# Patient Record
Sex: FEMALE | Race: WHITE | Employment: OTHER | URBAN - METROPOLITAN AREA
[De-identification: names, ages, dates, MRNs, and addresses within clinical notes are randomized per-mention and may not be internally consistent; named-entity substitution may affect disease eponyms.]

---

## 2017-01-17 ENCOUNTER — OFFICE VISIT (OUTPATIENT)
Dept: FAMILY MEDICINE CLINIC | Age: 67
End: 2017-01-17

## 2017-01-17 ENCOUNTER — HOSPITAL ENCOUNTER (OUTPATIENT)
Dept: GENERAL RADIOLOGY | Age: 67
Discharge: HOME | End: 2017-01-17

## 2017-01-17 VITALS
OXYGEN SATURATION: 97 % | DIASTOLIC BLOOD PRESSURE: 70 MMHG | WEIGHT: 188 LBS | HEART RATE: 108 BPM | BODY MASS INDEX: 34.6 KG/M2 | HEIGHT: 62 IN | SYSTOLIC BLOOD PRESSURE: 130 MMHG

## 2017-01-17 DIAGNOSIS — J18.9 PNEUMONIA DUE TO INFECTIOUS ORGANISM, UNSPECIFIED LATERALITY, UNSPECIFIED PART OF LUNG: ICD-10-CM

## 2017-01-17 DIAGNOSIS — N39.0 CHRONIC UTI: ICD-10-CM

## 2017-01-17 DIAGNOSIS — J18.9 PNEUMONIA OF BOTH LUNGS DUE TO INFECTIOUS ORGANISM, UNSPECIFIED PART OF LUNG: Primary | ICD-10-CM

## 2017-01-17 PROCEDURE — G8400 PT W/DXA NO RESULTS DOC: HCPCS | Performed by: NURSE PRACTITIONER

## 2017-01-17 PROCEDURE — 3014F SCREEN MAMMO DOC REV: CPT | Performed by: NURSE PRACTITIONER

## 2017-01-17 PROCEDURE — 99214 OFFICE O/P EST MOD 30 MIN: CPT | Performed by: NURSE PRACTITIONER

## 2017-01-17 PROCEDURE — 1123F ACP DISCUSS/DSCN MKR DOCD: CPT | Performed by: NURSE PRACTITIONER

## 2017-01-17 PROCEDURE — G8427 DOCREV CUR MEDS BY ELIG CLIN: HCPCS | Performed by: NURSE PRACTITIONER

## 2017-01-17 PROCEDURE — 4040F PNEUMOC VAC/ADMIN/RCVD: CPT | Performed by: NURSE PRACTITIONER

## 2017-01-17 PROCEDURE — G8484 FLU IMMUNIZE NO ADMIN: HCPCS | Performed by: NURSE PRACTITIONER

## 2017-01-17 PROCEDURE — 1036F TOBACCO NON-USER: CPT | Performed by: NURSE PRACTITIONER

## 2017-01-17 PROCEDURE — 3017F COLORECTAL CA SCREEN DOC REV: CPT | Performed by: NURSE PRACTITIONER

## 2017-01-17 PROCEDURE — G8419 CALC BMI OUT NRM PARAM NOF/U: HCPCS | Performed by: NURSE PRACTITIONER

## 2017-01-17 PROCEDURE — 1090F PRES/ABSN URINE INCON ASSESS: CPT | Performed by: NURSE PRACTITIONER

## 2017-01-17 ASSESSMENT — ENCOUNTER SYMPTOMS
TROUBLE SWALLOWING: 0
CHEST TIGHTNESS: 0
SORE THROAT: 0
SHORTNESS OF BREATH: 0
VOMITING: 0
DIFFICULTY BREATHING: 0
NAUSEA: 0
HEMOPTYSIS: 0
SPUTUM PRODUCTION: 0
HOARSE VOICE: 0
RHINORRHEA: 0
WHEEZING: 0
COUGH: 1
FREQUENT THROAT CLEARING: 0

## 2017-01-23 RX ORDER — GABAPENTIN 100 MG/1
100 CAPSULE ORAL 3 TIMES DAILY
Qty: 90 CAPSULE | Refills: 3 | Status: ON HOLD | OUTPATIENT
Start: 2017-01-23 | End: 2017-05-22 | Stop reason: HOSPADM

## 2017-01-31 RX ORDER — IBUPROFEN 800 MG/1
TABLET ORAL
Qty: 60 TABLET | Refills: 0 | Status: SHIPPED | OUTPATIENT
Start: 2017-01-31 | End: 2017-03-10 | Stop reason: SDUPTHER

## 2017-01-31 RX ORDER — TIZANIDINE 4 MG/1
TABLET ORAL
Qty: 60 TABLET | Refills: 0 | Status: SHIPPED | OUTPATIENT
Start: 2017-01-31 | End: 2017-03-13 | Stop reason: SDUPTHER

## 2017-02-13 ENCOUNTER — OFFICE VISIT (OUTPATIENT)
Dept: FAMILY MEDICINE CLINIC | Age: 67
End: 2017-02-13

## 2017-02-13 VITALS
DIASTOLIC BLOOD PRESSURE: 76 MMHG | OXYGEN SATURATION: 98 % | HEART RATE: 74 BPM | WEIGHT: 188 LBS | HEIGHT: 62 IN | BODY MASS INDEX: 34.6 KG/M2 | SYSTOLIC BLOOD PRESSURE: 134 MMHG

## 2017-02-13 DIAGNOSIS — R21 RASH: ICD-10-CM

## 2017-02-13 DIAGNOSIS — B37.0 THRUSH: ICD-10-CM

## 2017-02-13 DIAGNOSIS — L30.4 INTERTRIGO: Primary | ICD-10-CM

## 2017-02-13 PROCEDURE — G8417 CALC BMI ABV UP PARAM F/U: HCPCS | Performed by: NURSE PRACTITIONER

## 2017-02-13 PROCEDURE — 1036F TOBACCO NON-USER: CPT | Performed by: NURSE PRACTITIONER

## 2017-02-13 PROCEDURE — 99213 OFFICE O/P EST LOW 20 MIN: CPT | Performed by: NURSE PRACTITIONER

## 2017-02-13 PROCEDURE — G8427 DOCREV CUR MEDS BY ELIG CLIN: HCPCS | Performed by: NURSE PRACTITIONER

## 2017-02-13 PROCEDURE — 1090F PRES/ABSN URINE INCON ASSESS: CPT | Performed by: NURSE PRACTITIONER

## 2017-02-13 PROCEDURE — 1123F ACP DISCUSS/DSCN MKR DOCD: CPT | Performed by: NURSE PRACTITIONER

## 2017-02-13 PROCEDURE — G8484 FLU IMMUNIZE NO ADMIN: HCPCS | Performed by: NURSE PRACTITIONER

## 2017-02-13 PROCEDURE — G8400 PT W/DXA NO RESULTS DOC: HCPCS | Performed by: NURSE PRACTITIONER

## 2017-02-13 PROCEDURE — 4040F PNEUMOC VAC/ADMIN/RCVD: CPT | Performed by: NURSE PRACTITIONER

## 2017-02-13 PROCEDURE — 3014F SCREEN MAMMO DOC REV: CPT | Performed by: NURSE PRACTITIONER

## 2017-02-13 PROCEDURE — 3017F COLORECTAL CA SCREEN DOC REV: CPT | Performed by: NURSE PRACTITIONER

## 2017-02-13 RX ORDER — NYSTATIN 100000 U/G
CREAM TOPICAL
Qty: 30 G | Refills: 0 | Status: ON HOLD | OUTPATIENT
Start: 2017-02-13 | End: 2022-06-28

## 2017-02-13 ASSESSMENT — ENCOUNTER SYMPTOMS
VOMITING: 0
COUGH: 0
EYE PAIN: 0
SORE THROAT: 0
RHINORRHEA: 0
SHORTNESS OF BREATH: 0
DIARRHEA: 0
NAIL CHANGES: 0
SINUS PRESSURE: 0

## 2017-02-15 RX ORDER — ZOLPIDEM TARTRATE 10 MG/1
TABLET ORAL
Qty: 15 TABLET | Refills: 0 | OUTPATIENT
Start: 2017-02-15

## 2017-03-01 ENCOUNTER — TELEPHONE (OUTPATIENT)
Dept: FAMILY MEDICINE CLINIC | Age: 67
End: 2017-03-01

## 2017-03-06 ENCOUNTER — OFFICE VISIT (OUTPATIENT)
Dept: FAMILY MEDICINE CLINIC | Age: 67
End: 2017-03-06

## 2017-03-06 VITALS
DIASTOLIC BLOOD PRESSURE: 70 MMHG | HEIGHT: 61 IN | HEART RATE: 108 BPM | OXYGEN SATURATION: 98 % | WEIGHT: 196 LBS | SYSTOLIC BLOOD PRESSURE: 134 MMHG | BODY MASS INDEX: 37 KG/M2

## 2017-03-06 DIAGNOSIS — N76.0 ACUTE VAGINITIS: ICD-10-CM

## 2017-03-06 DIAGNOSIS — R35.0 URINARY FREQUENCY: Primary | ICD-10-CM

## 2017-03-06 DIAGNOSIS — B37.0 ORAL YEAST INFECTION: ICD-10-CM

## 2017-03-06 LAB
BILIRUBIN, POC: NORMAL
BLOOD URINE, POC: NORMAL
CLARITY, POC: NORMAL
COLOR, POC: NORMAL
GLUCOSE URINE, POC: 500
KETONES, POC: NORMAL
LEUKOCYTE EST, POC: NORMAL
NITRITE, POC: POSITIVE
PH, POC: 7
PROTEIN, POC: NORMAL
SPECIFIC GRAVITY, POC: 1.01
UROBILINOGEN, POC: 0.2

## 2017-03-06 PROCEDURE — 3017F COLORECTAL CA SCREEN DOC REV: CPT | Performed by: NURSE PRACTITIONER

## 2017-03-06 PROCEDURE — G8484 FLU IMMUNIZE NO ADMIN: HCPCS | Performed by: NURSE PRACTITIONER

## 2017-03-06 PROCEDURE — 3014F SCREEN MAMMO DOC REV: CPT | Performed by: NURSE PRACTITIONER

## 2017-03-06 PROCEDURE — G8427 DOCREV CUR MEDS BY ELIG CLIN: HCPCS | Performed by: NURSE PRACTITIONER

## 2017-03-06 PROCEDURE — 1036F TOBACCO NON-USER: CPT | Performed by: NURSE PRACTITIONER

## 2017-03-06 PROCEDURE — 99213 OFFICE O/P EST LOW 20 MIN: CPT | Performed by: NURSE PRACTITIONER

## 2017-03-06 PROCEDURE — 81003 URINALYSIS AUTO W/O SCOPE: CPT | Performed by: NURSE PRACTITIONER

## 2017-03-06 PROCEDURE — 1123F ACP DISCUSS/DSCN MKR DOCD: CPT | Performed by: NURSE PRACTITIONER

## 2017-03-06 PROCEDURE — 4040F PNEUMOC VAC/ADMIN/RCVD: CPT | Performed by: NURSE PRACTITIONER

## 2017-03-06 PROCEDURE — 1090F PRES/ABSN URINE INCON ASSESS: CPT | Performed by: NURSE PRACTITIONER

## 2017-03-06 PROCEDURE — G0444 DEPRESSION SCREEN ANNUAL: HCPCS | Performed by: NURSE PRACTITIONER

## 2017-03-06 PROCEDURE — G8417 CALC BMI ABV UP PARAM F/U: HCPCS | Performed by: NURSE PRACTITIONER

## 2017-03-06 PROCEDURE — G8400 PT W/DXA NO RESULTS DOC: HCPCS | Performed by: NURSE PRACTITIONER

## 2017-03-06 RX ORDER — FLUCONAZOLE 100 MG/1
150 TABLET ORAL EVERY OTHER DAY
Qty: 3 TABLET | Refills: 0 | Status: SHIPPED | OUTPATIENT
Start: 2017-03-06 | End: 2017-04-06

## 2017-03-06 ASSESSMENT — ENCOUNTER SYMPTOMS
CONSTIPATION: 0
SINUS PRESSURE: 0
DIARRHEA: 0
NAUSEA: 0
ABDOMINAL PAIN: 0
SORE THROAT: 0

## 2017-03-06 ASSESSMENT — PATIENT HEALTH QUESTIONNAIRE - PHQ9
2. FEELING DOWN, DEPRESSED OR HOPELESS: 2
SUM OF ALL RESPONSES TO PHQ QUESTIONS 1-9: 13
SUM OF ALL RESPONSES TO PHQ9 QUESTIONS 1 & 2: 5
10. IF YOU CHECKED OFF ANY PROBLEMS, HOW DIFFICULT HAVE THESE PROBLEMS MADE IT FOR YOU TO DO YOUR WORK, TAKE CARE OF THINGS AT HOME, OR GET ALONG WITH OTHER PEOPLE: 0
3. TROUBLE FALLING OR STAYING ASLEEP: 3
9. THOUGHTS THAT YOU WOULD BE BETTER OFF DEAD, OR OF HURTING YOURSELF: 0
8. MOVING OR SPEAKING SO SLOWLY THAT OTHER PEOPLE COULD HAVE NOTICED. OR THE OPPOSITE, BEING SO FIGETY OR RESTLESS THAT YOU HAVE BEEN MOVING AROUND A LOT MORE THAN USUAL: 0
7. TROUBLE CONCENTRATING ON THINGS, SUCH AS READING THE NEWSPAPER OR WATCHING TELEVISION: 2
6. FEELING BAD ABOUT YOURSELF - OR THAT YOU ARE A FAILURE OR HAVE LET YOURSELF OR YOUR FAMILY DOWN: 0
1. LITTLE INTEREST OR PLEASURE IN DOING THINGS: 3
4. FEELING TIRED OR HAVING LITTLE ENERGY: 3
5. POOR APPETITE OR OVEREATING: 0

## 2017-03-07 ENCOUNTER — HOSPITAL ENCOUNTER (OUTPATIENT)
Dept: GENERAL RADIOLOGY | Age: 67
Discharge: HOME | End: 2017-03-07

## 2017-03-07 LAB
A/G RATIO: 1.5 (ref 1.1–2.2)
ALBUMIN SERPL-MCNC: 4.1 G/DL (ref 3.4–5)
ALP BLD-CCNC: 128 U/L (ref 40–129)
ALT SERPL-CCNC: 40 U/L (ref 10–40)
ANION GAP SERPL CALCULATED.3IONS-SCNC: 21 MMOL/L (ref 3–16)
AST SERPL-CCNC: 24 U/L (ref 15–37)
BANDED NEUTROPHILS RELATIVE PERCENT: 1 % (ref 0–7)
BASOPHILS ABSOLUTE: 0 K/UL (ref 0–0.2)
BASOPHILS RELATIVE PERCENT: 0 %
BILIRUB SERPL-MCNC: <0.2 MG/DL (ref 0–1)
BUN BLDV-MCNC: 24 MG/DL (ref 7–20)
CALCIUM SERPL-MCNC: 9.2 MG/DL (ref 8.3–10.6)
CHLORIDE BLD-SCNC: 98 MMOL/L (ref 99–110)
CHOLESTEROL, TOTAL: 340 MG/DL (ref 0–199)
CO2: 18 MMOL/L (ref 21–32)
CREAT SERPL-MCNC: 0.7 MG/DL (ref 0.6–1.2)
EOSINOPHILS ABSOLUTE: 0.2 K/UL (ref 0–0.6)
EOSINOPHILS RELATIVE PERCENT: 1 %
GFR AFRICAN AMERICAN: >60
GFR NON-AFRICAN AMERICAN: >60
GLOBULIN: 2.8 G/DL
GLUCOSE BLD-MCNC: 155 MG/DL (ref 70–99)
HCT VFR BLD CALC: 29.2 % (ref 36–48)
HDLC SERPL-MCNC: 92 MG/DL (ref 40–60)
HEMOGLOBIN: 9.2 G/DL (ref 12–16)
LDL CHOLESTEROL CALCULATED: 197 MG/DL
LYMPHOCYTES ABSOLUTE: 2.5 K/UL (ref 1–5.1)
LYMPHOCYTES RELATIVE PERCENT: 15 %
MCH RBC QN AUTO: 25.4 PG (ref 26–34)
MCHC RBC AUTO-ENTMCNC: 31.6 G/DL (ref 31–36)
MCV RBC AUTO: 80.5 FL (ref 80–100)
MONOCYTES ABSOLUTE: 0.5 K/UL (ref 0–1.3)
MONOCYTES RELATIVE PERCENT: 3 %
MYELOCYTE PERCENT: 1 %
NEUTROPHILS ABSOLUTE: 13.3 K/UL (ref 1.7–7.7)
NEUTROPHILS RELATIVE PERCENT: 79 %
OVALOCYTES: ABNORMAL
PDW BLD-RTO: 16.6 % (ref 12.4–15.4)
PLATELET # BLD: 510 K/UL (ref 135–450)
PMV BLD AUTO: 7.5 FL (ref 5–10.5)
POTASSIUM SERPL-SCNC: 4.3 MMOL/L (ref 3.5–5.1)
RBC # BLD: 3.63 M/UL (ref 4–5.2)
SLIDE REVIEW: ABNORMAL
SODIUM BLD-SCNC: 137 MMOL/L (ref 136–145)
TOTAL PROTEIN: 6.9 G/DL (ref 6.4–8.2)
TRIGL SERPL-MCNC: 256 MG/DL (ref 0–150)
TSH SERPL DL<=0.05 MIU/L-ACNC: 0.69 UIU/ML (ref 0.27–4.2)
VITAMIN D 25-HYDROXY: 32 NG/ML
VLDLC SERPL CALC-MCNC: 51 MG/DL
WBC # BLD: 16.4 K/UL (ref 4–11)

## 2017-03-08 LAB
ESTIMATED AVERAGE GLUCOSE: 134.1 MG/DL
HBA1C MFR BLD: 6.3 %
ORGANISM: ABNORMAL
URINE CULTURE, ROUTINE: ABNORMAL

## 2017-03-13 RX ORDER — AMLODIPINE BESYLATE 5 MG/1
TABLET ORAL
Qty: 30 TABLET | Refills: 0 | Status: SHIPPED | OUTPATIENT
Start: 2017-03-13 | End: 2017-04-06

## 2017-03-27 ENCOUNTER — TELEPHONE (OUTPATIENT)
Dept: FAMILY MEDICINE CLINIC | Age: 67
End: 2017-03-27

## 2017-03-28 DIAGNOSIS — K13.70 ORAL LESION: Primary | ICD-10-CM

## 2017-03-29 ENCOUNTER — TELEPHONE (OUTPATIENT)
Dept: FAMILY MEDICINE CLINIC | Age: 67
End: 2017-03-29

## 2017-03-30 ENCOUNTER — TELEPHONE (OUTPATIENT)
Dept: FAMILY MEDICINE CLINIC | Age: 67
End: 2017-03-30

## 2017-03-31 ENCOUNTER — TELEPHONE (OUTPATIENT)
Dept: FAMILY MEDICINE CLINIC | Age: 67
End: 2017-03-31

## 2017-03-31 RX ORDER — IBUPROFEN 800 MG/1
TABLET ORAL
Qty: 30 TABLET | Refills: 0 | Status: ON HOLD | OUTPATIENT
Start: 2017-03-31 | End: 2017-12-28 | Stop reason: HOSPADM

## 2017-04-03 ENCOUNTER — CARE COORDINATION (OUTPATIENT)
Dept: FAMILY MEDICINE CLINIC | Age: 67
End: 2017-04-03

## 2017-04-05 ENCOUNTER — OFFICE VISIT (OUTPATIENT)
Dept: FAMILY MEDICINE CLINIC | Age: 67
End: 2017-04-05

## 2017-04-05 VITALS
BODY MASS INDEX: 37.95 KG/M2 | SYSTOLIC BLOOD PRESSURE: 130 MMHG | DIASTOLIC BLOOD PRESSURE: 60 MMHG | HEIGHT: 61 IN | HEART RATE: 102 BPM | WEIGHT: 201 LBS | OXYGEN SATURATION: 96 %

## 2017-04-05 DIAGNOSIS — R63.5 WEIGHT GAIN: ICD-10-CM

## 2017-04-05 DIAGNOSIS — R06.02 SHORTNESS OF BREATH: Primary | ICD-10-CM

## 2017-04-05 DIAGNOSIS — R60.9 PERIPHERAL EDEMA: ICD-10-CM

## 2017-04-05 PROCEDURE — G8427 DOCREV CUR MEDS BY ELIG CLIN: HCPCS | Performed by: NURSE PRACTITIONER

## 2017-04-05 PROCEDURE — 3017F COLORECTAL CA SCREEN DOC REV: CPT | Performed by: NURSE PRACTITIONER

## 2017-04-05 PROCEDURE — G8400 PT W/DXA NO RESULTS DOC: HCPCS | Performed by: NURSE PRACTITIONER

## 2017-04-05 PROCEDURE — 99213 OFFICE O/P EST LOW 20 MIN: CPT | Performed by: NURSE PRACTITIONER

## 2017-04-05 PROCEDURE — 1036F TOBACCO NON-USER: CPT | Performed by: NURSE PRACTITIONER

## 2017-04-05 PROCEDURE — 4040F PNEUMOC VAC/ADMIN/RCVD: CPT | Performed by: NURSE PRACTITIONER

## 2017-04-05 PROCEDURE — 1123F ACP DISCUSS/DSCN MKR DOCD: CPT | Performed by: NURSE PRACTITIONER

## 2017-04-05 PROCEDURE — 1090F PRES/ABSN URINE INCON ASSESS: CPT | Performed by: NURSE PRACTITIONER

## 2017-04-05 PROCEDURE — G8417 CALC BMI ABV UP PARAM F/U: HCPCS | Performed by: NURSE PRACTITIONER

## 2017-04-05 PROCEDURE — 3014F SCREEN MAMMO DOC REV: CPT | Performed by: NURSE PRACTITIONER

## 2017-04-05 RX ORDER — FUROSEMIDE 20 MG/1
20 TABLET ORAL 2 TIMES DAILY
Qty: 10 TABLET | Refills: 0 | Status: ON HOLD | OUTPATIENT
Start: 2017-04-05 | End: 2017-04-10 | Stop reason: HOSPADM

## 2017-04-05 RX ORDER — POTASSIUM CHLORIDE 20 MEQ/1
20 TABLET, EXTENDED RELEASE ORAL DAILY
Qty: 5 TABLET | Refills: 0 | Status: SHIPPED | OUTPATIENT
Start: 2017-04-05 | End: 2017-04-06

## 2017-04-05 ASSESSMENT — ENCOUNTER SYMPTOMS
HEMOPTYSIS: 0
ABDOMINAL PAIN: 0
SHORTNESS OF BREATH: 1
WHEEZING: 0
SWOLLEN GLANDS: 0
RHINORRHEA: 0
VOMITING: 0
SPUTUM PRODUCTION: 0
SORE THROAT: 0
ORTHOPNEA: 1

## 2017-04-10 ENCOUNTER — EPISODE CHANGES (OUTPATIENT)
Dept: CASE MANAGEMENT | Age: 67
End: 2017-04-10

## 2017-04-11 ENCOUNTER — CARE COORDINATION (OUTPATIENT)
Dept: CASE MANAGEMENT | Age: 67
End: 2017-04-11

## 2017-04-11 ENCOUNTER — EPISODE CHANGES (OUTPATIENT)
Dept: CASE MANAGEMENT | Age: 67
End: 2017-04-11

## 2017-04-11 ENCOUNTER — TELEPHONE (OUTPATIENT)
Dept: PHARMACY | Facility: CLINIC | Age: 67
End: 2017-04-11

## 2017-04-11 RX ORDER — FERROUS SULFATE 325(65) MG
325 TABLET ORAL
Status: ON HOLD | COMMUNITY
End: 2017-05-22

## 2017-04-11 RX ORDER — M-VIT,TX,IRON,MINS/CALC/FOLIC 27MG-0.4MG
1 TABLET ORAL DAILY
Status: ON HOLD | COMMUNITY
End: 2018-01-10 | Stop reason: CLARIF

## 2017-04-11 RX ORDER — DOCUSATE SODIUM 100 MG/1
100 CAPSULE, LIQUID FILLED ORAL 2 TIMES DAILY
Status: ON HOLD | COMMUNITY
End: 2018-01-10 | Stop reason: CLARIF

## 2017-04-12 ENCOUNTER — CARE COORDINATION (OUTPATIENT)
Dept: CASE MANAGEMENT | Age: 67
End: 2017-04-12

## 2017-04-13 ENCOUNTER — EPISODE CHANGES (OUTPATIENT)
Dept: CASE MANAGEMENT | Age: 67
End: 2017-04-13

## 2017-04-13 ENCOUNTER — OFFICE VISIT (OUTPATIENT)
Dept: FAMILY MEDICINE CLINIC | Age: 67
End: 2017-04-13

## 2017-04-13 ENCOUNTER — CARE COORDINATION (OUTPATIENT)
Dept: CASE MANAGEMENT | Age: 67
End: 2017-04-13

## 2017-04-13 VITALS
HEIGHT: 61 IN | SYSTOLIC BLOOD PRESSURE: 126 MMHG | WEIGHT: 199 LBS | DIASTOLIC BLOOD PRESSURE: 64 MMHG | OXYGEN SATURATION: 97 % | HEART RATE: 71 BPM | BODY MASS INDEX: 37.57 KG/M2

## 2017-04-13 DIAGNOSIS — E03.9 ACQUIRED HYPOTHYROIDISM: ICD-10-CM

## 2017-04-13 DIAGNOSIS — I50.31 ACUTE DIASTOLIC HEART FAILURE (HCC): Primary | ICD-10-CM

## 2017-04-13 DIAGNOSIS — D64.9 ANEMIA, UNSPECIFIED TYPE: ICD-10-CM

## 2017-04-13 DIAGNOSIS — Z13.31 POSITIVE DEPRESSION SCREENING: ICD-10-CM

## 2017-04-13 DIAGNOSIS — Z91.81 AT HIGH RISK FOR FALLS: ICD-10-CM

## 2017-04-13 PROCEDURE — 1123F ACP DISCUSS/DSCN MKR DOCD: CPT | Performed by: NURSE PRACTITIONER

## 2017-04-13 PROCEDURE — G8400 PT W/DXA NO RESULTS DOC: HCPCS | Performed by: NURSE PRACTITIONER

## 2017-04-13 PROCEDURE — G8431 POS CLIN DEPRES SCRN F/U DOC: HCPCS | Performed by: NURSE PRACTITIONER

## 2017-04-13 PROCEDURE — 3017F COLORECTAL CA SCREEN DOC REV: CPT | Performed by: NURSE PRACTITIONER

## 2017-04-13 PROCEDURE — 99213 OFFICE O/P EST LOW 20 MIN: CPT | Performed by: NURSE PRACTITIONER

## 2017-04-13 PROCEDURE — G8417 CALC BMI ABV UP PARAM F/U: HCPCS | Performed by: NURSE PRACTITIONER

## 2017-04-13 PROCEDURE — 4040F PNEUMOC VAC/ADMIN/RCVD: CPT | Performed by: NURSE PRACTITIONER

## 2017-04-13 PROCEDURE — 1090F PRES/ABSN URINE INCON ASSESS: CPT | Performed by: NURSE PRACTITIONER

## 2017-04-13 PROCEDURE — 1036F TOBACCO NON-USER: CPT | Performed by: NURSE PRACTITIONER

## 2017-04-13 PROCEDURE — G8427 DOCREV CUR MEDS BY ELIG CLIN: HCPCS | Performed by: NURSE PRACTITIONER

## 2017-04-13 PROCEDURE — 1111F DSCHRG MED/CURRENT MED MERGE: CPT | Performed by: NURSE PRACTITIONER

## 2017-04-13 PROCEDURE — 3014F SCREEN MAMMO DOC REV: CPT | Performed by: NURSE PRACTITIONER

## 2017-04-13 RX ORDER — BLOOD PRESSURE TEST KIT
KIT MISCELLANEOUS
Qty: 1 KIT | Refills: 0 | Status: ON HOLD | OUTPATIENT
Start: 2017-04-13 | End: 2018-01-10

## 2017-04-13 ASSESSMENT — ENCOUNTER SYMPTOMS
SHORTNESS OF BREATH: 0
CHOKING: 0
WHEEZING: 0
COUGH: 0

## 2017-04-17 ENCOUNTER — CARE COORDINATION (OUTPATIENT)
Dept: FAMILY MEDICINE CLINIC | Age: 67
End: 2017-04-17

## 2017-04-17 ENCOUNTER — TELEPHONE (OUTPATIENT)
Dept: FAMILY MEDICINE CLINIC | Age: 67
End: 2017-04-17

## 2017-04-17 ENCOUNTER — TELEPHONE (OUTPATIENT)
Dept: CARE COORDINATION | Age: 67
End: 2017-04-17

## 2017-04-18 ENCOUNTER — CARE COORDINATION (OUTPATIENT)
Dept: FAMILY MEDICINE CLINIC | Age: 67
End: 2017-04-18

## 2017-04-19 ENCOUNTER — TELEPHONE (OUTPATIENT)
Dept: CARE COORDINATION | Age: 67
End: 2017-04-19

## 2017-04-20 ENCOUNTER — TELEPHONE (OUTPATIENT)
Dept: FAMILY MEDICINE CLINIC | Age: 67
End: 2017-04-20

## 2017-04-24 ENCOUNTER — TELEPHONE (OUTPATIENT)
Dept: FAMILY MEDICINE CLINIC | Age: 67
End: 2017-04-24

## 2017-04-24 DIAGNOSIS — R21 RASH: Primary | ICD-10-CM

## 2017-04-27 RX ORDER — HYDROXYZINE HYDROCHLORIDE 25 MG/1
TABLET, FILM COATED ORAL
OUTPATIENT
Start: 2017-04-27

## 2017-04-27 RX ORDER — TIZANIDINE 4 MG/1
TABLET ORAL
Qty: 60 TABLET | Refills: 0 | OUTPATIENT
Start: 2017-04-27

## 2017-05-08 RX ORDER — IBUPROFEN 800 MG/1
800 TABLET ORAL EVERY 8 HOURS PRN
Qty: 30 TABLET | Refills: 0 | OUTPATIENT
Start: 2017-05-08

## 2017-05-08 RX ORDER — AMLODIPINE BESYLATE 5 MG/1
5 TABLET ORAL DAILY
Qty: 30 TABLET | OUTPATIENT
Start: 2017-05-08

## 2017-05-08 RX ORDER — LEVOTHYROXINE SODIUM 0.05 MG/1
50 TABLET ORAL DAILY
Qty: 30 TABLET | Refills: 3 | Status: SHIPPED | OUTPATIENT
Start: 2017-05-08 | End: 2017-08-22 | Stop reason: SDUPTHER

## 2017-05-08 RX ORDER — TIZANIDINE 4 MG/1
TABLET ORAL
Qty: 60 TABLET | Refills: 0 | OUTPATIENT
Start: 2017-05-08

## 2017-05-08 RX ORDER — AMLODIPINE BESYLATE 5 MG/1
5 TABLET ORAL DAILY
Status: ON HOLD | COMMUNITY
End: 2017-05-22 | Stop reason: HOSPADM

## 2017-05-10 ENCOUNTER — TELEPHONE (OUTPATIENT)
Dept: FAMILY MEDICINE CLINIC | Age: 67
End: 2017-05-10

## 2017-05-15 ENCOUNTER — OFFICE VISIT (OUTPATIENT)
Dept: FAMILY MEDICINE CLINIC | Age: 67
End: 2017-05-15

## 2017-05-15 VITALS
OXYGEN SATURATION: 96 % | DIASTOLIC BLOOD PRESSURE: 60 MMHG | HEART RATE: 95 BPM | WEIGHT: 203 LBS | SYSTOLIC BLOOD PRESSURE: 112 MMHG | HEIGHT: 61 IN | BODY MASS INDEX: 38.33 KG/M2

## 2017-05-15 DIAGNOSIS — I50.31 ACUTE DIASTOLIC HEART FAILURE (HCC): ICD-10-CM

## 2017-05-15 DIAGNOSIS — J30.9 ALLERGIC RHINITIS, UNSPECIFIED ALLERGIC RHINITIS TRIGGER, UNSPECIFIED RHINITIS SEASONALITY: ICD-10-CM

## 2017-05-15 DIAGNOSIS — N39.0 URINARY TRACT INFECTION, SITE UNSPECIFIED: ICD-10-CM

## 2017-05-15 DIAGNOSIS — R30.0 DYSURIA: Primary | ICD-10-CM

## 2017-05-15 LAB
BILIRUBIN, POC: NORMAL
BLOOD URINE, POC: NORMAL
CLARITY, POC: NORMAL
COLOR, POC: NORMAL
GLUCOSE URINE, POC: NORMAL
KETONES, POC: NORMAL
LEUKOCYTE EST, POC: NORMAL
NITRITE, POC: NORMAL
PH, POC: 7
PROTEIN, POC: NORMAL
SPECIFIC GRAVITY, POC: 1.01
UROBILINOGEN, POC: 0.2

## 2017-05-15 PROCEDURE — 81002 URINALYSIS NONAUTO W/O SCOPE: CPT | Performed by: NURSE PRACTITIONER

## 2017-05-15 PROCEDURE — 99213 OFFICE O/P EST LOW 20 MIN: CPT | Performed by: NURSE PRACTITIONER

## 2017-05-15 RX ORDER — LORATADINE 10 MG/1
10 TABLET ORAL DAILY
Qty: 30 TABLET | Refills: 0 | Status: SHIPPED | OUTPATIENT
Start: 2017-05-15 | End: 2017-06-27 | Stop reason: SDUPTHER

## 2017-05-15 RX ORDER — FLUTICASONE PROPIONATE 50 MCG
2 SPRAY, SUSPENSION (ML) NASAL DAILY
Qty: 16 G | Refills: 0 | Status: ON HOLD | OUTPATIENT
Start: 2017-05-15 | End: 2018-01-10 | Stop reason: CLARIF

## 2017-05-15 ASSESSMENT — ENCOUNTER SYMPTOMS
COUGH: 0
EYE REDNESS: 0
EYE DISCHARGE: 1
RHINORRHEA: 0
SINUS COMPLAINT: 1
CHEST TIGHTNESS: 0
VOMITING: 0
NAUSEA: 0
HOARSE VOICE: 0
SHORTNESS OF BREATH: 1
TROUBLE SWALLOWING: 1
SINUS PRESSURE: 1

## 2017-05-18 PROBLEM — I10 HYPERTENSION: Status: ACTIVE | Noted: 2017-05-18

## 2017-05-18 PROBLEM — E87.6 HYPOKALEMIA: Status: ACTIVE | Noted: 2017-05-18

## 2017-05-18 PROBLEM — K59.00 CONSTIPATION: Status: ACTIVE | Noted: 2017-05-18

## 2017-05-23 ENCOUNTER — TELEPHONE (OUTPATIENT)
Dept: PHARMACY | Facility: CLINIC | Age: 67
End: 2017-05-23

## 2017-05-23 ENCOUNTER — CARE COORDINATION (OUTPATIENT)
Dept: CASE MANAGEMENT | Age: 67
End: 2017-05-23

## 2017-05-24 ENCOUNTER — CARE COORDINATION (OUTPATIENT)
Dept: CASE MANAGEMENT | Age: 67
End: 2017-05-24

## 2017-05-26 ENCOUNTER — CARE COORDINATION (OUTPATIENT)
Dept: CASE MANAGEMENT | Age: 67
End: 2017-05-26

## 2017-06-02 ENCOUNTER — CARE COORDINATION (OUTPATIENT)
Dept: CASE MANAGEMENT | Age: 67
End: 2017-06-02

## 2017-06-05 RX ORDER — IBUPROFEN 800 MG/1
800 TABLET ORAL EVERY 8 HOURS PRN
Qty: 30 TABLET | Refills: 3 | OUTPATIENT
Start: 2017-06-05

## 2017-06-27 DIAGNOSIS — J30.9 ALLERGIC RHINITIS, UNSPECIFIED ALLERGIC RHINITIS TRIGGER, UNSPECIFIED RHINITIS SEASONALITY: ICD-10-CM

## 2017-06-28 RX ORDER — LORATADINE 10 MG/1
TABLET ORAL
Qty: 30 TABLET | Refills: 0 | Status: SHIPPED | OUTPATIENT
Start: 2017-06-28 | End: 2017-07-25 | Stop reason: SDUPTHER

## 2017-07-28 ENCOUNTER — TELEPHONE (OUTPATIENT)
Dept: DERMATOLOGY | Age: 67
End: 2017-07-28

## 2017-08-22 RX ORDER — LEVOTHYROXINE SODIUM 0.05 MG/1
TABLET ORAL
Qty: 30 TABLET | Refills: 3 | Status: SHIPPED | OUTPATIENT
Start: 2017-08-22 | End: 2017-11-30 | Stop reason: SDUPTHER

## 2017-09-05 ENCOUNTER — OFFICE VISIT (OUTPATIENT)
Dept: FAMILY MEDICINE CLINIC | Age: 67
End: 2017-09-05

## 2017-09-05 VITALS
HEART RATE: 77 BPM | DIASTOLIC BLOOD PRESSURE: 86 MMHG | WEIGHT: 200 LBS | SYSTOLIC BLOOD PRESSURE: 136 MMHG | HEIGHT: 63 IN | BODY MASS INDEX: 35.44 KG/M2 | OXYGEN SATURATION: 96 %

## 2017-09-05 DIAGNOSIS — M54.50 CHRONIC BILATERAL LOW BACK PAIN WITHOUT SCIATICA: ICD-10-CM

## 2017-09-05 DIAGNOSIS — Z13.820 OSTEOPOROSIS SCREENING: ICD-10-CM

## 2017-09-05 DIAGNOSIS — M54.2 CHRONIC NECK PAIN: Primary | ICD-10-CM

## 2017-09-05 DIAGNOSIS — G89.29 CHRONIC NECK PAIN: Primary | ICD-10-CM

## 2017-09-05 DIAGNOSIS — E55.9 VITAMIN D DEFICIENCY: ICD-10-CM

## 2017-09-05 DIAGNOSIS — G89.29 CHRONIC BILATERAL LOW BACK PAIN WITHOUT SCIATICA: ICD-10-CM

## 2017-09-05 DIAGNOSIS — Z23 IMMUNIZATION DUE: ICD-10-CM

## 2017-09-05 PROCEDURE — 90714 TD VACC NO PRESV 7 YRS+ IM: CPT | Performed by: NURSE PRACTITIONER

## 2017-09-05 PROCEDURE — 99213 OFFICE O/P EST LOW 20 MIN: CPT | Performed by: NURSE PRACTITIONER

## 2017-09-05 PROCEDURE — 90732 PPSV23 VACC 2 YRS+ SUBQ/IM: CPT | Performed by: NURSE PRACTITIONER

## 2017-09-05 PROCEDURE — G0009 ADMIN PNEUMOCOCCAL VACCINE: HCPCS | Performed by: NURSE PRACTITIONER

## 2017-09-05 PROCEDURE — 90471 IMMUNIZATION ADMIN: CPT | Performed by: NURSE PRACTITIONER

## 2017-09-05 RX ORDER — MELOXICAM 7.5 MG/1
7.5 TABLET ORAL DAILY
Qty: 30 TABLET | Refills: 3 | Status: ON HOLD | OUTPATIENT
Start: 2017-09-05 | End: 2018-01-10 | Stop reason: CLARIF

## 2017-09-05 RX ORDER — MULTIVIT-MIN/IRON/FOLIC ACID/K 18-600-40
1 CAPSULE ORAL DAILY
Qty: 30 CAPSULE | Refills: 3 | Status: ON HOLD | OUTPATIENT
Start: 2017-09-05 | End: 2018-01-10 | Stop reason: CLARIF

## 2017-09-05 ASSESSMENT — ENCOUNTER SYMPTOMS
SHORTNESS OF BREATH: 0
BACK PAIN: 1
BOWEL INCONTINENCE: 0

## 2017-09-21 DIAGNOSIS — G89.29 CHRONIC NECK PAIN: Primary | ICD-10-CM

## 2017-09-21 DIAGNOSIS — M54.2 CHRONIC NECK PAIN: Primary | ICD-10-CM

## 2017-09-26 ENCOUNTER — CARE COORDINATION (OUTPATIENT)
Dept: FAMILY MEDICINE CLINIC | Age: 67
End: 2017-09-26

## 2017-09-27 ENCOUNTER — TELEPHONE (OUTPATIENT)
Dept: FAMILY MEDICINE CLINIC | Age: 67
End: 2017-09-27

## 2017-09-27 NOTE — TELEPHONE ENCOUNTER
Deysi Porter asked that a letter stating she does not have Alzheimer or Dementia be faxed to [facility] to [phone]. I spoke with Lily Agustin, , his phone number is [phone] he states that he will fax over release of information to the office.

## 2017-10-05 ENCOUNTER — CARE COORDINATION (OUTPATIENT)
Dept: CARE COORDINATION | Age: 67
End: 2017-10-05

## 2017-10-12 ENCOUNTER — OFFICE VISIT (OUTPATIENT)
Dept: DERMATOLOGY | Age: 67
End: 2017-10-12

## 2017-10-12 DIAGNOSIS — L85.3 XEROSIS OF SKIN: ICD-10-CM

## 2017-10-12 DIAGNOSIS — L29.9 PRURITUS: ICD-10-CM

## 2017-10-12 DIAGNOSIS — L21.9 SEBORRHEIC DERMATITIS OF SCALP: ICD-10-CM

## 2017-10-12 DIAGNOSIS — Z78.9 TOBACCO NON-USER: ICD-10-CM

## 2017-10-12 DIAGNOSIS — L30.9 ECZEMA, UNSPECIFIED TYPE: Primary | ICD-10-CM

## 2017-10-12 PROCEDURE — 99203 OFFICE O/P NEW LOW 30 MIN: CPT | Performed by: DERMATOLOGY

## 2017-10-12 RX ORDER — CLOBETASOL PROPIONATE 0.46 MG/ML
SOLUTION TOPICAL
Qty: 50 ML | Refills: 2 | Status: SHIPPED | OUTPATIENT
Start: 2017-10-12 | End: 2018-02-09 | Stop reason: SDUPTHER

## 2017-10-12 RX ORDER — TRIAMCINOLONE ACETONIDE 1 MG/G
CREAM TOPICAL
Qty: 60 G | Refills: 2 | Status: ON HOLD | OUTPATIENT
Start: 2017-10-12 | End: 2018-03-06 | Stop reason: CLARIF

## 2017-10-12 RX ORDER — CICLOPIROX 1 G/100ML
SHAMPOO TOPICAL
Qty: 120 ML | Refills: 5 | Status: ON HOLD | OUTPATIENT
Start: 2017-10-12 | End: 2018-03-06 | Stop reason: CLARIF
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
## 2017-10-12 NOTE — PATIENT INSTRUCTIONS
Sun Protection Tips    · Apply broad spectrum water resistant sunscreen with an SPF of at least 30 to exposed areas of the skin. Dont forget the ears and lips! Remember to reapply sunscreen about every 2 hours and after swimming or sweating. · Wear sun protective clothing. Swim shirts (aka. rash guards) are a great idea and negates the need to reapply sunscreen in those areas. · Seek the shade whenever possible especially between the hours of 10am and 4pm when the suns rays are the strongest.     · Avoid tanning beds    Use cetaphil wash daily  Cerave moisturzer        DRY SKIN CARE    1. Do not take more than 1 shower or bath each day. Try to spend 10 minutes or less in the shower/bath. 2. Use a moisturizing soap such as Dove, Oil of Olay or Cetaphil. Antibacterial soaps can be too drying. 3. Use soap only on limited areas (face, underarms, groin). Try to avoid using soap on the arms, legs, trunk and back. 4. After showering, pat dry with a towel and generously apply a thick moisturizer all over. 5. If you are able, apply the moisturizer a second time during the day as well. 6. If a prescription cream or ointment has been ordered for you, apply the prescription medication to affected areas after your bath/shower while the skin is still damp, then apply the moisturizer as above. 7. When it comes to moisturizers, the thicker the better. Ointments (such as vaseline) are thicker than creams, and creams are thicker than lotions.  Suggested over-the-counter moisturizer:    CeraVe Moisturizing Cream in a jar/tub, Only CeraVe contains the three essential ceramides healthy skin needs      CeraVe Facial Moisturizing Lotion with SPF 30 is great for the face

## 2017-11-06 ENCOUNTER — CARE COORDINATION (OUTPATIENT)
Dept: CARE COORDINATION | Age: 67
End: 2017-11-06

## 2017-11-16 ENCOUNTER — OFFICE VISIT (OUTPATIENT)
Dept: DERMATOLOGY | Age: 67
End: 2017-11-16

## 2017-11-16 DIAGNOSIS — L85.3 XEROSIS OF SKIN: ICD-10-CM

## 2017-11-16 DIAGNOSIS — L81.0 POST-INFLAMMATORY HYPERPIGMENTATION: ICD-10-CM

## 2017-11-16 DIAGNOSIS — L21.9 SEBORRHEIC DERMATITIS OF SCALP: ICD-10-CM

## 2017-11-16 DIAGNOSIS — L30.9 ECZEMA, UNSPECIFIED TYPE: Primary | ICD-10-CM

## 2017-11-16 DIAGNOSIS — Z78.9 NON-TOBACCO USER: ICD-10-CM

## 2017-11-16 DIAGNOSIS — L29.9 PRURITUS: ICD-10-CM

## 2017-11-16 PROCEDURE — 99214 OFFICE O/P EST MOD 30 MIN: CPT | Performed by: DERMATOLOGY

## 2017-11-16 RX ORDER — FUROSEMIDE 20 MG/1
20 TABLET ORAL 2 TIMES DAILY PRN
Status: ON HOLD | COMMUNITY
Start: 2016-09-13 | End: 2018-01-22 | Stop reason: HOSPADM

## 2017-11-16 RX ORDER — SIMVASTATIN 20 MG
20 TABLET ORAL DAILY
Refills: 0 | Status: ON HOLD | COMMUNITY
Start: 2017-09-27 | End: 2018-03-31
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
## 2017-11-28 RX ORDER — LORATADINE 10 MG/1
TABLET ORAL
Qty: 30 TABLET | Refills: 3 | Status: SHIPPED | OUTPATIENT
Start: 2017-11-28 | End: 2018-01-08 | Stop reason: ALTCHOICE

## 2017-11-30 RX ORDER — LEVOTHYROXINE SODIUM 0.05 MG/1
50 TABLET ORAL DAILY
Qty: 30 TABLET | Refills: 3 | Status: ON HOLD | OUTPATIENT
Start: 2017-11-30 | End: 2022-09-07 | Stop reason: ALTCHOICE

## 2017-12-01 ENCOUNTER — CARE COORDINATION (OUTPATIENT)
Dept: CARE COORDINATION | Age: 67
End: 2017-12-01

## 2017-12-01 NOTE — CARE COORDINATION
Penter, CNP   furosemide (LASIX) 20 MG tablet Take 40 mg by mouth daily 9/13/16   Historical Provider, MD   simvastatin (ZOCOR) 20 MG tablet Take 20 mg by mouth daily 9/27/17   Historical Provider, MD   triamcinolone (KENALOG) 0.1 % cream Apply to affected areas on extremities and back BID x 2 weeks, then PRN flares 10/12/17   Cherry Rein, DO   Ciclopirox (LOPROX) 1 % SHAM Apply to affected areas on scalp 3 times per week, leave on for 5 minutes then rinse off lather 10/12/17   Cherry Rein, DO   clobetasol (TEMOVATE) 0.05 % external solution Apply a small amount BID to scalp only for 2-4 weeks, then prn flares 10/12/17   Cherry Vila, DO   Cholecalciferol (VITAMIN D) 2000 units CAPS capsule Take 1 capsule by mouth daily 9/5/17   Elizabeth Expose, CNP   meloxicam (MOBIC) 7.5 MG tablet Take 1 tablet by mouth daily 9/5/17   Elizabeth Expose, CNP   gabapentin (NEURONTIN) 300 MG capsule Take 1 capsule by mouth 3 times daily 5/22/17   Collin De La Torre MD   ferrous sulfate 325 (65 FE) MG tablet Take 1 tablet by mouth 2 times daily (with meals) 5/22/17   Collin De La Torre MD   potassium chloride (KLOR-CON M) 10 MEQ extended release tablet Take 1 tablet by mouth 2 times daily 5/22/17   Collin De La Torre MD   fluticasone [facility]) 50 MCG/ACT nasal spray 2 sprays by Nasal route daily 5/15/17   Elizabeth Expose, CNP   Misc. Devices (RAISED TOILET SEAT) MISC Unsteady gait ICD R26.81 4/17/17   Elizabeth Expose, CNP   Misc. Devices (PRECISION SCALE) MISC Weigh daily in the morning  ICD10: I50.31 4/17/17   Elizabeth Expose, CNP   Misc.  Devices (ADJUST BATH/SHOWER SEAT/BACK) MISC Patient in need of Transfer Tub Bench Unsteady on feet/unable to bare weight ICD 10: R26.81 4/17/17   Elizabeth Expose, CNP   Blood Pressure Monitoring (BLOOD PRESSURE MONITOR/L CUFF) MISC Check blood pressure twice daily  ICD 10: I10 4/17/17   Elizabeth Expose, CNP   Blood Pressure KIT Check blood pressure daily 4/13/17 Sarah Barnett CNP   docusate sodium (COLACE) 100 MG capsule Take 100 mg by mouth 2 times daily    Historical Provider, MD   Multiple Vitamins-Minerals (THERAPEUTIC MULTIVITAMIN-MINERALS) tablet Take 1 tablet by mouth daily    Historical Provider, MD   QUEtiapine (SEROQUEL) 100 MG tablet Take 100 mg by mouth 2 times daily    Historical Provider, MD   ibuprofen (ADVIL;MOTRIN) 800 MG tablet TAKE 1/2 TABLET BY MOUTH THREE TIMES DAILY AS NEEDED FOR PAIN 3/31/17   Sarah Barnett CNP   Magic Mouthwash (MIRACLE MOUTHWASH) Swish and spit 5 mLs 4 times daily as needed for Irritation 3/29/17   Ari Aguilar NP   tiZANidine (ZANAFLEX) 4 MG tablet TAKE 1-2 TABLETS BY MOUTH EVERY NIGHT AT BEDTIME 3/14/17   Tyson Vale MD   nystatin (MYCOSTATIN) 145041 UNIT/GM cream Apply topically 2 times daily under breasts 2/13/17   Sarah Barnett CNP   pantoprazole (PROTONIX) 40 MG tablet Take 1 tablet by mouth 2 times daily (before meals) 12/19/16   Sarah Barnett CNP   conjugated estrogens (PREMARIN) 0.625 MG/GM vaginal cream Place vaginally three times weekly.  11/3/16   Sarah Barnett CNP   dicyclomine (BENTYL) 10 MG capsule Take 10 mg by mouth 4 times daily (before meals and nightly)    Historical Provider, MD   betamethasone dipropionate 0.05 % lotion Apply topically 2 times daily as needed (rash)     Historical Provider, MD   halobetasol (ULTRAVATE) 0.05 % cream Apply topically 2 times daily as needed (rash)     Historical Provider, MD   desonide (DESOWEN) 0.05 % lotion Apply topically 2 times daily as needed (rash)     Historical Provider, MD   Wound Dressings (REVITADERM WOUND CARE) GEL Apply topically For psoriasis    Historical Provider, MD       Future Appointments  Date Time Provider Natalee Bolaños   5/22/2018 8:00 AM Rebecca Pope DO And Deanna MMA

## 2017-12-26 ENCOUNTER — CARE COORDINATION (OUTPATIENT)
Dept: CARE COORDINATION | Age: 67
End: 2017-12-26

## 2017-12-28 PROBLEM — R29.898 RIGHT HAND WEAKNESS: Status: ACTIVE | Noted: 2017-12-28

## 2017-12-28 PROBLEM — E11.9 DM2 (DIABETES MELLITUS, TYPE 2) (HCC): Chronic | Status: ACTIVE | Noted: 2017-12-28

## 2017-12-28 PROBLEM — M54.50 LOW BACK PAIN: Status: ACTIVE | Noted: 2017-12-28

## 2017-12-28 PROBLEM — E55.9 HYPOVITAMINOSIS D: Chronic | Status: ACTIVE | Noted: 2017-12-28

## 2017-12-28 PROBLEM — F41.9 ANXIETY AND DEPRESSION: Chronic | Status: ACTIVE | Noted: 2017-12-28

## 2017-12-28 PROBLEM — R79.89 ELEVATED LFTS: Status: ACTIVE | Noted: 2017-12-28

## 2017-12-28 PROBLEM — R73.9 ACUTE HYPERGLYCEMIA: Status: ACTIVE | Noted: 2017-12-28

## 2017-12-28 PROBLEM — R56.9 SEIZURES (HCC): Chronic | Status: ACTIVE | Noted: 2017-01-01

## 2017-12-28 PROBLEM — R56.9 SEIZURES (HCC): Status: ACTIVE | Noted: 2017-01-01

## 2017-12-28 PROBLEM — I50.32 CHRONIC DIASTOLIC CONGESTIVE HEART FAILURE (HCC): Chronic | Status: ACTIVE | Noted: 2017-04-13

## 2017-12-28 PROBLEM — K59.00 CONSTIPATION: Status: RESOLVED | Noted: 2017-05-18 | Resolved: 2017-12-28

## 2017-12-28 PROBLEM — F32.A ANXIETY AND DEPRESSION: Chronic | Status: ACTIVE | Noted: 2017-12-28

## 2017-12-28 PROBLEM — E87.6 HYPOKALEMIA: Status: RESOLVED | Noted: 2017-05-18 | Resolved: 2017-12-28

## 2017-12-28 PROBLEM — I10 HYPERTENSION: Chronic | Status: ACTIVE | Noted: 2017-05-18

## 2017-12-29 ENCOUNTER — CARE COORDINATION (OUTPATIENT)
Dept: CASE MANAGEMENT | Age: 67
End: 2017-12-29

## 2017-12-29 RX ORDER — BACLOFEN 10 MG/1
10 TABLET ORAL 3 TIMES DAILY PRN
Status: ON HOLD | COMMUNITY
End: 2018-01-12 | Stop reason: HOSPADM

## 2017-12-29 RX ORDER — SENNA PLUS 8.6 MG/1
1 TABLET ORAL 2 TIMES DAILY
Status: ON HOLD | COMMUNITY
End: 2022-06-28

## 2017-12-29 RX ORDER — OXYCODONE AND ACETAMINOPHEN 7.5; 325 MG/1; MG/1
0.5 TABLET ORAL EVERY 8 HOURS PRN
Status: ON HOLD | COMMUNITY
End: 2018-01-12

## 2017-12-29 RX ORDER — TRIMETHOPRIM 100 MG/1
100 TABLET ORAL 2 TIMES DAILY
Status: ON HOLD | COMMUNITY
End: 2018-01-12 | Stop reason: HOSPADM

## 2017-12-29 RX ORDER — NITROFURANTOIN MACROCRYSTALS 50 MG/1
50 CAPSULE ORAL NIGHTLY
Status: ON HOLD | COMMUNITY
End: 2018-01-08 | Stop reason: CLARIF

## 2017-12-29 RX ORDER — HYDROXYZINE HYDROCHLORIDE 25 MG/1
25 TABLET, FILM COATED ORAL 3 TIMES DAILY PRN
Status: ON HOLD | COMMUNITY
End: 2018-01-22 | Stop reason: HOSPADM

## 2017-12-29 RX ORDER — VENLAFAXINE 37.5 MG/1
37.5 TABLET ORAL 2 TIMES DAILY
Status: ON HOLD | COMMUNITY
End: 2018-01-10 | Stop reason: CLARIF

## 2017-12-29 NOTE — CARE COORDINATION
[facility] Transitions Initial Follow Up Call    Call within 2 business days of discharge: Yes    Patient: Kanu Flores Patient : 1950   MRN: [id number]  Reason for Admission: AMS, UTI  Discharge Date: 17 RARS: [facility] Risk Score: 21.75     Spoke with: Humberto Melendrez (the patient)    Facility: [place]    Non-face-to-face services provided:  Scheduled appointment with PCP-2018 with Dr Reese Velez at [address] [place] [address] and reviewed discharge summary and/or continuity of care documents  Education of patient/family/caregiver/guardian to support self-management-ONE pharmacy, ONE PCP  Assessment and support for treatment adherence and medication management-attempted med review  Establishment or re-establishment of referrals-na      Care Transitions 24 Hour Call    Do you have any ongoing symptoms?:  No  Do you have a copy of your discharge instructions?:  Yes  Do you have all of your prescriptions and are they filled?:  No (Comment: has called for refills at [facility] [address])  Have you been contacted by a [address]?:  No  Have you scheduled your follow up appointment?:  Yes  How are you going to get to your appointment?:  Car - drive self  Were you discharged with any Home Care or Post Acute Services:  No  Post Acute Services:  Home Health (Comment: [facility] services to start tomorrow)  Patient DME:  Commode, Straight cane, Walker, Shower chair  Do you have support at home?:  Child  Are you an active caregiver in your home?:  No  Care Transitions Interventions     Other Services:  (Comment: Skilled Nurse)        Humberto Melendrez reports feeling better, just tired. She has a scale, weighs daily and states today's weight is 223#. Attempted to discuss fluid and Na restrictions but she was difficult to keep on task. Attempted full med review. Patient has multiple meds in the home not on the [facility] in EPIC. She read each bottle to me, spelling the med name out and stating who prescribed it.  She is unclear if she is taking them all. She has multiple bottles of the same medications. She has meds prescribed by multiple providers - [facility] hospitalists, [facility] physicians, pain management, Cristela Catalan CNP and more. Chart states Sara as her PCP but she has been seeing [place] as her PCP for >1 year with regular OV. Many of her medications are from the [facility]. She is unclear when she has been there. She does not recall being admitted to [facility]. She asks for Sara's contact number, says she thinks that is who she would like to see as PCP. She asks for the number to the [place] [address] office [place] because it is closer to home. She has medications filled at 3 different pharmacies including [facility] and [facility]. She is agreeable to have all medications transferred to [facility] after educating her on the importance of using ONE pharmacy. Also discussed having only ONE primary care provider. Her choice of provider is [facility] but she is interested in transferring practices. She is agreeable and asks if I will make the follow up appt for her on any day besides Thursday or Friday. On Thursday she has housekeeping visit and on Fridays MOWs are delivered. She lives with her daughter but states she manages her own medications by taking them from the bottles. Encouraged her to consider filling a weekly mediset once the medications have been cleared up with her PCP. Medications in the home patient has but were not in EPIC at discharge include: oxycodone 7.5mg (prescribed by pain management), trimethoprim 100mg (prescribed by [facility]), metformin (prescribed by Kadi Alvarado), nitrofurantoin (prescribed by Reen Arteaga), baclofen (prescribed by ED), ASA 81mg (prescribed by Dr Martinez [facility]?), venlaxafine 37.5mg (prescribed by Roxanna Keating), sertraline (she states prescribed by [employer] ), hydroxyzine hcl (prescribed by ED ).      -call to the [place] [address] office, spoke with Jose Watson who states patient just had appt with Carlyn Crisostomobeth 3 days ago on 12/26/2017 for labwork and f/up. She has another appt with her on 1/10/2017. We discussed the discrepancy in medications in what the EPIC chart states, what is on their file and what the patient has at home. It was agreed upon that the patient should have follow up at the [facility] office to clear up medication issues and then transition to [place] at that time. An appt was made for the patient on 1/2/2018 with Dr Romelia Morales (who has also seen the patient recently at this practice). -call to [facility] - spoke with Jose Watson, reviewed medications. There are additional medications at this pharmacy not on patient MAR as well including lisinopril. Notified her tizanidine was discontinued at discharge (no refills were available anyway). Jenny requests med list faxed to pharmacy. Notified her this would be an incomplete list until patient has OV with PCP to clarify. She is aware. She will also request transfer of medications from [facility] and [facility] to [facility] as patient was in agreement for that to be her primary pharmacy. -med list faxed to Jose Watson at [facility]    -call to Alfonso Niño at [place] - patient has no script for Bactrim nor has she filled any scripts there since March 2017.     -Missing medications include bactrim, seroquel, simvastatin, gabapentin, meloxicam, lisinopril, premarin - gathered from speaking to patient, reviewing AVS, speaking with [facility] Bianca Esparza and [facility] Nyasia Pablo). -attempted to call the patient back to notify her of appt scheduled and explain the purpose of the one more appt at the [facility] office but she was not available.  A daughter answered the phone and said she went to \"[facility] and asked that I call back later because she could not take a message.      -call to Lizzy Harp RN [facility] at PCP office to notify her of today's

## 2018-01-03 ENCOUNTER — CARE COORDINATION (OUTPATIENT)
Dept: CASE MANAGEMENT | Age: 68
End: 2018-01-03

## 2018-01-03 NOTE — CARE COORDINATION
[facility] Transitions Follow Up Call    1/3/2018    Patient: [place]  Patient : 1950   MRN: [id number]  Reason for Admission: UTI, SIRS  Discharge Date: 17 RARS: Risk Score: 21.75       Attempted to f/up on call last week. Unable to reach patient by phone. Message left stating purpose of call with contact information requesting return call.      Follow Up  Future Appointments  Date Time Provider Natalee Bolaños   2018 8:00 AM Bonita Hankins DO And Derm SETH Blanchard RN

## 2018-01-05 ENCOUNTER — CARE COORDINATION (OUTPATIENT)
Dept: CARE COORDINATION | Age: 68
End: 2018-01-05

## 2018-01-08 PROBLEM — S82.852A TRIMALLEOLAR FRACTURE OF LEFT ANKLE, CLOSED, INITIAL ENCOUNTER: Status: ACTIVE | Noted: 2018-01-08

## 2018-01-09 PROBLEM — S82.842A ANKLE FRACTURE, BIMALLEOLAR, CLOSED, LEFT, INITIAL ENCOUNTER: Status: ACTIVE | Noted: 2018-01-09

## 2018-01-16 PROBLEM — J18.9 HCAP (HEALTHCARE-ASSOCIATED PNEUMONIA): Status: ACTIVE | Noted: 2018-01-16

## 2018-01-22 PROBLEM — J18.9 HCAP (HEALTHCARE-ASSOCIATED PNEUMONIA): Status: RESOLVED | Noted: 2018-01-16 | Resolved: 2018-01-22

## 2018-01-24 DIAGNOSIS — M25.572 LEFT ANKLE PAIN, UNSPECIFIED CHRONICITY: Primary | ICD-10-CM

## 2018-02-08 DIAGNOSIS — M25.572 ACUTE LEFT ANKLE PAIN: Primary | ICD-10-CM

## 2018-02-09 DIAGNOSIS — L30.9 ECZEMA, UNSPECIFIED TYPE: ICD-10-CM

## 2018-02-09 RX ORDER — CLOBETASOL PROPIONATE 0.46 MG/ML
SOLUTION TOPICAL
Qty: 50 ML | Refills: 0 | Status: ON HOLD | OUTPATIENT
Start: 2018-02-09 | End: 2018-03-06 | Stop reason: CLARIF

## 2018-02-12 ENCOUNTER — TELEPHONE (OUTPATIENT)
Dept: DERMATOLOGY | Age: 68
End: 2018-02-12

## 2018-02-22 DIAGNOSIS — M25.572 ACUTE LEFT ANKLE PAIN: Primary | ICD-10-CM

## 2018-03-04 PROBLEM — T50.901A OD (OVERDOSE OF DRUG): Status: ACTIVE | Noted: 2018-03-04

## 2018-03-04 PROBLEM — J96.00 ACUTE RESPIRATORY FAILURE (HCC): Status: ACTIVE | Noted: 2018-03-04

## 2018-03-12 RX ORDER — TRIAMCINOLONE ACETONIDE 1 MG/G
CREAM TOPICAL
Qty: 60 G | Refills: 0 | Status: ON HOLD | OUTPATIENT
Start: 2018-03-12 | End: 2018-03-31

## 2018-03-15 DIAGNOSIS — M25.572 LEFT ANKLE PAIN, UNSPECIFIED CHRONICITY: Primary | ICD-10-CM

## 2018-03-27 ENCOUNTER — HOSPITAL ENCOUNTER (OUTPATIENT)
Dept: OTHER | Age: 68
Discharge: HOME | End: 2018-03-27
Attending: NURSE PRACTITIONER | Admitting: NURSE PRACTITIONER

## 2018-03-27 LAB
A/G RATIO: 1.4 (ref 1.1–2.2)
ALBUMIN SERPL-MCNC: 4.3 G/DL (ref 3.4–5)
ALP BLD-CCNC: 134 U/L (ref 40–129)
ALT SERPL-CCNC: 20 U/L (ref 10–40)
ANION GAP SERPL CALCULATED.3IONS-SCNC: 17 MMOL/L (ref 3–16)
AST SERPL-CCNC: 24 U/L (ref 15–37)
BILIRUB SERPL-MCNC: <0.2 MG/DL (ref 0–1)
BUN BLDV-MCNC: 28 MG/DL (ref 7–20)
CALCIUM SERPL-MCNC: 9.1 MG/DL (ref 8.3–10.6)
CHLORIDE BLD-SCNC: 103 MMOL/L (ref 99–110)
CO2: 21 MMOL/L (ref 21–32)
CREAT SERPL-MCNC: 1.7 MG/DL (ref 0.6–1.2)
GFR AFRICAN AMERICAN: 36
GFR NON-AFRICAN AMERICAN: 30
GLOBULIN: 3.1 G/DL
GLUCOSE BLD-MCNC: 122 MG/DL (ref 70–99)
POTASSIUM SERPL-SCNC: 4.7 MMOL/L (ref 3.5–5.1)
SODIUM BLD-SCNC: 141 MMOL/L (ref 136–145)
TOTAL PROTEIN: 7.4 G/DL (ref 6.4–8.2)

## 2018-03-31 PROBLEM — E87.5 HYPERKALEMIA: Status: ACTIVE | Noted: 2018-03-31

## 2018-03-31 PROBLEM — R79.89 ELEVATED LACTIC ACID LEVEL: Status: ACTIVE | Noted: 2018-03-31

## 2018-03-31 PROBLEM — E87.29 HIGH ANION GAP METABOLIC ACIDOSIS: Status: ACTIVE | Noted: 2018-03-31

## 2018-03-31 PROBLEM — D72.829 LEUKOCYTOSIS: Status: ACTIVE | Noted: 2018-03-31

## 2018-03-31 PROBLEM — D75.839 THROMBOCYTOSIS: Status: ACTIVE | Noted: 2018-03-31

## 2018-03-31 PROBLEM — K92.2 GI BLEED: Status: ACTIVE | Noted: 2018-03-31

## 2018-04-13 ENCOUNTER — CLINICAL DOCUMENTATION (OUTPATIENT)
Dept: SPIRITUAL SERVICES | Age: 68
End: 2018-04-13

## 2018-04-19 DIAGNOSIS — L30.9 ECZEMA, UNSPECIFIED TYPE: ICD-10-CM

## 2018-05-03 ENCOUNTER — CLINICAL DOCUMENTATION (OUTPATIENT)
Dept: SPIRITUAL SERVICES | Age: 68
End: 2018-05-03

## 2018-05-14 ENCOUNTER — CLINICAL DOCUMENTATION (OUTPATIENT)
Dept: SPIRITUAL SERVICES | Age: 68
End: 2018-05-14

## 2018-05-22 ENCOUNTER — CLINICAL DOCUMENTATION (OUTPATIENT)
Dept: SPIRITUAL SERVICES | Age: 68
End: 2018-05-22

## 2018-07-12 RX ORDER — CLOBETASOL PROPIONATE 0.46 MG/ML
SOLUTION TOPICAL
Qty: 50 ML | Refills: 0 | OUTPATIENT
Start: 2018-07-12

## 2018-07-13 NOTE — TELEPHONE ENCOUNTER
I have left pt multiple messages since the denial from last month, pt has not called to schedule or to speak to me

## 2018-10-02 ENCOUNTER — HOSPITAL ENCOUNTER (OUTPATIENT)
Dept: ULTRASOUND IMAGING | Age: 68
Discharge: HOME | End: 2018-10-02
Payer: COMMERCIAL

## 2018-10-02 DIAGNOSIS — N17.9 AKI (ACUTE KIDNEY INJURY) (HCC): ICD-10-CM

## 2018-10-02 PROCEDURE — 76770 US EXAM ABDO BACK WALL COMP: CPT

## 2018-10-17 ENCOUNTER — TELEPHONE (OUTPATIENT)
Dept: ORTHOPEDIC SURGERY | Age: 68
End: 2018-10-17

## 2018-10-17 ENCOUNTER — OFFICE VISIT (OUTPATIENT)
Dept: ORTHOPEDIC SURGERY | Age: 68
End: 2018-10-17
Payer: COMMERCIAL

## 2018-10-17 VITALS
BODY MASS INDEX: 33.75 KG/M2 | HEIGHT: 63 IN | SYSTOLIC BLOOD PRESSURE: 131 MMHG | HEART RATE: 101 BPM | DIASTOLIC BLOOD PRESSURE: 61 MMHG | WEIGHT: 190.48 LBS

## 2018-10-17 DIAGNOSIS — M50.30 DDD (DEGENERATIVE DISC DISEASE), CERVICAL: ICD-10-CM

## 2018-10-17 DIAGNOSIS — M54.2 CHRONIC NECK PAIN: Primary | ICD-10-CM

## 2018-10-17 DIAGNOSIS — S16.1XXA STRAIN OF NECK MUSCLE, INITIAL ENCOUNTER: ICD-10-CM

## 2018-10-17 DIAGNOSIS — M54.50 LUMBAR PAIN: ICD-10-CM

## 2018-10-17 DIAGNOSIS — G89.29 CHRONIC NECK PAIN: Primary | ICD-10-CM

## 2018-10-17 PROCEDURE — 99214 OFFICE O/P EST MOD 30 MIN: CPT | Performed by: PHYSICIAN ASSISTANT

## 2019-01-02 ENCOUNTER — HOSPITAL ENCOUNTER (OUTPATIENT)
Age: 69
Discharge: HOME | End: 2019-01-02
Payer: COMMERCIAL

## 2019-01-02 LAB
ANION GAP SERPL CALCULATED.3IONS-SCNC: 15 MMOL/L (ref 3–16)
BACTERIA: ABNORMAL /HPF
BILIRUBIN URINE: ABNORMAL
BLOOD, URINE: NEGATIVE
BUN BLDV-MCNC: 27 MG/DL (ref 7–20)
CALCIUM SERPL-MCNC: 10 MG/DL (ref 8.3–10.6)
CHLORIDE BLD-SCNC: 100 MMOL/L (ref 99–110)
CLARITY: CLEAR
CO2: 26 MMOL/L (ref 21–32)
COLOR: YELLOW
CREAT SERPL-MCNC: 0.8 MG/DL (ref 0.6–1.2)
CREATININE URINE: 305.8 MG/DL (ref 28–259)
EPITHELIAL CELLS, UA: ABNORMAL /HPF
GFR AFRICAN AMERICAN: >60
GFR NON-AFRICAN AMERICAN: >60
GLUCOSE BLD-MCNC: 175 MG/DL (ref 70–99)
GLUCOSE URINE: NEGATIVE MG/DL
KETONES, URINE: ABNORMAL MG/DL
LEUKOCYTE ESTERASE, URINE: NEGATIVE
MICROSCOPIC EXAMINATION: YES
MUCUS: ABNORMAL /LPF
NITRITE, URINE: NEGATIVE
PH UA: 5.5
POTASSIUM SERPL-SCNC: 4.6 MMOL/L (ref 3.5–5.1)
PROTEIN PROTEIN: 33 MG/DL
PROTEIN UA: ABNORMAL MG/DL
PROTEIN/CREAT RATIO: 0.1 MG/DL
RBC UA: ABNORMAL /HPF (ref 0–2)
SODIUM BLD-SCNC: 141 MMOL/L (ref 136–145)
SPECIFIC GRAVITY UA: >=1.03
UROBILINOGEN, URINE: 0.2 E.U./DL
WBC UA: ABNORMAL /HPF (ref 0–5)

## 2019-01-02 PROCEDURE — 80048 BASIC METABOLIC PNL TOTAL CA: CPT

## 2019-01-02 PROCEDURE — 84156 ASSAY OF PROTEIN URINE: CPT

## 2019-01-02 PROCEDURE — 81001 URINALYSIS AUTO W/SCOPE: CPT

## 2019-01-02 PROCEDURE — 82570 ASSAY OF URINE CREATININE: CPT

## 2019-01-02 PROCEDURE — 36415 COLL VENOUS BLD VENIPUNCTURE: CPT

## 2019-02-19 ENCOUNTER — TELEPHONE (OUTPATIENT)
Dept: ORTHOPEDIC SURGERY | Age: 69
End: 2019-02-19

## 2019-04-03 ENCOUNTER — HOSPITAL ENCOUNTER (OUTPATIENT)
Dept: MRI IMAGING | Age: 69
Discharge: HOME | End: 2019-04-03
Payer: COMMERCIAL

## 2019-04-03 DIAGNOSIS — M50.30 DDD (DEGENERATIVE DISC DISEASE), CERVICAL: ICD-10-CM

## 2019-04-03 PROCEDURE — 72141 MRI NECK SPINE W/O DYE: CPT

## 2019-05-07 ENCOUNTER — HOSPITAL ENCOUNTER (OUTPATIENT)
Dept: ULTRASOUND IMAGING | Age: 69
Discharge: HOME | End: 2019-05-07
Payer: COMMERCIAL

## 2019-05-07 ENCOUNTER — HOSPITAL ENCOUNTER (OUTPATIENT)
Age: 69
Discharge: HOME | End: 2019-05-07
Payer: COMMERCIAL

## 2019-05-07 VITALS
SYSTOLIC BLOOD PRESSURE: 121 MMHG | HEART RATE: 77 BPM | DIASTOLIC BLOOD PRESSURE: 66 MMHG | OXYGEN SATURATION: 99 % | RESPIRATION RATE: 146 BRPM

## 2019-05-07 DIAGNOSIS — K76.0 HEPATIC STEATOSIS: ICD-10-CM

## 2019-05-07 LAB
INR BLD: 1.06 (ref 0.86–1.14)
PLATELET # BLD: 392 K/UL (ref 135–450)
PROTHROMBIN TIME: 12.1 SEC (ref 9.8–13)

## 2019-05-07 PROCEDURE — 2709999900 US BIOPSY LIVER PERCUTANEOUS

## 2019-05-07 PROCEDURE — 6360000002 HC RX W HCPCS: Performed by: RADIOLOGY

## 2019-05-07 PROCEDURE — 36415 COLL VENOUS BLD VENIPUNCTURE: CPT

## 2019-05-07 PROCEDURE — 85049 AUTOMATED PLATELET COUNT: CPT

## 2019-05-07 PROCEDURE — 6370000000 HC RX 637 (ALT 250 FOR IP): Performed by: RADIOLOGY

## 2019-05-07 PROCEDURE — 88342 IMHCHEM/IMCYTCHM 1ST ANTB: CPT

## 2019-05-07 PROCEDURE — 85610 PROTHROMBIN TIME: CPT

## 2019-05-07 PROCEDURE — 88313 SPECIAL STAINS GROUP 2: CPT

## 2019-05-07 PROCEDURE — 88307 TISSUE EXAM BY PATHOLOGIST: CPT

## 2019-05-07 RX ORDER — HYDROCODONE BITARTRATE AND ACETAMINOPHEN 5; 325 MG/1; MG/1
1 TABLET ORAL EVERY 4 HOURS PRN
Status: DISCONTINUED | OUTPATIENT
Start: 2019-05-07 | End: 2019-05-08 | Stop reason: HOSPADM

## 2019-05-07 RX ORDER — HYDROCODONE BITARTRATE AND ACETAMINOPHEN 5; 325 MG/1; MG/1
2 TABLET ORAL EVERY 4 HOURS PRN
Status: DISCONTINUED | OUTPATIENT
Start: 2019-05-07 | End: 2019-05-08 | Stop reason: HOSPADM

## 2019-05-07 RX ORDER — MIDAZOLAM HYDROCHLORIDE 5 MG/ML
INJECTION INTRAMUSCULAR; INTRAVENOUS
Status: COMPLETED | OUTPATIENT
Start: 2019-05-07 | End: 2019-05-07

## 2019-05-07 RX ORDER — FENTANYL CITRATE 50 UG/ML
INJECTION, SOLUTION INTRAMUSCULAR; INTRAVENOUS
Status: COMPLETED | OUTPATIENT
Start: 2019-05-07 | End: 2019-05-07

## 2019-05-07 RX ORDER — ONDANSETRON 2 MG/ML
4 INJECTION INTRAMUSCULAR; INTRAVENOUS EVERY 8 HOURS PRN
Status: DISCONTINUED | OUTPATIENT
Start: 2019-05-07 | End: 2019-05-08 | Stop reason: HOSPADM

## 2019-05-07 RX ORDER — ACETAMINOPHEN 325 MG/1
650 TABLET ORAL EVERY 4 HOURS PRN
Status: DISCONTINUED | OUTPATIENT
Start: 2019-05-07 | End: 2019-05-08 | Stop reason: HOSPADM

## 2019-05-07 RX ADMIN — MIDAZOLAM HYDROCHLORIDE 1 MG: 5 INJECTION, SOLUTION INTRAMUSCULAR; INTRAVENOUS at 10:29

## 2019-05-07 RX ADMIN — HYDROCODONE BITARTRATE AND ACETAMINOPHEN 2 TABLET: 5; 325 TABLET ORAL at 11:40

## 2019-05-07 RX ADMIN — FENTANYL CITRATE 50 MCG: 50 INJECTION, SOLUTION INTRAMUSCULAR; INTRAVENOUS at 10:29

## 2019-05-07 ASSESSMENT — PAIN SCALES - GENERAL: PAINLEVEL_OUTOF10: 7

## 2019-05-07 NOTE — PROGRESS NOTES
PT CC pain 7/10 described as aching upon breathing at biopsy site. Medicated with prn Norco see MAR.

## 2019-05-07 NOTE — BRIEF OP NOTE
Brief Postoperative Note    Mariela Kramer  YOB: 1950  [id number]    Pre-operative Diagnosis: abnormal LFT's    Post-operative Diagnosis: Same    Procedure: US guided random liver biopsy    Anesthesia: Moderate Sedation    Surgeons/Assistants: Dr. Kimmie Fountain    Estimated Blood Loss: less than 5ml     Complications: None    Specimens: Was Obtained: two 18G core biopsies    Findings: two red core tissue biopsies of the right hepatic lobe.     Electronically signed by Johanne Jordan MD on 5/7/2019 at 10:41 AM

## 2019-05-07 NOTE — PROGRESS NOTES
Image guided liver biopsy random completed. Two core samples of obtained and sent to lab for pathology. Pt tolerated procedure without any signs or symptoms of distress. Vital signs stable see flow sheets. Pt taken to recovery bay for post operative monitoring.

## 2019-05-07 NOTE — PRE SEDATION
capsule Take 100 mg by mouth 2 times daily    Historical Provider, MD   trimethoprim (TRIMPEX) 100 MG tablet Take 100 mg by mouth daily     Historical Provider, MD   fluticasone (FLONASE) 50 MCG/ACT nasal spray 1 spray by Nasal route daily    Historical Provider, MD   oxyCODONE-acetaminophen (PERCOCET) 7.5-325 MG per tablet Take 0.5 tablets by mouth every 8 hours as needed for Pain . Historical Provider, MD   sulfacetamide-prednisoLONE (BLEPHAMIDE) 10-0.2 % ophthalmic suspension 2 drops every 4 hours    Historical Provider, MD   clobetasol (TEMOVATE) 0.05 % ointment Apply topically 2 times daily Apply topically 2 times daily.     Historical Provider, MD   hydrOXYzine (ATARAX) 25 MG tablet Take 25 mg by mouth every 8 hours as needed for Itching     Historical Provider, MD   loratadine (CLARITIN) 10 MG tablet Take 10 mg by mouth daily    Historical Provider, MD   ondansetron (ZOFRAN) 4 MG tablet Take 4 mg by mouth every 8 hours as needed for Nausea or Vomiting    Historical Provider, MD   tiZANidine (ZANAFLEX) 4 MG tablet Take 4 mg by mouth 2 times daily     Historical Provider, MD   dicyclomine (BENTYL) 10 MG capsule Take 10 mg by mouth 4 times daily (before meals and nightly)    Historical Provider, MD   pantoprazole (PROTONIX) 40 MG tablet Take 1 tablet by mouth 2 times daily (before meals) 1/12/18   Joanna Velasco MD   senna (SENOKOT) 8.6 MG tablet Take 1 tablet by mouth 2 times daily     Historical Provider, MD   metFORMIN (GLUCOPHAGE) 500 MG tablet Take 500 mg by mouth 2 times daily (with meals)    Historical Provider, MD   levothyroxine (SYNTHROID) 50 MCG tablet Take 1 tablet by mouth daily 11/30/17   BLUE Ramsey CNP   nystatin (MYCOSTATIN) 797544 UNIT/GM cream Apply topically 2 times daily under breasts  Patient taking differently: Apply 100,000 Units topically 2 times daily as needed Apply topically 2 times daily under breasts 2/13/17   BLUE Ramsey CNP   conjugated estrogens (PREMARIN) 0.625 MG/GM vaginal cream Place vaginally three times weekly. 11/3/16   BLUE Mina - CNP     Coumadin Use Last 7 Days:  no  Antiplatelet drug therapy use last 7 days: no  Other anticoagulant use last 7 days: no  Additional Medication Information:        Pre-Sedation Documentation and Exam:   I have reviewed the patient's history and review of systems.     Mallampati Airway Assessment:  normal neck range of motion    Prior History of Anesthesia Complications:   none    ASA Classification:  Class 2 - A normal healthy patient with mild systemic disease    Sedation/ Anesthesia Plan:   intravenous sedation    Medications Planned:   midazolam (Versed) intravenously and fentanyl intravenously    Patient is an appropriate candidate for plan of sedation: yes    Electronically signed by Kristan Porras MD on 5/7/2019 at 10:40 AM

## 2019-05-07 NOTE — PROGRESS NOTES
Patient left in stable condition, friend present to drive patient home. Discharge instructions given, patient verbalized understanding.   Martha AJ RN

## 2019-09-09 ENCOUNTER — HOSPITAL ENCOUNTER (OUTPATIENT)
Age: 69
Discharge: HOME | End: 2019-09-09
Payer: COMMERCIAL

## 2019-09-09 LAB
BACTERIA: ABNORMAL /HPF
BILIRUBIN URINE: NEGATIVE
BLOOD, URINE: NEGATIVE
CLARITY: CLEAR
COLOR: YELLOW
GLUCOSE URINE: 100 MG/DL
KETONES, URINE: NEGATIVE MG/DL
LEUKOCYTE ESTERASE, URINE: ABNORMAL
MICROSCOPIC EXAMINATION: YES
NITRITE, URINE: NEGATIVE
PH UA: 7.5 (ref 5–8)
PROTEIN UA: ABNORMAL MG/DL
RBC UA: ABNORMAL /HPF (ref 0–2)
SPECIFIC GRAVITY UA: 1.02 (ref 1–1.03)
URINE TYPE: ABNORMAL
UROBILINOGEN, URINE: 0.2 E.U./DL
WBC UA: ABNORMAL /HPF (ref 0–5)

## 2019-09-09 PROCEDURE — 36415 COLL VENOUS BLD VENIPUNCTURE: CPT

## 2019-09-09 PROCEDURE — 84156 ASSAY OF PROTEIN URINE: CPT

## 2019-09-09 PROCEDURE — 82570 ASSAY OF URINE CREATININE: CPT

## 2019-09-09 PROCEDURE — 80048 BASIC METABOLIC PNL TOTAL CA: CPT

## 2019-09-09 PROCEDURE — 81001 URINALYSIS AUTO W/SCOPE: CPT

## 2019-09-09 PROCEDURE — 85027 COMPLETE CBC AUTOMATED: CPT

## 2019-09-10 LAB
ANION GAP SERPL CALCULATED.3IONS-SCNC: 16 MMOL/L (ref 3–16)
BUN BLDV-MCNC: 16 MG/DL (ref 7–20)
CALCIUM SERPL-MCNC: 9.7 MG/DL (ref 8.3–10.6)
CHLORIDE BLD-SCNC: 98 MMOL/L (ref 99–110)
CO2: 22 MMOL/L (ref 21–32)
CREAT SERPL-MCNC: 0.7 MG/DL (ref 0.6–1.2)
CREATININE URINE: 222.2 MG/DL (ref 28–259)
GFR AFRICAN AMERICAN: >60
GFR NON-AFRICAN AMERICAN: >60
GLUCOSE BLD-MCNC: 204 MG/DL (ref 70–99)
HCT VFR BLD CALC: 38.3 % (ref 36–48)
HEMOGLOBIN: 13.2 G/DL (ref 12–16)
MCH RBC QN AUTO: 33.8 PG (ref 26–34)
MCHC RBC AUTO-ENTMCNC: 34.5 G/DL (ref 31–36)
MCV RBC AUTO: 98 FL (ref 80–100)
PDW BLD-RTO: 14.3 % (ref 12.4–15.4)
PLATELET # BLD: 363 K/UL (ref 135–450)
PMV BLD AUTO: 8.7 FL (ref 5–10.5)
POTASSIUM SERPL-SCNC: 4 MMOL/L (ref 3.5–5.1)
PROTEIN PROTEIN: 24 MG/DL
PROTEIN/CREAT RATIO: 0.1 MG/DL
RBC # BLD: 3.9 M/UL (ref 4–5.2)
SODIUM BLD-SCNC: 136 MMOL/L (ref 136–145)
WBC # BLD: 10.2 K/UL (ref 4–11)

## 2019-11-02 ENCOUNTER — HOSPITAL ENCOUNTER (INPATIENT)
Age: 69
LOS: 9 days | Discharge: HOME HEALTH | DRG: 917 | End: 2019-11-11
Attending: EMERGENCY MEDICINE | Admitting: INTERNAL MEDICINE
Payer: COMMERCIAL

## 2019-11-02 ENCOUNTER — APPOINTMENT (OUTPATIENT)
Dept: GENERAL RADIOLOGY | Age: 69
DRG: 917 | End: 2019-11-02
Payer: COMMERCIAL

## 2019-11-02 DIAGNOSIS — A41.9 SEPTIC SHOCK (HCC): Primary | ICD-10-CM

## 2019-11-02 DIAGNOSIS — J18.9 PNEUMONIA DUE TO ORGANISM: ICD-10-CM

## 2019-11-02 DIAGNOSIS — E87.5 HYPERKALEMIA: ICD-10-CM

## 2019-11-02 DIAGNOSIS — J96.01 ACUTE RESPIRATORY FAILURE WITH HYPOXIA (HCC): ICD-10-CM

## 2019-11-02 DIAGNOSIS — N17.9 ACUTE RENAL FAILURE, UNSPECIFIED ACUTE RENAL FAILURE TYPE (HCC): ICD-10-CM

## 2019-11-02 DIAGNOSIS — R65.21 SEPTIC SHOCK (HCC): Primary | ICD-10-CM

## 2019-11-02 PROBLEM — R57.9 SHOCK (HCC): Status: ACTIVE | Noted: 2019-11-02

## 2019-11-02 LAB
A/G RATIO: 0.9 (ref 1.1–2.2)
A/G RATIO: 0.9 (ref 1.1–2.2)
ALBUMIN SERPL-MCNC: 3 G/DL (ref 3.4–5)
ALBUMIN SERPL-MCNC: 3.7 G/DL (ref 3.4–5)
ALP BLD-CCNC: 216 U/L (ref 40–129)
ALP BLD-CCNC: 271 U/L (ref 40–129)
ALT SERPL-CCNC: 34 U/L (ref 10–40)
ALT SERPL-CCNC: 43 U/L (ref 10–40)
AMPHETAMINE SCREEN, URINE: POSITIVE
ANION GAP SERPL CALCULATED.3IONS-SCNC: 20 MMOL/L (ref 3–16)
ANION GAP SERPL CALCULATED.3IONS-SCNC: 21 MMOL/L (ref 3–16)
AST SERPL-CCNC: 41 U/L (ref 15–37)
AST SERPL-CCNC: 46 U/L (ref 15–37)
BACTERIA: ABNORMAL /HPF
BARBITURATE SCREEN URINE: ABNORMAL
BASE EXCESS VENOUS: -8.3 MMOL/L (ref -3–3)
BASOPHILS ABSOLUTE: 0.1 K/UL (ref 0–0.2)
BASOPHILS RELATIVE PERCENT: 0.3 %
BENZODIAZEPINE SCREEN, URINE: ABNORMAL
BETA-HYDROXYBUTYRATE: 0.5 MMOL/L (ref 0–0.27)
BILIRUB SERPL-MCNC: 0.5 MG/DL (ref 0–1)
BILIRUB SERPL-MCNC: 0.6 MG/DL (ref 0–1)
BILIRUBIN URINE: ABNORMAL
BLOOD, URINE: NEGATIVE
BUN BLDV-MCNC: 59 MG/DL (ref 7–20)
BUN BLDV-MCNC: 61 MG/DL (ref 7–20)
CALCIUM SERPL-MCNC: 7.7 MG/DL (ref 8.3–10.6)
CALCIUM SERPL-MCNC: 8.4 MG/DL (ref 8.3–10.6)
CANNABINOID SCREEN URINE: ABNORMAL
CARBOXYHEMOGLOBIN: 2.8 % (ref 0–1.5)
CHLORIDE BLD-SCNC: 91 MMOL/L (ref 99–110)
CHLORIDE BLD-SCNC: 99 MMOL/L (ref 99–110)
CHP ED QC CHECK: NORMAL
CLARITY: ABNORMAL
CO2: 14 MMOL/L (ref 21–32)
CO2: 19 MMOL/L (ref 21–32)
COCAINE METABOLITE SCREEN URINE: ABNORMAL
COLOR: ABNORMAL
CREAT SERPL-MCNC: 5.3 MG/DL (ref 0.6–1.2)
CREAT SERPL-MCNC: 6.5 MG/DL (ref 0.6–1.2)
EOSINOPHILS ABSOLUTE: 0 K/UL (ref 0–0.6)
EOSINOPHILS RELATIVE PERCENT: 0.1 %
EPITHELIAL CELLS, UA: ABNORMAL /HPF
GFR AFRICAN AMERICAN: 10
GFR AFRICAN AMERICAN: 8
GFR NON-AFRICAN AMERICAN: 6
GFR NON-AFRICAN AMERICAN: 8
GLOBULIN: 3.5 G/DL
GLOBULIN: 4.1 G/DL
GLUCOSE BLD-MCNC: 204 MG/DL (ref 70–99)
GLUCOSE BLD-MCNC: 219 MG/DL (ref 70–99)
GLUCOSE BLD-MCNC: 226 MG/DL (ref 70–99)
GLUCOSE BLD-MCNC: 282 MG/DL
GLUCOSE BLD-MCNC: 282 MG/DL (ref 70–99)
GLUCOSE BLD-MCNC: 320 MG/DL
GLUCOSE BLD-MCNC: 320 MG/DL (ref 70–99)
GLUCOSE BLD-MCNC: 333 MG/DL
GLUCOSE BLD-MCNC: 333 MG/DL (ref 70–99)
GLUCOSE BLD-MCNC: 357 MG/DL (ref 70–99)
GLUCOSE URINE: 100 MG/DL
HCO3 VENOUS: 18.9 MMOL/L (ref 23–29)
HCT VFR BLD CALC: 37 % (ref 36–48)
HEMOGLOBIN: 11.9 G/DL (ref 12–16)
INR BLD: 1.12 (ref 0.86–1.14)
KETONES, URINE: ABNORMAL MG/DL
LACTIC ACID: 1.8 MMOL/L (ref 0.4–2)
LEUKOCYTE ESTERASE, URINE: NEGATIVE
LYMPHOCYTES ABSOLUTE: 1.4 K/UL (ref 1–5.1)
LYMPHOCYTES RELATIVE PERCENT: 4.3 %
Lab: ABNORMAL
MCH RBC QN AUTO: 29.8 PG (ref 26–34)
MCHC RBC AUTO-ENTMCNC: 32.2 G/DL (ref 31–36)
MCV RBC AUTO: 92.5 FL (ref 80–100)
METHADONE SCREEN, URINE: ABNORMAL
METHEMOGLOBIN VENOUS: 0.2 %
MICROSCOPIC EXAMINATION: YES
MONOCYTES ABSOLUTE: 1 K/UL (ref 0–1.3)
MONOCYTES RELATIVE PERCENT: 3.1 %
NEUTROPHILS ABSOLUTE: 29.4 K/UL (ref 1.7–7.7)
NEUTROPHILS RELATIVE PERCENT: 92.2 %
NITRITE, URINE: NEGATIVE
O2 CONTENT, VEN: 16 VOL %
O2 SAT, VEN: 89 %
O2 THERAPY: ABNORMAL
OPIATE SCREEN URINE: ABNORMAL
OXYCODONE URINE: POSITIVE
PCO2, VEN: 45.4 MMHG (ref 40–50)
PDW BLD-RTO: 14.1 % (ref 12.4–15.4)
PERFORMED ON: ABNORMAL
PH UA: 5
PH UA: 5 (ref 5–8)
PH VENOUS: 7.24 (ref 7.35–7.45)
PHENCYCLIDINE SCREEN URINE: ABNORMAL
PLATELET # BLD: 408 K/UL (ref 135–450)
PMV BLD AUTO: 8 FL (ref 5–10.5)
PO2, VEN: 65.1 MMHG (ref 25–40)
POTASSIUM REFLEX MAGNESIUM: 6 MMOL/L (ref 3.5–5.1)
POTASSIUM SERPL-SCNC: 5.2 MMOL/L (ref 3.5–5.1)
PRO-BNP: 675 PG/ML (ref 0–124)
PROCALCITONIN: 6.01 NG/ML (ref 0–0.15)
PROPOXYPHENE SCREEN: ABNORMAL
PROTEIN UA: ABNORMAL MG/DL
PROTHROMBIN TIME: 12.8 SEC (ref 9.8–13)
RBC # BLD: 4 M/UL (ref 4–5.2)
RBC UA: ABNORMAL /HPF (ref 0–2)
SODIUM BLD-SCNC: 131 MMOL/L (ref 136–145)
SODIUM BLD-SCNC: 133 MMOL/L (ref 136–145)
SPECIFIC GRAVITY UA: >=1.03 (ref 1–1.03)
TCO2 CALC VENOUS: 20 MMOL/L
TOTAL CK: 242 U/L (ref 26–192)
TOTAL PROTEIN: 6.5 G/DL (ref 6.4–8.2)
TOTAL PROTEIN: 7.8 G/DL (ref 6.4–8.2)
TROPONIN: 0.01 NG/ML
URINE TYPE: ABNORMAL
UROBILINOGEN, URINE: 0.2 E.U./DL
WBC # BLD: 31.9 K/UL (ref 4–11)
WBC UA: ABNORMAL /HPF (ref 0–5)

## 2019-11-02 PROCEDURE — 83935 ASSAY OF URINE OSMOLALITY: CPT

## 2019-11-02 PROCEDURE — 02HV33Z INSERTION OF INFUSION DEVICE INTO SUPERIOR VENA CAVA, PERCUTANEOUS APPROACH: ICD-10-PCS | Performed by: INTERNAL MEDICINE

## 2019-11-02 PROCEDURE — 2000000000 HC ICU R&B

## 2019-11-02 PROCEDURE — 99291 CRITICAL CARE FIRST HOUR: CPT

## 2019-11-02 PROCEDURE — 83930 ASSAY OF BLOOD OSMOLALITY: CPT

## 2019-11-02 PROCEDURE — 4500000026 HC ED CRITICAL CARE PROCEDURE

## 2019-11-02 PROCEDURE — 36415 COLL VENOUS BLD VENIPUNCTURE: CPT

## 2019-11-02 PROCEDURE — 2500000003 HC RX 250 WO HCPCS: Performed by: EMERGENCY MEDICINE

## 2019-11-02 PROCEDURE — 6370000000 HC RX 637 (ALT 250 FOR IP): Performed by: EMERGENCY MEDICINE

## 2019-11-02 PROCEDURE — 82436 ASSAY OF URINE CHLORIDE: CPT

## 2019-11-02 PROCEDURE — 83605 ASSAY OF LACTIC ACID: CPT

## 2019-11-02 PROCEDURE — 96367 TX/PROPH/DG ADDL SEQ IV INF: CPT

## 2019-11-02 PROCEDURE — 84145 PROCALCITONIN (PCT): CPT

## 2019-11-02 PROCEDURE — 82550 ASSAY OF CK (CPK): CPT

## 2019-11-02 PROCEDURE — 87086 URINE CULTURE/COLONY COUNT: CPT

## 2019-11-02 PROCEDURE — 96361 HYDRATE IV INFUSION ADD-ON: CPT

## 2019-11-02 PROCEDURE — 82803 BLOOD GASES ANY COMBINATION: CPT

## 2019-11-02 PROCEDURE — 82570 ASSAY OF URINE CREATININE: CPT

## 2019-11-02 PROCEDURE — 85025 COMPLETE CBC W/AUTO DIFF WBC: CPT

## 2019-11-02 PROCEDURE — 96365 THER/PROPH/DIAG IV INF INIT: CPT

## 2019-11-02 PROCEDURE — 84443 ASSAY THYROID STIM HORMONE: CPT

## 2019-11-02 PROCEDURE — 83880 ASSAY OF NATRIURETIC PEPTIDE: CPT

## 2019-11-02 PROCEDURE — 93005 ELECTROCARDIOGRAM TRACING: CPT | Performed by: PHYSICIAN ASSISTANT

## 2019-11-02 PROCEDURE — 6370000000 HC RX 637 (ALT 250 FOR IP): Performed by: PHYSICIAN ASSISTANT

## 2019-11-02 PROCEDURE — 87040 BLOOD CULTURE FOR BACTERIA: CPT

## 2019-11-02 PROCEDURE — 84300 ASSAY OF URINE SODIUM: CPT

## 2019-11-02 PROCEDURE — 96368 THER/DIAG CONCURRENT INF: CPT

## 2019-11-02 PROCEDURE — 85610 PROTHROMBIN TIME: CPT

## 2019-11-02 PROCEDURE — 87150 DNA/RNA AMPLIFIED PROBE: CPT

## 2019-11-02 PROCEDURE — 87186 SC STD MICRODIL/AGAR DIL: CPT

## 2019-11-02 PROCEDURE — 71045 X-RAY EXAM CHEST 1 VIEW: CPT

## 2019-11-02 PROCEDURE — 2580000003 HC RX 258: Performed by: PHYSICIAN ASSISTANT

## 2019-11-02 PROCEDURE — 6360000002 HC RX W HCPCS: Performed by: PHYSICIAN ASSISTANT

## 2019-11-02 PROCEDURE — 80307 DRUG TEST PRSMV CHEM ANLYZR: CPT

## 2019-11-02 PROCEDURE — 81001 URINALYSIS AUTO W/SCOPE: CPT

## 2019-11-02 PROCEDURE — 84133 ASSAY OF URINE POTASSIUM: CPT

## 2019-11-02 PROCEDURE — 84540 ASSAY OF URINE/UREA-N: CPT

## 2019-11-02 PROCEDURE — 82010 KETONE BODYS QUAN: CPT

## 2019-11-02 PROCEDURE — 80053 COMPREHEN METABOLIC PANEL: CPT

## 2019-11-02 PROCEDURE — 6360000002 HC RX W HCPCS: Performed by: EMERGENCY MEDICINE

## 2019-11-02 PROCEDURE — 2580000003 HC RX 258: Performed by: EMERGENCY MEDICINE

## 2019-11-02 PROCEDURE — 96375 TX/PRO/DX INJ NEW DRUG ADDON: CPT

## 2019-11-02 PROCEDURE — 84484 ASSAY OF TROPONIN QUANT: CPT

## 2019-11-02 RX ORDER — 0.9 % SODIUM CHLORIDE 0.9 %
1000 INTRAVENOUS SOLUTION INTRAVENOUS ONCE
Status: COMPLETED | OUTPATIENT
Start: 2019-11-02 | End: 2019-11-02

## 2019-11-02 RX ORDER — DEXTROSE MONOHYDRATE 25 G/50ML
12.5 INJECTION, SOLUTION INTRAVENOUS PRN
Status: DISCONTINUED | OUTPATIENT
Start: 2019-11-02 | End: 2019-11-03

## 2019-11-02 RX ORDER — NICOTINE POLACRILEX 4 MG
15 LOZENGE BUCCAL PRN
Status: DISCONTINUED | OUTPATIENT
Start: 2019-11-02 | End: 2019-11-03

## 2019-11-02 RX ORDER — HEPARIN SODIUM 5000 [USP'U]/ML
5000 INJECTION, SOLUTION INTRAVENOUS; SUBCUTANEOUS EVERY 8 HOURS SCHEDULED
Status: CANCELLED | OUTPATIENT
Start: 2019-11-02

## 2019-11-02 RX ORDER — METHYLPREDNISOLONE SODIUM SUCCINATE 125 MG/2ML
125 INJECTION, POWDER, LYOPHILIZED, FOR SOLUTION INTRAMUSCULAR; INTRAVENOUS DAILY
Status: COMPLETED | OUTPATIENT
Start: 2019-11-02 | End: 2019-11-02

## 2019-11-02 RX ORDER — DEXTROSE MONOHYDRATE 25 G/50ML
25 INJECTION, SOLUTION INTRAVENOUS ONCE
Status: COMPLETED | OUTPATIENT
Start: 2019-11-02 | End: 2019-11-02

## 2019-11-02 RX ORDER — ALBUTEROL SULFATE 2.5 MG/3ML
5 SOLUTION RESPIRATORY (INHALATION) ONCE
Status: COMPLETED | OUTPATIENT
Start: 2019-11-02 | End: 2019-11-02

## 2019-11-02 RX ORDER — NALOXONE HYDROCHLORIDE 0.4 MG/ML
0.4 INJECTION, SOLUTION INTRAMUSCULAR; INTRAVENOUS; SUBCUTANEOUS ONCE
Status: COMPLETED | OUTPATIENT
Start: 2019-11-02 | End: 2019-11-02

## 2019-11-02 RX ORDER — IPRATROPIUM BROMIDE AND ALBUTEROL SULFATE 2.5; .5 MG/3ML; MG/3ML
1 SOLUTION RESPIRATORY (INHALATION) ONCE
Status: COMPLETED | OUTPATIENT
Start: 2019-11-02 | End: 2019-11-02

## 2019-11-02 RX ORDER — DEXTROSE MONOHYDRATE 50 MG/ML
100 INJECTION, SOLUTION INTRAVENOUS PRN
Status: DISCONTINUED | OUTPATIENT
Start: 2019-11-02 | End: 2019-11-03

## 2019-11-02 RX ADMIN — DEXTROSE 50 % IN WATER (D50W) INTRAVENOUS SYRINGE 25 G: at 19:58

## 2019-11-02 RX ADMIN — NOREPINEPHRINE BITARTRATE 2 MCG/MIN: 1 INJECTION INTRAVENOUS at 22:52

## 2019-11-02 RX ADMIN — SODIUM CHLORIDE 1000 ML: 9 INJECTION, SOLUTION INTRAVENOUS at 19:30

## 2019-11-02 RX ADMIN — INSULIN HUMAN 10 UNITS: 100 INJECTION, SOLUTION PARENTERAL at 19:58

## 2019-11-02 RX ADMIN — NALOXONE HYDROCHLORIDE 0.4 MG: 0.4 INJECTION, SOLUTION INTRAMUSCULAR; INTRAVENOUS; SUBCUTANEOUS at 23:01

## 2019-11-02 RX ADMIN — SODIUM CHLORIDE 1000 ML: 9 INJECTION, SOLUTION INTRAVENOUS at 18:48

## 2019-11-02 RX ADMIN — SODIUM CHLORIDE 1000 ML: 9 INJECTION, SOLUTION INTRAVENOUS at 19:38

## 2019-11-02 RX ADMIN — IPRATROPIUM BROMIDE AND ALBUTEROL SULFATE 1 AMPULE: .5; 3 SOLUTION RESPIRATORY (INHALATION) at 18:57

## 2019-11-02 RX ADMIN — SODIUM CHLORIDE 6 UNITS/HR: 9 INJECTION, SOLUTION INTRAVENOUS at 22:23

## 2019-11-02 RX ADMIN — ALBUTEROL SULFATE 5 MG: 2.5 SOLUTION RESPIRATORY (INHALATION) at 18:57

## 2019-11-02 RX ADMIN — NALXONE HYDROCHLORIDE 0.4 MG: 0.4 INJECTION INTRAMUSCULAR; INTRAVENOUS; SUBCUTANEOUS at 21:55

## 2019-11-02 RX ADMIN — NALOXONE HYDROCHLORIDE 0.4 MG: 0.4 INJECTION, SOLUTION INTRAMUSCULAR; INTRAVENOUS; SUBCUTANEOUS at 18:49

## 2019-11-02 RX ADMIN — CALCIUM GLUCONATE 1 G: 98 INJECTION, SOLUTION INTRAVENOUS at 20:04

## 2019-11-02 RX ADMIN — METHYLPREDNISOLONE SODIUM SUCCINATE 125 MG: 125 INJECTION, POWDER, FOR SOLUTION INTRAMUSCULAR; INTRAVENOUS at 18:55

## 2019-11-02 RX ADMIN — AMPICILLIN AND SULBACTAM 3 G: 1; 2 INJECTION, POWDER, FOR SOLUTION INTRAMUSCULAR; INTRAVENOUS at 19:30

## 2019-11-02 RX ADMIN — SODIUM CHLORIDE 1000 ML: 9 INJECTION, SOLUTION INTRAVENOUS at 21:16

## 2019-11-02 RX ADMIN — AZITHROMYCIN DIHYDRATE 500 MG: 500 INJECTION, POWDER, LYOPHILIZED, FOR SOLUTION INTRAVENOUS at 20:05

## 2019-11-03 ENCOUNTER — APPOINTMENT (OUTPATIENT)
Dept: GENERAL RADIOLOGY | Age: 69
DRG: 917 | End: 2019-11-03
Payer: COMMERCIAL

## 2019-11-03 LAB
ALBUMIN SERPL-MCNC: 2.9 G/DL (ref 3.4–5)
ALBUMIN SERPL-MCNC: 2.9 G/DL (ref 3.4–5)
ALP BLD-CCNC: 219 U/L (ref 40–129)
ALT SERPL-CCNC: 36 U/L (ref 10–40)
ANION GAP SERPL CALCULATED.3IONS-SCNC: 10 MMOL/L (ref 3–16)
ANION GAP SERPL CALCULATED.3IONS-SCNC: 14 MMOL/L (ref 3–16)
ANION GAP SERPL CALCULATED.3IONS-SCNC: 15 MMOL/L (ref 3–16)
AST SERPL-CCNC: 41 U/L (ref 15–37)
BASE EXCESS ARTERIAL: -9.5 MMOL/L (ref -3–3)
BASE EXCESS VENOUS: -6.8 MMOL/L (ref -3–3)
BASOPHILS ABSOLUTE: 0 K/UL (ref 0–0.2)
BASOPHILS RELATIVE PERCENT: 0.1 %
BILIRUB SERPL-MCNC: 0.4 MG/DL (ref 0–1)
BILIRUBIN DIRECT: 0.3 MG/DL (ref 0–0.3)
BILIRUBIN, INDIRECT: 0.1 MG/DL (ref 0–1)
BUN BLDV-MCNC: 54 MG/DL (ref 7–20)
BUN BLDV-MCNC: 60 MG/DL (ref 7–20)
BUN BLDV-MCNC: 61 MG/DL (ref 7–20)
CALCIUM SERPL-MCNC: 7.5 MG/DL (ref 8.3–10.6)
CALCIUM SERPL-MCNC: 7.7 MG/DL (ref 8.3–10.6)
CALCIUM SERPL-MCNC: 7.8 MG/DL (ref 8.3–10.6)
CARBOXYHEMOGLOBIN ARTERIAL: 0.6 % (ref 0–1.5)
CARBOXYHEMOGLOBIN: 1.8 % (ref 0–1.5)
CHLORIDE BLD-SCNC: 106 MMOL/L (ref 99–110)
CHLORIDE BLD-SCNC: 106 MMOL/L (ref 99–110)
CHLORIDE BLD-SCNC: 107 MMOL/L (ref 99–110)
CHLORIDE URINE RANDOM: <20 MMOL/L
CO2: 17 MMOL/L (ref 21–32)
CO2: 17 MMOL/L (ref 21–32)
CO2: 18 MMOL/L (ref 21–32)
CREAT SERPL-MCNC: 2 MG/DL (ref 0.6–1.2)
CREAT SERPL-MCNC: 3.1 MG/DL (ref 0.6–1.2)
CREAT SERPL-MCNC: 3.9 MG/DL (ref 0.6–1.2)
CREATININE URINE: 320.3 MG/DL (ref 28–259)
EKG ATRIAL RATE: 99 BPM
EKG DIAGNOSIS: NORMAL
EKG P AXIS: 60 DEGREES
EKG P-R INTERVAL: 144 MS
EKG Q-T INTERVAL: 342 MS
EKG QRS DURATION: 74 MS
EKG QTC CALCULATION (BAZETT): 438 MS
EKG R AXIS: -28 DEGREES
EKG T AXIS: 66 DEGREES
EKG VENTRICULAR RATE: 99 BPM
EOSINOPHILS ABSOLUTE: 0 K/UL (ref 0–0.6)
EOSINOPHILS RELATIVE PERCENT: 0.1 %
GFR AFRICAN AMERICAN: 14
GFR AFRICAN AMERICAN: 18
GFR AFRICAN AMERICAN: 30
GFR NON-AFRICAN AMERICAN: 11
GFR NON-AFRICAN AMERICAN: 15
GFR NON-AFRICAN AMERICAN: 25
GLUCOSE BLD-MCNC: 109 MG/DL (ref 70–99)
GLUCOSE BLD-MCNC: 115 MG/DL (ref 70–99)
GLUCOSE BLD-MCNC: 115 MG/DL (ref 70–99)
GLUCOSE BLD-MCNC: 120 MG/DL (ref 70–99)
GLUCOSE BLD-MCNC: 136 MG/DL (ref 70–99)
GLUCOSE BLD-MCNC: 149 MG/DL (ref 70–99)
GLUCOSE BLD-MCNC: 150 MG/DL (ref 70–99)
GLUCOSE BLD-MCNC: 155 MG/DL (ref 70–99)
GLUCOSE BLD-MCNC: 159 MG/DL (ref 70–99)
GLUCOSE BLD-MCNC: 161 MG/DL (ref 70–99)
GLUCOSE BLD-MCNC: 167 MG/DL (ref 70–99)
GLUCOSE BLD-MCNC: 169 MG/DL (ref 70–99)
GLUCOSE BLD-MCNC: 169 MG/DL (ref 70–99)
GLUCOSE BLD-MCNC: 185 MG/DL (ref 70–99)
GLUCOSE BLD-MCNC: 221 MG/DL (ref 70–99)
GLUCOSE BLD-MCNC: 235 MG/DL (ref 70–99)
GLUCOSE BLD-MCNC: 244 MG/DL (ref 70–99)
GLUCOSE BLD-MCNC: 255 MG/DL (ref 70–99)
GLUCOSE BLD-MCNC: 261 MG/DL (ref 70–99)
GLUCOSE BLD-MCNC: 266 MG/DL (ref 70–99)
GLUCOSE BLD-MCNC: 267 MG/DL (ref 70–99)
GLUCOSE BLD-MCNC: 288 MG/DL (ref 70–99)
GLUCOSE BLD-MCNC: 324 MG/DL (ref 70–99)
HCO3 ARTERIAL: 16.3 MMOL/L (ref 21–29)
HCO3 VENOUS: 18.1 MMOL/L (ref 23–29)
HCT VFR BLD CALC: 32 % (ref 36–48)
HEMOGLOBIN, ART, EXTENDED: 10.8 G/DL (ref 12–16)
HEMOGLOBIN: 10.2 G/DL (ref 12–16)
LYMPHOCYTES ABSOLUTE: 1.1 K/UL (ref 1–5.1)
LYMPHOCYTES RELATIVE PERCENT: 5.2 %
MAGNESIUM: 2.1 MG/DL (ref 1.8–2.4)
MAGNESIUM: 2.1 MG/DL (ref 1.8–2.4)
MCH RBC QN AUTO: 30.2 PG (ref 26–34)
MCHC RBC AUTO-ENTMCNC: 32 G/DL (ref 31–36)
MCV RBC AUTO: 94.5 FL (ref 80–100)
METHEMOGLOBIN ARTERIAL: 0.3 %
METHEMOGLOBIN VENOUS: 0.2 %
MONOCYTES ABSOLUTE: 0.2 K/UL (ref 0–1.3)
MONOCYTES RELATIVE PERCENT: 1.1 %
NEUTROPHILS ABSOLUTE: 19.6 K/UL (ref 1.7–7.7)
NEUTROPHILS RELATIVE PERCENT: 93.5 %
O2 CONTENT ARTERIAL: 14 ML/DL
O2 CONTENT, VEN: 16 VOL %
O2 SAT, ARTERIAL: 92.8 %
O2 SAT, VEN: 99 %
O2 THERAPY: ABNORMAL
O2 THERAPY: ABNORMAL
OSMOLALITY URINE: 330 MOSM/KG (ref 390–1070)
OSMOLALITY: 311 MOSM/KG (ref 280–301)
PCO2 ARTERIAL: 35.1 MMHG (ref 35–45)
PCO2, VEN: 34.1 MMHG (ref 40–50)
PDW BLD-RTO: 14.9 % (ref 12.4–15.4)
PERFORMED ON: ABNORMAL
PH ARTERIAL: 7.28 (ref 7.35–7.45)
PH VENOUS: 7.34 (ref 7.35–7.45)
PHOSPHORUS: 3.2 MG/DL (ref 2.5–4.9)
PHOSPHORUS: 4 MG/DL (ref 2.5–4.9)
PHOSPHORUS: 4.1 MG/DL (ref 2.5–4.9)
PLATELET # BLD: 279 K/UL (ref 135–450)
PMV BLD AUTO: 8.1 FL (ref 5–10.5)
PO2 ARTERIAL: 71.8 MMHG (ref 75–108)
PO2, VEN: 167 MMHG (ref 25–40)
POTASSIUM SERPL-SCNC: 4.6 MMOL/L (ref 3.5–5.1)
POTASSIUM SERPL-SCNC: 4.7 MMOL/L (ref 3.5–5.1)
POTASSIUM SERPL-SCNC: 4.8 MMOL/L (ref 3.5–5.1)
POTASSIUM, UR: 61.9 MMOL/L
RBC # BLD: 3.38 M/UL (ref 4–5.2)
REPORT: NORMAL
SODIUM BLD-SCNC: 135 MMOL/L (ref 136–145)
SODIUM BLD-SCNC: 137 MMOL/L (ref 136–145)
SODIUM BLD-SCNC: 138 MMOL/L (ref 136–145)
SODIUM URINE: 30 MMOL/L
TCO2 ARTERIAL: 17.3 MMOL/L
TCO2 CALC VENOUS: 19 MMOL/L
TOTAL PROTEIN: 6.7 G/DL (ref 6.4–8.2)
TROPONIN: 0.02 NG/ML
TROPONIN: 0.03 NG/ML
TROPONIN: 0.03 NG/ML
TROPONIN: 0.06 NG/ML
TSH REFLEX: 3.56 UIU/ML (ref 0.27–4.2)
UREA NITROGEN, UR: 129 MG/DL (ref 800–1666)
URINE CULTURE, ROUTINE: NORMAL
WBC # BLD: 20.9 K/UL (ref 4–11)

## 2019-11-03 PROCEDURE — 2000000000 HC ICU R&B

## 2019-11-03 PROCEDURE — 2580000003 HC RX 258: Performed by: INTERNAL MEDICINE

## 2019-11-03 PROCEDURE — 36592 COLLECT BLOOD FROM PICC: CPT

## 2019-11-03 PROCEDURE — 82803 BLOOD GASES ANY COMBINATION: CPT

## 2019-11-03 PROCEDURE — C9113 INJ PANTOPRAZOLE SODIUM, VIA: HCPCS | Performed by: INTERNAL MEDICINE

## 2019-11-03 PROCEDURE — 83036 HEMOGLOBIN GLYCOSYLATED A1C: CPT

## 2019-11-03 PROCEDURE — 85025 COMPLETE CBC W/AUTO DIFF WBC: CPT

## 2019-11-03 PROCEDURE — 80076 HEPATIC FUNCTION PANEL: CPT

## 2019-11-03 PROCEDURE — 84100 ASSAY OF PHOSPHORUS: CPT

## 2019-11-03 PROCEDURE — 2500000003 HC RX 250 WO HCPCS: Performed by: INTERNAL MEDICINE

## 2019-11-03 PROCEDURE — 6370000000 HC RX 637 (ALT 250 FOR IP): Performed by: INTERNAL MEDICINE

## 2019-11-03 PROCEDURE — 83735 ASSAY OF MAGNESIUM: CPT

## 2019-11-03 PROCEDURE — 6360000002 HC RX W HCPCS: Performed by: INTERNAL MEDICINE

## 2019-11-03 PROCEDURE — 94640 AIRWAY INHALATION TREATMENT: CPT

## 2019-11-03 PROCEDURE — 2580000003 HC RX 258

## 2019-11-03 PROCEDURE — 93010 ELECTROCARDIOGRAM REPORT: CPT | Performed by: INTERNAL MEDICINE

## 2019-11-03 PROCEDURE — 84484 ASSAY OF TROPONIN QUANT: CPT

## 2019-11-03 PROCEDURE — 80069 RENAL FUNCTION PANEL: CPT

## 2019-11-03 PROCEDURE — 71045 X-RAY EXAM CHEST 1 VIEW: CPT

## 2019-11-03 PROCEDURE — 99291 CRITICAL CARE FIRST HOUR: CPT | Performed by: INTERNAL MEDICINE

## 2019-11-03 RX ORDER — MAGNESIUM SULFATE 1 G/100ML
1 INJECTION INTRAVENOUS PRN
Status: DISCONTINUED | OUTPATIENT
Start: 2019-11-03 | End: 2019-11-03

## 2019-11-03 RX ORDER — DEXTROSE AND SODIUM CHLORIDE 5; .45 G/100ML; G/100ML
INJECTION, SOLUTION INTRAVENOUS
Status: COMPLETED
Start: 2019-11-03 | End: 2019-11-03

## 2019-11-03 RX ORDER — SODIUM CHLORIDE 9 MG/ML
INJECTION, SOLUTION INTRAVENOUS CONTINUOUS
Status: DISCONTINUED | OUTPATIENT
Start: 2019-11-03 | End: 2019-11-03

## 2019-11-03 RX ORDER — SODIUM CHLORIDE 0.9 % (FLUSH) 0.9 %
10 SYRINGE (ML) INJECTION PRN
Status: DISCONTINUED | OUTPATIENT
Start: 2019-11-03 | End: 2019-11-11 | Stop reason: HOSPADM

## 2019-11-03 RX ORDER — IPRATROPIUM BROMIDE AND ALBUTEROL SULFATE 2.5; .5 MG/3ML; MG/3ML
1 SOLUTION RESPIRATORY (INHALATION) EVERY 4 HOURS PRN
Status: DISCONTINUED | OUTPATIENT
Start: 2019-11-03 | End: 2019-11-05

## 2019-11-03 RX ORDER — POTASSIUM CHLORIDE 29.8 MG/ML
20 INJECTION INTRAVENOUS PRN
Status: DISCONTINUED | OUTPATIENT
Start: 2019-11-03 | End: 2019-11-08

## 2019-11-03 RX ORDER — POTASSIUM CHLORIDE 7.45 MG/ML
10 INJECTION INTRAVENOUS PRN
Status: DISCONTINUED | OUTPATIENT
Start: 2019-11-03 | End: 2019-11-03

## 2019-11-03 RX ORDER — PANTOPRAZOLE SODIUM 40 MG/10ML
40 INJECTION, POWDER, LYOPHILIZED, FOR SOLUTION INTRAVENOUS 2 TIMES DAILY
Status: DISCONTINUED | OUTPATIENT
Start: 2019-11-03 | End: 2019-11-03

## 2019-11-03 RX ORDER — IPRATROPIUM BROMIDE AND ALBUTEROL SULFATE 2.5; .5 MG/3ML; MG/3ML
1 SOLUTION RESPIRATORY (INHALATION)
Status: DISCONTINUED | OUTPATIENT
Start: 2019-11-03 | End: 2019-11-03

## 2019-11-03 RX ORDER — DEXTROSE MONOHYDRATE 50 MG/ML
100 INJECTION, SOLUTION INTRAVENOUS PRN
Status: DISCONTINUED | OUTPATIENT
Start: 2019-11-03 | End: 2019-11-03

## 2019-11-03 RX ORDER — NYSTATIN 100000 U/G
100000 CREAM TOPICAL 2 TIMES DAILY PRN
Status: DISCONTINUED | OUTPATIENT
Start: 2019-11-03 | End: 2019-11-11 | Stop reason: HOSPADM

## 2019-11-03 RX ORDER — DEXTROSE MONOHYDRATE 25 G/50ML
12.5 INJECTION, SOLUTION INTRAVENOUS PRN
Status: DISCONTINUED | OUTPATIENT
Start: 2019-11-03 | End: 2019-11-11 | Stop reason: HOSPADM

## 2019-11-03 RX ORDER — DEXTROSE MONOHYDRATE 25 G/50ML
12.5 INJECTION, SOLUTION INTRAVENOUS PRN
Status: DISCONTINUED | OUTPATIENT
Start: 2019-11-03 | End: 2019-11-03

## 2019-11-03 RX ORDER — SODIUM CHLORIDE 0.9 % (FLUSH) 0.9 %
10 SYRINGE (ML) INJECTION EVERY 12 HOURS SCHEDULED
Status: DISCONTINUED | OUTPATIENT
Start: 2019-11-03 | End: 2019-11-11 | Stop reason: HOSPADM

## 2019-11-03 RX ORDER — SULFACETAMIDE/PREDNISOLONE 10 %-0.2 %
1 SUSPENSION, DROPS(FINAL DOSAGE FORM)(ML) OPHTHALMIC (EYE) EVERY 4 HOURS
Status: DISCONTINUED | OUTPATIENT
Start: 2019-11-03 | End: 2019-11-03 | Stop reason: CLARIF

## 2019-11-03 RX ORDER — NICOTINE POLACRILEX 4 MG
15 LOZENGE BUCCAL PRN
Status: DISCONTINUED | OUTPATIENT
Start: 2019-11-03 | End: 2019-11-11 | Stop reason: HOSPADM

## 2019-11-03 RX ORDER — SULFACETAMIDE SODIUM 100 MG/ML
1 SOLUTION/ DROPS OPHTHALMIC
Status: DISCONTINUED | OUTPATIENT
Start: 2019-11-03 | End: 2019-11-11 | Stop reason: HOSPADM

## 2019-11-03 RX ORDER — DEXTROSE AND SODIUM CHLORIDE 5; .45 G/100ML; G/100ML
INJECTION, SOLUTION INTRAVENOUS CONTINUOUS PRN
Status: DISCONTINUED | OUTPATIENT
Start: 2019-11-03 | End: 2019-11-03

## 2019-11-03 RX ORDER — NALOXONE HYDROCHLORIDE 1 MG/ML
0.4 INJECTION INTRAMUSCULAR; INTRAVENOUS; SUBCUTANEOUS PRN
Status: DISCONTINUED | OUTPATIENT
Start: 2019-11-03 | End: 2019-11-11 | Stop reason: HOSPADM

## 2019-11-03 RX ORDER — LEVOTHYROXINE SODIUM ANHYDROUS 100 UG/5ML
25 INJECTION, POWDER, LYOPHILIZED, FOR SOLUTION INTRAVENOUS
Status: DISCONTINUED | OUTPATIENT
Start: 2019-11-03 | End: 2019-11-04

## 2019-11-03 RX ORDER — PANTOPRAZOLE SODIUM 40 MG/10ML
40 INJECTION, POWDER, LYOPHILIZED, FOR SOLUTION INTRAVENOUS DAILY
Status: DISCONTINUED | OUTPATIENT
Start: 2019-11-04 | End: 2019-11-11 | Stop reason: HOSPADM

## 2019-11-03 RX ORDER — 0.9 % SODIUM CHLORIDE 0.9 %
15 INTRAVENOUS SOLUTION INTRAVENOUS ONCE
Status: DISCONTINUED | OUTPATIENT
Start: 2019-11-03 | End: 2019-11-03

## 2019-11-03 RX ORDER — HEPARIN SODIUM 5000 [USP'U]/ML
5000 INJECTION, SOLUTION INTRAVENOUS; SUBCUTANEOUS EVERY 8 HOURS SCHEDULED
Status: DISCONTINUED | OUTPATIENT
Start: 2019-11-03 | End: 2019-11-05

## 2019-11-03 RX ORDER — ALBUTEROL SULFATE 2.5 MG/3ML
2.5 SOLUTION RESPIRATORY (INHALATION)
Status: DISCONTINUED | OUTPATIENT
Start: 2019-11-03 | End: 2019-11-03

## 2019-11-03 RX ADMIN — POTASSIUM CHLORIDE 10 MEQ: 7.46 INJECTION, SOLUTION INTRAVENOUS at 08:49

## 2019-11-03 RX ADMIN — PIPERACILLIN SODIUM AND TAZOBACTAM SODIUM 3.38 G: 3; .375 INJECTION, POWDER, LYOPHILIZED, FOR SOLUTION INTRAVENOUS at 01:05

## 2019-11-03 RX ADMIN — SODIUM CHLORIDE: 9 INJECTION, SOLUTION INTRAVENOUS at 00:56

## 2019-11-03 RX ADMIN — HEPARIN SODIUM 5000 UNITS: 5000 INJECTION INTRAVENOUS; SUBCUTANEOUS at 14:03

## 2019-11-03 RX ADMIN — DEXTROSE AND SODIUM CHLORIDE: 5; 450 INJECTION, SOLUTION INTRAVENOUS at 10:29

## 2019-11-03 RX ADMIN — IPRATROPIUM BROMIDE AND ALBUTEROL SULFATE 1 AMPULE: .5; 3 SOLUTION RESPIRATORY (INHALATION) at 07:36

## 2019-11-03 RX ADMIN — POTASSIUM CHLORIDE 10 MEQ: 7.46 INJECTION, SOLUTION INTRAVENOUS at 09:37

## 2019-11-03 RX ADMIN — NOREPINEPHRINE BITARTRATE 6 MCG/MIN: 1 INJECTION INTRAVENOUS at 01:03

## 2019-11-03 RX ADMIN — IPRATROPIUM BROMIDE AND ALBUTEROL SULFATE 1 AMPULE: .5; 3 SOLUTION RESPIRATORY (INHALATION) at 00:20

## 2019-11-03 RX ADMIN — PIPERACILLIN SODIUM AND TAZOBACTAM SODIUM 3.38 G: 3; .375 INJECTION, POWDER, LYOPHILIZED, FOR SOLUTION INTRAVENOUS at 14:03

## 2019-11-03 RX ADMIN — SULFACETAMIDE SODIUM 1 DROP: 100 SOLUTION/ DROPS OPHTHALMIC at 01:08

## 2019-11-03 RX ADMIN — SULFACETAMIDE SODIUM 1 DROP: 100 SOLUTION/ DROPS OPHTHALMIC at 12:02

## 2019-11-03 RX ADMIN — LEVOTHYROXINE SODIUM ANHYDROUS 25 MCG: 100 INJECTION, POWDER, LYOPHILIZED, FOR SOLUTION INTRAVENOUS at 07:46

## 2019-11-03 RX ADMIN — SULFACETAMIDE SODIUM 1 DROP: 100 SOLUTION/ DROPS OPHTHALMIC at 07:50

## 2019-11-03 RX ADMIN — PANTOPRAZOLE SODIUM 40 MG: 40 INJECTION, POWDER, FOR SOLUTION INTRAVENOUS at 07:49

## 2019-11-03 RX ADMIN — Medication 25 MEQ: at 10:43

## 2019-11-03 RX ADMIN — Medication: at 20:26

## 2019-11-03 RX ADMIN — POTASSIUM CHLORIDE 10 MEQ: 7.46 INJECTION, SOLUTION INTRAVENOUS at 09:38

## 2019-11-03 RX ADMIN — PANTOPRAZOLE SODIUM 40 MG: 40 INJECTION, POWDER, FOR SOLUTION INTRAVENOUS at 01:35

## 2019-11-03 RX ADMIN — SULFACETAMIDE SODIUM 1 DROP: 100 SOLUTION/ DROPS OPHTHALMIC at 05:12

## 2019-11-03 RX ADMIN — DEXTROSE AND SODIUM CHLORIDE: 5; 450 INJECTION, SOLUTION INTRAVENOUS at 02:41

## 2019-11-03 RX ADMIN — SODIUM CHLORIDE 12.06 UNITS/HR: 9 INJECTION, SOLUTION INTRAVENOUS at 08:01

## 2019-11-03 RX ADMIN — VANCOMYCIN HYDROCHLORIDE 1000 MG: 1 INJECTION, POWDER, LYOPHILIZED, FOR SOLUTION INTRAVENOUS at 05:11

## 2019-11-03 RX ADMIN — HEPARIN SODIUM 5000 UNITS: 5000 INJECTION INTRAVENOUS; SUBCUTANEOUS at 21:49

## 2019-11-03 RX ADMIN — Medication 10 ML: at 22:09

## 2019-11-03 RX ADMIN — IPRATROPIUM BROMIDE AND ALBUTEROL SULFATE 1 AMPULE: .5; 3 SOLUTION RESPIRATORY (INHALATION) at 10:57

## 2019-11-03 RX ADMIN — SULFACETAMIDE SODIUM 1 DROP: 100 SOLUTION/ DROPS OPHTHALMIC at 20:38

## 2019-11-03 RX ADMIN — Medication: at 11:28

## 2019-11-03 RX ADMIN — SODIUM CHLORIDE 8.75 UNITS/HR: 9 INJECTION, SOLUTION INTRAVENOUS at 10:19

## 2019-11-03 RX ADMIN — Medication 10 ML: at 07:50

## 2019-11-04 LAB
ALBUMIN SERPL-MCNC: 2.9 G/DL (ref 3.4–5)
ALBUMIN SERPL-MCNC: 3 G/DL (ref 3.4–5)
ALBUMIN SERPL-MCNC: 3.2 G/DL (ref 3.4–5)
ANION GAP SERPL CALCULATED.3IONS-SCNC: 12 MMOL/L (ref 3–16)
ANION GAP SERPL CALCULATED.3IONS-SCNC: 13 MMOL/L (ref 3–16)
ANION GAP SERPL CALCULATED.3IONS-SCNC: 8 MMOL/L (ref 3–16)
BASE EXCESS ARTERIAL: 2.4 MMOL/L (ref -3–3)
BASE EXCESS ARTERIAL: 3.1 MMOL/L (ref -3–3)
BUN BLDV-MCNC: 28 MG/DL (ref 7–20)
BUN BLDV-MCNC: 37 MG/DL (ref 7–20)
BUN BLDV-MCNC: 45 MG/DL (ref 7–20)
CALCIUM SERPL-MCNC: 7.8 MG/DL (ref 8.3–10.6)
CALCIUM SERPL-MCNC: 8.1 MG/DL (ref 8.3–10.6)
CALCIUM SERPL-MCNC: 8.4 MG/DL (ref 8.3–10.6)
CARBOXYHEMOGLOBIN ARTERIAL: 0.9 % (ref 0–1.5)
CARBOXYHEMOGLOBIN ARTERIAL: 1.3 % (ref 0–1.5)
CHLORIDE BLD-SCNC: 102 MMOL/L (ref 99–110)
CHLORIDE BLD-SCNC: 106 MMOL/L (ref 99–110)
CHLORIDE BLD-SCNC: 107 MMOL/L (ref 99–110)
CO2: 21 MMOL/L (ref 21–32)
CO2: 22 MMOL/L (ref 21–32)
CO2: 26 MMOL/L (ref 21–32)
CREAT SERPL-MCNC: 0.7 MG/DL (ref 0.6–1.2)
CREAT SERPL-MCNC: 1 MG/DL (ref 0.6–1.2)
CREAT SERPL-MCNC: 1.2 MG/DL (ref 0.6–1.2)
ESTIMATED AVERAGE GLUCOSE: 220.2 MG/DL
GFR AFRICAN AMERICAN: 54
GFR AFRICAN AMERICAN: >60
GFR AFRICAN AMERICAN: >60
GFR NON-AFRICAN AMERICAN: 45
GFR NON-AFRICAN AMERICAN: 55
GFR NON-AFRICAN AMERICAN: >60
GLUCOSE BLD-MCNC: 114 MG/DL (ref 70–99)
GLUCOSE BLD-MCNC: 120 MG/DL (ref 70–99)
GLUCOSE BLD-MCNC: 127 MG/DL (ref 70–99)
GLUCOSE BLD-MCNC: 129 MG/DL (ref 70–99)
GLUCOSE BLD-MCNC: 130 MG/DL (ref 70–99)
GLUCOSE BLD-MCNC: 130 MG/DL (ref 70–99)
GLUCOSE BLD-MCNC: 131 MG/DL (ref 70–99)
GLUCOSE BLD-MCNC: 131 MG/DL (ref 70–99)
GLUCOSE BLD-MCNC: 132 MG/DL (ref 70–99)
GLUCOSE BLD-MCNC: 135 MG/DL (ref 70–99)
GLUCOSE BLD-MCNC: 137 MG/DL (ref 70–99)
GLUCOSE BLD-MCNC: 138 MG/DL (ref 70–99)
GLUCOSE BLD-MCNC: 139 MG/DL (ref 70–99)
GLUCOSE BLD-MCNC: 140 MG/DL (ref 70–99)
GLUCOSE BLD-MCNC: 146 MG/DL (ref 70–99)
GLUCOSE BLD-MCNC: 147 MG/DL (ref 70–99)
GLUCOSE BLD-MCNC: 148 MG/DL (ref 70–99)
GLUCOSE BLD-MCNC: 148 MG/DL (ref 70–99)
GLUCOSE BLD-MCNC: 151 MG/DL (ref 70–99)
GLUCOSE BLD-MCNC: 154 MG/DL (ref 70–99)
GLUCOSE BLD-MCNC: 157 MG/DL (ref 70–99)
GLUCOSE BLD-MCNC: 161 MG/DL (ref 70–99)
GLUCOSE BLD-MCNC: 162 MG/DL (ref 70–99)
GLUCOSE BLD-MCNC: 170 MG/DL (ref 70–99)
GLUCOSE BLD-MCNC: 174 MG/DL (ref 70–99)
HBA1C MFR BLD: 9.3 %
HCO3 ARTERIAL: 25.5 MMOL/L (ref 21–29)
HCO3 ARTERIAL: 25.9 MMOL/L (ref 21–29)
HCT VFR BLD CALC: 33 % (ref 36–48)
HEMOGLOBIN, ART, EXTENDED: 11.5 G/DL (ref 12–16)
HEMOGLOBIN, ART, EXTENDED: 12.4 G/DL (ref 12–16)
HEMOGLOBIN: 11.1 G/DL (ref 12–16)
LV EF: 70 %
LVEF MODALITY: NORMAL
MCH RBC QN AUTO: 29.9 PG (ref 26–34)
MCHC RBC AUTO-ENTMCNC: 33.6 G/DL (ref 31–36)
MCV RBC AUTO: 89.2 FL (ref 80–100)
METHEMOGLOBIN ARTERIAL: 0.1 %
METHEMOGLOBIN ARTERIAL: 0.1 %
O2 CONTENT ARTERIAL: 16 ML/DL
O2 CONTENT ARTERIAL: 17 ML/DL
O2 SAT, ARTERIAL: 95.4 %
O2 SAT, ARTERIAL: 96.8 %
O2 THERAPY: ABNORMAL
O2 THERAPY: ABNORMAL
PCO2 ARTERIAL: 32 MMHG (ref 35–45)
PCO2 ARTERIAL: 36.5 MMHG (ref 35–45)
PDW BLD-RTO: 14.9 % (ref 12.4–15.4)
PERFORMED ON: ABNORMAL
PH ARTERIAL: 7.47 (ref 7.35–7.45)
PH ARTERIAL: 7.52 (ref 7.35–7.45)
PHOSPHORUS: 2 MG/DL (ref 2.5–4.9)
PHOSPHORUS: 2.1 MG/DL (ref 2.5–4.9)
PHOSPHORUS: 2.7 MG/DL (ref 2.5–4.9)
PLATELET # BLD: 280 K/UL (ref 135–450)
PMV BLD AUTO: 7.4 FL (ref 5–10.5)
PO2 ARTERIAL: 71.9 MMHG (ref 75–108)
PO2 ARTERIAL: 78.2 MMHG (ref 75–108)
POTASSIUM SERPL-SCNC: 3.9 MMOL/L (ref 3.5–5.1)
POTASSIUM SERPL-SCNC: 4.1 MMOL/L (ref 3.5–5.1)
POTASSIUM SERPL-SCNC: 4.4 MMOL/L (ref 3.5–5.1)
RBC # BLD: 3.7 M/UL (ref 4–5.2)
SODIUM BLD-SCNC: 135 MMOL/L (ref 136–145)
SODIUM BLD-SCNC: 140 MMOL/L (ref 136–145)
SODIUM BLD-SCNC: 142 MMOL/L (ref 136–145)
TCO2 ARTERIAL: 26.5 MMOL/L
TCO2 ARTERIAL: 27 MMOL/L
TROPONIN: 0.16 NG/ML
TROPONIN: 0.18 NG/ML
TROPONIN: 0.29 NG/ML
VANCOMYCIN RANDOM: 5.6 UG/ML
WBC # BLD: 13.1 K/UL (ref 4–11)

## 2019-11-04 PROCEDURE — 6370000000 HC RX 637 (ALT 250 FOR IP): Performed by: INTERNAL MEDICINE

## 2019-11-04 PROCEDURE — 6360000002 HC RX W HCPCS: Performed by: INTERNAL MEDICINE

## 2019-11-04 PROCEDURE — 2700000000 HC OXYGEN THERAPY PER DAY

## 2019-11-04 PROCEDURE — 2580000003 HC RX 258: Performed by: INTERNAL MEDICINE

## 2019-11-04 PROCEDURE — 2500000003 HC RX 250 WO HCPCS: Performed by: INTERNAL MEDICINE

## 2019-11-04 PROCEDURE — 84484 ASSAY OF TROPONIN QUANT: CPT

## 2019-11-04 PROCEDURE — 99291 CRITICAL CARE FIRST HOUR: CPT | Performed by: INTERNAL MEDICINE

## 2019-11-04 PROCEDURE — 94660 CPAP INITIATION&MGMT: CPT

## 2019-11-04 PROCEDURE — 94761 N-INVAS EAR/PLS OXIMETRY MLT: CPT

## 2019-11-04 PROCEDURE — 80069 RENAL FUNCTION PANEL: CPT

## 2019-11-04 PROCEDURE — C9113 INJ PANTOPRAZOLE SODIUM, VIA: HCPCS | Performed by: INTERNAL MEDICINE

## 2019-11-04 PROCEDURE — 80202 ASSAY OF VANCOMYCIN: CPT

## 2019-11-04 PROCEDURE — 85027 COMPLETE CBC AUTOMATED: CPT

## 2019-11-04 PROCEDURE — 93306 TTE W/DOPPLER COMPLETE: CPT

## 2019-11-04 PROCEDURE — 82803 BLOOD GASES ANY COMBINATION: CPT

## 2019-11-04 PROCEDURE — 36592 COLLECT BLOOD FROM PICC: CPT

## 2019-11-04 PROCEDURE — 2000000000 HC ICU R&B

## 2019-11-04 RX ORDER — LEVOTHYROXINE SODIUM 0.05 MG/1
50 TABLET ORAL DAILY
Status: DISCONTINUED | OUTPATIENT
Start: 2019-11-05 | End: 2019-11-11 | Stop reason: HOSPADM

## 2019-11-04 RX ORDER — METOPROLOL TARTRATE 5 MG/5ML
5 INJECTION INTRAVENOUS ONCE
Status: COMPLETED | OUTPATIENT
Start: 2019-11-04 | End: 2019-11-04

## 2019-11-04 RX ORDER — METOPROLOL TARTRATE 5 MG/5ML
5 INJECTION INTRAVENOUS EVERY 6 HOURS
Status: DISCONTINUED | OUTPATIENT
Start: 2019-11-04 | End: 2019-11-05

## 2019-11-04 RX ADMIN — METOPROLOL TARTRATE 5 MG: 5 INJECTION, SOLUTION INTRAVENOUS at 21:44

## 2019-11-04 RX ADMIN — SODIUM CHLORIDE 0.6 UNITS/HR: 9 INJECTION, SOLUTION INTRAVENOUS at 04:56

## 2019-11-04 RX ADMIN — PIPERACILLIN SODIUM AND TAZOBACTAM SODIUM 3.38 G: 3; .375 INJECTION, POWDER, LYOPHILIZED, FOR SOLUTION INTRAVENOUS at 01:21

## 2019-11-04 RX ADMIN — SULFACETAMIDE SODIUM 1 DROP: 100 SOLUTION/ DROPS OPHTHALMIC at 20:26

## 2019-11-04 RX ADMIN — SULFACETAMIDE SODIUM 1 DROP: 100 SOLUTION/ DROPS OPHTHALMIC at 08:13

## 2019-11-04 RX ADMIN — Medication 10 ML: at 20:26

## 2019-11-04 RX ADMIN — METOPROLOL TARTRATE 5 MG: 5 INJECTION, SOLUTION INTRAVENOUS at 15:44

## 2019-11-04 RX ADMIN — PIPERACILLIN SODIUM AND TAZOBACTAM SODIUM 3.38 G: 3; .375 INJECTION, POWDER, LYOPHILIZED, FOR SOLUTION INTRAVENOUS at 13:20

## 2019-11-04 RX ADMIN — HEPARIN SODIUM 5000 UNITS: 5000 INJECTION INTRAVENOUS; SUBCUTANEOUS at 06:08

## 2019-11-04 RX ADMIN — SULFACETAMIDE SODIUM 1 DROP: 100 SOLUTION/ DROPS OPHTHALMIC at 00:02

## 2019-11-04 RX ADMIN — Medication: at 14:25

## 2019-11-04 RX ADMIN — Medication 10 ML: at 08:12

## 2019-11-04 RX ADMIN — Medication: at 04:55

## 2019-11-04 RX ADMIN — VANCOMYCIN HYDROCHLORIDE 1.5 G: 1 INJECTION, POWDER, LYOPHILIZED, FOR SOLUTION INTRAVENOUS at 08:16

## 2019-11-04 RX ADMIN — HEPARIN SODIUM 5000 UNITS: 5000 INJECTION INTRAVENOUS; SUBCUTANEOUS at 21:44

## 2019-11-04 RX ADMIN — SULFACETAMIDE SODIUM 1 DROP: 100 SOLUTION/ DROPS OPHTHALMIC at 04:58

## 2019-11-04 RX ADMIN — METOPROLOL TARTRATE 12.5 MG: 25 TABLET ORAL at 13:46

## 2019-11-04 RX ADMIN — LEVOTHYROXINE SODIUM ANHYDROUS 25 MCG: 100 INJECTION, POWDER, LYOPHILIZED, FOR SOLUTION INTRAVENOUS at 08:12

## 2019-11-04 RX ADMIN — HEPARIN SODIUM 5000 UNITS: 5000 INJECTION INTRAVENOUS; SUBCUTANEOUS at 13:28

## 2019-11-04 RX ADMIN — PANTOPRAZOLE SODIUM 40 MG: 40 INJECTION, POWDER, FOR SOLUTION INTRAVENOUS at 08:12

## 2019-11-04 RX ADMIN — MUPIROCIN: 20 OINTMENT TOPICAL at 20:26

## 2019-11-05 ENCOUNTER — APPOINTMENT (OUTPATIENT)
Dept: GENERAL RADIOLOGY | Age: 69
DRG: 917 | End: 2019-11-05
Payer: COMMERCIAL

## 2019-11-05 PROBLEM — R93.89 ABNORMAL CXR: Status: ACTIVE | Noted: 2019-11-05

## 2019-11-05 LAB
ALBUMIN SERPL-MCNC: 2.7 G/DL (ref 3.4–5)
ALBUMIN SERPL-MCNC: 3 G/DL (ref 3.4–5)
ALBUMIN SERPL-MCNC: 3 G/DL (ref 3.4–5)
ANION GAP SERPL CALCULATED.3IONS-SCNC: 11 MMOL/L (ref 3–16)
ANION GAP SERPL CALCULATED.3IONS-SCNC: 12 MMOL/L (ref 3–16)
ANION GAP SERPL CALCULATED.3IONS-SCNC: 13 MMOL/L (ref 3–16)
APTT: 34.6 SEC (ref 26–36)
APTT: 46.8 SEC (ref 26–36)
BASE EXCESS ARTERIAL: 3.5 MMOL/L (ref -3–3)
BUN BLDV-MCNC: 24 MG/DL (ref 7–20)
BUN BLDV-MCNC: 24 MG/DL (ref 7–20)
BUN BLDV-MCNC: 26 MG/DL (ref 7–20)
CALCIUM SERPL-MCNC: 8 MG/DL (ref 8.3–10.6)
CALCIUM SERPL-MCNC: 8 MG/DL (ref 8.3–10.6)
CALCIUM SERPL-MCNC: 8.3 MG/DL (ref 8.3–10.6)
CARBOXYHEMOGLOBIN ARTERIAL: 1 % (ref 0–1.5)
CHLORIDE BLD-SCNC: 100 MMOL/L (ref 99–110)
CHLORIDE BLD-SCNC: 101 MMOL/L (ref 99–110)
CHLORIDE BLD-SCNC: 103 MMOL/L (ref 99–110)
CO2: 27 MMOL/L (ref 21–32)
CO2: 27 MMOL/L (ref 21–32)
CO2: 29 MMOL/L (ref 21–32)
CREAT SERPL-MCNC: 0.6 MG/DL (ref 0.6–1.2)
EKG ATRIAL RATE: 97 BPM
EKG DIAGNOSIS: NORMAL
EKG P AXIS: 55 DEGREES
EKG P-R INTERVAL: 136 MS
EKG Q-T INTERVAL: 264 MS
EKG QRS DURATION: 64 MS
EKG QTC CALCULATION (BAZETT): 335 MS
EKG R AXIS: -25 DEGREES
EKG T AXIS: 70 DEGREES
EKG VENTRICULAR RATE: 97 BPM
GFR AFRICAN AMERICAN: >60
GFR NON-AFRICAN AMERICAN: >60
GLUCOSE BLD-MCNC: 118 MG/DL (ref 70–99)
GLUCOSE BLD-MCNC: 122 MG/DL (ref 70–99)
GLUCOSE BLD-MCNC: 123 MG/DL (ref 70–99)
GLUCOSE BLD-MCNC: 124 MG/DL (ref 70–99)
GLUCOSE BLD-MCNC: 127 MG/DL (ref 70–99)
GLUCOSE BLD-MCNC: 128 MG/DL (ref 70–99)
GLUCOSE BLD-MCNC: 130 MG/DL (ref 70–99)
GLUCOSE BLD-MCNC: 132 MG/DL (ref 70–99)
GLUCOSE BLD-MCNC: 133 MG/DL (ref 70–99)
GLUCOSE BLD-MCNC: 138 MG/DL (ref 70–99)
GLUCOSE BLD-MCNC: 143 MG/DL (ref 70–99)
GLUCOSE BLD-MCNC: 144 MG/DL (ref 70–99)
GLUCOSE BLD-MCNC: 146 MG/DL (ref 70–99)
GLUCOSE BLD-MCNC: 149 MG/DL (ref 70–99)
GLUCOSE BLD-MCNC: 150 MG/DL (ref 70–99)
GLUCOSE BLD-MCNC: 154 MG/DL (ref 70–99)
GLUCOSE BLD-MCNC: 157 MG/DL (ref 70–99)
GLUCOSE BLD-MCNC: 159 MG/DL (ref 70–99)
GLUCOSE BLD-MCNC: 168 MG/DL (ref 70–99)
HCO3 ARTERIAL: 26.9 MMOL/L (ref 21–29)
HEMOGLOBIN, ART, EXTENDED: 11.2 G/DL (ref 12–16)
METHEMOGLOBIN ARTERIAL: 0.2 %
O2 CONTENT ARTERIAL: 15 ML/DL
O2 SAT, ARTERIAL: 94.2 %
O2 THERAPY: ABNORMAL
PCO2 ARTERIAL: 36.5 MMHG (ref 35–45)
PERFORMED ON: ABNORMAL
PH ARTERIAL: 7.49 (ref 7.35–7.45)
PHOSPHORUS: 1.7 MG/DL (ref 2.5–4.9)
PHOSPHORUS: 1.8 MG/DL (ref 2.5–4.9)
PHOSPHORUS: 2.4 MG/DL (ref 2.5–4.9)
PO2 ARTERIAL: 64.9 MMHG (ref 75–108)
POTASSIUM SERPL-SCNC: 3.2 MMOL/L (ref 3.5–5.1)
POTASSIUM SERPL-SCNC: 3.6 MMOL/L (ref 3.5–5.1)
POTASSIUM SERPL-SCNC: 3.8 MMOL/L (ref 3.5–5.1)
PRO-BNP: ABNORMAL PG/ML (ref 0–124)
SODIUM BLD-SCNC: 140 MMOL/L (ref 136–145)
SODIUM BLD-SCNC: 140 MMOL/L (ref 136–145)
SODIUM BLD-SCNC: 143 MMOL/L (ref 136–145)
TCO2 ARTERIAL: 28.1 MMOL/L
TROPONIN: 0.41 NG/ML

## 2019-11-05 PROCEDURE — 31622 DX BRONCHOSCOPE/WASH: CPT

## 2019-11-05 PROCEDURE — 2500000003 HC RX 250 WO HCPCS

## 2019-11-05 PROCEDURE — 87070 CULTURE OTHR SPECIMN AEROBIC: CPT

## 2019-11-05 PROCEDURE — 2000000000 HC ICU R&B

## 2019-11-05 PROCEDURE — 6360000002 HC RX W HCPCS

## 2019-11-05 PROCEDURE — 2500000003 HC RX 250 WO HCPCS: Performed by: INTERNAL MEDICINE

## 2019-11-05 PROCEDURE — C9113 INJ PANTOPRAZOLE SODIUM, VIA: HCPCS | Performed by: INTERNAL MEDICINE

## 2019-11-05 PROCEDURE — 71045 X-RAY EXAM CHEST 1 VIEW: CPT

## 2019-11-05 PROCEDURE — 6370000000 HC RX 637 (ALT 250 FOR IP): Performed by: INTERNAL MEDICINE

## 2019-11-05 PROCEDURE — 2580000003 HC RX 258: Performed by: INTERNAL MEDICINE

## 2019-11-05 PROCEDURE — 6360000002 HC RX W HCPCS: Performed by: INTERNAL MEDICINE

## 2019-11-05 PROCEDURE — 36415 COLL VENOUS BLD VENIPUNCTURE: CPT

## 2019-11-05 PROCEDURE — 82803 BLOOD GASES ANY COMBINATION: CPT

## 2019-11-05 PROCEDURE — 87205 SMEAR GRAM STAIN: CPT

## 2019-11-05 PROCEDURE — 0B938ZZ DRAINAGE OF RIGHT MAIN BRONCHUS, VIA NATURAL OR ARTIFICIAL OPENING ENDOSCOPIC: ICD-10-PCS | Performed by: INTERNAL MEDICINE

## 2019-11-05 PROCEDURE — 93005 ELECTROCARDIOGRAM TRACING: CPT | Performed by: INTERNAL MEDICINE

## 2019-11-05 PROCEDURE — 93010 ELECTROCARDIOGRAM REPORT: CPT | Performed by: INTERNAL MEDICINE

## 2019-11-05 PROCEDURE — 99291 CRITICAL CARE FIRST HOUR: CPT | Performed by: INTERNAL MEDICINE

## 2019-11-05 PROCEDURE — 36592 COLLECT BLOOD FROM PICC: CPT

## 2019-11-05 PROCEDURE — 80069 RENAL FUNCTION PANEL: CPT

## 2019-11-05 PROCEDURE — 87077 CULTURE AEROBIC IDENTIFY: CPT

## 2019-11-05 PROCEDURE — 94761 N-INVAS EAR/PLS OXIMETRY MLT: CPT

## 2019-11-05 PROCEDURE — 94750 HC PULMONARY COMPLIANCE STUDY: CPT

## 2019-11-05 PROCEDURE — 84484 ASSAY OF TROPONIN QUANT: CPT

## 2019-11-05 PROCEDURE — 31645 BRNCHSC W/THER ASPIR 1ST: CPT | Performed by: INTERNAL MEDICINE

## 2019-11-05 PROCEDURE — 0BH18EZ INSERTION OF ENDOTRACHEAL AIRWAY INTO TRACHEA, VIA NATURAL OR ARTIFICIAL OPENING ENDOSCOPIC: ICD-10-PCS | Performed by: INTERNAL MEDICINE

## 2019-11-05 PROCEDURE — 99223 1ST HOSP IP/OBS HIGH 75: CPT | Performed by: INTERNAL MEDICINE

## 2019-11-05 PROCEDURE — 87186 SC STD MICRODIL/AGAR DIL: CPT

## 2019-11-05 PROCEDURE — 31500 INSERT EMERGENCY AIRWAY: CPT | Performed by: INTERNAL MEDICINE

## 2019-11-05 PROCEDURE — 31624 DX BRONCHOSCOPE/LAVAGE: CPT | Performed by: INTERNAL MEDICINE

## 2019-11-05 PROCEDURE — 85730 THROMBOPLASTIN TIME PARTIAL: CPT

## 2019-11-05 PROCEDURE — 31500 INSERT EMERGENCY AIRWAY: CPT

## 2019-11-05 PROCEDURE — 2700000000 HC OXYGEN THERAPY PER DAY

## 2019-11-05 PROCEDURE — 0B9D8ZX DRAINAGE OF RIGHT MIDDLE LUNG LOBE, VIA NATURAL OR ARTIFICIAL OPENING ENDOSCOPIC, DIAGNOSTIC: ICD-10-PCS | Performed by: INTERNAL MEDICINE

## 2019-11-05 PROCEDURE — 94640 AIRWAY INHALATION TREATMENT: CPT

## 2019-11-05 PROCEDURE — 94002 VENT MGMT INPAT INIT DAY: CPT

## 2019-11-05 PROCEDURE — 5A1945Z RESPIRATORY VENTILATION, 24-96 CONSECUTIVE HOURS: ICD-10-PCS | Performed by: INTERNAL MEDICINE

## 2019-11-05 PROCEDURE — 99152 MOD SED SAME PHYS/QHP 5/>YRS: CPT | Performed by: INTERNAL MEDICINE

## 2019-11-05 PROCEDURE — 83880 ASSAY OF NATRIURETIC PEPTIDE: CPT

## 2019-11-05 RX ORDER — FENTANYL CITRATE 50 UG/ML
25 INJECTION, SOLUTION INTRAMUSCULAR; INTRAVENOUS
Status: DISCONTINUED | OUTPATIENT
Start: 2019-11-05 | End: 2019-11-07

## 2019-11-05 RX ORDER — HEPARIN SODIUM 10000 [USP'U]/100ML
16 INJECTION, SOLUTION INTRAVENOUS CONTINUOUS
Status: DISCONTINUED | OUTPATIENT
Start: 2019-11-05 | End: 2019-11-06

## 2019-11-05 RX ORDER — ALBUTEROL SULFATE 90 UG/1
4 AEROSOL, METERED RESPIRATORY (INHALATION) EVERY 4 HOURS
Status: DISCONTINUED | OUTPATIENT
Start: 2019-11-05 | End: 2019-11-07

## 2019-11-05 RX ORDER — ATORVASTATIN CALCIUM 40 MG/1
40 TABLET, FILM COATED ORAL DAILY
Status: DISCONTINUED | OUTPATIENT
Start: 2019-11-05 | End: 2019-11-08

## 2019-11-05 RX ORDER — HEPARIN SODIUM 1000 [USP'U]/ML
2000 INJECTION, SOLUTION INTRAVENOUS; SUBCUTANEOUS ONCE
Status: COMPLETED | OUTPATIENT
Start: 2019-11-05 | End: 2019-11-05

## 2019-11-05 RX ORDER — PROPOFOL 10 MG/ML
10 INJECTION, EMULSION INTRAVENOUS CONTINUOUS
Status: DISCONTINUED | OUTPATIENT
Start: 2019-11-05 | End: 2019-11-07

## 2019-11-05 RX ORDER — CHLORHEXIDINE GLUCONATE 0.12 MG/ML
15 RINSE ORAL 2 TIMES DAILY
Status: DISCONTINUED | OUTPATIENT
Start: 2019-11-05 | End: 2019-11-07

## 2019-11-05 RX ORDER — HEPARIN SODIUM 1000 [USP'U]/ML
4000 INJECTION, SOLUTION INTRAVENOUS; SUBCUTANEOUS PRN
Status: DISCONTINUED | OUTPATIENT
Start: 2019-11-05 | End: 2019-11-08

## 2019-11-05 RX ORDER — FUROSEMIDE 10 MG/ML
20 INJECTION INTRAMUSCULAR; INTRAVENOUS ONCE
Status: COMPLETED | OUTPATIENT
Start: 2019-11-05 | End: 2019-11-05

## 2019-11-05 RX ORDER — FUROSEMIDE 10 MG/ML
INJECTION INTRAMUSCULAR; INTRAVENOUS
Status: COMPLETED
Start: 2019-11-05 | End: 2019-11-05

## 2019-11-05 RX ORDER — ASPIRIN 325 MG
325 TABLET ORAL DAILY
Status: DISCONTINUED | OUTPATIENT
Start: 2019-11-05 | End: 2019-11-08

## 2019-11-05 RX ORDER — ALBUTEROL SULFATE 90 UG/1
4 AEROSOL, METERED RESPIRATORY (INHALATION) EVERY 4 HOURS PRN
Status: DISCONTINUED | OUTPATIENT
Start: 2019-11-05 | End: 2019-11-05

## 2019-11-05 RX ORDER — MIDAZOLAM HYDROCHLORIDE 1 MG/ML
2 INJECTION INTRAMUSCULAR; INTRAVENOUS
Status: DISCONTINUED | OUTPATIENT
Start: 2019-11-05 | End: 2019-11-07

## 2019-11-05 RX ORDER — HEPARIN SODIUM 1000 [USP'U]/ML
2000 INJECTION, SOLUTION INTRAVENOUS; SUBCUTANEOUS PRN
Status: DISCONTINUED | OUTPATIENT
Start: 2019-11-05 | End: 2019-11-08

## 2019-11-05 RX ORDER — HEPARIN SODIUM 1000 [USP'U]/ML
4000 INJECTION, SOLUTION INTRAVENOUS; SUBCUTANEOUS ONCE
Status: COMPLETED | OUTPATIENT
Start: 2019-11-05 | End: 2019-11-05

## 2019-11-05 RX ADMIN — Medication 10 ML: at 20:13

## 2019-11-05 RX ADMIN — IPRATROPIUM BROMIDE AND ALBUTEROL SULFATE 1 AMPULE: .5; 3 SOLUTION RESPIRATORY (INHALATION) at 08:45

## 2019-11-05 RX ADMIN — MUPIROCIN: 20 OINTMENT TOPICAL at 08:35

## 2019-11-05 RX ADMIN — MUPIROCIN: 20 OINTMENT TOPICAL at 20:13

## 2019-11-05 RX ADMIN — PIPERACILLIN SODIUM AND TAZOBACTAM SODIUM 3.38 G: 3; .375 INJECTION, POWDER, LYOPHILIZED, FOR SOLUTION INTRAVENOUS at 01:38

## 2019-11-05 RX ADMIN — SULFACETAMIDE SODIUM 1 DROP: 100 SOLUTION/ DROPS OPHTHALMIC at 08:34

## 2019-11-05 RX ADMIN — Medication 4 PUFF: at 23:13

## 2019-11-05 RX ADMIN — FUROSEMIDE 20 MG: 10 INJECTION INTRAMUSCULAR; INTRAVENOUS at 11:37

## 2019-11-05 RX ADMIN — PIPERACILLIN SODIUM AND TAZOBACTAM SODIUM 3.38 G: 3; .375 INJECTION, POWDER, LYOPHILIZED, FOR SOLUTION INTRAVENOUS at 13:43

## 2019-11-05 RX ADMIN — SODIUM CHLORIDE 0.99 UNITS/HR: 9 INJECTION, SOLUTION INTRAVENOUS at 04:10

## 2019-11-05 RX ADMIN — FUROSEMIDE 20 MG: 10 INJECTION, SOLUTION INTRAMUSCULAR; INTRAVENOUS at 11:37

## 2019-11-05 RX ADMIN — METOPROLOL TARTRATE 25 MG: 25 TABLET ORAL at 20:13

## 2019-11-05 RX ADMIN — Medication 10 ML: at 08:34

## 2019-11-05 RX ADMIN — FENTANYL CITRATE 25 MCG: 50 INJECTION INTRAMUSCULAR; INTRAVENOUS at 09:35

## 2019-11-05 RX ADMIN — POTASSIUM CHLORIDE 20 MEQ: 400 INJECTION, SOLUTION INTRAVENOUS at 20:31

## 2019-11-05 RX ADMIN — PROPOFOL 70 MCG/KG/MIN: 10 INJECTION, EMULSION INTRAVENOUS at 22:03

## 2019-11-05 RX ADMIN — CHLORHEXIDINE GLUCONATE 0.12% ORAL RINSE 15 ML: 1.2 LIQUID ORAL at 20:13

## 2019-11-05 RX ADMIN — Medication 4 PUFF: at 11:07

## 2019-11-05 RX ADMIN — METOPROLOL TARTRATE 5 MG: 5 INJECTION, SOLUTION INTRAVENOUS at 04:10

## 2019-11-05 RX ADMIN — HEPARIN SODIUM AND DEXTROSE 804 UNITS/HR: 10000; 5 INJECTION INTRAVENOUS at 11:26

## 2019-11-05 RX ADMIN — PROPOFOL 70 MCG/KG/MIN: 10 INJECTION, EMULSION INTRAVENOUS at 19:11

## 2019-11-05 RX ADMIN — SULFACETAMIDE SODIUM 1 DROP: 100 SOLUTION/ DROPS OPHTHALMIC at 17:16

## 2019-11-05 RX ADMIN — METOPROLOL TARTRATE 25 MG: 25 TABLET ORAL at 11:07

## 2019-11-05 RX ADMIN — HEPARIN SODIUM 5000 UNITS: 5000 INJECTION INTRAVENOUS; SUBCUTANEOUS at 06:11

## 2019-11-05 RX ADMIN — POTASSIUM CHLORIDE 20 MEQ: 400 INJECTION, SOLUTION INTRAVENOUS at 21:19

## 2019-11-05 RX ADMIN — PROPOFOL 70 MCG/KG/MIN: 10 INJECTION, EMULSION INTRAVENOUS at 16:33

## 2019-11-05 RX ADMIN — MIDAZOLAM HYDROCHLORIDE 2 MG: 1 INJECTION, SOLUTION INTRAMUSCULAR; INTRAVENOUS at 09:35

## 2019-11-05 RX ADMIN — Medication 4 PUFF: at 19:57

## 2019-11-05 RX ADMIN — ATORVASTATIN CALCIUM 40 MG: 40 TABLET, FILM COATED ORAL at 11:13

## 2019-11-05 RX ADMIN — PROPOFOL 80 MCG/KG/MIN: 10 INJECTION, EMULSION INTRAVENOUS at 11:24

## 2019-11-05 RX ADMIN — ASPIRIN 325 MG: 325 TABLET, COATED ORAL at 11:13

## 2019-11-05 RX ADMIN — HEPARIN SODIUM 4000 UNITS: 1000 INJECTION INTRAVENOUS; SUBCUTANEOUS at 11:26

## 2019-11-05 RX ADMIN — SULFACETAMIDE SODIUM 1 DROP: 100 SOLUTION/ DROPS OPHTHALMIC at 00:29

## 2019-11-05 RX ADMIN — HEPARIN SODIUM 2000 UNITS: 1000 INJECTION INTRAVENOUS; SUBCUTANEOUS at 18:30

## 2019-11-05 RX ADMIN — MIDAZOLAM HYDROCHLORIDE 2 MG: 1 INJECTION, SOLUTION INTRAMUSCULAR; INTRAVENOUS at 10:38

## 2019-11-05 RX ADMIN — PROPOFOL 10 MCG/KG/MIN: 10 INJECTION, EMULSION INTRAVENOUS at 09:20

## 2019-11-05 RX ADMIN — SODIUM PHOSPHATE, MONOBASIC, MONOHYDRATE 15 MMOL: 276; 142 INJECTION, SOLUTION INTRAVENOUS at 22:35

## 2019-11-05 RX ADMIN — SULFACETAMIDE SODIUM 1 DROP: 100 SOLUTION/ DROPS OPHTHALMIC at 19:38

## 2019-11-05 RX ADMIN — LABETALOL HYDROCHLORIDE 20 MG: 5 INJECTION INTRAVENOUS at 01:48

## 2019-11-05 RX ADMIN — PROPOFOL 70 MCG/KG/MIN: 10 INJECTION, EMULSION INTRAVENOUS at 13:48

## 2019-11-05 RX ADMIN — PANTOPRAZOLE SODIUM 40 MG: 40 INJECTION, POWDER, FOR SOLUTION INTRAVENOUS at 08:34

## 2019-11-05 RX ADMIN — SULFACETAMIDE SODIUM 1 DROP: 100 SOLUTION/ DROPS OPHTHALMIC at 11:16

## 2019-11-05 RX ADMIN — Medication: at 04:10

## 2019-11-05 RX ADMIN — SULFACETAMIDE SODIUM 1 DROP: 100 SOLUTION/ DROPS OPHTHALMIC at 04:43

## 2019-11-05 RX ADMIN — SODIUM PHOSPHATE, MONOBASIC, MONOHYDRATE 15 MMOL: 276; 142 INJECTION, SOLUTION INTRAVENOUS at 01:43

## 2019-11-05 ASSESSMENT — PULMONARY FUNCTION TESTS
PIF_VALUE: 30
PIF_VALUE: 23
PIF_VALUE: 24
PIF_VALUE: 23
PIF_VALUE: 22
PIF_VALUE: 24
PIF_VALUE: 31
PIF_VALUE: 22
PIF_VALUE: 23
PIF_VALUE: 22
PIF_VALUE: 25
PIF_VALUE: 24
PIF_VALUE: 22
PIF_VALUE: 23
PIF_VALUE: 23
PIF_VALUE: 24

## 2019-11-06 ENCOUNTER — APPOINTMENT (OUTPATIENT)
Dept: GENERAL RADIOLOGY | Age: 69
DRG: 917 | End: 2019-11-06
Payer: COMMERCIAL

## 2019-11-06 LAB
ALBUMIN SERPL-MCNC: 2.8 G/DL (ref 3.4–5)
ALBUMIN SERPL-MCNC: 2.8 G/DL (ref 3.4–5)
ALBUMIN SERPL-MCNC: 2.9 G/DL (ref 3.4–5)
ANION GAP SERPL CALCULATED.3IONS-SCNC: 11 MMOL/L (ref 3–16)
ANION GAP SERPL CALCULATED.3IONS-SCNC: 11 MMOL/L (ref 3–16)
ANION GAP SERPL CALCULATED.3IONS-SCNC: 13 MMOL/L (ref 3–16)
APTT: 36.5 SEC (ref 26–36)
APTT: 41.9 SEC (ref 26–36)
APTT: 44.9 SEC (ref 26–36)
BASE EXCESS ARTERIAL: 4.5 MMOL/L (ref -3–3)
BUN BLDV-MCNC: 18 MG/DL (ref 7–20)
BUN BLDV-MCNC: 21 MG/DL (ref 7–20)
BUN BLDV-MCNC: 25 MG/DL (ref 7–20)
CALCIUM SERPL-MCNC: 8.1 MG/DL (ref 8.3–10.6)
CALCIUM SERPL-MCNC: 8.2 MG/DL (ref 8.3–10.6)
CALCIUM SERPL-MCNC: 8.2 MG/DL (ref 8.3–10.6)
CARBOXYHEMOGLOBIN ARTERIAL: 0.6 % (ref 0–1.5)
CHLORIDE BLD-SCNC: 101 MMOL/L (ref 99–110)
CHLORIDE BLD-SCNC: 102 MMOL/L (ref 99–110)
CHLORIDE BLD-SCNC: 103 MMOL/L (ref 99–110)
CO2: 25 MMOL/L (ref 21–32)
CO2: 26 MMOL/L (ref 21–32)
CO2: 28 MMOL/L (ref 21–32)
CREAT SERPL-MCNC: 0.6 MG/DL (ref 0.6–1.2)
CREAT SERPL-MCNC: 0.6 MG/DL (ref 0.6–1.2)
CREAT SERPL-MCNC: 0.7 MG/DL (ref 0.6–1.2)
EKG ATRIAL RATE: 75 BPM
EKG DIAGNOSIS: NORMAL
EKG P AXIS: 52 DEGREES
EKG P-R INTERVAL: 126 MS
EKG Q-T INTERVAL: 400 MS
EKG QRS DURATION: 70 MS
EKG QTC CALCULATION (BAZETT): 553 MS
EKG R AXIS: -25 DEGREES
EKG T AXIS: 68 DEGREES
EKG VENTRICULAR RATE: 115 BPM
GFR AFRICAN AMERICAN: >60
GFR NON-AFRICAN AMERICAN: >60
GLUCOSE BLD-MCNC: 113 MG/DL (ref 70–99)
GLUCOSE BLD-MCNC: 119 MG/DL (ref 70–99)
GLUCOSE BLD-MCNC: 130 MG/DL (ref 70–99)
GLUCOSE BLD-MCNC: 135 MG/DL (ref 70–99)
GLUCOSE BLD-MCNC: 138 MG/DL (ref 70–99)
GLUCOSE BLD-MCNC: 146 MG/DL (ref 70–99)
GLUCOSE BLD-MCNC: 146 MG/DL (ref 70–99)
GLUCOSE BLD-MCNC: 148 MG/DL (ref 70–99)
GLUCOSE BLD-MCNC: 154 MG/DL (ref 70–99)
GLUCOSE BLD-MCNC: 154 MG/DL (ref 70–99)
GLUCOSE BLD-MCNC: 158 MG/DL (ref 70–99)
GLUCOSE BLD-MCNC: 163 MG/DL (ref 70–99)
HCO3 ARTERIAL: 27.4 MMOL/L (ref 21–29)
HEMOGLOBIN, ART, EXTENDED: 10.7 G/DL (ref 12–16)
MAGNESIUM: 1.7 MG/DL (ref 1.8–2.4)
METHEMOGLOBIN ARTERIAL: 0.1 %
O2 CONTENT ARTERIAL: 15 ML/DL
O2 SAT, ARTERIAL: 98 %
O2 THERAPY: ABNORMAL
PCO2 ARTERIAL: 34.4 MMHG (ref 35–45)
PERFORMED ON: ABNORMAL
PH ARTERIAL: 7.52 (ref 7.35–7.45)
PHOSPHORUS: 2.1 MG/DL (ref 2.5–4.9)
PHOSPHORUS: 2.3 MG/DL (ref 2.5–4.9)
PHOSPHORUS: 2.4 MG/DL (ref 2.5–4.9)
PO2 ARTERIAL: 98 MMHG (ref 75–108)
POTASSIUM SERPL-SCNC: 3.4 MMOL/L (ref 3.5–5.1)
POTASSIUM SERPL-SCNC: 3.5 MMOL/L (ref 3.5–5.1)
POTASSIUM SERPL-SCNC: 3.6 MMOL/L (ref 3.5–5.1)
SODIUM BLD-SCNC: 138 MMOL/L (ref 136–145)
SODIUM BLD-SCNC: 141 MMOL/L (ref 136–145)
SODIUM BLD-SCNC: 141 MMOL/L (ref 136–145)
TCO2 ARTERIAL: 28.5 MMOL/L

## 2019-11-06 PROCEDURE — 2580000003 HC RX 258: Performed by: INTERNAL MEDICINE

## 2019-11-06 PROCEDURE — 36415 COLL VENOUS BLD VENIPUNCTURE: CPT

## 2019-11-06 PROCEDURE — 94761 N-INVAS EAR/PLS OXIMETRY MLT: CPT

## 2019-11-06 PROCEDURE — 93005 ELECTROCARDIOGRAM TRACING: CPT | Performed by: INTERNAL MEDICINE

## 2019-11-06 PROCEDURE — 2000000000 HC ICU R&B

## 2019-11-06 PROCEDURE — 6370000000 HC RX 637 (ALT 250 FOR IP): Performed by: INTERNAL MEDICINE

## 2019-11-06 PROCEDURE — 99291 CRITICAL CARE FIRST HOUR: CPT | Performed by: INTERNAL MEDICINE

## 2019-11-06 PROCEDURE — 6360000002 HC RX W HCPCS: Performed by: INTERNAL MEDICINE

## 2019-11-06 PROCEDURE — 80069 RENAL FUNCTION PANEL: CPT

## 2019-11-06 PROCEDURE — 83735 ASSAY OF MAGNESIUM: CPT

## 2019-11-06 PROCEDURE — 85730 THROMBOPLASTIN TIME PARTIAL: CPT

## 2019-11-06 PROCEDURE — 82803 BLOOD GASES ANY COMBINATION: CPT

## 2019-11-06 PROCEDURE — 2500000003 HC RX 250 WO HCPCS: Performed by: INTERNAL MEDICINE

## 2019-11-06 PROCEDURE — 71045 X-RAY EXAM CHEST 1 VIEW: CPT

## 2019-11-06 PROCEDURE — C9113 INJ PANTOPRAZOLE SODIUM, VIA: HCPCS | Performed by: INTERNAL MEDICINE

## 2019-11-06 PROCEDURE — 2700000000 HC OXYGEN THERAPY PER DAY

## 2019-11-06 PROCEDURE — 94003 VENT MGMT INPAT SUBQ DAY: CPT

## 2019-11-06 PROCEDURE — 94750 HC PULMONARY COMPLIANCE STUDY: CPT

## 2019-11-06 PROCEDURE — 93010 ELECTROCARDIOGRAM REPORT: CPT | Performed by: INTERNAL MEDICINE

## 2019-11-06 PROCEDURE — 94640 AIRWAY INHALATION TREATMENT: CPT

## 2019-11-06 PROCEDURE — 99232 SBSQ HOSP IP/OBS MODERATE 35: CPT | Performed by: INTERNAL MEDICINE

## 2019-11-06 RX ORDER — HEPARIN SODIUM 10000 [USP'U]/100ML
15.9 INJECTION, SOLUTION INTRAVENOUS CONTINUOUS
Status: DISCONTINUED | OUTPATIENT
Start: 2019-11-06 | End: 2019-11-08 | Stop reason: ALTCHOICE

## 2019-11-06 RX ORDER — MAGNESIUM SULFATE IN WATER 40 MG/ML
2 INJECTION, SOLUTION INTRAVENOUS ONCE
Status: COMPLETED | OUTPATIENT
Start: 2019-11-06 | End: 2019-11-06

## 2019-11-06 RX ORDER — HEPARIN SODIUM 1000 [USP'U]/ML
2000 INJECTION, SOLUTION INTRAVENOUS; SUBCUTANEOUS ONCE
Status: COMPLETED | OUTPATIENT
Start: 2019-11-06 | End: 2019-11-06

## 2019-11-06 RX ORDER — FUROSEMIDE 10 MG/ML
20 INJECTION INTRAMUSCULAR; INTRAVENOUS 2 TIMES DAILY
Status: DISCONTINUED | OUTPATIENT
Start: 2019-11-06 | End: 2019-11-09 | Stop reason: ALTCHOICE

## 2019-11-06 RX ADMIN — MUPIROCIN: 20 OINTMENT TOPICAL at 08:10

## 2019-11-06 RX ADMIN — ATORVASTATIN CALCIUM 40 MG: 40 TABLET, FILM COATED ORAL at 08:10

## 2019-11-06 RX ADMIN — POTASSIUM CHLORIDE 20 MEQ: 400 INJECTION, SOLUTION INTRAVENOUS at 06:36

## 2019-11-06 RX ADMIN — FUROSEMIDE 20 MG: 10 INJECTION, SOLUTION INTRAMUSCULAR; INTRAVENOUS at 11:29

## 2019-11-06 RX ADMIN — Medication 4 PUFF: at 19:15

## 2019-11-06 RX ADMIN — PANTOPRAZOLE SODIUM 40 MG: 40 INJECTION, POWDER, FOR SOLUTION INTRAVENOUS at 08:10

## 2019-11-06 RX ADMIN — MUPIROCIN: 20 OINTMENT TOPICAL at 21:19

## 2019-11-06 RX ADMIN — SULFACETAMIDE SODIUM 1 DROP: 100 SOLUTION/ DROPS OPHTHALMIC at 00:27

## 2019-11-06 RX ADMIN — POTASSIUM CHLORIDE 20 MEQ: 400 INJECTION, SOLUTION INTRAVENOUS at 05:28

## 2019-11-06 RX ADMIN — Medication 4 PUFF: at 15:50

## 2019-11-06 RX ADMIN — FUROSEMIDE 20 MG: 10 INJECTION, SOLUTION INTRAMUSCULAR; INTRAVENOUS at 18:09

## 2019-11-06 RX ADMIN — Medication 4 PUFF: at 07:37

## 2019-11-06 RX ADMIN — Medication 4 PUFF: at 03:29

## 2019-11-06 RX ADMIN — PROPOFOL 25 MCG/KG/MIN: 10 INJECTION, EMULSION INTRAVENOUS at 11:35

## 2019-11-06 RX ADMIN — METOPROLOL TARTRATE 25 MG: 25 TABLET ORAL at 08:10

## 2019-11-06 RX ADMIN — SULFACETAMIDE SODIUM 1 DROP: 100 SOLUTION/ DROPS OPHTHALMIC at 11:29

## 2019-11-06 RX ADMIN — Medication 10 ML: at 08:11

## 2019-11-06 RX ADMIN — SULFACETAMIDE SODIUM 1 DROP: 100 SOLUTION/ DROPS OPHTHALMIC at 08:11

## 2019-11-06 RX ADMIN — HEPARIN SODIUM 10.7 ML/HR: 10000 INJECTION, SOLUTION INTRAVENOUS at 10:20

## 2019-11-06 RX ADMIN — PROPOFOL 40 MCG/KG/MIN: 10 INJECTION, EMULSION INTRAVENOUS at 22:35

## 2019-11-06 RX ADMIN — METOPROLOL TARTRATE 25 MG: 25 TABLET ORAL at 13:49

## 2019-11-06 RX ADMIN — Medication 4 PUFF: at 11:05

## 2019-11-06 RX ADMIN — SULFACETAMIDE SODIUM 1 DROP: 100 SOLUTION/ DROPS OPHTHALMIC at 19:34

## 2019-11-06 RX ADMIN — CHLORHEXIDINE GLUCONATE 0.12% ORAL RINSE 15 ML: 1.2 LIQUID ORAL at 21:19

## 2019-11-06 RX ADMIN — PROPOFOL 70 MCG/KG/MIN: 10 INJECTION, EMULSION INTRAVENOUS at 05:10

## 2019-11-06 RX ADMIN — POTASSIUM CHLORIDE 20 MEQ: 400 INJECTION, SOLUTION INTRAVENOUS at 12:26

## 2019-11-06 RX ADMIN — HEPARIN SODIUM 2000 UNITS: 1000 INJECTION INTRAVENOUS; SUBCUTANEOUS at 17:50

## 2019-11-06 RX ADMIN — SULFACETAMIDE SODIUM 1 DROP: 100 SOLUTION/ DROPS OPHTHALMIC at 03:49

## 2019-11-06 RX ADMIN — PROPOFOL 70 MCG/KG/MIN: 10 INJECTION, EMULSION INTRAVENOUS at 00:21

## 2019-11-06 RX ADMIN — PROPOFOL 70 MCG/KG/MIN: 10 INJECTION, EMULSION INTRAVENOUS at 02:38

## 2019-11-06 RX ADMIN — PROPOFOL 40 MCG/KG/MIN: 10 INJECTION, EMULSION INTRAVENOUS at 18:30

## 2019-11-06 RX ADMIN — METOPROLOL TARTRATE 25 MG: 25 TABLET ORAL at 21:19

## 2019-11-06 RX ADMIN — SODIUM PHOSPHATE, MONOBASIC, MONOHYDRATE 10 MMOL: 276; 142 INJECTION, SOLUTION INTRAVENOUS at 17:58

## 2019-11-06 RX ADMIN — LEVOTHYROXINE SODIUM 50 MCG: 50 TABLET ORAL at 05:09

## 2019-11-06 RX ADMIN — Medication 4 PUFF: at 23:16

## 2019-11-06 RX ADMIN — PIPERACILLIN SODIUM AND TAZOBACTAM SODIUM 3.38 G: 3; .375 INJECTION, POWDER, LYOPHILIZED, FOR SOLUTION INTRAVENOUS at 01:24

## 2019-11-06 RX ADMIN — PROPOFOL 50 MCG/KG/MIN: 10 INJECTION, EMULSION INTRAVENOUS at 14:42

## 2019-11-06 RX ADMIN — ASPIRIN 325 MG: 325 TABLET, COATED ORAL at 08:10

## 2019-11-06 RX ADMIN — HEPARIN SODIUM 2000 UNITS: 1000 INJECTION, SOLUTION INTRAVENOUS; SUBCUTANEOUS at 01:25

## 2019-11-06 RX ADMIN — SODIUM CHLORIDE 0.94 UNITS/HR: 9 INJECTION, SOLUTION INTRAVENOUS at 02:20

## 2019-11-06 RX ADMIN — LABETALOL HYDROCHLORIDE 20 MG: 5 INJECTION INTRAVENOUS at 22:20

## 2019-11-06 RX ADMIN — MAGNESIUM SULFATE HEPTAHYDRATE 2 G: 40 INJECTION, SOLUTION INTRAVENOUS at 13:50

## 2019-11-06 RX ADMIN — HEPARIN SODIUM 2000 UNITS: 1000 INJECTION INTRAVENOUS; SUBCUTANEOUS at 10:19

## 2019-11-06 RX ADMIN — Medication 10 ML: at 21:18

## 2019-11-06 RX ADMIN — INSULIN LISPRO 2 UNITS: 100 INJECTION, SOLUTION INTRAVENOUS; SUBCUTANEOUS at 11:29

## 2019-11-06 RX ADMIN — PIPERACILLIN SODIUM AND TAZOBACTAM SODIUM 3.38 G: 3; .375 INJECTION, POWDER, LYOPHILIZED, FOR SOLUTION INTRAVENOUS at 13:50

## 2019-11-06 RX ADMIN — SULFACETAMIDE SODIUM 1 DROP: 100 SOLUTION/ DROPS OPHTHALMIC at 16:45

## 2019-11-06 RX ADMIN — SODIUM PHOSPHATE, MONOBASIC, MONOHYDRATE 15 MMOL: 276; 142 INJECTION, SOLUTION INTRAVENOUS at 06:08

## 2019-11-06 RX ADMIN — CHLORHEXIDINE GLUCONATE 0.12% ORAL RINSE 15 ML: 1.2 LIQUID ORAL at 08:11

## 2019-11-06 ASSESSMENT — PULMONARY FUNCTION TESTS
PIF_VALUE: 20
PIF_VALUE: 22
PIF_VALUE: 23
PIF_VALUE: 12
PIF_VALUE: 22
PIF_VALUE: 29
PIF_VALUE: 11
PIF_VALUE: 24
PIF_VALUE: 24
PIF_VALUE: 22
PIF_VALUE: 20
PIF_VALUE: 26
PIF_VALUE: 19
PIF_VALUE: 22
PIF_VALUE: 21
PIF_VALUE: 20
PIF_VALUE: 21
PIF_VALUE: 23
PIF_VALUE: 20
PIF_VALUE: 20
PIF_VALUE: 27
PIF_VALUE: 29
PIF_VALUE: 21
PIF_VALUE: 21
PIF_VALUE: 22
PIF_VALUE: 24
PIF_VALUE: 21
PIF_VALUE: 23
PIF_VALUE: 22
PIF_VALUE: 22
PIF_VALUE: 19
PIF_VALUE: 21
PIF_VALUE: 19

## 2019-11-07 ENCOUNTER — APPOINTMENT (OUTPATIENT)
Dept: GENERAL RADIOLOGY | Age: 69
DRG: 917 | End: 2019-11-07
Payer: COMMERCIAL

## 2019-11-07 LAB
ALBUMIN SERPL-MCNC: 2.9 G/DL (ref 3.4–5)
ALBUMIN SERPL-MCNC: 3 G/DL (ref 3.4–5)
ALBUMIN SERPL-MCNC: 3.3 G/DL (ref 3.4–5)
ANION GAP SERPL CALCULATED.3IONS-SCNC: 13 MMOL/L (ref 3–16)
ANION GAP SERPL CALCULATED.3IONS-SCNC: 14 MMOL/L (ref 3–16)
ANION GAP SERPL CALCULATED.3IONS-SCNC: 15 MMOL/L (ref 3–16)
APTT: 185.3 SEC (ref 26–36)
APTT: 46.9 SEC (ref 26–36)
APTT: 53.3 SEC (ref 26–36)
APTT: 62.4 SEC (ref 26–36)
BASE EXCESS ARTERIAL: 2.9 MMOL/L (ref -3–3)
BASE EXCESS VENOUS: 3.3 MMOL/L (ref -3–3)
BASOPHILS ABSOLUTE: 0 K/UL (ref 0–0.2)
BASOPHILS RELATIVE PERCENT: 0 %
BUN BLDV-MCNC: 18 MG/DL (ref 7–20)
CALCIUM SERPL-MCNC: 8.4 MG/DL (ref 8.3–10.6)
CALCIUM SERPL-MCNC: 8.7 MG/DL (ref 8.3–10.6)
CALCIUM SERPL-MCNC: 8.8 MG/DL (ref 8.3–10.6)
CARBOXYHEMOGLOBIN ARTERIAL: 1.3 % (ref 0–1.5)
CARBOXYHEMOGLOBIN: 3.3 % (ref 0–1.5)
CHLORIDE BLD-SCNC: 96 MMOL/L (ref 99–110)
CHLORIDE BLD-SCNC: 97 MMOL/L (ref 99–110)
CHLORIDE BLD-SCNC: 99 MMOL/L (ref 99–110)
CO2: 23 MMOL/L (ref 21–32)
CO2: 25 MMOL/L (ref 21–32)
CO2: 26 MMOL/L (ref 21–32)
CREAT SERPL-MCNC: 0.6 MG/DL (ref 0.6–1.2)
CREAT SERPL-MCNC: <0.5 MG/DL (ref 0.6–1.2)
CREAT SERPL-MCNC: <0.5 MG/DL (ref 0.6–1.2)
CULTURE, BLOOD 2: NORMAL
CULTURE, RESPIRATORY: ABNORMAL
CULTURE, RESPIRATORY: ABNORMAL
CULTURE, RESPIRATORY: NORMAL
EOSINOPHILS ABSOLUTE: 1 K/UL (ref 0–0.6)
EOSINOPHILS RELATIVE PERCENT: 6 %
GFR AFRICAN AMERICAN: >60
GFR NON-AFRICAN AMERICAN: >60
GLUCOSE BLD-MCNC: 108 MG/DL (ref 70–99)
GLUCOSE BLD-MCNC: 123 MG/DL (ref 70–99)
GLUCOSE BLD-MCNC: 125 MG/DL (ref 70–99)
GLUCOSE BLD-MCNC: 130 MG/DL (ref 70–99)
GLUCOSE BLD-MCNC: 138 MG/DL (ref 70–99)
GLUCOSE BLD-MCNC: 138 MG/DL (ref 70–99)
GLUCOSE BLD-MCNC: 140 MG/DL (ref 70–99)
GLUCOSE BLD-MCNC: 148 MG/DL (ref 70–99)
GLUCOSE BLD-MCNC: 151 MG/DL (ref 70–99)
GRAM STAIN RESULT: ABNORMAL
GRAM STAIN RESULT: NORMAL
HCO3 ARTERIAL: 25.5 MMOL/L (ref 21–29)
HCO3 VENOUS: 27.5 MMOL/L (ref 23–29)
HCT VFR BLD CALC: 32.7 % (ref 36–48)
HEMATOLOGY PATH CONSULT: NO
HEMOGLOBIN, ART, EXTENDED: 11.5 G/DL (ref 12–16)
HEMOGLOBIN: 10.8 G/DL (ref 12–16)
LYMPHOCYTES ABSOLUTE: 3.3 K/UL (ref 1–5.1)
LYMPHOCYTES RELATIVE PERCENT: 20 %
MCH RBC QN AUTO: 29.8 PG (ref 26–34)
MCHC RBC AUTO-ENTMCNC: 32.9 G/DL (ref 31–36)
MCV RBC AUTO: 90.5 FL (ref 80–100)
METAMYELOCYTES RELATIVE PERCENT: 2 %
METHEMOGLOBIN ARTERIAL: 0.3 %
METHEMOGLOBIN VENOUS: 0.1 %
MONOCYTES ABSOLUTE: 0.7 K/UL (ref 0–1.3)
MONOCYTES RELATIVE PERCENT: 4 %
MYELOCYTE PERCENT: 1 %
NEUTROPHILS ABSOLUTE: 11.7 K/UL (ref 1.7–7.7)
NEUTROPHILS RELATIVE PERCENT: 67 %
O2 CONTENT ARTERIAL: 16 ML/DL
O2 CONTENT, VEN: 14 VOL %
O2 SAT, ARTERIAL: 95.6 %
O2 SAT, VEN: 83 %
O2 THERAPY: ABNORMAL
O2 THERAPY: ABNORMAL
ORGANISM: ABNORMAL
PCO2 ARTERIAL: 32.4 MMHG (ref 35–45)
PCO2, VEN: 40.7 MMHG (ref 40–50)
PDW BLD-RTO: 15 % (ref 12.4–15.4)
PERFORMED ON: ABNORMAL
PH ARTERIAL: 7.51 (ref 7.35–7.45)
PH VENOUS: 7.45 (ref 7.35–7.45)
PHOSPHORUS: 2.7 MG/DL (ref 2.5–4.9)
PHOSPHORUS: 3.3 MG/DL (ref 2.5–4.9)
PHOSPHORUS: 3.5 MG/DL (ref 2.5–4.9)
PLATELET # BLD: 312 K/UL (ref 135–450)
PLATELET SLIDE REVIEW: ADEQUATE
PMV BLD AUTO: 8.2 FL (ref 5–10.5)
PO2 ARTERIAL: 69.7 MMHG (ref 75–108)
PO2, VEN: 44.5 MMHG (ref 25–40)
POIKILOCYTES: ABNORMAL
POLYCHROMASIA: ABNORMAL
POTASSIUM SERPL-SCNC: 3.3 MMOL/L (ref 3.5–5.1)
POTASSIUM SERPL-SCNC: 3.4 MMOL/L (ref 3.5–5.1)
POTASSIUM SERPL-SCNC: 3.6 MMOL/L (ref 3.5–5.1)
RBC # BLD: 3.61 M/UL (ref 4–5.2)
SLIDE REVIEW: ABNORMAL
SODIUM BLD-SCNC: 135 MMOL/L (ref 136–145)
SODIUM BLD-SCNC: 136 MMOL/L (ref 136–145)
SODIUM BLD-SCNC: 137 MMOL/L (ref 136–145)
TCO2 ARTERIAL: 26.5 MMOL/L
TCO2 CALC VENOUS: 29 MMOL/L
WBC # BLD: 16.7 K/UL (ref 4–11)

## 2019-11-07 PROCEDURE — 2700000000 HC OXYGEN THERAPY PER DAY

## 2019-11-07 PROCEDURE — 6370000000 HC RX 637 (ALT 250 FOR IP): Performed by: INTERNAL MEDICINE

## 2019-11-07 PROCEDURE — 71045 X-RAY EXAM CHEST 1 VIEW: CPT

## 2019-11-07 PROCEDURE — 80069 RENAL FUNCTION PANEL: CPT

## 2019-11-07 PROCEDURE — 94640 AIRWAY INHALATION TREATMENT: CPT

## 2019-11-07 PROCEDURE — 85025 COMPLETE CBC W/AUTO DIFF WBC: CPT

## 2019-11-07 PROCEDURE — C1751 CATH, INF, PER/CENT/MIDLINE: HCPCS

## 2019-11-07 PROCEDURE — 94761 N-INVAS EAR/PLS OXIMETRY MLT: CPT

## 2019-11-07 PROCEDURE — 6360000002 HC RX W HCPCS: Performed by: INTERNAL MEDICINE

## 2019-11-07 PROCEDURE — 2580000003 HC RX 258: Performed by: INTERNAL MEDICINE

## 2019-11-07 PROCEDURE — 99291 CRITICAL CARE FIRST HOUR: CPT | Performed by: INTERNAL MEDICINE

## 2019-11-07 PROCEDURE — 94750 HC PULMONARY COMPLIANCE STUDY: CPT

## 2019-11-07 PROCEDURE — 36573 INSJ PICC RS&I 5 YR+: CPT

## 2019-11-07 PROCEDURE — 82803 BLOOD GASES ANY COMBINATION: CPT

## 2019-11-07 PROCEDURE — 2000000000 HC ICU R&B

## 2019-11-07 PROCEDURE — 94150 VITAL CAPACITY TEST: CPT

## 2019-11-07 PROCEDURE — 2580000003 HC RX 258

## 2019-11-07 PROCEDURE — 02HV33Z INSERTION OF INFUSION DEVICE INTO SUPERIOR VENA CAVA, PERCUTANEOUS APPROACH: ICD-10-PCS | Performed by: RADIOLOGY

## 2019-11-07 PROCEDURE — 99232 SBSQ HOSP IP/OBS MODERATE 35: CPT | Performed by: INTERNAL MEDICINE

## 2019-11-07 PROCEDURE — C9113 INJ PANTOPRAZOLE SODIUM, VIA: HCPCS | Performed by: INTERNAL MEDICINE

## 2019-11-07 PROCEDURE — 85730 THROMBOPLASTIN TIME PARTIAL: CPT

## 2019-11-07 PROCEDURE — 36592 COLLECT BLOOD FROM PICC: CPT

## 2019-11-07 RX ORDER — SODIUM CHLORIDE 9 MG/ML
INJECTION, SOLUTION INTRAVENOUS
Status: COMPLETED
Start: 2019-11-07 | End: 2019-11-07

## 2019-11-07 RX ORDER — POTASSIUM CHLORIDE 750 MG/1
20 TABLET, EXTENDED RELEASE ORAL ONCE
Status: DISCONTINUED | OUTPATIENT
Start: 2019-11-07 | End: 2019-11-11 | Stop reason: HOSPADM

## 2019-11-07 RX ORDER — HEPARIN SODIUM 1000 [USP'U]/ML
2000 INJECTION, SOLUTION INTRAVENOUS; SUBCUTANEOUS ONCE
Status: COMPLETED | OUTPATIENT
Start: 2019-11-07 | End: 2019-11-07

## 2019-11-07 RX ORDER — IPRATROPIUM BROMIDE AND ALBUTEROL SULFATE 2.5; .5 MG/3ML; MG/3ML
1 SOLUTION RESPIRATORY (INHALATION) EVERY 4 HOURS
Status: DISCONTINUED | OUTPATIENT
Start: 2019-11-07 | End: 2019-11-07

## 2019-11-07 RX ORDER — SODIUM CHLORIDE 0.9 % (FLUSH) 0.9 %
10 SYRINGE (ML) INJECTION PRN
Status: DISCONTINUED | OUTPATIENT
Start: 2019-11-07 | End: 2019-11-11 | Stop reason: HOSPADM

## 2019-11-07 RX ORDER — SODIUM CHLORIDE 0.9 % (FLUSH) 0.9 %
10 SYRINGE (ML) INJECTION EVERY 12 HOURS SCHEDULED
Status: DISCONTINUED | OUTPATIENT
Start: 2019-11-07 | End: 2019-11-11 | Stop reason: HOSPADM

## 2019-11-07 RX ORDER — LIDOCAINE HYDROCHLORIDE 10 MG/ML
5 INJECTION, SOLUTION EPIDURAL; INFILTRATION; INTRACAUDAL; PERINEURAL ONCE
Status: DISCONTINUED | OUTPATIENT
Start: 2019-11-07 | End: 2019-11-11 | Stop reason: HOSPADM

## 2019-11-07 RX ORDER — IPRATROPIUM BROMIDE AND ALBUTEROL SULFATE 2.5; .5 MG/3ML; MG/3ML
1 SOLUTION RESPIRATORY (INHALATION) 2 TIMES DAILY
Status: DISCONTINUED | OUTPATIENT
Start: 2019-11-07 | End: 2019-11-10

## 2019-11-07 RX ADMIN — Medication 4 PUFF: at 07:34

## 2019-11-07 RX ADMIN — Medication 4 PUFF: at 03:06

## 2019-11-07 RX ADMIN — METOPROLOL TARTRATE 37.5 MG: 25 TABLET ORAL at 20:19

## 2019-11-07 RX ADMIN — FUROSEMIDE 20 MG: 10 INJECTION, SOLUTION INTRAMUSCULAR; INTRAVENOUS at 08:02

## 2019-11-07 RX ADMIN — POTASSIUM CHLORIDE 20 MEQ: 400 INJECTION, SOLUTION INTRAVENOUS at 10:43

## 2019-11-07 RX ADMIN — HEPARIN SODIUM 13.3 ML/HR: 10000 INJECTION, SOLUTION INTRAVENOUS at 08:21

## 2019-11-07 RX ADMIN — IPRATROPIUM BROMIDE AND ALBUTEROL SULFATE 1 AMPULE: .5; 3 SOLUTION RESPIRATORY (INHALATION) at 19:15

## 2019-11-07 RX ADMIN — PANTOPRAZOLE SODIUM 40 MG: 40 INJECTION, POWDER, FOR SOLUTION INTRAVENOUS at 08:03

## 2019-11-07 RX ADMIN — PIPERACILLIN SODIUM AND TAZOBACTAM SODIUM 3.38 G: 3; .375 INJECTION, POWDER, LYOPHILIZED, FOR SOLUTION INTRAVENOUS at 01:04

## 2019-11-07 RX ADMIN — Medication 10 ML: at 10:48

## 2019-11-07 RX ADMIN — HEPARIN SODIUM 14.6 ML/HR: 10000 INJECTION, SOLUTION INTRAVENOUS at 12:23

## 2019-11-07 RX ADMIN — PROPOFOL 40 MCG/KG/MIN: 10 INJECTION, EMULSION INTRAVENOUS at 05:55

## 2019-11-07 RX ADMIN — LEVOTHYROXINE SODIUM 50 MCG: 50 TABLET ORAL at 07:03

## 2019-11-07 RX ADMIN — Medication 10 ML: at 08:03

## 2019-11-07 RX ADMIN — Medication 10 ML: at 20:18

## 2019-11-07 RX ADMIN — ATORVASTATIN CALCIUM 40 MG: 40 TABLET, FILM COATED ORAL at 08:03

## 2019-11-07 RX ADMIN — HEPARIN SODIUM 2000 UNITS: 1000 INJECTION INTRAVENOUS; SUBCUTANEOUS at 10:45

## 2019-11-07 RX ADMIN — POTASSIUM CHLORIDE 20 MEQ: 400 INJECTION, SOLUTION INTRAVENOUS at 08:03

## 2019-11-07 RX ADMIN — Medication 4 PUFF: at 11:07

## 2019-11-07 RX ADMIN — MUPIROCIN: 20 OINTMENT TOPICAL at 20:19

## 2019-11-07 RX ADMIN — PROPOFOL 40 MCG/KG/MIN: 10 INJECTION, EMULSION INTRAVENOUS at 02:45

## 2019-11-07 RX ADMIN — SULFACETAMIDE SODIUM 1 DROP: 100 SOLUTION/ DROPS OPHTHALMIC at 08:04

## 2019-11-07 RX ADMIN — ASPIRIN 325 MG: 325 TABLET, COATED ORAL at 08:02

## 2019-11-07 RX ADMIN — FUROSEMIDE 20 MG: 10 INJECTION, SOLUTION INTRAMUSCULAR; INTRAVENOUS at 17:58

## 2019-11-07 RX ADMIN — SULFACETAMIDE SODIUM 1 DROP: 100 SOLUTION/ DROPS OPHTHALMIC at 04:10

## 2019-11-07 RX ADMIN — SULFACETAMIDE SODIUM 1 DROP: 100 SOLUTION/ DROPS OPHTHALMIC at 12:30

## 2019-11-07 RX ADMIN — SODIUM CHLORIDE 250 ML: 9 INJECTION, SOLUTION INTRAVENOUS at 14:56

## 2019-11-07 RX ADMIN — SODIUM CHLORIDE 250 ML: 9 INJECTION, SOLUTION INTRAVENOUS at 08:03

## 2019-11-07 RX ADMIN — MUPIROCIN: 20 OINTMENT TOPICAL at 08:02

## 2019-11-07 RX ADMIN — SULFACETAMIDE SODIUM 1 DROP: 100 SOLUTION/ DROPS OPHTHALMIC at 20:19

## 2019-11-07 RX ADMIN — METOPROLOL TARTRATE 25 MG: 25 TABLET ORAL at 08:02

## 2019-11-07 RX ADMIN — Medication 10 ML: at 20:19

## 2019-11-07 RX ADMIN — SULFACETAMIDE SODIUM 1 DROP: 100 SOLUTION/ DROPS OPHTHALMIC at 01:02

## 2019-11-07 RX ADMIN — PIPERACILLIN SODIUM AND TAZOBACTAM SODIUM 3.38 G: 3; .375 INJECTION, POWDER, LYOPHILIZED, FOR SOLUTION INTRAVENOUS at 14:00

## 2019-11-07 RX ADMIN — IPRATROPIUM BROMIDE AND ALBUTEROL SULFATE 1 AMPULE: .5; 3 SOLUTION RESPIRATORY (INHALATION) at 15:36

## 2019-11-07 ASSESSMENT — PULMONARY FUNCTION TESTS
PIF_VALUE: 19
PIF_VALUE: 21
PIF_VALUE: 20
PIF_VALUE: 19
PIF_VALUE: 20
PIF_VALUE: 11
PIF_VALUE: 20
PIF_VALUE: 21
PIF_VALUE: 18
PIF_VALUE: 19
PIF_VALUE: 11
PIF_VALUE: 13

## 2019-11-08 ENCOUNTER — APPOINTMENT (OUTPATIENT)
Dept: GENERAL RADIOLOGY | Age: 69
DRG: 917 | End: 2019-11-08
Payer: COMMERCIAL

## 2019-11-08 LAB
ALBUMIN SERPL-MCNC: 2.8 G/DL (ref 3.4–5)
ALBUMIN SERPL-MCNC: 3.2 G/DL (ref 3.4–5)
ALBUMIN SERPL-MCNC: 3.3 G/DL (ref 3.4–5)
ANION GAP SERPL CALCULATED.3IONS-SCNC: 13 MMOL/L (ref 3–16)
ANION GAP SERPL CALCULATED.3IONS-SCNC: 15 MMOL/L (ref 3–16)
ANION GAP SERPL CALCULATED.3IONS-SCNC: 16 MMOL/L (ref 3–16)
ANISOCYTOSIS: ABNORMAL
APTT: 46.9 SEC (ref 26–36)
APTT: 49.8 SEC (ref 26–36)
BASOPHILS ABSOLUTE: 0 K/UL (ref 0–0.2)
BASOPHILS RELATIVE PERCENT: 0 %
BUN BLDV-MCNC: 19 MG/DL (ref 7–20)
BUN BLDV-MCNC: 22 MG/DL (ref 7–20)
BUN BLDV-MCNC: 26 MG/DL (ref 7–20)
CALCIUM SERPL-MCNC: 9.1 MG/DL (ref 8.3–10.6)
CALCIUM SERPL-MCNC: 9.3 MG/DL (ref 8.3–10.6)
CALCIUM SERPL-MCNC: 9.6 MG/DL (ref 8.3–10.6)
CHLORIDE BLD-SCNC: 102 MMOL/L (ref 99–110)
CHLORIDE BLD-SCNC: 98 MMOL/L (ref 99–110)
CHLORIDE BLD-SCNC: 98 MMOL/L (ref 99–110)
CO2: 19 MMOL/L (ref 21–32)
CO2: 22 MMOL/L (ref 21–32)
CO2: 24 MMOL/L (ref 21–32)
CREAT SERPL-MCNC: 0.6 MG/DL (ref 0.6–1.2)
CREAT SERPL-MCNC: 0.6 MG/DL (ref 0.6–1.2)
CREAT SERPL-MCNC: <0.5 MG/DL (ref 0.6–1.2)
EKG ATRIAL RATE: 141 BPM
EKG DIAGNOSIS: NORMAL
EKG P AXIS: 61 DEGREES
EKG P-R INTERVAL: 122 MS
EKG Q-T INTERVAL: 296 MS
EKG QRS DURATION: 66 MS
EKG QTC CALCULATION (BAZETT): 453 MS
EKG R AXIS: -43 DEGREES
EKG T AXIS: 62 DEGREES
EKG VENTRICULAR RATE: 141 BPM
EOSINOPHILS ABSOLUTE: 1.1 K/UL (ref 0–0.6)
EOSINOPHILS RELATIVE PERCENT: 5 %
GFR AFRICAN AMERICAN: >60
GFR NON-AFRICAN AMERICAN: >60
GLUCOSE BLD-MCNC: 115 MG/DL (ref 70–99)
GLUCOSE BLD-MCNC: 123 MG/DL (ref 70–99)
GLUCOSE BLD-MCNC: 124 MG/DL (ref 70–99)
GLUCOSE BLD-MCNC: 131 MG/DL (ref 70–99)
GLUCOSE BLD-MCNC: 136 MG/DL (ref 70–99)
GLUCOSE BLD-MCNC: 151 MG/DL (ref 70–99)
GLUCOSE BLD-MCNC: 158 MG/DL (ref 70–99)
GLUCOSE BLD-MCNC: 175 MG/DL (ref 70–99)
HCT VFR BLD CALC: 33.9 % (ref 36–48)
HEMATOLOGY PATH CONSULT: NO
HEMOGLOBIN: 11.1 G/DL (ref 12–16)
LYMPHOCYTES ABSOLUTE: 4.4 K/UL (ref 1–5.1)
LYMPHOCYTES RELATIVE PERCENT: 21 %
MCH RBC QN AUTO: 29.8 PG (ref 26–34)
MCHC RBC AUTO-ENTMCNC: 32.6 G/DL (ref 31–36)
MCV RBC AUTO: 91.4 FL (ref 80–100)
MONOCYTES ABSOLUTE: 0.4 K/UL (ref 0–1.3)
MONOCYTES RELATIVE PERCENT: 2 %
NEUTROPHILS ABSOLUTE: 15.1 K/UL (ref 1.7–7.7)
NEUTROPHILS RELATIVE PERCENT: 72 %
PDW BLD-RTO: 15.5 % (ref 12.4–15.4)
PERFORMED ON: ABNORMAL
PHOSPHORUS: 2.5 MG/DL (ref 2.5–4.9)
PHOSPHORUS: 2.9 MG/DL (ref 2.5–4.9)
PHOSPHORUS: 3 MG/DL (ref 2.5–4.9)
PLATELET # BLD: 385 K/UL (ref 135–450)
PLATELET SLIDE REVIEW: ADEQUATE
PMV BLD AUTO: 8.4 FL (ref 5–10.5)
POTASSIUM SERPL-SCNC: 3.2 MMOL/L (ref 3.5–5.1)
POTASSIUM SERPL-SCNC: 3.4 MMOL/L (ref 3.5–5.1)
POTASSIUM SERPL-SCNC: 4.7 MMOL/L (ref 3.5–5.1)
RBC # BLD: 3.7 M/UL (ref 4–5.2)
SLIDE REVIEW: ABNORMAL
SODIUM BLD-SCNC: 133 MMOL/L (ref 136–145)
SODIUM BLD-SCNC: 135 MMOL/L (ref 136–145)
SODIUM BLD-SCNC: 139 MMOL/L (ref 136–145)
WBC # BLD: 21 K/UL (ref 4–11)

## 2019-11-08 PROCEDURE — 2580000003 HC RX 258: Performed by: INTERNAL MEDICINE

## 2019-11-08 PROCEDURE — 80069 RENAL FUNCTION PANEL: CPT

## 2019-11-08 PROCEDURE — 2000000000 HC ICU R&B

## 2019-11-08 PROCEDURE — 6370000000 HC RX 637 (ALT 250 FOR IP): Performed by: INTERNAL MEDICINE

## 2019-11-08 PROCEDURE — 6360000002 HC RX W HCPCS: Performed by: INTERNAL MEDICINE

## 2019-11-08 PROCEDURE — 02HV33Z INSERTION OF INFUSION DEVICE INTO SUPERIOR VENA CAVA, PERCUTANEOUS APPROACH: ICD-10-PCS | Performed by: RADIOLOGY

## 2019-11-08 PROCEDURE — 94640 AIRWAY INHALATION TREATMENT: CPT

## 2019-11-08 PROCEDURE — 94761 N-INVAS EAR/PLS OXIMETRY MLT: CPT

## 2019-11-08 PROCEDURE — 85730 THROMBOPLASTIN TIME PARTIAL: CPT

## 2019-11-08 PROCEDURE — 85025 COMPLETE CBC W/AUTO DIFF WBC: CPT

## 2019-11-08 PROCEDURE — C9113 INJ PANTOPRAZOLE SODIUM, VIA: HCPCS | Performed by: INTERNAL MEDICINE

## 2019-11-08 PROCEDURE — 99233 SBSQ HOSP IP/OBS HIGH 50: CPT | Performed by: INTERNAL MEDICINE

## 2019-11-08 PROCEDURE — 36415 COLL VENOUS BLD VENIPUNCTURE: CPT

## 2019-11-08 PROCEDURE — 36573 INSJ PICC RS&I 5 YR+: CPT

## 2019-11-08 PROCEDURE — 2700000000 HC OXYGEN THERAPY PER DAY

## 2019-11-08 PROCEDURE — 93005 ELECTROCARDIOGRAM TRACING: CPT | Performed by: INTERNAL MEDICINE

## 2019-11-08 PROCEDURE — 93010 ELECTROCARDIOGRAM REPORT: CPT | Performed by: INTERNAL MEDICINE

## 2019-11-08 RX ORDER — POTASSIUM CHLORIDE 7.45 MG/ML
10 INJECTION INTRAVENOUS PRN
Status: DISCONTINUED | OUTPATIENT
Start: 2019-11-08 | End: 2019-11-11 | Stop reason: HOSPADM

## 2019-11-08 RX ORDER — PRAVASTATIN SODIUM 40 MG
40 TABLET ORAL NIGHTLY
Status: DISCONTINUED | OUTPATIENT
Start: 2019-11-08 | End: 2019-11-11 | Stop reason: HOSPADM

## 2019-11-08 RX ORDER — IPRATROPIUM BROMIDE AND ALBUTEROL SULFATE 2.5; .5 MG/3ML; MG/3ML
1 SOLUTION RESPIRATORY (INHALATION) EVERY 4 HOURS PRN
Status: DISCONTINUED | OUTPATIENT
Start: 2019-11-08 | End: 2019-11-10

## 2019-11-08 RX ORDER — ASPIRIN 81 MG/1
81 TABLET, CHEWABLE ORAL DAILY
Status: DISCONTINUED | OUTPATIENT
Start: 2019-11-08 | End: 2019-11-11 | Stop reason: HOSPADM

## 2019-11-08 RX ORDER — AMIODARONE HYDROCHLORIDE 200 MG/1
400 TABLET ORAL 3 TIMES DAILY
Status: DISCONTINUED | OUTPATIENT
Start: 2019-11-08 | End: 2019-11-11 | Stop reason: HOSPADM

## 2019-11-08 RX ADMIN — IPRATROPIUM BROMIDE AND ALBUTEROL SULFATE 1 AMPULE: .5; 3 SOLUTION RESPIRATORY (INHALATION) at 19:36

## 2019-11-08 RX ADMIN — INSULIN LISPRO 2 UNITS: 100 INJECTION, SOLUTION INTRAVENOUS; SUBCUTANEOUS at 13:15

## 2019-11-08 RX ADMIN — POTASSIUM CHLORIDE 20 MEQ: 400 INJECTION, SOLUTION INTRAVENOUS at 07:47

## 2019-11-08 RX ADMIN — PANTOPRAZOLE SODIUM 40 MG: 40 INJECTION, POWDER, FOR SOLUTION INTRAVENOUS at 09:07

## 2019-11-08 RX ADMIN — SULFACETAMIDE SODIUM 1 DROP: 100 SOLUTION/ DROPS OPHTHALMIC at 12:57

## 2019-11-08 RX ADMIN — SULFACETAMIDE SODIUM 1 DROP: 100 SOLUTION/ DROPS OPHTHALMIC at 16:09

## 2019-11-08 RX ADMIN — SULFACETAMIDE SODIUM 1 DROP: 100 SOLUTION/ DROPS OPHTHALMIC at 20:55

## 2019-11-08 RX ADMIN — POTASSIUM CHLORIDE 10 MEQ: 7.46 INJECTION, SOLUTION INTRAVENOUS at 18:03

## 2019-11-08 RX ADMIN — HEPARIN SODIUM 15.9 ML/HR: 10000 INJECTION, SOLUTION INTRAVENOUS at 05:35

## 2019-11-08 RX ADMIN — AMIODARONE HYDROCHLORIDE 400 MG: 200 TABLET ORAL at 16:08

## 2019-11-08 RX ADMIN — Medication 10 ML: at 09:09

## 2019-11-08 RX ADMIN — SULFACETAMIDE SODIUM 1 DROP: 100 SOLUTION/ DROPS OPHTHALMIC at 09:06

## 2019-11-08 RX ADMIN — ATORVASTATIN CALCIUM 40 MG: 40 TABLET, FILM COATED ORAL at 09:08

## 2019-11-08 RX ADMIN — ASPIRIN 81 MG 81 MG: 81 TABLET ORAL at 12:41

## 2019-11-08 RX ADMIN — Medication 10 ML: at 09:07

## 2019-11-08 RX ADMIN — FUROSEMIDE 20 MG: 10 INJECTION, SOLUTION INTRAMUSCULAR; INTRAVENOUS at 18:10

## 2019-11-08 RX ADMIN — POTASSIUM CHLORIDE 10 MEQ: 7.46 INJECTION, SOLUTION INTRAVENOUS at 17:14

## 2019-11-08 RX ADMIN — MUPIROCIN: 20 OINTMENT TOPICAL at 09:08

## 2019-11-08 RX ADMIN — METOPROLOL TARTRATE 37.5 MG: 25 TABLET ORAL at 09:08

## 2019-11-08 RX ADMIN — SULFACETAMIDE SODIUM 1 DROP: 100 SOLUTION/ DROPS OPHTHALMIC at 04:00

## 2019-11-08 RX ADMIN — IPRATROPIUM BROMIDE AND ALBUTEROL SULFATE 1 AMPULE: .5; 3 SOLUTION RESPIRATORY (INHALATION) at 06:59

## 2019-11-08 RX ADMIN — LEVOTHYROXINE SODIUM 50 MCG: 50 TABLET ORAL at 07:47

## 2019-11-08 RX ADMIN — METOPROLOL TARTRATE 37.5 MG: 25 TABLET ORAL at 20:56

## 2019-11-08 RX ADMIN — Medication 10 ML: at 21:08

## 2019-11-08 RX ADMIN — PRAVASTATIN SODIUM 40 MG: 40 TABLET ORAL at 20:57

## 2019-11-08 RX ADMIN — ENOXAPARIN SODIUM 80 MG: 80 INJECTION SUBCUTANEOUS at 09:07

## 2019-11-08 RX ADMIN — INSULIN LISPRO 2 UNITS: 100 INJECTION, SOLUTION INTRAVENOUS; SUBCUTANEOUS at 20:54

## 2019-11-08 RX ADMIN — ENOXAPARIN SODIUM 80 MG: 80 INJECTION SUBCUTANEOUS at 20:55

## 2019-11-08 RX ADMIN — POTASSIUM CHLORIDE 10 MEQ: 7.46 INJECTION, SOLUTION INTRAVENOUS at 16:09

## 2019-11-08 RX ADMIN — MUPIROCIN: 20 OINTMENT TOPICAL at 20:56

## 2019-11-08 RX ADMIN — POTASSIUM CHLORIDE 10 MEQ: 7.46 INJECTION, SOLUTION INTRAVENOUS at 12:59

## 2019-11-08 RX ADMIN — PIPERACILLIN SODIUM AND TAZOBACTAM SODIUM 3.38 G: 3; .375 INJECTION, POWDER, LYOPHILIZED, FOR SOLUTION INTRAVENOUS at 01:11

## 2019-11-08 RX ADMIN — SULFACETAMIDE SODIUM 1 DROP: 100 SOLUTION/ DROPS OPHTHALMIC at 00:00

## 2019-11-08 RX ADMIN — AMIODARONE HYDROCHLORIDE 400 MG: 200 TABLET ORAL at 09:06

## 2019-11-08 RX ADMIN — Medication 10 ML: at 20:56

## 2019-11-08 RX ADMIN — PIPERACILLIN SODIUM AND TAZOBACTAM SODIUM 3.38 G: 3; .375 INJECTION, POWDER, LYOPHILIZED, FOR SOLUTION INTRAVENOUS at 12:57

## 2019-11-08 RX ADMIN — AMIODARONE HYDROCHLORIDE 400 MG: 200 TABLET ORAL at 20:56

## 2019-11-08 RX ADMIN — FUROSEMIDE 20 MG: 10 INJECTION, SOLUTION INTRAMUSCULAR; INTRAVENOUS at 09:07

## 2019-11-08 ASSESSMENT — PAIN SCALES - GENERAL
PAINLEVEL_OUTOF10: 0
PAINLEVEL_OUTOF10: 0

## 2019-11-09 LAB
ALBUMIN SERPL-MCNC: 3.1 G/DL (ref 3.4–5)
ALBUMIN SERPL-MCNC: 3.3 G/DL (ref 3.4–5)
ALBUMIN SERPL-MCNC: 3.4 G/DL (ref 3.4–5)
ANION GAP SERPL CALCULATED.3IONS-SCNC: 13 MMOL/L (ref 3–16)
ANION GAP SERPL CALCULATED.3IONS-SCNC: 16 MMOL/L (ref 3–16)
ANION GAP SERPL CALCULATED.3IONS-SCNC: 16 MMOL/L (ref 3–16)
ANION GAP SERPL CALCULATED.3IONS-SCNC: 18 MMOL/L (ref 3–16)
ANISOCYTOSIS: ABNORMAL
BANDED NEUTROPHILS RELATIVE PERCENT: 2 % (ref 0–7)
BASOPHILS ABSOLUTE: 0.2 K/UL (ref 0–0.2)
BASOPHILS RELATIVE PERCENT: 1 %
BUN BLDV-MCNC: 23 MG/DL (ref 7–20)
BUN BLDV-MCNC: 24 MG/DL (ref 7–20)
BUN BLDV-MCNC: 26 MG/DL (ref 7–20)
BUN BLDV-MCNC: 28 MG/DL (ref 7–20)
CALCIUM SERPL-MCNC: 9.1 MG/DL (ref 8.3–10.6)
CALCIUM SERPL-MCNC: 9.2 MG/DL (ref 8.3–10.6)
CALCIUM SERPL-MCNC: 9.2 MG/DL (ref 8.3–10.6)
CALCIUM SERPL-MCNC: 9.7 MG/DL (ref 8.3–10.6)
CHLORIDE BLD-SCNC: 97 MMOL/L (ref 99–110)
CHLORIDE BLD-SCNC: 98 MMOL/L (ref 99–110)
CHLORIDE BLD-SCNC: 98 MMOL/L (ref 99–110)
CHLORIDE BLD-SCNC: 99 MMOL/L (ref 99–110)
CO2: 18 MMOL/L (ref 21–32)
CO2: 20 MMOL/L (ref 21–32)
CO2: 23 MMOL/L (ref 21–32)
CO2: 23 MMOL/L (ref 21–32)
CREAT SERPL-MCNC: 0.7 MG/DL (ref 0.6–1.2)
EOSINOPHILS ABSOLUTE: 0.9 K/UL (ref 0–0.6)
EOSINOPHILS RELATIVE PERCENT: 4 %
GFR AFRICAN AMERICAN: >60
GFR NON-AFRICAN AMERICAN: >60
GLUCOSE BLD-MCNC: 117 MG/DL (ref 70–99)
GLUCOSE BLD-MCNC: 121 MG/DL (ref 70–99)
GLUCOSE BLD-MCNC: 125 MG/DL (ref 70–99)
GLUCOSE BLD-MCNC: 136 MG/DL (ref 70–99)
GLUCOSE BLD-MCNC: 151 MG/DL (ref 70–99)
GLUCOSE BLD-MCNC: 153 MG/DL (ref 70–99)
GLUCOSE BLD-MCNC: 161 MG/DL (ref 70–99)
GLUCOSE BLD-MCNC: 165 MG/DL (ref 70–99)
GLUCOSE BLD-MCNC: 168 MG/DL (ref 70–99)
GLUCOSE BLD-MCNC: 176 MG/DL (ref 70–99)
HCT VFR BLD CALC: 36.1 % (ref 36–48)
HEMOGLOBIN: 11.9 G/DL (ref 12–16)
LYMPHOCYTES ABSOLUTE: 3.9 K/UL (ref 1–5.1)
LYMPHOCYTES RELATIVE PERCENT: 18 %
MCH RBC QN AUTO: 30.1 PG (ref 26–34)
MCHC RBC AUTO-ENTMCNC: 33 G/DL (ref 31–36)
MCV RBC AUTO: 91.1 FL (ref 80–100)
MONOCYTES ABSOLUTE: 0.6 K/UL (ref 0–1.3)
MONOCYTES RELATIVE PERCENT: 3 %
NEUTROPHILS ABSOLUTE: 15.9 K/UL (ref 1.7–7.7)
NEUTROPHILS RELATIVE PERCENT: 72 %
PDW BLD-RTO: 15 % (ref 12.4–15.4)
PERFORMED ON: ABNORMAL
PHOSPHORUS: 2.5 MG/DL (ref 2.5–4.9)
PHOSPHORUS: 2.7 MG/DL (ref 2.5–4.9)
PHOSPHORUS: 2.7 MG/DL (ref 2.5–4.9)
PLATELET # BLD: 383 K/UL (ref 135–450)
PLATELET SLIDE REVIEW: ADEQUATE
PMV BLD AUTO: 8.5 FL (ref 5–10.5)
POTASSIUM SERPL-SCNC: 3.3 MMOL/L (ref 3.5–5.1)
POTASSIUM SERPL-SCNC: 3.7 MMOL/L (ref 3.5–5.1)
POTASSIUM SERPL-SCNC: 4.5 MMOL/L (ref 3.5–5.1)
POTASSIUM SERPL-SCNC: 4.5 MMOL/L (ref 3.5–5.1)
RBC # BLD: 3.97 M/UL (ref 4–5.2)
SLIDE REVIEW: ABNORMAL
SODIUM BLD-SCNC: 134 MMOL/L (ref 136–145)
SODIUM BLD-SCNC: 134 MMOL/L (ref 136–145)
SODIUM BLD-SCNC: 135 MMOL/L (ref 136–145)
SODIUM BLD-SCNC: 136 MMOL/L (ref 136–145)
WBC # BLD: 21.5 K/UL (ref 4–11)

## 2019-11-09 PROCEDURE — 6360000002 HC RX W HCPCS: Performed by: INTERNAL MEDICINE

## 2019-11-09 PROCEDURE — 80069 RENAL FUNCTION PANEL: CPT

## 2019-11-09 PROCEDURE — 97162 PT EVAL MOD COMPLEX 30 MIN: CPT

## 2019-11-09 PROCEDURE — 2580000003 HC RX 258: Performed by: INTERNAL MEDICINE

## 2019-11-09 PROCEDURE — 97166 OT EVAL MOD COMPLEX 45 MIN: CPT

## 2019-11-09 PROCEDURE — 2500000003 HC RX 250 WO HCPCS: Performed by: INTERNAL MEDICINE

## 2019-11-09 PROCEDURE — C9113 INJ PANTOPRAZOLE SODIUM, VIA: HCPCS | Performed by: INTERNAL MEDICINE

## 2019-11-09 PROCEDURE — 2060000000 HC ICU INTERMEDIATE R&B

## 2019-11-09 PROCEDURE — 6370000000 HC RX 637 (ALT 250 FOR IP): Performed by: INTERNAL MEDICINE

## 2019-11-09 PROCEDURE — 99233 SBSQ HOSP IP/OBS HIGH 50: CPT | Performed by: INTERNAL MEDICINE

## 2019-11-09 PROCEDURE — 85025 COMPLETE CBC W/AUTO DIFF WBC: CPT

## 2019-11-09 PROCEDURE — 97116 GAIT TRAINING THERAPY: CPT

## 2019-11-09 PROCEDURE — 2580000003 HC RX 258

## 2019-11-09 PROCEDURE — 94761 N-INVAS EAR/PLS OXIMETRY MLT: CPT

## 2019-11-09 PROCEDURE — 36415 COLL VENOUS BLD VENIPUNCTURE: CPT

## 2019-11-09 PROCEDURE — 97530 THERAPEUTIC ACTIVITIES: CPT

## 2019-11-09 PROCEDURE — 94640 AIRWAY INHALATION TREATMENT: CPT

## 2019-11-09 RX ORDER — METOPROLOL TARTRATE 50 MG/1
50 TABLET, FILM COATED ORAL 2 TIMES DAILY
Status: DISCONTINUED | OUTPATIENT
Start: 2019-11-09 | End: 2019-11-11 | Stop reason: HOSPADM

## 2019-11-09 RX ORDER — SODIUM CHLORIDE 9 MG/ML
INJECTION, SOLUTION INTRAVENOUS
Status: COMPLETED
Start: 2019-11-09 | End: 2019-11-09

## 2019-11-09 RX ADMIN — ASPIRIN 81 MG 81 MG: 81 TABLET ORAL at 08:43

## 2019-11-09 RX ADMIN — ENOXAPARIN SODIUM 80 MG: 80 INJECTION SUBCUTANEOUS at 08:42

## 2019-11-09 RX ADMIN — PANTOPRAZOLE SODIUM 40 MG: 40 INJECTION, POWDER, FOR SOLUTION INTRAVENOUS at 08:43

## 2019-11-09 RX ADMIN — PRAVASTATIN SODIUM 40 MG: 40 TABLET ORAL at 21:40

## 2019-11-09 RX ADMIN — SODIUM CHLORIDE 350 ML: 900 INJECTION INTRAVENOUS at 01:25

## 2019-11-09 RX ADMIN — SULFACETAMIDE SODIUM 1 DROP: 100 SOLUTION/ DROPS OPHTHALMIC at 21:39

## 2019-11-09 RX ADMIN — SULFACETAMIDE SODIUM 1 DROP: 100 SOLUTION/ DROPS OPHTHALMIC at 08:47

## 2019-11-09 RX ADMIN — FUROSEMIDE 20 MG: 10 INJECTION, SOLUTION INTRAMUSCULAR; INTRAVENOUS at 08:42

## 2019-11-09 RX ADMIN — POTASSIUM CHLORIDE 10 MEQ: 7.46 INJECTION, SOLUTION INTRAVENOUS at 10:17

## 2019-11-09 RX ADMIN — INSULIN LISPRO 2 UNITS: 100 INJECTION, SOLUTION INTRAVENOUS; SUBCUTANEOUS at 04:30

## 2019-11-09 RX ADMIN — POTASSIUM CHLORIDE 10 MEQ: 7.46 INJECTION, SOLUTION INTRAVENOUS at 08:42

## 2019-11-09 RX ADMIN — LEVOTHYROXINE SODIUM 50 MCG: 50 TABLET ORAL at 06:51

## 2019-11-09 RX ADMIN — INSULIN LISPRO 2 UNITS: 100 INJECTION, SOLUTION INTRAVENOUS; SUBCUTANEOUS at 08:45

## 2019-11-09 RX ADMIN — SULFACETAMIDE SODIUM 1 DROP: 100 SOLUTION/ DROPS OPHTHALMIC at 11:49

## 2019-11-09 RX ADMIN — ENOXAPARIN SODIUM 80 MG: 80 INJECTION SUBCUTANEOUS at 21:39

## 2019-11-09 RX ADMIN — SULFACETAMIDE SODIUM 1 DROP: 100 SOLUTION/ DROPS OPHTHALMIC at 00:00

## 2019-11-09 RX ADMIN — PIPERACILLIN SODIUM AND TAZOBACTAM SODIUM 3.38 G: 3; .375 INJECTION, POWDER, LYOPHILIZED, FOR SOLUTION INTRAVENOUS at 13:45

## 2019-11-09 RX ADMIN — SULFACETAMIDE SODIUM 1 DROP: 100 SOLUTION/ DROPS OPHTHALMIC at 16:20

## 2019-11-09 RX ADMIN — AMIODARONE HYDROCHLORIDE 400 MG: 200 TABLET ORAL at 21:40

## 2019-11-09 RX ADMIN — POTASSIUM CHLORIDE 10 MEQ: 7.46 INJECTION, SOLUTION INTRAVENOUS at 09:25

## 2019-11-09 RX ADMIN — METOPROLOL TARTRATE 50 MG: 25 TABLET ORAL at 21:40

## 2019-11-09 RX ADMIN — SODIUM PHOSPHATE, MONOBASIC, MONOHYDRATE 10 MMOL: 276; 142 INJECTION, SOLUTION INTRAVENOUS at 09:08

## 2019-11-09 RX ADMIN — IPRATROPIUM BROMIDE AND ALBUTEROL SULFATE 1 AMPULE: .5; 3 SOLUTION RESPIRATORY (INHALATION) at 18:50

## 2019-11-09 RX ADMIN — METOPROLOL TARTRATE 50 MG: 25 TABLET ORAL at 08:43

## 2019-11-09 RX ADMIN — Medication 10 ML: at 08:43

## 2019-11-09 RX ADMIN — AMIODARONE HYDROCHLORIDE 400 MG: 200 TABLET ORAL at 13:48

## 2019-11-09 RX ADMIN — AMIODARONE HYDROCHLORIDE 400 MG: 200 TABLET ORAL at 08:43

## 2019-11-09 RX ADMIN — Medication 10 ML: at 21:40

## 2019-11-09 RX ADMIN — INSULIN LISPRO 2 UNITS: 100 INJECTION, SOLUTION INTRAVENOUS; SUBCUTANEOUS at 11:49

## 2019-11-09 RX ADMIN — SULFACETAMIDE SODIUM 1 DROP: 100 SOLUTION/ DROPS OPHTHALMIC at 04:30

## 2019-11-09 RX ADMIN — LABETALOL HYDROCHLORIDE 20 MG: 5 INJECTION INTRAVENOUS at 02:19

## 2019-11-09 RX ADMIN — PIPERACILLIN SODIUM AND TAZOBACTAM SODIUM 3.38 G: 3; .375 INJECTION, POWDER, LYOPHILIZED, FOR SOLUTION INTRAVENOUS at 01:25

## 2019-11-09 RX ADMIN — IPRATROPIUM BROMIDE AND ALBUTEROL SULFATE 1 AMPULE: .5; 3 SOLUTION RESPIRATORY (INHALATION) at 08:00

## 2019-11-09 RX ADMIN — POTASSIUM CHLORIDE 10 MEQ: 7.46 INJECTION, SOLUTION INTRAVENOUS at 11:49

## 2019-11-09 ASSESSMENT — PAIN SCALES - GENERAL
PAINLEVEL_OUTOF10: 0
PAINLEVEL_OUTOF10: 0
PAINLEVEL_OUTOF10: 6

## 2019-11-09 ASSESSMENT — PAIN DESCRIPTION - LOCATION: LOCATION: ABDOMEN

## 2019-11-09 ASSESSMENT — PAIN DESCRIPTION - ONSET: ONSET: ON-GOING

## 2019-11-09 ASSESSMENT — PAIN - FUNCTIONAL ASSESSMENT: PAIN_FUNCTIONAL_ASSESSMENT: ACTIVITIES ARE NOT PREVENTED

## 2019-11-09 ASSESSMENT — PAIN DESCRIPTION - FREQUENCY: FREQUENCY: CONTINUOUS

## 2019-11-09 ASSESSMENT — PAIN DESCRIPTION - PAIN TYPE: TYPE: ACUTE PAIN

## 2019-11-09 ASSESSMENT — PAIN DESCRIPTION - DESCRIPTORS: DESCRIPTORS: ACHING

## 2019-11-09 ASSESSMENT — PAIN DESCRIPTION - ORIENTATION: ORIENTATION: LOWER

## 2019-11-10 LAB
ALBUMIN SERPL-MCNC: 3.3 G/DL (ref 3.4–5)
ALBUMIN SERPL-MCNC: 3.3 G/DL (ref 3.4–5)
ALBUMIN SERPL-MCNC: 3.4 G/DL (ref 3.4–5)
ANION GAP SERPL CALCULATED.3IONS-SCNC: 11 MMOL/L (ref 3–16)
ANION GAP SERPL CALCULATED.3IONS-SCNC: 14 MMOL/L (ref 3–16)
ANION GAP SERPL CALCULATED.3IONS-SCNC: 16 MMOL/L (ref 3–16)
ANISOCYTOSIS: ABNORMAL
ATYPICAL LYMPHOCYTE RELATIVE PERCENT: 2 % (ref 0–6)
BACTERIA: ABNORMAL /HPF
BANDED NEUTROPHILS RELATIVE PERCENT: 1 % (ref 0–7)
BASOPHILS ABSOLUTE: 0 K/UL (ref 0–0.2)
BASOPHILS RELATIVE PERCENT: 0 %
BILIRUBIN URINE: NEGATIVE
BLOOD CULTURE, ROUTINE: ABNORMAL
BLOOD CULTURE, ROUTINE: ABNORMAL
BLOOD, URINE: ABNORMAL
BUN BLDV-MCNC: 19 MG/DL (ref 7–20)
BUN BLDV-MCNC: 19 MG/DL (ref 7–20)
BUN BLDV-MCNC: 21 MG/DL (ref 7–20)
CALCIUM SERPL-MCNC: 9.2 MG/DL (ref 8.3–10.6)
CALCIUM SERPL-MCNC: 9.4 MG/DL (ref 8.3–10.6)
CALCIUM SERPL-MCNC: 9.5 MG/DL (ref 8.3–10.6)
CHLORIDE BLD-SCNC: 102 MMOL/L (ref 99–110)
CHLORIDE BLD-SCNC: 97 MMOL/L (ref 99–110)
CHLORIDE BLD-SCNC: 99 MMOL/L (ref 99–110)
CLARITY: ABNORMAL
CO2: 21 MMOL/L (ref 21–32)
CO2: 22 MMOL/L (ref 21–32)
CO2: 23 MMOL/L (ref 21–32)
COLOR: ABNORMAL
CREAT SERPL-MCNC: 0.6 MG/DL (ref 0.6–1.2)
CREAT SERPL-MCNC: 0.7 MG/DL (ref 0.6–1.2)
CREAT SERPL-MCNC: 0.7 MG/DL (ref 0.6–1.2)
EOSINOPHILS ABSOLUTE: 1.3 K/UL (ref 0–0.6)
EOSINOPHILS RELATIVE PERCENT: 7 %
EPITHELIAL CELLS, UA: ABNORMAL /HPF
GFR AFRICAN AMERICAN: >60
GFR NON-AFRICAN AMERICAN: >60
GLUCOSE BLD-MCNC: 118 MG/DL (ref 70–99)
GLUCOSE BLD-MCNC: 125 MG/DL (ref 70–99)
GLUCOSE BLD-MCNC: 127 MG/DL (ref 70–99)
GLUCOSE BLD-MCNC: 130 MG/DL (ref 70–99)
GLUCOSE BLD-MCNC: 134 MG/DL (ref 70–99)
GLUCOSE BLD-MCNC: 140 MG/DL (ref 70–99)
GLUCOSE BLD-MCNC: 170 MG/DL (ref 70–99)
GLUCOSE BLD-MCNC: 192 MG/DL (ref 70–99)
GLUCOSE URINE: 100 MG/DL
HCT VFR BLD CALC: 33.8 % (ref 36–48)
HEMATOLOGY PATH CONSULT: YES
HEMOGLOBIN: 10.7 G/DL (ref 12–16)
KETONES, URINE: NEGATIVE MG/DL
LEUKOCYTE ESTERASE, URINE: ABNORMAL
LYMPHOCYTES ABSOLUTE: 3.6 K/UL (ref 1–5.1)
LYMPHOCYTES RELATIVE PERCENT: 17 %
MCH RBC QN AUTO: 29.5 PG (ref 26–34)
MCHC RBC AUTO-ENTMCNC: 31.7 G/DL (ref 31–36)
MCV RBC AUTO: 93.1 FL (ref 80–100)
METAMYELOCYTES RELATIVE PERCENT: 2 %
MICROCYTES: ABNORMAL
MICROSCOPIC EXAMINATION: YES
MONOCYTES ABSOLUTE: 0.4 K/UL (ref 0–1.3)
MONOCYTES RELATIVE PERCENT: 2 %
MONONUCLEAR UNIDENTIFIED CELLS: 1 %
MYELOCYTE PERCENT: 2 %
NEUTROPHILS ABSOLUTE: 13.6 K/UL (ref 1.7–7.7)
NEUTROPHILS RELATIVE PERCENT: 65 %
NITRITE, URINE: NEGATIVE
ORGANISM: ABNORMAL
ORGANISM: ABNORMAL
PDW BLD-RTO: 15.1 % (ref 12.4–15.4)
PERFORMED ON: ABNORMAL
PH UA: 6.5 (ref 5–8)
PHOSPHORUS: 2.4 MG/DL (ref 2.5–4.9)
PHOSPHORUS: 2.6 MG/DL (ref 2.5–4.9)
PHOSPHORUS: 2.9 MG/DL (ref 2.5–4.9)
PLATELET # BLD: 375 K/UL (ref 135–450)
PLATELET SLIDE REVIEW: ADEQUATE
PMV BLD AUTO: 8.9 FL (ref 5–10.5)
POIKILOCYTES: ABNORMAL
POLYCHROMASIA: ABNORMAL
POTASSIUM SERPL-SCNC: 3.6 MMOL/L (ref 3.5–5.1)
POTASSIUM SERPL-SCNC: 3.8 MMOL/L (ref 3.5–5.1)
POTASSIUM SERPL-SCNC: 4.3 MMOL/L (ref 3.5–5.1)
PROMYELOCYTES PERCENT: 1 %
PROTEIN UA: 30 MG/DL
RBC # BLD: 3.63 M/UL (ref 4–5.2)
RBC UA: >100 /HPF (ref 0–2)
SODIUM BLD-SCNC: 134 MMOL/L (ref 136–145)
SODIUM BLD-SCNC: 135 MMOL/L (ref 136–145)
SODIUM BLD-SCNC: 136 MMOL/L (ref 136–145)
SPECIFIC GRAVITY UA: 1.01 (ref 1–1.03)
TEAR DROP CELLS: ABNORMAL
URINE TYPE: ABNORMAL
UROBILINOGEN, URINE: 2 E.U./DL
WBC # BLD: 19.2 K/UL (ref 4–11)
WBC UA: ABNORMAL /HPF (ref 0–5)

## 2019-11-10 PROCEDURE — 97116 GAIT TRAINING THERAPY: CPT

## 2019-11-10 PROCEDURE — 97112 NEUROMUSCULAR REEDUCATION: CPT

## 2019-11-10 PROCEDURE — 94640 AIRWAY INHALATION TREATMENT: CPT

## 2019-11-10 PROCEDURE — 6370000000 HC RX 637 (ALT 250 FOR IP): Performed by: INTERNAL MEDICINE

## 2019-11-10 PROCEDURE — 6360000002 HC RX W HCPCS: Performed by: INTERNAL MEDICINE

## 2019-11-10 PROCEDURE — C9113 INJ PANTOPRAZOLE SODIUM, VIA: HCPCS | Performed by: INTERNAL MEDICINE

## 2019-11-10 PROCEDURE — 2060000000 HC ICU INTERMEDIATE R&B

## 2019-11-10 PROCEDURE — 99233 SBSQ HOSP IP/OBS HIGH 50: CPT | Performed by: INTERNAL MEDICINE

## 2019-11-10 PROCEDURE — 99232 SBSQ HOSP IP/OBS MODERATE 35: CPT | Performed by: INTERNAL MEDICINE

## 2019-11-10 PROCEDURE — 87086 URINE CULTURE/COLONY COUNT: CPT

## 2019-11-10 PROCEDURE — 2580000003 HC RX 258: Performed by: INTERNAL MEDICINE

## 2019-11-10 PROCEDURE — 80069 RENAL FUNCTION PANEL: CPT

## 2019-11-10 PROCEDURE — 94150 VITAL CAPACITY TEST: CPT

## 2019-11-10 PROCEDURE — 36415 COLL VENOUS BLD VENIPUNCTURE: CPT

## 2019-11-10 PROCEDURE — 81001 URINALYSIS AUTO W/SCOPE: CPT

## 2019-11-10 PROCEDURE — 94761 N-INVAS EAR/PLS OXIMETRY MLT: CPT

## 2019-11-10 PROCEDURE — 6360000002 HC RX W HCPCS

## 2019-11-10 PROCEDURE — 85025 COMPLETE CBC W/AUTO DIFF WBC: CPT

## 2019-11-10 PROCEDURE — 97530 THERAPEUTIC ACTIVITIES: CPT

## 2019-11-10 RX ORDER — IPRATROPIUM BROMIDE AND ALBUTEROL SULFATE 2.5; .5 MG/3ML; MG/3ML
1 SOLUTION RESPIRATORY (INHALATION) EVERY 4 HOURS PRN
Status: DISCONTINUED | OUTPATIENT
Start: 2019-11-10 | End: 2019-11-11 | Stop reason: HOSPADM

## 2019-11-10 RX ORDER — ONDANSETRON 2 MG/ML
4 INJECTION INTRAMUSCULAR; INTRAVENOUS EVERY 6 HOURS PRN
Status: DISCONTINUED | OUTPATIENT
Start: 2019-11-10 | End: 2019-11-11 | Stop reason: HOSPADM

## 2019-11-10 RX ORDER — ONDANSETRON 2 MG/ML
INJECTION INTRAMUSCULAR; INTRAVENOUS
Status: COMPLETED
Start: 2019-11-10 | End: 2019-11-10

## 2019-11-10 RX ADMIN — SULFACETAMIDE SODIUM 1 DROP: 100 SOLUTION/ DROPS OPHTHALMIC at 21:34

## 2019-11-10 RX ADMIN — IPRATROPIUM BROMIDE AND ALBUTEROL SULFATE 1 AMPULE: .5; 3 SOLUTION RESPIRATORY (INHALATION) at 08:04

## 2019-11-10 RX ADMIN — METOPROLOL TARTRATE 50 MG: 25 TABLET ORAL at 21:34

## 2019-11-10 RX ADMIN — METOPROLOL TARTRATE 50 MG: 25 TABLET ORAL at 08:05

## 2019-11-10 RX ADMIN — INSULIN LISPRO 2 UNITS: 100 INJECTION, SOLUTION INTRAVENOUS; SUBCUTANEOUS at 21:28

## 2019-11-10 RX ADMIN — IPRATROPIUM BROMIDE AND ALBUTEROL SULFATE 1 AMPULE: .5; 3 SOLUTION RESPIRATORY (INHALATION) at 18:47

## 2019-11-10 RX ADMIN — LEVOTHYROXINE SODIUM 50 MCG: 50 TABLET ORAL at 05:00

## 2019-11-10 RX ADMIN — AMIODARONE HYDROCHLORIDE 400 MG: 200 TABLET ORAL at 08:05

## 2019-11-10 RX ADMIN — ONDANSETRON 4 MG: 2 INJECTION INTRAMUSCULAR; INTRAVENOUS at 06:18

## 2019-11-10 RX ADMIN — ENOXAPARIN SODIUM 80 MG: 80 INJECTION SUBCUTANEOUS at 08:05

## 2019-11-10 RX ADMIN — SULFACETAMIDE SODIUM 1 DROP: 100 SOLUTION/ DROPS OPHTHALMIC at 16:18

## 2019-11-10 RX ADMIN — Medication 10 ML: at 08:08

## 2019-11-10 RX ADMIN — PANTOPRAZOLE SODIUM 40 MG: 40 INJECTION, POWDER, FOR SOLUTION INTRAVENOUS at 08:08

## 2019-11-10 RX ADMIN — PRAVASTATIN SODIUM 40 MG: 40 TABLET ORAL at 21:34

## 2019-11-10 RX ADMIN — ONDANSETRON HYDROCHLORIDE 4 MG: 2 INJECTION, SOLUTION INTRAMUSCULAR; INTRAVENOUS at 06:18

## 2019-11-10 RX ADMIN — Medication 10 ML: at 21:34

## 2019-11-10 RX ADMIN — INSULIN LISPRO 2 UNITS: 100 INJECTION, SOLUTION INTRAVENOUS; SUBCUTANEOUS at 11:05

## 2019-11-10 RX ADMIN — AMIODARONE HYDROCHLORIDE 400 MG: 200 TABLET ORAL at 14:09

## 2019-11-10 RX ADMIN — SULFACETAMIDE SODIUM 1 DROP: 100 SOLUTION/ DROPS OPHTHALMIC at 08:11

## 2019-11-10 RX ADMIN — AMIODARONE HYDROCHLORIDE 400 MG: 200 TABLET ORAL at 21:34

## 2019-11-10 RX ADMIN — ASPIRIN 81 MG 81 MG: 81 TABLET ORAL at 08:05

## 2019-11-10 RX ADMIN — INSULIN LISPRO 2 UNITS: 100 INJECTION, SOLUTION INTRAVENOUS; SUBCUTANEOUS at 05:05

## 2019-11-11 ENCOUNTER — APPOINTMENT (OUTPATIENT)
Dept: NUCLEAR MEDICINE | Age: 69
DRG: 917 | End: 2019-11-11
Payer: COMMERCIAL

## 2019-11-11 VITALS
TEMPERATURE: 96.9 F | HEIGHT: 62 IN | HEART RATE: 89 BPM | WEIGHT: 194 LBS | BODY MASS INDEX: 35.7 KG/M2 | DIASTOLIC BLOOD PRESSURE: 79 MMHG | RESPIRATION RATE: 16 BRPM | SYSTOLIC BLOOD PRESSURE: 151 MMHG | OXYGEN SATURATION: 99 %

## 2019-11-11 LAB
ALBUMIN SERPL-MCNC: 3 G/DL (ref 3.4–5)
ALBUMIN SERPL-MCNC: 3.1 G/DL (ref 3.4–5)
ALBUMIN SERPL-MCNC: 3.8 G/DL (ref 3.4–5)
ANION GAP SERPL CALCULATED.3IONS-SCNC: 13 MMOL/L (ref 3–16)
ANION GAP SERPL CALCULATED.3IONS-SCNC: 15 MMOL/L (ref 3–16)
ANION GAP SERPL CALCULATED.3IONS-SCNC: 15 MMOL/L (ref 3–16)
BASOPHILS ABSOLUTE: 0 K/UL (ref 0–0.2)
BASOPHILS RELATIVE PERCENT: 0 %
BUN BLDV-MCNC: 16 MG/DL (ref 7–20)
BUN BLDV-MCNC: 17 MG/DL (ref 7–20)
BUN BLDV-MCNC: 18 MG/DL (ref 7–20)
CALCIUM SERPL-MCNC: 9.2 MG/DL (ref 8.3–10.6)
CALCIUM SERPL-MCNC: 9.4 MG/DL (ref 8.3–10.6)
CALCIUM SERPL-MCNC: 9.6 MG/DL (ref 8.3–10.6)
CHLORIDE BLD-SCNC: 100 MMOL/L (ref 99–110)
CHLORIDE BLD-SCNC: 100 MMOL/L (ref 99–110)
CHLORIDE BLD-SCNC: 101 MMOL/L (ref 99–110)
CO2: 18 MMOL/L (ref 21–32)
CO2: 19 MMOL/L (ref 21–32)
CO2: 22 MMOL/L (ref 21–32)
CREAT SERPL-MCNC: 0.7 MG/DL (ref 0.6–1.2)
CREAT SERPL-MCNC: <0.5 MG/DL (ref 0.6–1.2)
CREAT SERPL-MCNC: <0.5 MG/DL (ref 0.6–1.2)
EOSINOPHILS ABSOLUTE: 0.3 K/UL (ref 0–0.6)
EOSINOPHILS RELATIVE PERCENT: 2 %
GFR AFRICAN AMERICAN: >60
GFR NON-AFRICAN AMERICAN: >60
GLUCOSE BLD-MCNC: 121 MG/DL (ref 70–99)
GLUCOSE BLD-MCNC: 137 MG/DL (ref 70–99)
GLUCOSE BLD-MCNC: 142 MG/DL (ref 70–99)
GLUCOSE BLD-MCNC: 145 MG/DL (ref 70–99)
GLUCOSE BLD-MCNC: 154 MG/DL (ref 70–99)
HCT VFR BLD CALC: 33.1 % (ref 36–48)
HEMATOLOGY PATH CONSULT: NO
HEMOGLOBIN: 10.6 G/DL (ref 12–16)
LV EF: 60 %
LVEF MODALITY: NORMAL
LYMPHOCYTES ABSOLUTE: 3.1 K/UL (ref 1–5.1)
LYMPHOCYTES RELATIVE PERCENT: 21 %
MCH RBC QN AUTO: 29.5 PG (ref 26–34)
MCHC RBC AUTO-ENTMCNC: 32.1 G/DL (ref 31–36)
MCV RBC AUTO: 91.8 FL (ref 80–100)
MONOCYTES ABSOLUTE: 0.9 K/UL (ref 0–1.3)
MONOCYTES RELATIVE PERCENT: 6 %
NEUTROPHILS ABSOLUTE: 10.6 K/UL (ref 1.7–7.7)
NEUTROPHILS RELATIVE PERCENT: 71 %
PDW BLD-RTO: 14.8 % (ref 12.4–15.4)
PERFORMED ON: ABNORMAL
PERFORMED ON: ABNORMAL
PHOSPHORUS: 2.7 MG/DL (ref 2.5–4.9)
PHOSPHORUS: 3 MG/DL (ref 2.5–4.9)
PHOSPHORUS: 3.1 MG/DL (ref 2.5–4.9)
PLATELET # BLD: 390 K/UL (ref 135–450)
PLATELET SLIDE REVIEW: ADEQUATE
PMV BLD AUTO: 8.5 FL (ref 5–10.5)
POTASSIUM SERPL-SCNC: 3.8 MMOL/L (ref 3.5–5.1)
POTASSIUM SERPL-SCNC: 4.1 MMOL/L (ref 3.5–5.1)
POTASSIUM SERPL-SCNC: 4.2 MMOL/L (ref 3.5–5.1)
RBC # BLD: 3.61 M/UL (ref 4–5.2)
SLIDE REVIEW: ABNORMAL
SODIUM BLD-SCNC: 133 MMOL/L (ref 136–145)
SODIUM BLD-SCNC: 133 MMOL/L (ref 136–145)
SODIUM BLD-SCNC: 137 MMOL/L (ref 136–145)
URINE CULTURE, ROUTINE: NORMAL
WBC # BLD: 14.9 K/UL (ref 4–11)

## 2019-11-11 PROCEDURE — 99232 SBSQ HOSP IP/OBS MODERATE 35: CPT | Performed by: INTERNAL MEDICINE

## 2019-11-11 PROCEDURE — 2580000003 HC RX 258: Performed by: INTERNAL MEDICINE

## 2019-11-11 PROCEDURE — 85025 COMPLETE CBC W/AUTO DIFF WBC: CPT

## 2019-11-11 PROCEDURE — 92526 ORAL FUNCTION THERAPY: CPT

## 2019-11-11 PROCEDURE — 80069 RENAL FUNCTION PANEL: CPT

## 2019-11-11 PROCEDURE — 97110 THERAPEUTIC EXERCISES: CPT

## 2019-11-11 PROCEDURE — 94761 N-INVAS EAR/PLS OXIMETRY MLT: CPT

## 2019-11-11 PROCEDURE — 6360000002 HC RX W HCPCS: Performed by: INTERNAL MEDICINE

## 2019-11-11 PROCEDURE — 6370000000 HC RX 637 (ALT 250 FOR IP): Performed by: INTERNAL MEDICINE

## 2019-11-11 PROCEDURE — 3430000000 HC RX DIAGNOSTIC RADIOPHARMACEUTICAL: Performed by: INTERNAL MEDICINE

## 2019-11-11 PROCEDURE — 78452 HT MUSCLE IMAGE SPECT MULT: CPT

## 2019-11-11 PROCEDURE — 97530 THERAPEUTIC ACTIVITIES: CPT

## 2019-11-11 PROCEDURE — 36415 COLL VENOUS BLD VENIPUNCTURE: CPT

## 2019-11-11 PROCEDURE — 93017 CV STRESS TEST TRACING ONLY: CPT

## 2019-11-11 PROCEDURE — A9502 TC99M TETROFOSMIN: HCPCS | Performed by: INTERNAL MEDICINE

## 2019-11-11 RX ORDER — AMIODARONE HYDROCHLORIDE 200 MG/1
200 TABLET ORAL 3 TIMES DAILY
Qty: 30 TABLET | Refills: 0 | Status: SHIPPED | OUTPATIENT
Start: 2019-11-11 | End: 2019-11-11

## 2019-11-11 RX ORDER — METOPROLOL TARTRATE 50 MG/1
50 TABLET, FILM COATED ORAL 2 TIMES DAILY
Qty: 60 TABLET | Refills: 0 | Status: ON HOLD | OUTPATIENT
Start: 2019-11-11 | End: 2022-06-28

## 2019-11-11 RX ORDER — AMIODARONE HYDROCHLORIDE 200 MG/1
200 TABLET ORAL DAILY
Qty: 30 TABLET | Refills: 0 | Status: ON HOLD | OUTPATIENT
Start: 2019-11-11 | End: 2022-06-28

## 2019-11-11 RX ADMIN — Medication 10 ML: at 09:44

## 2019-11-11 RX ADMIN — SULFACETAMIDE SODIUM 1 DROP: 100 SOLUTION/ DROPS OPHTHALMIC at 09:43

## 2019-11-11 RX ADMIN — REGADENOSON 0.4 MG: 0.08 INJECTION, SOLUTION INTRAVENOUS at 12:51

## 2019-11-11 RX ADMIN — AMIODARONE HYDROCHLORIDE 200 MG: 200 TABLET ORAL at 17:37

## 2019-11-11 RX ADMIN — TETROFOSMIN 11 MILLICURIE: 1.38 INJECTION, POWDER, LYOPHILIZED, FOR SOLUTION INTRAVENOUS at 10:48

## 2019-11-11 RX ADMIN — TETROFOSMIN 33.7 MILLICURIE: 1.38 INJECTION, POWDER, LYOPHILIZED, FOR SOLUTION INTRAVENOUS at 12:51

## 2019-11-11 RX ADMIN — SULFACETAMIDE SODIUM 1 DROP: 100 SOLUTION/ DROPS OPHTHALMIC at 17:40

## 2019-11-11 RX ADMIN — LEVOTHYROXINE SODIUM 50 MCG: 50 TABLET ORAL at 04:49

## 2019-11-11 RX ADMIN — ASPIRIN 81 MG 81 MG: 81 TABLET ORAL at 17:38

## 2019-11-12 LAB — HEMATOLOGY PATH CONSULT: NORMAL

## 2019-11-19 ENCOUNTER — TELEPHONE (OUTPATIENT)
Dept: PULMONOLOGY | Age: 69
End: 2019-11-19

## 2019-11-19 NOTE — TELEPHONE ENCOUNTER
King Cockayne, MD  [facility], [place]   Caller: Unspecified (Yesterday,  1:03 PM)             PA LAT CXR in 4 weeks for f/u pneumonia, me or her PCP, pt choice

## 2019-12-19 ENCOUNTER — HOSPITAL ENCOUNTER (OUTPATIENT)
Dept: GENERAL RADIOLOGY | Age: 69
Discharge: HOME | End: 2019-12-19
Payer: COMMERCIAL

## 2019-12-19 ENCOUNTER — HOSPITAL ENCOUNTER (OUTPATIENT)
Age: 69
Discharge: HOME | End: 2019-12-19
Payer: COMMERCIAL

## 2019-12-19 ENCOUNTER — OFFICE VISIT (OUTPATIENT)
Dept: PULMONOLOGY | Age: 69
End: 2019-12-19
Payer: COMMERCIAL

## 2019-12-19 VITALS
SYSTOLIC BLOOD PRESSURE: 130 MMHG | RESPIRATION RATE: 18 BRPM | WEIGHT: 201 LBS | HEART RATE: 90 BPM | OXYGEN SATURATION: 97 % | HEIGHT: 61 IN | DIASTOLIC BLOOD PRESSURE: 62 MMHG | TEMPERATURE: 97.4 F | BODY MASS INDEX: 37.95 KG/M2

## 2019-12-19 DIAGNOSIS — Z51.81 THERAPEUTIC DRUG MONITORING: ICD-10-CM

## 2019-12-19 DIAGNOSIS — J18.9 PNEUMONIA DUE TO INFECTIOUS ORGANISM, UNSPECIFIED LATERALITY, UNSPECIFIED PART OF LUNG: ICD-10-CM

## 2019-12-19 DIAGNOSIS — J45.901 PERSISTENT ASTHMA WITH ACUTE EXACERBATION, UNSPECIFIED ASTHMA SEVERITY: ICD-10-CM

## 2019-12-19 DIAGNOSIS — I49.9 IRREGULAR HEART RHYTHM: ICD-10-CM

## 2019-12-19 DIAGNOSIS — Z51.81 THERAPEUTIC DRUG MONITORING: Primary | ICD-10-CM

## 2019-12-19 DIAGNOSIS — R60.0 LOWER EXTREMITY EDEMA: ICD-10-CM

## 2019-12-19 PROCEDURE — 71046 X-RAY EXAM CHEST 2 VIEWS: CPT

## 2019-12-19 PROCEDURE — 80069 RENAL FUNCTION PANEL: CPT

## 2019-12-19 PROCEDURE — 99214 OFFICE O/P EST MOD 30 MIN: CPT | Performed by: INTERNAL MEDICINE

## 2019-12-19 PROCEDURE — 93005 ELECTROCARDIOGRAM TRACING: CPT

## 2019-12-19 PROCEDURE — 36415 COLL VENOUS BLD VENIPUNCTURE: CPT

## 2019-12-19 RX ORDER — FUROSEMIDE 20 MG/1
20 TABLET ORAL DAILY
Qty: 30 TABLET | Refills: 3 | Status: SHIPPED | OUTPATIENT
Start: 2019-12-19 | End: 2020-05-07

## 2019-12-19 RX ORDER — CETIRIZINE HYDROCHLORIDE 10 MG/1
TABLET ORAL
Status: ON HOLD | COMMUNITY
Start: 2019-12-16 | End: 2020-08-01 | Stop reason: HOSPADM

## 2019-12-19 RX ORDER — CYCLOBENZAPRINE HCL 10 MG
TABLET ORAL
Status: ON HOLD | COMMUNITY
Start: 2019-12-16 | End: 2020-08-01 | Stop reason: HOSPADM

## 2019-12-20 LAB
ALBUMIN SERPL-MCNC: 4 G/DL (ref 3.4–5)
ANION GAP SERPL CALCULATED.3IONS-SCNC: 18 MMOL/L (ref 3–16)
BUN BLDV-MCNC: 16 MG/DL (ref 7–20)
CALCIUM SERPL-MCNC: 9.3 MG/DL (ref 8.3–10.6)
CHLORIDE BLD-SCNC: 100 MMOL/L (ref 99–110)
CO2: 20 MMOL/L (ref 21–32)
CREAT SERPL-MCNC: 0.9 MG/DL (ref 0.6–1.2)
EKG ATRIAL RATE: 82 BPM
EKG DIAGNOSIS: NORMAL
EKG P AXIS: 65 DEGREES
EKG P-R INTERVAL: 142 MS
EKG Q-T INTERVAL: 380 MS
EKG QRS DURATION: 74 MS
EKG QTC CALCULATION (BAZETT): 443 MS
EKG R AXIS: -22 DEGREES
EKG T AXIS: 53 DEGREES
EKG VENTRICULAR RATE: 82 BPM
GFR AFRICAN AMERICAN: >60
GFR NON-AFRICAN AMERICAN: >60
GLUCOSE BLD-MCNC: 118 MG/DL (ref 70–99)
PHOSPHORUS: 4.1 MG/DL (ref 2.5–4.9)
POTASSIUM SERPL-SCNC: 4.2 MMOL/L (ref 3.5–5.1)
SODIUM BLD-SCNC: 138 MMOL/L (ref 136–145)

## 2019-12-20 PROCEDURE — 93010 ELECTROCARDIOGRAM REPORT: CPT | Performed by: INTERNAL MEDICINE

## 2020-01-02 ENCOUNTER — HOSPITAL ENCOUNTER (OUTPATIENT)
Age: 70
Discharge: HOME | End: 2020-01-02
Payer: COMMERCIAL

## 2020-01-02 ENCOUNTER — OFFICE VISIT (OUTPATIENT)
Dept: PULMONOLOGY | Age: 70
End: 2020-01-02
Payer: COMMERCIAL

## 2020-01-02 VITALS
DIASTOLIC BLOOD PRESSURE: 60 MMHG | BODY MASS INDEX: 37.8 KG/M2 | HEIGHT: 61 IN | SYSTOLIC BLOOD PRESSURE: 123 MMHG | OXYGEN SATURATION: 98 % | RESPIRATION RATE: 16 BRPM | HEART RATE: 76 BPM | TEMPERATURE: 97.6 F | WEIGHT: 200.2 LBS

## 2020-01-02 PROCEDURE — 99214 OFFICE O/P EST MOD 30 MIN: CPT | Performed by: INTERNAL MEDICINE

## 2020-01-02 PROCEDURE — 80069 RENAL FUNCTION PANEL: CPT

## 2020-01-02 PROCEDURE — 85025 COMPLETE CBC W/AUTO DIFF WBC: CPT

## 2020-01-02 PROCEDURE — 36415 COLL VENOUS BLD VENIPUNCTURE: CPT

## 2020-01-02 RX ORDER — QUETIAPINE FUMARATE 25 MG/1
TABLET, FILM COATED ORAL 2 TIMES DAILY
COMMUNITY
End: 2021-11-29

## 2020-01-02 RX ORDER — PREDNISONE 10 MG/1
TABLET ORAL
Qty: 15 TABLET | Refills: 0 | Status: SHIPPED | OUTPATIENT
Start: 2020-01-02 | End: 2020-07-29 | Stop reason: CLARIF

## 2020-01-02 NOTE — TELEPHONE ENCOUNTER
· Request last note and urine culture results from [facility] practice    Attempted to reach several times today. No answer. Will try to request tomorrow.

## 2020-01-02 NOTE — Clinical Note
Hi Dr. Timur Smith saw me back today for her persistent shortness of breath. As you know she was hospitalized recently and followed up with me after that. Her chief complaint at that time was lower extremity edema. I put her on Lasix with monitoring of her kidney function and she is done very well. Her lower extremity edema is markedly better. Working to make her Lasix PRN. Unfortunately, Ms. Blair Galindo feels persistently badly despite objective improvement. She is on Advair now which I think will help with her reactive airway disease. I am going to treat her for 5 days with steroids to see if we can get her inflammation in the chest calm down.   However, overall, I think she is doing much better than she thinks she is.    thanks, Onleonides Son

## 2020-01-02 NOTE — PROGRESS NOTES
clubbing. Skin: Skin is warm and dry. Psychiatric: Normal mood and affect. Data:  12/19/2019 CXR resolution of prior pneumonia    11/4/2019 echo EF 65%, mild LVH    Assessment:  · Persistent asthma   · Community acquired pneumonia - resolved   · Lower extremity edema   · Irregular heart rhythm, suspected atrial fibrillation in hospital    Plan:   · Continue Advair 250/50 BID, PRN albuterol -- Add prednisone today for acute exacerbation 30 mg for 5 days  · Renal panel today for drug monitoring, CBC diff dx UTI  · Lasix 20 mg daily as needed for lower extremity edema   · I emphasized the need for close follow-up with her PCP since I will address only her pulmonary issues going forward.   · Request last note and urine culture results from [facility] practice  · See me in 3-4 months

## 2020-01-03 LAB
ALBUMIN SERPL-MCNC: 4.5 G/DL (ref 3.4–5)
ANION GAP SERPL CALCULATED.3IONS-SCNC: 19 MMOL/L (ref 3–16)
BASOPHILS ABSOLUTE: 0.1 K/UL (ref 0–0.2)
BASOPHILS RELATIVE PERCENT: 0.7 %
BUN BLDV-MCNC: 13 MG/DL (ref 7–20)
CALCIUM SERPL-MCNC: 9.8 MG/DL (ref 8.3–10.6)
CHLORIDE BLD-SCNC: 97 MMOL/L (ref 99–110)
CO2: 22 MMOL/L (ref 21–32)
CREAT SERPL-MCNC: 0.7 MG/DL (ref 0.6–1.2)
EOSINOPHILS ABSOLUTE: 0.8 K/UL (ref 0–0.6)
EOSINOPHILS RELATIVE PERCENT: 7.8 %
GFR AFRICAN AMERICAN: >60
GFR NON-AFRICAN AMERICAN: >60
GLUCOSE BLD-MCNC: 94 MG/DL (ref 70–99)
HCT VFR BLD CALC: 35 % (ref 36–48)
HEMOGLOBIN: 11.9 G/DL (ref 12–16)
LYMPHOCYTES ABSOLUTE: 2.4 K/UL (ref 1–5.1)
LYMPHOCYTES RELATIVE PERCENT: 23.7 %
MCH RBC QN AUTO: 32 PG (ref 26–34)
MCHC RBC AUTO-ENTMCNC: 34.2 G/DL (ref 31–36)
MCV RBC AUTO: 93.7 FL (ref 80–100)
MONOCYTES ABSOLUTE: 0.4 K/UL (ref 0–1.3)
MONOCYTES RELATIVE PERCENT: 4.1 %
NEUTROPHILS ABSOLUTE: 6.6 K/UL (ref 1.7–7.7)
NEUTROPHILS RELATIVE PERCENT: 63.7 %
PDW BLD-RTO: 15.4 % (ref 12.4–15.4)
PHOSPHORUS: 3.3 MG/DL (ref 2.5–4.9)
PLATELET # BLD: 382 K/UL (ref 135–450)
PMV BLD AUTO: 8.9 FL (ref 5–10.5)
POTASSIUM SERPL-SCNC: 4.3 MMOL/L (ref 3.5–5.1)
RBC # BLD: 3.73 M/UL (ref 4–5.2)
SODIUM BLD-SCNC: 138 MMOL/L (ref 136–145)
WBC # BLD: 10.3 K/UL (ref 4–11)

## 2020-03-25 PROBLEM — K92.2 GI BLEED: Status: RESOLVED | Noted: 2018-03-31 | Resolved: 2020-03-24

## 2020-04-13 ENCOUNTER — TELEPHONE (OUTPATIENT)
Dept: PULMONOLOGY | Age: 70
End: 2020-04-13

## 2020-05-08 RX ORDER — FUROSEMIDE 20 MG/1
20 TABLET ORAL DAILY
Qty: 30 TABLET | Refills: 3 | Status: SHIPPED | OUTPATIENT
Start: 2020-05-08 | End: 2020-08-31

## 2020-07-22 ENCOUNTER — TELEPHONE (OUTPATIENT)
Dept: PULMONOLOGY | Age: 70
End: 2020-07-22

## 2020-07-29 ENCOUNTER — APPOINTMENT (OUTPATIENT)
Dept: ULTRASOUND IMAGING | Age: 70
DRG: 871 | End: 2020-07-29
Payer: COMMERCIAL

## 2020-07-29 ENCOUNTER — TELEPHONE (OUTPATIENT)
Dept: PULMONOLOGY | Age: 70
End: 2020-07-29

## 2020-07-29 ENCOUNTER — APPOINTMENT (OUTPATIENT)
Dept: CT IMAGING | Age: 70
DRG: 871 | End: 2020-07-29
Payer: COMMERCIAL

## 2020-07-29 ENCOUNTER — VIRTUAL VISIT (OUTPATIENT)
Dept: PULMONOLOGY | Age: 70
End: 2020-07-29
Payer: COMMERCIAL

## 2020-07-29 ENCOUNTER — HOSPITAL ENCOUNTER (INPATIENT)
Age: 70
LOS: 3 days | Discharge: HOME HEALTH | DRG: 871 | End: 2020-08-01
Attending: EMERGENCY MEDICINE | Admitting: HOSPITALIST
Payer: COMMERCIAL

## 2020-07-29 ENCOUNTER — APPOINTMENT (OUTPATIENT)
Dept: INTERVENTIONAL RADIOLOGY/VASCULAR | Age: 70
DRG: 871 | End: 2020-07-29
Payer: COMMERCIAL

## 2020-07-29 ENCOUNTER — APPOINTMENT (OUTPATIENT)
Dept: GENERAL RADIOLOGY | Age: 70
DRG: 871 | End: 2020-07-29
Payer: COMMERCIAL

## 2020-07-29 PROBLEM — R41.82 ALTERED MENTAL STATUS: Status: ACTIVE | Noted: 2020-07-29

## 2020-07-29 LAB
A/G RATIO: 0.9 (ref 1.1–2.2)
ALBUMIN SERPL-MCNC: 4.2 G/DL (ref 3.4–5)
ALP BLD-CCNC: 220 U/L (ref 40–129)
ALT SERPL-CCNC: 29 U/L (ref 10–40)
AMMONIA: 26 UMOL/L (ref 11–51)
AMPHETAMINE SCREEN, URINE: POSITIVE
ANION GAP SERPL CALCULATED.3IONS-SCNC: 10 MMOL/L (ref 3–16)
ANION GAP SERPL CALCULATED.3IONS-SCNC: 12 MMOL/L (ref 3–16)
ANION GAP SERPL CALCULATED.3IONS-SCNC: 17 MMOL/L (ref 3–16)
AST SERPL-CCNC: 39 U/L (ref 15–37)
BARBITURATE SCREEN URINE: ABNORMAL
BASE EXCESS VENOUS: -4.1 MMOL/L (ref -3–3)
BASOPHILS ABSOLUTE: 0.1 K/UL (ref 0–0.2)
BASOPHILS RELATIVE PERCENT: 0.4 %
BENZODIAZEPINE SCREEN, URINE: POSITIVE
BETA-HYDROXYBUTYRATE: 0.1 MMOL/L (ref 0–0.27)
BILIRUB SERPL-MCNC: 0.4 MG/DL (ref 0–1)
BILIRUBIN URINE: NEGATIVE
BLOOD, URINE: ABNORMAL
BUN BLDV-MCNC: 22 MG/DL (ref 7–20)
BUN BLDV-MCNC: 23 MG/DL (ref 7–20)
BUN BLDV-MCNC: 23 MG/DL (ref 7–20)
CALCIUM SERPL-MCNC: 10 MG/DL (ref 8.3–10.6)
CALCIUM SERPL-MCNC: 8.8 MG/DL (ref 8.3–10.6)
CALCIUM SERPL-MCNC: 9 MG/DL (ref 8.3–10.6)
CANNABINOID SCREEN URINE: ABNORMAL
CARBOXYHEMOGLOBIN: 2.6 % (ref 0–1.5)
CHLORIDE BLD-SCNC: 100 MMOL/L (ref 99–110)
CHLORIDE BLD-SCNC: 102 MMOL/L (ref 99–110)
CHLORIDE BLD-SCNC: 105 MMOL/L (ref 99–110)
CLARITY: CLEAR
CO2: 21 MMOL/L (ref 21–32)
CO2: 21 MMOL/L (ref 21–32)
CO2: 23 MMOL/L (ref 21–32)
COCAINE METABOLITE SCREEN URINE: ABNORMAL
COLOR: YELLOW
CREAT SERPL-MCNC: 1.3 MG/DL (ref 0.6–1.2)
CREAT SERPL-MCNC: 1.4 MG/DL (ref 0.6–1.2)
CREAT SERPL-MCNC: 1.7 MG/DL (ref 0.6–1.2)
CREATININE URINE: 207.8 MG/DL (ref 28–259)
EKG ATRIAL RATE: 111 BPM
EKG DIAGNOSIS: NORMAL
EKG P AXIS: 67 DEGREES
EKG P-R INTERVAL: 116 MS
EKG Q-T INTERVAL: 304 MS
EKG QRS DURATION: 64 MS
EKG QTC CALCULATION (BAZETT): 413 MS
EKG R AXIS: -35 DEGREES
EKG T AXIS: 65 DEGREES
EKG VENTRICULAR RATE: 111 BPM
EOSINOPHILS ABSOLUTE: 0.4 K/UL (ref 0–0.6)
EOSINOPHILS RELATIVE PERCENT: 2.1 %
GFR AFRICAN AMERICAN: 36
GFR AFRICAN AMERICAN: 45
GFR AFRICAN AMERICAN: 49
GFR NON-AFRICAN AMERICAN: 30
GFR NON-AFRICAN AMERICAN: 37
GFR NON-AFRICAN AMERICAN: 41
GLOBULIN: 4.5 G/DL
GLUCOSE BLD-MCNC: 114 MG/DL (ref 70–99)
GLUCOSE BLD-MCNC: 115 MG/DL (ref 70–99)
GLUCOSE BLD-MCNC: 115 MG/DL (ref 70–99)
GLUCOSE BLD-MCNC: 123 MG/DL (ref 70–99)
GLUCOSE BLD-MCNC: 138 MG/DL (ref 70–99)
GLUCOSE URINE: 100 MG/DL
HCO3 VENOUS: 21.1 MMOL/L (ref 23–29)
HCT VFR BLD CALC: 38.1 % (ref 36–48)
HEMOGLOBIN: 12.1 G/DL (ref 12–16)
INR BLD: 1.01 (ref 0.86–1.14)
KETONES, URINE: ABNORMAL MG/DL
LACTATE DEHYDROGENASE: 179 U/L (ref 100–190)
LACTIC ACID, SEPSIS: 2.1 MMOL/L (ref 0.4–1.9)
LACTIC ACID, SEPSIS: 3.3 MMOL/L (ref 0.4–1.9)
LACTIC ACID: 1.7 MMOL/L (ref 0.4–2)
LEUKOCYTE ESTERASE, URINE: NEGATIVE
LIPASE: 48 U/L (ref 13–60)
LYMPHOCYTES ABSOLUTE: 1.6 K/UL (ref 1–5.1)
LYMPHOCYTES RELATIVE PERCENT: 9.2 %
Lab: ABNORMAL
MCH RBC QN AUTO: 29.7 PG (ref 26–34)
MCHC RBC AUTO-ENTMCNC: 31.9 G/DL (ref 31–36)
MCV RBC AUTO: 93.2 FL (ref 80–100)
METHADONE SCREEN, URINE: ABNORMAL
METHEMOGLOBIN VENOUS: 0.3 %
MICROSCOPIC EXAMINATION: YES
MONOCYTES ABSOLUTE: 1.1 K/UL (ref 0–1.3)
MONOCYTES RELATIVE PERCENT: 6 %
NEUTROPHILS ABSOLUTE: 14.4 K/UL (ref 1.7–7.7)
NEUTROPHILS RELATIVE PERCENT: 82.3 %
NITRITE, URINE: NEGATIVE
O2 CONTENT, VEN: 14 VOL %
O2 SAT, VEN: 78 %
O2 THERAPY: ABNORMAL
OPIATE SCREEN URINE: ABNORMAL
OXYCODONE URINE: POSITIVE
PCO2, VEN: 39.2 MMHG (ref 40–50)
PDW BLD-RTO: 14.6 % (ref 12.4–15.4)
PERFORMED ON: ABNORMAL
PERFORMED ON: ABNORMAL
PH UA: 6.5
PH UA: 6.5 (ref 5–8)
PH VENOUS: 7.35 (ref 7.35–7.45)
PHENCYCLIDINE SCREEN URINE: ABNORMAL
PLATELET # BLD: 371 K/UL (ref 135–450)
PMV BLD AUTO: 7.3 FL (ref 5–10.5)
PO2, VEN: 44.1 MMHG (ref 25–40)
POTASSIUM REFLEX MAGNESIUM: 5.7 MMOL/L (ref 3.5–5.1)
POTASSIUM SERPL-SCNC: 5 MMOL/L (ref 3.5–5.1)
POTASSIUM SERPL-SCNC: 5.8 MMOL/L (ref 3.5–5.1)
PRO-BNP: 75 PG/ML (ref 0–124)
PROCALCITONIN: 0.35 NG/ML (ref 0–0.15)
PROPOXYPHENE SCREEN: ABNORMAL
PROTEIN UA: 30 MG/DL
PROTHROMBIN TIME: 11.7 SEC (ref 10–13.2)
RBC # BLD: 4.08 M/UL (ref 4–5.2)
RBC UA: NORMAL /HPF (ref 0–4)
SODIUM BLD-SCNC: 135 MMOL/L (ref 136–145)
SODIUM BLD-SCNC: 138 MMOL/L (ref 136–145)
SODIUM BLD-SCNC: 138 MMOL/L (ref 136–145)
SPECIFIC GRAVITY UA: 1.02 (ref 1–1.03)
TCO2 CALC VENOUS: 22 MMOL/L
TOTAL PROTEIN: 8.7 G/DL (ref 6.4–8.2)
TROPONIN: <0.01 NG/ML
TROPONIN: <0.01 NG/ML
URINE REFLEX TO CULTURE: ABNORMAL
URINE TYPE: ABNORMAL
UROBILINOGEN, URINE: 0.2 E.U./DL
WBC # BLD: 17.5 K/UL (ref 4–11)
WBC UA: NORMAL /HPF (ref 0–5)

## 2020-07-29 PROCEDURE — 36592 COLLECT BLOOD FROM PICC: CPT

## 2020-07-29 PROCEDURE — 83615 LACTATE (LD) (LDH) ENZYME: CPT

## 2020-07-29 PROCEDURE — 81001 URINALYSIS AUTO W/SCOPE: CPT

## 2020-07-29 PROCEDURE — 71045 X-RAY EXAM CHEST 1 VIEW: CPT

## 2020-07-29 PROCEDURE — 83880 ASSAY OF NATRIURETIC PEPTIDE: CPT

## 2020-07-29 PROCEDURE — 74176 CT ABD & PELVIS W/O CONTRAST: CPT

## 2020-07-29 PROCEDURE — 94761 N-INVAS EAR/PLS OXIMETRY MLT: CPT

## 2020-07-29 PROCEDURE — 84145 PROCALCITONIN (PCT): CPT

## 2020-07-29 PROCEDURE — 83605 ASSAY OF LACTIC ACID: CPT

## 2020-07-29 PROCEDURE — 2580000003 HC RX 258: Performed by: INTERNAL MEDICINE

## 2020-07-29 PROCEDURE — 6370000000 HC RX 637 (ALT 250 FOR IP): Performed by: INTERNAL MEDICINE

## 2020-07-29 PROCEDURE — 82010 KETONE BODYS QUAN: CPT

## 2020-07-29 PROCEDURE — 83036 HEMOGLOBIN GLYCOSYLATED A1C: CPT

## 2020-07-29 PROCEDURE — 93005 ELECTROCARDIOGRAM TRACING: CPT | Performed by: EMERGENCY MEDICINE

## 2020-07-29 PROCEDURE — 99442 PR PHYS/QHP TELEPHONE EVALUATION 11-20 MIN: CPT | Performed by: INTERNAL MEDICINE

## 2020-07-29 PROCEDURE — 99285 EMERGENCY DEPT VISIT HI MDM: CPT

## 2020-07-29 PROCEDURE — 2500000003 HC RX 250 WO HCPCS: Performed by: INTERNAL MEDICINE

## 2020-07-29 PROCEDURE — U0003 INFECTIOUS AGENT DETECTION BY NUCLEIC ACID (DNA OR RNA); SEVERE ACUTE RESPIRATORY SYNDROME CORONAVIRUS 2 (SARS-COV-2) (CORONAVIRUS DISEASE [COVID-19]), AMPLIFIED PROBE TECHNIQUE, MAKING USE OF HIGH THROUGHPUT TECHNOLOGIES AS DESCRIBED BY CMS-2020-01-R: HCPCS

## 2020-07-29 PROCEDURE — 2580000003 HC RX 258

## 2020-07-29 PROCEDURE — 86140 C-REACTIVE PROTEIN: CPT

## 2020-07-29 PROCEDURE — 6370000000 HC RX 637 (ALT 250 FOR IP): Performed by: NURSE PRACTITIONER

## 2020-07-29 PROCEDURE — 2580000003 HC RX 258: Performed by: EMERGENCY MEDICINE

## 2020-07-29 PROCEDURE — 85610 PROTHROMBIN TIME: CPT

## 2020-07-29 PROCEDURE — 6360000002 HC RX W HCPCS: Performed by: NURSE PRACTITIONER

## 2020-07-29 PROCEDURE — 82570 ASSAY OF URINE CREATININE: CPT

## 2020-07-29 PROCEDURE — 02HV33Z INSERTION OF INFUSION DEVICE INTO SUPERIOR VENA CAVA, PERCUTANEOUS APPROACH: ICD-10-PCS | Performed by: INTERNAL MEDICINE

## 2020-07-29 PROCEDURE — 82803 BLOOD GASES ANY COMBINATION: CPT

## 2020-07-29 PROCEDURE — 2580000003 HC RX 258: Performed by: NURSE PRACTITIONER

## 2020-07-29 PROCEDURE — 80053 COMPREHEN METABOLIC PANEL: CPT

## 2020-07-29 PROCEDURE — 80307 DRUG TEST PRSMV CHEM ANLYZR: CPT

## 2020-07-29 PROCEDURE — 70450 CT HEAD/BRAIN W/O DYE: CPT

## 2020-07-29 PROCEDURE — 82140 ASSAY OF AMMONIA: CPT

## 2020-07-29 PROCEDURE — 2000000000 HC ICU R&B

## 2020-07-29 PROCEDURE — 6360000002 HC RX W HCPCS: Performed by: INTERNAL MEDICINE

## 2020-07-29 PROCEDURE — 85025 COMPLETE CBC W/AUTO DIFF WBC: CPT

## 2020-07-29 PROCEDURE — 94640 AIRWAY INHALATION TREATMENT: CPT

## 2020-07-29 PROCEDURE — 76705 ECHO EXAM OF ABDOMEN: CPT

## 2020-07-29 PROCEDURE — 84156 ASSAY OF PROTEIN URINE: CPT

## 2020-07-29 PROCEDURE — 82728 ASSAY OF FERRITIN: CPT

## 2020-07-29 PROCEDURE — 93010 ELECTROCARDIOGRAM REPORT: CPT | Performed by: INTERNAL MEDICINE

## 2020-07-29 PROCEDURE — 87040 BLOOD CULTURE FOR BACTERIA: CPT

## 2020-07-29 PROCEDURE — 84484 ASSAY OF TROPONIN QUANT: CPT

## 2020-07-29 PROCEDURE — 6360000002 HC RX W HCPCS: Performed by: EMERGENCY MEDICINE

## 2020-07-29 PROCEDURE — 83690 ASSAY OF LIPASE: CPT

## 2020-07-29 PROCEDURE — 36415 COLL VENOUS BLD VENIPUNCTURE: CPT

## 2020-07-29 PROCEDURE — 99291 CRITICAL CARE FIRST HOUR: CPT | Performed by: INTERNAL MEDICINE

## 2020-07-29 PROCEDURE — U0002 COVID-19 LAB TEST NON-CDC: HCPCS

## 2020-07-29 RX ORDER — DEXTROSE MONOHYDRATE 25 G/50ML
12.5 INJECTION, SOLUTION INTRAVENOUS PRN
Status: DISCONTINUED | OUTPATIENT
Start: 2020-07-29 | End: 2020-08-01 | Stop reason: HOSPADM

## 2020-07-29 RX ORDER — FLUTICASONE PROPIONATE 50 MCG
1 SPRAY, SUSPENSION (ML) NASAL DAILY
Status: DISCONTINUED | OUTPATIENT
Start: 2020-07-29 | End: 2020-08-01 | Stop reason: HOSPADM

## 2020-07-29 RX ORDER — SODIUM POLYSTYRENE SULFONATE 15 G/60ML
15 SUSPENSION ORAL; RECTAL ONCE
Status: COMPLETED | OUTPATIENT
Start: 2020-07-29 | End: 2020-07-29

## 2020-07-29 RX ORDER — AMIODARONE HYDROCHLORIDE 200 MG/1
200 TABLET ORAL DAILY
Status: DISCONTINUED | OUTPATIENT
Start: 2020-07-29 | End: 2020-08-01 | Stop reason: HOSPADM

## 2020-07-29 RX ORDER — SODIUM CHLORIDE 0.9 % (FLUSH) 0.9 %
10 SYRINGE (ML) INJECTION EVERY 12 HOURS SCHEDULED
Status: DISCONTINUED | OUTPATIENT
Start: 2020-07-29 | End: 2020-08-01 | Stop reason: HOSPADM

## 2020-07-29 RX ORDER — LIDOCAINE HYDROCHLORIDE 10 MG/ML
5 INJECTION, SOLUTION INFILTRATION; PERINEURAL ONCE
Status: DISCONTINUED | OUTPATIENT
Start: 2020-07-29 | End: 2020-08-01

## 2020-07-29 RX ORDER — 0.9 % SODIUM CHLORIDE 0.9 %
1000 INTRAVENOUS SOLUTION INTRAVENOUS ONCE
Status: COMPLETED | OUTPATIENT
Start: 2020-07-29 | End: 2020-07-29

## 2020-07-29 RX ORDER — DEXTROSE MONOHYDRATE 50 MG/ML
100 INJECTION, SOLUTION INTRAVENOUS PRN
Status: DISCONTINUED | OUTPATIENT
Start: 2020-07-29 | End: 2020-08-01 | Stop reason: HOSPADM

## 2020-07-29 RX ORDER — PANTOPRAZOLE SODIUM 40 MG/1
40 TABLET, DELAYED RELEASE ORAL
Status: DISCONTINUED | OUTPATIENT
Start: 2020-07-29 | End: 2020-08-01 | Stop reason: HOSPADM

## 2020-07-29 RX ORDER — SODIUM CHLORIDE 9 MG/ML
INJECTION, SOLUTION INTRAVENOUS CONTINUOUS
Status: DISCONTINUED | OUTPATIENT
Start: 2020-07-29 | End: 2020-07-29 | Stop reason: ALTCHOICE

## 2020-07-29 RX ORDER — SODIUM CHLORIDE 0.9 % (FLUSH) 0.9 %
10 SYRINGE (ML) INJECTION PRN
Status: DISCONTINUED | OUTPATIENT
Start: 2020-07-29 | End: 2020-08-01 | Stop reason: HOSPADM

## 2020-07-29 RX ORDER — POLYETHYLENE GLYCOL 3350 17 G/17G
17 POWDER, FOR SOLUTION ORAL DAILY PRN
Status: DISCONTINUED | OUTPATIENT
Start: 2020-07-29 | End: 2020-08-01 | Stop reason: HOSPADM

## 2020-07-29 RX ORDER — ACETAMINOPHEN 325 MG/1
650 TABLET ORAL EVERY 6 HOURS PRN
Status: DISCONTINUED | OUTPATIENT
Start: 2020-07-29 | End: 2020-08-01 | Stop reason: HOSPADM

## 2020-07-29 RX ORDER — TAMSULOSIN HYDROCHLORIDE 0.4 MG/1
0.4 CAPSULE ORAL DAILY
Status: DISCONTINUED | OUTPATIENT
Start: 2020-07-29 | End: 2020-08-01 | Stop reason: HOSPADM

## 2020-07-29 RX ORDER — SODIUM CHLORIDE 9 MG/ML
INJECTION, SOLUTION INTRAVENOUS
Status: COMPLETED
Start: 2020-07-29 | End: 2020-07-29

## 2020-07-29 RX ORDER — DICYCLOMINE HYDROCHLORIDE 10 MG/1
10 CAPSULE ORAL
Status: DISCONTINUED | OUTPATIENT
Start: 2020-07-29 | End: 2020-08-01 | Stop reason: HOSPADM

## 2020-07-29 RX ORDER — LEVOTHYROXINE SODIUM 0.05 MG/1
50 TABLET ORAL DAILY
Status: DISCONTINUED | OUTPATIENT
Start: 2020-07-29 | End: 2020-08-01 | Stop reason: HOSPADM

## 2020-07-29 RX ORDER — ONDANSETRON 2 MG/ML
4 INJECTION INTRAMUSCULAR; INTRAVENOUS EVERY 6 HOURS PRN
Status: DISCONTINUED | OUTPATIENT
Start: 2020-07-29 | End: 2020-08-01 | Stop reason: HOSPADM

## 2020-07-29 RX ORDER — ACETAMINOPHEN 650 MG/1
650 SUPPOSITORY RECTAL EVERY 6 HOURS PRN
Status: DISCONTINUED | OUTPATIENT
Start: 2020-07-29 | End: 2020-08-01 | Stop reason: HOSPADM

## 2020-07-29 RX ORDER — NICOTINE POLACRILEX 4 MG
15 LOZENGE BUCCAL PRN
Status: DISCONTINUED | OUTPATIENT
Start: 2020-07-29 | End: 2020-08-01 | Stop reason: HOSPADM

## 2020-07-29 RX ORDER — BUDESONIDE AND FORMOTEROL FUMARATE DIHYDRATE 160; 4.5 UG/1; UG/1
2 AEROSOL RESPIRATORY (INHALATION) 2 TIMES DAILY
Status: DISCONTINUED | OUTPATIENT
Start: 2020-07-29 | End: 2020-08-01 | Stop reason: HOSPADM

## 2020-07-29 RX ORDER — SODIUM CHLORIDE 450 MG/100ML
INJECTION, SOLUTION INTRAVENOUS CONTINUOUS
Status: DISCONTINUED | OUTPATIENT
Start: 2020-07-29 | End: 2020-07-29

## 2020-07-29 RX ORDER — CETIRIZINE HYDROCHLORIDE 10 MG/1
10 TABLET ORAL DAILY
Status: DISCONTINUED | OUTPATIENT
Start: 2020-07-29 | End: 2020-08-01 | Stop reason: HOSPADM

## 2020-07-29 RX ORDER — 0.9 % SODIUM CHLORIDE 0.9 %
1000 INTRAVENOUS SOLUTION INTRAVENOUS ONCE
Status: DISCONTINUED | OUTPATIENT
Start: 2020-07-29 | End: 2020-08-01 | Stop reason: HOSPADM

## 2020-07-29 RX ORDER — METOPROLOL TARTRATE 50 MG/1
50 TABLET, FILM COATED ORAL 2 TIMES DAILY
Status: DISCONTINUED | OUTPATIENT
Start: 2020-07-29 | End: 2020-08-01 | Stop reason: HOSPADM

## 2020-07-29 RX ORDER — PROMETHAZINE HYDROCHLORIDE 25 MG/1
12.5 TABLET ORAL EVERY 6 HOURS PRN
Status: DISCONTINUED | OUTPATIENT
Start: 2020-07-29 | End: 2020-08-01 | Stop reason: HOSPADM

## 2020-07-29 RX ORDER — 0.9 % SODIUM CHLORIDE 0.9 %
500 INTRAVENOUS SOLUTION INTRAVENOUS
Status: DISCONTINUED | OUTPATIENT
Start: 2020-07-29 | End: 2020-08-01

## 2020-07-29 RX ADMIN — ENOXAPARIN SODIUM 40 MG: 40 INJECTION SUBCUTANEOUS at 19:00

## 2020-07-29 RX ADMIN — TAMSULOSIN HYDROCHLORIDE 0.4 MG: 0.4 CAPSULE ORAL at 19:00

## 2020-07-29 RX ADMIN — MUPIROCIN: 20 OINTMENT TOPICAL at 20:00

## 2020-07-29 RX ADMIN — PIPERACILLIN SODIUM AND TAZOBACTAM SODIUM 3.38 G: 3; .375 INJECTION, POWDER, LYOPHILIZED, FOR SOLUTION INTRAVENOUS at 15:52

## 2020-07-29 RX ADMIN — CETIRIZINE HYDROCHLORIDE 10 MG: 10 TABLET ORAL at 19:00

## 2020-07-29 RX ADMIN — SODIUM CHLORIDE 250 ML: 9 INJECTION, SOLUTION INTRAVENOUS at 22:51

## 2020-07-29 RX ADMIN — Medication 2 PUFF: at 20:29

## 2020-07-29 RX ADMIN — LEVOTHYROXINE SODIUM 50 MCG: 50 TABLET ORAL at 19:00

## 2020-07-29 RX ADMIN — FLUTICASONE PROPIONATE 1 SPRAY: 50 SPRAY, METERED NASAL at 19:00

## 2020-07-29 RX ADMIN — SODIUM CHLORIDE 1000 ML: 9 INJECTION, SOLUTION INTRAVENOUS at 13:12

## 2020-07-29 RX ADMIN — PANTOPRAZOLE SODIUM 40 MG: 40 TABLET, DELAYED RELEASE ORAL at 19:00

## 2020-07-29 RX ADMIN — Medication 10 ML: at 19:59

## 2020-07-29 RX ADMIN — ACETAMINOPHEN 650 MG: 325 TABLET ORAL at 23:01

## 2020-07-29 RX ADMIN — SODIUM BICARBONATE: 84 INJECTION, SOLUTION INTRAVENOUS at 19:58

## 2020-07-29 RX ADMIN — SODIUM POLYSTYRENE SULFONATE 15 G: 15 SUSPENSION ORAL; RECTAL at 19:00

## 2020-07-29 RX ADMIN — AMIODARONE HYDROCHLORIDE 200 MG: 200 TABLET ORAL at 19:00

## 2020-07-29 RX ADMIN — PIPERACILLIN SODIUM AND TAZOBACTAM SODIUM 3.38 G: 3; .375 INJECTION, POWDER, LYOPHILIZED, FOR SOLUTION INTRAVENOUS at 22:49

## 2020-07-29 ASSESSMENT — PAIN DESCRIPTION - DESCRIPTORS: DESCRIPTORS: HEADACHE

## 2020-07-29 ASSESSMENT — PAIN SCALES - GENERAL
PAINLEVEL_OUTOF10: 0
PAINLEVEL_OUTOF10: 7
PAINLEVEL_OUTOF10: 3

## 2020-07-29 ASSESSMENT — PAIN DESCRIPTION - PAIN TYPE
TYPE: ACUTE PAIN
TYPE: ACUTE PAIN

## 2020-07-29 ASSESSMENT — PAIN DESCRIPTION - LOCATION
LOCATION: HEAD
LOCATION: CHEST

## 2020-07-29 NOTE — PROGRESS NOTES
Handoff report to Ana Luisa Rodrigez RN for transfer of care. Resting in bed , Up earlier to toilet, SBA, voided large amount of urine. Call light in reach, denies further needs. ICU monitoring leads in place.

## 2020-07-29 NOTE — ED NOTES
Report called to [facility] in ICU. Lalo Fry RN transporting pt. Per Veldon Goldmann, hospitalist to put in nephro consult for PICC placement. Maira morales.       Cedric Ríos RN  07/29/20 [id number]

## 2020-07-29 NOTE — ED NOTES
Heraclio Abarca RN attempted PIV, unsuccessful. Clinical Admin contacted for US IV.       Bailee Perez RN  07/29/20 4148

## 2020-07-29 NOTE — TELEPHONE ENCOUNTER
MD Chandan Sorto MA               Please look for ED note and CC: me.   After acute illness resolved, we can reschedule f/u for 3 months

## 2020-07-29 NOTE — ED NOTES
1802- Call placed o CC for [address] [facility] admission     Call was returned by Dr. Pablo Espana and spoke to Dr. Doroteo Corcoran  07/29/20 Ying  07/29/20 8737

## 2020-07-29 NOTE — Clinical Note
Please look for ED note and CC: me.  After acute illness resolved, we can reschedule f/u for 3 months
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
## 2020-07-29 NOTE — PROGRESS NOTES
Admit for altered mental status, COVID rule out, sepsis, VIOLETA, hyperkalemia, polysubstance abuse, possible pneumonia    Ladoris He FNP-C

## 2020-07-29 NOTE — ED NOTES
1417- Perfect serve sent to Dr. Melinda Vásquez for admission     [id number]- Call was returned by Za Moran NP and spoke to Dr. Trey Santiago  07/29/20 [place]  07/29/20 9330

## 2020-07-29 NOTE — PROGRESS NOTES
Patient is not able to demonstrate the ability to move from a reclining position to an upright position within the recliner due to medical status. Nona Guerrero

## 2020-07-29 NOTE — ED NOTES
Spoke with [facility] in Radiology, will notify PICC team of nephro consult.       Vero Nicholas RN  07/29/20 6893

## 2020-07-29 NOTE — PROGRESS NOTES
pneumonia    11/4/2019 echo EF 65%, mild LVH    Assessment:  · Acute encephalopathy  · Shortness of breath   · Persistent asthma   · Lower extremity edema   · Irregular heart rhythm, suspected atrial fibrillation in hospital    Plan:   I instructed the patient to call 911/EMS and to come to the emergency department for evaluation. I am concerned about CO2 narcosis v acute infection v stroke & cannot do adequate evaluation on phone.  I also recommend ABG to rule out carbon monoxide poisoning    Prior Plan  · Continue Advair 250/50 BID, PRN albuterol
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
## 2020-07-29 NOTE — ED PROVIDER NOTES
GASTROINTESTINAL ENDOSCOPY  01/12/2018   gastritis    Family History  Problem Relation Age of Onset  Eva Perla Cancer Mother        bone   Heart Disease Mother    Hypertension Father    Heart Disease Father     Social History    Socioeconomic History   Marital status: Single    Spouse name: Not on file   Number of children: Not on file   Years of education: Not on file   Highest education level: Not on file  Occupational History   Not on file  Social Needs   Financial resource strain: Not on file   Food insecurity    Worry: Not on file    Inability: Not on file   Transportation needs    Medical: Not on file    Non-medical: Not on file  Tobacco Use   Smoking status: Never Smoker   Smokeless tobacco: Never Used  Substance and Sexual Activity   Alcohol use: No   Drug use: Yes    Types: Methamphetamines   Sexual activity: Not Currently  Lifestyle   Physical activity    Days per week: Not on file    Minutes per session: Not on file   Stress: Not on file  Relationships   Social connections    Talks on phone: Not on file    Gets together: Not on file    Attends [employer] service: Not on file    Active member of club or organization: Not on file    Attends meetings of clubs or organizations: Not on file    Relationship status: Not on file   Intimate partner violence    Fear of current or ex partner: Not on file    Emotionally abused: Not on file    Physically abused: Not on file    Forced sexual activity: Not on file  Other Topics Concern   Not on file  Social History Narrative   Not on file    No current facility-administered medications for this encounter.      Current Outpatient Medications  Medication Sig Dispense Refill   ADVAIR DISKUS 250-50 MCG/DOSE AEPB INHALE 1 PUFF INTO THE LUNGS 2 TIMES DAILY 1 Inhaler 5   furosemide (LASIX) 20 MG tablet TAKE 1 TABLET BY MOUTH DAILY 30 tablet 3   AMOXICILLIN PO Take by mouth     QUEtiapine (SEROQUEL) 25 MG tablet Take by mouth 2 times daily Indications: 200mg at HS     cyclobenzaprine (FLEXERIL) 10 MG tablet TAKE 1 TABLET BY MOUTH EVERY EIGHT HOURS FOR 30 DAYS     cetirizine (ZYRTEC) 10 MG tablet      metoprolol tartrate (LOPRESSOR) 50 MG tablet Take 1 tablet by mouth 2 times daily 60 tablet 0   apixaban (ELIQUIS) 5 MG TABS tablet Take 1 tablet by mouth 2 times daily 60 tablet 0   amiodarone (CORDARONE) 200 MG tablet Take 1 tablet by mouth daily 30 tablet 0   clobetasol (TEMOVATE) 0.05 % external solution Apply small amount to scalp only two nights per week for flares for up to 4 weeks 50 mL 0   tamsulosin (FLOMAX) 0.4 MG capsule Take 1 capsule by mouth daily 30 capsule 0   docusate sodium (COLACE) 100 MG capsule Take 100 mg by mouth 2 times daily     trimethoprim (TRIMPEX) 100 MG tablet Take 100 mg by mouth daily      fluticasone (FLONASE) 50 MCG/ACT nasal spray 1 spray by Nasal route daily     oxyCODONE-acetaminophen (PERCOCET) 7.5-325 MG per tablet Take 0.5 tablets by mouth every 8 hours as needed for Pain .  sulfacetamide-prednisoLONE (BLEPHAMIDE) 10-0.2 % ophthalmic suspension 2 drops every 4 hours     clobetasol (TEMOVATE) 0.05 % ointment Apply topically 2 times daily Apply topically 2 times daily.      hydrOXYzine (ATARAX) 25 MG tablet Take 25 mg by mouth every 8 hours as needed for Itching      loratadine (CLARITIN) 10 MG tablet Take 10 mg by mouth daily     ondansetron (ZOFRAN) 4 MG tablet Take 4 mg by mouth every 8 hours as needed for Nausea or Vomiting     tiZANidine (ZANAFLEX) 4 MG tablet Take 4 mg by mouth 2 times daily      dicyclomine (BENTYL) 10 MG capsule Take 10 mg by mouth 4 times daily (before meals and nightly)     pantoprazole (PROTONIX) 40 MG tablet Take 1 tablet by mouth 2 times daily (before meals) 60 tablet 0   senna (SENOKOT) 8.6 MG tablet Take 1 tablet by mouth 2 times daily      metFORMIN (GLUCOPHAGE) 500 MG tablet Take 500 mg by mouth 2 times daily (with meals)     levothyroxine (SYNTHROID) 50 MCG tablet Take 1 tablet by Total Protein 8.7 (H) 6.4 - 8.2 g/dL    Alb 4.2 3.4 - 5.0 g/dL    Albumin/Globulin Ratio 0.9 (L) 1.1 - 2.2    Total Bilirubin 0.4 0.0 - 1.0 mg/dL    Alkaline Phosphatase 220 (H) 40 - 129 U/L    ALT 29 10 - 40 U/L    AST 39 (H) 15 - 37 U/L    Globulin 4.5 g/dL   CBC Auto Differential   Result Value Ref Range    WBC 17.5 (H) 4.0 - 11.0 K/uL    RBC 4.08 4.00 - 5.20 M/uL    Hemoglobin 12.1 12.0 - 16.0 g/dL    Hematocrit 38.1 36.0 - 48.0 %    MCV 93.2 80.0 - 100.0 fL    MCH 29.7 26.0 - 34.0 pg    MCHC 31.9 31.0 - 36.0 g/dL    RDW 14.6 12.4 - 15.4 %    Platelets 062 603 - 383 K/uL    MPV 7.3 5.0 - 10.5 fL    Neutrophils % 82.3 %    Lymphocytes % 9.2 %    Monocytes % 6.0 %    Eosinophils % 2.1 %    Basophils % 0.4 %    Neutrophils Absolute 14.4 (H) 1.7 - 7.7 K/uL    Lymphocytes Absolute 1.6 1.0 - 5.1 K/uL    Monocytes Absolute 1.1 0.0 - 1.3 K/uL    Eosinophils Absolute 0.4 0.0 - 0.6 K/uL    Basophils Absolute 0.1 0.0 - 0.2 K/uL   Urinalysis Reflex to Culture    Specimen: Urine, clean catch   Result Value Ref Range    Color, UA Yellow Straw/Yellow    Clarity, UA Clear Clear    Glucose, Ur 100 (A) Negative mg/dL    Bilirubin Urine Negative Negative    Ketones, Urine TRACE (A) Negative mg/dL    Specific Gravity, UA 1.020 1.005 - 1.030    Blood, Urine TRACE-INTACT (A) Negative    pH, UA 6.5 5.0 - 8.0    Protein, UA 30 (A) Negative mg/dL    Urobilinogen, Urine 0.2 <2.0 E.U./dL    Nitrite, Urine Negative Negative    Leukocyte Esterase, Urine Negative Negative    Microscopic Examination YES     Urine Type NotGiven     Urine Reflex to Culture Not Indicated    Lactate, Sepsis   Result Value Ref Range    Lactic Acid, Sepsis 3.3 (H) 0.4 - 1.9 mmol/L   Troponin   Result Value Ref Range    Troponin <0.01 <0.01 ng/mL   Blood Gas, Venous   Result Value Ref Range    pH, Marty 7.349 (L) 7.350 - 7.450    pCO2, Marty 39.2 (L) 40.0 - 50.0 mmHg    pO2, Marty 44.1 (H) 25.0 - 40.0 mmHg    HCO3, Venous 21.1 (L) 23.0 - 29.0 mmol/L    Base Excess, Marty -4.1 (L) -3.0 - 3.0 mmol/L    O2 Sat, Marty 78 Not Established %    Carboxyhemoglobin 2.6 (H) 0.0 - 1.5 %    MetHgb, Marty 0.3 <1.5 %    TC02 (Calc), Marty 22 Not Established mmol/L    O2 Content, Marty 14 Not Established VOL %    O2 Therapy Unknown    Brain Natriuretic Peptide   Result Value Ref Range    Pro-BNP 75 0 - 124 pg/mL   Procalcitonin   Result Value Ref Range    Procalcitonin 0.35 (H) 0.00 - 0.15 ng/mL   Urine Drug Screen   Result Value Ref Range    Amphetamine Screen, Urine POSITIVE (A) Negative <1000ng/mL    Barbiturate Screen, Ur Neg Negative <200 ng/mL    Benzodiazepine Screen, Urine POSITIVE (A) Negative <200 ng/mL    Cannabinoid Scrn, Ur Neg Negative <50 ng/mL    Cocaine Metabolite Screen, Urine Neg Negative <300 ng/mL    Opiate Scrn, Ur Neg Negative <300 ng/mL    PCP Screen, Urine Neg Negative <25 ng/mL    Methadone Screen, Urine Neg Negative <300 ng/mL    Propoxyphene Scrn, Ur Neg Negative <300 ng/mL    Oxycodone Urine POSITIVE (A) Negative <100 ng/ml    pH, UA 6.5     Drug Screen Comment: see below    Lipase   Result Value Ref Range    Lipase 48.0 13.0 - 60.0 U/L   Microscopic Urinalysis   Result Value Ref Range    WBC, UA None seen 0 - 5 /HPF    RBC, UA 0-2 0 - 4 /HPF   POCT Glucose   Result Value Ref Range    POC Glucose 115 (H) 70 - 99 mg/dl    Performed on ACCU-CHEK    EKG 12 Lead   Result Value Ref Range    Ventricular Rate 111 BPM    Atrial Rate 111 BPM    P-R Interval 116 ms    QRS Duration 64 ms    Q-T Interval 304 ms    QTc Calculation (Bazett) 413 ms    P Axis 67 degrees    R Axis -35 degrees    T Axis 65 degrees    Diagnosis       Sinus tachycardiaLeft axis deviationNo previous ECGs availableConfirmed by MAK MELO MD ([id number]) on 7/29/2020 2:44:53 PM         I have reviewed all labs for this visit. ECG  The Ekg interpreted by me shows  Sinus tachycardia with a rate of 111  Axis is left  QTc is normal  Intervals and Durations are unremarkable.       ST Segments: No ST elevation    RADIOLOGY  Ct Abdomen Pelvis Wo Contrast Additional Contrast? None    Result Date: 7/29/2020  EXAMINATION: CT OF THE ABDOMEN AND PELVIS WITHOUT CONTRAST 7/29/2020 1:31 pm TECHNIQUE: CT of the abdomen and pelvis was performed without the administration of intravenous contrast. Multiplanar reformatted images are provided for review. Dose modulation, iterative reconstruction, and/or weight based adjustment of the mA/kV was utilized to reduce the radiation dose to as low as reasonably achievable. COMPARISON: 03/31/2018 HISTORY: ORDERING SYSTEM PROVIDED HISTORY: elevatefd alk phos, ruq pain TECHNOLOGIST PROVIDED HISTORY: Additional Contrast?->None Reason for exam:->elevatefd alk phos, ruq pain Reason for Exam: abd pain Acuity: Acute Type of Exam: Initial Additional signs and symptoms: Per family patient has \"not been acting right\" since this morning. Pt had a temp of 100.6 upon arrival FINDINGS: Lower Chest: There is mild right basilar dependent atelectasis. The pleural spaces are clear. Organs: The liver, pancreas, kidneys and adrenals are unremarkable. There has been progressive enlargement of the spleen which now measures 14 cm in greatest dimension. Postop changes of cholecystectomy are present. GI/Bowel: There is no bowel dilatation, wall thickening or obstruction. Diverticular changes are scattered throughout the left hemicolon. Pelvis: The uterus is surgically absent. The bladder is decompressed and thus not evaluated. Peritoneum/Retroperitoneum: There is no free air, free fluid or intraperitoneal inflammatory change. Note is made of a retroaortic left renal vein. A few borderline enlarged lymph nodes are scattered throughout the yareli hepatis, celiac axis and right lower quadrant. The largest lymph node is centered anterior to the IVC and measures 3 cm in diameter. Bones/Soft Tissues: There is no fracture or aggressive osseous lesion. 1. No acute abdominopelvic adenopathy.  2. Splenomegaly with mild nonspecific abdominopelvic lymph node enlargement. Ct Head Wo Contrast    Result Date: 7/29/2020  EXAMINATION: CT OF THE HEAD WITHOUT CONTRAST  7/29/2020 1:31 pm TECHNIQUE: CT of the head was performed without the administration of intravenous contrast. Dose modulation, iterative reconstruction, and/or weight based adjustment of the mA/kV was utilized to reduce the radiation dose to as low as reasonably achievable. COMPARISON: 03/04/2018 HISTORY: ORDERING SYSTEM PROVIDED HISTORY: ams TECHNOLOGIST PROVIDED HISTORY: Has a \"code stroke\" or \"stroke alert\" been called? ->No Reason for exam:->ams Reason for Exam: Per family patient has \"not been acting right\" since this morning. Pt had a temp of 100.6 upon arrival Acuity: Unknown Type of Exam: Initial FINDINGS: BRAIN/VENTRICLES: There is no acute intracranial hemorrhage, mass effect or midline shift. No abnormal extra-axial fluid collection. The gray-white differentiation is maintained without evidence of an acute infarct. There is prominence of the ventricles and sulci due to global parenchymal volume loss. There are nonspecific areas of hypoattenuation within the periventricular and subcortical white matter, which likely represent chronic microvascular ischemic change. ORBITS: The visualized portion of the orbits demonstrate no acute abnormality. SINUSES: The visualized paranasal sinuses and mastoid air cells demonstrate no acute abnormality. SOFT TISSUES/SKULL: No acute abnormality of the visualized skull or soft tissues. No acute intracranial abnormality. Us Gallbladder Ruq    Result Date: 7/29/2020  EXAMINATION: RIGHT UPPER QUADRANT ULTRASOUND 7/29/2020 1:33 pm COMPARISON: None. HISTORY: ORDERING SYSTEM PROVIDED HISTORY: RUQ pain TECHNOLOGIST PROVIDED HISTORY: Reason for exam:->RUQ pain FINDINGS: LIVER:  The liver demonstrates normal echogenicity without evidence of intrahepatic biliary ductal dilatation.  BILIARY SYSTEM:  The gallbladder is surgically absent. Common bile duct is within normal limits measuring 10 mm. RIGHT KIDNEY: The right kidney is grossly unremarkable without evidence of hydronephrosis. PANCREAS:  Nonvisualized OTHER: No evidence of right upper quadrant ascites. Unremarkable right upper quadrant ultrasound. Xr Chest Portable    Result Date: 7/29/2020  EXAMINATION: ONE XRAY VIEW OF THE CHEST 7/29/2020 12:30 pm COMPARISON: Chest x-ray dated 12/19/2019 HISTORY: ORDERING SYSTEM PROVIDED HISTORY: cough TECHNOLOGIST PROVIDED HISTORY: Reason for exam:->cough Reason for Exam: cough Acuity: Acute Type of Exam: Initial FINDINGS: HEART/MEDIASTINUM: The cardiac silhouette appears enlarged. [employer] PLEURA/LUNGS: There is pulmonary vascular congestion/interstitial edema. There are no focal consolidations or pleural effusions. There is no appreciable pneumothorax. BONES/SOFT TISSUE: No acute abnormality. Pulmonary vascular congestion/interstitial edema. ED COURSE/MDM  Patient seen and evaluated. At presentation, patient was awake, alert, had tremors of bilateral upper extremities, normotensive, febrile to 100.6, tachycardic to 110, and satting well on room air. Patient was found to be coughing during the history taking. Differential diagnosis includes ischemic stroke, intracranial bleed, hypoglycemia, metabolic encephalopathy, sepsis, among others. Fingerstick glucose obtained which was 115. CBC, CMP, procalcitonin, blood cultures x2, troponin, BNP, urinalysis, urine culture, and chest x-ray obtained. CT head without and CTA head and neck also ordered due to family members concern for altered mental status. Patient was closely monitored on cardiac monitor. Labs remarkable for leukocytosis, elevated procalcitonin, elevated lactate, cr 1.7 and k 5.8. Due to the renal function, CT obtained without contrast.  Patient given 1L IVF bolus. UDS positive for amphetamines, benzodiazepines, and oxycodone. Alk phos elevated.   Given this, reassessed the patient and patient complained of right upper quadrant tenderness. Given this, CT abdomen pelvis without contrast and right upper quadrant ultrasound obtained which showed no acute pathology. CT showed no acute intracranial bleed. Patient is febrile, tachycardic, has elevated lactate, leukocytosis >12, and therefore meets sepsis criteria. Unable to determine source. Urinalysis negative. CXR showed pulmonary vascular congestion and no focal consolidation. Patient given zosyn for empiric treatment. Vanc not given in the ED as patient has no recent hospitalizations/MRSA risk factor and patient's renal function. Repeat lactate order placed to be obtained after ivf bolus. Medicine consulted for admission. Medicine recommended admission to ICU as patient's repeat SBP 90-100s. Discussed patient with ICU. Admit. I provided at least 30 minutes of critical care excluding separately billable procedures. During the patient's ED course, the patient was given:  Medications - No data to display     CLINICAL IMPRESSION  VIOLETA  Hyperkalemia  AMS, resolved    Blood pressure (!) 125/97, pulse 110, temperature 100.6 °F (38.1 °C), temperature source Oral, resp. rate 17, height 5' 1\" (1.549 m), weight 215 lb (97.5 kg), SpO2 100 %, not currently breastfeeding. Lila was admitted to the ICU. Patient was given scripts for the following medications. I counseled patient how to take these medications. New Prescriptions   No medications on file      Follow-up with:  No follow-up provider specified. DISCLAIMER: This chart was created using Dragon dictation software. Efforts were made by me to ensure accuracy, however some errors may be present due to limitations of this technology and occasionally words are not transcribed correctly.      Sam Elaine MD  07/30/20 [id number]

## 2020-07-29 NOTE — ED NOTES
PICC was called and made aware of order spoke to Timothy Baumann, PT-INR order was placed per [facility]. PICC Marleen Griffin) called and spoke with IMELDA GUERRA and requesting a nephrology consult.  Perfect serve Chris to [facility] to make aware of consult requesting per Dr. Ponce Black with hospitalist will placed the Nephrology consult,   [phone] to [facility] in ICU and she states she will make the consult to Wade Alexander  07/29/20 [address] [facility]  07/29/20 [facility]  07/29/20 [id number]

## 2020-07-30 LAB
A/G RATIO: 1.1 (ref 1.1–2.2)
ALBUMIN SERPL-MCNC: 3.7 G/DL (ref 3.4–5)
ALP BLD-CCNC: 178 U/L (ref 40–129)
ALT SERPL-CCNC: 24 U/L (ref 10–40)
ANION GAP SERPL CALCULATED.3IONS-SCNC: 11 MMOL/L (ref 3–16)
AST SERPL-CCNC: 32 U/L (ref 15–37)
BASOPHILS ABSOLUTE: 0.1 K/UL (ref 0–0.2)
BASOPHILS RELATIVE PERCENT: 1.2 %
BILIRUB SERPL-MCNC: 0.6 MG/DL (ref 0–1)
BUN BLDV-MCNC: 18 MG/DL (ref 7–20)
C-REACTIVE PROTEIN: 44.6 MG/L (ref 0–5.1)
C-REACTIVE PROTEIN: 70.4 MG/L (ref 0–5.1)
CALCIUM SERPL-MCNC: 8.8 MG/DL (ref 8.3–10.6)
CHLORIDE BLD-SCNC: 102 MMOL/L (ref 99–110)
CO2: 24 MMOL/L (ref 21–32)
CREAT SERPL-MCNC: 1.1 MG/DL (ref 0.6–1.2)
D DIMER: 639 NG/ML DDU (ref 0–229)
EOSINOPHILS ABSOLUTE: 0.7 K/UL (ref 0–0.6)
EOSINOPHILS RELATIVE PERCENT: 6.7 %
ESTIMATED AVERAGE GLUCOSE: 165.7 MG/DL
FERRITIN: 67.2 NG/ML (ref 15–150)
FERRITIN: 77.8 NG/ML (ref 15–150)
GFR AFRICAN AMERICAN: 60
GFR NON-AFRICAN AMERICAN: 49
GLOBULIN: 3.4 G/DL
GLUCOSE BLD-MCNC: 128 MG/DL (ref 70–99)
GLUCOSE BLD-MCNC: 131 MG/DL (ref 70–99)
GLUCOSE BLD-MCNC: 132 MG/DL (ref 70–99)
GLUCOSE BLD-MCNC: 134 MG/DL (ref 70–99)
GLUCOSE BLD-MCNC: 146 MG/DL (ref 70–99)
HBA1C MFR BLD: 7.4 %
HCT VFR BLD CALC: 30.9 % (ref 36–48)
HEMOGLOBIN: 10.1 G/DL (ref 12–16)
LACTATE DEHYDROGENASE: 185 U/L (ref 100–190)
LACTIC ACID: 1 MMOL/L (ref 0.4–2)
LYMPHOCYTES ABSOLUTE: 2.5 K/UL (ref 1–5.1)
LYMPHOCYTES RELATIVE PERCENT: 22.6 %
MAGNESIUM: 1.7 MG/DL (ref 1.8–2.4)
MCH RBC QN AUTO: 29.8 PG (ref 26–34)
MCHC RBC AUTO-ENTMCNC: 32.8 G/DL (ref 31–36)
MCV RBC AUTO: 90.8 FL (ref 80–100)
MONOCYTES ABSOLUTE: 0.5 K/UL (ref 0–1.3)
MONOCYTES RELATIVE PERCENT: 4.4 %
NEUTROPHILS ABSOLUTE: 7.3 K/UL (ref 1.7–7.7)
NEUTROPHILS RELATIVE PERCENT: 65.1 %
PDW BLD-RTO: 14.6 % (ref 12.4–15.4)
PERFORMED ON: ABNORMAL
PHOSPHORUS: 4.1 MG/DL (ref 2.5–4.9)
PLATELET # BLD: 299 K/UL (ref 135–450)
PMV BLD AUTO: 6.8 FL (ref 5–10.5)
POTASSIUM REFLEX MAGNESIUM: 4.1 MMOL/L (ref 3.5–5.1)
POTASSIUM SERPL-SCNC: 4.1 MMOL/L (ref 3.5–5.1)
PROTEIN PROTEIN: 60 MG/DL
RBC # BLD: 3.4 M/UL (ref 4–5.2)
SODIUM BLD-SCNC: 137 MMOL/L (ref 136–145)
TOTAL PROTEIN: 7.1 G/DL (ref 6.4–8.2)
TROPONIN: <0.01 NG/ML
TROPONIN: <0.01 NG/ML
WBC # BLD: 11.1 K/UL (ref 4–11)

## 2020-07-30 PROCEDURE — 80053 COMPREHEN METABOLIC PANEL: CPT

## 2020-07-30 PROCEDURE — 2500000003 HC RX 250 WO HCPCS: Performed by: INTERNAL MEDICINE

## 2020-07-30 PROCEDURE — 6360000002 HC RX W HCPCS: Performed by: NURSE PRACTITIONER

## 2020-07-30 PROCEDURE — 83615 LACTATE (LD) (LDH) ENZYME: CPT

## 2020-07-30 PROCEDURE — 83605 ASSAY OF LACTIC ACID: CPT

## 2020-07-30 PROCEDURE — 36556 INSERT NON-TUNNEL CV CATH: CPT

## 2020-07-30 PROCEDURE — 2580000003 HC RX 258: Performed by: NURSE PRACTITIONER

## 2020-07-30 PROCEDURE — 87449 NOS EACH ORGANISM AG IA: CPT

## 2020-07-30 PROCEDURE — 2580000003 HC RX 258: Performed by: INTERNAL MEDICINE

## 2020-07-30 PROCEDURE — 84100 ASSAY OF PHOSPHORUS: CPT

## 2020-07-30 PROCEDURE — 6370000000 HC RX 637 (ALT 250 FOR IP): Performed by: NURSE PRACTITIONER

## 2020-07-30 PROCEDURE — 76937 US GUIDE VASCULAR ACCESS: CPT

## 2020-07-30 PROCEDURE — 83735 ASSAY OF MAGNESIUM: CPT

## 2020-07-30 PROCEDURE — 6370000000 HC RX 637 (ALT 250 FOR IP): Performed by: INTERNAL MEDICINE

## 2020-07-30 PROCEDURE — 84484 ASSAY OF TROPONIN QUANT: CPT

## 2020-07-30 PROCEDURE — 85379 FIBRIN DEGRADATION QUANT: CPT

## 2020-07-30 PROCEDURE — 6360000002 HC RX W HCPCS: Performed by: INTERNAL MEDICINE

## 2020-07-30 PROCEDURE — 99233 SBSQ HOSP IP/OBS HIGH 50: CPT | Performed by: INTERNAL MEDICINE

## 2020-07-30 PROCEDURE — 36592 COLLECT BLOOD FROM PICC: CPT

## 2020-07-30 PROCEDURE — 86140 C-REACTIVE PROTEIN: CPT

## 2020-07-30 PROCEDURE — 85025 COMPLETE CBC W/AUTO DIFF WBC: CPT

## 2020-07-30 PROCEDURE — 1200000000 HC SEMI PRIVATE

## 2020-07-30 PROCEDURE — 82728 ASSAY OF FERRITIN: CPT

## 2020-07-30 PROCEDURE — 36415 COLL VENOUS BLD VENIPUNCTURE: CPT

## 2020-07-30 RX ORDER — SODIUM CHLORIDE 9 MG/ML
INJECTION, SOLUTION INTRAVENOUS CONTINUOUS
Status: DISCONTINUED | OUTPATIENT
Start: 2020-07-30 | End: 2020-07-30

## 2020-07-30 RX ORDER — QUETIAPINE FUMARATE 25 MG/1
50 TABLET, FILM COATED ORAL 2 TIMES DAILY
Status: DISCONTINUED | OUTPATIENT
Start: 2020-07-30 | End: 2020-08-01 | Stop reason: HOSPADM

## 2020-07-30 RX ORDER — MAGNESIUM SULFATE IN WATER 40 MG/ML
2 INJECTION, SOLUTION INTRAVENOUS ONCE
Status: COMPLETED | OUTPATIENT
Start: 2020-07-30 | End: 2020-07-30

## 2020-07-30 RX ADMIN — SODIUM CHLORIDE: 9 INJECTION, SOLUTION INTRAVENOUS at 09:37

## 2020-07-30 RX ADMIN — PANTOPRAZOLE SODIUM 40 MG: 40 TABLET, DELAYED RELEASE ORAL at 06:53

## 2020-07-30 RX ADMIN — Medication 10 ML: at 10:03

## 2020-07-30 RX ADMIN — APIXABAN 5 MG: 5 TABLET, FILM COATED ORAL at 20:26

## 2020-07-30 RX ADMIN — QUETIAPINE FUMARATE 50 MG: 25 TABLET ORAL at 10:11

## 2020-07-30 RX ADMIN — DICYCLOMINE HYDROCHLORIDE 10 MG: 10 CAPSULE ORAL at 17:31

## 2020-07-30 RX ADMIN — LEVOTHYROXINE SODIUM 50 MCG: 50 TABLET ORAL at 09:28

## 2020-07-30 RX ADMIN — DICYCLOMINE HYDROCHLORIDE 10 MG: 10 CAPSULE ORAL at 20:43

## 2020-07-30 RX ADMIN — METOPROLOL TARTRATE 50 MG: 50 TABLET, FILM COATED ORAL at 20:26

## 2020-07-30 RX ADMIN — CETIRIZINE HYDROCHLORIDE 10 MG: 10 TABLET ORAL at 09:28

## 2020-07-30 RX ADMIN — AMIODARONE HYDROCHLORIDE 200 MG: 200 TABLET ORAL at 09:28

## 2020-07-30 RX ADMIN — DICYCLOMINE HYDROCHLORIDE 10 MG: 10 CAPSULE ORAL at 09:28

## 2020-07-30 RX ADMIN — DOXYCYCLINE 100 MG: 100 INJECTION, POWDER, LYOPHILIZED, FOR SOLUTION INTRAVENOUS at 09:31

## 2020-07-30 RX ADMIN — PIPERACILLIN SODIUM AND TAZOBACTAM SODIUM 3.38 G: 3; .375 INJECTION, POWDER, LYOPHILIZED, FOR SOLUTION INTRAVENOUS at 06:55

## 2020-07-30 RX ADMIN — ACETAMINOPHEN 650 MG: 325 TABLET ORAL at 06:53

## 2020-07-30 RX ADMIN — Medication 10 ML: at 09:29

## 2020-07-30 RX ADMIN — MUPIROCIN: 20 OINTMENT TOPICAL at 09:28

## 2020-07-30 RX ADMIN — DICYCLOMINE HYDROCHLORIDE 10 MG: 10 CAPSULE ORAL at 06:53

## 2020-07-30 RX ADMIN — METOPROLOL TARTRATE 50 MG: 50 TABLET, FILM COATED ORAL at 09:28

## 2020-07-30 RX ADMIN — MAGNESIUM SULFATE 2 G: 2 INJECTION INTRAVENOUS at 09:30

## 2020-07-30 RX ADMIN — DOXYCYCLINE 100 MG: 100 INJECTION, POWDER, LYOPHILIZED, FOR SOLUTION INTRAVENOUS at 20:44

## 2020-07-30 RX ADMIN — PANTOPRAZOLE SODIUM 40 MG: 40 TABLET, DELAYED RELEASE ORAL at 17:38

## 2020-07-30 RX ADMIN — APIXABAN 5 MG: 5 TABLET, FILM COATED ORAL at 09:28

## 2020-07-30 RX ADMIN — CEFTRIAXONE SODIUM 1 G: 1 INJECTION, POWDER, FOR SOLUTION INTRAMUSCULAR; INTRAVENOUS at 09:52

## 2020-07-30 RX ADMIN — INSULIN LISPRO 1 UNITS: 100 INJECTION, SOLUTION INTRAVENOUS; SUBCUTANEOUS at 10:12

## 2020-07-30 RX ADMIN — MUPIROCIN: 20 OINTMENT TOPICAL at 20:28

## 2020-07-30 RX ADMIN — QUETIAPINE FUMARATE 50 MG: 25 TABLET ORAL at 20:25

## 2020-07-30 RX ADMIN — Medication 10 ML: at 20:29

## 2020-07-30 RX ADMIN — TAMSULOSIN HYDROCHLORIDE 0.4 MG: 0.4 CAPSULE ORAL at 09:28

## 2020-07-30 ASSESSMENT — PAIN SCALES - GENERAL
PAINLEVEL_OUTOF10: 3
PAINLEVEL_OUTOF10: 0

## 2020-07-30 NOTE — PROGRESS NOTES
Shift assessment, completed, see flow sheet. Pt is alert and oriented x 4. Following commands. NSR 79, /58, SpO2 98%. Respirations are easy, even, and unlabored. Bilateral lung sounds clear/diminished. CVC/PIV, WNL with NS, Mg, rocephin, and doxycycline infusing. Maribel Cali in place and connected to LWS. Call light within reach. Bed in lowest position. Bed alarm on.  Will continue to monitor

## 2020-07-30 NOTE — PROGRESS NOTES
Speech Language Pathology  Evaluation Attempt  Discussed with RN/MD. Requests hold at this time until COVID rule out is discontinued. Patient with previous dysphagia tx in 11/7/2019. Recommendation at that time was dental soft with thin liquids. Per RN, patient showing No s/s of penetration/aspiration with thin liquids. Will continue to follow and assess when droplet plus isolation has been lifted. Thank you. Joyce RIOS  [id number] [facility]  Speech-Language Pathologist [id number]

## 2020-07-30 NOTE — PLAN OF CARE
Problem: Discharge Planning:  Goal: Participates in care planning  Description: Participates in care planning  Outcome: Ongoing     Problem: Fluid Volume - Imbalance:  Goal: Absence of imbalanced fluid volume signs and symptoms  Description: Absence of imbalanced fluid volume signs and symptoms  Outcome: Ongoing     Problem: Mental Status - Impaired:  Goal: Mental status will be restored to baseline  Description: Mental status will be restored to baseline  Outcome: Ongoing     Problem: Serum Glucose Level - Abnormal:  Goal: Ability to maintain appropriate glucose levels will improve to within specified parameters  Description: Ability to maintain appropriate glucose levels will improve to within specified parameters  Outcome: Ongoing     Problem: Sleep Pattern Disturbance:  Goal: Appears well-rested  Description: Appears well-rested  Outcome: Ongoing
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
## 2020-07-30 NOTE — PROGRESS NOTES
Pulmonary Progress Note    CC: Severe Sepsis     Subjective:   RA  Bicarb drip   No pressors   No fever   Feels better         Intake/Output Summary (Last 24 hours) at 7/30/2020 0705  Last data filed at 7/30/2020 0704  Gross per 24 hour   Intake 1447 ml   Output 700 ml   Net 747 ml       Exam:   BP (!) 133/50   Pulse 93   Temp 98 °F (36.7 °C) (Oral)   Resp 22   Ht 5' 1\" (1.549 m)   Wt 215 lb (97.5 kg)   SpO2 92%   BMI 40.62 kg/m²  on RA  Gen: + Distress. Ill-appearing  Eyes: PERRL. No sclera icterus. No conjunctival injection. ENT: No discharge. Pharynx clear. Neck: Trachea midline. No obvious mass. Resp: + Accessory muscle use. L crackles. No wheezes. No rhonchi. No dullness on percussion. CV: Tachycardic. Regular rhythm. No murmur or rub. No edema. GI: Non-tender. Non-distended. No hernia. Skin: Warm and dry. No nodule on exposed extremities. Lymph: No cervical LAD. No supraclavicular LAD. M/S: No cyanosis. No joint deformity. No clubbing. Neuro: Somewhat lethargic. Moves all four extremities. Psych: Oriented x 3.  No anxiety.        Scheduled Meds:   budesonide-formoterol  2 puff Inhalation BID    amiodarone  200 mg Oral Daily    apixaban  5 mg Oral BID    cetirizine  10 mg Oral Daily    dicyclomine  10 mg Oral 4x Daily AC & HS    fluticasone  1 spray Nasal Daily    levothyroxine  50 mcg Oral Daily    metoprolol tartrate  50 mg Oral BID    pantoprazole  40 mg Oral BID AC    tamsulosin  0.4 mg Oral Daily    sodium chloride flush  10 mL Intravenous 2 times per day    lidocaine 1 % injection  5 mL Intradermal Once    sodium chloride flush  10 mL Intravenous 2 times per day    sodium chloride  1,000 mL Intravenous Once    mupirocin   Nasal BID    piperacillin-tazobactam (ZOSYN) 3.375 g in dextrose 5% IVPB (mini-bag)  3.375 g Intravenous Q8H    insulin lispro  0-6 Units Subcutaneous TID WC    insulin lispro  0-3 Units Subcutaneous Nightly     Continuous Infusions:   norepinephrine      IV infusion builder 100 mL/hr at 07/29/20 1958    dextrose       PRN Meds:  sodium chloride flush, acetaminophen **OR** acetaminophen, polyethylene glycol, promethazine **OR** ondansetron, sodium chloride flush, sodium chloride, glucose, dextrose, glucagon (rDNA), dextrose    Labs:  CBC:   Recent Labs     07/29/20  1216   WBC 17.5*   HGB 12.1   HCT 38.1   MCV 93.2        BMP:   Recent Labs     07/29/20  1216 07/29/20  1545 07/29/20 2005    135* 138   K 5.8* 5.7* 5.0    102 105   CO2 21 21 23   BUN 22* 23* 23*   CREATININE 1.7* 1.4* 1.3*     LIVER PROFILE:   Recent Labs     07/29/20  1216   AST 39*   ALT 29   LIPASE 48.0   BILITOT 0.4   ALKPHOS 220*     PT/INR:   Recent Labs     07/29/20  1216   PROTIME 11.7   INR 1.01     APTT: No results for input(s): APTT in the last 72 hours. UA:  Recent Labs     07/29/20  1320   COLORU Yellow   PHUR 6.5  6.5   WBCUA None seen   RBCUA 0-2   CLARITYU Clear   SPECGRAV 1.020   LEUKOCYTESUR Negative   UROBILINOGEN 0.2   BILIRUBINUR Negative   BLOODU TRACE-INTACT*   GLUCOSEU 100*     No results for input(s): PHART, KNQ8UGX, PO2ART in the last 72 hours.   Cultures:   7/29 BCx     Films:  Chest x-ray 7/29 imaging was reviewed by me and showed   Left-sided airspace disease     CT abdomen 7/29  Splenomegaly with mild nonspecific abdominopelvic lymph node enlargement        CT head 7/29/2020 no acute intracranial abnormalities     ASSESSMENT:  · Severe sepsis-source probably respiratory  · Acute encephalopathy/metabolic encephalopathy  · Fever  · Left-sided community-acquired pneumonia  · Rule out COVID 19 viral infection  · Hyperkalemia and Electrolytes disorder   · Acute kidney injury  · Anion gap metabolic acidosis  · Lactic acidosis  · PAF on Eliquis   · Abnormal CT abdomen 7/29/2020        PLAN:  · Supplemental oxygen to maintain SaO2 >92%; wean as tolerated  · D/C Bicarb   · Maintenance IVF NS at 75 cc/hr  · Broad spectrum antibiotics to include Ceftriaxone and Doxy   · Follow up Cx   · Droplet plus isolation (surgical mask, eye protection, gown, glove)  · Emergent Covid 19 testing  · Blood sugar control, with goal 140-180  · Electrolytes replacement   · Seroquel half dose   · DVT prophylaxis: Home Eliquis   · MRSA prophylaxis: Bactroban  · Code status: Full code   · Okay to move out of the ICU from our perspective
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
## 2020-07-30 NOTE — CARE COORDINATION
Case Management Assessment  Initial Evaluation    Date/Time of Evaluation: 7/30/2020 11:06 AM  Assessment Completed by: Ronna Copeland    Patient Name: Kurt Hahn  YOB: 1950  Diagnosis: Altered mental status [R41.82]  Date / Time: 7/29/2020 11:51 AM  Admission status/Date:07/29/20 Chart Reviewed: Yes      Patient Interviewed: No   Family Interviewed:  Yes - Dtr Vasyl Perez)       Hospitalization in the last 30 days:  No    Contacts  :     Relationship to Patient:   Phone Number:    Alternate Contact:     Relationship to Patient:     Phone Number:    Met with:    Current PCP [address] required for SNF : Y,      3 night stay required -  [employer]  Support Systems:    Transportation: self    Meal Preparation: self/MOW  [address] Environment: house  Steps: 9  Plans to Return to Present Housing: Yes  Other Identified Issues: Antoine Becker  Currently active with [address] : No     Passport/Waiver : Yes Martin Babb [phone]  :                      Phone Number:    Passport/Waiver Services: [facility] 2 hrs day 7 days week          Durable Medical Equipment   DME Provider: n/a  Equipment: n/aWalker_c__Cane___RTS___ BSC__c_Shower Chair__c_  02__ at ____Liter(s)---Uses________HHN___ CPAP___ BiPap___ Hospital Bed___W/C_x___Other________      Has Home O2 in place on admit:  No    If above answer is No ---is pt presently on O2 during hospitalization:  Yes  if yes CM to follow for potential DC O2 need  Informed of need to bring portable home O2 tank on day of discharge for nursing to connect prior to leaving:   Not Indicated  Verbalized agreement/Understanding:   Not Indicated    Community Service Affiliation  Dialysis:  No    · Name:  · Location  · Dialysis Schedule:  · Phone:   · Fax:     Outpatient PT/OT: No    Cancer Center: No     CHF Clinic: No     Pulmonary Rehab: No  Pain Clinic: No  Community Mental Health: No    Wound Clinic: No     COVID SCREENING: Yes - prnfinh       Other: n/a    The Plan for Transition of Care is related to the following treatment goals: return home with services      DISCHARGE PLAN: Chart reviewed. In Covid precautions. Called and spoke with dtr Cynthia Nunez) high functioning [facility]. (She is active with [facility] [phone] after hours [phone]. For changes in status or emergent situations that would affect care of Pt's dtr.) Called and updated (Poonam) [facility] through emergency line. Pt has Passport services [facility] 2 hrs a day 7 days a week. Has been active with [facility]PS in the past Brian Pineda). Home per squad in past. CM to follow. Pt/ot when appropriate. Had used [facility] in past and would like them to follow [phone]  Fax [phone]. Ref to agency. Call from [facility] stating Pt was seen at [facility] address when last active with her and agency doesn't  go to [place]. Will need to f/u with Pt on [address]  agency choice when appropriate. Explained Case Management role/services.

## 2020-07-30 NOTE — PROGRESS NOTES
Dr. Lalitha Buckley @ bedside assessing pt for interdisciplinary rounds. All labs lines assessment POC, diet, labs,tmax, reviewed. Per MD pt ok to have dental soft diet, troponin, lactic to be d/c, pt OK to transfer [facility] , pt bicarb gtt to be d/c and pt to start NS, Mg to be replaced, zosyn d/c and pt to start doxy and ceftriaxone, see new orders.

## 2020-07-31 LAB
ANION GAP SERPL CALCULATED.3IONS-SCNC: 12 MMOL/L (ref 3–16)
BASOPHILS ABSOLUTE: 0.1 K/UL (ref 0–0.2)
BASOPHILS RELATIVE PERCENT: 0.9 %
BUN BLDV-MCNC: 17 MG/DL (ref 7–20)
CALCIUM SERPL-MCNC: 9.5 MG/DL (ref 8.3–10.6)
CHLORIDE BLD-SCNC: 100 MMOL/L (ref 99–110)
CO2: 23 MMOL/L (ref 21–32)
CREAT SERPL-MCNC: 0.9 MG/DL (ref 0.6–1.2)
EOSINOPHILS ABSOLUTE: 1.1 K/UL (ref 0–0.6)
EOSINOPHILS RELATIVE PERCENT: 10 %
GFR AFRICAN AMERICAN: >60
GFR NON-AFRICAN AMERICAN: >60
GLUCOSE BLD-MCNC: 109 MG/DL (ref 70–99)
GLUCOSE BLD-MCNC: 125 MG/DL (ref 70–99)
GLUCOSE BLD-MCNC: 130 MG/DL (ref 70–99)
GLUCOSE BLD-MCNC: 133 MG/DL (ref 70–99)
GLUCOSE BLD-MCNC: 228 MG/DL (ref 70–99)
HCT VFR BLD CALC: 32.2 % (ref 36–48)
HEMOGLOBIN: 10.6 G/DL (ref 12–16)
L. PNEUMOPHILA SEROGP 1 UR AG: NORMAL
LYMPHOCYTES ABSOLUTE: 2.6 K/UL (ref 1–5.1)
LYMPHOCYTES RELATIVE PERCENT: 22.8 %
MAGNESIUM: 2 MG/DL (ref 1.8–2.4)
MCH RBC QN AUTO: 29.8 PG (ref 26–34)
MCHC RBC AUTO-ENTMCNC: 32.9 G/DL (ref 31–36)
MCV RBC AUTO: 90.7 FL (ref 80–100)
MONOCYTES ABSOLUTE: 0.7 K/UL (ref 0–1.3)
MONOCYTES RELATIVE PERCENT: 6.4 %
NEUTROPHILS ABSOLUTE: 6.7 K/UL (ref 1.7–7.7)
NEUTROPHILS RELATIVE PERCENT: 59.9 %
PDW BLD-RTO: 14.2 % (ref 12.4–15.4)
PERFORMED ON: ABNORMAL
PHOSPHORUS: 3.7 MG/DL (ref 2.5–4.9)
PLATELET # BLD: 333 K/UL (ref 135–450)
PMV BLD AUTO: 7.2 FL (ref 5–10.5)
POTASSIUM SERPL-SCNC: 4.1 MMOL/L (ref 3.5–5.1)
RBC # BLD: 3.55 M/UL (ref 4–5.2)
REPORT: NORMAL
SARS-COV-2: NOT DETECTED
SODIUM BLD-SCNC: 135 MMOL/L (ref 136–145)
STREP PNEUMONIAE ANTIGEN, URINE: NORMAL
THIS TEST SENT TO: NORMAL
WBC # BLD: 11.2 K/UL (ref 4–11)

## 2020-07-31 PROCEDURE — 83735 ASSAY OF MAGNESIUM: CPT

## 2020-07-31 PROCEDURE — 6370000000 HC RX 637 (ALT 250 FOR IP): Performed by: INTERNAL MEDICINE

## 2020-07-31 PROCEDURE — 2500000003 HC RX 250 WO HCPCS: Performed by: INTERNAL MEDICINE

## 2020-07-31 PROCEDURE — 36592 COLLECT BLOOD FROM PICC: CPT

## 2020-07-31 PROCEDURE — 85025 COMPLETE CBC W/AUTO DIFF WBC: CPT

## 2020-07-31 PROCEDURE — 2580000003 HC RX 258: Performed by: INTERNAL MEDICINE

## 2020-07-31 PROCEDURE — 6370000000 HC RX 637 (ALT 250 FOR IP): Performed by: NURSE PRACTITIONER

## 2020-07-31 PROCEDURE — 1200000000 HC SEMI PRIVATE

## 2020-07-31 PROCEDURE — 6370000000 HC RX 637 (ALT 250 FOR IP): Performed by: HOSPITALIST

## 2020-07-31 PROCEDURE — 99233 SBSQ HOSP IP/OBS HIGH 50: CPT | Performed by: INTERNAL MEDICINE

## 2020-07-31 PROCEDURE — 92610 EVALUATE SWALLOWING FUNCTION: CPT

## 2020-07-31 PROCEDURE — 84100 ASSAY OF PHOSPHORUS: CPT

## 2020-07-31 PROCEDURE — 6360000002 HC RX W HCPCS: Performed by: INTERNAL MEDICINE

## 2020-07-31 PROCEDURE — 94640 AIRWAY INHALATION TREATMENT: CPT

## 2020-07-31 PROCEDURE — 94761 N-INVAS EAR/PLS OXIMETRY MLT: CPT

## 2020-07-31 PROCEDURE — 80048 BASIC METABOLIC PNL TOTAL CA: CPT

## 2020-07-31 RX ADMIN — INSULIN LISPRO 2 UNITS: 100 INJECTION, SOLUTION INTRAVENOUS; SUBCUTANEOUS at 12:11

## 2020-07-31 RX ADMIN — APIXABAN 5 MG: 5 TABLET, FILM COATED ORAL at 08:23

## 2020-07-31 RX ADMIN — METOPROLOL TARTRATE 50 MG: 50 TABLET, FILM COATED ORAL at 08:23

## 2020-07-31 RX ADMIN — PANTOPRAZOLE SODIUM 40 MG: 40 TABLET, DELAYED RELEASE ORAL at 06:40

## 2020-07-31 RX ADMIN — DICYCLOMINE HYDROCHLORIDE 10 MG: 10 CAPSULE ORAL at 20:59

## 2020-07-31 RX ADMIN — FLUTICASONE PROPIONATE 1 SPRAY: 50 SPRAY, METERED NASAL at 08:24

## 2020-07-31 RX ADMIN — DICYCLOMINE HYDROCHLORIDE 10 MG: 10 CAPSULE ORAL at 16:15

## 2020-07-31 RX ADMIN — APIXABAN 5 MG: 5 TABLET, FILM COATED ORAL at 20:59

## 2020-07-31 RX ADMIN — DICYCLOMINE HYDROCHLORIDE 10 MG: 10 CAPSULE ORAL at 06:40

## 2020-07-31 RX ADMIN — Medication 2 PUFF: at 20:13

## 2020-07-31 RX ADMIN — TAMSULOSIN HYDROCHLORIDE 0.4 MG: 0.4 CAPSULE ORAL at 08:23

## 2020-07-31 RX ADMIN — Medication 10 ML: at 21:05

## 2020-07-31 RX ADMIN — DICYCLOMINE HYDROCHLORIDE 10 MG: 10 CAPSULE ORAL at 11:12

## 2020-07-31 RX ADMIN — Medication 10 ML: at 20:59

## 2020-07-31 RX ADMIN — CETIRIZINE HYDROCHLORIDE 10 MG: 10 TABLET ORAL at 08:23

## 2020-07-31 RX ADMIN — LEVOTHYROXINE SODIUM 50 MCG: 50 TABLET ORAL at 08:23

## 2020-07-31 RX ADMIN — QUETIAPINE FUMARATE 50 MG: 25 TABLET ORAL at 08:23

## 2020-07-31 RX ADMIN — AMIODARONE HYDROCHLORIDE 200 MG: 200 TABLET ORAL at 08:23

## 2020-07-31 RX ADMIN — METOPROLOL TARTRATE 50 MG: 50 TABLET, FILM COATED ORAL at 20:59

## 2020-07-31 RX ADMIN — DOXYCYCLINE 100 MG: 100 INJECTION, POWDER, LYOPHILIZED, FOR SOLUTION INTRAVENOUS at 20:59

## 2020-07-31 RX ADMIN — DOXYCYCLINE 100 MG: 100 INJECTION, POWDER, LYOPHILIZED, FOR SOLUTION INTRAVENOUS at 11:11

## 2020-07-31 RX ADMIN — Medication 10 ML: at 21:06

## 2020-07-31 RX ADMIN — CEFTRIAXONE SODIUM 1 G: 1 INJECTION, POWDER, FOR SOLUTION INTRAMUSCULAR; INTRAVENOUS at 11:11

## 2020-07-31 RX ADMIN — ACETAMINOPHEN 650 MG: 325 TABLET ORAL at 04:31

## 2020-07-31 RX ADMIN — Medication 10 ML: at 08:24

## 2020-07-31 RX ADMIN — PANTOPRAZOLE SODIUM 40 MG: 40 TABLET, DELAYED RELEASE ORAL at 16:15

## 2020-07-31 RX ADMIN — QUETIAPINE FUMARATE 50 MG: 25 TABLET ORAL at 20:59

## 2020-07-31 ASSESSMENT — PAIN DESCRIPTION - LOCATION: LOCATION: HEAD

## 2020-07-31 ASSESSMENT — PAIN SCALES - GENERAL
PAINLEVEL_OUTOF10: 0
PAINLEVEL_OUTOF10: 5
PAINLEVEL_OUTOF10: 0

## 2020-07-31 NOTE — PROGRESS NOTES
Pulmonary Progress Note    CC: Severe Sepsis     Subjective:   RA  Off IVF             Intake/Output Summary (Last 24 hours) at 7/31/2020 0751  Last data filed at 7/31/2020 5351  Gross per 24 hour   Intake 1559 ml   Output 2850 ml   Net -1291 ml       Exam:   BP (!) 122/56   Pulse 70   Temp 98 °F (36.7 °C) (Axillary)   Resp 22   Ht 5' 1\" (1.549 m)   Wt 200 lb 8 oz (90.9 kg)   SpO2 92%   BMI 37.88 kg/m²  on RA  Gen: No distress. Ill-appearing  Eyes: PERRL. No sclera icterus. No conjunctival injection. ENT: No discharge. Pharynx clear. Neck: Trachea midline. No obvious mass. Resp: No accessory muscle use. L crackles. No wheezes. No rhonchi. No dullness on percussion. CV: Tachycardic. Regular rhythm. No murmur or rub. No edema. GI: Non-tender. Non-distended. No hernia. Skin: Warm and dry. No nodule on exposed extremities. Lymph: No cervical LAD. No supraclavicular LAD. M/S: No cyanosis. No joint deformity. No clubbing. Neuro: AAOx3. Followed commands. Moves all four extremities. Psych: No agitation.  No anxiety.        Scheduled Meds:   cefTRIAXone (ROCEPHIN) IV  1 g Intravenous Q24H    doxycycline (VIBRAMYCIN) IV  100 mg Intravenous Q12H    QUEtiapine  50 mg Oral BID    budesonide-formoterol  2 puff Inhalation BID    amiodarone  200 mg Oral Daily    apixaban  5 mg Oral BID    cetirizine  10 mg Oral Daily    dicyclomine  10 mg Oral 4x Daily AC & HS    fluticasone  1 spray Nasal Daily    levothyroxine  50 mcg Oral Daily    metoprolol tartrate  50 mg Oral BID    pantoprazole  40 mg Oral BID AC    tamsulosin  0.4 mg Oral Daily    sodium chloride flush  10 mL Intravenous 2 times per day    lidocaine 1 % injection  5 mL Intradermal Once    sodium chloride flush  10 mL Intravenous 2 times per day    sodium chloride  1,000 mL Intravenous Once    mupirocin   Nasal BID    insulin lispro  0-6 Units Subcutaneous TID WC    insulin lispro  0-3 Units Subcutaneous Nightly     Continuous Infusions:   dextrose       PRN Meds:  sodium chloride flush, acetaminophen **OR** acetaminophen, polyethylene glycol, promethazine **OR** ondansetron, sodium chloride flush, sodium chloride, glucose, dextrose, glucagon (rDNA), dextrose    Labs:  CBC:   Recent Labs     07/29/20 1216 07/30/20  0700 07/31/20  0410   WBC 17.5* 11.1* 11.2*   HGB 12.1 10.1* 10.6*   HCT 38.1 30.9* 32.2*   MCV 93.2 90.8 90.7    299 333     BMP:   Recent Labs     07/29/20 2005 07/30/20  0700 07/31/20  0410    137 135*   K 5.0 4.1  4.1 4.1    102 100   CO2 23 24 23   PHOS  --  4.1 3.7   BUN 23* 18 17   CREATININE 1.3* 1.1 0.9     LIVER PROFILE:   Recent Labs     07/29/20 1216 07/30/20  0700   AST 39* 32   ALT 29 24   LIPASE 48.0  --    BILITOT 0.4 0.6   ALKPHOS 220* 178*     PT/INR:   Recent Labs     07/29/20  1216   PROTIME 11.7   INR 1.01     APTT: No results for input(s): APTT in the last 72 hours. UA:  Recent Labs     07/29/20  1320   COLORU Yellow   PHUR 6.5  6.5   WBCUA None seen   RBCUA 0-2   CLARITYU Clear   SPECGRAV 1.020   LEUKOCYTESUR Negative   UROBILINOGEN 0.2   BILIRUBINUR Negative   BLOODU TRACE-INTACT*   GLUCOSEU 100*     No results for input(s): PHART, WWX1BEP, PO2ART in the last 72 hours.   Cultures:   7/29 BCx NGTD   7/30 Legionella/Strep Negative       Films:  Chest x-ray 7/29 imaging was reviewed by me and showed   Left-sided airspace disease     CT abdomen 7/29  Splenomegaly with mild nonspecific abdominopelvic lymph node enlargement        CT head 7/29/2020 no acute intracranial abnormalities     ASSESSMENT:  · Severe sepsis-source probably respiratory  · Acute encephalopathy/metabolic encephalopathy  · Fever  · Left-sided community-acquired pneumonia  · Rule out COVID 19 viral infection  · Hyperkalemia and Electrolytes disorder   · Acute kidney injury  · Anion gap metabolic acidosis  · Lactic acidosis  · PAF on Eliquis   · Abnormal CT abdomen 7/29/2020        PLAN:  · Supplemental oxygen to maintain SaO2 >92%; wean as tolerated  · Broad spectrum antibiotics to include Ceftriaxone and Doxy D#2  · Follow up Cx   · Blood sugar control, with goal 140-180  · Electrolytes replacement   · Seroquel half dose   · DVT prophylaxis: Home Eliquis   · MRSA prophylaxis: Bactroban  · Code status: Full code   · Okay to move out of the ICU from our perspective

## 2020-07-31 NOTE — PROGRESS NOTES
Hospitalist Progress Note      PCP: Chris Shen DO    Date of Admission: 7/29/2020    Subjective: off O2, not on pressors    Medications:  Reviewed    Infusion Medications    dextrose       Scheduled Medications    cefTRIAXone (ROCEPHIN) IV  1 g Intravenous Q24H    doxycycline (VIBRAMYCIN) IV  100 mg Intravenous Q12H    QUEtiapine  50 mg Oral BID    budesonide-formoterol  2 puff Inhalation BID    amiodarone  200 mg Oral Daily    apixaban  5 mg Oral BID    cetirizine  10 mg Oral Daily    dicyclomine  10 mg Oral 4x Daily AC & HS    fluticasone  1 spray Nasal Daily    levothyroxine  50 mcg Oral Daily    metoprolol tartrate  50 mg Oral BID    pantoprazole  40 mg Oral BID AC    tamsulosin  0.4 mg Oral Daily    sodium chloride flush  10 mL Intravenous 2 times per day    lidocaine 1 % injection  5 mL Intradermal Once    sodium chloride flush  10 mL Intravenous 2 times per day    sodium chloride  1,000 mL Intravenous Once    mupirocin   Nasal BID    insulin lispro  0-6 Units Subcutaneous TID WC    insulin lispro  0-3 Units Subcutaneous Nightly     PRN Meds: sodium chloride flush, acetaminophen **OR** acetaminophen, polyethylene glycol, promethazine **OR** ondansetron, sodium chloride flush, sodium chloride, glucose, dextrose, glucagon (rDNA), dextrose      Intake/Output Summary (Last 24 hours) at 7/30/2020 2138  Last data filed at 7/30/2020 2029  Gross per 24 hour   Intake 2591 ml   Output 3650 ml   Net -1059 ml       Physical Exam Performed:    BP (!) 124/108   Pulse 81   Temp 98.2 °F (36.8 °C) (Oral)   Resp 25   Ht 5' 1\" (1.549 m)   Wt 194 lb 5 oz (88.1 kg)   SpO2 94%   BMI 36.72 kg/m²     General appearance:  No apparent distress, appears stated age and cooperative. HEENT:  Normal cephalic, atraumatic without obvious deformity. Pupils equal, round, and reactive to light. Extra ocular muscles intact. Conjunctivae/corneas clear. Neck: Supple, with full range of motion.  No jugular venous distention. Trachea midline. Respiratory:  Normal respiratory effort. Clear to auscultation, bilaterally without Rales/Wheezes/Rhonchi. Cardiovascular:  Regular rate and rhythm with normal S1/S2 without murmurs, rubs or gallops. Abdomen: Soft, non-tender, non-distended with normal bowel sounds. Musculoskeletal:  No clubbing, cyanosis or edema bilaterally. Full range of motion without deformity. Skin: Skin color, texture, turgor normal.  No rashes or lesions. Neurologic:  Neurovascularly intact without any focal sensory/motor deficits. Cranial nerves: II-XII intact, grossly non-focal.  Psychiatric:  Alert , thought content appropriate, normal insight  Capillary Refill: Brisk,< 3 seconds   Peripheral Pulses: +2 palpable, equal bilaterally        Labs:   Recent Labs     07/29/20  1216 07/30/20  0700   WBC 17.5* 11.1*   HGB 12.1 10.1*   HCT 38.1 30.9*    299     Recent Labs     07/29/20  1545 07/29/20 2005 07/30/20  0700   * 138 137   K 5.7* 5.0 4.1  4.1    105 102   CO2 21 23 24   BUN 23* 23* 18   CREATININE 1.4* 1.3* 1.1   CALCIUM 9.0 8.8 8.8   PHOS  --   --  4.1     Recent Labs     07/29/20  1216 07/30/20  0700   AST 39* 32   ALT 29 24   BILITOT 0.4 0.6   ALKPHOS 220* 178*     Recent Labs     07/29/20  1216   INR 1.01     Recent Labs     07/29/20 2005 07/30/20  0145 07/30/20  0700   TROPONINI <0.01 <0.01 <0.01       Urinalysis:      Lab Results   Component Value Date    NITRU Negative 07/29/2020    WBCUA None seen 07/29/2020    BACTERIA 2+ 11/10/2019    RBCUA 0-2 07/29/2020    BLOODU TRACE-INTACT 07/29/2020    SPECGRAV 1.020 07/29/2020    GLUCOSEU 100 07/29/2020    GLUCOSEU NEGATIVE 03/05/2011       Radiology:  IR PICC WO SQ PORT/PUMP > 5 YEARS   Final Result   Successful placement of JACC line. US GALLBLADDER RUQ   Final Result   Unremarkable right upper quadrant ultrasound. CT ABDOMEN PELVIS WO CONTRAST Additional Contrast? None   Final Result   1.  No acute abdominopelvic adenopathy. 2. Splenomegaly with mild nonspecific abdominopelvic lymph node enlargement. CT HEAD WO CONTRAST   Final Result   No acute intracranial abnormality. XR CHEST PORTABLE   Final Result   Pulmonary vascular congestion/interstitial edema. IR FLUORO GUIDED CVA DEVICE PLMT/REPLACE/REMOVAL    (Results Pending)           Assessment/Plan:    Active Hospital Problems    Diagnosis    Altered mental status [R41.82]    Severe sepsis ([address] [A41.9, R65.20]    Community acquired pneumonia of left lower lobe of lung ([address] [J18.1]    Acute kidney injury (VIOLETA) with acute tubular necrosis (ATN) (HCC) [N17.0]    Lactic acidosis [E87.2]         1) Severe sepsis/ Left sided pneumonia  - probable gram neg PNA, r/o Covid pending  - continue rocephin/doxy  - follow up blood cultures  - trend lactic acid     2) VIOLETA  - treated with IVF, creat improved from 1.7-->1.3-->1.1  - likely prerenal   - Nephro consulted    3) Hyperkalemia - resolved       Hypomag - replete     4) dCHF  - compensated     5) DM II  - hold metformin 2/2 lactic acidosis  - corrective ISS     6) Acute metabolic encephalopathy  - likely toxic, improved.  Has h/o VILLEGAS, check ammonia 26     7) par Afib  - continue amiodarone, eliquis         DVT Prophylaxis: eliquis  Diet: DIET DENTAL SOFT; Carb Control: 4 carb choices (60 gms)/meal; Low Sodium (2 GM)  Code Status: Full Code    PT/OT Eval Status: ordered    Dispo - transfer out of Nicolás Beasley MD

## 2020-07-31 NOTE — PROGRESS NOTES
Shift assessment completed, see flowsheets. Patient is A/O x 4. AM medications given per orders. Patient denies pain or further needs at this time. Patient currently awake in bed, call light and personal belongings within reach. Patient educated on use of call light and to call out with needs, verbalized understanding.

## 2020-07-31 NOTE — PROGRESS NOTES
4 Eyes Skin Assessment     The patient is being assess for   Shift Handoff  To Sharath Joyce RN  Re:  Respiratory Management. I agree that 2 RN's have performed a thorough Head to Toe Skin Assessment on the patient. ALL assessment sites listed below have been assessed. Areas assessed by both nurses:   [x]   Head, Face, and Ears   [x]   Shoulders, Back, and Chest, Abdomen  [x]   Arms, Elbows, and Hands   [x]   Coccyx, Sacrum, and Ischium  [x]   Legs, Feet, and Heels          **SHARE this note so that the co-signing nurse is able to place an eSignature**    Co-signer eSignature: {Esignature:[id number]}    Does the Patient have Skin Breakdown?   No          Abdiel Prevention initiated:  Yes   Wound Care Orders initiated:  Yes      [address]  nurse consulted for Pressure Injury (Stage 3,4, Unstageable, DTI, NWPT, Complex wounds)and New or Established Ostomies:  No      Primary Nurse eSignature: Electronically signed by Emily Torres RN on 7/31/20 at 6:13 AM EDT

## 2020-07-31 NOTE — CARE COORDINATION
INTERDISCIPLINARY PLAN OF CARE CONFERENCE    Date/Time: 7/31/2020 1:07 PM  Completed by: Sharon Holman, Case Management      Patient Name:  Justin Mayfield  YOB: 1950  Admitting Diagnosis: Altered mental status [R41.82]     Admit Date/Time:  7/29/2020 11:51 AM    Chart reviewed. Interdisciplinary team met to discuss patient progress and discharge plans. Disciplines included Case Management, Nursing, and Dietitian. Current Status: Inpt status  PT/OT recommendation:n/a    Anticipated Discharge Date: TBD  Expected D/C Disposition:  Home  Confirmed plan with patient and/or family Yes  Discharge Plan Comments: Return home with Dtr. On admit -Called and spoke with dtr Carleen Kidd) high functioning [facility]. (She is active with [facility] [phone] after hours [phone]. For changes in status or emergent situations that would affect care of Pt's dtr.) Called and updated ([place]) [facility] through emergency line. Pt has [employer] [facility] 2 hrs a day 7 days a week. Has been active with APS in the past Sameer Bean). Home per squad in past. CM to follow. Pt/ot when appropriate. Had used [facility] in past and would like them to follow [phone]  Fax [phone]. Ref to agency.     Call from [facility] stating Pt was seen at [facility] address when last active with her and agency doesn't  [place]. Addendum: Met  with Pt at bedside in ICU. Pt wants [facility] to follow at [place]. Ref called to Liaison Joe Pal). Agency not in network but she will call around.       Home O2 in place on admit: No  Pt informed of need to bring portable home O2 tank on day of discharge for nursing to connect prior to leaving:  Not Indicated  Verbalized agreement/Understanding:  Not Indicated

## 2020-07-31 NOTE — CARE COORDINATION
[facility]  Received referral regarding HC services from Gabbie NARVAEZ. Sent request to [facility] for coverage. [facility] is in network with [facility]. [facility] is able to see pt for [facility] services at discharge. Liaison will continue to follow for [facility] needs until discharge.       Electronically signed by Jade Crespo RN on 7/31/2020 at 2:38 PM
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
## 2020-07-31 NOTE — PROGRESS NOTES
Speech Language Pathology  Facility/Department: [facility]   CLINICAL BEDSIDE SWALLOW EVALUATION    NAME: Troy Espinosa  : 1950  MRN: [id number]    ADMISSION DATE: 2020  ADMITTING DIAGNOSIS: has GERD (gastroesophageal reflux disease); Environmental allergies; Chronic neck, back, & bilateral LE pain; Diverticulosis; SIRS (systemic inflammatory response syndrome) ([address] Acute respiratory failure with hypoxia ([address] Obesity; Hypothyroidism; Acute encephalopathy; Septic shock ([address] Acute renal failure (ARF) ([address] Chronic diastolic CHF (grade 1 LVDD); Hypertension; Anxiety and depression; Elevated LFTs; Acute hyperglycemia; Diabetes ([address] Hypovitaminosis D; Possible/questionable hx of seizures; Right hand weakness & numbness; Acute-on-chronic low back pain with radicular symptoms; Trimalleolar fracture of left ankle, closed, initial encounter; Ankle fracture, bimalleolar, closed, left, initial encounter; Acute respiratory failure with hypoxemia ([address] Closed fracture of left ankle; Aspiration pneumonitis ([address] Pneumonia due to organism; Acute respiratory failure ([address] OD (overdose of drug); Drug ingestion; Toxic encephalopathy; History of depression; Misuse of prescription only drugs ([address] GI bleed; Elevated lactic acid level; Hyperkalemia; Leukocytosis; Thrombocytosis ([address] High anion gap metabolic acidosis; Shock [facility]); NSTEMI (non-ST elevated myocardial infarction) ([address] Abnormal CXR; PSVT (paroxysmal supraventricular tachycardia) ([address] PAF (paroxysmal atrial fibrillation) ([address] Altered mental status; Severe sepsis ([address] Community acquired pneumonia of left lower lobe of lung ([address] Acute kidney injury (VIOLETA) with acute tubular necrosis (ATN) ([facility]); and Lactic acidosis on their problem list.  ONSET DATE: 2020    Recent Chest Xray/CT of Chest: (2020)  Impression    Pulmonary vascular congestion/interstitial edema.       Date of Eval: 2020  Evaluating Therapist: Nneka Alvarado Sebastian    Current Diet level:  Current Diet : Dysphagia Soft and Bite-Sized (Dysphagia III)  Current Liquid Diet : Thin    Primary Complaint  Patient Complaint: none    Pain:  Pain Assessment  Pain Assessment: 0-10  Pain Level: 0  Patient's Stated Pain Goal: No pain  Pain Type: (Headache)  Pain Location: Head  Pain Descriptors: Headache  Response to Pain Intervention: Asleep with RR greater than 10  RASS Score: Light Sedation - Patient awakens with eye opening and eye contact, but not sustained    Reason for Referral  Priscila Nunn was referred for a bedside swallow evaluation to assess the efficiency of her swallow function, identify signs and symptoms of aspiration and make recommendations regarding safe dietary consistencies, effective compensatory strategies, and safe eating environment. Impression  RN approved entry to room. Pt was alert and in good spirits. RR 24, 02 96%, remained consistent throughout PO trials. Pt accepted all PO trials. Oral motor exam was [facility]. Clinician observed pt eat lunch meal of cooked carrots, apple sauce, and mashed potatoes. Pt given thin liquid via cup and straw. No wet vocal quality or s/s of penetration/aspiration noted. Recommend SBS diet due to pt not having dentures with her / thin liquids. Pt educated to take small sips/bites during meal times and to fully chew food prior to swallow. Pt instructed to remain upright during mealtimes and 30 minutes after. Dysphagia Diagnosis: Swallow function appears grossly intact  Dysphagia Outcome Severity Scale: Level 6: Within functional limits/Modified independence(level 6 due to edentulous)     Treatment Plan  Requires SLP Intervention: No  D/C Recommendations: To be determined     Recommended Diet and Intervention  Diet Solids Recommendation: Dysphagia Soft and Bite-Sized (Dysphagia III)  Liquid Consistency Recommendation:  Thin  Recommended Form of Meds: Whole with water     Therapeutic Interventions: Patient/Family education    Compensatory Swallowing Strategies  Compensatory Swallowing Strategies: Upright as possible for all oral intake;Remain upright for 30-45 minutes after meals;Small bites/sips    Treatment/Goals     General  Chart Reviewed: Yes  Comments: Pt admitted with altered mental status  Subjective  Subjective: RN approved entry to room. Pt was upright in bed and in good spirits. Behavior/Cognition: Alert; Cooperative;Pleasant mood  Respiratory Status: Room air  O2 Device: None (Room air)  Dentition: Edentulous(dentures at home)  Patient Positioning: Upright in bed  Prior Dysphagia History: 11/7/19  Consistencies Administered: Dysphagia Soft and Bite-Sized (Dysphagia III); Dysphagia Pureed (Dysphagia I); Thin;Thin - straw; Thin - cup    Vision/Hearing  Vision  Vision: Impaired    Oral Motor Deficits  Oral/Motor  Oral Motor: Within functional limits    Oral Phase Dysfunction  Oral Phase  Oral Phase: WNL     Indicators of Pharyngeal Phase Dysfunction   Pharyngeal Phase  Pharyngeal Phase: WNL    Prognosis  Prognosis  Prognosis for safe diet advancement: excellent  Individuals consulted  Consulted and agree with results and recommendations: Patient;RN    Education  Patient Education: Pt educated to remain upright during mealtimes and 30 minutes after. Due to not having dentures in pt reminded to take small bites and sips and to thoroughly chew food prior to swallowing.   Patient Education Response: Demonstrated understanding;Verbalizes understanding     Therapy Time  SLP Individual Minutes  Time In: 7978  Time Out: 0852  Minutes: [address], Speech Therapy   Fox Walker  7/31/2020 12:35 PM

## 2020-07-31 NOTE — PLAN OF CARE
Problem: Nutrition  Intervention: Swallowing evaluation  Note: Bedside swallow evaluation completed this date. Brian Obrien M.S. [id number] [facility]  Speech-language pathologist  [id number]      Intervention: Aspiration precautions  Note: Bedside swallow evaluation completed this date.     Brian Obrien M.S. [id number] [facility] ([place]  Speech-language pathologist  [id number]

## 2020-07-31 NOTE — PROGRESS NOTES
Progress Note    HISTORY     CC:  Shortness of breath           We are following for acute kidney injury       Subjective/   HPI:   BP is stable. Kidney function is back to normal.  She is asking about discharge.   She is feeling better     ROS:  Constitutional:  No fevers, No Chills, + weakness  Cardiovascular:  No palpations, no edema  Respiratory:  No wheezing, no cough  Skin:  No rash, no itching  :  No hematuria, no dysuria     Social Hx:  No family at bedside     Past Medical and Surgical History:  - Reviewed, no changes     EXAM       Objective/     Vitals:    07/31/20 0823 07/31/20 1100 07/31/20 1200 07/31/20 1300   BP: (!) 152/67 (!) 152/67 (!) 124/107    Pulse: 73 60 62 65   Resp: 18 21 14 24   Temp: 97.1 °F (36.2 °C) 98.3 °F (36.8 °C)     TempSrc: Axillary Oral     SpO2: 93% 97% 96% 95%   Weight:       Height:         24HR INTAKE/OUTPUT:      Intake/Output Summary (Last 24 hours) at 7/31/2020 1413  Last data filed at 7/31/2020 0609  Gross per 24 hour   Intake 675 ml   Output 1800 ml   Net -1125 ml     Constitutional:  Alert, awake, no apparent distress  Eyes:  Pupils reactive, sclera clear   Neck:  Normal thyroid, no masses   Cardiovascular:  Regular, no rub  Respiratory:  No distress, no wheezing  Psychiatry:  Appropriate mood/affect, alert  Abdomen: +bs, soft, nt, no masses   Musculoskeletal: No LE edema, no clubbing   Lymphatics:  No LAD in neck, no supraclavicular nodes       MEDICAL DECISION MAKING       Data/  Recent Labs     07/29/20  1216 07/30/20  0700 07/31/20  0410   WBC 17.5* 11.1* 11.2*   HGB 12.1 10.1* 10.6*   HCT 38.1 30.9* 32.2*   MCV 93.2 90.8 90.7    299 333     Recent Labs     07/29/20 2005 07/30/20  0700 07/31/20  0410    137 135*   K 5.0 4.1  4.1 4.1    102 100   CO2 23 24 23   GLUCOSE 115* 134* 133*   PHOS  --  4.1 3.7   MG  --  1.70* 2.00   BUN 23* 18 17   CREATININE 1.3* 1.1 0.9   LABGLOM 41* 49* >60   GFRAA 49* 60* >60       Assessment/     Acute

## 2020-07-31 NOTE — PROGRESS NOTES
4 Eyes Skin Assessment     The patient is being assess for   Transfer to New Unit    I agree that 2 RN's have performed a thorough Head to Toe Skin Assessment on the patient. ALL assessment sites listed below have been assessed. Areas assessed by both nurses:   [x]   Head, Face, and Ears   [x]   Shoulders, Back, and Chest, Abdomen  [x]   Arms, Elbows, and Hands   [x]   Coccyx, Sacrum, and Ischium  [x]   Legs, Feet, and Heels        Bruising RUE, Skin tear to right upper arm and right forearm. **SHARE this note so that the co-signing nurse is able to place an eSignature**    Co-signer eSignature: Electronically signed by Johnny Plunkett RN on 7/31/20 at 3:03 PM EDT    Does the Patient have Skin Breakdown?   No          Abdiel Prevention initiated:  No   Wound Care Orders initiated:  No      [facility] nurse consulted for Pressure Injury (Stage 3,4, Unstageable, DTI, NWPT, Complex wounds)and New or Established Ostomies:  No      Primary Nurse eSignature: Electronically signed by Daiana Landrum RN on 7/31/20 at 2:19 PM EDT

## 2020-08-01 VITALS
BODY MASS INDEX: 38.36 KG/M2 | TEMPERATURE: 98 F | HEART RATE: 66 BPM | DIASTOLIC BLOOD PRESSURE: 76 MMHG | RESPIRATION RATE: 16 BRPM | HEIGHT: 61 IN | OXYGEN SATURATION: 97 % | SYSTOLIC BLOOD PRESSURE: 138 MMHG | WEIGHT: 203.2 LBS

## 2020-08-01 PROBLEM — R41.82 ALTERED MENTAL STATUS: Status: RESOLVED | Noted: 2020-07-29 | Resolved: 2020-08-01

## 2020-08-01 LAB
ALBUMIN SERPL-MCNC: 4.3 G/DL (ref 3.4–5)
ANION GAP SERPL CALCULATED.3IONS-SCNC: 12 MMOL/L (ref 3–16)
BUN BLDV-MCNC: 20 MG/DL (ref 7–20)
CALCIUM SERPL-MCNC: 10 MG/DL (ref 8.3–10.6)
CHLORIDE BLD-SCNC: 98 MMOL/L (ref 99–110)
CO2: 22 MMOL/L (ref 21–32)
CREAT SERPL-MCNC: 0.9 MG/DL (ref 0.6–1.2)
GFR AFRICAN AMERICAN: >60
GFR NON-AFRICAN AMERICAN: >60
GLUCOSE BLD-MCNC: 137 MG/DL (ref 70–99)
GLUCOSE BLD-MCNC: 148 MG/DL (ref 70–99)
GLUCOSE BLD-MCNC: 155 MG/DL (ref 70–99)
PERFORMED ON: ABNORMAL
PERFORMED ON: ABNORMAL
PHOSPHORUS: 5 MG/DL (ref 2.5–4.9)
POTASSIUM SERPL-SCNC: 4.4 MMOL/L (ref 3.5–5.1)
SODIUM BLD-SCNC: 132 MMOL/L (ref 136–145)

## 2020-08-01 PROCEDURE — 99232 SBSQ HOSP IP/OBS MODERATE 35: CPT | Performed by: INTERNAL MEDICINE

## 2020-08-01 PROCEDURE — 6370000000 HC RX 637 (ALT 250 FOR IP): Performed by: INTERNAL MEDICINE

## 2020-08-01 PROCEDURE — 80069 RENAL FUNCTION PANEL: CPT

## 2020-08-01 PROCEDURE — 2500000003 HC RX 250 WO HCPCS: Performed by: INTERNAL MEDICINE

## 2020-08-01 PROCEDURE — 6360000002 HC RX W HCPCS: Performed by: INTERNAL MEDICINE

## 2020-08-01 PROCEDURE — 2580000003 HC RX 258: Performed by: INTERNAL MEDICINE

## 2020-08-01 PROCEDURE — 99238 HOSP IP/OBS DSCHRG MGMT 30/<: CPT | Performed by: INTERNAL MEDICINE

## 2020-08-01 PROCEDURE — 94640 AIRWAY INHALATION TREATMENT: CPT

## 2020-08-01 RX ORDER — CEFDINIR 300 MG/1
300 CAPSULE ORAL 2 TIMES DAILY
Qty: 10 CAPSULE | Refills: 0 | Status: SHIPPED | OUTPATIENT
Start: 2020-08-01 | End: 2020-08-06

## 2020-08-01 RX ADMIN — METOPROLOL TARTRATE 50 MG: 50 TABLET, FILM COATED ORAL at 08:20

## 2020-08-01 RX ADMIN — QUETIAPINE FUMARATE 50 MG: 25 TABLET ORAL at 08:21

## 2020-08-01 RX ADMIN — DOXYCYCLINE 100 MG: 100 INJECTION, POWDER, LYOPHILIZED, FOR SOLUTION INTRAVENOUS at 09:19

## 2020-08-01 RX ADMIN — Medication 2 PUFF: at 07:47

## 2020-08-01 RX ADMIN — LEVOTHYROXINE SODIUM 50 MCG: 50 TABLET ORAL at 08:20

## 2020-08-01 RX ADMIN — PANTOPRAZOLE SODIUM 40 MG: 40 TABLET, DELAYED RELEASE ORAL at 06:14

## 2020-08-01 RX ADMIN — DICYCLOMINE HYDROCHLORIDE 10 MG: 10 CAPSULE ORAL at 11:20

## 2020-08-01 RX ADMIN — TAMSULOSIN HYDROCHLORIDE 0.4 MG: 0.4 CAPSULE ORAL at 08:20

## 2020-08-01 RX ADMIN — CEFTRIAXONE SODIUM 1 G: 1 INJECTION, POWDER, FOR SOLUTION INTRAMUSCULAR; INTRAVENOUS at 08:33

## 2020-08-01 RX ADMIN — CETIRIZINE HYDROCHLORIDE 10 MG: 10 TABLET ORAL at 08:20

## 2020-08-01 RX ADMIN — AMIODARONE HYDROCHLORIDE 200 MG: 200 TABLET ORAL at 08:21

## 2020-08-01 RX ADMIN — DICYCLOMINE HYDROCHLORIDE 10 MG: 10 CAPSULE ORAL at 06:14

## 2020-08-01 RX ADMIN — Medication 10 ML: at 08:21

## 2020-08-01 RX ADMIN — APIXABAN 5 MG: 5 TABLET, FILM COATED ORAL at 08:21

## 2020-08-01 RX ADMIN — Medication 10 ML: at 08:22

## 2020-08-01 ASSESSMENT — PAIN SCALES - GENERAL: PAINLEVEL_OUTOF10: 0

## 2020-08-01 NOTE — PROGRESS NOTES
AM assessment complete. Gave am meds due see mar. Denies any pain. Ice water given. Up as tolerated. Request to ho home. Call light within reach.

## 2020-08-01 NOTE — DISCHARGE INSTR - COC
Continuity of Care Form    Patient Name: Shreya Robles   :  1950  MRN:  [id number]    Admit date:  2020  Discharge date:  2020    Code Status Order: Full Code   Advance Directives:     Admitting Physician:  Darryle Muskrat, MD  PCP: Cassandra Bond DO    Discharging Nurse: Alberto Ndiaye RN  [address] Unit/Room#: 0218/0218-01  Discharging Unit Phone Number: [phone]    Emergency Contact:   Extended Emergency Contact Information  Primary Emergency Contact: Humberto García  Address: [address]           [place] [address] [address] Phone: [phone]  Relation: Child    Past Surgical History:  Past Surgical History:   Procedure Laterality Date    CATARACT REMOVAL WITH IMPLANT Left 14    CATARACT REMOVAL WITH IMPLANT Right 1/2/15    phacoemulsification with initraocular lens implant     SECTION      x3    COLONOSCOPY  12    HYSTERECTOMY      TONSILLECTOMY      TUBAL LIGATION      UPPER GASTROINTESTINAL ENDOSCOPY  2014    gastric polyp    UPPER GASTROINTESTINAL ENDOSCOPY  2014    gastric polyp    UPPER GASTROINTESTINAL ENDOSCOPY  2018    gastritis       Immunization History:   Immunization History   Administered Date(s) Administered    Influenza Virus Vaccine 2010, 2014, 2017, 2018, 2018, 2019, 2019, 2019    Influenza Whole 2010    Influenza, Quadv, IM, PF (6 mo and older Fluzone, Flulaval, Fluarix, and 3 yrs and older Afluria) 10/07/2016, 2018    Pneumococcal Conjugate 13-valent (Malika Churn) 2016    Pneumococcal Polysaccharide (Mtwckvemo90) 2011, 2017, 2017, 2018, 2018, 2019, 2019, 2019    Td, unspecified formulation 2017       Active Problems:  Patient Active Problem List   Diagnosis Code    GERD (gastroesophageal reflux disease) K21.9    Environmental allergies Z91.09    Chronic neck, back, & bilateral LE pain M79.604, M79.605    Diverticulosis K57.90    SIRS (systemic inflammatory response syndrome) ([facility]) R65.10    Acute respiratory failure with hypoxia ([facility]) J96.01    Obesity E66.9    Hypothyroidism E03.9    Acute encephalopathy G93.40    Septic shock ([facility]) A41.9, R65.21    Acute renal failure (ARF) ([facility]) N17.9    Chronic diastolic CHF (grade 1 LVDD) I50.32    Hypertension I10    Anxiety and depression F41.9, F32.9    Elevated LFTs R94.5    Acute hyperglycemia R73.9    Diabetes ([facility]) E11.9    Hypovitaminosis D E55.9    Possible/questionable hx of seizures R56.9    Right hand weakness & numbness R29.898    Acute-on-chronic low back pain with radicular symptoms M54.5    Trimalleolar fracture of left ankle, closed, initial encounter S82.852A    Ankle fracture, bimalleolar, closed, left, initial encounter S82.842A    Acute respiratory failure with hypoxemia ([facility]) J96.01    Closed fracture of left ankle S82.892A    Aspiration pneumonitis ([facility]) J69.0    Pneumonia due to organism J18.9    Acute respiratory failure ([facility]) J96.00    OD (overdose of drug) T50.901A    Drug ingestion [id number]    Toxic encephalopathy G92    History of depression Z86.59    Misuse of prescription only drugs ([facility]) F19.10    GI bleed K92.2    Elevated lactic acid level R79.89    Hyperkalemia E87.5    Leukocytosis D72.829    Thrombocytosis ([facility]) D47.3    High anion gap metabolic acidosis B24.9    Shock ([facility]) R57.9    NSTEMI (non-ST elevated myocardial infarction) ([facility]) I21.4    Abnormal CXR R93.89    PSVT (paroxysmal supraventricular tachycardia) ([facility]) I47.1    PAF (paroxysmal atrial fibrillation) ([facility]) I48.0    Community acquired pneumonia of left lower lobe of lung ([facility]) J18.1       Isolation/Infection:   Isolation          No Isolation        Patient Infection Status     Infection Onset Added Last Indicated Last Indicated By Review Planned Expiration Resolved Resolved By    None active    Resolved    COVID-19 Rule Out 07/29/20 07/29/20 07/29/20 COVID-19 (Ordered)   07/31/20 Rule-Out Test Resulted          Nurse Assessment:  Last Vital Signs: /76   Pulse 66   Temp 98 °F (36.7 °C) (Oral)   Resp 16   Ht 5' 1\" (1.549 m)   Wt 203 lb 3.2 oz (92.2 kg)   SpO2 97%   BMI 38.39 kg/m²     Last documented pain score (0-10 scale): Pain Level: 0  Last Weight:   Wt Readings from Last 1 Encounters:   08/01/20 203 lb 3.2 oz (92.2 kg)     Mental Status:  oriented and alert    IV Access:  - None    Nursing Mobility/ADLs:  Walking   Independent  Transfer  Independent  Bathing  Independent  Dressing  Independent  Toileting  Independent  Feeding  Independent  Med Admin  Independent  Med Delivery   whole    Wound Care Documentation and Therapy:        Elimination:  Continence:   · Bowel: Yes  · Bladder: Yes  Urinary Catheter: None   Colostomy/Ileostomy/Ileal Conduit: No       Date of Last BM: ***    Intake/Output Summary (Last 24 hours) at 8/1/2020 1035  Last data filed at 7/31/2020 1753  Gross per 24 hour   Intake 480 ml   Output --   Net 480 ml     I/O last 3 completed shifts: In: 480 [P.O.:480]  Out: -     Safety Concerns:     None    Impairments/Disabilities:      None    Nutrition Therapy:  Current Nutrition Therapy:   - Oral Diet:  Carb Control 4 carbs/meal (1800kcals/day)    Routes of Feeding: Oral  Liquids: No Restrictions  Daily Fluid Restriction: no  Last Modified Barium Swallow with Video (Video Swallowing Test): not done    Treatments at the Time of Hospital Discharge:   Respiratory Treatments: ***  Oxygen Therapy:  is not on home oxygen therapy.   Ventilator:    - No ventilator support    Rehab Therapies: Physical Therapy and Occupational Therapy/SN/SW  Weight Bearing Status/Restrictions: No weight bearing restirctions  Other Medical Equipment (for information only, NOT a DME order):  {EQUIPMENT:442740171}  Other Treatments: ***    Patient's personal belongings (please select all that are sent with patient):  Glasses    RN SIGNATURE:  Electronically signed by Ag Guidry RN on 8/1/20 at 10:39 AM EDT    CASE MANAGEMENT/SOCIAL WORK SECTION    Inpatient Status Date: 7/29/2020    Readmission Risk Assessment Score:  Readmission Risk              Risk of Unplanned Readmission:        32           Discharging to Facility/ Agency   Name: [facility]  Address: [address] [place] [address],[address] [place], [address]   Phone: [phone]  Fax: [phone]          / signature: Electronically signed by Jen Ray RN on 8/1/20 at 10:56 AM EDT    PHYSICIAN SECTION    Prognosis: {Prognosis:7404231700}    Condition at Discharge: [address] Patient Condition:[id number]}    Rehab Potential (if transferring to Rehab): {Prognosis:1284032315}    Recommended Labs or Other Treatments After Discharge: ***    Physician Certification: I certify the above information and transfer of Priscila uNnn  is necessary for the continuing treatment of the diagnosis listed and that she requires Home Care for less 30 days.      Update Admission H&P: {CHP DME Changes in CMGEL:300428799}    PHYSICIAN SIGNATURE:  Electronically signed by Mika Muller MD/Florecita Zambrano RN on 8/1/20 at 10:57 AM EDT

## 2020-08-01 NOTE — PROGRESS NOTES
Hospitalist Progress Note      PCP: Carlo Lr DO    Date of Admission: 7/29/2020    Subjective: wants to go home today, feels better, SOB improved    Medications:  Reviewed    Infusion Medications    dextrose       Scheduled Medications    cefTRIAXone (ROCEPHIN) IV  1 g Intravenous Q24H    doxycycline (VIBRAMYCIN) IV  100 mg Intravenous Q12H    QUEtiapine  50 mg Oral BID    budesonide-formoterol  2 puff Inhalation BID    amiodarone  200 mg Oral Daily    apixaban  5 mg Oral BID    cetirizine  10 mg Oral Daily    dicyclomine  10 mg Oral 4x Daily AC & HS    fluticasone  1 spray Nasal Daily    levothyroxine  50 mcg Oral Daily    metoprolol tartrate  50 mg Oral BID    pantoprazole  40 mg Oral BID AC    tamsulosin  0.4 mg Oral Daily    sodium chloride flush  10 mL Intravenous 2 times per day    lidocaine 1 % injection  5 mL Intradermal Once    sodium chloride flush  10 mL Intravenous 2 times per day    sodium chloride  1,000 mL Intravenous Once    mupirocin   Nasal BID    insulin lispro  0-6 Units Subcutaneous TID WC    insulin lispro  0-3 Units Subcutaneous Nightly     PRN Meds: sodium chloride flush, acetaminophen **OR** acetaminophen, polyethylene glycol, promethazine **OR** ondansetron, sodium chloride flush, sodium chloride, glucose, dextrose, glucagon (rDNA), dextrose      Intake/Output Summary (Last 24 hours) at 7/31/2020 7843  Last data filed at 7/31/2020 1753  Gross per 24 hour   Intake 895 ml   Output 1000 ml   Net -105 ml       Physical Exam Performed:    BP (!) 145/88   Pulse 75   Temp 97.7 °F (36.5 °C) (Oral)   Resp 16   Ht 5' 1\" (1.549 m)   Wt 200 lb 8 oz (90.9 kg)   SpO2 97%   BMI 37.88 kg/m²     General appearance:  No apparent distress, appears stated age and cooperative. HEENT:  Normal cephalic, atraumatic without obvious deformity. Pupils equal, round, and reactive to light.  Extra ocular muscles intact. Conjunctivae/corneas clear.   Neck: Supple, with full range of motion. No jugular venous distention. Trachea midline. Respiratory:  Normal respiratory effort. Clear to auscultation, bilaterally without Rales/Wheezes/Rhonchi. Cardiovascular:  Regular rate and rhythm with normal S1/S2 without murmurs, rubs or gallops. Abdomen: Soft, non-tender, non-distended with normal bowel sounds. Musculoskeletal:  No clubbing, cyanosis or edema bilaterally.  Full range of motion without deformity. Skin: Skin color, texture, turgor normal.  No rashes or lesions. Neurologic:  Neurovascularly intact without any focal sensory/motor deficits. Cranial nerves: II-XII intact, grossly non-focal.  Psychiatric:  Alert , thought content appropriate, normal insight  Capillary Refill: Brisk,< 3 seconds   Peripheral Pulses: +2 palpable, equal bilaterally    Labs:   Recent Labs     07/29/20 1216 07/30/20  0700 07/31/20  0410   WBC 17.5* 11.1* 11.2*   HGB 12.1 10.1* 10.6*   HCT 38.1 30.9* 32.2*    299 333     Recent Labs     07/29/20 2005 07/30/20  0700 07/31/20  0410    137 135*   K 5.0 4.1  4.1 4.1    102 100   CO2 23 24 23   BUN 23* 18 17   CREATININE 1.3* 1.1 0.9   CALCIUM 8.8 8.8 9.5   PHOS  --  4.1 3.7     Recent Labs     07/29/20  1216 07/30/20  0700   AST 39* 32   ALT 29 24   BILITOT 0.4 0.6   ALKPHOS 220* 178*     Recent Labs     07/29/20  1216   INR 1.01     Recent Labs     07/29/20 2005 07/30/20  0145 07/30/20  0700   TROPONINI <0.01 <0.01 <0.01       Urinalysis:      Lab Results   Component Value Date    NITRU Negative 07/29/2020    WBCUA None seen 07/29/2020    BACTERIA 2+ 11/10/2019    RBCUA 0-2 07/29/2020    BLOODU TRACE-INTACT 07/29/2020    SPECGRAV 1.020 07/29/2020    GLUCOSEU 100 07/29/2020    GLUCOSEU NEGATIVE 03/05/2011       Radiology:  IR PICC WO SQ PORT/PUMP > 5 YEARS   Final Result   Successful placement of JACC line. US GALLBLADDER RUQ   Final Result   Unremarkable right upper quadrant ultrasound.          CT ABDOMEN PELVIS WO CONTRAST Additional Contrast? None   Final Result   1. No acute abdominopelvic adenopathy. 2. Splenomegaly with mild nonspecific abdominopelvic lymph node enlargement. CT HEAD WO CONTRAST   Final Result   No acute intracranial abnormality. XR CHEST PORTABLE   Final Result   Pulmonary vascular congestion/interstitial edema. IR FLUORO GUIDED CVA DEVICE PLMT/REPLACE/REMOVAL    (Results Pending)           Assessment/Plan:    Active Hospital Problems    Diagnosis    Altered mental status [R41.82]    Severe sepsis ([address] [A41.9, R65.20]    Community acquired pneumonia of left lower lobe of lung ([address] [J18.1]    Acute kidney injury (VIOLETA) with acute tubular necrosis (ATN) ([facility]) [N17.0]    Lactic acidosis [E87.2]         1) Severe sepsis/ Left sided pneumonia  - probable gram neg PNA, r/o Covid neg  - continue rocephin/doxy  - follow up blood cultures     2) VIOLETA - resolved  - treated with IVF, creat improved from 1.7-->1.3-->1.1-->0.9  - likely prerenal   - Nephro consulted     3) Hyperkalemia - resolved       Hypomag - repleted     4) dCHF  - compensated     5) DM II  - held metformin 2/2 lactic acidosis  - on corrective ISS     6) Acute metabolic encephalopathy  - likely toxic, improved.  Has h/o VILLEGAS, check ammonia 26     7) par Afib  - continue amiodarone, eliquis           DVT Prophylaxis: eliquis  Diet: DIET DENTAL SOFT; Carb Control: 4 carb choices (60 gms)/meal; Low Sodium (2 GM)  Code Status: Full Code    PT/OT Eval Status: ordered    Dispo - transfer out of Gato Dela Cruz MD

## 2020-08-01 NOTE — PROGRESS NOTES
Removed RIJ per order. Petroleum, gauze tegaderm dressing applied see flowsheet. Side rails x2 up. Bed lowest position. Call light within reach.

## 2020-08-01 NOTE — CARE COORDINATION
DISCHARGE ORDER  Date/Time 2020 10:49 AM  Completed by: Heraclio Waddell, Case Management    Patient Name: Kashmir Miller    : 1950  Admitting Diagnosis: Altered mental status [R41.82]      Admit order Date and Status:2020 inpt  (verify MD's last order for status of admission)      Noted discharge order. Confirmed discharge plan  (pt): Yes  with whom_________pt______  If pt confirmed DC plan does family need to be contacted by CM No if yes who____/a__  Discharge Plan: Reviewed chart. Role of discharge planner explained and patient verbalized understanding. Discharge order is noted. Pt is being d/c'd to home today. Pt's O2 sats are 97% on RA. Pt states that she lives in a mobile home in [place]. CM received a consult stating \"Patient stated more than twice to this writer, her daughter or daughters are administering \"drugs\" in her drinks. Chandrika Jason tested positive for amphetamines, benzodiazepines and Oxycodone. \"  CM spoke with pt and she confirms that this has happened more than once. Pt states that her daughter, Domenica Torres, has a drug addiction and that she has a restraining order on her until , and she showed CM the card that states this. CM explained that CM would call [facility] regarding this and pt agreed and approved. Pt also states that she would like [facility] for SN/PT/OT/SW. CM called and LM via  for Joana Jorge ([phone]) with [facility] regarding situation and provided that pt was discharging to home today. CM provided pt's demographic info and phone number, as well. CM called LM with Larry Tang with [facility] to let him know pt was d/c to home today and would need PT/OT/SN/SW. CM faxed [facility]/and [facility] orders to fax #: [phone]. Chelsea Hyde with [facility] stated could accept pt on 2020. No further discharge needs needed or noted. Addendum 2020 1129: Larry Tang with [facility] and Figueroa Alas with [facility] confirmed that they can accept. Reviewed chart. Role of discharge planner explained and patient verbalized understanding. Discharge order is noted. Has Home O2 in place on admit:  No  Informed of need to bring portable home O2 tank on day of discharge for nursing to connect prior to leaving:   Not Indicated  Verbalized agreement/Understanding:   Not Indicated    Discharge timeout done with Bailey Cleary RN. All discharge needs and concerns addressed.

## 2020-08-01 NOTE — PROGRESS NOTES
Hospitalist Progress Note      PCP: Chris Shen DO    Date of Admission: 7/29/2020    Subjective:  Denies any issues today . wishes to go home  No cough. No fevers  BP stable  Mentation clear    Medications:  Reviewed    Infusion Medications    dextrose       Scheduled Medications    cefTRIAXone (ROCEPHIN) IV  1 g Intravenous Q24H    doxycycline (VIBRAMYCIN) IV  100 mg Intravenous Q12H    QUEtiapine  50 mg Oral BID    budesonide-formoterol  2 puff Inhalation BID    amiodarone  200 mg Oral Daily    apixaban  5 mg Oral BID    cetirizine  10 mg Oral Daily    dicyclomine  10 mg Oral 4x Daily AC & HS    fluticasone  1 spray Nasal Daily    levothyroxine  50 mcg Oral Daily    metoprolol tartrate  50 mg Oral BID    pantoprazole  40 mg Oral BID AC    tamsulosin  0.4 mg Oral Daily    sodium chloride flush  10 mL Intravenous 2 times per day    sodium chloride flush  10 mL Intravenous 2 times per day    sodium chloride  1,000 mL Intravenous Once    insulin lispro  0-6 Units Subcutaneous TID WC    insulin lispro  0-3 Units Subcutaneous Nightly     PRN Meds: sodium chloride flush, acetaminophen **OR** acetaminophen, polyethylene glycol, promethazine **OR** ondansetron, sodium chloride flush, glucose, dextrose, glucagon (rDNA), dextrose      Intake/Output Summary (Last 24 hours) at 8/1/2020 0928  Last data filed at 7/31/2020 1753  Gross per 24 hour   Intake 480 ml   Output --   Net 480 ml       Physical Exam Performed:    /76   Pulse 66   Temp 98 °F (36.7 °C) (Oral)   Resp 16   Ht 5' 1\" (1.549 m)   Wt 203 lb 3.2 oz (92.2 kg)   SpO2 97%   BMI 38.39 kg/m²         General:  Elderly female up in bed. Obese  Right IJ TLC  Awake, alert and oriented.  Appears to be not in any distress  Mucous Membranes:  Pink , anicteric  Neck: No JVD, no carotid bruit, no thyromegaly  Chest:  Clear to auscultation bilaterally, no added sounds  Cardiovascular:  RRR S1S2 heard, no murmurs or gallops  Abdomen:  Soft, obese, undistended, non tender, no organomegaly, BS present  Extremities: No edema or cyanosis. Distal pulses well felt  Neurological : grossly normal        Labs:   Recent Labs     07/29/20  1216 07/30/20  0700 07/31/20  0410   WBC 17.5* 11.1* 11.2*   HGB 12.1 10.1* 10.6*   HCT 38.1 30.9* 32.2*    299 333     Recent Labs     07/30/20  0700 07/31/20  0410 08/01/20  0622    135* 132*   K 4.1  4.1 4.1 4.4    100 98*   CO2 24 23 22   BUN 18 17 20   CREATININE 1.1 0.9 0.9   CALCIUM 8.8 9.5 10.0   PHOS 4.1 3.7 5.0*     Recent Labs     07/29/20  1216 07/30/20  0700   AST 39* 32   ALT 29 24   BILITOT 0.4 0.6   ALKPHOS 220* 178*     Recent Labs     07/29/20  1216   INR 1.01     Recent Labs     07/29/20 2005 07/30/20  0145 07/30/20  0700   TROPONINI <0.01 <0.01 <0.01       Urinalysis:      Lab Results   Component Value Date    NITRU Negative 07/29/2020    WBCUA None seen 07/29/2020    BACTERIA 2+ 11/10/2019    RBCUA 0-2 07/29/2020    BLOODU TRACE-INTACT 07/29/2020    SPECGRAV 1.020 07/29/2020    GLUCOSEU 100 07/29/2020    GLUCOSEU NEGATIVE 03/05/2011       Radiology:  IR PICC WO SQ PORT/PUMP > 5 YEARS   Final Result   Successful placement of JACC line. US GALLBLADDER RUQ   Final Result   Unremarkable right upper quadrant ultrasound. CT ABDOMEN PELVIS WO CONTRAST Additional Contrast? None   Final Result   1. No acute abdominopelvic adenopathy. 2. Splenomegaly with mild nonspecific abdominopelvic lymph node enlargement. CT HEAD WO CONTRAST   Final Result   No acute intracranial abnormality. XR CHEST PORTABLE   Final Result   Pulmonary vascular congestion/interstitial edema.          IR FLUORO GUIDED CVA DEVICE PLMT/REPLACE/REMOVAL    (Results Pending)     Cultures  Blood- NGTD  Sputum - pending    covid neg      Assessment/Plan:    Active Hospital Problems    Diagnosis    Altered mental status [R41.82]    Severe sepsis ([address] [A41.9, R65.20]    [facility] acquired pneumonia of left lower lobe of lung [facility] [J18.1]    Acute kidney injury (VIOLETA) with acute tubular necrosis (ATN) ([facility]) [N17.0]    Lactic acidosis [E87.2]     1) Severe sepsis/ Left sided pneumonia  - probable gram neg PNA,  - covid neg  - continue rocephin/doxy  - follow up blood cultures- neg  - improved symptoms. Remains on RA     2) VIOLETA - resolved  - treated with IVF, creat improved from 1.7-->1.3-->1.1-->0.9  - likely prerenal   - Nephro consulted  - can resume ACEI     3) Hyperkalemia - resolved       Hypomag - repleted     4) dCHF  - compensated     5) DM II  - held metformin 2/2 lactic acidosis  - on corrective ISS  - can resume at dc     6) Acute metabolic vs Toxic encephalopathy - Resolved  - hx of polysubstance abuse, with multiple sedating medications & PNA  -  improved.  Has h/o VILLEGAS, check ammonia 26  - recommend cutting down on sedating medications     7) par Afib  - continue amiodarone, eliquis    Should have f/w for splenomegaly and abn LN noted on CT abd  Likely reactive         DVT Prophylaxis: eliquis  Diet: DIET DENTAL SOFT; Carb Control: 4 carb choices (60 gms)/meal; Low Sodium (2 GM)  Code Status: Full Code    PT/OT Eval Status: ordered    Dispo - d/c home    Ivonne Gasca MD 8/1/2020 12:22 PM

## 2020-08-01 NOTE — FLOWSHEET NOTE
07/31/20 2052   Vital Signs   Temp 97.7 °F (36.5 °C)   Temp Source Oral   Pulse 75   Heart Rate Source Monitor   Resp 16   BP (!) 145/88   BP Location Right lower arm   BP Upper/Lower Lower   Patient Position Semi fowlers   Level of Consciousness 0   MEWS Score 1   Oxygen Therapy   SpO2 97 %   O2 Device None (Room air)   Pt A/O x 4 assessment completed. PM meds given. Pt denies any needs at this time.  Call light within reach

## 2020-08-01 NOTE — PLAN OF CARE
Problem: Discharge Planning:  Goal: Participates in care planning  Description: Participates in care planning  8/1/2020 0823 by Yifan Slater RN  Outcome: Ongoing  7/31/2020 2245 by Alison Benson RN  Outcome: Ongoing  Goal: Discharged to appropriate level of care  Description: Discharged to appropriate level of care  8/1/2020 0823 by Yifan Slater RN  Outcome: Ongoing  7/31/2020 2245 by Alison Benson RN  Outcome: Ongoing     Problem: Fluid Volume - Imbalance:  Goal: Absence of imbalanced fluid volume signs and symptoms  Description: Absence of imbalanced fluid volume signs and symptoms  Outcome: Ongoing     Problem: Mental Status - Impaired:  Goal: Mental status will be restored to baseline  Description: Mental status will be restored to baseline  Outcome: Ongoing     Problem: Serum Glucose Level - Abnormal:  Goal: Ability to maintain appropriate glucose levels will improve to within specified parameters  Description: Ability to maintain appropriate glucose levels will improve to within specified parameters  Outcome: Ongoing     Problem: Sleep Pattern Disturbance:  Goal: Appears well-rested  Description: Appears well-rested  Outcome: Ongoing     Problem: Falls - Risk of:  Goal: Will remain free from falls  Description: Will remain free from falls  8/1/2020 0823 by Yifan Slater RN  Outcome: Ongoing  7/31/2020 2245 by Alison Benson RN  Outcome: Ongoing  Goal: Absence of physical injury  Description: Absence of physical injury  Outcome: Ongoing     Problem: OXYGENATION/RESPIRATORY FUNCTION  Goal: Patient will maintain patent airway  Description: Resp e/e unlabored on RA w/ CSPO2 monitoring   Outcome: Ongoing  Goal: Patient will achieve/maintain normal respiratory rate/effort  Description: Respiratory rate and effort will be within normal limits for the patient  Outcome: Ongoing     Problem: HEMODYNAMIC STATUS  Goal: Patient has stable vital signs and fluid balance  Description: VSS strict I&O ICU monitoring in place  Outcome: Ongoing     Problem: FLUID AND ELECTROLYTE IMBALANCE  Goal: Fluid and electrolyte balance are achieved/maintained  Description: Daily labs, strict I&O Mg IVPG replacement  Outcome: Ongoing     Problem: ACTIVITY INTOLERANCE/IMPAIRED MOBILITY  Goal: Mobility/activity is maintained at optimum level for patient  Description: Q 2 and prn turns.  Up to bsc w/ minimal assist.   Outcome: Ongoing     Problem: Airway Clearance - Ineffective:  Goal: Clear lung sounds  Description: Clear lung sounds  Outcome: Ongoing  Goal: Ability to maintain a clear airway will improve  Description: Ability to maintain a clear airway will improve  Outcome: Ongoing     Problem: Fluid Volume - Deficit:  Goal: Achieves intake and output within specified parameters  Description: Achieves intake and output within specified parameters  Outcome: Ongoing     Problem: Gas Exchange - Impaired:  Goal: Levels of oxygenation will improve  Description: Levels of oxygenation will improve  Outcome: Ongoing     Problem: Hyperthermia:  Goal: Ability to maintain a body temperature in the normal range will improve  Description: Ability to maintain a body temperature in the normal range will improve  Outcome: Ongoing     Problem: Tobacco Use:  Goal: Will participate in inpatient tobacco-use cessation counseling  Description: Will participate in inpatient tobacco-use cessation counseling  Outcome: Ongoing

## 2020-08-01 NOTE — PROGRESS NOTES
Pulmonary Progress Note    CC: Severe Sepsis     Subjective:   Feels better  Room air  No fever      Intake/Output Summary (Last 24 hours) at 8/1/2020 0720  Last data filed at 7/31/2020 1753  Gross per 24 hour   Intake 480 ml   Output --   Net 480 ml       Exam:   /69   Pulse 68   Temp 98.9 °F (37.2 °C) (Oral)   Resp 16   Ht 5' 1\" (1.549 m)   Wt 203 lb 3.2 oz (92.2 kg)   SpO2 96%   BMI 38.39 kg/m²  on RA  Gen: No distress. Ill-appearing  Eyes: PERRL. No sclera icterus. No conjunctival injection. ENT: No discharge. Pharynx clear. Neck: Trachea midline. No obvious mass. Resp: No accessory muscle use. Few basilar crackles. No wheezes. No rhonchi. No dullness on percussion. CV:  Regular rate. Regular rhythm. No murmur or rub. No edema. GI: Non-tender. Non-distended. No hernia. Skin: Warm and dry. No nodule on exposed extremities. Lymph: No cervical LAD. No supraclavicular LAD. M/S: No cyanosis. No joint deformity. No clubbing. Neuro: AAOx3. Followed commands. Moves all four extremities. Psych: No agitation.  No anxiety.        Scheduled Meds:   cefTRIAXone (ROCEPHIN) IV  1 g Intravenous Q24H    doxycycline (VIBRAMYCIN) IV  100 mg Intravenous Q12H    QUEtiapine  50 mg Oral BID    budesonide-formoterol  2 puff Inhalation BID    amiodarone  200 mg Oral Daily    apixaban  5 mg Oral BID    cetirizine  10 mg Oral Daily    dicyclomine  10 mg Oral 4x Daily AC & HS    fluticasone  1 spray Nasal Daily    levothyroxine  50 mcg Oral Daily    metoprolol tartrate  50 mg Oral BID    pantoprazole  40 mg Oral BID AC    tamsulosin  0.4 mg Oral Daily    sodium chloride flush  10 mL Intravenous 2 times per day    lidocaine 1 % injection  5 mL Intradermal Once    sodium chloride flush  10 mL Intravenous 2 times per day    sodium chloride  1,000 mL Intravenous Once    mupirocin   Nasal BID    insulin lispro  0-6 Units Subcutaneous TID WC    insulin lispro  0-3 Units Subcutaneous Nightly Continuous Infusions:   dextrose       PRN Meds:  sodium chloride flush, acetaminophen **OR** acetaminophen, polyethylene glycol, promethazine **OR** ondansetron, sodium chloride flush, sodium chloride, glucose, dextrose, glucagon (rDNA), dextrose    Labs:  CBC:   Recent Labs     07/29/20  1216 07/30/20  0700 07/31/20  0410   WBC 17.5* 11.1* 11.2*   HGB 12.1 10.1* 10.6*   HCT 38.1 30.9* 32.2*   MCV 93.2 90.8 90.7    299 333     BMP:   Recent Labs     07/30/20  0700 07/31/20  0410 08/01/20  0622    135* 132*   K 4.1  4.1 4.1 4.4    100 98*   CO2 24 23 22   PHOS 4.1 3.7 5.0*   BUN 18 17 20   CREATININE 1.1 0.9 0.9     LIVER PROFILE:   Recent Labs     07/29/20  1216 07/30/20  0700   AST 39* 32   ALT 29 24   LIPASE 48.0  --    BILITOT 0.4 0.6   ALKPHOS 220* 178*     PT/INR:   Recent Labs     07/29/20  1216   PROTIME 11.7   INR 1.01     APTT: No results for input(s): APTT in the last 72 hours. UA:  Recent Labs     07/29/20  1320   COLORU Yellow   PHUR 6.5  6.5   WBCUA None seen   RBCUA 0-2   CLARITYU Clear   SPECGRAV 1.020   LEUKOCYTESUR Negative   UROBILINOGEN 0.2   BILIRUBINUR Negative   BLOODU TRACE-INTACT*   GLUCOSEU 100*     No results for input(s): PHART, CWV0MTK, PO2ART in the last 72 hours.   Cultures:   7/29 COVID-19 negative  7/29 BCx NGTD   7/30 Legionella/Strep Negative       Films:  Chest x-ray 7/29   Left-sided airspace disease     CT abdomen 7/29  Splenomegaly with mild nonspecific abdominopelvic lymph node enlargement        CT head 7/29/2020 no acute intracranial abnormalities     ASSESSMENT:  · Severe sepsis-source probably respiratory  · Acute encephalopathy/metabolic encephalopathy  · Fever  · Left-sided community-acquired pneumonia  · PAF on Eliquis   · Abnormal CT abdomen 7/29/2020-plan per internal medicine        PLAN:  · Supplemental oxygen to maintain SaO2 >92%; wean as tolerated  · Broad spectrum antibiotics to include Ceftriaxone and Doxy D#3  · Follow up Cx   · DVT prophylaxis: Home Eliquis   · DC planning is okay with pulmonary  · DC central line before discharge  · Discussed with internal medicine  · I recommend CXR in 4-6 week to document resolution of abnormalities

## 2020-08-01 NOTE — PLAN OF CARE
Problem: Discharge Planning:  Goal: Participates in care planning  Description: Participates in care planning  7/31/2020 2245 by Dequan Bernstein RN  Outcome: Ongoing  7/31/2020 1312 by Jhonny Gutierrez RN  Outcome: Ongoing  Goal: Discharged to appropriate level of care  Description: Discharged to appropriate level of care  7/31/2020 2245 by Dequan Bernstein RN  Outcome: Ongoing  7/31/2020 1312 by Jhonny Gutierrez RN  Outcome: Ongoing     Problem: Fluid Volume - Imbalance:  Goal: Absence of imbalanced fluid volume signs and symptoms  Description: Absence of imbalanced fluid volume signs and symptoms  7/31/2020 1312 by Jhonny Gutierrez RN  Outcome: Ongoing     Problem: Mental Status - Impaired:  Goal: Mental status will be restored to baseline  Description: Mental status will be restored to baseline  7/31/2020 1312 by Jhonny Gutierrez RN  Outcome: Ongoing     Problem: Serum Glucose Level - Abnormal:  Goal: Ability to maintain appropriate glucose levels will improve to within specified parameters  Description: Ability to maintain appropriate glucose levels will improve to within specified parameters  7/31/2020 1312 by Jhonny Gutierrez RN  Outcome: Ongoing     Problem: Sleep Pattern Disturbance:  Goal: Appears well-rested  Description: Appears well-rested  7/31/2020 1312 by Jhonny Gutierrez RN  Outcome: Ongoing     Problem: Falls - Risk of:  Goal: Will remain free from falls  Description: Will remain free from falls  7/31/2020 2245 by Dequan Bernstein RN  Outcome: Ongoing  7/31/2020 1312 by Diane Coon RN  Outcome: Ongoing  Goal: Absence of physical injury  Description: Absence of physical injury  7/31/2020 1312 by Jhonny Gutierrez RN  Outcome: Ongoing     Problem: OXYGENATION/RESPIRATORY FUNCTION  Goal: Patient will maintain patent airway  Description: Resp e/e unlabored on RA w/ CSPO2 monitoring   7/31/2020 1312 by Jhonny Gutierrez RN  Outcome: Ongoing  Goal: Patient will achieve/maintain normal respiratory

## 2020-08-02 LAB
BLOOD CULTURE, ROUTINE: NORMAL
CULTURE, BLOOD 2: NORMAL

## 2020-08-03 NOTE — TELEPHONE ENCOUNTER
Patient discharged 8/1/20. Would you like patient to f/u in 3 months? Please advise.      ASSESSMENT: 8/1/20 Hospital Note  · Severe sepsis-source probably respiratory  · Acute encephalopathy/metabolic encephalopathy  · Fever  · Left-sided community-acquired pneumonia  · PAF on Eliquis   · Abnormal CT abdomen 7/29/2020-plan per internal medicine        PLAN:  · Supplemental oxygen to maintain SaO2 >92%; wean as tolerated  · Broad spectrum antibiotics to include Ceftriaxone and Doxy D#3  · Follow up Cx   · DVT prophylaxis: Home Eliquis   · DC planning is okay with pulmonary  · DC central line before discharge  · Discussed with internal medicine  · I recommend CXR in 4-6 week to document resolution of abnormalities

## 2020-08-04 NOTE — DISCHARGE SUMMARY
on CT abd  Likely reactive     DVT Prophylaxis: eliquis    Procedures (Please Review Full Report for Details)  N/A    Consults    Nephrology  Pulmonology       Physical Exam at Discharge:    /76   Pulse 66   Temp 98 °F (36.7 °C) (Oral)   Resp 16   Ht 5' 1\" (1.549 m)   Wt 203 lb 3.2 oz (92.2 kg)   SpO2 97%   BMI 38.39 kg/m²     See today's progress note      U/A:    Lab Results   Component Value Date    NITRITE neg 05/15/2017    COLORU Yellow 07/29/2020    WBCUA None seen 07/29/2020    RBCUA 0-2 07/29/2020    MUCUS 1+ 01/02/2019    BACTERIA 2+ 11/10/2019    CLARITYU Clear 07/29/2020    SPECGRAV 1.020 07/29/2020    LEUKOCYTESUR Negative 07/29/2020    BLOODU TRACE-INTACT 07/29/2020    GLUCOSEU 100 07/29/2020    GLUCOSEU NEGATIVE 03/05/2011    AMORPHOUS 2+ 12/09/2016       ABG    Lab Results   Component Value Date    GNX5NDR 25.5 11/07/2019    BEART 2.9 11/07/2019    U3ACXNFE 95.6 11/07/2019    PHART 7.514 11/07/2019    VXG0NQA 32.4 11/07/2019    PO2ART 69.7 11/07/2019    OGH2YVQ 26.5 11/07/2019         CULTURES  Blood- NGTD  Sputum - pending     covid neg    RADIOLOGY  IR PICC WO SQ PORT/PUMP > 5 YEARS   Final Result   Successful placement of JACC line. US GALLBLADDER RUQ   Final Result   Unremarkable right upper quadrant ultrasound. CT ABDOMEN PELVIS WO CONTRAST Additional Contrast? None   Final Result   1. No acute abdominopelvic adenopathy. 2. Splenomegaly with mild nonspecific abdominopelvic lymph node enlargement. CT HEAD WO CONTRAST   Final Result   No acute intracranial abnormality. XR CHEST PORTABLE   Final Result   Pulmonary vascular congestion/interstitial edema.          IR FLUORO GUIDED CVA DEVICE PLMT/REPLACE/REMOVAL    (Results Pending)         Discharge Medications     Medication List      START taking these medications    cefdinir 300 MG capsule  Commonly known as:  OMNICEF  Take 1 capsule by mouth 2 times daily for 5 days        CHANGE how you take these medications    nystatin [id number] UNIT/GM cream  Commonly known as:  MYCOSTATIN  Apply topically 2 times daily under breasts  What changed:    · how much to take  · how to take this  · when to take this  · reasons to take this        CONTINUE taking these medications    Advair Diskus 250-50 MCG/DOSE Aepb  Generic drug:  fluticasone-salmeterol  INHALE 1 PUFF INTO THE LUNGS 2 TIMES DAILY     amiodarone 200 MG tablet  Commonly known as:  CORDARONE  Take 1 tablet by mouth daily     apixaban 5 MG Tabs tablet  Commonly known as:  ELIQUIS  Take 1 tablet by mouth 2 times daily     * clobetasol 0.05 % external solution  Commonly known as:  TEMOVATE  Apply small amount to scalp only two nights per week for flares for up to 4 weeks     * clobetasol 0.05 % ointment  Commonly known as:  TEMOVATE     conjugated estrogens 0.625 MG/GM vaginal cream  Commonly known as:  Premarin  Place vaginally three times weekly.      dicyclomine 10 MG capsule  Commonly known as:  BENTYL     docusate sodium 100 MG capsule  Commonly known as:  COLACE     fluticasone 50 MCG/ACT nasal spray  Commonly known as:  FLONASE     furosemide 20 MG tablet  Commonly known as:  LASIX  TAKE 1 TABLET BY MOUTH DAILY     hydrOXYzine 25 MG tablet  Commonly known as:  ATARAX     levothyroxine 50 MCG tablet  Commonly known as:  SYNTHROID  Take 1 tablet by mouth daily     loratadine 10 MG tablet  Commonly known as:  CLARITIN     metFORMIN 500 MG tablet  Commonly known as:  GLUCOPHAGE     metoprolol tartrate 50 MG tablet  Commonly known as:  LOPRESSOR  Take 1 tablet by mouth 2 times daily     ondansetron 4 MG tablet  Commonly known as:  ZOFRAN     oxyCODONE-acetaminophen 7.5-325 MG per tablet  Commonly known as:  PERCOCET     pantoprazole 40 MG tablet  Commonly known as:  PROTONIX  Take 1 tablet by mouth 2 times daily (before meals)     senna 8.6 MG tablet  Commonly known as:  SENOKOT     SEROquel 25 MG tablet  Generic drug:  QUEtiapine     sulfacetamide-prednisoLONE 10-0.2 % ophthalmic suspension  Commonly known as:  BLEPHAMIDE     tamsulosin 0.4 MG capsule  Commonly known as:  FLOMAX  Take 1 capsule by mouth daily     trimethoprim 100 MG tablet  Commonly known as:  TRIMPEX         * This list has 2 medication(s) that are the same as other medications prescribed for you. Read the directions carefully, and ask your doctor or other care provider to review them with you. STOP taking these medications    AMOXICILLIN PO     cetirizine 10 MG tablet  Commonly known as:  ZYRTEC     cyclobenzaprine 10 MG tablet  Commonly known as:  FLEXERIL     tiZANidine 4 MG tablet  Commonly known as:  Lonita Shady           Where to Get Your Medications      These medications were sent to [facility], [address] [place] [address] 67, [address] [address]    Phone:  [phone]   · cefdinir 300 MG capsule           Discharged in stable condition to home. Follow Up: Follow up with PCP.   Jeffrey Simmonds, MD 8/10/2020 9:48 AM

## 2020-08-05 NOTE — TELEPHONE ENCOUNTER
Pt returned call and is scheduled for a VV with Dr. Flex Falcon on 8/28/20. Pt notified to get CXR prior.

## 2020-08-13 ENCOUNTER — HOSPITAL ENCOUNTER (OUTPATIENT)
Dept: GENERAL RADIOLOGY | Age: 70
Discharge: HOME | End: 2020-08-13
Payer: COMMERCIAL

## 2020-08-13 ENCOUNTER — HOSPITAL ENCOUNTER (OUTPATIENT)
Age: 70
Discharge: HOME | End: 2020-08-13
Payer: COMMERCIAL

## 2020-08-13 PROCEDURE — 71046 X-RAY EXAM CHEST 2 VIEWS: CPT

## 2020-08-28 ENCOUNTER — VIRTUAL VISIT (OUTPATIENT)
Dept: PULMONOLOGY | Age: 70
End: 2020-08-28
Payer: COMMERCIAL

## 2020-08-28 PROCEDURE — 99442 PR PHYS/QHP TELEPHONE EVALUATION 11-20 MIN: CPT | Performed by: INTERNAL MEDICINE

## 2020-08-28 NOTE — PROGRESS NOTES
Pulmonary Follow-up Note  Chief Complaint: Lower extremity edema  Referring Provider: hospital f/u    Mya Range is a 72 y.o. female evaluated via telephone on 8/28/2020. Consent: obtained  This is a Patient Initiated Episode   ID verified at the start of the visit  Total Time: minutes: 11-20 minutes     Interval History #3:   Admitted 7/29/20 for pneumonia after VV with me where she was confused. Treated for left sided pneumonia with improvement. F/U CXR shows resolution of pneumonia. Now doing pretty well. No new concerns. Recovery has been slow. Interval History #2: Lower extremity edema is markedly better with Lasix. Her breathing has seen some improvement with Advair twice daily, but she still has shortness of breath. Overall she still does not feel well. She is concerned that she has an untreated UTI. Interval history: Has had 2 falls with injury to her legs since discharge. She has increased lower extremity edema since discharge. She has not yet followed up with her PCP as recommended but understands that this is important particularly given her mentioned complaints. She is having shortness of breath and wheezing. Presenting history:  11/3/2019  78 yo female with a history of CHF and seizures and asthma was found unresponsive at home. Family is concerned that she too much of her chronic pain medicines. He was in respiratory distress and confused. She did respond to Narcan. She was placed on BiPAP. Narcan had to be repeated x3. She subsequently developed hypotension and was started on levofed. There is some concern that she aspirated and has pneumonia as well. She was also found to be in DKA. reports that she has never smoked. She has never used smokeless tobacco.     Review of Systems:    Physical Exam:  not currently breastfeeding.    PHONE     Data:  8/13/20 CXR resolution of prior pneumonia  7/29/20 ACT no acute abnormality lower lung fields    11/4/2019 echo EF 65%, mild LVH    Assessment:  · Community acquired pneumonia - LLL - resolved  · Moderate persistent asthma   · Lower extremity edema   · Irregular heart rhythm, suspected atrial fibrillation in hospital    Plan:   · Continue Advair 250/50 BID, PRN albuterol  · See me in 6 months

## 2020-09-01 RX ORDER — FUROSEMIDE 20 MG/1
20 TABLET ORAL DAILY
Qty: 30 TABLET | Refills: 3 | Status: SHIPPED | OUTPATIENT
Start: 2020-09-01 | End: 2020-12-16

## 2020-11-03 PROBLEM — J18.9 PNEUMONIA DUE TO ORGANISM: Status: RESOLVED | Noted: 2020-11-03 | Resolved: 2020-11-03

## 2020-12-16 RX ORDER — FUROSEMIDE 20 MG/1
TABLET ORAL
Qty: 30 TABLET | Refills: 3 | Status: SHIPPED | OUTPATIENT
Start: 2020-12-16 | End: 2021-03-08

## 2021-03-08 RX ORDER — FUROSEMIDE 20 MG/1
TABLET ORAL
Qty: 30 TABLET | Refills: 3 | Status: SHIPPED | OUTPATIENT
Start: 2021-03-08 | End: 2021-06-22

## 2021-03-16 ENCOUNTER — OFFICE VISIT (OUTPATIENT)
Dept: PULMONOLOGY | Age: 71
End: 2021-03-16
Payer: COMMERCIAL

## 2021-03-16 VITALS
OXYGEN SATURATION: 97 % | RESPIRATION RATE: 16 BRPM | DIASTOLIC BLOOD PRESSURE: 78 MMHG | TEMPERATURE: 97 F | HEART RATE: 65 BPM | SYSTOLIC BLOOD PRESSURE: 131 MMHG

## 2021-03-16 DIAGNOSIS — J45.40 MODERATE PERSISTENT ASTHMA WITHOUT COMPLICATION: Primary | ICD-10-CM

## 2021-03-16 PROCEDURE — 99213 OFFICE O/P EST LOW 20 MIN: CPT | Performed by: INTERNAL MEDICINE

## 2021-03-16 RX ORDER — ALBUTEROL SULFATE 90 UG/1
2 AEROSOL, METERED RESPIRATORY (INHALATION) 4 TIMES DAILY PRN
Qty: 1 INHALER | Refills: 5 | Status: SHIPPED | OUTPATIENT
Start: 2021-03-16 | End: 2021-08-27

## 2021-03-16 NOTE — PROGRESS NOTES
Pulmonary Follow-up Note  Chief Complaint: Lower extremity edema  Referring Provider: hospital f/u    Interval History #3:   SOB is improved with advair but only using once per day. Has significant cough. Doesn't have albuteroll. Was recently seen by PCP and recommended to go to ED for possible TIA sx, which she did not do. Interval History #2: Lower extremity edema is markedly better with Lasix. Her breathing has seen some improvement with Advair twice daily, but she still has shortness of breath. Overall she still does not feel well. She is concerned that she has an untreated UTI. Interval history: Has had 2 falls with injury to her legs since discharge. She has increased lower extremity edema since discharge. She has not yet followed up with her PCP as recommended but understands that this is important particularly given her mentioned complaints. She is having shortness of breath and wheezing. Presenting history:  11/3/2019  78 yo female with a history of CHF and seizures and asthma was found unresponsive at home. Family is concerned that she too much of her chronic pain medicines. He was in respiratory distress and confused. She did respond to Narcan. She was placed on BiPAP. Narcan had to be repeated x3. She subsequently developed hypotension and was started on levofed. There is some concern that she aspirated and has pneumonia as well. She was also found to be in DKA. reports that she has never smoked. She has never used smokeless tobacco.     Review of Systems:    Physical Exam:  Blood pressure 131/78, pulse 65, temperature 97 °F (36.1 °C), resp. rate 16, SpO2 97 %, not currently breastfeeding.    PHONE     Data:  8/13/20 CXR resolution of prior pneumonia  7/29/20 ACT no acute abnormality lower lung fields    11/4/2019 echo EF 65%, mild LVH    Assessment:  · Moderate persistent asthma       Plan:   · Advair 250/50 BID - emphasized need to use BID; added PRN albuterol MDI   · See me in 6 months

## 2021-03-20 ENCOUNTER — HOSPITAL ENCOUNTER (INPATIENT)
Age: 71
LOS: 2 days | Discharge: LEFT BEFORE DISCHARGE | DRG: 065 | End: 2021-03-22
Attending: HOSPITALIST | Admitting: HOSPITALIST
Payer: COMMERCIAL

## 2021-03-20 ENCOUNTER — APPOINTMENT (OUTPATIENT)
Dept: GENERAL RADIOLOGY | Age: 71
End: 2021-03-20
Payer: COMMERCIAL

## 2021-03-20 ENCOUNTER — APPOINTMENT (OUTPATIENT)
Dept: CT IMAGING | Age: 71
End: 2021-03-20
Payer: COMMERCIAL

## 2021-03-20 ENCOUNTER — HOSPITAL ENCOUNTER (EMERGENCY)
Age: 71
Discharge: ANOTHER HOSPITAL | End: 2021-03-20
Attending: EMERGENCY MEDICINE
Payer: COMMERCIAL

## 2021-03-20 VITALS
WEIGHT: 205 LBS | DIASTOLIC BLOOD PRESSURE: 49 MMHG | SYSTOLIC BLOOD PRESSURE: 106 MMHG | BODY MASS INDEX: 38.71 KG/M2 | OXYGEN SATURATION: 99 % | HEIGHT: 61 IN | HEART RATE: 88 BPM | TEMPERATURE: 97.3 F | RESPIRATION RATE: 18 BRPM

## 2021-03-20 DIAGNOSIS — I63.9 CEREBROVASCULAR ACCIDENT (CVA), UNSPECIFIED MECHANISM (HCC): Primary | ICD-10-CM

## 2021-03-20 DIAGNOSIS — I51.7 CARDIOMEGALY: ICD-10-CM

## 2021-03-20 DIAGNOSIS — D72.829 LEUKOCYTOSIS, UNSPECIFIED TYPE: ICD-10-CM

## 2021-03-20 DIAGNOSIS — R07.9 CHEST PAIN, UNSPECIFIED TYPE: ICD-10-CM

## 2021-03-20 DIAGNOSIS — N17.9 AKI (ACUTE KIDNEY INJURY) (HCC): ICD-10-CM

## 2021-03-20 PROBLEM — R29.818 ACUTE FOCAL NEUROLOGICAL DEFICIT: Status: ACTIVE | Noted: 2021-03-20

## 2021-03-20 LAB
A/G RATIO: 1.1 (ref 1.1–2.2)
ALBUMIN SERPL-MCNC: 4.3 G/DL (ref 3.4–5)
ALP BLD-CCNC: 268 U/L (ref 40–129)
ALT SERPL-CCNC: 34 U/L (ref 10–40)
ANION GAP SERPL CALCULATED.3IONS-SCNC: 13 MMOL/L (ref 3–16)
AST SERPL-CCNC: 37 U/L (ref 15–37)
BASOPHILS ABSOLUTE: 0.2 K/UL (ref 0–0.2)
BASOPHILS RELATIVE PERCENT: 1.2 %
BILIRUB SERPL-MCNC: 0.4 MG/DL (ref 0–1)
BILIRUBIN URINE: NEGATIVE
BLOOD, URINE: NEGATIVE
BUN BLDV-MCNC: 26 MG/DL (ref 7–20)
CALCIUM SERPL-MCNC: 9.7 MG/DL (ref 8.3–10.6)
CHLORIDE BLD-SCNC: 102 MMOL/L (ref 99–110)
CLARITY: CLEAR
CO2: 20 MMOL/L (ref 21–32)
COLOR: YELLOW
CREAT SERPL-MCNC: 1.3 MG/DL (ref 0.6–1.2)
EOSINOPHILS ABSOLUTE: 0.7 K/UL (ref 0–0.6)
EOSINOPHILS RELATIVE PERCENT: 4.4 %
GFR AFRICAN AMERICAN: 49
GFR NON-AFRICAN AMERICAN: 41
GLOBULIN: 3.9 G/DL
GLUCOSE BLD-MCNC: 275 MG/DL (ref 70–99)
GLUCOSE URINE: >=1000 MG/DL
HCT VFR BLD CALC: 30.5 % (ref 36–48)
HEMOGLOBIN: 9.8 G/DL (ref 12–16)
KETONES, URINE: NEGATIVE MG/DL
LACTIC ACID, SEPSIS: 1.9 MMOL/L (ref 0.4–1.9)
LEUKOCYTE ESTERASE, URINE: NEGATIVE
LYMPHOCYTES ABSOLUTE: 1.2 K/UL (ref 1–5.1)
LYMPHOCYTES RELATIVE PERCENT: 8.2 %
MCH RBC QN AUTO: 28.5 PG (ref 26–34)
MCHC RBC AUTO-ENTMCNC: 32.2 G/DL (ref 31–36)
MCV RBC AUTO: 88.6 FL (ref 80–100)
MICROSCOPIC EXAMINATION: ABNORMAL
MONOCYTES ABSOLUTE: 0.7 K/UL (ref 0–1.3)
MONOCYTES RELATIVE PERCENT: 4.5 %
NEUTROPHILS ABSOLUTE: 12.2 K/UL (ref 1.7–7.7)
NEUTROPHILS RELATIVE PERCENT: 81.7 %
NITRITE, URINE: NEGATIVE
PDW BLD-RTO: 16.3 % (ref 12.4–15.4)
PH UA: 7 (ref 5–8)
PLATELET # BLD: 318 K/UL (ref 135–450)
PMV BLD AUTO: 8.1 FL (ref 5–10.5)
POTASSIUM REFLEX MAGNESIUM: 5 MMOL/L (ref 3.5–5.1)
PRO-BNP: 106 PG/ML (ref 0–124)
PROTEIN UA: NEGATIVE MG/DL
RBC # BLD: 3.44 M/UL (ref 4–5.2)
SODIUM BLD-SCNC: 135 MMOL/L (ref 136–145)
SPECIFIC GRAVITY UA: 1.01 (ref 1–1.03)
TOTAL PROTEIN: 8.2 G/DL (ref 6.4–8.2)
TROPONIN: <0.01 NG/ML
TROPONIN: <0.01 NG/ML
URINE REFLEX TO CULTURE: ABNORMAL
URINE TYPE: ABNORMAL
UROBILINOGEN, URINE: 0.2 E.U./DL
WBC # BLD: 14.9 K/UL (ref 4–11)

## 2021-03-20 PROCEDURE — 99291 CRITICAL CARE FIRST HOUR: CPT

## 2021-03-20 PROCEDURE — 83605 ASSAY OF LACTIC ACID: CPT

## 2021-03-20 PROCEDURE — 70450 CT HEAD/BRAIN W/O DYE: CPT

## 2021-03-20 PROCEDURE — 85025 COMPLETE CBC W/AUTO DIFF WBC: CPT

## 2021-03-20 PROCEDURE — 2580000003 HC RX 258: Performed by: NURSE PRACTITIONER

## 2021-03-20 PROCEDURE — 6370000000 HC RX 637 (ALT 250 FOR IP): Performed by: NURSE PRACTITIONER

## 2021-03-20 PROCEDURE — 6360000002 HC RX W HCPCS: Performed by: INTERNAL MEDICINE

## 2021-03-20 PROCEDURE — 6370000000 HC RX 637 (ALT 250 FOR IP): Performed by: INTERNAL MEDICINE

## 2021-03-20 PROCEDURE — 99284 EMERGENCY DEPT VISIT MOD MDM: CPT

## 2021-03-20 PROCEDURE — 1200000000 HC SEMI PRIVATE

## 2021-03-20 PROCEDURE — 83880 ASSAY OF NATRIURETIC PEPTIDE: CPT

## 2021-03-20 PROCEDURE — 80053 COMPREHEN METABOLIC PANEL: CPT

## 2021-03-20 PROCEDURE — 36415 COLL VENOUS BLD VENIPUNCTURE: CPT

## 2021-03-20 PROCEDURE — 94640 AIRWAY INHALATION TREATMENT: CPT

## 2021-03-20 PROCEDURE — 93005 ELECTROCARDIOGRAM TRACING: CPT | Performed by: EMERGENCY MEDICINE

## 2021-03-20 PROCEDURE — 84484 ASSAY OF TROPONIN QUANT: CPT

## 2021-03-20 PROCEDURE — 82607 VITAMIN B-12: CPT

## 2021-03-20 PROCEDURE — 94150 VITAL CAPACITY TEST: CPT

## 2021-03-20 PROCEDURE — 71045 X-RAY EXAM CHEST 1 VIEW: CPT

## 2021-03-20 PROCEDURE — 81003 URINALYSIS AUTO W/O SCOPE: CPT

## 2021-03-20 RX ORDER — MAGNESIUM SULFATE IN WATER 40 MG/ML
2000 INJECTION, SOLUTION INTRAVENOUS PRN
Status: DISCONTINUED | OUTPATIENT
Start: 2021-03-20 | End: 2021-03-22 | Stop reason: HOSPADM

## 2021-03-20 RX ORDER — POTASSIUM CHLORIDE 7.45 MG/ML
10 INJECTION INTRAVENOUS PRN
Status: DISCONTINUED | OUTPATIENT
Start: 2021-03-20 | End: 2021-03-22 | Stop reason: HOSPADM

## 2021-03-20 RX ORDER — LABETALOL HYDROCHLORIDE 5 MG/ML
10 INJECTION, SOLUTION INTRAVENOUS EVERY 10 MIN PRN
Status: DISCONTINUED | OUTPATIENT
Start: 2021-03-20 | End: 2021-03-22 | Stop reason: HOSPADM

## 2021-03-20 RX ORDER — PROMETHAZINE HYDROCHLORIDE 25 MG/1
12.5 TABLET ORAL EVERY 6 HOURS PRN
Status: DISCONTINUED | OUTPATIENT
Start: 2021-03-20 | End: 2021-03-22 | Stop reason: HOSPADM

## 2021-03-20 RX ORDER — ACETAMINOPHEN 325 MG/1
650 TABLET ORAL EVERY 6 HOURS PRN
Status: DISCONTINUED | OUTPATIENT
Start: 2021-03-20 | End: 2021-03-22 | Stop reason: HOSPADM

## 2021-03-20 RX ORDER — 0.9 % SODIUM CHLORIDE 0.9 %
500 INTRAVENOUS SOLUTION INTRAVENOUS ONCE
Status: COMPLETED | OUTPATIENT
Start: 2021-03-20 | End: 2021-03-20

## 2021-03-20 RX ORDER — FLUTICASONE PROPIONATE 50 MCG
1 SPRAY, SUSPENSION (ML) NASAL DAILY
Status: DISCONTINUED | OUTPATIENT
Start: 2021-03-21 | End: 2021-03-22 | Stop reason: HOSPADM

## 2021-03-20 RX ORDER — ASPIRIN 325 MG
325 TABLET ORAL ONCE
Status: COMPLETED | OUTPATIENT
Start: 2021-03-20 | End: 2021-03-20

## 2021-03-20 RX ORDER — TAMSULOSIN HYDROCHLORIDE 0.4 MG/1
0.4 CAPSULE ORAL DAILY
Status: DISCONTINUED | OUTPATIENT
Start: 2021-03-21 | End: 2021-03-22 | Stop reason: HOSPADM

## 2021-03-20 RX ORDER — HYDROCODONE BITARTRATE AND ACETAMINOPHEN 7.5; 325 MG/1; MG/1
1 TABLET ORAL EVERY 6 HOURS PRN
Status: DISCONTINUED | OUTPATIENT
Start: 2021-03-20 | End: 2021-03-22 | Stop reason: HOSPADM

## 2021-03-20 RX ORDER — ONDANSETRON 2 MG/ML
4 INJECTION INTRAMUSCULAR; INTRAVENOUS EVERY 6 HOURS PRN
Status: DISCONTINUED | OUTPATIENT
Start: 2021-03-20 | End: 2021-03-22 | Stop reason: HOSPADM

## 2021-03-20 RX ORDER — QUETIAPINE FUMARATE 25 MG/1
50 TABLET, FILM COATED ORAL NIGHTLY
Status: DISCONTINUED | OUTPATIENT
Start: 2021-03-20 | End: 2021-03-22 | Stop reason: HOSPADM

## 2021-03-20 RX ORDER — BUDESONIDE AND FORMOTEROL FUMARATE DIHYDRATE 80; 4.5 UG/1; UG/1
2 AEROSOL RESPIRATORY (INHALATION) 2 TIMES DAILY
Refills: 5 | Status: DISCONTINUED | OUTPATIENT
Start: 2021-03-20 | End: 2021-03-22 | Stop reason: HOSPADM

## 2021-03-20 RX ORDER — FAMOTIDINE 20 MG/1
20 TABLET, FILM COATED ORAL DAILY PRN
Status: DISCONTINUED | OUTPATIENT
Start: 2021-03-20 | End: 2021-03-22 | Stop reason: HOSPADM

## 2021-03-20 RX ORDER — POLYETHYLENE GLYCOL 3350 17 G/17G
17 POWDER, FOR SOLUTION ORAL DAILY PRN
Status: DISCONTINUED | OUTPATIENT
Start: 2021-03-20 | End: 2021-03-22 | Stop reason: HOSPADM

## 2021-03-20 RX ORDER — ATORVASTATIN CALCIUM 80 MG/1
80 TABLET, FILM COATED ORAL NIGHTLY
Status: DISCONTINUED | OUTPATIENT
Start: 2021-03-20 | End: 2021-03-22 | Stop reason: HOSPADM

## 2021-03-20 RX ORDER — CETIRIZINE HYDROCHLORIDE 10 MG/1
10 TABLET ORAL DAILY
Status: DISCONTINUED | OUTPATIENT
Start: 2021-03-21 | End: 2021-03-22 | Stop reason: HOSPADM

## 2021-03-20 RX ORDER — AMIODARONE HYDROCHLORIDE 200 MG/1
200 TABLET ORAL DAILY
Status: DISCONTINUED | OUTPATIENT
Start: 2021-03-21 | End: 2021-03-22 | Stop reason: HOSPADM

## 2021-03-20 RX ORDER — ACETAMINOPHEN 650 MG/1
650 SUPPOSITORY RECTAL EVERY 6 HOURS PRN
Status: DISCONTINUED | OUTPATIENT
Start: 2021-03-20 | End: 2021-03-22 | Stop reason: HOSPADM

## 2021-03-20 RX ORDER — HYDROXYZINE HYDROCHLORIDE 10 MG/1
25 TABLET, FILM COATED ORAL EVERY 8 HOURS PRN
Status: DISCONTINUED | OUTPATIENT
Start: 2021-03-20 | End: 2021-03-22 | Stop reason: HOSPADM

## 2021-03-20 RX ORDER — ALBUTEROL SULFATE 2.5 MG/3ML
2.5 SOLUTION RESPIRATORY (INHALATION) EVERY 4 HOURS PRN
Status: DISCONTINUED | OUTPATIENT
Start: 2021-03-20 | End: 2021-03-21

## 2021-03-20 RX ORDER — LEVOTHYROXINE SODIUM 0.05 MG/1
50 TABLET ORAL DAILY
Status: DISCONTINUED | OUTPATIENT
Start: 2021-03-21 | End: 2021-03-22 | Stop reason: HOSPADM

## 2021-03-20 RX ADMIN — ALBUTEROL SULFATE 2.5 MG: 2.5 SOLUTION RESPIRATORY (INHALATION) at 23:33

## 2021-03-20 RX ADMIN — HYDROCODONE BITARTRATE AND ACETAMINOPHEN 1 TABLET: 7.5; 325 TABLET ORAL at 23:23

## 2021-03-20 RX ADMIN — ATORVASTATIN CALCIUM 80 MG: 80 TABLET, FILM COATED ORAL at 23:23

## 2021-03-20 RX ADMIN — ASPIRIN 325 MG: 325 TABLET, FILM COATED ORAL at 17:35

## 2021-03-20 RX ADMIN — SODIUM CHLORIDE 500 ML: 9 INJECTION, SOLUTION INTRAVENOUS at 15:20

## 2021-03-20 RX ADMIN — Medication 2 PUFF: at 23:33

## 2021-03-20 RX ADMIN — QUETIAPINE FUMARATE 50 MG: 25 TABLET ORAL at 23:23

## 2021-03-20 ASSESSMENT — PAIN DESCRIPTION - ORIENTATION: ORIENTATION: RIGHT

## 2021-03-20 ASSESSMENT — PAIN SCALES - GENERAL
PAINLEVEL_OUTOF10: 8
PAINLEVEL_OUTOF10: 6
PAINLEVEL_OUTOF10: 0
PAINLEVEL_OUTOF10: 8

## 2021-03-20 ASSESSMENT — ENCOUNTER SYMPTOMS
SHORTNESS OF BREATH: 1
COLOR CHANGE: 0
SORE THROAT: 0
ABDOMINAL PAIN: 0
RHINORRHEA: 0

## 2021-03-20 ASSESSMENT — PAIN DESCRIPTION - PAIN TYPE: TYPE: ACUTE PAIN

## 2021-03-20 ASSESSMENT — PAIN DESCRIPTION - DESCRIPTORS: DESCRIPTORS: ACHING

## 2021-03-20 NOTE — ED NOTES
Consult to [facility] via [facility] at [facility]  03/20/21 [address] completed with call back from Dr. nAderson Barrera at [facility]  03/20/21 [phone]

## 2021-03-20 NOTE — ED PROVIDER NOTES
I independently performed a history and physical on Ariadna Rounds. All diagnostic, treatment, and disposition decisions were made by myself in conjunction with the advanced practice provider. For further details of [facility] Escobar emergency department encounter, please see the advance practice provider's documentation. In brief, this is a 72y.o. year old female, with   Chief Complaint   Patient presents with    Numbness     right side of all body with pain for one week, History of stroke, rt hand pain for months. Who presents to the emergency department complaining that her right side feels numb and heavy. Patient apparently saw [facility] about this about a week ago and was referred to the emergency department at that time. Patient did not come hoping that her symptoms were going to get better. She subsequently presented to day because the numbness seems to be getting worse. She also complains of intermittent aching pain to the right arm. She for the last couple of months has also had some difficulty with swallowing and feeling like she is choking on things. On examination I find an elderly female patient in no acute distress. Clear to auscultation bilaterally. Minimally tachycardic but no murmur. At [facility] stroke score as below. My concern is that the patient has possibly had a small stroke. I believe evaluation by neurology and an MRI is indicated. NIH Stroke Scale     Interval: Baseline  Time: 4:37 PM  Person Administering Scale: Kristi Moctezuma   Administer stroke scale items in the order listed. Record performance in each category after each subscale exam. Do not go back and change scores. Follow directions provided for each exam technique. Scores should reflect what the patient does, not what the clinician thinks the patient can do. The clinician should record answers while administering the exam and work quickly.  Except where indicated, the patient should not be

## 2021-03-20 NOTE — ED NOTES
Pt ambulating out in hallway, gait steady, dinner tray ordered, pt updated on plan of care and wait for transfer     Levar Wooten RN  03/20/21 [id number]

## 2021-03-20 NOTE — ED NOTES
Pt ambulated to restroom without assistance, gait steady, pulse ox 94% with ambulation       Kevin Phelan RN  03/20/21 2149
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
## 2021-03-20 NOTE — ED NOTES
[address] called back at 1800 with:  Bed Number: 073  Report Number: [phone]  Accepting Doctor: Dr. Jackie Marlow  Transport: Chayito Brush: Ian Salamanca  03/20/21 5505

## 2021-03-21 ENCOUNTER — APPOINTMENT (OUTPATIENT)
Dept: MRI IMAGING | Age: 71
DRG: 065 | End: 2021-03-21
Attending: HOSPITALIST
Payer: COMMERCIAL

## 2021-03-21 LAB
A/G RATIO: 1.1 (ref 1.1–2.2)
ALBUMIN SERPL-MCNC: 4.3 G/DL (ref 3.4–5)
ALP BLD-CCNC: 262 U/L (ref 40–129)
ALT SERPL-CCNC: 36 U/L (ref 10–40)
ANION GAP SERPL CALCULATED.3IONS-SCNC: 12 MMOL/L (ref 3–16)
AST SERPL-CCNC: 37 U/L (ref 15–37)
BASOPHILS ABSOLUTE: 0.1 K/UL (ref 0–0.2)
BASOPHILS ABSOLUTE: 0.1 K/UL (ref 0–0.2)
BASOPHILS RELATIVE PERCENT: 0.6 %
BASOPHILS RELATIVE PERCENT: 1.1 %
BILIRUB SERPL-MCNC: 0.5 MG/DL (ref 0–1)
BUN BLDV-MCNC: 26 MG/DL (ref 7–20)
CALCIUM SERPL-MCNC: 9.8 MG/DL (ref 8.3–10.6)
CHLORIDE BLD-SCNC: 102 MMOL/L (ref 99–110)
CHOLESTEROL, TOTAL: 172 MG/DL (ref 0–199)
CO2: 22 MMOL/L (ref 21–32)
CREAT SERPL-MCNC: 1.4 MG/DL (ref 0.6–1.2)
EKG ATRIAL RATE: 102 BPM
EKG DIAGNOSIS: NORMAL
EKG P AXIS: 61 DEGREES
EKG P-R INTERVAL: 136 MS
EKG Q-T INTERVAL: 332 MS
EKG QRS DURATION: 66 MS
EKG QTC CALCULATION (BAZETT): 432 MS
EKG R AXIS: -6 DEGREES
EKG T AXIS: 58 DEGREES
EKG VENTRICULAR RATE: 102 BPM
EOSINOPHILS ABSOLUTE: 0.8 K/UL (ref 0–0.6)
EOSINOPHILS ABSOLUTE: 1.2 K/UL (ref 0–0.6)
EOSINOPHILS RELATIVE PERCENT: 5.8 %
EOSINOPHILS RELATIVE PERCENT: 8.1 %
ESTIMATED AVERAGE GLUCOSE: 180 MG/DL
GFR AFRICAN AMERICAN: 45
GFR NON-AFRICAN AMERICAN: 37
GLOBULIN: 3.8 G/DL
GLUCOSE BLD-MCNC: 131 MG/DL (ref 70–99)
GLUCOSE BLD-MCNC: 145 MG/DL (ref 70–99)
GLUCOSE BLD-MCNC: 146 MG/DL (ref 70–99)
GLUCOSE BLD-MCNC: 152 MG/DL (ref 70–99)
GLUCOSE BLD-MCNC: 176 MG/DL (ref 70–99)
GLUCOSE BLD-MCNC: 216 MG/DL (ref 70–99)
HBA1C MFR BLD: 7.9 %
HCT VFR BLD CALC: 29.8 % (ref 36–48)
HCT VFR BLD CALC: 30.4 % (ref 36–48)
HDLC SERPL-MCNC: 72 MG/DL (ref 40–60)
HEMOGLOBIN: 9.8 G/DL (ref 12–16)
HEMOGLOBIN: 9.8 G/DL (ref 12–16)
LDL CHOLESTEROL CALCULATED: 81 MG/DL
LYMPHOCYTES ABSOLUTE: 2.1 K/UL (ref 1–5.1)
LYMPHOCYTES ABSOLUTE: 3.4 K/UL (ref 1–5.1)
LYMPHOCYTES RELATIVE PERCENT: 17.3 %
LYMPHOCYTES RELATIVE PERCENT: 21.4 %
MCH RBC QN AUTO: 28.2 PG (ref 26–34)
MCH RBC QN AUTO: 28.9 PG (ref 26–34)
MCHC RBC AUTO-ENTMCNC: 32.3 G/DL (ref 31–36)
MCHC RBC AUTO-ENTMCNC: 32.9 G/DL (ref 31–36)
MCV RBC AUTO: 87.2 FL (ref 80–100)
MCV RBC AUTO: 87.7 FL (ref 80–100)
MONOCYTES ABSOLUTE: 0.7 K/UL (ref 0–1.3)
MONOCYTES ABSOLUTE: 0.9 K/UL (ref 0–1.3)
MONOCYTES RELATIVE PERCENT: 4.3 %
MONOCYTES RELATIVE PERCENT: 6.7 %
NEUTROPHILS ABSOLUTE: 14.3 K/UL (ref 1.7–7.7)
NEUTROPHILS ABSOLUTE: 6.3 K/UL (ref 1.7–7.7)
NEUTROPHILS RELATIVE PERCENT: 62.7 %
NEUTROPHILS RELATIVE PERCENT: 72 %
PDW BLD-RTO: 15.9 % (ref 12.4–15.4)
PDW BLD-RTO: 16.3 % (ref 12.4–15.4)
PERFORMED ON: ABNORMAL
PLATELET # BLD: 351 K/UL (ref 135–450)
PLATELET # BLD: 414 K/UL (ref 135–450)
PMV BLD AUTO: 7.4 FL (ref 5–10.5)
PMV BLD AUTO: 7.9 FL (ref 5–10.5)
POTASSIUM REFLEX MAGNESIUM: 4.8 MMOL/L (ref 3.5–5.1)
RBC # BLD: 3.39 M/UL (ref 4–5.2)
RBC # BLD: 3.49 M/UL (ref 4–5.2)
SODIUM BLD-SCNC: 136 MMOL/L (ref 136–145)
TOTAL PROTEIN: 8.1 G/DL (ref 6.4–8.2)
TRIGL SERPL-MCNC: 93 MG/DL (ref 0–150)
TROPONIN: <0.01 NG/ML
TSH REFLEX: 0.71 UIU/ML (ref 0.27–4.2)
VITAMIN B-12: 1007 PG/ML (ref 211–911)
VLDLC SERPL CALC-MCNC: 19 MG/DL
WBC # BLD: 10 K/UL (ref 4–11)
WBC # BLD: 19.9 K/UL (ref 4–11)

## 2021-03-21 PROCEDURE — 6370000000 HC RX 637 (ALT 250 FOR IP): Performed by: INTERNAL MEDICINE

## 2021-03-21 PROCEDURE — 1200000000 HC SEMI PRIVATE

## 2021-03-21 PROCEDURE — 84443 ASSAY THYROID STIM HORMONE: CPT

## 2021-03-21 PROCEDURE — 80053 COMPREHEN METABOLIC PANEL: CPT

## 2021-03-21 PROCEDURE — 6360000002 HC RX W HCPCS: Performed by: HOSPITALIST

## 2021-03-21 PROCEDURE — 97535 SELF CARE MNGMENT TRAINING: CPT

## 2021-03-21 PROCEDURE — 85025 COMPLETE CBC W/AUTO DIFF WBC: CPT

## 2021-03-21 PROCEDURE — 94640 AIRWAY INHALATION TREATMENT: CPT

## 2021-03-21 PROCEDURE — 84484 ASSAY OF TROPONIN QUANT: CPT

## 2021-03-21 PROCEDURE — 97162 PT EVAL MOD COMPLEX 30 MIN: CPT

## 2021-03-21 PROCEDURE — 2580000003 HC RX 258: Performed by: INTERNAL MEDICINE

## 2021-03-21 PROCEDURE — 70551 MRI BRAIN STEM W/O DYE: CPT

## 2021-03-21 PROCEDURE — 97165 OT EVAL LOW COMPLEX 30 MIN: CPT

## 2021-03-21 PROCEDURE — 80061 LIPID PANEL: CPT

## 2021-03-21 PROCEDURE — 97116 GAIT TRAINING THERAPY: CPT

## 2021-03-21 PROCEDURE — 6370000000 HC RX 637 (ALT 250 FOR IP): Performed by: NURSE PRACTITIONER

## 2021-03-21 PROCEDURE — 83036 HEMOGLOBIN GLYCOSYLATED A1C: CPT

## 2021-03-21 PROCEDURE — 6360000002 HC RX W HCPCS: Performed by: INTERNAL MEDICINE

## 2021-03-21 PROCEDURE — 36415 COLL VENOUS BLD VENIPUNCTURE: CPT

## 2021-03-21 RX ORDER — DEXTROSE MONOHYDRATE 50 MG/ML
100 INJECTION, SOLUTION INTRAVENOUS PRN
Status: DISCONTINUED | OUTPATIENT
Start: 2021-03-21 | End: 2021-03-22 | Stop reason: HOSPADM

## 2021-03-21 RX ORDER — 0.9 % SODIUM CHLORIDE 0.9 %
500 INTRAVENOUS SOLUTION INTRAVENOUS ONCE
Status: COMPLETED | OUTPATIENT
Start: 2021-03-21 | End: 2021-03-21

## 2021-03-21 RX ORDER — NICOTINE POLACRILEX 4 MG
15 LOZENGE BUCCAL PRN
Status: DISCONTINUED | OUTPATIENT
Start: 2021-03-21 | End: 2021-03-22 | Stop reason: HOSPADM

## 2021-03-21 RX ORDER — DEXTROSE MONOHYDRATE 25 G/50ML
12.5 INJECTION, SOLUTION INTRAVENOUS PRN
Status: DISCONTINUED | OUTPATIENT
Start: 2021-03-21 | End: 2021-03-22 | Stop reason: HOSPADM

## 2021-03-21 RX ORDER — ALBUTEROL SULFATE 2.5 MG/3ML
2.5 SOLUTION RESPIRATORY (INHALATION) 2 TIMES DAILY
Status: DISCONTINUED | OUTPATIENT
Start: 2021-03-21 | End: 2021-03-22 | Stop reason: HOSPADM

## 2021-03-21 RX ADMIN — Medication 2 PUFF: at 07:50

## 2021-03-21 RX ADMIN — INSULIN LISPRO 1 UNITS: 100 INJECTION, SOLUTION INTRAVENOUS; SUBCUTANEOUS at 20:36

## 2021-03-21 RX ADMIN — Medication 2 PUFF: at 19:18

## 2021-03-21 RX ADMIN — AMIODARONE HYDROCHLORIDE 200 MG: 200 TABLET ORAL at 09:12

## 2021-03-21 RX ADMIN — HYDROCODONE BITARTRATE AND ACETAMINOPHEN 1 TABLET: 7.5; 325 TABLET ORAL at 22:12

## 2021-03-21 RX ADMIN — CETIRIZINE HYDROCHLORIDE 10 MG: 10 TABLET, FILM COATED ORAL at 09:12

## 2021-03-21 RX ADMIN — FLUTICASONE PROPIONATE 1 SPRAY: 50 SPRAY, METERED NASAL at 09:20

## 2021-03-21 RX ADMIN — TAMSULOSIN HYDROCHLORIDE 0.4 MG: 0.4 CAPSULE ORAL at 09:11

## 2021-03-21 RX ADMIN — QUETIAPINE FUMARATE 50 MG: 25 TABLET ORAL at 20:36

## 2021-03-21 RX ADMIN — ENOXAPARIN SODIUM 40 MG: 40 INJECTION SUBCUTANEOUS at 09:13

## 2021-03-21 RX ADMIN — INSULIN LISPRO 2 UNITS: 100 INJECTION, SOLUTION INTRAVENOUS; SUBCUTANEOUS at 12:25

## 2021-03-21 RX ADMIN — SODIUM CHLORIDE 500 ML: 9 INJECTION, SOLUTION INTRAVENOUS at 03:21

## 2021-03-21 RX ADMIN — ALBUTEROL SULFATE 2.5 MG: 2.5 SOLUTION RESPIRATORY (INHALATION) at 19:13

## 2021-03-21 RX ADMIN — ACETAMINOPHEN 650 MG: 325 TABLET ORAL at 09:28

## 2021-03-21 RX ADMIN — ALBUTEROL SULFATE 2.5 MG: 2.5 SOLUTION RESPIRATORY (INHALATION) at 07:50

## 2021-03-21 RX ADMIN — ATORVASTATIN CALCIUM 80 MG: 80 TABLET, FILM COATED ORAL at 20:35

## 2021-03-21 RX ADMIN — LEVOTHYROXINE SODIUM 50 MCG: 0.05 TABLET ORAL at 09:12

## 2021-03-21 RX ADMIN — INSULIN LISPRO 1 UNITS: 100 INJECTION, SOLUTION INTRAVENOUS; SUBCUTANEOUS at 09:14

## 2021-03-21 RX ADMIN — INSULIN LISPRO 1 UNITS: 100 INJECTION, SOLUTION INTRAVENOUS; SUBCUTANEOUS at 17:22

## 2021-03-21 ASSESSMENT — PAIN DESCRIPTION - ORIENTATION
ORIENTATION: RIGHT
ORIENTATION: RIGHT

## 2021-03-21 ASSESSMENT — PAIN SCALES - GENERAL
PAINLEVEL_OUTOF10: 4
PAINLEVEL_OUTOF10: 5

## 2021-03-21 ASSESSMENT — PAIN DESCRIPTION - DESCRIPTORS: DESCRIPTORS: HEADACHE

## 2021-03-21 ASSESSMENT — PAIN DESCRIPTION - LOCATION
LOCATION: GENERALIZED;HEAD
LOCATION: ARM

## 2021-03-21 ASSESSMENT — PAIN DESCRIPTION - PAIN TYPE
TYPE: ACUTE PAIN
TYPE: ACUTE PAIN

## 2021-03-21 NOTE — PROGRESS NOTES
RESPIRATORY THERAPY ASSESSMENT    Name:  [address] [place] [address] Record Number:  [id number]  Age: 72 y.o. Gender: female  : 1950  Today's Date:  3/21/2021  Room:  [address] [place] [address]    Assessment     Is the patient being admitted for a COPD or Asthma exacerbation? No   (If yes the patient will be seen every 4 hours for the first 24 hours and then reassessed)    Patient Admission Diagnosis      Allergies  Allergies   Allergen Reactions    Erythromycin Nausea And Vomiting    Keflex [Cephalexin] Nausea And Vomiting    Sulfa Antibiotics Nausea And Vomiting    Tetracyclines & Related Nausea And Vomiting       Minimum Predicted Vital Capacity:     720          Actual Vital Capacity:      950              Pulmonary History:Asthma and CHF/Pulmonary Edema  Home Oxygen Therapy:  room air  Home Respiratory Therapy:  HHN Albuterol BID, Advair BID   Current Respiratory Therapy:  HHN Albuterol PRN, MDI Symbicort BID  Treatment Type: MDI  Medications: Budesonide/Formoterol    Respiratory Severity Index(RSI)   Patients with orders for inhalation medications, oxygen, or any therapeutic treatment modality will be placed on Respiratory Protocol. They will be assessed with the first treatment and at least every 72 hours thereafter. The following severity scale will be used to determine frequency of treatment intervention.     Smoking History: No Smoking History = 0    Social History  Social History     Tobacco Use    Smoking status: Never Smoker    Smokeless tobacco: Never Used   Substance Use Topics    Alcohol use: No    Drug use: Yes     Types: Methamphetamines       Recent Surgical History: None = 0  Past Surgical History  Past Surgical History:   Procedure Laterality Date    CATARACT REMOVAL WITH IMPLANT Left 14    CATARACT REMOVAL WITH IMPLANT Right 1/2/15    phacoemulsification with initraocular lens implant     SECTION      x3    COLONOSCOPY  12    HYSTERECTOMY      TONSILLECTOMY      TUBAL LIGATION      UPPER GASTROINTESTINAL ENDOSCOPY  03/25/2014    gastric polyp    UPPER GASTROINTESTINAL ENDOSCOPY  06/26/2014    gastric polyp    UPPER GASTROINTESTINAL ENDOSCOPY  01/12/2018    gastritis       Level of Consciousness: Alert, Oriented, and Cooperative = 0    Level of Activity: Walking with assistance = 1    Respiratory Pattern: Regular Pattern; RR 8-20 = 0    Breath Sounds: Clear = 0    Sputum   ,  ,    Cough: Strong, spontaneous, non-productive = 0    Vital Signs   /66   Pulse 81   Temp 98.2 °F (36.8 °C) (Oral)   Resp 16   Ht 5' 1\" (1.549 m)   Wt 200 lb (90.7 kg)   SpO2 94%   BMI 37.79 kg/m²   SPO2 (COPD values may differ): Greater than or equal to 92% on room air = 0    Peak Flow (asthma only): not applicable = 0    RSI: 0-4 = See once and convert to home regimen or discontinue        Plan       Goals: medication delivery, mobilize retained secretions, volume expansion and improve oxygenation    Patient/caregiver was educated on the proper method of use for Respiratory Care Devices:  Yes      Level of patient/caregiver understanding able to:   ? Verbalize understanding   ? Demonstrate understanding       ? Teach back        ? Needs reinforcement       ? No available caregiver               ? Other:     Response to education:  Good     Is patient being placed on Home Treatment Regimen? Yes     Does the patient have everything they need prior to discharge? NA     Comments: Patient admits with right side numbness. Plan of Care: HHN Albuterol BID, MDI Symbicort BID    Electronically signed by Dominique Barragan RCP on 3/21/2021 at 1:09 AM    Respiratory Protocol Guidelines     1. Assessment and treatment by Respiratory Therapy will be initiated for medication and therapeutic interventions upon initiation of aerosolized medication. 2. Physician will be contacted for respiratory rate (RR) greater than 35 breaths per minute.  Therapy will be held for heart rate (HR) greater than 140 beats per minute, pending direction from physician. 3. Bronchodilators will be administered via Metered Dose Inhaler (MDI) with spacer when the following criteria are met:  a. Alert and cooperative     b. HR < 140 bpm  c. RR < 30 bpm                d. Can demonstrate a 2-3 second inspiratory hold  4. Bronchodilators will be administered via Hand Held Nebulizer ANDRZEJ [facility]) to patients when ANY of the following criteria are met  a. Incognizant or uncooperative          b. Patients treated with HHN at Home        c. Unable to demonstrate proper use of MDI with spacer     d. RR > 30 bpm   5. Bronchodilators will be delivered via Metered Dose Inhaler (MDI), HHN, Aerogen to intubated patients on mechanical ventilation. 6. Inhalation medication orders will be delivered and/or substituted as outlined below. Aerosolized Medications Ordering and Administration Guidelines:    1. All Medications will be ordered by a physician, and their frequency and/or modality will be adjusted as defined by the patients Respiratory Severity Index (RSI) score. 2. If the patient does not have documented COPD, consider discontinuing anticholinergics when RSI is less than 9.  3. If the bronchospasm worsens (increased RSI), then the bronchodilator frequency can be increased to a maximum of every 4 hours. If greater than every 4 hours is required, the physician will be contacted. 4. If the bronchospasm improves, the frequency of the bronchodilator can be decreased, based on the patient's RSI, but not less than home treatment regimen frequency. 5. Bronchodilator(s) will be discontinued if patient has a RSI less than 9 and has received no scheduled or as needed treatment for 72  Hrs. Patients Ordered on a Mucolytic Agent:    1. Must always be administered with a bronchodilator.     2. Discontinue if patient experiences worsened bronchospasm, or secretions have lessened to the point that the patient is able to clear them with a
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
## 2021-03-21 NOTE — FLOWSHEET NOTE
03/21/21 1237   Encounter Summary   Services provided to: Patient   Continue Visiting   (3/21 Informed pt & RN we've ADs in chart.)   Complexity of Encounter Moderate   Length of Encounter 15 minutes   Advance Care Planning Yes   Advance Directives (For Healthcare)   Healthcare Directive Yes, patient has an advance directive for healthcare treatment   Type of Healthcare Directive Durable power of  for health care;Living will   Copy in Chart Yes, copy in chart   Advance Directives Copy in Media tab  (From 2018)
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
## 2021-03-21 NOTE — FLOWSHEET NOTE
03/21/21 1218   Encounter Summary   Services provided to: Patient   Referral/Consult From: Nurse;Patient   Support System Children   Continue Visiting   (3/21 Spt/prayer. Gave/explained ADs. Pt will call to complet)   Complexity of Encounter High   Length of Encounter 45 minutes   Spiritual Assessment Completed Yes   Advance Care Planning Yes   Spiritual/Caodaism   Type Spiritual support   Assessment Calm; Approachable; Anxious; Coping;Doubtful   Intervention Active listening;Explored feelings, thoughts, concerns;Explored coping resources;Nurtured hope;Prayer;Sustaining presence/ Ministry of presence;Empowerment; Discussed meaning/purpose;Discussed illness/injury and it's impact   Outcome Comfort;Expressed gratitude;Expressed feelings of graham, peace, and/or awe;Engaged in conversation; Shared life review;Expressed feelings/needs/concerns; Tearful;Coping; Shared reminiscences; Encouraged; Hopeful;Receptive   Advance Directives (For Healthcare)   Healthcare Directive No, patient does not have an advance directive for healthcare treatment   Advance Directives Documents given;Documents explained  (Pt will call after reading over documents.)

## 2021-03-21 NOTE — PLAN OF CARE
Bed in lowest position, wheels locked, 2/4 side rails up, nonskid footwear on. Call light within reach. Pt instructed to call out when needing assistance. Pt stated understanding. Nurse will continue to monitor.

## 2021-03-21 NOTE — PROGRESS NOTES
4 Eyes Skin Assessment     The patient is being assess for   Admission    I agree that 2 RN's have performed a thorough Head to Toe Skin Assessment on the patient. ALL assessment sites listed below have been assessed. Areas assessed by both nurses:   [x]   Head, Face, and Ears   [x]   Shoulders, Back, and Chest, Abdomen  [x]   Arms, Elbows, and Hands   [x]   Coccyx, Sacrum, and Ischium  [x]   Legs, Feet, and Heels          **SHARE this note so that the co-signing nurse is able to place an eSignature**    Co-signer eSignature: Electronically signed by Bev Layton RN on 3/21/21 at 3:42 AM EDT    Does the Patient have Skin Breakdown?   No          Abdiel Prevention initiated:  NA   Wound Care Orders initiated:  NA      WOC nurse consulted for Pressure Injury (Stage 3,4, Unstageable, DTI, NWPT, Complex wounds)and New or Established Ostomies:  NA      Primary Nurse eSignature: Electronically signed by Merdis Phoenix, RN on 3/20/21 at 11:28 PM EDT
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
## 2021-03-22 ENCOUNTER — APPOINTMENT (OUTPATIENT)
Dept: VASCULAR LAB | Age: 71
DRG: 065 | End: 2021-03-22
Attending: HOSPITALIST
Payer: COMMERCIAL

## 2021-03-22 VITALS
BODY MASS INDEX: 40.29 KG/M2 | HEART RATE: 97 BPM | HEIGHT: 61 IN | WEIGHT: 213.41 LBS | SYSTOLIC BLOOD PRESSURE: 170 MMHG | TEMPERATURE: 97.8 F | DIASTOLIC BLOOD PRESSURE: 73 MMHG | OXYGEN SATURATION: 96 % | RESPIRATION RATE: 16 BRPM

## 2021-03-22 LAB
A/G RATIO: 1.1 (ref 1.1–2.2)
ALBUMIN SERPL-MCNC: 3.9 G/DL (ref 3.4–5)
ALP BLD-CCNC: 228 U/L (ref 40–129)
ALT SERPL-CCNC: 34 U/L (ref 10–40)
ANION GAP SERPL CALCULATED.3IONS-SCNC: 10 MMOL/L (ref 3–16)
AST SERPL-CCNC: 49 U/L (ref 15–37)
BASOPHILS ABSOLUTE: 0.1 K/UL (ref 0–0.2)
BASOPHILS RELATIVE PERCENT: 1 %
BILIRUB SERPL-MCNC: 0.4 MG/DL (ref 0–1)
BUN BLDV-MCNC: 27 MG/DL (ref 7–20)
CALCIUM SERPL-MCNC: 9.5 MG/DL (ref 8.3–10.6)
CHLORIDE BLD-SCNC: 104 MMOL/L (ref 99–110)
CO2: 23 MMOL/L (ref 21–32)
CREAT SERPL-MCNC: 1.1 MG/DL (ref 0.6–1.2)
EOSINOPHILS ABSOLUTE: 0.9 K/UL (ref 0–0.6)
EOSINOPHILS RELATIVE PERCENT: 10 %
GFR AFRICAN AMERICAN: 59
GFR NON-AFRICAN AMERICAN: 49
GLOBULIN: 3.5 G/DL
GLUCOSE BLD-MCNC: 150 MG/DL (ref 70–99)
GLUCOSE BLD-MCNC: 152 MG/DL (ref 70–99)
GLUCOSE BLD-MCNC: 168 MG/DL (ref 70–99)
GLUCOSE BLD-MCNC: 183 MG/DL (ref 70–99)
HCT VFR BLD CALC: 28.1 % (ref 36–48)
HEMOGLOBIN: 9.1 G/DL (ref 12–16)
LYMPHOCYTES ABSOLUTE: 2.2 K/UL (ref 1–5.1)
LYMPHOCYTES RELATIVE PERCENT: 24.3 %
MCH RBC QN AUTO: 28.3 PG (ref 26–34)
MCHC RBC AUTO-ENTMCNC: 32.5 G/DL (ref 31–36)
MCV RBC AUTO: 87.2 FL (ref 80–100)
MONOCYTES ABSOLUTE: 0.7 K/UL (ref 0–1.3)
MONOCYTES RELATIVE PERCENT: 7.5 %
NEUTROPHILS ABSOLUTE: 5.1 K/UL (ref 1.7–7.7)
NEUTROPHILS RELATIVE PERCENT: 57.2 %
PDW BLD-RTO: 15.9 % (ref 12.4–15.4)
PERFORMED ON: ABNORMAL
PLATELET # BLD: 322 K/UL (ref 135–450)
PMV BLD AUTO: 8.2 FL (ref 5–10.5)
POTASSIUM REFLEX MAGNESIUM: 4.5 MMOL/L (ref 3.5–5.1)
RBC # BLD: 3.22 M/UL (ref 4–5.2)
SODIUM BLD-SCNC: 137 MMOL/L (ref 136–145)
TOTAL PROTEIN: 7.4 G/DL (ref 6.4–8.2)
WBC # BLD: 8.9 K/UL (ref 4–11)

## 2021-03-22 PROCEDURE — 80053 COMPREHEN METABOLIC PANEL: CPT

## 2021-03-22 PROCEDURE — 93308 TTE F-UP OR LMTD: CPT

## 2021-03-22 PROCEDURE — 93880 EXTRACRANIAL BILAT STUDY: CPT

## 2021-03-22 PROCEDURE — 97530 THERAPEUTIC ACTIVITIES: CPT

## 2021-03-22 PROCEDURE — 36415 COLL VENOUS BLD VENIPUNCTURE: CPT

## 2021-03-22 PROCEDURE — 85025 COMPLETE CBC W/AUTO DIFF WBC: CPT

## 2021-03-22 PROCEDURE — 6370000000 HC RX 637 (ALT 250 FOR IP): Performed by: NURSE PRACTITIONER

## 2021-03-22 PROCEDURE — 92610 EVALUATE SWALLOWING FUNCTION: CPT

## 2021-03-22 PROCEDURE — 97110 THERAPEUTIC EXERCISES: CPT

## 2021-03-22 PROCEDURE — 6360000002 HC RX W HCPCS: Performed by: INTERNAL MEDICINE

## 2021-03-22 PROCEDURE — 94640 AIRWAY INHALATION TREATMENT: CPT

## 2021-03-22 PROCEDURE — 92526 ORAL FUNCTION THERAPY: CPT

## 2021-03-22 PROCEDURE — 6360000002 HC RX W HCPCS: Performed by: HOSPITALIST

## 2021-03-22 PROCEDURE — 6370000000 HC RX 637 (ALT 250 FOR IP): Performed by: INTERNAL MEDICINE

## 2021-03-22 RX ADMIN — FLUTICASONE PROPIONATE 1 SPRAY: 50 SPRAY, METERED NASAL at 09:20

## 2021-03-22 RX ADMIN — INSULIN LISPRO 1 UNITS: 100 INJECTION, SOLUTION INTRAVENOUS; SUBCUTANEOUS at 17:07

## 2021-03-22 RX ADMIN — AMIODARONE HYDROCHLORIDE 200 MG: 200 TABLET ORAL at 09:15

## 2021-03-22 RX ADMIN — INSULIN LISPRO 1 UNITS: 100 INJECTION, SOLUTION INTRAVENOUS; SUBCUTANEOUS at 09:16

## 2021-03-22 RX ADMIN — ENOXAPARIN SODIUM 40 MG: 40 INJECTION SUBCUTANEOUS at 09:16

## 2021-03-22 RX ADMIN — TAMSULOSIN HYDROCHLORIDE 0.4 MG: 0.4 CAPSULE ORAL at 09:15

## 2021-03-22 RX ADMIN — LEVOTHYROXINE SODIUM 50 MCG: 0.05 TABLET ORAL at 09:16

## 2021-03-22 RX ADMIN — CETIRIZINE HYDROCHLORIDE 10 MG: 10 TABLET, FILM COATED ORAL at 09:15

## 2021-03-22 RX ADMIN — ALBUTEROL SULFATE 2.5 MG: 2.5 SOLUTION RESPIRATORY (INHALATION) at 08:42

## 2021-03-22 RX ADMIN — Medication 2 PUFF: at 08:42

## 2021-03-22 RX ADMIN — INSULIN LISPRO 1 UNITS: 100 INJECTION, SOLUTION INTRAVENOUS; SUBCUTANEOUS at 12:16

## 2021-03-22 ASSESSMENT — PAIN SCALES - GENERAL
PAINLEVEL_OUTOF10: 4
PAINLEVEL_OUTOF10: 4

## 2021-03-22 ASSESSMENT — PAIN DESCRIPTION - ORIENTATION: ORIENTATION: RIGHT

## 2021-03-22 NOTE — CARE COORDINATION
CASE MANAGEMENT INITIAL ASSESSMENT      Reviewed chart and completed assessment with:patient  Explained Case Management role/services. Primary contact information:see below  Health Care Decision Maker :   Primary Decision Maker: Simran Hightower Child - [phone]    Secondary Decision Maker: Concepcion Edwards - Other - [phone]          Can this person be reached and be able to respond quickly, such as within a few minutes or hours? Yes    Admit date/status:03/20/2021  Diagnosis:Acute focal neurological deficit   Is this a Readmission?:  No      Insurance:Elizabeth Casanova   Precert required for SNF: No       3 night stay required: Yes    Living arrangements, Adls, care needs, prior to admission:Pt lives w/two daughters in a ranch home w/a ramp    [address] at home:  none    Services in the home and/or outpatient, prior to [address] Notification (HEN):not initiated    Barriers to discharge:none    Plan/comments:Pt plans to d/c home w/family. Pt is a/o, ambulatory, has no DCP needs w/insurance and PCP. Pt may need cab voucher for home. Please contact DCP should needs arise.   Yessy Virk RN       ECOC on chart for MD signature

## 2021-03-22 NOTE — PROGRESS NOTES
Occupational Therapy  Chart reviewed. Attempted to see pt for OT, pt off the floor at vascular lab. Will try to return later for therapy as appropriate and schedule permits. Thank you.     Anitra Luna, S/OT

## 2021-03-22 NOTE — PLAN OF CARE
Problem: Nutrition  Intervention: Swallowing evaluation  Note: SLP completed evaluation. Please refer to notes in EMR. Intervention: Aspiration precautions  Note: SLP completed evaluation. Please refer to notes in EMR.        Lisa Mills MA [address] [facility]  Speech Language Pathologist

## 2021-03-22 NOTE — PROGRESS NOTES
Hysterectomy; Tonsillectomy;  section; Colonoscopy (12); Upper gastrointestinal endoscopy (2014); Upper gastrointestinal endoscopy (2014); Tubal ligation; Cataract removal with implant (Left, 14); Cataract removal with implant (Right, 1/2/15); and Upper gastrointestinal endoscopy (2018). Restrictions  Restrictions/Precautions  Restrictions/Precautions: Fall Risk, Up as Tolerated  Position Activity Restriction  Other position/activity restrictions: Telemetry  Subjective   General  Chart Reviewed: Yes  Patient assessed for rehabilitation services?: Yes  Family / Caregiver Present: No  Referring Practitioner: Suly Tejada DO  Diagnosis: Acute focal neurological deficit, R numbness and visual changes over past week. MRI pending. Subjective  Subjective: Pt in bed upon entry, pleasantly agreeable to OT. General Comment  Comments: RN approved  Vital Signs  Patient Currently in Pain: Denies   Orientation  Orientation  Overall Orientation Status: Within Functional Limits  Objective    ADL  Feeding: Independent; Beverage management  Additional Comments: Pt declined other ADLs at this time.         Balance  Sitting Balance: Supervision  Standing Balance: Supervision  Standing Balance  Time: >7 min  Activity: dynamic standing activity, hallway >100 ft  Functional Mobility  Functional - Mobility Device: No device  Activity: Other(room-level, hallway)  Assist Level: Stand by assistance(progressing to supervision)  Bed mobility  Supine to Sit: Modified independent(HOB elevated, use of hand rails)  Sit to Supine: Unable to assess  Transfers  Stand Step Transfers: Stand by assistance  Sit to stand: Stand by assistance  Stand to sit: Stand by assistance     Coordination  Gross Motor: Intact, dynamic sitting reach: accurate 100% across trials      Vision  Vision Comment: Visual scanning intact, 100% accurate across trials  Cognition  Overall Cognitive Status: WFL     Type of ROM/Therapeutic Exercise  Type of ROM/Therapeutic Exercise: AROM  Comment: BUE  Exercises  Scapular Protraction: 10x  Scapular Retraction: 10x  Shoulder Depression: 10x  Shoulder Elevation: 10x  Shoulder Flexion: 15x  Shoulder Extension: 15x  Horizontal ABduction: 15x  Horizontal ADduction: 15x  Elbow Flexion: 15x  Elbow Extension: 15x  Supination: 15x  Pronation: 15x  Wrist Flexion: 15x  Wrist Extension: 15x  Grasp/Release: 15x  Other: 5 sit<>stand, 5 trunk rotations both sides    Plan   Plan  Times per week: 3-5x  Current Treatment Recommendations: Strengthening, ROM, Balance Training, Functional Mobility Training, Endurance Training, Safety Education & Training, Gait Training, Self-Care / ADL    AM-PAC Score  AM-PAC Inpatient Daily Activity Raw Score: 22 (03/22/21 1545)  AM-PAC Inpatient ADL T-Scale Score : 47.1 (03/22/21 1545)  ADL Inpatient CMS 0-100% Score: 25.8 (03/22/21 1545)  ADL Inpatient CMS G-Code Modifier : CJ (03/22/21 1545)    Goals  Short term goals  Time Frame for Short term goals: 1 week (3/28/21)  Short term goal 1: LB dressing with SPV - ongoing, did not address  Short term goal 2: 3 grooming tasks standing with SPV - ongoing, anticipate MET 3/22  Short term goal 3: Toileting with SPV - ongoing, did not address  Short term goal 4: Item retrieval in room with SPV - ongoing, SBA progressing to SPV 3/22  Short term goal 5: Demo independence of BUE HEP - GOAL MET 3/22  Patient Goals   Patient goals : \"I want to figure out what's going on and be able to go back home. \"       Therapy Time   Individual Concurrent Group Co-treatment   Time In 0221         Time Out 0246         Minutes 25         Timed Code Treatment Minutes: 25 Minutes     Should the patient be discharged before next OT session, this progress note will serve as the discharge summary.     Darya Hinojosa, S/OT
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
## 2021-03-22 NOTE — PROGRESS NOTES
Speech Language Pathology  Facility/Department: [facility] C5 - MED SURG/ORTHO   CLINICAL BEDSIDE SWALLOW EVALUATION    Recommended Diet  Diet Solids Recommendation: Regular  Liquid Consistency Recommendation: Thin (no straws)  Recommended Form of Meds: PO    NAME: Seth Galaviz  : 1950  MRN: [id number]    ADMISSION DATE: 3/20/2021  ADMITTING DIAGNOSIS: has GERD (gastroesophageal reflux disease); Environmental allergies; Chronic neck, back, & bilateral LE pain; Diverticulosis; SIRS (systemic inflammatory response syndrome) ([address] Acute respiratory failure with hypoxia ([address] Obesity; Hypothyroidism; Acute encephalopathy; Septic shock ([address] Acute renal failure (ARF) ([address] Chronic diastolic CHF (grade 1 LVDD); Hypertension; Anxiety and depression; Elevated LFTs; Acute hyperglycemia; Diabetes ([address] Hypovitaminosis D; Possible/questionable hx of seizures; Right hand weakness & numbness; Acute-on-chronic low back pain with radicular symptoms; Trimalleolar fracture of left ankle, closed, initial encounter; Ankle fracture, bimalleolar, closed, left, initial encounter; Acute respiratory failure with hypoxemia ([address] Closed fracture of left ankle; Aspiration pneumonitis ([address] Acute respiratory failure ([address] OD (overdose of drug); Drug ingestion; Toxic encephalopathy; History of depression; Misuse of prescription only drugs; GI bleed; Elevated lactic acid level; Hyperkalemia; Leukocytosis; Thrombocytosis ([address] High anion gap metabolic acidosis; Shock [facility]); NSTEMI (non-ST elevated myocardial infarction) ([address] Abnormal CXR; PSVT (paroxysmal supraventricular tachycardia) ([address] PAF (paroxysmal atrial fibrillation) ([address] Community acquired pneumonia of left lower lobe of lung; and Acute focal neurological deficit on their problem list.  ONSET DATE: 2021    Recent Chest Xray/CT of Chest: 2021  Impression   Clear lungs. Cardiomegaly. No acute cardiopulmonary abnormality.        Date of Eval: 3/22/2021  Evaluating Therapist: Marion Patricia    Current Diet level:  Current Diet : Regular  Current Liquid Diet : Thin      Primary Complaint  Patient Complaint: Per MD H&P: \"79 y.o. female who presented to [facility] with past medical history of asthma, hypothyroidism, depression, IBS, hypertension, seizures, generalized anxiety disorder, GERD, HFpEF presented to the ED for numbness in the right side of her body.     Patient reported that she has been having episodes where she was numb initially on the right arm was recommended to follow-up in the ED but patient thought that she is likely carpal tunnel. He continued to progress and worsen and now affecting her right side of her body that its sensation is decreased from the left side. Patient also reports that she is having episodes where she is seeing dragon flies on the right side of her vision that comes at random times and goes when she looks to her right side to see. No known alleviating or exacerbating factor patient does report that she has history of mini strokes otherwise has no current complaints of fever chills nausea vomiting chest pain. Patient does report intermittent exertional dyspnea that is not new. CODE STATUS cussed and wanted full code. \"    Pain:  Pain Assessment  Pain Assessment: 0-10  Pain Level: 4  Patient's Stated Pain Goal: No pain  Pain Type: Acute pain  Pain Location: Other (Comment)(whole right side of body)  Pain Orientation: Right  Pain Descriptors: Aching, Heaviness  Non-Pharmaceutical Pain Intervention(s): Ambulation/Increased Activity, Repositioned, Therapeutic presence, Distraction  Response to Pain Intervention: Patient Satisfied     Vitals/labs:   Temp: N/A  SpO2: 94% on RA  RR: 16  BP: 157/28  HR: 95    Reason for Referral  Rafy Leyva was referred for a bedside swallow evaluation to assess the efficiency of her swallow function, identify signs and symptoms of aspiration and make recommendations regarding safe dietary consistencies, effective compensatory strategies, and safe eating environment. Impression  Dysphagia Diagnosis: Mild oral stage dysphagia;Mild pharyngeal stage dysphagia  Dysphagia Outcome Severity Scale: Level 5: Mild dysphagia- Distant supervision. May need one diet consistency restricted     Dysphagia Impression: Pt is a 72year old female admitted to [facility] from [facility] on [facility]/20/2021 due to right sided numbness, increased SOB with exertion. Concern for CVA. MRI of brain indicated no acute intracranial abnormality. PMHx includes asthma, hypothyroidism, depression, IBS, HTN, seizures, anxiety, GERD, CHF, asthma, neuropathy. Pt previously seen by speech therapy in November 2019 and July 2020; recommended thin liquid, dental soft solids. RN ok'd entry of SLP. Pt alert and cooperative, agreeable to BSE. Pt reported she occ has \"food go down the wrong pipe\" and food \"getting stuck. \" Pt denied difficulty masticating solids or pain when swallowing. Oral mech exam indicated generalized oral weakness. Pt with both top and bottom dentures. SpO2% of 94-97 at rest, and RR of 16. Thin liquid via cup (pt does not like straws), puree, and regular solids presented. Pt with slow mastication, but able to clear with increased time. Adequate A-P propulsion and oral clearance. Mildly delayed trigger of pharyngeal swallow. Suspect adequate laryngeal elevation with palpation to anterior neck. 1x post-swallow wet cough. No additional s/s of aspiration in 9 more trials of thin liquid via side of cup. No additional coughing, throat clearing, or wet vocal quality. No changes in vital signs. Pt stated she does not like to use straws. SLP edu pt re: risk of aspiration and safe swallow strategies. Pt verbalized understanding. SLP recommends regular solids, thin liquids, meds in PO. No straws. SLP to continue to monitor for diet tolerance monitoring.  Hold PO and contact SLP if s/s of aspiration develop or worsening respiratory status. Treatment Plan  Requires SLP Intervention: Yes  Duration/Frequency of Treatment: 2-4x. /week  D/C Recommendations: To be determined       Recommended Diet and Intervention  Diet Solids Recommendation: Regular  Liquid Consistency Recommendation: Thin (no straws)  Recommended Form of Meds: PO  Recommendations: Dysphagia treatment  Therapeutic Interventions: Patient/Family education;Diet tolerance monitoring    Compensatory Swallowing Strategies  Compensatory Swallowing Strategies: Alternate solids and liquids;Eat/Feed slowly; No straws;Upright as possible for all oral intake;Remain upright for 30-45 minutes after meals;Small bites/sips    Treatment/Goals  Short-term Goals  Timeframe for Short-term Goals: 5 days (03/27/2021)  Long-term Goals  Timeframe for Long-term Goals: 7 days (03/29/2021)  Goal 1: Pt will tolerate safest and least restrictive diet with no overt s/s of aspiration  Dysphagia Goals: The patient will tolerate recommended diet without observed clinical signs of aspiration; The patient/caregiver will demonstrate understanding of compensatory strategies for improved swallowing safety. General  Chart Reviewed: Yes  Comments: Chart reviewed for completion of BSE. Subjective  Subjective: RN ok'd entry of SLP. Reported no concerns. Behavior/Cognition: Alert; Cooperative;Pleasant mood  Respiratory Status: Room air  O2 Device: None (Room air)  Communication Observation: Functional  Follows Directions: Simple  Dentition: Dentures top;Dentures bottom  Patient Positioning: Upright in bed  Baseline Vocal Quality: Normal  Prior Dysphagia History: Pt previously seen by  in November 2019 and July 2020 - recommended dental soft, thin liquids. Consistencies Administered: Reg solid; Dysphagia Pureed (Dysphagia I); Thin - cup    Vision/Hearing  Vision  Vision: Impaired  Vision Exceptions: Wears glasses at all times  Hearing  Hearing: Exceptions to [facility]  Hearing Exceptions: Hard of hearing/hearing concerns    Oral Motor Deficits  Oral/Motor  Oral Motor: Exceptions to [facility]  Labial ROM: Reduced left; Reduced right  Labial Strength: Reduced  Labial Coordination: Reduced  Lingual Strength: Reduced  Lingual Coordination: Reduced    Oral Phase Dysfunction  Oral Phase  Oral Phase: WFL  Oral Phase  Oral Phase - Comment: Slow mastication, but adequate. Adequate A-P propulsion, and oral clearance. Indicators of Pharyngeal Phase Dysfunction   Pharyngeal Phase  Pharyngeal Phase: WFL  Pharyngeal Phase   Pharyngeal: Mildly delayed trigger of pharyngeal swallow. Suspect adequate laryngeal elevation with palpation to anterior neck. 1x post-swallow wet cough. No additional s/s of aspiration. Prognosis  Prognosis  Prognosis for safe diet advancement: good  Individuals consulted  Consulted and agree with results and recommendations: Patient;RN    Education  Patient Education: SLP edu pt re: role of SLP, rationale of BSE, risk of aspiration, safe swallow strategies, diet recommendations, POC  Patient Education Response: Verbalizes understanding  Safety Devices in place: Yes  Type of devices: All fall risk precautions in place; Left in bed;Bed alarm in place;Call light within reach;Nurse notified       Therapy Time  SLP Individual Minutes  Time In: 6823  Time Out: 1234  Minutes: 3041 Nora Taylor  Speech Language Pathologist

## 2021-03-22 NOTE — PROGRESS NOTES
Notified MD of patients want to leave AMA. MD states as long as patient is alert and oriented and understands the risk then he is okay with it. Patient notified, still wants to leave RN will get AMA form filled out.

## 2021-04-08 NOTE — DISCHARGE SUMMARY
Hospital Medicine Discharge Summary    Patient: Violeta Espinoza     Gender: female  : 1950   Age: 72 y.o. MRN: [id number]    Admitting Physician: Cuco Arriola MD  Discharge Physician: BLUE Gannon CNP     Code Status: Full Code    Admit Date: 3/20/2021   Discharge Date: 3/22/2021      Disposition:  AMA    Discharge Diagnoses: Active Hospital Problems    Diagnosis Date Noted    Acute focal neurological deficit [R29.818] 2021       Follow-up appointments:  AMA    Outpatient to do list: AMA    Condition at Discharge:  [facility] Course:   72 y.o. female who presented to [facility] with past medical history of asthma, hypothyroidism, depression, IBS, hypertension, seizures, generalized anxiety disorder, GERD, HFpEF presented to the ED for numbness in the right side of her body.     Patient reported that she has been having episodes where she was numb initially on the right arm was recommended to follow-up in the ED but patient thought that she is likely carpal tunnel. He continued to progress and worsen and now affecting her right side of her body that its sensation is decreased from the left side. Patient also reports that she is having episodes where she is seeing dragon flies on the right side of her vision that comes at random times and goes when she looks to her right side to see. No known alleviating or exacerbating factor patient does report that she has history of mini strokes otherwise has no current complaints of fever chills nausea vomiting chest pain. Patient does report intermittent exertional dyspnea that is not new.      Pt elected to leave AMA mid afternoon, 3/22/2021    Discharge Medications:   Discharge Medication List as of 3/22/2021  6:35 PM        Discharge Medication List as of 3/22/2021  6:35 PM        Discharge Medication List as of 3/22/2021  6:35 PM      CONTINUE these medications which have NOT CHANGED    Details   albuterol sulfate HFA (VENTOLIN HFA) 108 (90 Base) MCG/ACT inhaler Inhale 2 puffs into the lungs 4 times daily as needed for Wheezing or Shortness of Breath, Disp-1 Inhaler, R-5Normal      furosemide (LASIX) 20 MG tablet TAKE 1 TABLET (20MG) BY MOUTH DAILY, Disp-30 tablet, R-3Normal      ADVAIR DISKUS 250-50 MCG/DOSE AEPB INHALE 1 PUFF INTO THE LUNGS 2 TIMES DAILY, Disp-60 each, R-5Normal      QUEtiapine (SEROQUEL) 25 MG tablet Take by mouth 2 times daily Indications: 200mg at [facility]      metoprolol tartrate (LOPRESSOR) 50 MG tablet Take 1 tablet by mouth 2 times daily, Disp-60 tablet, R-0Normal      amiodarone (CORDARONE) 200 MG tablet Take 1 tablet by mouth daily, Disp-30 tablet, R-0Normal      clobetasol (TEMOVATE) 0.05 % external solution Apply small amount to scalp only two nights per week for flares for up to 4 weeks, Disp-50 mL, R-0, Normal      tamsulosin (FLOMAX) 0.4 MG capsule Take 1 capsule by mouth daily, Disp-30 capsule, R-0Print      docusate sodium (COLACE) 100 MG capsule Take 100 mg by mouth 2 times dailyHistorical Med      trimethoprim (TRIMPEX) 100 MG tablet Take 100 mg by mouth daily Historical Med      fluticasone (FLONASE) 50 MCG/ACT nasal spray 1 spray by Nasal route dailyHistorical Med      oxyCODONE-acetaminophen (PERCOCET) 7.5-325 MG per tablet Take 0.5 tablets by mouth every 8 hours as needed for Pain . Historical Med      sulfacetamide-prednisoLONE (BLEPHAMIDE) 10-0.2 % ophthalmic suspension 2 drops every 4 hoursHistorical Med      clobetasol (TEMOVATE) 0.05 % ointment Apply topically 2 times daily Apply topically 2 times daily. , Topical, 2 TIMES DAILY, Historical Med      hydrOXYzine (ATARAX) 25 MG tablet Take 25 mg by mouth every 8 hours as needed for Itching Historical Med      loratadine (CLARITIN) 10 MG tablet Take 10 mg by mouth dailyHistorical Med      ondansetron (ZOFRAN) 4 MG tablet Take 4 mg by mouth every 8 hours as needed for Nausea or VomitingHistorical Med      dicyclomine (BENTYL) 10 MG capsule Take 10 mg by mouth 4 times daily (before meals and nightly)Historical Med      pantoprazole (PROTONIX) 40 MG tablet Take 1 tablet by mouth 2 times daily (before meals), Disp-60 tablet, R-0NO PRINT      senna (SENOKOT) 8.6 MG tablet Take 1 tablet by mouth 2 times daily Historical Med      levothyroxine (SYNTHROID) 50 MCG tablet Take 1 tablet by mouth daily, Disp-30 tablet, R-3Normal      nystatin (MYCOSTATIN) 868018 UNIT/GM cream Apply topically 2 times daily under breasts, Disp-30 g, R-0, Normal      conjugated estrogens (PREMARIN) 0.625 MG/GM vaginal cream Place vaginally three times weekly. , Disp-1 Tube, R-1, Normal      albuterol (PROVENTIL) (2.5 MG/3ML) 0.083% nebulizer solution Take 3 mLs by nebulization every 4 hours as needed for Wheezing., Disp-150 mL, R-11Normal           Discharge Medication List as of 3/22/2021  6:35 PM          Discharge ROS:  AMA    Discharge Exam:    BP (!) 170/73   Pulse 97   Temp 97.8 °F (36.6 °C) (Oral)   Resp 16   Ht 5' 1\" (1.549 m)   Wt 213 lb 6.5 oz (96.8 kg)   SpO2 96%   BMI 40.32 kg/m²   KEITH-AMA    Labs:  For convenience and continuity at follow-up the following most recent labs are provided:    Lab Results   Component Value Date    WBC 8.9 03/22/2021    HGB 9.1 03/22/2021    HCT 28.1 03/22/2021    MCV 87.2 03/22/2021     03/22/2021     03/22/2021    K 4.5 03/22/2021     03/22/2021    CO2 23 03/22/2021    BUN 27 03/22/2021    CREATININE 1.1 03/22/2021    CALCIUM 9.5 03/22/2021    PHOS 5.0 08/01/2020    ALKPHOS 228 03/22/2021    ALT 34 03/22/2021    AST 49 03/22/2021    BILITOT 0.4 03/22/2021    BILIDIR 0.3 11/03/2019    LABALBU 3.9 03/22/2021    LDLCALC 81 03/21/2021    TRIG 93 03/21/2021     Lab Results   Component Value Date    INR 1.01 07/29/2020    INR 1.12 11/02/2019    INR 1.06 05/07/2019       Radiology:  Echo Limited    Result Date: 3/22/2021  Transthoracic Echocardiography Report (TTE)  Demographics   Patient Name       [address] [place] [address] Date of Study      03/22/2021         Gender              Female   Patient Number     [id number]         Date of Birth       1950   Visit Number       [id number]          Age                 72 year(s)   Accession Number   [id number]         Room Number         8164   Corporate ID       [id number]             Sonographer         Caitlin Park, RT   Ordering Physician Owen Bhatia,  Interpreting        [address]                 Physician           Claudia Mcgrath MD, [facility]  Procedure Type of Study   TTE procedure:ECHOCARDIOGRAM LIMITED. Procedure Date Date: 03/22/2021 Start: 07:56 AM Study Location: [address] [place] [address] - Echo Lab Technical Quality: Adequate visualization Indications:CVA. Patient Status: Routine Height: 61 inches Weight: 200 pounds BSA: 1.89 m2 BMI: 37.79 kg/m2 BP: 100/62 mmHg  Conclusions   Summary  Limited only f/u for LVEF. Normal left ventricular systolic function with ejection fraction of 55-60%. No regional wall motion abnormalites are seen. No evidence of left ventricular mass or thrombus noted. Compared to previous study from 11-4-2019 no changes noted in left  ventricular function. Signature   ------------------------------------------------------------------  Electronically signed by Claudia Mcgrath MD, [facility]  (Interpreting physician) on 03/22/2021 at 11:48 AM  ------------------------------------------------------------------   Findings   Left Ventricle  Normal left ventricular systolic function with ejection fraction of 55-60%. No regional wall motion abnormalites are seen. No evidence of left ventricular mass or thrombus noted. Compared to previous study from 11-4-2019 no changes noted in left  ventricular function. Miscellaneous  Limited only f/u for LVEF.       Ct Head Wo Contrast    Result Date: 3/20/2021  EXAMINATION: CT OF THE HEAD WITHOUT CONTRAST  3/20/2021 2:51 pm TECHNIQUE: CT of the head was performed without the administration of intravenous contrast. Dose modulation, iterative reconstruction, and/or weight based adjustment of the mA/kV was utilized to reduce the radiation dose to as low as reasonably achievable. COMPARISON: July 29, 2020. HISTORY: ORDERING SYSTEM PROVIDED HISTORY: right sided weakness TECHNOLOGIST PROVIDED HISTORY: If patient is on cardiac monitor and/or pulse ox, they may be taken off cardiac monitor and pulse ox, left on O2 if currently on. All monitors reattached when patient returns to room. Has a \"code stroke\" or \"stroke alert\" been called? ->No Reason for exam:->right sided weakness Decision Support Exception->Emergency Medical Condition (MA) Reason for Exam: hx tia FINDINGS: BRAIN/VENTRICLES:  There is mild cortical and cerebellar volume loss with associated ex vacuo ventricular dilation. Mild diffuse periventricular and subcortical deep white matter hypoattenuation noted, findings compatible with chronic small vessel ischemic disease. The gray-white matter differentiation is otherwise preserved throughout. There is no acute hemorrhage, mass, or mass effect. No evidence of acute territorial infarct. No abnormal extraaxial fluid collections. ORBITS:  The visualized portion of the orbits demonstrate no acute abnormality. SINUSES:  The mastoid air cells are normally aerated. The visualized paranasal sinuses are grossly clear. SOFT TISSUES/SKULL:  No significant abnormality of the visualized skull or soft tissues. No acute fracture. No scalp hematoma. No evidence of acute intracranial process. Mild atrophy and chronic small vessel ischemic changes noted. Xr Chest Portable    Result Date: 3/20/2021  EXAMINATION: ONE XRAY VIEW OF THE CHEST 3/20/2021 12:51 pm COMPARISON: August 13, 2020.  HISTORY: ORDERING SYSTEM PROVIDED HISTORY: cp TECHNOLOGIST PROVIDED HISTORY: Reason for exam:->cp Reason for Exam: chest pain Acuity: Acute Type of Exam: The left internal carotid artery reveals a <50% diameter reducing stenosis based on velocity criteria. The left vertebral artery demonstrates normal antegrade flow. The left subclavian artery is visualized and demonstrates multiphasic flow. There are no prior studies for comparison. Conclusions   Summary   No hemodynamically significant stenosis noted in the internal carotid  arteries bilaterally. The bilateral vertebral arteries have normal antegrade flow. Signature   ------------------------------------------------------------------  Electronically signed by Skylar Horta MD (Interpreting  physician) on 03/24/2021 at 08:51 PM  ------------------------------------------------------------------  Patient Status:Routine. Study [facility] - Vascular Lab. Technical Quality:Good visualization. Velocities are measured in cm/s ; Diameters are measured in mm Carotid Right Measurements +---------------+----+----+-----+----+ ! Location       ! PSV ! EDV ! Angle! RI  ! +---------------+----+----+-----+----+ ! Prox CCA       !210 !0   !60   !1   ! +---------------+----+----+-----+----+ ! Mid CCA        !164 !15. 6!60   !0.9 ! +---------------+----+----+-----+----+ ! Dist CCA       !118 !20. 8!60   !0.82! +---------------+----+----+-----+----+ ! Prox ICA       !104 !22. 7!60   !0.78! +---------------+----+----+-----+----+ ! Mid ICA        !150 !30. 3! 60   !0.8 ! +---------------+----+----+-----+----+ ! Dist ICA       !149 !27. 5!60   !0.82! +---------------+----+----+-----+----+ ! Prox ECA       !149 !16. 1! 60   !0.89! +---------------+----+----+-----+----+ ! Vertebral      !77. 4!14. 4!60   !0.81! +---------------+----+----+-----+----+ ! Prox Subclavian! 221 !    !60   !    ! +---------------+----+----+-----+----+   - There is antegrade vertebral flow noted on the right side. - Additional Measurements:ICAPSV/CCAPSV 0.91. Carotid Left Measurements +---------------+----+----+-----+----+ ! Location       ! PSV ! EDV ! Angle! RI ! +---------------+----+----+-----+----+ ! Prox CCA       !166 !25. 1! 60   !0.85! +---------------+----+----+-----+----+ ! Mid CCA        !199 !24  !60   !0.88! +---------------+----+----+-----+----+ ! Dist CCA       !170 !22. 3!60   !0.87! +---------------+----+----+-----+----+ ! Prox ICA       !112 !23. 2! 60   !0.79! +---------------+----+----+-----+----+ ! Mid ICA        !115 !22. 8!52   !0.8 ! +---------------+----+----+-----+----+ ! Dist ICA       !118 !32. 9!60   !0.72! +---------------+----+----+-----+----+ ! Prox ECA       !125 !17. 4!60   !0.86! +---------------+----+----+-----+----+ ! Vertebral      !58. 1!10. 2! 60   !0.82! +---------------+----+----+-----+----+ ! Prox Subclavian! 360 !    !60   !    ! +---------------+----+----+-----+----+   - There is antegrade vertebral flow noted on the left side. - Additional Measurements:ICAPSV/CCAPSV 0.59. ICAEDV/CCAEDV 1.31. Mri Brain Without Contrast    Result Date: 3/21/2021  EXAMINATION: MRI OF THE BRAIN WITHOUT CONTRAST  3/21/2021 8:45 am TECHNIQUE: Multiplanar multisequence MRI of the brain was performed without the administration of intravenous contrast. COMPARISON: CT head march 20, 2021 HISTORY: ORDERING SYSTEM PROVIDED HISTORY: acute cva TECHNOLOGIST PROVIDED HISTORY: Reason for exam:->acute cva FINDINGS: INTRACRANIAL STRUCTURES/VENTRICLES: The sulci, cisterns and ventricles are age-appropriate in size. There is mild T2/FLAIR hyperintensity in the periventricular and subcortical white matter, likely related to minimal chronic microvascular disease. There is no acute infarct. No mass effect or midline shift. No acute intracranial hemorrhage. There is no hydrocephalus. The sellar/suprasellar regions appear unremarkable. The normal signal voids within the major intracranial vessels appear maintained. ORBITS: The visualized portion of the orbits demonstrate no acute abnormality. SINUSES: There is scattered minimal mucosal thickening in the paranasal sinuses.   The

## 2021-06-10 ENCOUNTER — HOSPITAL ENCOUNTER (OUTPATIENT)
Dept: NON INVASIVE DIAGNOSTICS | Age: 71
Discharge: HOME | End: 2021-06-10
Payer: MEDICARE

## 2021-06-10 ENCOUNTER — HOSPITAL ENCOUNTER (OUTPATIENT)
Age: 71
Discharge: HOME | End: 2021-06-10
Payer: MEDICARE

## 2021-06-10 DIAGNOSIS — R01.1 SYSTOLIC MURMUR: ICD-10-CM

## 2021-06-10 LAB
IRON SATURATION: 15 % (ref 15–50)
IRON: 44 UG/DL (ref 37–145)
LV EF: 65 %
LVEF MODALITY: NORMAL
TOTAL IRON BINDING CAPACITY: 297 UG/DL (ref 260–445)

## 2021-06-10 PROCEDURE — 83550 IRON BINDING TEST: CPT

## 2021-06-10 PROCEDURE — 83540 ASSAY OF IRON: CPT

## 2021-06-10 PROCEDURE — 36415 COLL VENOUS BLD VENIPUNCTURE: CPT

## 2021-06-10 PROCEDURE — 93306 TTE W/DOPPLER COMPLETE: CPT

## 2021-06-22 RX ORDER — FUROSEMIDE 20 MG/1
TABLET ORAL
Qty: 30 TABLET | Refills: 2 | Status: SHIPPED | OUTPATIENT
Start: 2021-06-22 | End: 2021-09-09

## 2021-07-12 RX ORDER — FUROSEMIDE 20 MG/1
TABLET ORAL
Qty: 30 TABLET | Refills: 2 | OUTPATIENT
Start: 2021-07-12

## 2021-08-27 RX ORDER — ALBUTEROL SULFATE 90 UG/1
2 AEROSOL, METERED RESPIRATORY (INHALATION) 4 TIMES DAILY PRN
Qty: 8.5 G | Refills: 5 | Status: SHIPPED | OUTPATIENT
Start: 2021-08-27 | End: 2022-01-27

## 2021-09-09 RX ORDER — FUROSEMIDE 20 MG/1
TABLET ORAL
Qty: 30 TABLET | Refills: 2 | Status: ON HOLD | OUTPATIENT
Start: 2021-09-09 | End: 2022-09-10 | Stop reason: SDUPTHER

## 2021-10-14 ENCOUNTER — TELEPHONE (OUTPATIENT)
Dept: PULMONOLOGY | Age: 71
End: 2021-10-14

## 2021-11-26 ENCOUNTER — APPOINTMENT (OUTPATIENT)
Dept: GENERAL RADIOLOGY | Age: 71
DRG: 244 | End: 2021-11-26
Payer: MEDICAID

## 2021-11-26 ENCOUNTER — HOSPITAL ENCOUNTER (EMERGENCY)
Age: 71
Discharge: HOME | DRG: 244 | End: 2021-11-26
Attending: STUDENT IN AN ORGANIZED HEALTH CARE EDUCATION/TRAINING PROGRAM
Payer: MEDICAID

## 2021-11-26 ENCOUNTER — APPOINTMENT (OUTPATIENT)
Dept: CT IMAGING | Age: 71
DRG: 244 | End: 2021-11-26
Payer: MEDICAID

## 2021-11-26 VITALS
BODY MASS INDEX: 39.56 KG/M2 | TEMPERATURE: 98.1 F | RESPIRATION RATE: 18 BRPM | DIASTOLIC BLOOD PRESSURE: 74 MMHG | HEART RATE: 96 BPM | HEIGHT: 62 IN | WEIGHT: 215 LBS | OXYGEN SATURATION: 98 % | SYSTOLIC BLOOD PRESSURE: 155 MMHG

## 2021-11-26 DIAGNOSIS — K57.32 DIVERTICULITIS OF COLON: Primary | ICD-10-CM

## 2021-11-26 LAB
A/G RATIO: 0.9 (ref 1.1–2.2)
ALBUMIN SERPL-MCNC: 4.2 G/DL (ref 3.4–5)
ALP BLD-CCNC: 377 U/L (ref 40–129)
ALT SERPL-CCNC: 28 U/L (ref 10–40)
ANION GAP SERPL CALCULATED.3IONS-SCNC: 13 MMOL/L (ref 3–16)
AST SERPL-CCNC: 42 U/L (ref 15–37)
BASOPHILS ABSOLUTE: 0.1 K/UL (ref 0–0.2)
BASOPHILS RELATIVE PERCENT: 0.7 %
BILIRUB SERPL-MCNC: 0.4 MG/DL (ref 0–1)
BILIRUBIN URINE: NEGATIVE
BLOOD, URINE: NEGATIVE
BUN BLDV-MCNC: 15 MG/DL (ref 7–20)
CALCIUM SERPL-MCNC: 9.5 MG/DL (ref 8.3–10.6)
CHLORIDE BLD-SCNC: 100 MMOL/L (ref 99–110)
CLARITY: CLEAR
CO2: 22 MMOL/L (ref 21–32)
COLOR: YELLOW
CREAT SERPL-MCNC: 1.2 MG/DL (ref 0.6–1.2)
EKG ATRIAL RATE: 82 BPM
EKG DIAGNOSIS: NORMAL
EKG P AXIS: 54 DEGREES
EKG P-R INTERVAL: 150 MS
EKG Q-T INTERVAL: 404 MS
EKG QRS DURATION: 72 MS
EKG QTC CALCULATION (BAZETT): 472 MS
EKG R AXIS: -14 DEGREES
EKG T AXIS: 44 DEGREES
EKG VENTRICULAR RATE: 82 BPM
EOSINOPHILS ABSOLUTE: 0.7 K/UL (ref 0–0.6)
EOSINOPHILS RELATIVE PERCENT: 6.4 %
GFR AFRICAN AMERICAN: 54
GFR NON-AFRICAN AMERICAN: 44
GLUCOSE BLD-MCNC: 196 MG/DL (ref 70–99)
GLUCOSE URINE: NEGATIVE MG/DL
HCT VFR BLD CALC: 34.8 % (ref 36–48)
HEMOGLOBIN: 11.3 G/DL (ref 12–16)
KETONES, URINE: NEGATIVE MG/DL
LACTIC ACID: 1.7 MMOL/L (ref 0.4–2)
LEUKOCYTE ESTERASE, URINE: NEGATIVE
LIPASE: 25 U/L (ref 13–60)
LYMPHOCYTES ABSOLUTE: 1.7 K/UL (ref 1–5.1)
LYMPHOCYTES RELATIVE PERCENT: 15.8 %
MCH RBC QN AUTO: 29.2 PG (ref 26–34)
MCHC RBC AUTO-ENTMCNC: 32.3 G/DL (ref 31–36)
MCV RBC AUTO: 90.2 FL (ref 80–100)
MICROSCOPIC EXAMINATION: NORMAL
MONOCYTES ABSOLUTE: 0.6 K/UL (ref 0–1.3)
MONOCYTES RELATIVE PERCENT: 5.5 %
NEUTROPHILS ABSOLUTE: 7.6 K/UL (ref 1.7–7.7)
NEUTROPHILS RELATIVE PERCENT: 71.6 %
NITRITE, URINE: NEGATIVE
PDW BLD-RTO: 15.6 % (ref 12.4–15.4)
PH UA: 7 (ref 5–8)
PLATELET # BLD: 282 K/UL (ref 135–450)
PMV BLD AUTO: 8.2 FL (ref 5–10.5)
POTASSIUM REFLEX MAGNESIUM: 4.6 MMOL/L (ref 3.5–5.1)
PROTEIN UA: NEGATIVE MG/DL
RBC # BLD: 3.86 M/UL (ref 4–5.2)
SODIUM BLD-SCNC: 135 MMOL/L (ref 136–145)
SPECIFIC GRAVITY UA: 1.01 (ref 1–1.03)
TOTAL PROTEIN: 8.7 G/DL (ref 6.4–8.2)
TROPONIN: <0.01 NG/ML
URINE REFLEX TO CULTURE: NORMAL
URINE TYPE: NORMAL
UROBILINOGEN, URINE: 0.2 E.U./DL
WBC # BLD: 10.7 K/UL (ref 4–11)

## 2021-11-26 PROCEDURE — 84484 ASSAY OF TROPONIN QUANT: CPT

## 2021-11-26 PROCEDURE — 99283 EMERGENCY DEPT VISIT LOW MDM: CPT

## 2021-11-26 PROCEDURE — 93005 ELECTROCARDIOGRAM TRACING: CPT | Performed by: STUDENT IN AN ORGANIZED HEALTH CARE EDUCATION/TRAINING PROGRAM

## 2021-11-26 PROCEDURE — 74177 CT ABD & PELVIS W/CONTRAST: CPT

## 2021-11-26 PROCEDURE — 6360000004 HC RX CONTRAST MEDICATION: Performed by: STUDENT IN AN ORGANIZED HEALTH CARE EDUCATION/TRAINING PROGRAM

## 2021-11-26 PROCEDURE — 93010 ELECTROCARDIOGRAM REPORT: CPT | Performed by: INTERNAL MEDICINE

## 2021-11-26 PROCEDURE — 96376 TX/PRO/DX INJ SAME DRUG ADON: CPT

## 2021-11-26 PROCEDURE — 80053 COMPREHEN METABOLIC PANEL: CPT

## 2021-11-26 PROCEDURE — 6360000002 HC RX W HCPCS: Performed by: STUDENT IN AN ORGANIZED HEALTH CARE EDUCATION/TRAINING PROGRAM

## 2021-11-26 PROCEDURE — 96374 THER/PROPH/DIAG INJ IV PUSH: CPT

## 2021-11-26 PROCEDURE — 85025 COMPLETE CBC W/AUTO DIFF WBC: CPT

## 2021-11-26 PROCEDURE — 96375 TX/PRO/DX INJ NEW DRUG ADDON: CPT

## 2021-11-26 PROCEDURE — 6370000000 HC RX 637 (ALT 250 FOR IP): Performed by: STUDENT IN AN ORGANIZED HEALTH CARE EDUCATION/TRAINING PROGRAM

## 2021-11-26 PROCEDURE — 83690 ASSAY OF LIPASE: CPT

## 2021-11-26 PROCEDURE — 2580000003 HC RX 258: Performed by: STUDENT IN AN ORGANIZED HEALTH CARE EDUCATION/TRAINING PROGRAM

## 2021-11-26 PROCEDURE — 81003 URINALYSIS AUTO W/O SCOPE: CPT

## 2021-11-26 PROCEDURE — 71045 X-RAY EXAM CHEST 1 VIEW: CPT

## 2021-11-26 PROCEDURE — 96361 HYDRATE IV INFUSION ADD-ON: CPT

## 2021-11-26 PROCEDURE — 83605 ASSAY OF LACTIC ACID: CPT

## 2021-11-26 RX ORDER — AMOXICILLIN AND CLAVULANATE POTASSIUM 875; 125 MG/1; MG/1
1 TABLET, FILM COATED ORAL 2 TIMES DAILY
Qty: 14 TABLET | Refills: 0 | Status: ON HOLD | OUTPATIENT
Start: 2021-11-26 | End: 2021-12-02 | Stop reason: SDUPTHER

## 2021-11-26 RX ORDER — ONDANSETRON 2 MG/ML
4 INJECTION INTRAMUSCULAR; INTRAVENOUS EVERY 30 MIN PRN
Status: DISCONTINUED | OUTPATIENT
Start: 2021-11-26 | End: 2021-11-26 | Stop reason: HOSPADM

## 2021-11-26 RX ORDER — LACTULOSE 10 G/15ML
20 SOLUTION ORAL EVERY EVENING
Qty: 210 ML | Refills: 0 | Status: SHIPPED | OUTPATIENT
Start: 2021-11-26 | End: 2021-12-03

## 2021-11-26 RX ORDER — MORPHINE SULFATE 4 MG/ML
4 INJECTION, SOLUTION INTRAMUSCULAR; INTRAVENOUS EVERY 30 MIN PRN
Status: COMPLETED | OUTPATIENT
Start: 2021-11-26 | End: 2021-11-26

## 2021-11-26 RX ORDER — HYDROCODONE BITARTRATE AND ACETAMINOPHEN 5; 325 MG/1; MG/1
1 TABLET ORAL EVERY 6 HOURS PRN
Qty: 12 TABLET | Refills: 0 | Status: SHIPPED | OUTPATIENT
Start: 2021-11-26 | End: 2021-12-02 | Stop reason: HOSPADM

## 2021-11-26 RX ORDER — 0.9 % SODIUM CHLORIDE 0.9 %
1000 INTRAVENOUS SOLUTION INTRAVENOUS ONCE
Status: COMPLETED | OUTPATIENT
Start: 2021-11-26 | End: 2021-11-26

## 2021-11-26 RX ORDER — AMOXICILLIN AND CLAVULANATE POTASSIUM 875; 125 MG/1; MG/1
1 TABLET, FILM COATED ORAL ONCE
Status: COMPLETED | OUTPATIENT
Start: 2021-11-26 | End: 2021-11-26

## 2021-11-26 RX ADMIN — ONDANSETRON HYDROCHLORIDE 4 MG: 2 INJECTION, SOLUTION INTRAMUSCULAR; INTRAVENOUS at 13:04

## 2021-11-26 RX ADMIN — SODIUM CHLORIDE 1000 ML: 9 INJECTION, SOLUTION INTRAVENOUS at 12:27

## 2021-11-26 RX ADMIN — MORPHINE SULFATE 4 MG: 4 INJECTION INTRAVENOUS at 15:21

## 2021-11-26 RX ADMIN — IOPAMIDOL 75 ML: 755 INJECTION, SOLUTION INTRAVENOUS at 13:20

## 2021-11-26 RX ADMIN — AMOXICILLIN AND CLAVULANATE POTASSIUM 1 TABLET: 875; 125 TABLET, FILM COATED ORAL at 15:39

## 2021-11-26 RX ADMIN — MORPHINE SULFATE 4 MG: 4 INJECTION INTRAVENOUS at 13:04

## 2021-11-26 ASSESSMENT — PAIN DESCRIPTION - PAIN TYPE: TYPE: ACUTE PAIN

## 2021-11-26 ASSESSMENT — PAIN SCALES - GENERAL
PAINLEVEL_OUTOF10: 8
PAINLEVEL_OUTOF10: 10
PAINLEVEL_OUTOF10: 10

## 2021-11-26 ASSESSMENT — PAIN DESCRIPTION - LOCATION: LOCATION: ABDOMEN
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
## 2021-11-29 ENCOUNTER — HOSPITAL ENCOUNTER (INPATIENT)
Age: 71
LOS: 3 days | Discharge: HOME | DRG: 244 | End: 2021-12-02
Attending: STUDENT IN AN ORGANIZED HEALTH CARE EDUCATION/TRAINING PROGRAM | Admitting: INTERNAL MEDICINE
Payer: MEDICAID

## 2021-11-29 ENCOUNTER — APPOINTMENT (OUTPATIENT)
Dept: CT IMAGING | Age: 71
DRG: 244 | End: 2021-11-29
Payer: MEDICAID

## 2021-11-29 DIAGNOSIS — K57.32 DIVERTICULITIS OF COLON: Primary | ICD-10-CM

## 2021-11-29 DIAGNOSIS — R10.84 GENERALIZED ABDOMINAL PAIN: ICD-10-CM

## 2021-11-29 PROBLEM — K57.92 DIVERTICULITIS: Status: ACTIVE | Noted: 2021-11-29

## 2021-11-29 LAB
A/G RATIO: 0.9 (ref 1.1–2.2)
ALBUMIN SERPL-MCNC: 4 G/DL (ref 3.4–5)
ALP BLD-CCNC: 341 U/L (ref 40–129)
ALT SERPL-CCNC: 20 U/L (ref 10–40)
ANION GAP SERPL CALCULATED.3IONS-SCNC: 15 MMOL/L (ref 3–16)
AST SERPL-CCNC: 28 U/L (ref 15–37)
BASOPHILS ABSOLUTE: 0.1 K/UL (ref 0–0.2)
BASOPHILS RELATIVE PERCENT: 0.8 %
BILIRUB SERPL-MCNC: 0.3 MG/DL (ref 0–1)
BILIRUBIN URINE: NEGATIVE
BLOOD, URINE: NEGATIVE
BUN BLDV-MCNC: 18 MG/DL (ref 7–20)
CALCIUM SERPL-MCNC: 9.5 MG/DL (ref 8.3–10.6)
CHLORIDE BLD-SCNC: 98 MMOL/L (ref 99–110)
CLARITY: CLEAR
CO2: 19 MMOL/L (ref 21–32)
COLOR: YELLOW
CREAT SERPL-MCNC: 0.9 MG/DL (ref 0.6–1.2)
EOSINOPHILS ABSOLUTE: 0.6 K/UL (ref 0–0.6)
EOSINOPHILS RELATIVE PERCENT: 6.1 %
GFR AFRICAN AMERICAN: >60
GFR NON-AFRICAN AMERICAN: >60
GLUCOSE BLD-MCNC: 102 MG/DL (ref 70–99)
GLUCOSE URINE: NEGATIVE MG/DL
HCT VFR BLD CALC: 34.5 % (ref 36–48)
HEMOGLOBIN: 11.1 G/DL (ref 12–16)
INFLUENZA A: NOT DETECTED
INFLUENZA B: NOT DETECTED
KETONES, URINE: NEGATIVE MG/DL
LEUKOCYTE ESTERASE, URINE: NEGATIVE
LIPASE: 18 U/L (ref 13–60)
LYMPHOCYTES ABSOLUTE: 2.5 K/UL (ref 1–5.1)
LYMPHOCYTES RELATIVE PERCENT: 23.4 %
MCH RBC QN AUTO: 29.1 PG (ref 26–34)
MCHC RBC AUTO-ENTMCNC: 32.1 G/DL (ref 31–36)
MCV RBC AUTO: 90.6 FL (ref 80–100)
MICROSCOPIC EXAMINATION: NORMAL
MONOCYTES ABSOLUTE: 0.7 K/UL (ref 0–1.3)
MONOCYTES RELATIVE PERCENT: 6.9 %
NEUTROPHILS ABSOLUTE: 6.6 K/UL (ref 1.7–7.7)
NEUTROPHILS RELATIVE PERCENT: 62.8 %
NITRITE, URINE: NEGATIVE
PDW BLD-RTO: 15.6 % (ref 12.4–15.4)
PH UA: 6 (ref 5–8)
PLATELET # BLD: 326 K/UL (ref 135–450)
PMV BLD AUTO: 8.6 FL (ref 5–10.5)
POTASSIUM REFLEX MAGNESIUM: 4.5 MMOL/L (ref 3.5–5.1)
PROTEIN UA: NEGATIVE MG/DL
RBC # BLD: 3.81 M/UL (ref 4–5.2)
SARS-COV-2 RNA, RT PCR: NOT DETECTED
SODIUM BLD-SCNC: 132 MMOL/L (ref 136–145)
SPECIFIC GRAVITY UA: 1.01 (ref 1–1.03)
TOTAL PROTEIN: 8.6 G/DL (ref 6.4–8.2)
URINE TYPE: NORMAL
UROBILINOGEN, URINE: 0.2 E.U./DL
WBC # BLD: 10.5 K/UL (ref 4–11)

## 2021-11-29 PROCEDURE — 6360000002 HC RX W HCPCS: Performed by: STUDENT IN AN ORGANIZED HEALTH CARE EDUCATION/TRAINING PROGRAM

## 2021-11-29 PROCEDURE — 82746 ASSAY OF FOLIC ACID SERUM: CPT

## 2021-11-29 PROCEDURE — 86038 ANTINUCLEAR ANTIBODIES: CPT

## 2021-11-29 PROCEDURE — 82728 ASSAY OF FERRITIN: CPT

## 2021-11-29 PROCEDURE — 80074 ACUTE HEPATITIS PANEL: CPT

## 2021-11-29 PROCEDURE — 86039 ANTINUCLEAR ANTIBODIES (ANA): CPT

## 2021-11-29 PROCEDURE — 36415 COLL VENOUS BLD VENIPUNCTURE: CPT

## 2021-11-29 PROCEDURE — 83540 ASSAY OF IRON: CPT

## 2021-11-29 PROCEDURE — 96365 THER/PROPH/DIAG IV INF INIT: CPT

## 2021-11-29 PROCEDURE — 6370000000 HC RX 637 (ALT 250 FOR IP): Performed by: NURSE PRACTITIONER

## 2021-11-29 PROCEDURE — 83550 IRON BINDING TEST: CPT

## 2021-11-29 PROCEDURE — 81003 URINALYSIS AUTO W/O SCOPE: CPT

## 2021-11-29 PROCEDURE — 74177 CT ABD & PELVIS W/CONTRAST: CPT

## 2021-11-29 PROCEDURE — 87636 SARSCOV2 & INF A&B AMP PRB: CPT

## 2021-11-29 PROCEDURE — 1200000000 HC SEMI PRIVATE

## 2021-11-29 PROCEDURE — 94640 AIRWAY INHALATION TREATMENT: CPT

## 2021-11-29 PROCEDURE — 94761 N-INVAS EAR/PLS OXIMETRY MLT: CPT

## 2021-11-29 PROCEDURE — 82607 VITAMIN B-12: CPT

## 2021-11-29 PROCEDURE — 82103 ALPHA-1-ANTITRYPSIN TOTAL: CPT

## 2021-11-29 PROCEDURE — 99283 EMERGENCY DEPT VISIT LOW MDM: CPT

## 2021-11-29 PROCEDURE — 82104 ALPHA-1-ANTITRYPSIN PHENO: CPT

## 2021-11-29 PROCEDURE — 83516 IMMUNOASSAY NONANTIBODY: CPT

## 2021-11-29 PROCEDURE — 2580000003 HC RX 258: Performed by: NURSE PRACTITIONER

## 2021-11-29 PROCEDURE — 96376 TX/PRO/DX INJ SAME DRUG ADON: CPT

## 2021-11-29 PROCEDURE — 83690 ASSAY OF LIPASE: CPT

## 2021-11-29 PROCEDURE — 96372 THER/PROPH/DIAG INJ SC/IM: CPT

## 2021-11-29 PROCEDURE — 2580000003 HC RX 258: Performed by: STUDENT IN AN ORGANIZED HEALTH CARE EDUCATION/TRAINING PROGRAM

## 2021-11-29 PROCEDURE — 85025 COMPLETE CBC W/AUTO DIFF WBC: CPT

## 2021-11-29 PROCEDURE — 6360000002 HC RX W HCPCS: Performed by: NURSE PRACTITIONER

## 2021-11-29 PROCEDURE — 82105 ALPHA-FETOPROTEIN SERUM: CPT

## 2021-11-29 PROCEDURE — 80053 COMPREHEN METABOLIC PANEL: CPT

## 2021-11-29 PROCEDURE — 6360000004 HC RX CONTRAST MEDICATION: Performed by: STUDENT IN AN ORGANIZED HEALTH CARE EDUCATION/TRAINING PROGRAM

## 2021-11-29 PROCEDURE — 96375 TX/PRO/DX INJ NEW DRUG ADDON: CPT

## 2021-11-29 RX ORDER — ONDANSETRON 2 MG/ML
4 INJECTION INTRAMUSCULAR; INTRAVENOUS EVERY 6 HOURS PRN
Status: DISCONTINUED | OUTPATIENT
Start: 2021-11-29 | End: 2021-11-29

## 2021-11-29 RX ORDER — DICYCLOMINE HYDROCHLORIDE 10 MG/1
10 CAPSULE ORAL
Status: DISCONTINUED | OUTPATIENT
Start: 2021-11-29 | End: 2021-12-02 | Stop reason: HOSPADM

## 2021-11-29 RX ORDER — ACETAMINOPHEN 325 MG/1
650 TABLET ORAL EVERY 6 HOURS PRN
Status: DISCONTINUED | OUTPATIENT
Start: 2021-11-29 | End: 2021-12-02 | Stop reason: HOSPADM

## 2021-11-29 RX ORDER — SODIUM CHLORIDE 9 MG/ML
25 INJECTION, SOLUTION INTRAVENOUS PRN
Status: DISCONTINUED | OUTPATIENT
Start: 2021-11-29 | End: 2021-12-02 | Stop reason: HOSPADM

## 2021-11-29 RX ORDER — METOPROLOL TARTRATE 50 MG/1
50 TABLET, FILM COATED ORAL 2 TIMES DAILY
Status: DISCONTINUED | OUTPATIENT
Start: 2021-11-29 | End: 2021-12-02 | Stop reason: HOSPADM

## 2021-11-29 RX ORDER — SODIUM CHLORIDE 9 MG/ML
INJECTION, SOLUTION INTRAVENOUS CONTINUOUS
Status: DISCONTINUED | OUTPATIENT
Start: 2021-11-29 | End: 2021-12-02

## 2021-11-29 RX ORDER — MORPHINE SULFATE 2 MG/ML
2 INJECTION, SOLUTION INTRAMUSCULAR; INTRAVENOUS EVERY 4 HOURS PRN
Status: DISCONTINUED | OUTPATIENT
Start: 2021-11-29 | End: 2021-12-01

## 2021-11-29 RX ORDER — CETIRIZINE HYDROCHLORIDE 10 MG/1
5 TABLET ORAL DAILY
Status: DISCONTINUED | OUTPATIENT
Start: 2021-11-29 | End: 2021-12-02 | Stop reason: HOSPADM

## 2021-11-29 RX ORDER — ONDANSETRON 2 MG/ML
INJECTION INTRAMUSCULAR; INTRAVENOUS
Status: DISPENSED
Start: 2021-11-29 | End: 2021-11-30

## 2021-11-29 RX ORDER — MORPHINE SULFATE 4 MG/ML
4 INJECTION, SOLUTION INTRAMUSCULAR; INTRAVENOUS ONCE
Status: COMPLETED | OUTPATIENT
Start: 2021-11-29 | End: 2021-11-29

## 2021-11-29 RX ORDER — TAMSULOSIN HYDROCHLORIDE 0.4 MG/1
0.4 CAPSULE ORAL DAILY
Status: DISCONTINUED | OUTPATIENT
Start: 2021-11-29 | End: 2021-12-02 | Stop reason: HOSPADM

## 2021-11-29 RX ORDER — SENNA PLUS 8.6 MG/1
1 TABLET ORAL 2 TIMES DAILY
Status: DISCONTINUED | OUTPATIENT
Start: 2021-11-29 | End: 2021-12-02 | Stop reason: HOSPADM

## 2021-11-29 RX ORDER — LEVOTHYROXINE SODIUM 0.03 MG/1
50 TABLET ORAL
Status: DISCONTINUED | OUTPATIENT
Start: 2021-11-30 | End: 2021-12-02 | Stop reason: HOSPADM

## 2021-11-29 RX ORDER — ALBUTEROL SULFATE 2.5 MG/3ML
2.5 SOLUTION RESPIRATORY (INHALATION) EVERY 4 HOURS PRN
Status: DISCONTINUED | OUTPATIENT
Start: 2021-11-29 | End: 2021-12-02 | Stop reason: HOSPADM

## 2021-11-29 RX ORDER — KETOROLAC TROMETHAMINE 30 MG/ML
15 INJECTION, SOLUTION INTRAMUSCULAR; INTRAVENOUS ONCE
Status: COMPLETED | OUTPATIENT
Start: 2021-11-29 | End: 2021-11-29

## 2021-11-29 RX ORDER — CLOBETASOL PROPIONATE 0.5 MG/G
OINTMENT TOPICAL 2 TIMES DAILY
Status: DISCONTINUED | OUTPATIENT
Start: 2021-11-29 | End: 2021-12-02 | Stop reason: HOSPADM

## 2021-11-29 RX ORDER — ONDANSETRON 4 MG/1
4 TABLET, ORALLY DISINTEGRATING ORAL EVERY 8 HOURS PRN
Status: DISCONTINUED | OUTPATIENT
Start: 2021-11-29 | End: 2021-12-02 | Stop reason: HOSPADM

## 2021-11-29 RX ORDER — PANTOPRAZOLE SODIUM 40 MG/1
40 TABLET, DELAYED RELEASE ORAL
Status: DISCONTINUED | OUTPATIENT
Start: 2021-11-30 | End: 2021-12-02 | Stop reason: HOSPADM

## 2021-11-29 RX ORDER — FLUTICASONE PROPIONATE 50 MCG
1 SPRAY, SUSPENSION (ML) NASAL DAILY
Status: DISCONTINUED | OUTPATIENT
Start: 2021-11-29 | End: 2021-12-02 | Stop reason: HOSPADM

## 2021-11-29 RX ORDER — POLYETHYLENE GLYCOL 3350 17 G/17G
17 POWDER, FOR SOLUTION ORAL DAILY PRN
Status: DISCONTINUED | OUTPATIENT
Start: 2021-11-29 | End: 2021-11-30

## 2021-11-29 RX ORDER — QUETIAPINE FUMARATE 25 MG/1
100 TABLET, FILM COATED ORAL 2 TIMES DAILY
Status: DISCONTINUED | OUTPATIENT
Start: 2021-11-29 | End: 2021-12-01

## 2021-11-29 RX ORDER — ONDANSETRON 2 MG/ML
4 INJECTION INTRAMUSCULAR; INTRAVENOUS EVERY 6 HOURS PRN
Status: DISCONTINUED | OUTPATIENT
Start: 2021-11-29 | End: 2021-12-02 | Stop reason: HOSPADM

## 2021-11-29 RX ORDER — SODIUM CHLORIDE 0.9 % (FLUSH) 0.9 %
5-40 SYRINGE (ML) INJECTION PRN
Status: DISCONTINUED | OUTPATIENT
Start: 2021-11-29 | End: 2021-12-02 | Stop reason: HOSPADM

## 2021-11-29 RX ORDER — DOCUSATE SODIUM 100 MG/1
100 CAPSULE, LIQUID FILLED ORAL 2 TIMES DAILY
Status: DISCONTINUED | OUTPATIENT
Start: 2021-11-29 | End: 2021-12-02 | Stop reason: HOSPADM

## 2021-11-29 RX ORDER — FUROSEMIDE 20 MG/1
20 TABLET ORAL DAILY
Status: DISCONTINUED | OUTPATIENT
Start: 2021-11-29 | End: 2021-12-02 | Stop reason: HOSPADM

## 2021-11-29 RX ORDER — ACETAMINOPHEN 650 MG/1
650 SUPPOSITORY RECTAL EVERY 6 HOURS PRN
Status: DISCONTINUED | OUTPATIENT
Start: 2021-11-29 | End: 2021-12-02 | Stop reason: HOSPADM

## 2021-11-29 RX ORDER — QUETIAPINE FUMARATE 100 MG/1
100 TABLET, FILM COATED ORAL 2 TIMES DAILY
Status: ON HOLD | COMMUNITY
End: 2022-08-23 | Stop reason: HOSPADM

## 2021-11-29 RX ORDER — SODIUM CHLORIDE 0.9 % (FLUSH) 0.9 %
5-40 SYRINGE (ML) INJECTION EVERY 12 HOURS SCHEDULED
Status: DISCONTINUED | OUTPATIENT
Start: 2021-11-29 | End: 2021-12-02 | Stop reason: HOSPADM

## 2021-11-29 RX ORDER — BUDESONIDE AND FORMOTEROL FUMARATE DIHYDRATE 160; 4.5 UG/1; UG/1
2 AEROSOL RESPIRATORY (INHALATION) 2 TIMES DAILY
Status: DISCONTINUED | OUTPATIENT
Start: 2021-11-29 | End: 2021-12-02 | Stop reason: HOSPADM

## 2021-11-29 RX ORDER — AMIODARONE HYDROCHLORIDE 200 MG/1
200 TABLET ORAL DAILY
Status: DISCONTINUED | OUTPATIENT
Start: 2021-11-29 | End: 2021-12-02 | Stop reason: HOSPADM

## 2021-11-29 RX ADMIN — ONDANSETRON HYDROCHLORIDE 4 MG: 2 INJECTION, SOLUTION INTRAMUSCULAR; INTRAVENOUS at 15:20

## 2021-11-29 RX ADMIN — DOCUSATE SODIUM 100 MG: 100 CAPSULE ORAL at 20:34

## 2021-11-29 RX ADMIN — PIPERACILLIN SODIUM AND TAZOBACTAM SODIUM 3375 MG: 3; .375 INJECTION, POWDER, LYOPHILIZED, FOR SOLUTION INTRAVENOUS at 23:40

## 2021-11-29 RX ADMIN — Medication 10 ML: at 23:39

## 2021-11-29 RX ADMIN — AMIODARONE HYDROCHLORIDE 200 MG: 200 TABLET ORAL at 23:38

## 2021-11-29 RX ADMIN — MORPHINE SULFATE 2 MG: 2 INJECTION, SOLUTION INTRAMUSCULAR; INTRAVENOUS at 23:36

## 2021-11-29 RX ADMIN — MORPHINE SULFATE 4 MG: 4 INJECTION INTRAVENOUS at 15:21

## 2021-11-29 RX ADMIN — SENNOSIDES 8.6 MG: 8.6 TABLET, FILM COATED ORAL at 23:43

## 2021-11-29 RX ADMIN — DICYCLOMINE HYDROCHLORIDE 10 MG: 10 CAPSULE ORAL at 20:34

## 2021-11-29 RX ADMIN — TAMSULOSIN HYDROCHLORIDE 0.4 MG: 0.4 CAPSULE ORAL at 20:34

## 2021-11-29 RX ADMIN — CETIRIZINE HYDROCHLORIDE 5 MG: 10 TABLET ORAL at 23:38

## 2021-11-29 RX ADMIN — IOPAMIDOL 75 ML: 755 INJECTION, SOLUTION INTRAVENOUS at 15:07

## 2021-11-29 RX ADMIN — KETOROLAC TROMETHAMINE 15 MG: 30 INJECTION, SOLUTION INTRAMUSCULAR; INTRAVENOUS at 15:21

## 2021-11-29 RX ADMIN — METOPROLOL TARTRATE 50 MG: 50 TABLET, FILM COATED ORAL at 20:34

## 2021-11-29 RX ADMIN — ENOXAPARIN SODIUM 40 MG: 40 INJECTION SUBCUTANEOUS at 23:39

## 2021-11-29 RX ADMIN — QUETIAPINE FUMARATE 100 MG: 25 TABLET ORAL at 23:39

## 2021-11-29 RX ADMIN — PIPERACILLIN SODIUM AND TAZOBACTAM SODIUM 3375 MG: 3; .375 INJECTION, POWDER, LYOPHILIZED, FOR SOLUTION INTRAVENOUS at 15:48

## 2021-11-29 RX ADMIN — SODIUM CHLORIDE: 9 INJECTION, SOLUTION INTRAVENOUS at 20:54

## 2021-11-29 RX ADMIN — Medication 2 PUFF: at 20:58

## 2021-11-29 ASSESSMENT — PAIN DESCRIPTION - FREQUENCY
FREQUENCY: CONTINUOUS
FREQUENCY: CONTINUOUS

## 2021-11-29 ASSESSMENT — PAIN DESCRIPTION - PAIN TYPE
TYPE: ACUTE PAIN

## 2021-11-29 ASSESSMENT — PAIN SCALES - GENERAL
PAINLEVEL_OUTOF10: 10
PAINLEVEL_OUTOF10: 7

## 2021-11-29 ASSESSMENT — PAIN DESCRIPTION - DESCRIPTORS
DESCRIPTORS: ACHING;SHOOTING
DESCRIPTORS: ACHING

## 2021-11-29 ASSESSMENT — PAIN DESCRIPTION - LOCATION
LOCATION: ABDOMEN
LOCATION: ABDOMEN;BACK
LOCATION: ABDOMEN

## 2021-11-29 ASSESSMENT — PAIN DESCRIPTION - ORIENTATION: ORIENTATION: MID;LOWER

## 2021-11-29 ASSESSMENT — PAIN DESCRIPTION - ONSET: ONSET: GRADUAL

## 2021-11-29 NOTE — ED PROVIDER NOTES
Laterality Date    CATARACT REMOVAL WITH IMPLANT Left 14    CATARACT REMOVAL WITH IMPLANT Right 1/2/15    phacoemulsification with initraocular lens implant     SECTION      x3    COLONOSCOPY  12    HYSTERECTOMY      TONSILLECTOMY      TUBAL LIGATION      UPPER GASTROINTESTINAL ENDOSCOPY  2014    gastric polyp    UPPER GASTROINTESTINAL ENDOSCOPY  2014    gastric polyp    UPPER GASTROINTESTINAL ENDOSCOPY  2018    gastritis     Family History   Problem Relation Age of Onset    Cancer Mother         bone    Heart Disease Mother     Hypertension Father     Heart Disease Father      Social History     Socioeconomic History    Marital status: Single     Spouse name: Not on file    Number of children: Not on file    Years of education: Not on file    Highest education level: Not on file   Occupational History    Not on file   Tobacco Use    Smoking status: Never Smoker    Smokeless tobacco: Never Used   Substance and Sexual Activity    Alcohol use: No    Drug use: Not Currently     Types: Methamphetamines (Crystal Meth)    Sexual activity: Not Currently   Other Topics Concern    Not on file   Social History Narrative    Not on file     Social Determinants of Health     Financial Resource Strain:     Difficulty of Paying Living Expenses: Not on file   Food Insecurity:     Worried About Running Out of Food in the Last Year: Not on file    Luan of Food in the Last Year: Not on file   Transportation Needs:     Lack of Transportation (Medical): Not on file    Lack of Transportation (Non-Medical):  Not on file   Physical Activity:     Days of Exercise per Week: Not on file    Minutes of Exercise per Session: Not on file   Stress:     Feeling of Stress : Not on file   Social Connections:     Frequency of Communication with Friends and Family: Not on file    Frequency of Social Gatherings with Friends and Family: Not on file    Attends Taoism Services: Not on file    Active Member of Clubs or Organizations: Not on file    Attends Club or Organization Meetings: Not on file    Marital Status: Not on file   Intimate Partner Violence:     Fear of Current or Ex-Partner: Not on file    Emotionally Abused: Not on file    Physically Abused: Not on file    Sexually Abused: Not on file   Housing Stability:     Unable to Pay for Housing in the Last Year: Not on file    Number of [place] in the Last Year: Not on file    Unstable Housing in the Last Year: Not on file     No current facility-administered medications for this encounter. Current Outpatient Medications   Medication Sig Dispense Refill    amoxicillin-clavulanate (AUGMENTIN) 875-125 MG per tablet Take 1 tablet by mouth 2 times daily for 7 days 14 tablet 0    lactulose (CHRONULAC) 10 GM/15ML solution Take 30 mLs by mouth every evening for 7 days 210 mL 0    HYDROcodone-acetaminophen (NORCO) 5-325 MG per tablet Take 1 tablet by mouth every 6 hours as needed for Pain for up to 3 days. Intended supply: 3 days.  Take lowest dose possible to manage pain 12 tablet 0    ADVAIR DISKUS 250-50 MCG/DOSE AEPB INHALE 1 PUFF INTO THE LUNGS 2 TIMES DAILY (RINSE MOUTH AFTER EACH USE WITH WATER THEN SPIT OUT) 60 each 4    furosemide (LASIX) 20 MG tablet TAKE 1 TABLET (20MG) BY MOUTH DAILY 30 tablet 2    albuterol sulfate  (90 Base) MCG/ACT inhaler INHALE 2 PUFFS INTO THE LUNGS 4 TIMES DAILY AS NEEDED FOR WHEEZING OR SHORTNESS OF BREATH 8.5 g 5    QUEtiapine (SEROQUEL) 25 MG tablet Take by mouth 2 times daily Indications: 200mg at HS      metoprolol tartrate (LOPRESSOR) 50 MG tablet Take 1 tablet by mouth 2 times daily 60 tablet 0    amiodarone (CORDARONE) 200 MG tablet Take 1 tablet by mouth daily 30 tablet 0    clobetasol (TEMOVATE) 0.05 % external solution Apply small amount to scalp only two nights per week for flares for up to 4 weeks 50 mL 0    tamsulosin (FLOMAX) 0.4 MG capsule Take 1 capsule by mouth daily 30 capsule 0    docusate sodium (COLACE) 100 MG capsule Take 100 mg by mouth 2 times daily      trimethoprim (TRIMPEX) 100 MG tablet Take 100 mg by mouth daily       fluticasone (FLONASE) 50 MCG/ACT nasal spray 1 spray by Nasal route daily      oxyCODONE-acetaminophen (PERCOCET) 7.5-325 MG per tablet Take 0.5 tablets by mouth every 8 hours as needed for Pain .  sulfacetamide-prednisoLONE (BLEPHAMIDE) 10-0.2 % ophthalmic suspension 2 drops every 4 hours      clobetasol (TEMOVATE) 0.05 % ointment Apply topically 2 times daily Apply topically 2 times daily.  hydrOXYzine (ATARAX) 25 MG tablet Take 25 mg by mouth every 8 hours as needed for Itching       loratadine (CLARITIN) 10 MG tablet Take 10 mg by mouth daily      ondansetron (ZOFRAN) 4 MG tablet Take 4 mg by mouth every 8 hours as needed for Nausea or Vomiting      dicyclomine (BENTYL) 10 MG capsule Take 10 mg by mouth 4 times daily (before meals and nightly)      pantoprazole (PROTONIX) 40 MG tablet Take 1 tablet by mouth 2 times daily (before meals) 60 tablet 0    senna (SENOKOT) 8.6 MG tablet Take 1 tablet by mouth 2 times daily       levothyroxine (SYNTHROID) 50 MCG tablet Take 1 tablet by mouth daily 30 tablet 3    nystatin (MYCOSTATIN) 858977 UNIT/GM cream Apply topically 2 times daily under breasts (Patient not taking: Reported on 3/16/2021) 30 g 0    conjugated estrogens (PREMARIN) 0.625 MG/GM vaginal cream Place vaginally three times weekly. (Patient not taking: Reported on 3/16/2021) 1 Tube 1    albuterol (PROVENTIL) (2.5 MG/3ML) 0.083% nebulizer solution Take 3 mLs by nebulization every 4 hours as needed for Wheezing.  150 mL 11     Allergies   Allergen Reactions    Erythromycin Nausea And Vomiting    Keflex [Cephalexin] Nausea And Vomiting    Sulfa Antibiotics Nausea And Vomiting    Tetracyclines & Related Nausea And Vomiting       REVIEW OF SYSTEMS  All systems reviewed, pertinent positives per HPI otherwise noted to be negative. PHYSICAL EXAM  BP (!) 173/78   Pulse 86   Temp 98.2 °F (36.8 °C) (Tympanic)   Resp 16   Ht 5' 2\" (1.575 m)   Wt 190 lb (86.2 kg)   SpO2 98%   BMI 34.75 kg/m²    GENERAL APPEARANCE: Awake and alert. Cooperative. Uncomfortable   HENT: Normocephalic. Atraumatic. Mucous membranes are moist  NECK: Supple. Full range of motion without pain or stiffness  EYES: PERRL. EOM's grossly intact. HEART/CHEST: RRR. No murmurs. LUNGS: Respirations unlabored. CTAB. Good air exchange. Speaking comfortably in full sentences. ABDOMEN: Tender diffusely. Soft. Non-distended. No masses. No organomegaly. No guarding or rebound. MUSCULOSKELETAL: No extremity edema. Compartments soft. No deformity. No tenderness in the extremities. All extremities neurovascularly intact. SKIN: Warm and dry. No acute rashes. NEUROLOGICAL: Alert and oriented. No gross facial drooping. Strength 5/5, sensation intact. PSYCHIATRIC: Normal mood and affect. LABS  I have reviewed all labs for this visit.    Results for orders placed or performed during the hospital encounter of 11/29/21   CBC Auto Differential   Result Value Ref Range    WBC 10.5 4.0 - 11.0 K/uL    RBC 3.81 (L) 4.00 - 5.20 M/uL    Hemoglobin 11.1 (L) 12.0 - 16.0 g/dL    Hematocrit 34.5 (L) 36.0 - 48.0 %    MCV 90.6 80.0 - 100.0 fL    MCH 29.1 26.0 - 34.0 pg    MCHC 32.1 31.0 - 36.0 g/dL    RDW 15.6 (H) 12.4 - 15.4 %    Platelets 786 592 - 174 K/uL    MPV 8.6 5.0 - 10.5 fL    Neutrophils % 62.8 %    Lymphocytes % 23.4 %    Monocytes % 6.9 %    Eosinophils % 6.1 %    Basophils % 0.8 %    Neutrophils Absolute 6.6 1.7 - 7.7 K/uL    Lymphocytes Absolute 2.5 1.0 - 5.1 K/uL    Monocytes Absolute 0.7 0.0 - 1.3 K/uL    Eosinophils Absolute 0.6 0.0 - 0.6 K/uL    Basophils Absolute 0.1 0.0 - 0.2 K/uL   Comprehensive Metabolic Panel w/ Reflex to MG   Result Value Ref Range    Sodium 132 (L) 136 - 145 mmol/L    Potassium reflex Magnesium 4.5 3.5 - 5.1 mmol/L Chloride 98 (L) 99 - 110 mmol/L    CO2 19 (L) 21 - 32 mmol/L    Anion Gap 15 3 - 16    Glucose 102 (H) 70 - 99 mg/dL    BUN 18 7 - 20 mg/dL    CREATININE 0.9 0.6 - 1.2 mg/dL    GFR Non-African American >60 >60    GFR African American >60 >60    Calcium 9.5 8.3 - 10.6 mg/dL    Total Protein 8.6 (H) 6.4 - 8.2 g/dL    Albumin 4.0 3.4 - 5.0 g/dL    Albumin/Globulin Ratio 0.9 (L) 1.1 - 2.2    Total Bilirubin 0.3 0.0 - 1.0 mg/dL    Alkaline Phosphatase 341 (H) 40 - 129 U/L    ALT 20 10 - 40 U/L    AST 28 15 - 37 U/L   Lipase   Result Value Ref Range    Lipase 18.0 13.0 - 60.0 U/L   Urinalysis, reflex to microscopic   Result Value Ref Range    Color, UA Yellow Straw/Yellow    Clarity, UA Clear Clear    Glucose, Ur Negative Negative mg/dL    Bilirubin Urine Negative Negative    Ketones, Urine Negative Negative mg/dL    Specific Gravity, UA 1.015 1.005 - 1.030    Blood, Urine Negative Negative    pH, UA 6.0 5.0 - 8.0    Protein, UA Negative Negative mg/dL    Urobilinogen, Urine 0.2 <2.0 E.U./dL    Nitrite, Urine Negative Negative    Leukocyte Esterase, Urine Negative Negative    Microscopic Examination Not Indicated     Urine Type NotGiven          RADIOLOGY  CT ABDOMEN PELVIS W IV CONTRAST Additional Contrast? None   Final Result   1. Sigmoid colon diverticulitis which appears similar to the comparison   study. No evidence of abscess formation at this time. 2.  Other chronic findings as above                 ED COURSE / MDM  Patient seen and evaluated. Old records reviewed and pertinent information included in HPI. Labs and imaging reviewed and results discussed with patient. Overall, uncomfortable appearing patient in no acute distress presenting for worsening abdominal pain and symptoms associated with diverticulitis diagnosed on 11/26. Physical exam remarkable for diffuse abdominal tenderness.     Differential diagnosis includes but is not limited to: Diverticulitis, intra-abdominal abscess, intra-abdominal studies are reassuring, her CT shows no improvement despite 3 days of antibiotics. Patient reports worsening pain. She is 80years old. She reports poor p.o tolerance. Concern for failure of outpatient treatment given worsening symptoms. At this time, do feel the patient requires admission for further work-up and management. Discussed the patient with hospital team, patient to be admitted to [facility]. CLINICAL IMPRESSION  1. Diverticulitis of colon    2. Generalized abdominal pain        Blood pressure (!) 151/65, pulse 89, temperature 98.2 °F (36.8 °C), temperature source Tympanic, resp. rate 18, height 5' 2\" (1.575 m), weight 190 lb (86.2 kg), SpO2 96 %, not currently breastfeeding. Lila was admitted in stable condition. DISCLAIMER: This chart was created using Dragon dictation software. Efforts were made by me to ensure accuracy, however some errors may be present due to limitations of this technology and occasionally words are not transcribed correctly.       Rene Jesus MD  11/30/21 [id number]

## 2021-11-29 NOTE — PLAN OF CARE
Admit to 2w    Diverticulitis, failed outpatient tx  Abdominal pain   Nausea  Pain control, IVF, and Zosyn   Mild hyponatremia  Holding Lasix at this time with poor PO intake       Hannah Nelson, APRN - CNP

## 2021-11-30 LAB
ANION GAP SERPL CALCULATED.3IONS-SCNC: 11 MMOL/L (ref 3–16)
BASOPHILS ABSOLUTE: 0.1 K/UL (ref 0–0.2)
BASOPHILS RELATIVE PERCENT: 1 %
BUN BLDV-MCNC: 13 MG/DL (ref 7–20)
CALCIUM SERPL-MCNC: 8.8 MG/DL (ref 8.3–10.6)
CHLORIDE BLD-SCNC: 99 MMOL/L (ref 99–110)
CO2: 19 MMOL/L (ref 21–32)
CREAT SERPL-MCNC: 0.8 MG/DL (ref 0.6–1.2)
EOSINOPHILS ABSOLUTE: 0.7 K/UL (ref 0–0.6)
EOSINOPHILS RELATIVE PERCENT: 8.8 %
GFR AFRICAN AMERICAN: >60
GFR NON-AFRICAN AMERICAN: >60
GLUCOSE BLD-MCNC: 129 MG/DL (ref 70–99)
GLUCOSE BLD-MCNC: 153 MG/DL (ref 70–99)
GLUCOSE BLD-MCNC: 160 MG/DL (ref 70–99)
GLUCOSE BLD-MCNC: 162 MG/DL (ref 70–99)
HCT VFR BLD CALC: 31.1 % (ref 36–48)
HEMOGLOBIN: 10.2 G/DL (ref 12–16)
LYMPHOCYTES ABSOLUTE: 2.2 K/UL (ref 1–5.1)
LYMPHOCYTES RELATIVE PERCENT: 28.2 %
MCH RBC QN AUTO: 29.6 PG (ref 26–34)
MCHC RBC AUTO-ENTMCNC: 32.8 G/DL (ref 31–36)
MCV RBC AUTO: 90.3 FL (ref 80–100)
MONOCYTES ABSOLUTE: 0.6 K/UL (ref 0–1.3)
MONOCYTES RELATIVE PERCENT: 8.1 %
NEUTROPHILS ABSOLUTE: 4.2 K/UL (ref 1.7–7.7)
NEUTROPHILS RELATIVE PERCENT: 53.9 %
PDW BLD-RTO: 15.5 % (ref 12.4–15.4)
PERFORMED ON: ABNORMAL
PLATELET # BLD: 304 K/UL (ref 135–450)
PMV BLD AUTO: 7.8 FL (ref 5–10.5)
POTASSIUM REFLEX MAGNESIUM: 4.6 MMOL/L (ref 3.5–5.1)
RBC # BLD: 3.45 M/UL (ref 4–5.2)
SODIUM BLD-SCNC: 129 MMOL/L (ref 136–145)
WBC # BLD: 7.8 K/UL (ref 4–11)

## 2021-11-30 PROCEDURE — 6360000002 HC RX W HCPCS: Performed by: NURSE PRACTITIONER

## 2021-11-30 PROCEDURE — 94761 N-INVAS EAR/PLS OXIMETRY MLT: CPT

## 2021-11-30 PROCEDURE — 6370000000 HC RX 637 (ALT 250 FOR IP)

## 2021-11-30 PROCEDURE — 6370000000 HC RX 637 (ALT 250 FOR IP): Performed by: PHYSICIAN ASSISTANT

## 2021-11-30 PROCEDURE — 6370000000 HC RX 637 (ALT 250 FOR IP): Performed by: NURSE PRACTITIONER

## 2021-11-30 PROCEDURE — 85025 COMPLETE CBC W/AUTO DIFF WBC: CPT

## 2021-11-30 PROCEDURE — 96376 TX/PRO/DX INJ SAME DRUG ADON: CPT

## 2021-11-30 PROCEDURE — 6360000002 HC RX W HCPCS: Performed by: INTERNAL MEDICINE

## 2021-11-30 PROCEDURE — 83036 HEMOGLOBIN GLYCOSYLATED A1C: CPT

## 2021-11-30 PROCEDURE — 1200000000 HC SEMI PRIVATE

## 2021-11-30 PROCEDURE — 96372 THER/PROPH/DIAG INJ SC/IM: CPT

## 2021-11-30 PROCEDURE — 94640 AIRWAY INHALATION TREATMENT: CPT

## 2021-11-30 PROCEDURE — 2580000003 HC RX 258: Performed by: NURSE PRACTITIONER

## 2021-11-30 PROCEDURE — 80048 BASIC METABOLIC PNL TOTAL CA: CPT

## 2021-11-30 PROCEDURE — 99232 SBSQ HOSP IP/OBS MODERATE 35: CPT

## 2021-11-30 RX ORDER — DEXTROSE MONOHYDRATE 25 G/50ML
12.5 INJECTION, SOLUTION INTRAVENOUS PRN
Status: DISCONTINUED | OUTPATIENT
Start: 2021-11-30 | End: 2021-12-02 | Stop reason: HOSPADM

## 2021-11-30 RX ORDER — ALBUTEROL SULFATE 2.5 MG/3ML
2.5 SOLUTION RESPIRATORY (INHALATION) 2 TIMES DAILY
Status: DISCONTINUED | OUTPATIENT
Start: 2021-11-30 | End: 2021-12-02 | Stop reason: HOSPADM

## 2021-11-30 RX ORDER — NICOTINE POLACRILEX 4 MG
15 LOZENGE BUCCAL PRN
Status: DISCONTINUED | OUTPATIENT
Start: 2021-11-30 | End: 2021-12-02 | Stop reason: HOSPADM

## 2021-11-30 RX ORDER — POLYETHYLENE GLYCOL 3350 17 G/17G
17 POWDER, FOR SOLUTION ORAL 2 TIMES DAILY
Status: DISCONTINUED | OUTPATIENT
Start: 2021-11-30 | End: 2021-12-02 | Stop reason: HOSPADM

## 2021-11-30 RX ORDER — DEXTROSE MONOHYDRATE 50 MG/ML
100 INJECTION, SOLUTION INTRAVENOUS PRN
Status: DISCONTINUED | OUTPATIENT
Start: 2021-11-30 | End: 2021-12-02 | Stop reason: HOSPADM

## 2021-11-30 RX ADMIN — CLOBETASOL PROPIONATE: 0.5 OINTMENT TOPICAL at 09:22

## 2021-11-30 RX ADMIN — ONDANSETRON HYDROCHLORIDE 4 MG: 2 INJECTION, SOLUTION INTRAMUSCULAR; INTRAVENOUS at 10:59

## 2021-11-30 RX ADMIN — DICYCLOMINE HYDROCHLORIDE 10 MG: 10 CAPSULE ORAL at 21:41

## 2021-11-30 RX ADMIN — Medication 2 PUFF: at 20:15

## 2021-11-30 RX ADMIN — METOPROLOL TARTRATE 50 MG: 50 TABLET, FILM COATED ORAL at 21:41

## 2021-11-30 RX ADMIN — PIPERACILLIN SODIUM AND TAZOBACTAM SODIUM 3375 MG: 3; .375 INJECTION, POWDER, LYOPHILIZED, FOR SOLUTION INTRAVENOUS at 15:47

## 2021-11-30 RX ADMIN — ALBUTEROL SULFATE 2.5 MG: 2.5 SOLUTION RESPIRATORY (INHALATION) at 08:39

## 2021-11-30 RX ADMIN — CETIRIZINE HYDROCHLORIDE 5 MG: 10 TABLET ORAL at 09:21

## 2021-11-30 RX ADMIN — INSULIN LISPRO 1 UNITS: 100 INJECTION, SOLUTION INTRAVENOUS; SUBCUTANEOUS at 16:54

## 2021-11-30 RX ADMIN — PANTOPRAZOLE SODIUM 40 MG: 40 TABLET, DELAYED RELEASE ORAL at 15:41

## 2021-11-30 RX ADMIN — ENOXAPARIN SODIUM 40 MG: 40 INJECTION SUBCUTANEOUS at 21:41

## 2021-11-30 RX ADMIN — DOCUSATE SODIUM 100 MG: 100 CAPSULE ORAL at 21:41

## 2021-11-30 RX ADMIN — POLYETHYLENE GLYCOL (3350) 17 G: 17 POWDER, FOR SOLUTION ORAL at 21:41

## 2021-11-30 RX ADMIN — SODIUM CHLORIDE: 9 INJECTION, SOLUTION INTRAVENOUS at 15:39

## 2021-11-30 RX ADMIN — MORPHINE SULFATE 2 MG: 2 INJECTION, SOLUTION INTRAMUSCULAR; INTRAVENOUS at 15:40

## 2021-11-30 RX ADMIN — DICYCLOMINE HYDROCHLORIDE 10 MG: 10 CAPSULE ORAL at 09:21

## 2021-11-30 RX ADMIN — DICYCLOMINE HYDROCHLORIDE 10 MG: 10 CAPSULE ORAL at 06:31

## 2021-11-30 RX ADMIN — PIPERACILLIN SODIUM AND TAZOBACTAM SODIUM 3375 MG: 3; .375 INJECTION, POWDER, LYOPHILIZED, FOR SOLUTION INTRAVENOUS at 06:33

## 2021-11-30 RX ADMIN — METOPROLOL TARTRATE 50 MG: 50 TABLET, FILM COATED ORAL at 09:21

## 2021-11-30 RX ADMIN — QUETIAPINE FUMARATE 100 MG: 25 TABLET ORAL at 11:35

## 2021-11-30 RX ADMIN — POLYETHYLENE GLYCOL (3350) 17 G: 17 POWDER, FOR SOLUTION ORAL at 15:41

## 2021-11-30 RX ADMIN — QUETIAPINE FUMARATE 100 MG: 25 TABLET ORAL at 21:40

## 2021-11-30 RX ADMIN — ALBUTEROL SULFATE 2.5 MG: 2.5 SOLUTION RESPIRATORY (INHALATION) at 20:11

## 2021-11-30 RX ADMIN — INSULIN LISPRO 1 UNITS: 100 INJECTION, SOLUTION INTRAVENOUS; SUBCUTANEOUS at 11:36

## 2021-11-30 RX ADMIN — SENNOSIDES 8.6 MG: 8.6 TABLET, FILM COATED ORAL at 10:54

## 2021-11-30 RX ADMIN — LEVOTHYROXINE SODIUM 50 MCG: 25 TABLET ORAL at 06:31

## 2021-11-30 RX ADMIN — Medication 10 ML: at 21:50

## 2021-11-30 RX ADMIN — MORPHINE SULFATE 2 MG: 2 INJECTION, SOLUTION INTRAMUSCULAR; INTRAVENOUS at 21:38

## 2021-11-30 RX ADMIN — DOCUSATE SODIUM 100 MG: 100 CAPSULE ORAL at 09:21

## 2021-11-30 RX ADMIN — SENNOSIDES 8.6 MG: 8.6 TABLET, FILM COATED ORAL at 21:41

## 2021-11-30 RX ADMIN — AMIODARONE HYDROCHLORIDE 200 MG: 200 TABLET ORAL at 09:21

## 2021-11-30 RX ADMIN — Medication 2 PUFF: at 08:39

## 2021-11-30 RX ADMIN — MORPHINE SULFATE 2 MG: 2 INJECTION, SOLUTION INTRAMUSCULAR; INTRAVENOUS at 09:21

## 2021-11-30 RX ADMIN — DICYCLOMINE HYDROCHLORIDE 10 MG: 10 CAPSULE ORAL at 16:53

## 2021-11-30 RX ADMIN — PANTOPRAZOLE SODIUM 40 MG: 40 TABLET, DELAYED RELEASE ORAL at 06:31

## 2021-11-30 RX ADMIN — TAMSULOSIN HYDROCHLORIDE 0.4 MG: 0.4 CAPSULE ORAL at 09:21

## 2021-11-30 RX ADMIN — ONDANSETRON HYDROCHLORIDE 4 MG: 2 INJECTION, SOLUTION INTRAMUSCULAR; INTRAVENOUS at 04:58

## 2021-11-30 RX ADMIN — MORPHINE SULFATE 2 MG: 2 INJECTION, SOLUTION INTRAMUSCULAR; INTRAVENOUS at 04:58

## 2021-11-30 ASSESSMENT — PAIN DESCRIPTION - PAIN TYPE
TYPE: ACUTE PAIN

## 2021-11-30 ASSESSMENT — PAIN SCALES - GENERAL
PAINLEVEL_OUTOF10: 0
PAINLEVEL_OUTOF10: 10
PAINLEVEL_OUTOF10: 8
PAINLEVEL_OUTOF10: 7
PAINLEVEL_OUTOF10: 8
PAINLEVEL_OUTOF10: 10
PAINLEVEL_OUTOF10: 0
PAINLEVEL_OUTOF10: 0
PAINLEVEL_OUTOF10: 8
PAINLEVEL_OUTOF10: 0
PAINLEVEL_OUTOF10: 0

## 2021-11-30 ASSESSMENT — PAIN DESCRIPTION - ORIENTATION: ORIENTATION: MID

## 2021-11-30 ASSESSMENT — PAIN DESCRIPTION - PROGRESSION
CLINICAL_PROGRESSION: NOT CHANGED

## 2021-11-30 ASSESSMENT — PAIN DESCRIPTION - LOCATION
LOCATION: ABDOMEN

## 2021-11-30 ASSESSMENT — PAIN DESCRIPTION - FREQUENCY: FREQUENCY: INTERMITTENT

## 2021-11-30 ASSESSMENT — PAIN DESCRIPTION - DESCRIPTORS: DESCRIPTORS: ACHING

## 2021-11-30 ASSESSMENT — PAIN DESCRIPTION - ONSET: ONSET: GRADUAL

## 2021-11-30 NOTE — ED NOTES
[facility] for consult for pt. Office will page Dr. Indra Castellano  11/30/21 [id number] - Dr. Josef Sharpe called and said that pt had seen another doctor and that he asked Dr. Gladys Patel to see pt.       Ale Gray  11/30/21 1100

## 2021-11-30 NOTE — PROGRESS NOTES
RT Inhaler-Nebulizer Bronchodilator Protocol Note    There is a bronchodilator order in the chart from a provider indicating to follow the RT Bronchodilator Protocol and there is an Initiate RT Inhaler-Nebulizer Bronchodilator Protocol order as well (see protocol at bottom of note). CXR Findings:  No results found. The findings from the last RT Protocol Assessment were as follows:   History Pulmonary Disease: (P) Chronic pulmonary disease  Respiratory Pattern: (P) Dyspnea on exertion or RR 21-25 bpm  Breath Sounds: (P) Slightly diminished and/or crackles  Cough: (P) Strong, spontaneous, non-productive  Indication for Bronchodilator Therapy: (P) Decreased or absent breath sounds  Bronchodilator Assessment Score: (P) 6    Aerosolized bronchodilator medication orders have been revised according to the RT Inhaler-Nebulizer Bronchodilator Protocol below. Respiratory Therapist to perform RT Therapy Protocol Assessment initially then follow the protocol. Repeat RT Therapy Protocol Assessment PRN for score 0-3 or on second treatment, BID, and PRN for scores above 3. No Indications - adjust the frequency to every 6 hours PRN wheezing or bronchospasm, if no treatments needed after 48 hours then discontinue using Per Protocol order mode. If indication present, adjust the RT bronchodilator orders based on the Bronchodilator Assessment Score as indicated below. Use Inhaler orders unless patient has one or more of the following: on home nebulizer, not able to hold breath for 10 seconds, is not alert and oriented, cannot activate and use MDI correctly, or respiratory rate 25 breaths per minute or more, then use the equivalent nebulizer order(s) with same Frequency and PRN reasons based on the score. If a patient is on this medication at home then do not decrease Frequency below that used at home.     0-3 - enter or revise RT bronchodilator order(s) to equivalent RT Bronchodilator order with Frequency of every 4 hours PRN for wheezing or increased work of breathing using Per Protocol order mode. 4-6 - enter or revise RT Bronchodilator order(s) to two equivalent RT bronchodilator orders with one order with BID Frequency and one order with Frequency of every 4 hours PRN wheezing or increased work of breathing using Per Protocol order mode. 7-10 - enter or revise RT Bronchodilator order(s) to two equivalent RT bronchodilator orders with one order with TID Frequency and one order with Frequency of every 4 hours PRN wheezing or increased work of breathing using Per Protocol order mode. 11-13 - enter or revise RT Bronchodilator order(s) to one equivalent RT bronchodilator order with QID Frequency and an Albuterol order with Frequency of every 4 hours PRN wheezing or increased work of breathing using Per Protocol order mode. Greater than 13 - enter or revise RT Bronchodilator order(s) to one equivalent RT bronchodilator order with every 4 hours Frequency and an Albuterol order with Frequency of every 2 hours PRN wheezing or increased work of breathing using Per Protocol order mode.          Electronically signed by Milind Momin RCP on 11/30/2021 at 8:42 AM

## 2021-11-30 NOTE — PROGRESS NOTES
Shift assessment completed, see flowsheet. Patient resting in bed at this time. All needs met. Respirations easy and even at rest. Ambulating to bathroom with standy by assist. Pt is currently refusing bed alarm, she is alert and oriented with appropriate safety awareness. Bed in lowest position and locked, SR up x2. Call light within reach.

## 2021-11-30 NOTE — PLAN OF CARE
Problem: Pain:  Goal: Pain level will decrease  11/30/2021 1519 by Sravan Ji RN  Outcome: Ongoing  11/30/2021 0407 by Tracy Orta RN  Outcome: Ongoing  Goal: Control of acute pain  11/30/2021 1519 by Sravan Ji RN  Outcome: Ongoing  11/30/2021 0407 by Tracy Orta RN  Outcome: Ongoing  Goal: Control of chronic pain  Outcome: Ongoing  Goal: Patient's pain/discomfort is manageable  Outcome: Ongoing     Problem: Falls - Risk of:  Goal: Will remain free from falls  11/30/2021 1519 by Sravan Ji RN  Outcome: Ongoing  11/30/2021 0407 by Tracy Orta RN  Outcome: Ongoing  Goal: Absence of physical injury  11/30/2021 1519 by Sravan Ji RN  Outcome: Ongoing  11/30/2021 0407 by Tracy Orta RN  Outcome: Ongoing     Problem: Infection:  Goal: Will remain free from infection  Outcome: Ongoing     Problem: Safety:  Goal: Free from accidental physical injury  Outcome: Ongoing  Goal: Free from intentional harm  Outcome: Ongoing     Problem: Daily Care:  Goal: Daily care needs are met  Outcome: Ongoing     Problem: Skin Integrity:  Goal: Skin integrity will stabilize  Outcome: Ongoing     Problem: Discharge Planning:  Goal: Patients continuum of care needs are met  Outcome: Ongoing

## 2021-11-30 NOTE — PROGRESS NOTES
FYI:  Additive QT interval prolongation may occur during combination therapy of Quetiapine, Amiodarone, and Zofran. Most adverse events occur when the QTC > 500. Patient's QTC = 472. Compazine has lesser effects on the QT interval and can be ordered as an alternative to Zofran. Observe for signs and symptoms.   Sean Henry R.Ph.11/29/20218:57 PM
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
## 2021-11-30 NOTE — PROGRESS NOTES
Progress Note    Admit Date:  11/29/2021    80 y.o. female who presented to the hospital with a chief complaint of suprapubic pain. The patient was recently diagnosed with diverticulitis and was discharged from the emergency department with oral antibiotic therapy. She however has continued to have the same symptoms of suprapubic abdominal pain and cramping. She does not think she has gotten any relief from the antibiotic she was prescribed. She called her physician who told her to come to the emergency department. She will be admitted for failed outpatient therapy for diverticulitis. Subjective:  Ms. Lizzeth White is not feeling much better today. She is complaining of generalized abdominal pain and back pain. She has no appetite and is quite nauseous during my interview. She states she has not had a BM in several days. She is still passing gas. CT did not show signs of bowel obstruction. Objective:   Patient Vitals for the past 4 hrs:   BP Temp Temp src Pulse Resp SpO2   11/30/21 0841 -- -- -- -- 18 94 %   11/30/21 0753 (!) 133/53 98 °F (36.7 °C) Oral 70 18 94 %        No intake or output data in the 24 hours ending 11/30/21 1010    Physical Exam:    Gen: No distress, appears in pain. Alert. Eyes: PERRL. No sclera icterus. No conjunctival injection. ENT: No discharge. Pharynx clear. Neck: Trachea midline. Resp: No accessory muscle use. No crackles. No rhonchi. Mild inspiratory wheezes noted in all lung fields. CV: Regular rate. Regular rhythm. No murmur. No rub. No edema. Capillary Refill: Brisk,< 3 seconds   Peripheral Pulses: +2 palpable, equal bilaterally   GI: Non-distended. Normal bowel sounds. Moderately TTP in all quadrants to light palpation, worse in left lower quadrant. Skin: Warm and dry. No nodule on exposed extremities. No rash on exposed extremities. M/S: No cyanosis. No joint deformity. No clubbing. Neuro: Awake. Grossly nonfocal    Psych: Oriented x 3.  No anxiety or agitation. Scheduled Meds:   albuterol  2.5 mg Nebulization BID    budesonide-formoterol  2 puff Inhalation BID    amiodarone  200 mg Oral Daily    clobetasol   Topical BID    dicyclomine  10 mg Oral 4x Daily AC & HS    docusate sodium  100 mg Oral BID    fluticasone  1 spray Each Nostril Daily    [Held by provider] furosemide  20 mg Oral Daily    levothyroxine  50 mcg Oral QAM AC    cetirizine  5 mg Oral Daily    metoprolol tartrate  50 mg Oral BID    pantoprazole  40 mg Oral BID AC    QUEtiapine  100 mg Oral BID    senna  1 tablet Oral BID    tamsulosin  0.4 mg Oral Daily    sodium chloride flush  5-40 mL IntraVENous 2 times per day    enoxaparin  40 mg SubCUTAneous Nightly    piperacillin-tazobactam  3,375 mg IntraVENous Q8H     Continuous Infusions:   sodium chloride      sodium chloride 75 mL/hr at 11/29/21 2054     PRN Meds:  albuterol, sodium chloride flush, sodium chloride, ondansetron **OR** ondansetron, polyethylene glycol, acetaminophen **OR** acetaminophen, morphine    Data:  CBC:   Recent Labs     11/29/21  1408 11/30/21  0716   WBC 10.5 7.8   HGB 11.1* 10.2*   HCT 34.5* 31.1*   MCV 90.6 90.3    304     BMP:   Recent Labs     11/29/21  1408 11/30/21  0716   * 129*   K 4.5 4.6   CL 98* 99   CO2 19* 19*   BUN 18 13   CREATININE 0.9 0.8     LIVER PROFILE:   Recent Labs     11/29/21  1408   AST 28   ALT 20   LIPASE 18.0   BILITOT 0.3   ALKPHOS 341*     CULTURES  Results for Aliya Mchugh (MRN [id number]) as of 11/30/2021 10:11   Ref. Range 11/29/2021 16:39   INFLUENZA A Latest Ref Range: NOT DETECTED  NOT DETECTED   INFLUENZA B Latest Ref Range: NOT DETECTED  NOT DETECTED     Results for Aliya Mchugh (MRN [id number]) as of 11/30/2021 10:11   Ref. Range 11/29/2021 16:39   SARS-CoV-2 RNA, RT PCR Latest Ref Range: NOT DETECTED  NOT DETECTED      RADIOLOGY  CT ABDOMEN PELVIS W IV CONTRAST Additional Contrast? None   Final Result   1.   Sigmoid colon diverticulitis which

## 2021-11-30 NOTE — PROGRESS NOTES
Patient admitted to room __215__ from ER. Patient oriented to room, call light, bed rails, phone, lights and bathroom. Patient instructed about the schedule of the day including: vital sign frequency, lab draws, possible tests, frequency of MD and staff rounds, daily weights, I &O's and prescribed diet. Bed alarm on. Bed locked, in lowest position, side rails up 2/4, call light within reach. Recliner Assessment:     Patient is able to demonstrate the ability to move from a reclining position to an upright position within the recliner. 4 Eyes Skin Assessment     The patient is being assess for   Admission    I agree that 2 RN's have performed a thorough Head to Toe Skin Assessment on the patient. ALL assessment sites listed below have been assessed. Areas assessed for pressure by both nurses:   [x]   Head, Face, and Ears   [x]   Shoulders, Back, and Chest, Abdomen  [x]   Arms, Elbows, and Hands   [x]   Coccyx, Sacrum, and Ischium  [x]   Legs, Feet, and Heels   Scattered bruises. Skin Assessed Under all Medical Devices by both nurses:  N/A              All Mepilex Borders were peeled back and area peeked at by both nurses:  No: N/A  Please list where Mepilex Borders are located:  N/A             **SHARE this note so that the co-signing nurse is able to place an eSignature**    Co-signer eSignature: Electronically signed by Steven Reveles RN on 11/30/21 at 6:39 PM EST    Does the Patient have Skin Breakdown related to pressure?   No              Abdiel Prevention initiated:  No   Wound Care Orders initiated:  No      [facility] nurse consulted for Pressure Injury (Stage 3,4, Unstageable, DTI, NWPT, Complex wounds)and New or Established Ostomies:  No      Primary Nurse eSignature: Electronically signed by Nory Abraham RN on 11/30/21 at 3:22 PM EST

## 2021-11-30 NOTE — PROGRESS NOTES
RT Inhaler-Nebulizer Bronchodilator Protocol Note    There is a bronchodilator order in the chart from a provider indicating to follow the RT Bronchodilator Protocol and there is an Initiate RT Inhaler-Nebulizer Bronchodilator Protocol order as well (see protocol at bottom of note). CXR Findings:  No results found. The findings from the last RT Protocol Assessment were as follows:   History Pulmonary Disease: Chronic pulmonary disease  Respiratory Pattern: Dyspnea on exertion or RR 21-25 bpm  Breath Sounds: Slightly diminished and/or crackles  Cough: Strong, spontaneous, non-productive  Indication for Bronchodilator Therapy: Decreased or absent breath sounds  Bronchodilator Assessment Score: 6    Aerosolized bronchodilator medication orders have been revised according to the RT Inhaler-Nebulizer Bronchodilator Protocol below. Respiratory Therapist to perform RT Therapy Protocol Assessment initially then follow the protocol. Repeat RT Therapy Protocol Assessment PRN for score 0-3 or on second treatment, BID, and PRN for scores above 3. No Indications - adjust the frequency to every 6 hours PRN wheezing or bronchospasm, if no treatments needed after 48 hours then discontinue using Per Protocol order mode. If indication present, adjust the RT bronchodilator orders based on the Bronchodilator Assessment Score as indicated below. Use Inhaler orders unless patient has one or more of the following: on home nebulizer, not able to hold breath for 10 seconds, is not alert and oriented, cannot activate and use MDI correctly, or respiratory rate 25 breaths per minute or more, then use the equivalent nebulizer order(s) with same Frequency and PRN reasons based on the score. If a patient is on this medication at home then do not decrease Frequency below that used at home.     0-3 - enter or revise RT bronchodilator order(s) to equivalent RT Bronchodilator order with Frequency of every 4 hours PRN for wheezing or increased work of breathing using Per Protocol order mode. 4-6 - enter or revise RT Bronchodilator order(s) to two equivalent RT bronchodilator orders with one order with BID Frequency and one order with Frequency of every 4 hours PRN wheezing or increased work of breathing using Per Protocol order mode. 7-10 - enter or revise RT Bronchodilator order(s) to two equivalent RT bronchodilator orders with one order with TID Frequency and one order with Frequency of every 4 hours PRN wheezing or increased work of breathing using Per Protocol order mode. 11-13 - enter or revise RT Bronchodilator order(s) to one equivalent RT bronchodilator order with QID Frequency and an Albuterol order with Frequency of every 4 hours PRN wheezing or increased work of breathing using Per Protocol order mode. Greater than 13 - enter or revise RT Bronchodilator order(s) to one equivalent RT bronchodilator order with every 4 hours Frequency and an Albuterol order with Frequency of every 2 hours PRN wheezing or increased work of breathing using Per Protocol order mode.            Electronically signed by Harriett Lockhart RCP on 11/30/2021 at 12:00 AM

## 2021-11-30 NOTE — PROGRESS NOTES
Shift assessment complete. VSS. Pt A/OX4. C/o abd pain 10/10. C/o nausea and dry heaving. Nausea meds not available yet. PRN Morphine given. Scheduled meds given per orders. See MAR. Abd round, distended. Pt says \"my stomach is twice the size it normally is\". Pt says \"I haven't pooped in 9 days\". Pt denies wounds or skin issues. No skin issues noted upon exam. Stand by assist to RR. Pt denies needs at this time. Call light within reach.
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
## 2021-12-01 LAB
ANION GAP SERPL CALCULATED.3IONS-SCNC: 11 MMOL/L (ref 3–16)
BASOPHILS ABSOLUTE: 0.1 K/UL (ref 0–0.2)
BASOPHILS RELATIVE PERCENT: 0.9 %
BUN BLDV-MCNC: 10 MG/DL (ref 7–20)
CALCIUM SERPL-MCNC: 8.7 MG/DL (ref 8.3–10.6)
CHLORIDE BLD-SCNC: 105 MMOL/L (ref 99–110)
CO2: 21 MMOL/L (ref 21–32)
CREAT SERPL-MCNC: 0.7 MG/DL (ref 0.6–1.2)
EOSINOPHILS ABSOLUTE: 0.6 K/UL (ref 0–0.6)
EOSINOPHILS RELATIVE PERCENT: 8.8 %
ESTIMATED AVERAGE GLUCOSE: 205.9 MG/DL
GFR AFRICAN AMERICAN: >60
GFR NON-AFRICAN AMERICAN: >60
GLUCOSE BLD-MCNC: 115 MG/DL (ref 70–99)
GLUCOSE BLD-MCNC: 134 MG/DL (ref 70–99)
GLUCOSE BLD-MCNC: 153 MG/DL (ref 70–99)
GLUCOSE BLD-MCNC: 167 MG/DL (ref 70–99)
GLUCOSE BLD-MCNC: 211 MG/DL (ref 70–99)
HBA1C MFR BLD: 8.8 %
HCT VFR BLD CALC: 30.7 % (ref 36–48)
HEMOGLOBIN: 9.9 G/DL (ref 12–16)
LYMPHOCYTES ABSOLUTE: 2.1 K/UL (ref 1–5.1)
LYMPHOCYTES RELATIVE PERCENT: 31.5 %
MCH RBC QN AUTO: 29.3 PG (ref 26–34)
MCHC RBC AUTO-ENTMCNC: 32.2 G/DL (ref 31–36)
MCV RBC AUTO: 91.1 FL (ref 80–100)
MONOCYTES ABSOLUTE: 0.6 K/UL (ref 0–1.3)
MONOCYTES RELATIVE PERCENT: 8.6 %
NEUTROPHILS ABSOLUTE: 3.4 K/UL (ref 1.7–7.7)
NEUTROPHILS RELATIVE PERCENT: 50.2 %
PDW BLD-RTO: 15.4 % (ref 12.4–15.4)
PERFORMED ON: ABNORMAL
PLATELET # BLD: 281 K/UL (ref 135–450)
PMV BLD AUTO: 8.3 FL (ref 5–10.5)
POTASSIUM REFLEX MAGNESIUM: 4.3 MMOL/L (ref 3.5–5.1)
RBC # BLD: 3.37 M/UL (ref 4–5.2)
SODIUM BLD-SCNC: 137 MMOL/L (ref 136–145)
WBC # BLD: 6.7 K/UL (ref 4–11)

## 2021-12-01 PROCEDURE — 6370000000 HC RX 637 (ALT 250 FOR IP): Performed by: NURSE PRACTITIONER

## 2021-12-01 PROCEDURE — 99232 SBSQ HOSP IP/OBS MODERATE 35: CPT | Performed by: INTERNAL MEDICINE

## 2021-12-01 PROCEDURE — 6370000000 HC RX 637 (ALT 250 FOR IP): Performed by: INTERNAL MEDICINE

## 2021-12-01 PROCEDURE — 80048 BASIC METABOLIC PNL TOTAL CA: CPT

## 2021-12-01 PROCEDURE — 96376 TX/PRO/DX INJ SAME DRUG ADON: CPT

## 2021-12-01 PROCEDURE — 94761 N-INVAS EAR/PLS OXIMETRY MLT: CPT

## 2021-12-01 PROCEDURE — 6370000000 HC RX 637 (ALT 250 FOR IP): Performed by: PHYSICIAN ASSISTANT

## 2021-12-01 PROCEDURE — 85025 COMPLETE CBC W/AUTO DIFF WBC: CPT

## 2021-12-01 PROCEDURE — 1200000000 HC SEMI PRIVATE

## 2021-12-01 PROCEDURE — 94640 AIRWAY INHALATION TREATMENT: CPT

## 2021-12-01 PROCEDURE — 2580000003 HC RX 258: Performed by: NURSE PRACTITIONER

## 2021-12-01 PROCEDURE — 96372 THER/PROPH/DIAG INJ SC/IM: CPT

## 2021-12-01 PROCEDURE — 6360000002 HC RX W HCPCS: Performed by: INTERNAL MEDICINE

## 2021-12-01 PROCEDURE — 36415 COLL VENOUS BLD VENIPUNCTURE: CPT

## 2021-12-01 PROCEDURE — 6360000002 HC RX W HCPCS: Performed by: NURSE PRACTITIONER

## 2021-12-01 RX ORDER — QUETIAPINE FUMARATE 100 MG/1
100 TABLET, FILM COATED ORAL NIGHTLY
Status: DISCONTINUED | OUTPATIENT
Start: 2021-12-01 | End: 2021-12-02 | Stop reason: HOSPADM

## 2021-12-01 RX ORDER — OXYCODONE HYDROCHLORIDE AND ACETAMINOPHEN 5; 325 MG/1; MG/1
1 TABLET ORAL EVERY 6 HOURS PRN
Status: DISCONTINUED | OUTPATIENT
Start: 2021-12-01 | End: 2021-12-02 | Stop reason: HOSPADM

## 2021-12-01 RX ADMIN — PIPERACILLIN SODIUM AND TAZOBACTAM SODIUM 3375 MG: 3; .375 INJECTION, POWDER, LYOPHILIZED, FOR SOLUTION INTRAVENOUS at 15:32

## 2021-12-01 RX ADMIN — PANTOPRAZOLE SODIUM 40 MG: 40 TABLET, DELAYED RELEASE ORAL at 15:30

## 2021-12-01 RX ADMIN — TAMSULOSIN HYDROCHLORIDE 0.4 MG: 0.4 CAPSULE ORAL at 07:57

## 2021-12-01 RX ADMIN — DOCUSATE SODIUM 100 MG: 100 CAPSULE ORAL at 21:19

## 2021-12-01 RX ADMIN — ONDANSETRON HYDROCHLORIDE 4 MG: 2 INJECTION, SOLUTION INTRAMUSCULAR; INTRAVENOUS at 05:18

## 2021-12-01 RX ADMIN — INSULIN LISPRO 2 UNITS: 100 INJECTION, SOLUTION INTRAVENOUS; SUBCUTANEOUS at 11:53

## 2021-12-01 RX ADMIN — PIPERACILLIN SODIUM AND TAZOBACTAM SODIUM 3375 MG: 3; .375 INJECTION, POWDER, LYOPHILIZED, FOR SOLUTION INTRAVENOUS at 02:39

## 2021-12-01 RX ADMIN — METOPROLOL TARTRATE 50 MG: 50 TABLET, FILM COATED ORAL at 07:56

## 2021-12-01 RX ADMIN — Medication 2 PUFF: at 17:37

## 2021-12-01 RX ADMIN — QUETIAPINE FUMARATE 100 MG: 100 TABLET ORAL at 21:19

## 2021-12-01 RX ADMIN — SODIUM CHLORIDE: 9 INJECTION, SOLUTION INTRAVENOUS at 18:18

## 2021-12-01 RX ADMIN — ENOXAPARIN SODIUM 40 MG: 40 INJECTION SUBCUTANEOUS at 21:19

## 2021-12-01 RX ADMIN — INSULIN LISPRO 1 UNITS: 100 INJECTION, SOLUTION INTRAVENOUS; SUBCUTANEOUS at 08:06

## 2021-12-01 RX ADMIN — SENNOSIDES 8.6 MG: 8.6 TABLET, FILM COATED ORAL at 07:56

## 2021-12-01 RX ADMIN — POLYETHYLENE GLYCOL (3350) 17 G: 17 POWDER, FOR SOLUTION ORAL at 21:19

## 2021-12-01 RX ADMIN — DOCUSATE SODIUM 100 MG: 100 CAPSULE ORAL at 07:57

## 2021-12-01 RX ADMIN — DICYCLOMINE HYDROCHLORIDE 10 MG: 10 CAPSULE ORAL at 21:19

## 2021-12-01 RX ADMIN — OXYCODONE HYDROCHLORIDE AND ACETAMINOPHEN 1 TABLET: 5; 325 TABLET ORAL at 11:52

## 2021-12-01 RX ADMIN — DICYCLOMINE HYDROCHLORIDE 10 MG: 10 CAPSULE ORAL at 16:43

## 2021-12-01 RX ADMIN — Medication 2 PUFF: at 05:25

## 2021-12-01 RX ADMIN — CETIRIZINE HYDROCHLORIDE 5 MG: 10 TABLET ORAL at 07:57

## 2021-12-01 RX ADMIN — DICYCLOMINE HYDROCHLORIDE 10 MG: 10 CAPSULE ORAL at 06:47

## 2021-12-01 RX ADMIN — LEVOTHYROXINE SODIUM 50 MCG: 25 TABLET ORAL at 06:48

## 2021-12-01 RX ADMIN — ONDANSETRON HYDROCHLORIDE 4 MG: 2 INJECTION, SOLUTION INTRAMUSCULAR; INTRAVENOUS at 23:17

## 2021-12-01 RX ADMIN — METOPROLOL TARTRATE 50 MG: 50 TABLET, FILM COATED ORAL at 21:19

## 2021-12-01 RX ADMIN — POLYETHYLENE GLYCOL (3350) 17 G: 17 POWDER, FOR SOLUTION ORAL at 07:57

## 2021-12-01 RX ADMIN — PANTOPRAZOLE SODIUM 40 MG: 40 TABLET, DELAYED RELEASE ORAL at 06:48

## 2021-12-01 RX ADMIN — SENNOSIDES 8.6 MG: 8.6 TABLET, FILM COATED ORAL at 21:19

## 2021-12-01 RX ADMIN — ALBUTEROL SULFATE 2.5 MG: 2.5 SOLUTION RESPIRATORY (INHALATION) at 17:37

## 2021-12-01 RX ADMIN — PIPERACILLIN SODIUM AND TAZOBACTAM SODIUM 3375 MG: 3; .375 INJECTION, POWDER, LYOPHILIZED, FOR SOLUTION INTRAVENOUS at 23:19

## 2021-12-01 RX ADMIN — PIPERACILLIN SODIUM AND TAZOBACTAM SODIUM 3375 MG: 3; .375 INJECTION, POWDER, LYOPHILIZED, FOR SOLUTION INTRAVENOUS at 08:06

## 2021-12-01 RX ADMIN — AMIODARONE HYDROCHLORIDE 200 MG: 200 TABLET ORAL at 07:57

## 2021-12-01 RX ADMIN — ALBUTEROL SULFATE 2.5 MG: 2.5 SOLUTION RESPIRATORY (INHALATION) at 05:25

## 2021-12-01 RX ADMIN — DICYCLOMINE HYDROCHLORIDE 10 MG: 10 CAPSULE ORAL at 10:29

## 2021-12-01 RX ADMIN — MORPHINE SULFATE 2 MG: 2 INJECTION, SOLUTION INTRAMUSCULAR; INTRAVENOUS at 05:19

## 2021-12-01 ASSESSMENT — PAIN DESCRIPTION - PROGRESSION
CLINICAL_PROGRESSION: NOT CHANGED
CLINICAL_PROGRESSION: GRADUALLY IMPROVING
CLINICAL_PROGRESSION: NOT CHANGED

## 2021-12-01 ASSESSMENT — PAIN SCALES - GENERAL
PAINLEVEL_OUTOF10: 8
PAINLEVEL_OUTOF10: 0
PAINLEVEL_OUTOF10: 6
PAINLEVEL_OUTOF10: 0

## 2021-12-01 ASSESSMENT — PAIN DESCRIPTION - ORIENTATION: ORIENTATION: MID

## 2021-12-01 ASSESSMENT — PAIN DESCRIPTION - ONSET: ONSET: ON-GOING

## 2021-12-01 ASSESSMENT — PAIN - FUNCTIONAL ASSESSMENT: PAIN_FUNCTIONAL_ASSESSMENT: ACTIVITIES ARE NOT PREVENTED

## 2021-12-01 ASSESSMENT — PAIN DESCRIPTION - FREQUENCY: FREQUENCY: INTERMITTENT

## 2021-12-01 ASSESSMENT — PAIN DESCRIPTION - DESCRIPTORS: DESCRIPTORS: ACHING;CRAMPING

## 2021-12-01 ASSESSMENT — PAIN DESCRIPTION - LOCATION: LOCATION: ABDOMEN

## 2021-12-01 ASSESSMENT — PAIN DESCRIPTION - PAIN TYPE: TYPE: ACUTE PAIN

## 2021-12-01 NOTE — PROGRESS NOTES
RT Inhaler-Nebulizer Bronchodilator Protocol Note    There is a bronchodilator order in the chart from a provider indicating to follow the RT Bronchodilator Protocol and there is an Initiate RT Inhaler-Nebulizer Bronchodilator Protocol order as well (see protocol at bottom of note). CXR Findings:  No results found. The findings from the last RT Protocol Assessment were as follows:   History Pulmonary Disease: Chronic pulmonary disease  Respiratory Pattern: Dyspnea on exertion or RR 21-25 bpm  Breath Sounds: Slightly diminished and/or crackles  Cough: Strong, spontaneous, non-productive  Indication for Bronchodilator Therapy: Decreased or absent breath sounds  Bronchodilator Assessment Score: 6    Aerosolized bronchodilator medication orders have been revised according to the RT Inhaler-Nebulizer Bronchodilator Protocol below. Respiratory Therapist to perform RT Therapy Protocol Assessment initially then follow the protocol. Repeat RT Therapy Protocol Assessment PRN for score 0-3 or on second treatment, BID, and PRN for scores above 3. No Indications - adjust the frequency to every 6 hours PRN wheezing or bronchospasm, if no treatments needed after 48 hours then discontinue using Per Protocol order mode. If indication present, adjust the RT bronchodilator orders based on the Bronchodilator Assessment Score as indicated below. Use Inhaler orders unless patient has one or more of the following: on home nebulizer, not able to hold breath for 10 seconds, is not alert and oriented, cannot activate and use MDI correctly, or respiratory rate 25 breaths per minute or more, then use the equivalent nebulizer order(s) with same Frequency and PRN reasons based on the score. If a patient is on this medication at home then do not decrease Frequency below that used at home.     0-3 - enter or revise RT bronchodilator order(s) to equivalent RT Bronchodilator order with Frequency of every 4 hours PRN for wheezing or increased work of breathing using Per Protocol order mode. 4-6 - enter or revise RT Bronchodilator order(s) to two equivalent RT bronchodilator orders with one order with BID Frequency and one order with Frequency of every 4 hours PRN wheezing or increased work of breathing using Per Protocol order mode. 7-10 - enter or revise RT Bronchodilator order(s) to two equivalent RT bronchodilator orders with one order with TID Frequency and one order with Frequency of every 4 hours PRN wheezing or increased work of breathing using Per Protocol order mode. 11-13 - enter or revise RT Bronchodilator order(s) to one equivalent RT bronchodilator order with QID Frequency and an Albuterol order with Frequency of every 4 hours PRN wheezing or increased work of breathing using Per Protocol order mode. Greater than 13 - enter or revise RT Bronchodilator order(s) to one equivalent RT bronchodilator order with every 4 hours Frequency and an Albuterol order with Frequency of every 2 hours PRN wheezing or increased work of breathing using Per Protocol order mode.          Electronically signed by Beronica Jessica RCP on 11/30/2021 at 10:14 PM

## 2021-12-01 NOTE — FLOWSHEET NOTE
12/01/21 1421   Vital Signs   Temp 97 °F (36.1 °C)   Temp Source Oral   Pulse 68   Heart Rate Source Monitor   Resp 18   BP (!) 141/72   BP Location Left upper arm   Patient Position Semi fowlers   Level of Consciousness Alert (0)   MEWS Score 1   Patient Currently in Pain Denies   Pain Assessment   Pain Assessment 0-10   Pain Level 0   Oxygen Therapy   SpO2 96 %   O2 Device None (Room air)     Patient without any complains of at this time. Pt resting in bed quietly at this time. Denies any needs. All needs and call light within reach.

## 2021-12-01 NOTE — PROGRESS NOTES
Patient up to bathroom, gait steady. Large formed brown bowel movement. Patient has had 2 normal bowel movements since admission.

## 2021-12-01 NOTE — PROGRESS NOTES
Progress Note    Admit Date:  11/29/2021    80 y.o. female who presented to the hospital with a chief complaint of suprapubic pain. The patient was recently diagnosed with diverticulitis and was discharged from the emergency department with oral antibiotic therapy. She however has continued to have the same symptoms of suprapubic abdominal pain and cramping. She does not think she has gotten any relief from the antibiotic she was prescribed. She called her physician who told her to come to the emergency department. She will be admitted for failed outpatient therapy for diverticulitis. Subjective:  Ms. Anderson Shi is seen up in chair today. Pain improving but not gone. Tolerating full liquid diet  Has multiple bowel movements      Objective:   Patient Vitals for the past 4 hrs:   BP Temp Temp src Pulse Resp SpO2   12/01/21 0718 123/62 97.6 °F (36.4 °C) Oral 69 18 93 %   12/01/21 0525 -- -- -- -- -- 92 %   12/01/21 0505 121/61 97.6 °F (36.4 °C) Oral 73 18 94 %            Intake/Output Summary (Last 24 hours) at 12/1/2021 2091  Last data filed at 11/30/2021 1837  Gross per 24 hour   Intake 671.89 ml   Output --   Net 671.89 ml       Physical Exam:        General: elderly female up in chair Awake, alert and oriented. Appears to be not in any distress  Mucous Membranes:  Pink , anicteric  Neck: No JVD, no carotid bruit, no thyromegaly  Chest:  Clear to auscultation bilaterally, no added sounds  Cardiovascular:  RRR S1S2 heard, no murmurs or gallops  Abdomen:  Soft, undistended,  Mild right and left LQ tenderness L> R, no organomegaly, BS present  Extremities: No edema or cyanosis.  Distal pulses well felt  Neurological : grossly normal          Scheduled Meds:   albuterol  2.5 mg Nebulization BID    insulin lispro  0-6 Units SubCUTAneous TID WC    insulin lispro  0-3 Units SubCUTAneous Nightly    polyethylene glycol  17 g Oral BID    influenza virus vaccine  0.5 mL IntraMUSCular Prior to discharge    budesonide-formoterol  2 puff Inhalation BID    amiodarone  200 mg Oral Daily    clobetasol   Topical BID    dicyclomine  10 mg Oral 4x Daily AC & HS    docusate sodium  100 mg Oral BID    fluticasone  1 spray Each Nostril Daily    [Held by provider] furosemide  20 mg Oral Daily    levothyroxine  50 mcg Oral QAM AC    cetirizine  5 mg Oral Daily    metoprolol tartrate  50 mg Oral BID    pantoprazole  40 mg Oral BID AC    QUEtiapine  100 mg Oral BID    senna  1 tablet Oral BID    tamsulosin  0.4 mg Oral Daily    sodium chloride flush  5-40 mL IntraVENous 2 times per day    enoxaparin  40 mg SubCUTAneous Nightly    piperacillin-tazobactam  3,375 mg IntraVENous Q8H     Continuous Infusions:   dextrose      sodium chloride      sodium chloride 75 mL/hr at 12/01/21 0649     PRN Meds:  glucose, dextrose, glucagon (rDNA), dextrose, albuterol, sodium chloride flush, sodium chloride, ondansetron **OR** ondansetron, acetaminophen **OR** acetaminophen, morphine    Data:  CBC:   Recent Labs     11/29/21  1408 11/30/21  0716 12/01/21  0552   WBC 10.5 7.8 6.7   HGB 11.1* 10.2* 9.9*   HCT 34.5* 31.1* 30.7*   MCV 90.6 90.3 91.1    304 281     BMP:   Recent Labs     11/29/21  1408 11/30/21  0716 12/01/21  0552   * 129* 137   K 4.5 4.6 4.3   CL 98* 99 105   CO2 19* 19* 21   BUN 18 13 10   CREATININE 0.9 0.8 0.7     LIVER PROFILE:   Recent Labs     11/29/21  1408   AST 28   ALT 20   LIPASE 18.0   BILITOT 0.3   ALKPHOS 341*     CULTURES  Results for Rogelio Lopez (MRN [id number]) as of 11/30/2021 10:11   Ref. Range 11/29/2021 16:39   INFLUENZA A Latest Ref Range: NOT DETECTED  NOT DETECTED   INFLUENZA B Latest Ref Range: NOT DETECTED  NOT DETECTED     Results for Rogelio Lopez (MRN [id number]) as of 11/30/2021 10:11   Ref.  Range 11/29/2021 16:39   SARS-CoV-2 RNA, RT PCR Latest Ref Range: NOT DETECTED  NOT DETECTED      RADIOLOGY  CT ABDOMEN PELVIS W IV CONTRAST Additional Contrast? None   Final Result 1.  Sigmoid colon diverticulitis which appears similar to the comparison   study. No evidence of abscess formation at this time. 2.  Other chronic findings as above           Assessment/Plan:    #Acute sigmoid diverticulitis  -Recently discharged on ABX, returns with worsened abdominal pain  -CT as above with no major issues   -Zosyn D #2  -Clear liquid diet for now  -Monitor I&O's  - gi consulted, pain controlled  - can dc home on oral abx    #Hyponatremia  -improved with NS    #Chronic dCHF no AE  -No evidence of fluid overload  -Hold Lasix- resume at dc    #COPD no AE  -Continue home regimen    #Hypothyroidism  -Continue Synthroid    #Diabetes mellitus type 2  -Last HgbA1c 7.9; 3/21/2021  -Hold home medication  -POCT glucose  -Low-dose SSI    #Atrial fibrillation  -Continue Lopressor and amiodarone  -No home anticoagulation medications    #GERD  -Continue Protonix    #IBS  -Continue home bowel regimen    #Depression  -Continue Seroquel    #Morbid Obesity  - Body mass index is 39.69 kg/m². - Complicating assessment and treatment. Placing patient at risk for multiple co-morbidities as well as early death and contributing to the patient's presentation.   - Counseled on weight loss. DVT Prophylaxis: Lovenox  Diet: ADULT DIET; Full Liquid;  Low Fiber  Code Status: Full Code      Dc planning

## 2021-12-01 NOTE — PROGRESS NOTES
PROGRESS NOTE  S:71 yrs Patient  admitted on 11/29/2021 with Diverticulitis [K57.92]  Diverticulitis of colon [K57.32]  Generalized abdominal pain [R10.84] . Today she complains of nausea, heartburn, generalized abdominal pain and abdominal distention. She passed 3x BMs today, and is tolerating full liquid diet. Exam:   Vitals:    12/01/21 1421   BP: (!) 141/72   Pulse: 68   Resp: 18   Temp: 97 °F (36.1 °C)   SpO2: 96%      General appearance: alert, appears stated age, cooperative, morbidly obese and syndromic appearance - chronically ill appearing  HEENT: Neck supple with midline trachea  Neck: no adenopathy and supple, symmetrical, trachea midline  Lungs: clear to auscultation bilaterally  Heart: regular rate and rhythm, S1, S2 normal, no murmur, click, rub or gallop  Abdomen: normal findings: no masses palpable and symmetric and abnormal findings:  distended, hypoactive bowel sounds, obese and tenderness mild in the upper abdomen  Extremities: extremities normal, atraumatic, no cyanosis or edema     Medications: Reviewed    Labs:  CBC:   Recent Labs     11/29/21  1408 11/30/21  0716 12/01/21  0552   WBC 10.5 7.8 6.7   HGB 11.1* 10.2* 9.9*   HCT 34.5* 31.1* 30.7*   MCV 90.6 90.3 91.1    304 281     BMP:   Recent Labs     11/29/21  1408 11/30/21  0716 12/01/21  0552   * 129* 137   K 4.5 4.6 4.3   CL 98* 99 105   CO2 19* 19* 21   BUN 18 13 10   CREATININE 0.9 0.8 0.7     LIVER PROFILE:   Recent Labs     11/29/21  1408   AST 28   ALT 20   LIPASE 18.0   PROT 8.6*   BILITOT 0.3   ALKPHOS 341*     PT/INR: No results for input(s): INR in the last 72 hours. Invalid input(s): PT    Attending Supervising [de-identified] Attestation Statement  The patient is a 80 y.o. female. I have performed a history and physical examination of the patient.  I discussed the case with my physician assistant Nichole Clark PA-C    I reviewed the patient's Past Medical History, Past Surgical History, Medications, and Allergies. Physical Exam:  Vitals:    12/01/21 0525 12/01/21 0718 12/01/21 1421 12/01/21 1738   BP:  123/62 (!) 141/72    Pulse:  69 68    Resp:  18 18 18   Temp:  97.6 °F (36.4 °C) 97 °F (36.1 °C)    TempSrc:  Oral Oral    SpO2: 92% 93% 96% 96%   Weight:       Height:           Physical Examination: General appearance - alert, well appearing, and in no distress  Mental status - alert, oriented to person, place, and time  Eyes - pupils equal and reactive, extraocular eye movements intact  Neck - supple, no significant adenopathy  Chest - clear to auscultation, no wheezes, rales or rhonchi, symmetric air entry  Heart - normal rate, regular rhythm, normal S1, S2, no murmurs, rubs, clicks or gallops  Abdomen - soft, nontender, nondistended, no masses or organomegaly  Extremities - no pedal edema, no clubbing or cyanosis        Impression: 73-year-old female with a history of HTN, CHF, VILLEGAS cirrhosis, GERD, asthma, anxiety, depression, and seizures admitted with acute sigmoid diverticulitis. CT also showed cirrhosis and possible esophageal varices. Recommendation:  1. Continue supportive care  2. Monitor and replenish electrolytes  3. Monitor and document output  4. Continue broad spectrum antibiotics  5. Continue Pantoprazole 40 mg BID   6. Bowel regimen with Miralax BID, Senna BID, and Colace BID  7. Check liver serologies   8. Slowly advance to low fiber diet as tolerated  9. Will follow  10. OP EGD and colonoscopy in 4-6 weeks upon d/c         Tisha Dee PA-C  3:17 PM 12/1/2021                      73-year-old female with a history of HTN, CHF, GERD, asthma, anxiety, depression, and seizures admitted with acute sigmoid diverticulitis. CT also showed cirrhosis and possible esophageal varices     Continue supportive care. Broad spectrum antibiotics. Advance to low fiber diet as tolerated. miralax BId. outpt EGD and colonoscopy in 4-6wks.  Check liver serologies

## 2021-12-01 NOTE — FLOWSHEET NOTE
12/01/21 0718   Vital Signs   Temp 97.6 °F (36.4 °C)   Temp Source Oral   Pulse 69   Resp 18   /62   BP Location Left upper arm   Patient Position Semi fowlers   Level of Consciousness Alert (0)   MEWS Score 1   Patient Currently in Pain Denies   Pain Assessment   Pain Assessment 0-10   Pain Level 0   Oxygen Therapy   SpO2 93 %   O2 Device None (Room air)     Patient sitting up in bed watching TV. States feels better stomach a little sore, I had a regular formed bowel movement last night. VSS. Skin pink warm and dry. Bed in low position, call bell within reach, bedside table within reach. Bed alarm on. Will continue to monitor.

## 2021-12-01 NOTE — PROGRESS NOTES
RT Inhaler-Nebulizer Bronchodilator Protocol Note    There is a bronchodilator order in the chart from a provider indicating to follow the RT Bronchodilator Protocol and there is an Initiate RT Inhaler-Nebulizer Bronchodilator Protocol order as well (see protocol at bottom of note). CXR Findings:  No results found. The findings from the last RT Protocol Assessment were as follows:   History Pulmonary Disease: Chronic pulmonary disease  Respiratory Pattern: (P) Regular pattern and RR 12-20 bpm  Breath Sounds: (P) Slightly diminished and/or crackles  Cough: (P) Strong, spontaneous, non-productive  Indication for Bronchodilator Therapy: (P) Decreased or absent breath sounds  Bronchodilator Assessment Score: (P) 4    Aerosolized bronchodilator medication orders have been revised according to the RT Inhaler-Nebulizer Bronchodilator Protocol below. Respiratory Therapist to perform RT Therapy Protocol Assessment initially then follow the protocol. Repeat RT Therapy Protocol Assessment PRN for score 0-3 or on second treatment, BID, and PRN for scores above 3. No Indications - adjust the frequency to every 6 hours PRN wheezing or bronchospasm, if no treatments needed after 48 hours then discontinue using Per Protocol order mode. If indication present, adjust the RT bronchodilator orders based on the Bronchodilator Assessment Score as indicated below. Use Inhaler orders unless patient has one or more of the following: on home nebulizer, not able to hold breath for 10 seconds, is not alert and oriented, cannot activate and use MDI correctly, or respiratory rate 25 breaths per minute or more, then use the equivalent nebulizer order(s) with same Frequency and PRN reasons based on the score. If a patient is on this medication at home then do not decrease Frequency below that used at home.     0-3 - enter or revise RT bronchodilator order(s) to equivalent RT Bronchodilator order with Frequency of every 4 hours PRN for wheezing or increased work of breathing using Per Protocol order mode. 4-6 - enter or revise RT Bronchodilator order(s) to two equivalent RT bronchodilator orders with one order with BID Frequency and one order with Frequency of every 4 hours PRN wheezing or increased work of breathing using Per Protocol order mode. 7-10 - enter or revise RT Bronchodilator order(s) to two equivalent RT bronchodilator orders with one order with TID Frequency and one order with Frequency of every 4 hours PRN wheezing or increased work of breathing using Per Protocol order mode. 11-13 - enter or revise RT Bronchodilator order(s) to one equivalent RT bronchodilator order with QID Frequency and an Albuterol order with Frequency of every 4 hours PRN wheezing or increased work of breathing using Per Protocol order mode. Greater than 13 - enter or revise RT Bronchodilator order(s) to one equivalent RT bronchodilator order with every 4 hours Frequency and an Albuterol order with Frequency of every 2 hours PRN wheezing or increased work of breathing using Per Protocol order mode. RT to enter RT Home Evaluation for COPD & MDI Assessment order using Per Protocol order mode.     Electronically signed by Denisse Dotson RCP on 12/1/2021 at 5:11 PM

## 2021-12-01 NOTE — CARE COORDINATION
INTERDISCIPLINARY PLAN OF CARE CONFERENCE    Date/Time: 12/1/2021 4:01 PM  Completed by: Nathanael Ward RN, Case Management      Patient Name:  Norberto Santiago  YOB: 1950  Admitting Diagnosis: Diverticulitis [K57.92]  Diverticulitis of colon [K57.32]  Generalized abdominal pain [R10.84]     Admit Date/Time:  11/29/2021  1:33 PM    Chart reviewed. Interdisciplinary team contacted or reviewed plan related to patient progress and discharge plans. Disciplines included Case Management, Nursing, and Dietitian. Current Status:INPT   PT/OT recommendation for discharge plan of care: n/a    Expected D/C Disposition:  Home     Discharge Plan Comments: Plan cont for pt to return home at discharge. IPTA. CM following.     Home O2 in place on admit: No  Pt informed of need to bring portable home O2 tank on day of discharge for nursing to connect prior to leaving:  Not Indicated  Verbalized agreement/Understanding:  Not Indicated

## 2021-12-01 NOTE — PLAN OF CARE
Problem: Pain:  Goal: Pain level will decrease  12/1/2021 0956 by Dariel Wagner RN  Outcome: Ongoing  12/1/2021 0313 by Brenda Mack RN  Outcome: Ongoing  12/1/2021 0302 by Brenda Mack RN  Outcome: Ongoing  Goal: Control of acute pain  12/1/2021 0956 by Dariel Wagner RN  Outcome: Ongoing  12/1/2021 0313 by Brenda Mack RN  Outcome: Ongoing  12/1/2021 0302 by Brenda Mack RN  Outcome: Ongoing  Goal: Control of chronic pain  12/1/2021 0956 by Dariel Wagner RN  Outcome: Ongoing  12/1/2021 0313 by Brenda Mack RN  Outcome: Ongoing  12/1/2021 0302 by Brenda Mack RN  Outcome: Ongoing  Goal: Patient's pain/discomfort is manageable  12/1/2021 0956 by Dariel Wagner RN  Outcome: Ongoing  12/1/2021 0313 by Brenda Mack RN  Outcome: Ongoing  12/1/2021 0302 by Brenda Mack RN  Outcome: Ongoing     Problem: Falls - Risk of:  Goal: Will remain free from falls  12/1/2021 0956 by Dariel Wagner RN  Outcome: Ongoing  12/1/2021 0313 by Brenda Mack RN  Outcome: Ongoing  12/1/2021 0302 by Brenda Mack RN  Outcome: Ongoing  Goal: Absence of physical injury  12/1/2021 0956 by Dariel Wagner RN  Outcome: Ongoing  12/1/2021 0313 by Brenda Mack RN  Outcome: Ongoing  12/1/2021 0302 by Brenda Mack RN  Outcome: Ongoing     Problem: Infection:  Goal: Will remain free from infection  12/1/2021 0956 by Dariel Wagner RN  Outcome: Ongoing  12/1/2021 0313 by Brenda Mack RN  Outcome: Ongoing  12/1/2021 0302 by Brenda Mack RN  Outcome: Ongoing     Problem: Safety:  Goal: Free from accidental physical injury  12/1/2021 0956 by Dariel Wagner RN  Outcome: Ongoing  12/1/2021 0313 by Brenda Obion, RN  Outcome: Ongoing  12/1/2021 0302 by Brenda Mack RN  Outcome: Ongoing  Goal: Free from intentional harm  12/1/2021 0956 by Dariel Wagner RN  Outcome: Ongoing  12/1/2021 0313 by Brenda Mack RN  Outcome: Ongoing  12/1/2021 0302 by Brenda Mack, RN  Outcome: Ongoing     Problem: Daily Care:  Goal: Daily care needs are met  12/1/2021 0956 by Tania Morrell RN  Outcome: Ongoing  12/1/2021 0313 by Chaparro Martins RN  Outcome: Ongoing  12/1/2021 0302 by Chaparro Martins RN  Outcome: Ongoing     Problem: Skin Integrity:  Goal: Skin integrity will stabilize  12/1/2021 0956 by Tania Morrell RN  Outcome: Ongoing  12/1/2021 0313 by Chaparro Martins RN  Outcome: Ongoing  12/1/2021 0302 by Chaparro Martins RN  Outcome: Ongoing     Problem: Discharge Planning:  Goal: Patients continuum of care needs are met  12/1/2021 0956 by Tania Morrell RN  Outcome: Ongoing  12/1/2021 0313 by Chaparro Martins RN  Outcome: Ongoing  12/1/2021 0302 by Chaparro Martins RN  Outcome: Ongoing

## 2021-12-01 NOTE — PLAN OF CARE
Problem: Pain:  Goal: Pain level will decrease  Description: Pain level will decrease  12/1/2021 0302 by Giulia Rowe RN  Outcome: Ongoing  11/30/2021 1519 by Rachna Calvillo RN  Outcome: Ongoing  Goal: Control of acute pain  Description: Control of acute pain  12/1/2021 0302 by Giulia Rowe RN  Outcome: Ongoing  11/30/2021 1519 by Rachna Calvillo RN  Outcome: Ongoing  Goal: Control of chronic pain  Description: Control of chronic pain  12/1/2021 0302 by Giulia Rowe RN  Outcome: Ongoing  11/30/2021 1519 by Rachna Calvillo RN  Outcome: Ongoing  Goal: Patient's pain/discomfort is manageable  Description: Patient's pain/discomfort is manageable  12/1/2021 0302 by Giulia Rowe RN  Outcome: Ongoing  11/30/2021 1519 by Rachna Calvillo RN  Outcome: Ongoing     Problem: Falls - Risk of:  Goal: Will remain free from falls  Description: Will remain free from falls  12/1/2021 0302 by Giulia Rowe RN  Outcome: Ongoing  11/30/2021 1519 by Rachna Calvillo RN  Outcome: Ongoing  Goal: Absence of physical injury  Description: Absence of physical injury  12/1/2021 0302 by iGulia Rowe RN  Outcome: Ongoing  11/30/2021 1519 by Rachna Calvillo RN  Outcome: Ongoing     Problem: Infection:  Goal: Will remain free from infection  Description: Will remain free from infection  12/1/2021 0302 by Giulia Rowe RN  Outcome: Ongoing  11/30/2021 1519 by Rachna Calvillo RN  Outcome: Ongoing     Problem: Safety:  Goal: Free from accidental physical injury  Description: Free from accidental physical injury  12/1/2021 0302 by Giulia Rowe RN  Outcome: Ongoing  11/30/2021 1519 by Rachna Calvillo RN  Outcome: Ongoing  Goal: Free from intentional harm  Description: Free from intentional harm  12/1/2021 0302 by Giulia Rowe RN  Outcome: Ongoing  11/30/2021 1519 by Rachna Calvillo RN  Outcome: Ongoing     Problem: Daily Care:  Goal: Daily care needs are met  Description: Daily care needs are

## 2021-12-02 VITALS
BODY MASS INDEX: 39.93 KG/M2 | TEMPERATURE: 98.2 F | HEART RATE: 78 BPM | OXYGEN SATURATION: 95 % | SYSTOLIC BLOOD PRESSURE: 145 MMHG | DIASTOLIC BLOOD PRESSURE: 76 MMHG | WEIGHT: 217 LBS | RESPIRATION RATE: 16 BRPM | HEIGHT: 62 IN

## 2021-12-02 LAB
ALBUMIN SERPL-MCNC: 3.3 G/DL (ref 3.4–5)
ALP BLD-CCNC: 270 U/L (ref 40–129)
ALT SERPL-CCNC: 19 U/L (ref 10–40)
ANION GAP SERPL CALCULATED.3IONS-SCNC: 10 MMOL/L (ref 3–16)
AST SERPL-CCNC: 33 U/L (ref 15–37)
BASOPHILS ABSOLUTE: 0.1 K/UL (ref 0–0.2)
BASOPHILS RELATIVE PERCENT: 1.4 %
BILIRUB SERPL-MCNC: 0.3 MG/DL (ref 0–1)
BILIRUBIN DIRECT: <0.2 MG/DL (ref 0–0.3)
BILIRUBIN, INDIRECT: ABNORMAL MG/DL (ref 0–1)
BUN BLDV-MCNC: 8 MG/DL (ref 7–20)
CALCIUM SERPL-MCNC: 9.1 MG/DL (ref 8.3–10.6)
CHLORIDE BLD-SCNC: 105 MMOL/L (ref 99–110)
CO2: 21 MMOL/L (ref 21–32)
CREAT SERPL-MCNC: 0.7 MG/DL (ref 0.6–1.2)
EOSINOPHILS ABSOLUTE: 0.7 K/UL (ref 0–0.6)
EOSINOPHILS RELATIVE PERCENT: 8.9 %
FERRITIN: 64 NG/ML (ref 15–150)
FOLATE: 3.98 NG/ML (ref 4.78–24.2)
GFR AFRICAN AMERICAN: >60
GFR NON-AFRICAN AMERICAN: >60
GLUCOSE BLD-MCNC: 134 MG/DL (ref 70–99)
GLUCOSE BLD-MCNC: 148 MG/DL (ref 70–99)
GLUCOSE BLD-MCNC: 155 MG/DL (ref 70–99)
HAV IGM SER IA-ACNC: NORMAL
HCT VFR BLD CALC: 31.5 % (ref 36–48)
HEMOGLOBIN: 10.1 G/DL (ref 12–16)
HEPATITIS B CORE IGM ANTIBODY: NORMAL
HEPATITIS B SURFACE ANTIGEN INTERPRETATION: NORMAL
HEPATITIS C ANTIBODY INTERPRETATION: NORMAL
IRON SATURATION: 12 % (ref 15–50)
IRON: 30 UG/DL (ref 37–145)
LYMPHOCYTES ABSOLUTE: 2 K/UL (ref 1–5.1)
LYMPHOCYTES RELATIVE PERCENT: 26.4 %
MCH RBC QN AUTO: 28.9 PG (ref 26–34)
MCHC RBC AUTO-ENTMCNC: 31.9 G/DL (ref 31–36)
MCV RBC AUTO: 90.6 FL (ref 80–100)
MONOCYTES ABSOLUTE: 0.6 K/UL (ref 0–1.3)
MONOCYTES RELATIVE PERCENT: 7.9 %
NEUTROPHILS ABSOLUTE: 4.3 K/UL (ref 1.7–7.7)
NEUTROPHILS RELATIVE PERCENT: 55.4 %
PDW BLD-RTO: 15.4 % (ref 12.4–15.4)
PERFORMED ON: ABNORMAL
PERFORMED ON: ABNORMAL
PLATELET # BLD: 287 K/UL (ref 135–450)
PMV BLD AUTO: 7.7 FL (ref 5–10.5)
POTASSIUM REFLEX MAGNESIUM: 4.3 MMOL/L (ref 3.5–5.1)
RBC # BLD: 3.47 M/UL (ref 4–5.2)
SODIUM BLD-SCNC: 136 MMOL/L (ref 136–145)
TOTAL IRON BINDING CAPACITY: 245 UG/DL (ref 260–445)
TOTAL PROTEIN: 7.1 G/DL (ref 6.4–8.2)
VITAMIN B-12: 1485 PG/ML (ref 211–911)
WBC # BLD: 7.7 K/UL (ref 4–11)

## 2021-12-02 PROCEDURE — 99238 HOSP IP/OBS DSCHRG MGMT 30/<: CPT | Performed by: INTERNAL MEDICINE

## 2021-12-02 PROCEDURE — 6360000002 HC RX W HCPCS: Performed by: INTERNAL MEDICINE

## 2021-12-02 PROCEDURE — 85025 COMPLETE CBC W/AUTO DIFF WBC: CPT

## 2021-12-02 PROCEDURE — 80048 BASIC METABOLIC PNL TOTAL CA: CPT

## 2021-12-02 PROCEDURE — 2580000003 HC RX 258: Performed by: NURSE PRACTITIONER

## 2021-12-02 PROCEDURE — 36415 COLL VENOUS BLD VENIPUNCTURE: CPT

## 2021-12-02 PROCEDURE — 94761 N-INVAS EAR/PLS OXIMETRY MLT: CPT

## 2021-12-02 PROCEDURE — 94640 AIRWAY INHALATION TREATMENT: CPT

## 2021-12-02 PROCEDURE — 6360000002 HC RX W HCPCS: Performed by: NURSE PRACTITIONER

## 2021-12-02 PROCEDURE — 96376 TX/PRO/DX INJ SAME DRUG ADON: CPT

## 2021-12-02 PROCEDURE — 6370000000 HC RX 637 (ALT 250 FOR IP): Performed by: NURSE PRACTITIONER

## 2021-12-02 RX ORDER — AMOXICILLIN AND CLAVULANATE POTASSIUM 875; 125 MG/1; MG/1
1 TABLET, FILM COATED ORAL 2 TIMES DAILY
Qty: 14 TABLET | Refills: 0 | Status: SHIPPED | OUTPATIENT
Start: 2021-12-02 | End: 2021-12-09

## 2021-12-02 RX ADMIN — AMIODARONE HYDROCHLORIDE 200 MG: 200 TABLET ORAL at 09:10

## 2021-12-02 RX ADMIN — METOPROLOL TARTRATE 50 MG: 50 TABLET, FILM COATED ORAL at 09:10

## 2021-12-02 RX ADMIN — ONDANSETRON HYDROCHLORIDE 4 MG: 2 INJECTION, SOLUTION INTRAMUSCULAR; INTRAVENOUS at 06:22

## 2021-12-02 RX ADMIN — Medication 2 PUFF: at 05:34

## 2021-12-02 RX ADMIN — PIPERACILLIN SODIUM AND TAZOBACTAM SODIUM 3375 MG: 3; .375 INJECTION, POWDER, LYOPHILIZED, FOR SOLUTION INTRAVENOUS at 09:17

## 2021-12-02 RX ADMIN — DICYCLOMINE HYDROCHLORIDE 10 MG: 10 CAPSULE ORAL at 06:22

## 2021-12-02 RX ADMIN — LEVOTHYROXINE SODIUM 50 MCG: 25 TABLET ORAL at 06:22

## 2021-12-02 RX ADMIN — ALBUTEROL SULFATE 2.5 MG: 2.5 SOLUTION RESPIRATORY (INHALATION) at 05:33

## 2021-12-02 RX ADMIN — PANTOPRAZOLE SODIUM 40 MG: 40 TABLET, DELAYED RELEASE ORAL at 06:22

## 2021-12-02 RX ADMIN — DOCUSATE SODIUM 100 MG: 100 CAPSULE ORAL at 09:09

## 2021-12-02 RX ADMIN — CETIRIZINE HYDROCHLORIDE 5 MG: 10 TABLET ORAL at 09:09

## 2021-12-02 RX ADMIN — INSULIN LISPRO 1 UNITS: 100 INJECTION, SOLUTION INTRAVENOUS; SUBCUTANEOUS at 09:18

## 2021-12-02 RX ADMIN — SENNOSIDES 8.6 MG: 8.6 TABLET, FILM COATED ORAL at 09:09

## 2021-12-02 RX ADMIN — DICYCLOMINE HYDROCHLORIDE 10 MG: 10 CAPSULE ORAL at 10:43

## 2021-12-02 RX ADMIN — TAMSULOSIN HYDROCHLORIDE 0.4 MG: 0.4 CAPSULE ORAL at 09:10

## 2021-12-02 NOTE — PLAN OF CARE
Problem: Pain:  Goal: Pain level will decrease  Description: Pain level will decrease  12/2/2021 1344 by Cheo Haddad RN  Outcome: Met This Shift  12/2/2021 0413 by Javier Lara RN  Outcome: Ongoing  Goal: Control of acute pain  Description: Control of acute pain  12/2/2021 1344 by Cheo Haddad RN  Outcome: Met This Shift  12/2/2021 0413 by Javier Lara RN  Outcome: Ongoing  Goal: Control of chronic pain  Description: Control of chronic pain  12/2/2021 1344 by Cheo Haddad RN  Outcome: Met This Shift  12/2/2021 0413 by Javier Lara RN  Outcome: Ongoing  Goal: Patient's pain/discomfort is manageable  Description: Patient's pain/discomfort is manageable  12/2/2021 1344 by Cheo Haddad RN  Outcome: Met This Shift  12/2/2021 0413 by Javier Lara RN  Outcome: Ongoing     Problem: Falls - Risk of:  Goal: Will remain free from falls  Description: Will remain free from falls  12/2/2021 1344 by Cheo Haddad RN  Outcome: Met This Shift  12/2/2021 0413 by Javier Laar RN  Outcome: Ongoing  Goal: Absence of physical injury  Description: Absence of physical injury  12/2/2021 1344 by Cheo Haddad RN  Outcome: Met This Shift  12/2/2021 0413 by Javier Lara RN  Outcome: Ongoing     Problem: Infection:  Goal: Will remain free from infection  Description: Will remain free from infection  12/2/2021 1344 by Ceho Haddad RN  Outcome: Met This Shift  12/2/2021 0413 by Javier Lara RN  Outcome: Ongoing     Problem: Safety:  Goal: Free from accidental physical injury  Description: Free from accidental physical injury  12/2/2021 1344 by Cheo Haddad RN  Outcome: Met This Shift  12/2/2021 0413 by Javier Lara RN  Outcome: Ongoing  Goal: Free from intentional harm  Description: Free from intentional harm  12/2/2021 1344 by Cheo Haddad RN  Outcome: Met This Shift  12/2/2021 0413 by Javier Lara RN  Outcome: Ongoing     Problem: Daily Care:  Goal: Daily care needs are met  Description: Daily care needs are met  12/2/2021 1344 by Jarad Vuong RN  Outcome: Met This Shift  12/2/2021 0413 by Laura Beaulieu RN  Outcome: Ongoing     Problem: Skin Integrity:  Goal: Skin integrity will stabilize  Description: Skin integrity will stabilize  12/2/2021 1344 by Jarad Vuong RN  Outcome: Met This Shift  12/2/2021 0413 by Laura Beaulieu RN  Outcome: Ongoing     Problem: Discharge Planning:  Goal: Patients continuum of care needs are met  Description: Patients continuum of care needs are met  12/2/2021 1344 by Jarad Vuong RN  Outcome: Met This Shift  12/2/2021 0413 by Laura Beaulieu RN  Outcome: Ongoing

## 2021-12-02 NOTE — DISCHARGE INSTR - DIET

## 2021-12-02 NOTE — CARE COORDINATION
DISCHARGE ORDER  Date/Time 2021 11:25 AM  Completed by: Aries Castro RN, Case Management    Patient Name: Gretel Mosquera      : 1950  Admitting Diagnosis: Diverticulitis [K57.92]  Diverticulitis of colon [K57.32]  Generalized abdominal pain [R10.84]      Admit order Date and Status: INPT 21  (verify MD's last order for status of admission)      Noted discharge order. If applicable PT/OT recommendation at Discharge: n/a  DME recommendation by PT/OT:n/a  Confirmed discharge plan : Yes  with whom__with pt_____________  If pt confirmed DC plan does family need to be contacted by CM No if yes who______  Discharge Plan: Pt cont to plan to return home at discharge. Denies need for hhc or other services. Pt states that she is IPTA and drives. Reviewed chart. Role of discharge planner explained and patient verbalized understanding. Discharge order is noted. Has Home O2 in place on admit:  No  Informed of need to bring portable home O2 tank on day of discharge for nursing to connect prior to leaving:   Not Indicated  Verbalized agreement/Understanding:   Not Indicated  Pt is being d/c'd to home today. Pt's O2 sats are 95% on roomair. Discharge timeout done with Debra Lundberg. All discharge needs and concerns addressed.

## 2021-12-02 NOTE — PROGRESS NOTES
Patient discharged to home. Patient is in stable condition. One medication sent to patients pharmacy. Patient waiting on ride.

## 2021-12-02 NOTE — PROGRESS NOTES
PROGRESS NOTE  S:71 yrs Patient  admitted on 11/29/2021 with Diverticulitis [K57.92]  Diverticulitis of colon [K57.32]  Generalized abdominal pain [R10.84] . Today she feels improved. She passed a formed BM this AM, and is tolerating full liquid diet. Exam:   Vitals:    12/02/21 0904   BP: (!) 145/76   Pulse: 78   Resp: 16   Temp: 98.2 °F (36.8 °C)   SpO2: 95%      General appearance: alert, appears stated age, cooperative, no distress and moderately obese  HEENT: Neck supple with midline trachea  Neck: no adenopathy and supple, symmetrical, trachea midline  Lungs: clear to auscultation bilaterally  Heart: regular rate and rhythm, S1, S2 normal, no murmur, click, rub or gallop  Abdomen: normal findings: bowel sounds normal, no masses palpable and symmetric and abnormal findings:  distended, obese and tenderness mild in the lower abdomen  Extremities: extremities normal, atraumatic, no cyanosis or edema     Medications: Reviewed    Labs:  CBC:   Recent Labs     11/30/21  0716 12/01/21  0552 12/02/21  0530   WBC 7.8 6.7 7.7   HGB 10.2* 9.9* 10.1*   HCT 31.1* 30.7* 31.5*   MCV 90.3 91.1 90.6    281 287     BMP:   Recent Labs     11/30/21  0716 12/01/21  0552 12/02/21  0530   * 137 136   K 4.6 4.3 4.3   CL 99 105 105   CO2 19* 21 21   BUN 13 10 8   CREATININE 0.8 0.7 0.7     LIVER PROFILE:   Recent Labs     11/29/21  1408   AST 28   ALT 20   LIPASE 18.0   PROT 8.6*   BILITOT 0.3   ALKPHOS 341*     PT/INR: No results for input(s): INR in the last 72 hours. Invalid input(s): PT      Impression: 45-year-old female with a history of HTN, CHF, VILLEGAS cirrhosis, GERD, asthma, anxiety, depression, and seizures admitted with acute sigmoid diverticulitis.  CT also showed cirrhosis and possible esophageal varices      Recommendation:  1. Continue supportive care  2. Monitor and replenish electrolytes  3. Monitor and document output  4.  Continue broad spectrum

## 2021-12-02 NOTE — PROGRESS NOTES
Pt called out asking for something for nausea, PRN Zofran given at this time. Pt denies any other needs at this time. Call light within reach. Bed in low locked position.

## 2021-12-02 NOTE — DISCHARGE INSTR - COC
Continuity of Care Form    Patient Name: Cornelia Hernandez   :  1950  MRN:  [id number]    Admit date:  2021  Discharge date:  21    Code Status Order: Full Code   Advance Directives:      Admitting Physician:  Antoinette Partida MD  PCP: Everton Baca DO    Discharging Nurse: Good Yusuf Unit/Room#: 0215/0215-02  Discharging Unit Phone Number: [phone]    Emergency Contact:   Extended Emergency Contact Information  Primary Emergency Contact: Michelle Casillas  Address: [address]           [place] [address] Curt Halo of [address] Phone: [phone]  Work Phone: [phone]  Mobile Phone: [phone]  Relation: Child  Secondary Emergency Contact: guero peterson  Relation: Other    Past Surgical History:  Past Surgical History:   Procedure Laterality Date    CATARACT REMOVAL WITH IMPLANT Left 14    CATARACT REMOVAL WITH IMPLANT Right 1/2/15    phacoemulsification with initraocular lens implant     SECTION      x3    COLONOSCOPY  12    HYSTERECTOMY      TONSILLECTOMY      TUBAL LIGATION      UPPER GASTROINTESTINAL ENDOSCOPY  2014    gastric polyp    UPPER GASTROINTESTINAL ENDOSCOPY  2014    gastric polyp    UPPER GASTROINTESTINAL ENDOSCOPY  2018    gastritis       Immunization History:   Immunization History   Administered Date(s) Administered    Influenza Virus Vaccine 2010, 2014, 2017, 2018, 2018, 2019, 2019, 2019    Influenza Whole 2010    Influenza, Vita Lindsey, IM, PF (6 mo and older Fluzone, Flulaval, Fluarix, and 3 yrs and older Afluria) 10/07/2016, 2018    Pneumococcal Conjugate 13-valent (Barnetta Side) 2016    Pneumococcal Polysaccharide ([id number]) 2011, 2017, 2017, 2018, 2018, 2019, 2019, 2019    Td, unspecified formulation 2017       Active Problems:  Patient Active Problem List   Diagnosis Code GERD (gastroesophageal reflux disease) K21.9    Environmental allergies Z91.09    Chronic neck, back, & bilateral LE pain M79.604, M79.605    Diverticulosis K57.90    SIRS (systemic inflammatory response syndrome) ([facility]) R65.10    Acute respiratory failure with hypoxia ([facility]) J96.01    Obesity E66.9    Hypothyroidism E03.9    Acute encephalopathy G93.40    Septic shock ([facility]) A41.9, R65.21    Acute renal failure (ARF) ([facility]) N17.9    Chronic diastolic CHF (grade 1 LVDD) I50.32    Benign essential HTN I10    Anxiety and depression F41.9, F32. A    Elevated LFTs R79.89    Acute hyperglycemia R73.9    Diabetes ([facility]) E11.9    Hypovitaminosis D E55.9    Possible/questionable hx of seizures R56.9    Right hand weakness & numbness R29.898    Acute-on-chronic low back pain with radicular symptoms M54.50    Trimalleolar fracture of left ankle, closed, initial encounter S82.852A    Ankle fracture, bimalleolar, closed, left, initial encounter S82.842A    Acute respiratory failure with hypoxemia ([facility]) J96.01    Closed fracture of left ankle S82.892A    Aspiration pneumonitis ([facility]) J69.0    Acute respiratory failure ([facility]) J96.00    OD (overdose of drug) T50.901A    Drug ingestion [id number]    Toxic encephalopathy G92.9    History of depression Z86.59    Misuse of prescription only drugs F19.90    GI bleed K92.2    Elevated lactic acid level R79.89    Hyperkalemia E87.5    Leukocytosis D72.829    Thrombocytosis D75.839    High anion gap metabolic acidosis O78.0    Shock ([facility]) R57.9    NSTEMI (non-ST elevated myocardial infarction) ([facility]) I21.4    Abnormal CXR R93.89    PSVT (paroxysmal supraventricular tachycardia) ([facility]) I47.1    PAF (paroxysmal atrial fibrillation) ([facility]) I48.0    Community acquired pneumonia of left lower lobe of lung J18.9    Acute focal neurological deficit R29.818    Diverticulitis K57.92    Diverticulitis of colon K57.32    Acute hyponatremia E87.1    COPD without exacerbation ([facility]) J44.9    Generalized abdominal done    Treatments at the Time of Hospital Discharge:   Respiratory Treatments: inhalers  Oxygen Therapy:  is not on home oxygen therapy. Ventilator:    - No ventilator support    Rehab Therapies:   Weight Bearing Status/Restrictions: No weight bearing restirctions  Other Medical Equipment (for information only, NOT a DME order): Other Treatments:     Patient's personal belongings (please select all that are sent with patient):  Paulette    RN SIGNATURE:  Electronically signed by Brooklyn Singh RN on 12/2/21 at 12:40 PM EST    CASE MANAGEMENT/SOCIAL WORK SECTION    Inpatient Status Date: ***    Readmission Risk Assessment Score:  Readmission Risk              Risk of Unplanned Readmission:  35           Discharging to Facility/ Agency   Name:   Address:  Phone:  Fax:    Dialysis Facility (if applicable)   Name:  Address:  Dialysis Schedule:  Phone:  Fax:    / signature: {Esignature:[id number]}    PHYSICIAN SECTION    Prognosis: {Prognosis:3881001248}    Condition at Discharge: Adrien Shaver Patient Condition:[id number]}    Rehab Potential (if transferring to Rehab): {Prognosis:8770792373}    Recommended Labs or Other Treatments After Discharge: ***    Physician Certification: I certify the above information and transfer of Gretel Faith  is necessary for the continuing treatment of the diagnosis listed and that she requires {Admit to Appropriate Level of Care:43005} for {GREATER/LESS:270812027} 30 days.      Update Admission H&P: {CHP DME Changes in LLMMI:358032106}    PHYSICIAN SIGNATURE:  {Esignature:[id number]}

## 2021-12-02 NOTE — PROGRESS NOTES
Handoff report and transfer of care given at bedside to [facility] [place], [place]. Patient in stable condition, denies needs/concerns at this time. Call light within reach.

## 2021-12-02 NOTE — DISCHARGE SUMMARY
Name:  Concha Jordan  Room:  0215/0215-02  MRN:    [id number]    Discharge Summary      This discharge summary is in conjunction with a complete physical exam done on the day of discharge. Discharging Physician: Dr. Soila Connolly: 11/29/2021  Discharge:   12/02/21      HPI taken from admission H&P:    80 y.o. female who presented to the hospital with a chief complaint of suprapubic pain. The patient was recently diagnosed with diverticulitis and was discharged from the emergency department with oral antibiotic therapy. She however has continued to have the same symptoms of suprapubic abdominal pain and cramping. She does not think she has gotten any relief from the antibiotic she was prescribed. She called her physician who told her to come to the emergency department. She will be admitted for failed outpatient therapy for diverticulitis       Diagnoses this Admission and Hospital Course   #Acute sigmoid diverticulitis  -Recently discharged on ABX, returns with worsened abdominal pain  -CT as above with no major issues   -Zosyn D #3  -Clear liquid diet  adv as tolerated   -Monitored I&O's  - gi consulted, pain controlled - plan for outpt colonoscopy in 4 weeks  - can dc home on oral abx- Augmentin         Abn ct abd - possible cirrhosis and esophageal varices  - planned for outpt workup by GI     #Hyponatremia  -improved with NS     #Chronic dCHF no AE  -No evidence of fluid overload  -Hold Lasix- resume at dc     #COPD no AE  -Continued home regimen     #Hypothyroidism  -Continued Synthroid     #Diabetes mellitus type 2  -Last HgbA1c 7.9; 3/21/2021  -Hold home medication  -POCT glucose  -Low-dose SSI     #Atrial fibrillation  -Continued Lopressor and amiodarone  -No home anticoagulation medications     #GERD  -Continued Protonix     #IBS  -Continued home bowel regimen     #Depression  -Continued Seroquel     #Morbid Obesity  - Body mass index is 39.69 kg/m². - Complicating assessment and treatment. DETECTED  NOT DETECTED      Results for Erin Dance (MRN [id number]) as of 11/30/2021 10:11    Ref. Range 11/29/2021 16:39   SARS-CoV-2 RNA, RT PCR Latest Ref Range: NOT DETECTED  NOT DETECTED          RADIOLOGY  CT ABDOMEN PELVIS W IV CONTRAST Additional Contrast? None   Final Result   1. Sigmoid colon diverticulitis which appears similar to the comparison   study. No evidence of abscess formation at this time. 2.  Other chronic findings as above               Discharge Medications     Medication List      CHANGE how you take these medications    QUEtiapine 100 MG tablet  Commonly known as: SEROQUEL  What changed: Another medication with the same name was removed. Continue taking this medication, and follow the directions you see here. CONTINUE taking these medications    Advair Diskus 250-50 MCG/DOSE Aepb  Generic drug: fluticasone-salmeterol  INHALE 1 PUFF INTO THE LUNGS 2 TIMES DAILY (RINSE MOUTH AFTER EACH USE WITH WATER THEN SPIT OUT)     * albuterol (2.5 MG/3ML) 0.083% nebulizer solution  Commonly known as: Proventil  Take 3 mLs by nebulization every 4 hours as needed for Wheezing. * albuterol sulfate  (90 Base) MCG/ACT inhaler  INHALE 2 PUFFS INTO THE LUNGS 4 TIMES DAILY AS NEEDED FOR WHEEZING OR SHORTNESS OF BREATH     amiodarone 200 MG tablet  Commonly known as: CORDARONE  Take 1 tablet by mouth daily     amoxicillin-clavulanate 875-125 MG per tablet  Commonly known as: AUGMENTIN  Take 1 tablet by mouth 2 times daily for 7 days     * clobetasol 0.05 % external solution  Commonly known as: TEMOVATE  Apply small amount to scalp only two nights per week for flares for up to 4 weeks     * clobetasol 0.05 % ointment  Commonly known as: TEMOVATE     conjugated estrogens 0.625 MG/GM vaginal cream  Commonly known as: Premarin  Place vaginally three times weekly.      dicyclomine 10 MG capsule  Commonly known as: BENTYL     docusate sodium 100 MG capsule  Commonly known as: Roena Child fluticasone 50 MCG/ACT nasal spray  Commonly known as: FLONASE     furosemide 20 MG tablet  Commonly known as: LASIX  TAKE 1 TABLET (20MG) BY MOUTH DAILY     hydrOXYzine 25 MG tablet  Commonly known as: ATARAX     lactulose 10 GM/15ML solution  Commonly known as: CHRONULAC  Take 30 mLs by mouth every evening for 7 days     levothyroxine 50 MCG tablet  Commonly known as: SYNTHROID  Take 1 tablet by mouth daily     loratadine 10 MG tablet  Commonly known as: CLARITIN     metoprolol tartrate 50 MG tablet  Commonly known as: LOPRESSOR  Take 1 tablet by mouth 2 times daily     nystatin 573252 UNIT/GM cream  Commonly known as: MYCOSTATIN  Apply topically 2 times daily under breasts     ondansetron 4 MG tablet  Commonly known as: ZOFRAN     oxyCODONE-acetaminophen 7.5-325 MG per tablet  Commonly known as: PERCOCET     pantoprazole 40 MG tablet  Commonly known as: PROTONIX  Take 1 tablet by mouth 2 times daily (before meals)     senna 8.6 MG tablet  Commonly known as: SENOKOT     sulfacetamide-prednisoLONE 10-0.2 % ophthalmic suspension  Commonly known as: BLEPHAMIDE     tamsulosin 0.4 MG capsule  Commonly known as: FLOMAX  Take 1 capsule by mouth daily     trimethoprim 100 MG tablet  Commonly known as: TRIMPEX         * This list has 4 medication(s) that are the same as other medications prescribed for you. Read the directions carefully, and ask your doctor or other care provider to review them with you. STOP taking these medications    HYDROcodone-acetaminophen 5-325 MG per tablet  Commonly known as: Norco           Where to Get Your Medications      These medications were sent to [facility], [address] [place] [address], [address] [id number]    Phone: [phone]   · amoxicillin-clavulanate 875-125 MG per tablet           Discharged in stable condition to home     Follow Up:   Follow up with PCP in 1 week and GI with plan for outpt colonoscopy in 4 weeks      Onur Nath MD, 12/5/2021 8:58 AM

## 2021-12-02 NOTE — PROGRESS NOTES
RT Inhaler-Nebulizer Bronchodilator Protocol Note    There is a bronchodilator order in the chart from a provider indicating to follow the RT Bronchodilator Protocol and there is an Initiate RT Inhaler-Nebulizer Bronchodilator Protocol order as well (see protocol at bottom of note). CXR Findings:  No results found. The findings from the last RT Protocol Assessment were as follows:   History Pulmonary Disease: Chronic pulmonary disease  Respiratory Pattern: Dyspnea on exertion or RR 21-25 bpm  Breath Sounds: Slightly diminished and/or crackles  Cough: Strong, spontaneous, non-productive  Indication for Bronchodilator Therapy: Decreased or absent breath sounds  Bronchodilator Assessment Score: 6    Aerosolized bronchodilator medication orders have been revised according to the RT Inhaler-Nebulizer Bronchodilator Protocol below. Respiratory Therapist to perform RT Therapy Protocol Assessment initially then follow the protocol. Repeat RT Therapy Protocol Assessment PRN for score 0-3 or on second treatment, BID, and PRN for scores above 3. No Indications - adjust the frequency to every 6 hours PRN wheezing or bronchospasm, if no treatments needed after 48 hours then discontinue using Per Protocol order mode. If indication present, adjust the RT bronchodilator orders based on the Bronchodilator Assessment Score as indicated below. Use Inhaler orders unless patient has one or more of the following: on home nebulizer, not able to hold breath for 10 seconds, is not alert and oriented, cannot activate and use MDI correctly, or respiratory rate 25 breaths per minute or more, then use the equivalent nebulizer order(s) with same Frequency and PRN reasons based on the score. If a patient is on this medication at home then do not decrease Frequency below that used at home.     0-3 - enter or revise RT bronchodilator order(s) to equivalent RT Bronchodilator order with Frequency of every 4 hours PRN for wheezing or increased work of breathing using Per Protocol order mode. 4-6 - enter or revise RT Bronchodilator order(s) to two equivalent RT bronchodilator orders with one order with BID Frequency and one order with Frequency of every 4 hours PRN wheezing or increased work of breathing using Per Protocol order mode. 7-10 - enter or revise RT Bronchodilator order(s) to two equivalent RT bronchodilator orders with one order with TID Frequency and one order with Frequency of every 4 hours PRN wheezing or increased work of breathing using Per Protocol order mode. 11-13 - enter or revise RT Bronchodilator order(s) to one equivalent RT bronchodilator order with QID Frequency and an Albuterol order with Frequency of every 4 hours PRN wheezing or increased work of breathing using Per Protocol order mode. Greater than 13 - enter or revise RT Bronchodilator order(s) to one equivalent RT bronchodilator order with every 4 hours Frequency and an Albuterol order with Frequency of every 2 hours PRN wheezing or increased work of breathing using Per Protocol order mode.          Electronically signed by James Rod RCP on 12/2/2021 at 2:34 AM

## 2021-12-02 NOTE — PROGRESS NOTES
Progress Note    Admit Date:  11/29/2021    80 y.o. female who presented to the hospital with a chief complaint of suprapubic pain. The patient was recently diagnosed with diverticulitis and was discharged from the emergency department with oral antibiotic therapy. She however has continued to have the same symptoms of suprapubic abdominal pain and cramping. She does not think she has gotten any relief from the antibiotic she was prescribed. She called her physician who told her to come to the emergency department. She will be admitted for failed outpatient therapy for diverticulitis. Subjective:  Ms. Alyssa Rojas is seen up in bed today   Pain improved and . Tolerating full liquid diet  No acute issues      Objective:   Patient Vitals for the past 4 hrs:   BP Temp Temp src Pulse Resp SpO2   12/02/21 0904 (!) 145/76 98.2 °F (36.8 °C) Oral 78 16 95 %            Intake/Output Summary (Last 24 hours) at 12/2/2021 1017  Last data filed at 12/1/2021 1748  Gross per 24 hour   Intake 1432.94 ml   Output --   Net 1432.94 ml       Physical Exam:        General: elderly female up in chair Awake, alert and oriented. Appears to be not in any distress  Mucous Membranes:  Pink , anicteric  Neck: No JVD, no carotid bruit, no thyromegaly  Chest:  Clear to auscultation bilaterally, no added sounds  Cardiovascular:  RRR S1S2 heard, no murmurs or gallops  Abdomen:  Soft, obese  undistended,  Mild right and left LQ tenderness L> R, no organomegaly, BS present  Extremities: No edema or cyanosis.  Distal pulses well felt  Neurological : grossly normal          Scheduled Meds:   QUEtiapine  100 mg Oral Nightly    albuterol  2.5 mg Nebulization BID    insulin lispro  0-6 Units SubCUTAneous TID     insulin lispro  0-3 Units SubCUTAneous Nightly    polyethylene glycol  17 g Oral BID    influenza virus vaccine  0.5 mL IntraMUSCular Prior to discharge    budesonide-formoterol  2 puff Inhalation BID    amiodarone  200 mg Oral Daily chronic findings as above           Assessment/Plan:    #Acute sigmoid diverticulitis  -Recently discharged on ABX, returns with worsened abdominal pain  -CT as above with no major issues   -Zosyn D #3  -Clear liquid diet for now- adv as tolerated   -Monitor I&O's  - gi consulted, pain controlled - plan for outpt colonoscopy in 4 weeks  - can dc home on oral abx      Abn ct abd - possible cirrhosis and esophageal varices  - planned for outpt workup by GI    #Hyponatremia  -improved with NS    #Chronic dCHF no AE  -No evidence of fluid overload  -Hold Lasix- resume at dc    #COPD no AE  -Continue home regimen    #Hypothyroidism  -Continue Synthroid    #Diabetes mellitus type 2  -Last HgbA1c 7.9; 3/21/2021  -Hold home medication  -POCT glucose  -Low-dose SSI    #Atrial fibrillation  -Continue Lopressor and amiodarone  -No home anticoagulation medications    #GERD  -Continue Protonix    #IBS  -Continue home bowel regimen    #Depression  -Continue Seroquel    #Morbid Obesity  - Body mass index is 39.69 kg/m². - Complicating assessment and treatment. Placing patient at risk for multiple co-morbidities as well as early death and contributing to the patient's presentation.   - Counseled on weight loss. DVT Prophylaxis: Lovenox  Diet: ADULT DIET;  Regular; Low Fiber  Code Status: Full Code      Dc planning

## 2021-12-03 LAB
AFP: 5.3 UG/L
ANTI-NUCLEAR ANTIBODY (ANA): POSITIVE

## 2021-12-03 RX ORDER — FUROSEMIDE 20 MG/1
TABLET ORAL
Qty: 30 TABLET | Refills: 2 | OUTPATIENT
Start: 2021-12-03

## 2021-12-03 NOTE — ADT AUTH CERT
PA RECOMMENDATION by Bina Ta RN       Review Status Review Entered   In Primary 2021 15:42      Criteria Review   We recommend that the following pt's current hospitalization under Inpatient status Is appropriate       Name: Norberto Santiago   : 1950   CSN: [id number]   [facility]        Clinical summary 81 yo F admitted for acute sigmoid diverticulitis that had failed outpatient treatment.    -CT abd with sigmoid colon diverticulitis which appears similar to the comparison study.   -Being managed with IV abx  -Also, Na 129  Vitals Stable  Labs and Imaging See above  MCG criteria applies yes  Comments       This chart was reviewed at 12:53 PM 2021    Lewis Lambert MD   Physician [address]   CELL : [phone]

## 2021-12-06 LAB
ALPHA-1 ANTITRYPSIN PHENOTYPE: ABNORMAL
ALPHA-1 ANTITRYPSIN: 255 MG/DL (ref 90–200)
F-ACTIN AB IGG: 44 UNITS (ref 0–19)
SMOOTH MUSCLE AB IGG TITER: ABNORMAL

## 2021-12-10 LAB
ANTINUCLEAR AB INTERPRETIVE COMMENT: ABNORMAL
ANTINUCLEAR ANTIBODY, HEP-2, IGG: DETECTED

## 2022-01-07 ENCOUNTER — HOSPITAL ENCOUNTER (OUTPATIENT)
Dept: GENERAL RADIOLOGY | Age: 72
Discharge: HOME | End: 2022-01-07
Payer: MEDICARE

## 2022-01-07 ENCOUNTER — HOSPITAL ENCOUNTER (OUTPATIENT)
Age: 72
Discharge: HOME | End: 2022-01-07
Payer: MEDICARE

## 2022-01-07 DIAGNOSIS — R06.02 SOB (SHORTNESS OF BREATH): ICD-10-CM

## 2022-01-07 PROCEDURE — 71046 X-RAY EXAM CHEST 2 VIEWS: CPT

## 2022-01-10 ENCOUNTER — TELEPHONE (OUTPATIENT)
Dept: SURGERY | Age: 72
End: 2022-01-10

## 2022-01-27 RX ORDER — ALBUTEROL SULFATE 90 UG/1
2 AEROSOL, METERED RESPIRATORY (INHALATION) 4 TIMES DAILY PRN
Qty: 8.5 G | Refills: 1 | Status: SHIPPED | OUTPATIENT
Start: 2022-01-27 | End: 2022-03-24

## 2022-02-25 RX ORDER — FUROSEMIDE 20 MG/1
TABLET ORAL
Qty: 30 TABLET | Refills: 3 | OUTPATIENT
Start: 2022-02-25

## 2022-03-24 ENCOUNTER — HOSPITAL ENCOUNTER (OUTPATIENT)
Dept: ULTRASOUND IMAGING | Age: 72
Discharge: HOME | End: 2022-03-24
Payer: MEDICARE

## 2022-03-24 DIAGNOSIS — R14.0 ABDOMINAL DISTENTION: ICD-10-CM

## 2022-03-24 PROCEDURE — 76700 US EXAM ABDOM COMPLETE: CPT

## 2022-03-24 RX ORDER — ALBUTEROL SULFATE 90 UG/1
2 AEROSOL, METERED RESPIRATORY (INHALATION) 4 TIMES DAILY PRN
Qty: 8.5 G | Refills: 0 | Status: ON HOLD | OUTPATIENT
Start: 2022-03-24 | End: 2022-06-28

## 2022-03-25 ENCOUNTER — TELEPHONE (OUTPATIENT)
Dept: PULMONOLOGY | Age: 72
End: 2022-03-25

## 2022-03-25 NOTE — TELEPHONE ENCOUNTER
Attempted to contact patient no answer and unable to leave message. Maday Kamara MD 23 hours ago (11:41 AM)     DB       Needs appt?   CMT or me

## 2022-04-04 NOTE — TELEPHONE ENCOUNTER
Pt no-showed last appt & was unable to be reached afterward.  No more refills please & notify pharmacy of appt needed at next refill request.

## 2022-04-21 DIAGNOSIS — J45.40 MODERATE PERSISTENT ASTHMA WITHOUT COMPLICATION: Primary | ICD-10-CM

## 2022-04-21 RX ORDER — FLUTICASONE PROPIONATE AND SALMETEROL 50; 250 UG/1; UG/1
POWDER RESPIRATORY (INHALATION)
Qty: 60 EACH | Refills: 0 | OUTPATIENT
Start: 2022-04-21

## 2022-04-21 RX ORDER — ALBUTEROL SULFATE 90 UG/1
2 AEROSOL, METERED RESPIRATORY (INHALATION) 4 TIMES DAILY PRN
Qty: 8.5 G | Refills: 0 | OUTPATIENT
Start: 2022-04-21

## 2022-04-21 NOTE — TELEPHONE ENCOUNTER
Needs appt with dr. Hoang Hull or Rosy Magaña. Called the pharmacy and made them aware. Spoke with damaso and she stated they had a delivery going out to her this week. Brittaney García put a note with her order to call the office to make an appointment.

## 2022-05-17 ENCOUNTER — HOSPITAL ENCOUNTER (OUTPATIENT)
Age: 72
Discharge: HOME | End: 2022-05-17
Payer: MEDICARE

## 2022-05-17 LAB
ALBUMIN SERPL-MCNC: 4 G/DL (ref 3.4–5)
ANION GAP SERPL CALCULATED.3IONS-SCNC: 19 MMOL/L (ref 3–16)
BUN BLDV-MCNC: 16 MG/DL (ref 7–20)
CALCIUM SERPL-MCNC: 8.9 MG/DL (ref 8.3–10.6)
CHLORIDE BLD-SCNC: 102 MMOL/L (ref 99–110)
CO2: 19 MMOL/L (ref 21–32)
CREAT SERPL-MCNC: 1 MG/DL (ref 0.6–1.2)
CREATININE URINE: 223 MG/DL (ref 28–259)
GFR AFRICAN AMERICAN: >60
GFR NON-AFRICAN AMERICAN: 55
GLUCOSE BLD-MCNC: 114 MG/DL (ref 70–99)
PHOSPHORUS: 2.9 MG/DL (ref 2.5–4.9)
POTASSIUM SERPL-SCNC: 4 MMOL/L (ref 3.5–5.1)
PROTEIN PROTEIN: 47 MG/DL
PROTEIN/CREAT RATIO: 0.2 MG/DL
SODIUM BLD-SCNC: 140 MMOL/L (ref 136–145)

## 2022-05-17 PROCEDURE — 36415 COLL VENOUS BLD VENIPUNCTURE: CPT

## 2022-05-17 PROCEDURE — 82570 ASSAY OF URINE CREATININE: CPT

## 2022-05-17 PROCEDURE — 80069 RENAL FUNCTION PANEL: CPT

## 2022-05-17 PROCEDURE — 84156 ASSAY OF PROTEIN URINE: CPT

## 2022-06-27 ENCOUNTER — APPOINTMENT (OUTPATIENT)
Dept: GENERAL RADIOLOGY | Age: 72
DRG: 885 | End: 2022-06-27
Payer: MEDICARE

## 2022-06-27 ENCOUNTER — HOSPITAL ENCOUNTER (INPATIENT)
Age: 72
LOS: 2 days | Discharge: LEFT BEFORE DISCHARGE | DRG: 885 | End: 2022-06-29
Attending: EMERGENCY MEDICINE | Admitting: INTERNAL MEDICINE
Payer: MEDICARE

## 2022-06-27 DIAGNOSIS — R07.9 CHEST PAIN, UNSPECIFIED TYPE: Primary | ICD-10-CM

## 2022-06-27 DIAGNOSIS — I50.9 ACUTE ON CHRONIC CONGESTIVE HEART FAILURE, UNSPECIFIED HEART FAILURE TYPE (HCC): ICD-10-CM

## 2022-06-27 DIAGNOSIS — N18.9 CHRONIC KIDNEY DISEASE, UNSPECIFIED CKD STAGE: ICD-10-CM

## 2022-06-27 DIAGNOSIS — R06.02 SHORTNESS OF BREATH: ICD-10-CM

## 2022-06-27 LAB
A/G RATIO: 1.3 (ref 1.1–2.2)
ALBUMIN SERPL-MCNC: 4.3 G/DL (ref 3.4–5)
ALP BLD-CCNC: 246 U/L (ref 40–129)
ALT SERPL-CCNC: 14 U/L (ref 10–40)
ANION GAP SERPL CALCULATED.3IONS-SCNC: 17 MMOL/L (ref 3–16)
AST SERPL-CCNC: 25 U/L (ref 15–37)
BASE EXCESS VENOUS: 2 MMOL/L (ref -3–3)
BASOPHILS ABSOLUTE: 0.1 K/UL (ref 0–0.2)
BASOPHILS RELATIVE PERCENT: 1.2 %
BILIRUB SERPL-MCNC: 0.3 MG/DL (ref 0–1)
BUN BLDV-MCNC: 20 MG/DL (ref 7–20)
CALCIUM SERPL-MCNC: 9.2 MG/DL (ref 8.3–10.6)
CARBOXYHEMOGLOBIN: 1 % (ref 0–1.5)
CHLORIDE BLD-SCNC: 101 MMOL/L (ref 99–110)
CO2: 24 MMOL/L (ref 21–32)
CREAT SERPL-MCNC: 1.1 MG/DL (ref 0.6–1.2)
EOSINOPHILS ABSOLUTE: 0.9 K/UL (ref 0–0.6)
EOSINOPHILS RELATIVE PERCENT: 10.4 %
GFR AFRICAN AMERICAN: 59
GFR NON-AFRICAN AMERICAN: 49
GLUCOSE BLD-MCNC: 128 MG/DL (ref 70–99)
GLUCOSE BLD-MCNC: 138 MG/DL (ref 70–99)
HCO3 VENOUS: 27.5 MMOL/L (ref 23–29)
HCT VFR BLD CALC: 32.3 % (ref 36–48)
HEMOGLOBIN: 10.2 G/DL (ref 12–16)
INFLUENZA A: NOT DETECTED
INFLUENZA B: NOT DETECTED
INR BLD: 0.99 (ref 0.87–1.14)
LACTIC ACID: 2.5 MMOL/L (ref 0.4–2)
LYMPHOCYTES ABSOLUTE: 1.8 K/UL (ref 1–5.1)
LYMPHOCYTES RELATIVE PERCENT: 20.6 %
MCH RBC QN AUTO: 25.7 PG (ref 26–34)
MCHC RBC AUTO-ENTMCNC: 31.6 G/DL (ref 31–36)
MCV RBC AUTO: 81.5 FL (ref 80–100)
METHEMOGLOBIN VENOUS: 0.3 %
MONOCYTES ABSOLUTE: 0.6 K/UL (ref 0–1.3)
MONOCYTES RELATIVE PERCENT: 6.7 %
NEUTROPHILS ABSOLUTE: 5.4 K/UL (ref 1.7–7.7)
NEUTROPHILS RELATIVE PERCENT: 61.1 %
O2 SAT, VEN: 88 %
O2 THERAPY: ABNORMAL
PCO2, VEN: 46.9 MMHG (ref 40–50)
PDW BLD-RTO: 17.2 % (ref 12.4–15.4)
PERFORMED ON: ABNORMAL
PH VENOUS: 7.39 (ref 7.35–7.45)
PLATELET # BLD: 309 K/UL (ref 135–450)
PMV BLD AUTO: 8.4 FL (ref 5–10.5)
PO2, VEN: 55.1 MMHG (ref 25–40)
POTASSIUM REFLEX MAGNESIUM: 4 MMOL/L (ref 3.5–5.1)
PRO-BNP: 477 PG/ML (ref 0–124)
PROTHROMBIN TIME: 12.9 SEC (ref 11.7–14.5)
RBC # BLD: 3.96 M/UL (ref 4–5.2)
SARS-COV-2 RNA, RT PCR: NOT DETECTED
SODIUM BLD-SCNC: 142 MMOL/L (ref 136–145)
TCO2 CALC VENOUS: 29 MMOL/L
TOTAL PROTEIN: 7.5 G/DL (ref 6.4–8.2)
TROPONIN: <0.01 NG/ML
TROPONIN: <0.01 NG/ML
WBC # BLD: 8.8 K/UL (ref 4–11)

## 2022-06-27 PROCEDURE — 82803 BLOOD GASES ANY COMBINATION: CPT

## 2022-06-27 PROCEDURE — 6360000002 HC RX W HCPCS: Performed by: NURSE PRACTITIONER

## 2022-06-27 PROCEDURE — 6370000000 HC RX 637 (ALT 250 FOR IP): Performed by: INTERNAL MEDICINE

## 2022-06-27 PROCEDURE — 85025 COMPLETE CBC W/AUTO DIFF WBC: CPT

## 2022-06-27 PROCEDURE — 85610 PROTHROMBIN TIME: CPT

## 2022-06-27 PROCEDURE — 2060000000 HC ICU INTERMEDIATE R&B

## 2022-06-27 PROCEDURE — 96374 THER/PROPH/DIAG INJ IV PUSH: CPT

## 2022-06-27 PROCEDURE — 71045 X-RAY EXAM CHEST 1 VIEW: CPT

## 2022-06-27 PROCEDURE — 99285 EMERGENCY DEPT VISIT HI MDM: CPT

## 2022-06-27 PROCEDURE — 94761 N-INVAS EAR/PLS OXIMETRY MLT: CPT

## 2022-06-27 PROCEDURE — 83880 ASSAY OF NATRIURETIC PEPTIDE: CPT

## 2022-06-27 PROCEDURE — 93005 ELECTROCARDIOGRAM TRACING: CPT | Performed by: NURSE PRACTITIONER

## 2022-06-27 PROCEDURE — 80053 COMPREHEN METABOLIC PANEL: CPT

## 2022-06-27 PROCEDURE — 6370000000 HC RX 637 (ALT 250 FOR IP): Performed by: NURSE PRACTITIONER

## 2022-06-27 PROCEDURE — 84484 ASSAY OF TROPONIN QUANT: CPT

## 2022-06-27 PROCEDURE — 83605 ASSAY OF LACTIC ACID: CPT

## 2022-06-27 PROCEDURE — 36415 COLL VENOUS BLD VENIPUNCTURE: CPT

## 2022-06-27 PROCEDURE — 94640 AIRWAY INHALATION TREATMENT: CPT

## 2022-06-27 PROCEDURE — 87636 SARSCOV2 & INF A&B AMP PRB: CPT

## 2022-06-27 PROCEDURE — 96375 TX/PRO/DX INJ NEW DRUG ADDON: CPT

## 2022-06-27 PROCEDURE — 84443 ASSAY THYROID STIM HORMONE: CPT

## 2022-06-27 RX ORDER — BUDESONIDE AND FORMOTEROL FUMARATE DIHYDRATE 160; 4.5 UG/1; UG/1
2 AEROSOL RESPIRATORY (INHALATION) 2 TIMES DAILY
Status: DISCONTINUED | OUTPATIENT
Start: 2022-06-27 | End: 2022-06-29 | Stop reason: HOSPADM

## 2022-06-27 RX ORDER — ASPIRIN 325 MG
325 TABLET ORAL ONCE
Status: COMPLETED | OUTPATIENT
Start: 2022-06-27 | End: 2022-06-27

## 2022-06-27 RX ORDER — SODIUM CHLORIDE 0.9 % (FLUSH) 0.9 %
5-40 SYRINGE (ML) INJECTION EVERY 12 HOURS SCHEDULED
Status: DISCONTINUED | OUTPATIENT
Start: 2022-06-27 | End: 2022-06-29 | Stop reason: HOSPADM

## 2022-06-27 RX ORDER — ONDANSETRON 2 MG/ML
4 INJECTION INTRAMUSCULAR; INTRAVENOUS ONCE
Status: COMPLETED | OUTPATIENT
Start: 2022-06-27 | End: 2022-06-27

## 2022-06-27 RX ORDER — METOPROLOL TARTRATE 50 MG/1
50 TABLET, FILM COATED ORAL 2 TIMES DAILY
Status: DISCONTINUED | OUTPATIENT
Start: 2022-06-27 | End: 2022-06-28

## 2022-06-27 RX ORDER — MORPHINE SULFATE 4 MG/ML
4 INJECTION, SOLUTION INTRAMUSCULAR; INTRAVENOUS ONCE
Status: COMPLETED | OUTPATIENT
Start: 2022-06-27 | End: 2022-06-27

## 2022-06-27 RX ORDER — MAGNESIUM SULFATE IN WATER 40 MG/ML
2000 INJECTION, SOLUTION INTRAVENOUS PRN
Status: DISCONTINUED | OUTPATIENT
Start: 2022-06-27 | End: 2022-06-29 | Stop reason: HOSPADM

## 2022-06-27 RX ORDER — POTASSIUM CHLORIDE 7.45 MG/ML
10 INJECTION INTRAVENOUS PRN
Status: DISCONTINUED | OUTPATIENT
Start: 2022-06-27 | End: 2022-06-29 | Stop reason: HOSPADM

## 2022-06-27 RX ORDER — FUROSEMIDE 10 MG/ML
40 INJECTION INTRAMUSCULAR; INTRAVENOUS ONCE
Status: COMPLETED | OUTPATIENT
Start: 2022-06-27 | End: 2022-06-27

## 2022-06-27 RX ORDER — PANTOPRAZOLE SODIUM 40 MG/1
40 TABLET, DELAYED RELEASE ORAL
Status: DISCONTINUED | OUTPATIENT
Start: 2022-06-28 | End: 2022-06-29 | Stop reason: HOSPADM

## 2022-06-27 RX ORDER — LORAZEPAM 2 MG/ML
1 INJECTION INTRAMUSCULAR ONCE
Status: COMPLETED | OUTPATIENT
Start: 2022-06-28 | End: 2022-06-28

## 2022-06-27 RX ORDER — SODIUM CHLORIDE 9 MG/ML
INJECTION, SOLUTION INTRAVENOUS PRN
Status: DISCONTINUED | OUTPATIENT
Start: 2022-06-27 | End: 2022-06-29 | Stop reason: HOSPADM

## 2022-06-27 RX ORDER — ONDANSETRON 2 MG/ML
4 INJECTION INTRAMUSCULAR; INTRAVENOUS EVERY 6 HOURS PRN
Status: DISCONTINUED | OUTPATIENT
Start: 2022-06-27 | End: 2022-06-29 | Stop reason: HOSPADM

## 2022-06-27 RX ORDER — AMIODARONE HYDROCHLORIDE 200 MG/1
200 TABLET ORAL DAILY
Status: DISCONTINUED | OUTPATIENT
Start: 2022-06-28 | End: 2022-06-29 | Stop reason: HOSPADM

## 2022-06-27 RX ORDER — ENOXAPARIN SODIUM 100 MG/ML
40 INJECTION SUBCUTANEOUS DAILY
Status: DISCONTINUED | OUTPATIENT
Start: 2022-06-28 | End: 2022-06-29 | Stop reason: HOSPADM

## 2022-06-27 RX ORDER — QUETIAPINE FUMARATE 100 MG/1
100 TABLET, FILM COATED ORAL 2 TIMES DAILY
Status: DISCONTINUED | OUTPATIENT
Start: 2022-06-27 | End: 2022-06-28

## 2022-06-27 RX ORDER — SODIUM CHLORIDE 0.9 % (FLUSH) 0.9 %
5-40 SYRINGE (ML) INJECTION PRN
Status: DISCONTINUED | OUTPATIENT
Start: 2022-06-27 | End: 2022-06-29 | Stop reason: HOSPADM

## 2022-06-27 RX ADMIN — METOPROLOL TARTRATE 50 MG: 50 TABLET, FILM COATED ORAL at 23:46

## 2022-06-27 RX ADMIN — MORPHINE SULFATE 4 MG: 4 INJECTION INTRAVENOUS at 22:32

## 2022-06-27 RX ADMIN — Medication 2 PUFF: at 23:20

## 2022-06-27 RX ADMIN — FUROSEMIDE 40 MG: 10 INJECTION, SOLUTION INTRAMUSCULAR; INTRAVENOUS at 22:07

## 2022-06-27 RX ADMIN — ASPIRIN 325 MG: 325 TABLET ORAL at 19:10

## 2022-06-27 RX ADMIN — ONDANSETRON HYDROCHLORIDE 4 MG: 2 INJECTION, SOLUTION INTRAMUSCULAR; INTRAVENOUS at 22:31

## 2022-06-27 ASSESSMENT — ENCOUNTER SYMPTOMS
SORE THROAT: 0
BACK PAIN: 0
VOMITING: 0
RHINORRHEA: 0
BLOOD IN STOOL: 0
EYE PAIN: 0
ABDOMINAL PAIN: 0
SHORTNESS OF BREATH: 0
DIARRHEA: 0
NAUSEA: 0
COUGH: 0

## 2022-06-27 ASSESSMENT — PAIN - FUNCTIONAL ASSESSMENT: PAIN_FUNCTIONAL_ASSESSMENT: 0-10

## 2022-06-27 ASSESSMENT — PAIN SCALES - GENERAL: PAINLEVEL_OUTOF10: 5

## 2022-06-27 ASSESSMENT — PAIN DESCRIPTION - LOCATION: LOCATION: CHEST
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
## 2022-06-28 LAB
AMPHETAMINE SCREEN, URINE: ABNORMAL
BARBITURATE SCREEN URINE: ABNORMAL
BASE EXCESS ARTERIAL: 3.5 MMOL/L (ref -3–3)
BENZODIAZEPINE SCREEN, URINE: ABNORMAL
CANNABINOID SCREEN URINE: ABNORMAL
CARBOXYHEMOGLOBIN ARTERIAL: 0.3 % (ref 0–1.5)
COCAINE METABOLITE SCREEN URINE: ABNORMAL
EKG ATRIAL RATE: 94 BPM
EKG DIAGNOSIS: NORMAL
EKG P AXIS: 53 DEGREES
EKG P-R INTERVAL: 138 MS
EKG Q-T INTERVAL: 360 MS
EKG QRS DURATION: 76 MS
EKG QTC CALCULATION (BAZETT): 450 MS
EKG R AXIS: -24 DEGREES
EKG T AXIS: 34 DEGREES
EKG VENTRICULAR RATE: 94 BPM
GLUCOSE BLD-MCNC: 102 MG/DL (ref 70–99)
GLUCOSE BLD-MCNC: 110 MG/DL (ref 70–99)
GLUCOSE BLD-MCNC: 133 MG/DL (ref 70–99)
GLUCOSE BLD-MCNC: 166 MG/DL (ref 70–99)
HCO3 ARTERIAL: 27.8 MMOL/L (ref 21–29)
HEMOGLOBIN, ART, EXTENDED: 10 G/DL (ref 12–16)
Lab: ABNORMAL
METHADONE SCREEN, URINE: ABNORMAL
METHEMOGLOBIN ARTERIAL: 0.3 %
O2 SAT, ARTERIAL: 90.9 %
O2 THERAPY: ABNORMAL
OPIATE SCREEN URINE: POSITIVE
OXYCODONE URINE: ABNORMAL
PCO2 ARTERIAL: 41 MMHG (ref 35–45)
PERFORMED ON: ABNORMAL
PH ARTERIAL: 7.45 (ref 7.35–7.45)
PH UA: 5
PHENCYCLIDINE SCREEN URINE: ABNORMAL
PO2 ARTERIAL: 57.2 MMHG (ref 75–108)
PROPOXYPHENE SCREEN: ABNORMAL
TCO2 ARTERIAL: 29.1 MMOL/L
TSH REFLEX FT4: 0.63 UIU/ML (ref 0.27–4.2)

## 2022-06-28 PROCEDURE — 2580000003 HC RX 258: Performed by: INTERNAL MEDICINE

## 2022-06-28 PROCEDURE — 80307 DRUG TEST PRSMV CHEM ANLYZR: CPT

## 2022-06-28 PROCEDURE — 93010 ELECTROCARDIOGRAM REPORT: CPT | Performed by: INTERNAL MEDICINE

## 2022-06-28 PROCEDURE — 99223 1ST HOSP IP/OBS HIGH 75: CPT | Performed by: INTERNAL MEDICINE

## 2022-06-28 PROCEDURE — 82803 BLOOD GASES ANY COMBINATION: CPT

## 2022-06-28 PROCEDURE — 96375 TX/PRO/DX INJ NEW DRUG ADDON: CPT

## 2022-06-28 PROCEDURE — 6370000000 HC RX 637 (ALT 250 FOR IP): Performed by: INTERNAL MEDICINE

## 2022-06-28 PROCEDURE — 6360000002 HC RX W HCPCS: Performed by: INTERNAL MEDICINE

## 2022-06-28 PROCEDURE — 94640 AIRWAY INHALATION TREATMENT: CPT

## 2022-06-28 PROCEDURE — 2700000000 HC OXYGEN THERAPY PER DAY

## 2022-06-28 PROCEDURE — 97535 SELF CARE MNGMENT TRAINING: CPT

## 2022-06-28 PROCEDURE — 96372 THER/PROPH/DIAG INJ SC/IM: CPT

## 2022-06-28 PROCEDURE — 2060000000 HC ICU INTERMEDIATE R&B

## 2022-06-28 PROCEDURE — 97530 THERAPEUTIC ACTIVITIES: CPT

## 2022-06-28 PROCEDURE — 36600 WITHDRAWAL OF ARTERIAL BLOOD: CPT

## 2022-06-28 PROCEDURE — 94761 N-INVAS EAR/PLS OXIMETRY MLT: CPT

## 2022-06-28 PROCEDURE — 97166 OT EVAL MOD COMPLEX 45 MIN: CPT

## 2022-06-28 RX ORDER — SUCRALFATE 1 G/1
1 TABLET ORAL 4 TIMES DAILY
COMMUNITY

## 2022-06-28 RX ORDER — TRAZODONE HYDROCHLORIDE 100 MG/1
100 TABLET ORAL NIGHTLY
Status: ON HOLD | COMMUNITY
End: 2022-09-10 | Stop reason: HOSPADM

## 2022-06-28 RX ORDER — MIRTAZAPINE 7.5 MG/1
7.5 TABLET, FILM COATED ORAL NIGHTLY
COMMUNITY

## 2022-06-28 RX ORDER — GABAPENTIN 300 MG/1
300 CAPSULE ORAL 3 TIMES DAILY
COMMUNITY

## 2022-06-28 RX ORDER — SODIUM CHLORIDE 9 MG/ML
INJECTION, SOLUTION INTRAVENOUS CONTINUOUS
Status: DISCONTINUED | OUTPATIENT
Start: 2022-06-28 | End: 2022-06-29

## 2022-06-28 RX ORDER — KETOCONAZOLE 20 MG/ML
SHAMPOO TOPICAL DAILY PRN
COMMUNITY

## 2022-06-28 RX ORDER — PLECANATIDE 3 MG/1
TABLET ORAL DAILY
COMMUNITY

## 2022-06-28 RX ORDER — SIMVASTATIN 20 MG
20 TABLET ORAL NIGHTLY
COMMUNITY

## 2022-06-28 RX ORDER — NIFEDIPINE 30 MG/1
60 TABLET, FILM COATED, EXTENDED RELEASE ORAL DAILY
COMMUNITY
End: 2022-09-10

## 2022-06-28 RX ORDER — FLUOCINOLONE ACETONIDE 0.1 MG/ML
SOLUTION TOPICAL DAILY
COMMUNITY

## 2022-06-28 RX ORDER — NYSTATIN 100000 U/G
CREAM TOPICAL 2 TIMES DAILY
COMMUNITY

## 2022-06-28 RX ORDER — MELOXICAM 7.5 MG/1
7.5 TABLET ORAL DAILY
Status: ON HOLD | COMMUNITY
End: 2022-09-07 | Stop reason: ALTCHOICE

## 2022-06-28 RX ORDER — QUETIAPINE FUMARATE 25 MG/1
50 TABLET, FILM COATED ORAL NIGHTLY
Status: DISCONTINUED | OUTPATIENT
Start: 2022-06-29 | End: 2022-06-29

## 2022-06-28 RX ORDER — DULAGLUTIDE 0.75 MG/.5ML
0.75 INJECTION, SOLUTION SUBCUTANEOUS WEEKLY
Status: ON HOLD | COMMUNITY
End: 2022-09-07 | Stop reason: ALTCHOICE

## 2022-06-28 RX ORDER — TIZANIDINE 4 MG/1
8 TABLET ORAL DAILY PRN
COMMUNITY

## 2022-06-28 RX ORDER — LISINOPRIL 5 MG/1
5 TABLET ORAL DAILY
COMMUNITY

## 2022-06-28 RX ADMIN — SODIUM CHLORIDE: 9 INJECTION, SOLUTION INTRAVENOUS at 21:42

## 2022-06-28 RX ADMIN — ENOXAPARIN SODIUM 40 MG: 100 INJECTION SUBCUTANEOUS at 11:40

## 2022-06-28 RX ADMIN — METOPROLOL TARTRATE 12.5 MG: 25 TABLET, FILM COATED ORAL at 19:40

## 2022-06-28 RX ADMIN — Medication 2 PUFF: at 19:09

## 2022-06-28 RX ADMIN — PANTOPRAZOLE SODIUM 40 MG: 40 TABLET, DELAYED RELEASE ORAL at 14:22

## 2022-06-28 RX ADMIN — LORAZEPAM 1 MG: 2 INJECTION, SOLUTION INTRAMUSCULAR; INTRAVENOUS at 00:02

## 2022-06-28 RX ADMIN — Medication 10 ML: at 00:02

## 2022-06-28 RX ADMIN — Medication 2 PUFF: at 07:37

## 2022-06-28 RX ADMIN — Medication 10 ML: at 11:40

## 2022-06-28 RX ADMIN — SODIUM CHLORIDE: 9 INJECTION, SOLUTION INTRAVENOUS at 11:01

## 2022-06-28 NOTE — ED NOTES
2126 Perfect Serve Sent To Dr. Mayelin Guerrero  06/27/22 2126    2246 consult completed admit orders placed on [facility] Nick  06/27/22 2247

## 2022-06-28 NOTE — CARE COORDINATION
Case Management Assessment  Initial Evaluation      Patient Name: Frieda Baca  YOB: 1950  Diagnosis: SOB (shortness of breath) [R06.02]  Date / Time: 6/27/2022  5:01 PM    Admission status/Date:6/27/2022  Chart Reviewed: Yes      Patient Interviewed: Yes   Family Interviewed:  No      Hospitalization in the last 30 days:  No      Health Care Decision Maker :   Primary Decision Maker: Melissa Schwartz - [phone]    Primary Decision Maker: Marya Hunt - [phone]    Secondary Decision Maker: Carrie Stephenson  [phone]    (CM - must 1st enter selection under Navigator - emergency contact- [place] Relationship and pick relationship)   Who do you trust or have selected to make healthcare decisions for you      Met with: pt  Interview conducted  (bedside/phone): bedside    Current PCP: Fior Orona, [address] Medicare  Precert required for SNF : Y, N          3 night stay required - Y, N    ADLS  Support Systems/Care Needs:    Transportation: self    Meal Preparation: self    Housing  Living Arrangements: double wide with 2 daughters  Steps: ramp  Intent for return to present living arrangements: Yes  Identified Issues: [address] [facility] with [address] : No Agency:(Services)     Passport/Waiver : No  :                      Phone Number:    Passport/Waiver Services: n/a          Durable Medical Equiptment   DME Provider: n/a  Equipment: yes  Walker__X_Cane__X_RTS___ BSC___Shower Chair__X_Hospital Bed___W/C__X__Other________  02 at ____Liter(s)---wears(frequency)_______ [facility] ___ CPAP___ BiPap___   N/A____      Home O2 Use :  No    If No for home O2---if presently on O2 during hospitalization:  No  if yes CM to follow for potential DC O2 need  Informed of need for care provider to bring portable home O2 tank on day of discharge for nursing to connect prior to leaving:   Not Indicated  Verbalized agreement/Understanding: Not Indicated    Community Service Affiliation  Dialysis:  No    · Agency:  · Location:  · Dialysis Schedule:  · Phone:   · Fax: Other Community Services: (ex:PT/OT,Mental Health,Wound Clinic, Cardio/Pul [address])    DISCHARGE PLAN: Explained Case Management role/services. Reviewed chart. Role of discharge planner explained and patient verbalized understanding. Pt is alert and oriented. [facility] and plans to return. Pt is awaiting a psych consult. Pt is currently on 2L, but not on home O2. --Follow for possible home O2. Pt states that she is not home bound. Pt states that she would like outpt PT/OT. CM placed this info in the d/c instructions. CM ordered PT/OT eval for their recommendations. Pt states that when she goes to [place] with her family, she gets very winded and would like a scooter. Pt states she has a wheelchair, but does \"not want to lug it around everywhere and have family push me because it is an inconvenience to family to push me everywhere\". CM recommended that she contact her PCP so they could start the paperwork for the scooter if they felt she met criteria for a scooter.

## 2022-06-28 NOTE — PROGRESS NOTES
Hand off report give to CHARLIE Jorgensen. Patient is stable showing no signs of distress and has no current needs at this time. Call light is in reach and bed is in lowest position. Care is transferred at this time.

## 2022-06-28 NOTE — PROGRESS NOTES
Patient admitted to room 307 from ED. Patient oriented to room, call light, bed rails, phone, lights and bathroom. Patient instructed about the schedule of the day including: vital sign frequency, lab draws, possible tests, frequency of MD and staff rounds, daily weights, I &O's and prescribed diet. Telemetry box in place, patient aware of placement and reason. Bed locked, in lowest position, side rails up 2/4, call light within reach. Recliner Assessment  Patient is able to demonstrate the ability to move from a reclining position to an upright position within the recliner. 4 Eyes Skin Assessment     The patient is being assess for   Admission    I agree that 2 RN's have performed a thorough Head to Toe Skin Assessment on the patient. ALL assessment sites listed below have been assessed. Areas assessed for pressure by both nurses:   [x]   Head, Face, and Ears   [x]   Shoulders, Back, and Chest, Abdomen  [x]   Arms, Elbows, and Hands   [x]   Coccyx, Sacrum, and Ischium  [x]   Legs, Feet, and Heels  BLE pitting swelling.scattered bruising. Skin Assessed Under all Medical Devices by both nurses:  n/a              All Mepilex Borders were peeled back and area peeked at by both nurses:  No: n/a  Please list where Mepilex Borders are located:  n/a             **SHARE this note so that the co-signing nurse is able to place an eSignature**    Co-signer eSignature: Electronically signed by Gertrude Whitman RN on 6/28/22 at 6:12 AM EDT    Does the Patient have Skin Breakdown related to pressure?   No              Abdiel Prevention initiated:  No   Wound Care Orders initiated:  No      [facility] nurse consulted for Pressure Injury (Stage 3,4, Unstageable, DTI, NWPT, Complex wounds)and New or Established Ostomies:  No      Primary Nurse eSignature: Electronically signed by Norma Fink RN on 6/28/22 at 12:34 AM EDT

## 2022-06-28 NOTE — ED PROVIDER NOTES
I independently examined and evaluated Phillip Giraldo. All diagnostic, treatment, and disposition decisions were made by myself in conjunction with the GRISELDA, Asad Cabrera. For all further details of the patient's emergency department visit, please see their documentation. 29-year-old female presenting with chest pain and shortness of breath that has been intermittent for the past couple of days. She also has heart palpitations. She has noted swelling in her legs. She does have a history of an MI and congestive heart failure. She also has hypertension and diabetes and chronic renal insufficiency. Vitals:    06/27/22 2346   BP: (!) 149/57   Pulse: (!) 111   Resp:    Temp:    SpO2:      On exam the patient is tachycardic. She has bibasilar crackles and decreased breath sounds at bilateral bases.     Results for orders placed or performed during the hospital encounter of 06/27/22   COVID-19 & Influenza Combo    Specimen: Nasopharyngeal Swab   Result Value Ref Range    SARS-CoV-2 RNA, RT PCR NOT DETECTED NOT DETECTED    INFLUENZA A NOT DETECTED NOT DETECTED    INFLUENZA B NOT DETECTED NOT DETECTED   CBC with Auto Differential   Result Value Ref Range    WBC 8.8 4.0 - 11.0 K/uL    RBC 3.96 (L) 4.00 - 5.20 M/uL    Hemoglobin 10.2 (L) 12.0 - 16.0 g/dL    Hematocrit 32.3 (L) 36.0 - 48.0 %    MCV 81.5 80.0 - 100.0 fL    MCH 25.7 (L) 26.0 - 34.0 pg    MCHC 31.6 31.0 - 36.0 g/dL    RDW 17.2 (H) 12.4 - 15.4 %    Platelets 757 450 - 979 K/uL    MPV 8.4 5.0 - 10.5 fL    Neutrophils % 61.1 %    Lymphocytes % 20.6 %    Monocytes % 6.7 %    Eosinophils % 10.4 %    Basophils % 1.2 %    Neutrophils Absolute 5.4 1.7 - 7.7 K/uL    Lymphocytes Absolute 1.8 1.0 - 5.1 K/uL    Monocytes Absolute 0.6 0.0 - 1.3 K/uL    Eosinophils Absolute 0.9 (H) 0.0 - 0.6 K/uL    Basophils Absolute 0.1 0.0 - 0.2 K/uL   Comprehensive Metabolic Panel w/ Reflex to MG   Result Value Ref Range    Sodium 142 136 - 145 mmol/L    Potassium reflex Magnesium 4.0 3.5 - 5.1 mmol/L    Chloride 101 99 - 110 mmol/L    CO2 24 21 - 32 mmol/L    Anion Gap 17 (H) 3 - 16    Glucose 138 (H) 70 - 99 mg/dL    BUN 20 7 - 20 mg/dL    CREATININE 1.1 0.6 - 1.2 mg/dL    GFR Non- 49 (A) >60    GFR  59 (A) >60    Calcium 9.2 8.3 - 10.6 mg/dL    Total Protein 7.5 6.4 - 8.2 g/dL    Albumin 4.3 3.4 - 5.0 g/dL    Albumin/Globulin Ratio 1.3 1.1 - 2.2    Total Bilirubin 0.3 0.0 - 1.0 mg/dL    Alkaline Phosphatase 246 (H) 40 - 129 U/L    ALT 14 10 - 40 U/L    AST 25 15 - 37 U/L   Troponin   Result Value Ref Range    Troponin <0.01 <0.01 ng/mL   Lactic Acid   Result Value Ref Range    Lactic Acid 2.5 (H) 0.4 - 2.0 mmol/L   Blood Gas, Venous   Result Value Ref Range    pH, Marty 7.386 7.350 - 7.450    pCO2, Marty 46.9 40.0 - 50.0 mmHg    pO2, Marty 55.1 (H) 25.0 - 40.0 mmHg    HCO3, Venous 27.5 23.0 - 29.0 mmol/L    Base Excess, Marty 2.0 -3.0 - 3.0 mmol/L    O2 Sat, Marty 88 Not Established %    Carboxyhemoglobin 1.0 0.0 - 1.5 %    MetHgb, Marty 0.3 <1.5 %    TC02 (Calc), Marty 29 Not Established mmol/L    O2 Therapy Unknown    Brain Natriuretic Peptide   Result Value Ref Range    Pro- (H) 0 - 124 pg/mL   Protime-INR   Result Value Ref Range    Protime 12.9 11.7 - 14.5 sec    INR 0.99 0.87 - 1.14   Troponin   Result Value Ref Range    Troponin <0.01 <0.01 ng/mL   POCT Glucose   Result Value Ref Range    POC Glucose 128 (H) 70 - 99 mg/dl    Performed on [facility]    EKG 12 Lead   Result Value Ref Range    Ventricular Rate 94 BPM    Atrial Rate 94 BPM    P-R Interval 138 ms    QRS Duration 76 ms    Q-T Interval 360 ms    QTc Calculation (Bazett) 450 ms    P Axis 53 degrees    R Axis -24 degrees    T Axis 34 degrees    Diagnosis       Normal sinus rhythmPossible Left atrial enlargementBorderline ECGWhen compared with ECG of 26-NOV-2021 12:47,No significant change was found       XR CHEST PORTABLE   Final Result   No acute cardiopulmonary findings                 EKG  The Ekg interpreted by myself  normal sinus rhythm with a rate of 94  Axis is   Normal  QTc is  normal  Intervals and Durations are unremarkable. No specific ST-T wave changes appreciated. No evidence of acute ischemia. No prior EKG for comparison. I personally saw and performed a substantive portion of the visit including all aspects of the medical decision making. MDM:        Here her EKG shows no ischemic changes. Troponin is negative. Lab work appears to be at baseline. Chest x-ray is normal.  We have started diuresis with Lasix. She was given morphine for pain. We will admit her for further chest pain evaluation and for gentle diuresis.      Preet Donovan MD  07/01/22 3096

## 2022-06-28 NOTE — PROGRESS NOTES
Consult has been called to Dr. General [facility] on 6/28/22. Spoke with Ghazal Stewart.  11:22 AM    Boubacar العلي  6/28/2022

## 2022-06-28 NOTE — PROGRESS NOTES
Bedside report and transfer of care given to [facility], RN. Pt currently resting in bed with the call light within reach. Pt denies any other care needs at this time. Pt stable at this time.
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
## 2022-06-28 NOTE — CARE COORDINATION
Attempted meeting with pt at bedside and she was being seen by another provider.      Humberto Cerda MSW, ERNESTO

## 2022-06-28 NOTE — FLOWSHEET NOTE
06/28/22 1130   Oxygen Therapy   SpO2 (!) 83 %   Pulse Oximetry Type Continuous   O2 Device None (Room air)     MT called regarding O2 sat as shown above. Wrier young pt she was sleeping, pt placed on 2L NC O2 98.

## 2022-06-28 NOTE — FLOWSHEET NOTE
06/28/22 1930   Vital Signs   Temp 99.1 °F (37.3 °C)   Temp Source Oral   Heart Rate 86   Heart Rate Source Monitor   Resp 18   BP (!) 149/59   BP Location Left upper arm   MAP (Calculated) 89   Level of Consciousness Alert (0)   MEWS Score 1   Oxygen Therapy   SpO2 100 %   Pulse Oximetry Type Continuous   O2 Device Nasal cannula   O2 Flow Rate (L/min) 2 L/min     Shift assessment completed see flow sheet. Patient in bed alert and oriented X4. Patient on 2L O2 while sleeping, showing no signs of distress. bs 166. Evening medications given per order. No other needs at this time. Call light in reach.

## 2022-06-28 NOTE — FLOWSHEET NOTE
06/28/22 0800   Vital Signs   Temp 98.6 °F (37 °C)   Temp Source Oral   Heart Rate 70   Heart Rate Source Monitor   Resp 16   BP (!) 79/47   BP Location Right upper arm   MAP (Calculated) 57.67   Patient Position Semi fowlers   Level of Consciousness Alert (0)   MEWS Score 3   Oxygen Therapy   SpO2 92 %   Pulse Oximetry Type Continuous   O2 Device None (Room air)     Perfect serve sent to IM regarding VS. Awaiting response/orders.

## 2022-06-28 NOTE — FLOWSHEET NOTE
06/28/22 0300   Vital Signs   Temp 99.2 °F (37.3 °C)   Temp Source Oral   Heart Rate 67   Heart Rate Source Monitor   Resp 16   BP (!) 86/48   BP Location Left upper arm   MAP (Calculated) 60.67   Patient Position Semi fowlers   Level of Consciousness Alert (0)   MEWS Score 2   Oxygen Therapy   SpO2 94 %   O2 Device None (Room air)     Vitals taken, shown above. Perfect serve sent for bp. Patient asymptomatic. Call light in reach. Bed in lowest position.

## 2022-06-28 NOTE — PROGRESS NOTES
Writer received med rec fax from [facility]. Writer verified with primary decision maker medications and pharmacy utilized. Med rec completed at this time. Copy placed in soft chart.

## 2022-06-28 NOTE — PROGRESS NOTES
Writer verified with patient pharmacy [facility] [phone]. Writer called pharmacy and spoke with Kellen Light - Pharmacist to request a med rec for the last 3 months. Kellne Light to fax to to [phone].

## 2022-06-28 NOTE — PROGRESS NOTES
Patient tried removing IV, pt removed partial IV and got a skin tear from the adhesives to her RAC. Adhesive remover utilized to remove the IV, no complications as writer removed IV. Writer cleaned site with NS and provided a dsg. To protect skin.

## 2022-06-28 NOTE — ACP (ADVANCE CARE PLANNING)
Advance Care Planning     General Advance Care Planning (ACP) Conversation    Date of Conversation: 6/27/2022  Conducted with: Patient with Decision Making Capacity    Healthcare Decision Maker:    Primary Decision Maker: Summer Hawthorne - Efren - [phone]    Primary Decision Maker: Crow Chávez - [phone]    Secondary Decision Maker: Concha Tomlinson - [phone]  Click here to complete Healthcare Decision Makers including selection of the Healthcare Decision Maker Relationship (ie \"Primary\"). Today we documented Decision Maker(s) consistent with Legal Next of Kin hierarchy. Content/Action Overview:   Has ACP document(s) NOT on file - requested patient to provide  Reviewed DNR/DNI and patient elects Full Code (Attempt Resuscitation)  n/a  n/a    Length of Voluntary ACP Conversation in minutes:  <16 minutes (Non-Billable)    Tanna Austin RN
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
## 2022-06-28 NOTE — FLOWSHEET NOTE
06/27/22 2245   Vital Signs   Temp 100 °F (37.8 °C)   Temp Source Oral   Heart Rate (!) 115   Heart Rate Source Monitor   BP (!) 159/77   BP Location Left upper arm   MAP (Calculated) 104.33   Level of Consciousness Alert (0)   Oxygen Therapy   SpO2 98 %   O2 Device None (Room air)     Patient arrived via wheel chair from ED. Admission assessment completed see flow sheet. Patient in bed alert and oriented X4. Patient on room air, showing no signs of distress. New gown provided bed bath given with chlorhexadine wipes. purwick placed, No other needs at this time. Bed alarm on. Call light in reach.

## 2022-06-28 NOTE — ACP (ADVANCE CARE PLANNING)
Advance Care Planning     Advance Care Planning Inpatient Note  Spiritual Care Department    Today's Date: 6/28/2022  Unit: [facility] PCU TELEMETRY    Received request from admission screening. Upon review of chart and communication with care team, Spiritual Care will defer advance care planning with patient at this time. . RN was/were present during visit. Goals of ACP Conversation:  Discuss advance care planning documents    Health Care Decision Makers:       Primary Decision Maker: Troy El - [phone]    Secondary Decision Maker: Lis Pradhan - [phone]    Summary:  Updated Healthcare Decision Maker    Advance Care Planning Documents (Patient Wishes):  Healthcare Power of /Advance Directive Appointment of Health Care Agent  Living Will/Advance Directive     Assessment:  Per RN, Pt is waiting on psychiatric evaluation and is unable to go over Advance Directives at this time. Interventions:  Reviewed Pt's chart and previously completed Advance Directives on file. Care Preferences Communicated:   No    Outcomes/Plan:  Pt has previously completed Advance Directive forms in her electronic medical record.  updated Pt's decision maker in Epic to reflect current forms.  Please re-consult Spiritual Care should Pt be cleared in psych evaluation, have capacity, and wish to update or revise her Advance Directives    Electronically signed by Arik Carreno on 6/28/2022 at 1:52 PM

## 2022-06-29 VITALS
WEIGHT: 217.3 LBS | SYSTOLIC BLOOD PRESSURE: 182 MMHG | DIASTOLIC BLOOD PRESSURE: 74 MMHG | BODY MASS INDEX: 39.74 KG/M2 | OXYGEN SATURATION: 96 % | HEART RATE: 87 BPM | TEMPERATURE: 98.4 F | RESPIRATION RATE: 18 BRPM

## 2022-06-29 LAB
GLUCOSE BLD-MCNC: 128 MG/DL (ref 70–99)
GLUCOSE BLD-MCNC: 135 MG/DL (ref 70–99)
GLUCOSE BLD-MCNC: 149 MG/DL (ref 70–99)
PERFORMED ON: ABNORMAL

## 2022-06-29 PROCEDURE — 99231 SBSQ HOSP IP/OBS SF/LOW 25: CPT

## 2022-06-29 PROCEDURE — 94761 N-INVAS EAR/PLS OXIMETRY MLT: CPT

## 2022-06-29 PROCEDURE — 97116 GAIT TRAINING THERAPY: CPT

## 2022-06-29 PROCEDURE — 97162 PT EVAL MOD COMPLEX 30 MIN: CPT

## 2022-06-29 PROCEDURE — 96372 THER/PROPH/DIAG INJ SC/IM: CPT

## 2022-06-29 PROCEDURE — 6370000000 HC RX 637 (ALT 250 FOR IP): Performed by: INTERNAL MEDICINE

## 2022-06-29 PROCEDURE — 99232 SBSQ HOSP IP/OBS MODERATE 35: CPT | Performed by: NURSE PRACTITIONER

## 2022-06-29 PROCEDURE — 99233 SBSQ HOSP IP/OBS HIGH 50: CPT | Performed by: INTERNAL MEDICINE

## 2022-06-29 PROCEDURE — 6360000002 HC RX W HCPCS: Performed by: INTERNAL MEDICINE

## 2022-06-29 PROCEDURE — 97530 THERAPEUTIC ACTIVITIES: CPT

## 2022-06-29 PROCEDURE — 94640 AIRWAY INHALATION TREATMENT: CPT

## 2022-06-29 PROCEDURE — 2700000000 HC OXYGEN THERAPY PER DAY

## 2022-06-29 PROCEDURE — 2580000003 HC RX 258: Performed by: INTERNAL MEDICINE

## 2022-06-29 RX ORDER — QUETIAPINE FUMARATE 25 MG/1
50 TABLET, FILM COATED ORAL 2 TIMES DAILY
Status: DISCONTINUED | OUTPATIENT
Start: 2022-06-29 | End: 2022-06-29 | Stop reason: HOSPADM

## 2022-06-29 RX ADMIN — QUETIAPINE FUMARATE 50 MG: 25 TABLET ORAL at 07:55

## 2022-06-29 RX ADMIN — METOPROLOL TARTRATE 12.5 MG: 25 TABLET, FILM COATED ORAL at 07:55

## 2022-06-29 RX ADMIN — ENOXAPARIN SODIUM 40 MG: 100 INJECTION SUBCUTANEOUS at 07:55

## 2022-06-29 RX ADMIN — Medication 10 ML: at 07:58

## 2022-06-29 RX ADMIN — PANTOPRAZOLE SODIUM 40 MG: 40 TABLET, DELAYED RELEASE ORAL at 05:36

## 2022-06-29 RX ADMIN — AMIODARONE HYDROCHLORIDE 200 MG: 200 TABLET ORAL at 07:55

## 2022-06-29 RX ADMIN — Medication 2 PUFF: at 07:19

## 2022-06-29 ASSESSMENT — PAIN DESCRIPTION - DESCRIPTORS: DESCRIPTORS: ACHING

## 2022-06-29 ASSESSMENT — PAIN DESCRIPTION - FREQUENCY: FREQUENCY: INTERMITTENT

## 2022-06-29 ASSESSMENT — PAIN SCALES - GENERAL
PAINLEVEL_OUTOF10: 3
PAINLEVEL_OUTOF10: 8

## 2022-06-29 ASSESSMENT — PAIN DESCRIPTION - PAIN TYPE: TYPE: ACUTE PAIN

## 2022-06-29 ASSESSMENT — PAIN DESCRIPTION - ONSET: ONSET: GRADUAL

## 2022-06-29 ASSESSMENT — PAIN DESCRIPTION - LOCATION: LOCATION: HEAD

## 2022-06-29 ASSESSMENT — PAIN - FUNCTIONAL ASSESSMENT: PAIN_FUNCTIONAL_ASSESSMENT: PREVENTS OR INTERFERES SOME ACTIVE ACTIVITIES AND ADLS

## 2022-06-29 NOTE — PROGRESS NOTES
[address]  Cardiology  Progress Note    Admission date:  2022    Reason for follow up visit: Edema    HPI/CC: Norberto Santiago is a 71 y.o. female who presented 2022 for edema. Echo and stress test recommended when able. Also being treated for AMS and delusions. Rhythm has been sinus. Subjective: Sleeping, opens eyes briefly but not answering questions. Vitals:  Blood pressure (!) 182/74, pulse 87, temperature 98.4 °F (36.9 °C), temperature source Oral, resp. rate 18, weight 217 lb 4.8 oz (98.6 kg), SpO2 97 %, not currently breastfeeding.   Temp  Av.7 °F (37.1 °C)  Min: 98.4 °F (36.9 °C)  Max: 99.1 °F (37.3 °C)  Pulse  Av.6  Min: 84  Max: 91  BP  Min: 149/59  Max: 191/78  SpO2  Av.6 %  Min: 96 %  Max: 100 %    24 hour I/O    Intake/Output Summary (Last 24 hours) at 2022 1429  Last data filed at 2022 1326  Gross per 24 hour   Intake 360 ml   Output 1925 ml   Net -1565 ml     Current Facility-Administered Medications   Medication Dose Route Frequency Provider Last Rate Last Admin    QUEtiapine (SEROQUEL) tablet 50 mg  50 mg Oral BID Cachorro Moura MD   50 mg at 22 0755    metoprolol tartrate (LOPRESSOR) tablet 12.5 mg  12.5 mg Oral BID Cachorro Moura MD   12.5 mg at 22 0755    budesonide-formoterol (SYMBICORT) 160-4.5 MCG/ACT inhaler 2 puff  2 puff Inhalation BID Solitario Ferrell MD   2 puff at 22 0719    amiodarone (CORDARONE) tablet 200 mg  200 mg Oral Daily Noe Isidro MD   200 mg at 22 0755    pantoprazole (PROTONIX) tablet 40 mg  40 mg Oral BID AC Noe Isidro MD   40 mg at 22 0536    sodium chloride flush 0.9 % injection 5-40 mL  5-40 mL IntraVENous 2 times per day Noe Isidro MD   10 mL at 22 0758    sodium chloride flush 0.9 % injection 5-40 mL  5-40 mL IntraVENous PRN Noe Isidro MD        0.9 % sodium chloride infusion   IntraVENous PRN Solitario Ferrell MD  enoxaparin (LOVENOX) injection 40 mg  40 mg SubCUTAneous Daily Noe Isidro MD   40 mg at 06/29/22 0755    potassium chloride 10 mEq/100 mL IVPB (Peripheral Line)  10 mEq IntraVENous PRN Imelda Magana MD        magnesium sulfate 2000 mg in 50 mL IVPB premix  2,000 mg IntraVENous PRN Noe Isidro MD        ondansetron (ZOFRAN) injection 4 mg  4 mg IntraVENous Q6H PRN Noe Isidro MD         Review of Systems   Unable to perform ROS: Mental status change     Objective:     Telemetry monitor: SR    Physical Exam:  Constitutional: Drowsy, appears older than stated age  Eyes: PERRL, sclera nonicteric  Neck:  Supple, no masses, no thyroidmegaly, no JVD  Skin:  Warm and dry; no rash or lesions  Heart: Regular, normal apex, S1 and S2 normal, no M/G/R  Lungs:  Normal respiratory effort; clear; no wheezing/rhonchi/rales  Abdomen: soft, non tender, + bowel sounds  Extremities:  No edema or cyanosis; no clubbing  Neuro: KEITH due to AMS    Data Reviewed:    Echo 6/2021:  Left ventricular systolic function is hyperdynamic with ejection fraction   estimated at >65%. No regional wall motion abnormalities. There is mild concentric left ventricular hypertrophy. Grade I diastolic dysfunction with normal left ventricular filling pressure. Increased gradient across the left ventricular outflow tract consistent with   hyperdynamic left ventricular systolic function. Stress test 2019:   Normal LV function.    There is uniform isotope uptake at stress and rest. There is no evidence of    myocardial ischemia.    Possible basal lateral scar.      Lab Reviewed:     Renal Profile:  Lab Results   Component Value Date    CREATININE 1.1 06/27/2022    BUN 20 06/27/2022     06/27/2022    K 4.0 06/27/2022     06/27/2022    CO2 24 06/27/2022     CBC:    Lab Results   Component Value Date    WBC 8.8 06/27/2022    RBC 3.96 06/27/2022    HGB 10.2 06/27/2022    HCT 32.3 06/27/2022    MCV 81.5 06/27/2022    RDW 17.2 06/27/2022     06/27/2022     BNP:    Lab Results   Component Value Date    PROBNP 477 06/27/2022    PROBNP 106 03/20/2021    PROBNP 75 07/29/2020    PROBNP 15,189 11/05/2019    PROBNP 675 11/02/2019     Fasting Lipid Panel:    Lab Results   Component Value Date    CHOL 172 03/21/2021    HDL 72 03/21/2021    HDL 81 10/06/2010    TRIG 93 03/21/2021     Cardiac Enzymes:  CK/MbTroponin  Lab Results   Component Value Date    CKTOTAL 242 11/02/2019    TROPONINI <0.01 06/27/2022     PT/ INR   Lab Results   Component Value Date    INR 0.99 06/27/2022    INR 1.01 07/29/2020    INR 1.12 11/02/2019    PROTIME 12.9 06/27/2022    PROTIME 11.7 07/29/2020    PROTIME 12.8 11/02/2019     PTT No results found for: PTT   Lab Results   Component Value Date    MG 2.00 07/31/2020      Lab Results   Component Value Date    TSH 3.30 04/07/2017     All labs and imaging reviewed today    Assessment:  Edema  Reported CHF and CAD but details unavailable  Paroxysmal atrial fibrillation: note din 2019  AMS/delusions  CKD  DM  VILLEGAS cirrhosis  Normal LVEF on echo 2021  No ischemia on stress test +scar 2019    Plan:   1. Obtain echo  2. Stress test recommended when mentation improves  3. Appears she was on amiodarone for PAF during acute illness in 2019, verify if she was actually taking prior to admission, does not look like she has followed with cardiology. Would like to avoid amiodarone if possible. Does not appear she is on anticoagulation for PAF. 4. Better HTN management  5.  Continue metoprolol    BLUE Matias-CNP  [address]  [phone]

## 2022-06-29 NOTE — CARE COORDINATION
INTERDISCIPLINARY PLAN OF CARE CONFERENCE    Date/Time: 2022 4:05 PM  Completed by: Georges De Paz RN, Case Management      Patient Name:  Jessica Burden  YOB: 1950  Admitting Diagnosis: SOB (shortness of breath) [R06.02]     Admit Date/Time:  2022  5:01 PM    Chart reviewed. Interdisciplinary team contacted or reviewed plan related to patient progress and discharge plans. Disciplines included Case Management, Nursing, and Dietitian. Current Status:ongoing  PT/OT recommendation for discharge plan of care: home with prn assist    Expected D/C Disposition:  Home  Confirmed plan with patient and/or family Yes confirmed with: (name) pt and daughters  Met with:pt and daughters  Discharge Plan Comments: Reviewed chart. Role of discharge planner explained and patient verbalized understanding. Pt [facility] with his 2 daughter and plans to return. Pt's daughters are supportive. Pt has a RW. Pt has outpt PT in the d/c notes. Home O2 in place on admit: No  Pt informed of need to bring portable home O2 tank on day of discharge for nursing to connect prior to leaving:  Not Indicated  Verbalized agreement/Understanding:  Not Indicated      DISCHARGE ORDER  Date/Time 2022 5:11 PM  Completed by: Georges De Paz RN, Case Management    Patient Name: Jessica Burden      : 1950  Admitting Diagnosis: SOB (shortness of breath) [R06.02]      Admit order Date and Status:2022  (verify MD's last order for status of admission)      Pt left AMA  If applicable PT/OT recommendation at Discharge:  home with prn assist    DME recommendation by PT/OT:RW  Confirmed discharge plan  (pt): Yes  with whom___pt____________  If pt confirmed DC plan does family need to be contacted by CM Yes if yes who______3 daughters  Discharge Plan: Reviewed chart. Role of discharge planner explained and patient verbalized understanding. Discharge order is noted.   Pt is being d/c'd to home today with 3 daughters. Pt is leaving AM. Pt refuses HC. Pt declines any services. Family confirms pt has a RW at home. Outpt PT/OT is on pt's d/c instructions to call to set up. 97% on 2L is noted at 0745am, but pt is not on O2 in room and states he is not using and left AMA. Per Jacklyn GUERRA, pt's O2 sats have been 93-96% on RA. PT/OT notes reflect that pt's O2 sats were 93-96% on RA. No need for home O2. Pt left without psych re-evaluating pt.

## 2022-06-29 NOTE — PROGRESS NOTES
Patient family to bedside after noon. Patient called RN to bedside to discuss care. Patient was very upset that psychology had come to eval her. She was angry with the doctor and felt disrespected that he said she was \"psychotic\". Pt had stated that she had a recent diagnosis of throat cancer and that she had a parasite and was sneezing worms. Family did confirm that pt was sneezing up worms after sharing water with her cats. RN spoke with the patient to try to clear the air. Provider paged to see if there was someone else who could care for the patient, pt refused. RN educated patient on cardiovascular testing to be done and the risks of leaving without having them done, pt stated she will do them outpatient. Patient eventually left AMA with family and all belongings. NAD, tolerating RA, satting 93-96%. Charge RN at bedside when paperwork filled out.
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
## 2022-06-29 NOTE — PROGRESS NOTES
Hand off report give to CHARLIE Tobar. Patient is stable showing no signs of distress and has no current needs at this time. Call light is in reach and bed is in lowest position. Care is transferred at this time.
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
## 2022-06-29 NOTE — FLOWSHEET NOTE
06/29/22 0545   Vital Signs   Heart Rate 90   BP (!) 183/78   MAP (Calculated) 113    bp elevated.  Perfect serve sent to hospitalist

## 2022-06-29 NOTE — FLOWSHEET NOTE
06/29/22 0115   Vital Signs   Temp 98.6 °F (37 °C)   Temp Source Oral   Heart Rate 91   Heart Rate Source Monitor   Resp 20   BP (!) 160/65   BP Location Left upper arm   MAP (Calculated) 96.67   Patient Position Semi fowlers   Level of Consciousness Alert (0)   MEWS Score 1   Oxygen Therapy   SpO2 96 %   O2 Device Nasal cannula   O2 Flow Rate (L/min) 2 L/min   Height and Weight   Weight 217 lb 4.8 oz (98.6 kg)   Weight Method Standing scale   BMI (Calculated) 0     Vitals taken, shown above. Patient is stable with no current needs at this time. Call light in reach. Bed in lowest position.
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
## 2022-06-30 NOTE — DISCHARGE SUMMARY
Pt left AMA last night before she was evaluated by psychiatry team and refused to complete the evaluation.      Jagruti Zheng MD  6/30/2022  7:16 AM

## 2022-07-01 RX ORDER — ALBUTEROL SULFATE 90 UG/1
2 AEROSOL, METERED RESPIRATORY (INHALATION) 4 TIMES DAILY PRN
Qty: 8.5 G | Refills: 0 | OUTPATIENT
Start: 2022-07-01

## 2022-07-01 RX ORDER — FLUTICASONE PROPIONATE AND SALMETEROL 50; 250 UG/1; UG/1
POWDER RESPIRATORY (INHALATION)
Qty: 60 EACH | Refills: 0 | OUTPATIENT
Start: 2022-07-01

## 2022-07-21 RX ORDER — ALBUTEROL SULFATE 90 UG/1
2 AEROSOL, METERED RESPIRATORY (INHALATION) 4 TIMES DAILY PRN
Qty: 8.5 G | Refills: 3 | OUTPATIENT
Start: 2022-07-21

## 2022-07-21 RX ORDER — FLUTICASONE PROPIONATE AND SALMETEROL 50; 250 UG/1; UG/1
POWDER RESPIRATORY (INHALATION)
Qty: 60 EACH | Refills: 3 | OUTPATIENT
Start: 2022-07-21

## 2022-08-19 ENCOUNTER — APPOINTMENT (OUTPATIENT)
Dept: GENERAL RADIOLOGY | Age: 72
DRG: 871 | End: 2022-08-19
Payer: MEDICARE

## 2022-08-19 ENCOUNTER — HOSPITAL ENCOUNTER (EMERGENCY)
Age: 72
Discharge: HOME | DRG: 871 | End: 2022-08-20
Attending: STUDENT IN AN ORGANIZED HEALTH CARE EDUCATION/TRAINING PROGRAM
Payer: MEDICARE

## 2022-08-19 VITALS
DIASTOLIC BLOOD PRESSURE: 66 MMHG | HEIGHT: 62 IN | SYSTOLIC BLOOD PRESSURE: 177 MMHG | BODY MASS INDEX: 39.56 KG/M2 | HEART RATE: 96 BPM | WEIGHT: 215 LBS | RESPIRATION RATE: 17 BRPM | TEMPERATURE: 98.1 F | OXYGEN SATURATION: 96 %
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
## 2022-08-19 DIAGNOSIS — S92.344A CLOSED NONDISPLACED FRACTURE OF FOURTH METATARSAL BONE OF RIGHT FOOT, INITIAL ENCOUNTER: Primary | ICD-10-CM

## 2022-08-19 DIAGNOSIS — M25.532 LEFT WRIST PAIN: ICD-10-CM

## 2022-08-19 DIAGNOSIS — I10 HYPERTENSION, UNSPECIFIED TYPE: ICD-10-CM

## 2022-08-19 PROCEDURE — 73110 X-RAY EXAM OF WRIST: CPT

## 2022-08-19 PROCEDURE — 73630 X-RAY EXAM OF FOOT: CPT

## 2022-08-19 PROCEDURE — 73090 X-RAY EXAM OF FOREARM: CPT

## 2022-08-19 PROCEDURE — 6360000002 HC RX W HCPCS: Performed by: STUDENT IN AN ORGANIZED HEALTH CARE EDUCATION/TRAINING PROGRAM

## 2022-08-19 PROCEDURE — 99284 EMERGENCY DEPT VISIT MOD MDM: CPT

## 2022-08-19 PROCEDURE — 96374 THER/PROPH/DIAG INJ IV PUSH: CPT

## 2022-08-19 RX ORDER — MORPHINE SULFATE 4 MG/ML
4 INJECTION, SOLUTION INTRAMUSCULAR; INTRAVENOUS ONCE
Status: COMPLETED | OUTPATIENT
Start: 2022-08-19 | End: 2022-08-19

## 2022-08-19 RX ADMIN — MORPHINE SULFATE 4 MG: 4 INJECTION, SOLUTION INTRAMUSCULAR; INTRAVENOUS at 23:14

## 2022-08-19 ASSESSMENT — PAIN - FUNCTIONAL ASSESSMENT: PAIN_FUNCTIONAL_ASSESSMENT: 0-10

## 2022-08-19 ASSESSMENT — PAIN SCALES - GENERAL: PAINLEVEL_OUTOF10: 8

## 2022-08-19 ASSESSMENT — PAIN DESCRIPTION - PAIN TYPE: TYPE: ACUTE PAIN

## 2022-08-19 ASSESSMENT — PAIN DESCRIPTION - LOCATION: LOCATION: ARM

## 2022-08-20 RX ORDER — NAPROXEN 500 MG/1
500 TABLET ORAL 2 TIMES DAILY PRN
Qty: 10 TABLET | Refills: 0 | Status: ON HOLD | OUTPATIENT
Start: 2022-08-20 | End: 2022-09-07 | Stop reason: ALTCHOICE

## 2022-08-20 NOTE — DISCHARGE INSTRUCTIONS
Your foot x-ray did show a metatarsal fracture. You have been placed in a walking boot. Please follow-up with orthopedics. It was unclear if the abnormalities in your wrist were new or old, you were placed in a wrist brace. Please follow-up with orthopedics.     Your blood pressure was high, please follow-up with your primary care doctor
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
## 2022-08-21 ENCOUNTER — APPOINTMENT (OUTPATIENT)
Dept: CT IMAGING | Age: 72
DRG: 871 | End: 2022-08-21
Payer: MEDICARE

## 2022-08-21 ENCOUNTER — APPOINTMENT (OUTPATIENT)
Dept: GENERAL RADIOLOGY | Age: 72
DRG: 871 | End: 2022-08-21
Payer: MEDICARE

## 2022-08-21 ENCOUNTER — HOSPITAL ENCOUNTER (INPATIENT)
Age: 72
LOS: 2 days | Discharge: HOME | DRG: 871 | End: 2022-08-23
Attending: EMERGENCY MEDICINE | Admitting: INTERNAL MEDICINE
Payer: MEDICARE

## 2022-08-21 DIAGNOSIS — R29.6 FREQUENT FALLS: ICD-10-CM

## 2022-08-21 DIAGNOSIS — A41.9 SEPTICEMIA (HCC): ICD-10-CM

## 2022-08-21 DIAGNOSIS — K57.32 DIVERTICULITIS OF COLON: ICD-10-CM

## 2022-08-21 DIAGNOSIS — R56.9 SEIZURE-LIKE ACTIVITY (HCC): ICD-10-CM

## 2022-08-21 DIAGNOSIS — R07.9 CHEST PAIN, UNSPECIFIED TYPE: Primary | ICD-10-CM

## 2022-08-21 LAB
A/G RATIO: 1 (ref 1.1–2.2)
ALBUMIN SERPL-MCNC: 3.2 G/DL (ref 3.4–5)
ALP BLD-CCNC: 158 U/L (ref 40–129)
ALT SERPL-CCNC: 8 U/L (ref 10–40)
AMPHETAMINE SCREEN, URINE: ABNORMAL
ANION GAP SERPL CALCULATED.3IONS-SCNC: 16 MMOL/L (ref 3–16)
AST SERPL-CCNC: 18 U/L (ref 15–37)
BACTERIA: ABNORMAL /HPF
BARBITURATE SCREEN URINE: ABNORMAL
BENZODIAZEPINE SCREEN, URINE: ABNORMAL
BILIRUB SERPL-MCNC: <0.2 MG/DL (ref 0–1)
BILIRUBIN URINE: NEGATIVE
BLOOD, URINE: ABNORMAL
BUN BLDV-MCNC: 34 MG/DL (ref 7–20)
CALCIUM SERPL-MCNC: 8 MG/DL (ref 8.3–10.6)
CANNABINOID SCREEN URINE: ABNORMAL
CHLORIDE BLD-SCNC: 99 MMOL/L (ref 99–110)
CLARITY: CLEAR
CO2: 21 MMOL/L (ref 21–32)
COCAINE METABOLITE SCREEN URINE: ABNORMAL
COLOR: YELLOW
CREAT SERPL-MCNC: 3 MG/DL (ref 0.6–1.2)
EKG ATRIAL RATE: 50 BPM
EKG DIAGNOSIS: NORMAL
EKG P AXIS: 35 DEGREES
EKG P-R INTERVAL: 162 MS
EKG Q-T INTERVAL: 492 MS
EKG QRS DURATION: 72 MS
EKG QTC CALCULATION (BAZETT): 448 MS
EKG R AXIS: -8 DEGREES
EKG T AXIS: 71 DEGREES
EKG VENTRICULAR RATE: 50 BPM
EPITHELIAL CELLS, UA: ABNORMAL /HPF (ref 0–5)
GFR AFRICAN AMERICAN: 19
GFR NON-AFRICAN AMERICAN: 15
GLUCOSE BLD-MCNC: 146 MG/DL
GLUCOSE BLD-MCNC: 146 MG/DL (ref 70–99)
GLUCOSE BLD-MCNC: 225 MG/DL (ref 70–99)
GLUCOSE BLD-MCNC: 94 MG/DL (ref 70–99)
GLUCOSE URINE: NEGATIVE MG/DL
HCT VFR BLD CALC: 24.6 % (ref 36–48)
HEMOGLOBIN: 7.8 G/DL (ref 12–16)
INFLUENZA A: NOT DETECTED
INFLUENZA B: NOT DETECTED
KETONES, URINE: NEGATIVE MG/DL
LACTIC ACID: 2 MMOL/L (ref 0.4–2)
LACTIC ACID: 2.2 MMOL/L (ref 0.4–2)
LEUKOCYTE ESTERASE, URINE: ABNORMAL
Lab: ABNORMAL
MCH RBC QN AUTO: 27.5 PG (ref 26–34)
MCHC RBC AUTO-ENTMCNC: 31.9 G/DL (ref 31–36)
MCV RBC AUTO: 86.3 FL (ref 80–100)
METHADONE SCREEN, URINE: ABNORMAL
MICROSCOPIC EXAMINATION: YES
MUCUS: ABNORMAL /LPF
NITRITE, URINE: NEGATIVE
OCCULT BLOOD DIAGNOSTIC: NORMAL
OPIATE SCREEN URINE: POSITIVE
OXYCODONE URINE: ABNORMAL
PDW BLD-RTO: 18.3 % (ref 12.4–15.4)
PERFORMED ON: ABNORMAL
PERFORMED ON: NORMAL
PH UA: 6
PH UA: 6 (ref 5–8)
PHENCYCLIDINE SCREEN URINE: ABNORMAL
PLATELET # BLD: 223 K/UL (ref 135–450)
PMV BLD AUTO: 9.1 FL (ref 5–10.5)
POTASSIUM SERPL-SCNC: 3.4 MMOL/L (ref 3.5–5.1)
PROPOXYPHENE SCREEN: ABNORMAL
PROTEIN UA: NEGATIVE MG/DL
RBC # BLD: 2.84 M/UL (ref 4–5.2)
RBC UA: ABNORMAL /HPF (ref 0–4)
REASON FOR REJECTION: NORMAL
REJECTED TEST: NORMAL
SARS-COV-2 RNA, RT PCR: NOT DETECTED
SODIUM BLD-SCNC: 136 MMOL/L (ref 136–145)
SPECIFIC GRAVITY UA: <=1.005 (ref 1–1.03)
TOTAL CK: 67 U/L (ref 26–192)
TOTAL PROTEIN: 6.3 G/DL (ref 6.4–8.2)
TROPONIN: 0.01 NG/ML
URINE REFLEX TO CULTURE: YES
URINE TYPE: ABNORMAL
UROBILINOGEN, URINE: 0.2 E.U./DL
WBC # BLD: 6.9 K/UL (ref 4–11)
WBC UA: ABNORMAL /HPF (ref 0–5)

## 2022-08-21 PROCEDURE — 96366 THER/PROPH/DIAG IV INF ADDON: CPT

## 2022-08-21 PROCEDURE — 02HV33Z INSERTION OF INFUSION DEVICE INTO SUPERIOR VENA CAVA, PERCUTANEOUS APPROACH: ICD-10-PCS | Performed by: INTERNAL MEDICINE

## 2022-08-21 PROCEDURE — 84484 ASSAY OF TROPONIN QUANT: CPT

## 2022-08-21 PROCEDURE — 2580000003 HC RX 258: Performed by: INTERNAL MEDICINE

## 2022-08-21 PROCEDURE — 6360000002 HC RX W HCPCS: Performed by: INTERNAL MEDICINE

## 2022-08-21 PROCEDURE — 80053 COMPREHEN METABOLIC PANEL: CPT

## 2022-08-21 PROCEDURE — 6360000002 HC RX W HCPCS: Performed by: EMERGENCY MEDICINE

## 2022-08-21 PROCEDURE — 3209999900 CT LUMBAR SPINE TRAUMA RECONSTRUCTION

## 2022-08-21 PROCEDURE — 2000000000 HC ICU R&B

## 2022-08-21 PROCEDURE — 6370000000 HC RX 637 (ALT 250 FOR IP): Performed by: INTERNAL MEDICINE

## 2022-08-21 PROCEDURE — 99285 EMERGENCY DEPT VISIT HI MDM: CPT

## 2022-08-21 PROCEDURE — 96365 THER/PROPH/DIAG IV INF INIT: CPT

## 2022-08-21 PROCEDURE — 36415 COLL VENOUS BLD VENIPUNCTURE: CPT

## 2022-08-21 PROCEDURE — 81001 URINALYSIS AUTO W/SCOPE: CPT

## 2022-08-21 PROCEDURE — 73620 X-RAY EXAM OF FOOT: CPT

## 2022-08-21 PROCEDURE — 3209999900 CT THORACIC SPINE TRAUMA RECONSTRUCTION

## 2022-08-21 PROCEDURE — 93005 ELECTROCARDIOGRAM TRACING: CPT | Performed by: EMERGENCY MEDICINE

## 2022-08-21 PROCEDURE — 83605 ASSAY OF LACTIC ACID: CPT

## 2022-08-21 PROCEDURE — 85027 COMPLETE CBC AUTOMATED: CPT

## 2022-08-21 PROCEDURE — 87040 BLOOD CULTURE FOR BACTERIA: CPT

## 2022-08-21 PROCEDURE — 96367 TX/PROPH/DG ADDL SEQ IV INF: CPT

## 2022-08-21 PROCEDURE — 82270 OCCULT BLOOD FECES: CPT

## 2022-08-21 PROCEDURE — 82550 ASSAY OF CK (CPK): CPT

## 2022-08-21 PROCEDURE — 2700000000 HC OXYGEN THERAPY PER DAY

## 2022-08-21 PROCEDURE — 70450 CT HEAD/BRAIN W/O DYE: CPT

## 2022-08-21 PROCEDURE — 36556 INSERT NON-TUNNEL CV CATH: CPT

## 2022-08-21 PROCEDURE — 93010 ELECTROCARDIOGRAM REPORT: CPT | Performed by: INTERNAL MEDICINE

## 2022-08-21 PROCEDURE — 87086 URINE CULTURE/COLONY COUNT: CPT

## 2022-08-21 PROCEDURE — 87636 SARSCOV2 & INF A&B AMP PRB: CPT

## 2022-08-21 PROCEDURE — 2500000003 HC RX 250 WO HCPCS: Performed by: EMERGENCY MEDICINE

## 2022-08-21 PROCEDURE — 94761 N-INVAS EAR/PLS OXIMETRY MLT: CPT

## 2022-08-21 PROCEDURE — 72125 CT NECK SPINE W/O DYE: CPT

## 2022-08-21 PROCEDURE — 74176 CT ABD & PELVIS W/O CONTRAST: CPT

## 2022-08-21 PROCEDURE — 83036 HEMOGLOBIN GLYCOSYLATED A1C: CPT

## 2022-08-21 PROCEDURE — 2580000003 HC RX 258: Performed by: EMERGENCY MEDICINE

## 2022-08-21 PROCEDURE — 80307 DRUG TEST PRSMV CHEM ANLYZR: CPT

## 2022-08-21 RX ORDER — ACETAMINOPHEN 325 MG/1
650 TABLET ORAL EVERY 6 HOURS PRN
Status: DISCONTINUED | OUTPATIENT
Start: 2022-08-21 | End: 2022-08-23 | Stop reason: HOSPADM

## 2022-08-21 RX ORDER — ONDANSETRON 4 MG/1
4 TABLET, ORALLY DISINTEGRATING ORAL EVERY 8 HOURS PRN
Status: DISCONTINUED | OUTPATIENT
Start: 2022-08-21 | End: 2022-08-23 | Stop reason: HOSPADM

## 2022-08-21 RX ORDER — ACETAMINOPHEN 650 MG/1
650 SUPPOSITORY RECTAL EVERY 6 HOURS PRN
Status: DISCONTINUED | OUTPATIENT
Start: 2022-08-21 | End: 2022-08-23 | Stop reason: HOSPADM

## 2022-08-21 RX ORDER — SUCRALFATE 1 G/1
1 TABLET ORAL 4 TIMES DAILY
Status: DISCONTINUED | OUTPATIENT
Start: 2022-08-21 | End: 2022-08-21

## 2022-08-21 RX ORDER — DEXTROSE MONOHYDRATE 100 MG/ML
INJECTION, SOLUTION INTRAVENOUS CONTINUOUS PRN
Status: DISCONTINUED | OUTPATIENT
Start: 2022-08-21 | End: 2022-08-23 | Stop reason: HOSPADM

## 2022-08-21 RX ORDER — POLYETHYLENE GLYCOL 3350 17 G/17G
17 POWDER, FOR SOLUTION ORAL DAILY PRN
Status: DISCONTINUED | OUTPATIENT
Start: 2022-08-21 | End: 2022-08-23 | Stop reason: HOSPADM

## 2022-08-21 RX ORDER — SODIUM CHLORIDE, SODIUM LACTATE, POTASSIUM CHLORIDE, AND CALCIUM CHLORIDE .6; .31; .03; .02 G/100ML; G/100ML; G/100ML; G/100ML
1000 INJECTION, SOLUTION INTRAVENOUS ONCE
Status: COMPLETED | OUTPATIENT
Start: 2022-08-21 | End: 2022-08-21

## 2022-08-21 RX ORDER — SODIUM CHLORIDE 9 MG/ML
INJECTION, SOLUTION INTRAVENOUS PRN
Status: DISCONTINUED | OUTPATIENT
Start: 2022-08-21 | End: 2022-08-23 | Stop reason: HOSPADM

## 2022-08-21 RX ORDER — ONDANSETRON 4 MG/1
4 TABLET, FILM COATED ORAL EVERY 8 HOURS PRN
Status: DISCONTINUED | OUTPATIENT
Start: 2022-08-21 | End: 2022-08-21 | Stop reason: SDUPTHER

## 2022-08-21 RX ORDER — INSULIN LISPRO 100 [IU]/ML
0-4 INJECTION, SOLUTION INTRAVENOUS; SUBCUTANEOUS
Status: DISCONTINUED | OUTPATIENT
Start: 2022-08-21 | End: 2022-08-23 | Stop reason: HOSPADM

## 2022-08-21 RX ORDER — SODIUM CHLORIDE 0.9 % (FLUSH) 0.9 %
5-40 SYRINGE (ML) INJECTION PRN
Status: DISCONTINUED | OUTPATIENT
Start: 2022-08-21 | End: 2022-08-23 | Stop reason: HOSPADM

## 2022-08-21 RX ORDER — PROPOFOL 10 MG/ML
INJECTION, EMULSION INTRAVENOUS
Status: DISCONTINUED
Start: 2022-08-21 | End: 2022-08-22

## 2022-08-21 RX ORDER — ALOGLIPTIN 6.25 MG/1
6.25 TABLET, FILM COATED ORAL DAILY
Status: DISCONTINUED | OUTPATIENT
Start: 2022-08-21 | End: 2022-08-23 | Stop reason: HOSPADM

## 2022-08-21 RX ORDER — SODIUM CHLORIDE 9 MG/ML
INJECTION, SOLUTION INTRAVENOUS CONTINUOUS
Status: DISCONTINUED | OUTPATIENT
Start: 2022-08-21 | End: 2022-08-23

## 2022-08-21 RX ORDER — SODIUM CHLORIDE 0.9 % (FLUSH) 0.9 %
5-40 SYRINGE (ML) INJECTION EVERY 12 HOURS SCHEDULED
Status: DISCONTINUED | OUTPATIENT
Start: 2022-08-21 | End: 2022-08-23 | Stop reason: HOSPADM

## 2022-08-21 RX ORDER — ATORVASTATIN CALCIUM 10 MG/1
10 TABLET, FILM COATED ORAL DAILY
Status: DISCONTINUED | OUTPATIENT
Start: 2022-08-21 | End: 2022-08-23 | Stop reason: HOSPADM

## 2022-08-21 RX ORDER — INSULIN LISPRO 100 [IU]/ML
0-4 INJECTION, SOLUTION INTRAVENOUS; SUBCUTANEOUS NIGHTLY
Status: DISCONTINUED | OUTPATIENT
Start: 2022-08-21 | End: 2022-08-23 | Stop reason: HOSPADM

## 2022-08-21 RX ORDER — ENOXAPARIN SODIUM 100 MG/ML
30 INJECTION SUBCUTANEOUS DAILY
Status: DISCONTINUED | OUTPATIENT
Start: 2022-08-21 | End: 2022-08-22

## 2022-08-21 RX ORDER — ONDANSETRON 2 MG/ML
4 INJECTION INTRAMUSCULAR; INTRAVENOUS EVERY 6 HOURS PRN
Status: DISCONTINUED | OUTPATIENT
Start: 2022-08-21 | End: 2022-08-23 | Stop reason: HOSPADM

## 2022-08-21 RX ORDER — PANTOPRAZOLE SODIUM 40 MG/1
40 TABLET, DELAYED RELEASE ORAL
Status: DISCONTINUED | OUTPATIENT
Start: 2022-08-21 | End: 2022-08-23 | Stop reason: HOSPADM

## 2022-08-21 RX ORDER — DICYCLOMINE HYDROCHLORIDE 10 MG/1
10 CAPSULE ORAL
Status: DISCONTINUED | OUTPATIENT
Start: 2022-08-21 | End: 2022-08-23 | Stop reason: HOSPADM

## 2022-08-21 RX ORDER — LEVOTHYROXINE SODIUM 88 UG/1
88 TABLET ORAL DAILY
Status: DISCONTINUED | OUTPATIENT
Start: 2022-08-21 | End: 2022-08-23 | Stop reason: HOSPADM

## 2022-08-21 RX ADMIN — NOREPINEPHRINE BITARTRATE 5 MCG/MIN: 1 INJECTION INTRAVENOUS at 13:23

## 2022-08-21 RX ADMIN — ONDANSETRON 4 MG: 4 TABLET, ORALLY DISINTEGRATING ORAL at 17:15

## 2022-08-21 RX ADMIN — ENOXAPARIN SODIUM 30 MG: 100 INJECTION SUBCUTANEOUS at 16:45

## 2022-08-21 RX ADMIN — ATORVASTATIN CALCIUM 10 MG: 10 TABLET, FILM COATED ORAL at 16:45

## 2022-08-21 RX ADMIN — PANTOPRAZOLE SODIUM 40 MG: 40 TABLET, DELAYED RELEASE ORAL at 16:45

## 2022-08-21 RX ADMIN — DICYCLOMINE HYDROCHLORIDE 10 MG: 10 CAPSULE ORAL at 16:45

## 2022-08-21 RX ADMIN — SODIUM CHLORIDE: 9 INJECTION, SOLUTION INTRAVENOUS at 15:23

## 2022-08-21 RX ADMIN — CEFEPIME HYDROCHLORIDE 1000 MG: 1 INJECTION, POWDER, FOR SOLUTION INTRAMUSCULAR; INTRAVENOUS at 16:54

## 2022-08-21 RX ADMIN — SODIUM CHLORIDE, POTASSIUM CHLORIDE, SODIUM LACTATE AND CALCIUM CHLORIDE 1000 ML: 600; 310; 30; 20 INJECTION, SOLUTION INTRAVENOUS at 12:47

## 2022-08-21 RX ADMIN — PIPERACILLIN SODIUM AND TAZOBACTAM SODIUM 2250 MG: 2; .25 INJECTION, POWDER, LYOPHILIZED, FOR SOLUTION INTRAVENOUS at 13:57

## 2022-08-21 RX ADMIN — DICYCLOMINE HYDROCHLORIDE 10 MG: 10 CAPSULE ORAL at 21:29

## 2022-08-21 RX ADMIN — VANCOMYCIN HYDROCHLORIDE 1000 MG: 1 INJECTION, POWDER, LYOPHILIZED, FOR SOLUTION INTRAVENOUS at 14:21

## 2022-08-21 RX ADMIN — SODIUM CHLORIDE, POTASSIUM CHLORIDE, SODIUM LACTATE AND CALCIUM CHLORIDE 1000 ML: 600; 310; 30; 20 INJECTION, SOLUTION INTRAVENOUS at 13:20

## 2022-08-21 RX ADMIN — SERTRALINE HYDROCHLORIDE 50 MG: 50 TABLET ORAL at 16:45

## 2022-08-21 RX ADMIN — Medication 10 ML: at 21:37

## 2022-08-21 ASSESSMENT — PAIN SCALES - GENERAL
PAINLEVEL_OUTOF10: 8
PAINLEVEL_OUTOF10: 0

## 2022-08-21 ASSESSMENT — PAIN DESCRIPTION - ORIENTATION: ORIENTATION: LEFT;RIGHT

## 2022-08-21 ASSESSMENT — PAIN DESCRIPTION - FREQUENCY: FREQUENCY: CONTINUOUS

## 2022-08-21 ASSESSMENT — PAIN - FUNCTIONAL ASSESSMENT: PAIN_FUNCTIONAL_ASSESSMENT: 0-10

## 2022-08-21 ASSESSMENT — PAIN DESCRIPTION - DESCRIPTORS: DESCRIPTORS: ACHING

## 2022-08-21 NOTE — ED NOTES
Report received from [place]. Patient resting in bed. Awake and alert. Resp reg and easy. No distress noted. Bed in low position. Side rails x2. Call light in reach.       Jordan Hollins RN  08/21/22 1924

## 2022-08-21 NOTE — PLAN OF CARE
Admit to ICU for sepsis. 58-year-old female presenting with recurrent falls and syncope. Recently seen in the ED noted to have a wrist fracture. .  Presenting back again with recurrent syncope. .  Work-up in the ER included CT of chest abdomen and pelvis which showed diverticulitis. Patient became hypotensive in the ER, did not respond to 2 L of fluid bolus, now started on pressors. Received broad-spectrum antibiotics vancomycin and Zosyn. .  She is in VIOLETA creatinine is up to 3.0, baseline is around 1.   Seen by nephrology    Admitted to ICU, continue IV fluids and pressors that were started in the ED,   Antibiotics vancomycin and cefepime,  Check blood cultures,    She is a diabetic-placed on sliding scale insulin  Hold all antihypertensive medications

## 2022-08-21 NOTE — CONSULTS
Patient seen and examined, consult note dictated. Assessment and Plan:    1- A/CKD: The patient has chronic kidney disease with a baseline creatinine of 1.3 to 1.5. Her VIOLETA is likely secondary to a pre-renal component, although a non oliguric ATN in the setting of hypotension cannot be ruled out.   - Continue volume expansion.  - Avoid all nephrotoxic agents at this time. - Maintain mean arterial pressure > 65 mmHg. 2- Hypokalemia: Mild, monitor without replacement for now. 3- Hypotension: Blood pressure improving with current regimen.   4- Sepsis: Currently on IV antibiotics per primary team.

## 2022-08-21 NOTE — ED NOTES
1429 Perfect serve sent to Dr. Rachna Avila    9856 consult completed with call back from Dr. Rachna Avila speaking with Dr. Elizabeth Mackay  08/21/22 [phone]

## 2022-08-21 NOTE — PROGRESS NOTES
[id number] [facility] Pharmacokinetic Monitoring Service - Vancomycin     Nasrin Rodriguez is a 71 y.o. female starting on vancomycin therapy for Intra-abdominal. Pharmacy consulted by Dr. Kojo Simmons for monitoring and adjustment. Target Concentration: Dosing based on anticipated concentration <15 mg/L due to renal impairment/insufficiency    Additional Antimicrobials: cefepime    Pertinent Laboratory Values: Wt Readings from Last 1 Encounters:   08/21/22 215 lb (97.5 kg)     Temp Readings from Last 1 Encounters:   08/21/22 97.3 °F (36.3 °C) (Oral)     Estimated Creatinine Clearance: 19 mL/min (A) (based on SCr of 3 mg/dL (H)). Recent Labs     08/21/22  1152 08/21/22  1240   CREATININE  --  3.0*   WBC 6.9  --          Pertinent Cultures:  Culture Date Source Results   8/21 Urine/blood na   MRSA Nasal Swab: N/A. Non-respiratory infection. Plan:  Concentration-guided dosing due to renal impairment/insufficiency  Start vancomycin pulse dose. Renal labs as indicated   Vancomycin concentration ordered for 8/22 @ 0600.    Pharmacy will continue to monitor patient and adjust therapy as indicated    Thank you for the consult,  Benton Baltazar, [address] [place] [address]  8/21/2022 4:11 PM

## 2022-08-21 NOTE — ED NOTES
Dr. Kristen Wright made aware of pt's low B/P 75/36. 1 Liter of LR ordered, and pressure bagged into pt at this time.       Elver Gary RN  08/21/22 1266
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
## 2022-08-22 ENCOUNTER — TELEPHONE (OUTPATIENT)
Dept: ORTHOPEDIC SURGERY | Age: 72
End: 2022-08-22

## 2022-08-22 LAB
ANION GAP SERPL CALCULATED.3IONS-SCNC: 14 MMOL/L (ref 3–16)
BASOPHILS ABSOLUTE: 0.1 K/UL (ref 0–0.2)
BASOPHILS RELATIVE PERCENT: 0.6 %
BUN BLDV-MCNC: 25 MG/DL (ref 7–20)
CALCIUM SERPL-MCNC: 8.4 MG/DL (ref 8.3–10.6)
CHLORIDE BLD-SCNC: 107 MMOL/L (ref 99–110)
CO2: 22 MMOL/L (ref 21–32)
CREAT SERPL-MCNC: 1.7 MG/DL (ref 0.6–1.2)
EOSINOPHILS ABSOLUTE: 0.7 K/UL (ref 0–0.6)
EOSINOPHILS RELATIVE PERCENT: 8.3 %
ESTIMATED AVERAGE GLUCOSE: 142.7 MG/DL
GFR AFRICAN AMERICAN: 36
GFR NON-AFRICAN AMERICAN: 30
GLUCOSE BLD-MCNC: 100 MG/DL (ref 70–99)
GLUCOSE BLD-MCNC: 113 MG/DL (ref 70–99)
GLUCOSE BLD-MCNC: 126 MG/DL (ref 70–99)
GLUCOSE BLD-MCNC: 134 MG/DL (ref 70–99)
GLUCOSE BLD-MCNC: 147 MG/DL (ref 70–99)
GLUCOSE BLD-MCNC: 166 MG/DL (ref 70–99)
HBA1C MFR BLD: 6.6 %
HCT VFR BLD CALC: 27.1 % (ref 36–48)
HEMOGLOBIN: 9 G/DL (ref 12–16)
LYMPHOCYTES ABSOLUTE: 1.3 K/UL (ref 1–5.1)
LYMPHOCYTES RELATIVE PERCENT: 15.9 %
MAGNESIUM: 1.6 MG/DL (ref 1.8–2.4)
MCH RBC QN AUTO: 27.7 PG (ref 26–34)
MCHC RBC AUTO-ENTMCNC: 33.4 G/DL (ref 31–36)
MCV RBC AUTO: 83.1 FL (ref 80–100)
MONOCYTES ABSOLUTE: 0.5 K/UL (ref 0–1.3)
MONOCYTES RELATIVE PERCENT: 5.7 %
NEUTROPHILS ABSOLUTE: 5.7 K/UL (ref 1.7–7.7)
NEUTROPHILS RELATIVE PERCENT: 69.5 %
PDW BLD-RTO: 17.9 % (ref 12.4–15.4)
PERFORMED ON: ABNORMAL
PHOSPHORUS: 2.9 MG/DL (ref 2.5–4.9)
PLATELET # BLD: 237 K/UL (ref 135–450)
PMV BLD AUTO: 8.2 FL (ref 5–10.5)
POTASSIUM SERPL-SCNC: 3.2 MMOL/L (ref 3.5–5.1)
RBC # BLD: 3.26 M/UL (ref 4–5.2)
SODIUM BLD-SCNC: 143 MMOL/L (ref 136–145)
URINE CULTURE, ROUTINE: NORMAL
VANCOMYCIN TROUGH: 10.2 UG/ML (ref 10–20)
WBC # BLD: 8.2 K/UL (ref 4–11)

## 2022-08-22 PROCEDURE — 80048 BASIC METABOLIC PNL TOTAL CA: CPT

## 2022-08-22 PROCEDURE — 2580000003 HC RX 258: Performed by: INTERNAL MEDICINE

## 2022-08-22 PROCEDURE — 85025 COMPLETE CBC W/AUTO DIFF WBC: CPT

## 2022-08-22 PROCEDURE — 6360000002 HC RX W HCPCS: Performed by: INTERNAL MEDICINE

## 2022-08-22 PROCEDURE — 6370000000 HC RX 637 (ALT 250 FOR IP): Performed by: INTERNAL MEDICINE

## 2022-08-22 PROCEDURE — 83735 ASSAY OF MAGNESIUM: CPT

## 2022-08-22 PROCEDURE — 84100 ASSAY OF PHOSPHORUS: CPT

## 2022-08-22 PROCEDURE — 2060000000 HC ICU INTERMEDIATE R&B

## 2022-08-22 PROCEDURE — 80202 ASSAY OF VANCOMYCIN: CPT

## 2022-08-22 PROCEDURE — 2700000000 HC OXYGEN THERAPY PER DAY

## 2022-08-22 PROCEDURE — 94761 N-INVAS EAR/PLS OXIMETRY MLT: CPT

## 2022-08-22 PROCEDURE — 36415 COLL VENOUS BLD VENIPUNCTURE: CPT

## 2022-08-22 PROCEDURE — 99223 1ST HOSP IP/OBS HIGH 75: CPT | Performed by: INTERNAL MEDICINE

## 2022-08-22 RX ORDER — POTASSIUM CHLORIDE 750 MG/1
40 TABLET, EXTENDED RELEASE ORAL PRN
Status: DISCONTINUED | OUTPATIENT
Start: 2022-08-22 | End: 2022-08-23 | Stop reason: HOSPADM

## 2022-08-22 RX ORDER — LISINOPRIL 5 MG/1
5 TABLET ORAL DAILY
Status: DISCONTINUED | OUTPATIENT
Start: 2022-08-22 | End: 2022-08-23 | Stop reason: HOSPADM

## 2022-08-22 RX ORDER — ENOXAPARIN SODIUM 100 MG/ML
40 INJECTION SUBCUTANEOUS DAILY
Status: DISCONTINUED | OUTPATIENT
Start: 2022-08-23 | End: 2022-08-23 | Stop reason: HOSPADM

## 2022-08-22 RX ORDER — MAGNESIUM SULFATE 1 G/100ML
1000 INJECTION INTRAVENOUS PRN
Status: DISCONTINUED | OUTPATIENT
Start: 2022-08-22 | End: 2022-08-23 | Stop reason: HOSPADM

## 2022-08-22 RX ORDER — POTASSIUM CHLORIDE 7.45 MG/ML
10 INJECTION INTRAVENOUS PRN
Status: DISCONTINUED | OUTPATIENT
Start: 2022-08-22 | End: 2022-08-23 | Stop reason: HOSPADM

## 2022-08-22 RX ADMIN — DICYCLOMINE HYDROCHLORIDE 10 MG: 10 CAPSULE ORAL at 16:12

## 2022-08-22 RX ADMIN — ENOXAPARIN SODIUM 30 MG: 100 INJECTION SUBCUTANEOUS at 08:49

## 2022-08-22 RX ADMIN — ATORVASTATIN CALCIUM 10 MG: 10 TABLET, FILM COATED ORAL at 08:49

## 2022-08-22 RX ADMIN — PANTOPRAZOLE SODIUM 40 MG: 40 TABLET, DELAYED RELEASE ORAL at 16:12

## 2022-08-22 RX ADMIN — SODIUM CHLORIDE: 9 INJECTION, SOLUTION INTRAVENOUS at 01:42

## 2022-08-22 RX ADMIN — PANTOPRAZOLE SODIUM 40 MG: 40 TABLET, DELAYED RELEASE ORAL at 06:44

## 2022-08-22 RX ADMIN — POTASSIUM BICARBONATE 40 MEQ: 391 TABLET, EFFERVESCENT ORAL at 08:48

## 2022-08-22 RX ADMIN — DICYCLOMINE HYDROCHLORIDE 10 MG: 10 CAPSULE ORAL at 19:38

## 2022-08-22 RX ADMIN — SERTRALINE HYDROCHLORIDE 50 MG: 50 TABLET ORAL at 08:49

## 2022-08-22 RX ADMIN — CEFEPIME 2000 MG: 2 INJECTION, POWDER, FOR SOLUTION INTRAVENOUS at 16:16

## 2022-08-22 RX ADMIN — Medication 10 ML: at 08:49

## 2022-08-22 RX ADMIN — DICYCLOMINE HYDROCHLORIDE 10 MG: 10 CAPSULE ORAL at 11:30

## 2022-08-22 RX ADMIN — MAGNESIUM SULFATE HEPTAHYDRATE 1000 MG: 1 INJECTION, SOLUTION INTRAVENOUS at 09:09

## 2022-08-22 RX ADMIN — LISINOPRIL 5 MG: 5 TABLET ORAL at 11:30

## 2022-08-22 RX ADMIN — Medication 1500 MG: at 09:04

## 2022-08-22 RX ADMIN — Medication 10 ML: at 19:41

## 2022-08-22 RX ADMIN — ONDANSETRON 4 MG: 4 TABLET, ORALLY DISINTEGRATING ORAL at 12:46

## 2022-08-22 RX ADMIN — DICYCLOMINE HYDROCHLORIDE 10 MG: 10 CAPSULE ORAL at 06:44

## 2022-08-22 RX ADMIN — MUPIROCIN: 20 OINTMENT TOPICAL at 19:40

## 2022-08-22 RX ADMIN — SODIUM CHLORIDE: 9 INJECTION, SOLUTION INTRAVENOUS at 11:49

## 2022-08-22 RX ADMIN — LEVOTHYROXINE SODIUM 88 MCG: 88 TABLET ORAL at 08:49

## 2022-08-22 RX ADMIN — SODIUM CHLORIDE: 9 INJECTION, SOLUTION INTRAVENOUS at 21:53

## 2022-08-22 RX ADMIN — ACETAMINOPHEN 650 MG: 325 TABLET ORAL at 16:12

## 2022-08-22 RX ADMIN — ACETAMINOPHEN 650 MG: 325 TABLET ORAL at 08:48

## 2022-08-22 ASSESSMENT — PAIN - FUNCTIONAL ASSESSMENT: PAIN_FUNCTIONAL_ASSESSMENT: ACTIVITIES ARE NOT PREVENTED

## 2022-08-22 ASSESSMENT — PAIN DESCRIPTION - DESCRIPTORS: DESCRIPTORS: DULL;ACHING

## 2022-08-22 ASSESSMENT — PAIN SCALES - GENERAL: PAINLEVEL_OUTOF10: 5

## 2022-08-22 NOTE — PROGRESS NOTES
Progress Note    Admit Date:  8/21/2022    Patient admitted with syncope, history of recurrent falls at home with right foot fracture and left wrist fracture possible seizure activity  Work-up revealed the patient to be in sepsis/septic shock. Started on IV fluids and on broad-spectrum IV antibiotics for diverticulitis    Subjective:  Ms. Abram Muir  is better today. Still has some abdominal pain . Weaned off of Levophed , blood pressures are actually trending up. Objective:   BP (!) 142/48   Pulse (!) 103   Temp 98.2 °F (36.8 °C) (Oral)   Resp 26   Ht 5' 2\" (1.575 m)   Wt 178 lb 12.7 oz (81.1 kg)   SpO2 98%   BMI 32.70 kg/m²     Intake/Output Summary (Last 24 hours) at 8/22/2022 1403  Last data filed at 8/22/2022 1249  Gross per 24 hour   Intake 1764.33 ml   Output 3800 ml   Net -2035.67 ml         Physical Exam:  General:  Awake, alert, NAD  Looks fatigued. In seizure precautions  Skin:  Warm and dry  Neck:  JVD absent. Neck supple  Chest:  Clear to auscultation, respiration easy. No wheezes, rales or rhonchi. Cardiovascular:  RRR ,S1S2 normal  Abdomen:  Soft, mildly tender, non distended, BS +  Extremities:  No edema. Intact peripheral pulses.  Brisk cap refill, < 2 secs  Neuro: non focal      Medications:   Scheduled Meds:   cefepime  2,000 mg IntraVENous Q24H    lisinopril  5 mg Oral Daily    [START ON 8/23/2022] enoxaparin  40 mg SubCUTAneous Daily    mupirocin   Nasal BID    dicyclomine  10 mg Oral 4x Daily AC & HS    levothyroxine  88 mcg Oral Daily    pantoprazole  40 mg Oral BID AC    sertraline  50 mg Oral Daily    atorvastatin  10 mg Oral Daily    alogliptin  6.25 mg Oral Daily    sodium chloride flush  5-40 mL IntraVENous 2 times per day    insulin lispro  0-4 Units SubCUTAneous TID WC    insulin lispro  0-4 Units SubCUTAneous Nightly    vancomycin (VANCOCIN) intermittent dosing (placeholder)   Other RX Placeholder       Continuous Infusions:   sodium chloride 100 mL/hr at 08/22/22 1149 dextrose      sodium chloride         Data:  CBC:   Recent Labs     08/21/22  1152 08/22/22  0410   WBC 6.9 8.2   RBC 2.84* 3.26*   HGB 7.8* 9.0*   HCT 24.6* 27.1*   MCV 86.3 83.1   RDW 18.3* 17.9*    237     BMP:   Recent Labs     08/21/22  1240 08/22/22  0410    143   K 3.4* 3.2*   CL 99 107   CO2 21 22   PHOS  --  2.9   BUN 34* 25*   CREATININE 3.0* 1.7*     BNP: No results for input(s): BNP in the last 72 hours. PT/INR: No results for input(s): PROTIME, INR in the last 72 hours. APTT: No results for input(s): APTT in the last 72 hours. CARDIAC ENZYMES:   Recent Labs     08/21/22  1240   TROPONINI 0.01     FASTING LIPID PANEL:  Lab Results   Component Value Date    CHOL 172 03/21/2021    HDL 72 (H) 03/21/2021    TRIG 93 03/21/2021     LIVER PROFILE:   Recent Labs     08/21/22  1240   AST 18   ALT 8*   BILITOT <0.2   ALKPHOS 158*          Cultures  COVID and influenza not detected  Blood cultures no growth to date    Radiology  CT THORACIC SPINE TRAUMA RECONSTRUCTION   Final Result   No acute osseous abnormality in the thoracic spine. CT LUMBAR SPINE TRAUMA RECONSTRUCTION   Final Result   No acute abnormality. CT CERVICAL SPINE WO CONTRAST   Final Result   No acute abnormality of the cervical spine. CT HEAD WO CONTRAST   Final Result   No acute intracranial abnormality. CT CHEST ABDOMEN PELVIS WO CONTRAST   Final Result   Chest:      No acute traumatic solid organ injury identified      Patchy ground-glass opacity and tree-in-bud nodularity in the right upper   lobe, likely postinflammatory-infectious      Abdomen and pelvis:      Wall thickening is seen involving the sigmoid colon with surrounding   pericolonic fat stranding suspicious for recurrent diverticulitis. Cirrhosis and splenomegaly. RECOMMENDATIONS:   Fleischner Society guidelines for follow-up and management of incidentally   detected pulmonary nodules: Halley Cotto       Multiple Solid Nodules: Nodule size equals 6-8 mm   In a low-risk patient, CT at 3-6 months, then consider CT at 18-24 months. In a high-risk patient, CT at 3-6 months, then CT at 18-24 months. Nodule size greater than 8 mm   In a low-risk patient, CT at 3-6 months, then consider CT at 18-24 months. In a high-risk patient, CT at 3-6 months, then CT at 18-24 months. - Low risk patients include individuals with minimal or absent history of   smoking and other known risk factors. - High risk patients include individuals with a history or smoking or known   risk factors. Radiology 2017 http://pubs. rsna.org/doi/full/10.1148/radiol. 4135915667         XR FOOT LEFT (2 VIEWS)   Final Result   No acute osseous abnormality. Assessment:  Principal Problem:    Sepsis ([address]  Resolved Problems:    * No resolved hospital problems. *      Plan:    #Sepsis/septic shock  -Likely secondary to diverticulitis; follow-up on cultures  - required pressors  -Weaned off of Levophed now, continue IV fluids  Can  transfer out of ICU    # Syncope  #Recurrent falls  #Possible seizure activity  -in seizure precautions  -continue to monitor in telemetry  - trt sepsis   -Consult PT OT    # acute diverticulitis  - continue IV antibiotics-vancomycin and cefepime    #Possible UTI  -Follow-up on cultures, antibiotics as above    #History of hypertension   -Weaned off of Levophed now , blood pressure starting to trend up now started on lisinopril     # History of fall with left wrist fracture and right foot fracture  - left wrist is in splint, right foot boot . # hypokalemia and hypomagnesemia   -replete     # VIOLETA on CKD  -Continue IV fluids    #Diabetes mellitus type 2  -SSI      She can be downgraded to PCU  ADULT DIET;  Regular; 4 carb choices (60 gm/meal)   Full Code      Abhinav Ojeda MD   8/22/2022 2:03 PM

## 2022-08-22 NOTE — PROGRESS NOTES
4 Eyes Skin Assessment     NAME:  Balaji Jalloh  YOB: 1950  MEDICAL RECORD NUMBER:  [id number]    The patient is being assess for  Transfer to New Unit    I agree that 2 RN's have performed a thorough Head to Toe Skin Assessment on the patient. ALL assessment sites listed below have been assessed. Areas assessed by both nurses:    Head, Face, Ears, Shoulders, Back, Chest, Arms, Elbows, Hands, Sacrum. Buttock, Coccyx, Ischium, and Legs. Feet and Heels      Pt has scattered bruising to BLE , no noted open areas and pt denies         Does the Patient have a Wound?  No noted wound(s)       Abdiel Prevention initiated:  Yes   Wound Care Orders initiated:  No    Pressure Injury (Stage 3,4, Unstageable, DTI, NWPT, and Complex wounds) if present place referral/consult order under [de-identified] No    New and Established Ostomies if present place consult order under : No      Nurse 1 eSignature: Electronically signed by Joce La RN on 8/22/22 at 5:24 PM EDT    **SHARE this note so that the co-signing nurse is able to place an eSignature**    Nurse 2 eSignature: {Esignature:[id number]}

## 2022-08-22 NOTE — PROGRESS NOTES
Unremarkable  Cardiovascular:  S1, S2 without m/r/g   Respiratory: CTA B without w/r/r; respiratory effort normal  Abdomen:  soft, nt, nd,   Extremities: no lower extremity edema  Neuro/Psy: AAoriented times 3 ; moves all 4 ext    Data/  Recent Labs     08/21/22  1152 08/22/22  0410   WBC 6.9 8.2   HGB 7.8* 9.0*   HCT 24.6* 27.1*   MCV 86.3 83.1    237     Recent Labs     08/21/22  1240 08/21/22  1704 08/22/22  0410     --  143   K 3.4*  --  3.2*   CL 99  --  107   CO2 21  --  22   GLUCOSE 225* 146 113*   PHOS  --   --  2.9   MG  --   --  1.60*   BUN 34*  --  25*   CREATININE 3.0*  --  1.7*   LABGLOM 15*  --  30*   GFRAA 19*  --  36*       Assessment/   -VIOLETA on CKD in the setting of hypotension/shock, prerenal in etiology. Improving  -Hypokalemia with hypomagnesemia  -Chronic kidney disease stage III, baseline creatinine of 1.1-1.4, follows with Dr. Marie Gale  -Septic shock, improving  Likely from diverticulitis on antibiotics  -Anemia  -Syncope/seizure per internal medicine  -History of VILLEGAS cirrhosis    Plan/   -Continue IV fluid as ordered  -Replace potassium and magnesium as ordered  -Keep systolic blood pressure more than 110  -Serial renal panel  -daily wts and strict i/o  -renal dose medications   -avoid nephrotoxins      Ruben Murphy MD  Office: [phone]  Fax:    Kavya Freeman [phone]

## 2022-08-22 NOTE — PROGRESS NOTES
Bedside report and Pt care transferred to [facility].. Pt denies any assistance at this time.

## 2022-08-22 NOTE — PROGRESS NOTES
Extended Infusion B-Lactam Antibiotics: Cefepime     Day: 2    Recent Labs     08/21/22  1240 08/22/22  0410   CREATININE 3.0* 1.7*     Estimated Creatinine Clearance: 30 mL/min (A) (based on SCr of 1.7 mg/dL (H)). Recent Labs     08/21/22  1152 08/22/22  0410   WBC 6.9 8.2       Cefepime-Extended Infusion (4-hour infusion) - Preferred Dosing Strategy   Renal Function (CrCl mL/min) ? 60 30 - 59 11 - 29 ? 10, HD PD CRRT   All indications - Loading dose of 2000 milligrams x 1 over 30 minutes or via IV push. Maintenance dose   should begin at the next regularly scheduled dosing interval based on indication/renal function. Intra-abdominal infections, Skin and soft tissue infections, Urinary tract infections 2000mg q12h 2000mg q24h 1000mg q24h 500mg q24h 1000mg q24h 2000mg q24h*   Bacteremia, CNS infections, Cystic fibrosis, Diabetic foot infections, Endocarditis, Febrile neutropenia, Healthcare-associated infections, Osteomyelitis/joint infections, Pneumonia, Sepsis, BMI > 40* 2000mg q8h 2000mg q12h 1000mg q12h 1000mg q24h 1000mg q24h 2000mg q12h     Dose updated based on increased renal function and changed to extended infusion.      Abhinav Connolly [facility] 8/22/2022 5:27 AM

## 2022-08-22 NOTE — ACP (ADVANCE CARE PLANNING)
Advance Care Planning     General Advance Care Planning (ACP) Conversation    Date of Conversation: 8/21/2022  Conducted with: Patient with Decision Making Capacity    Healthcare Decision Maker:    Primary Decision Maker: Qamar Schwartz - [phone]  Click here to complete Healthcare Decision Makers including selection of the Healthcare Decision Maker Relationship (ie \"Primary\"). Today we documented Decision Maker(s) consistent with Legal Next of Kin hierarchy.     Content/Action Overview:    Reviewed DNR/DNI and patient elects Full Code (Attempt Resuscitation)    Length of Voluntary ACP Conversation in minutes:  <16 minutes (Non-Billable)    Gadsden Kaleigh LEIVAW, JENELLEW-S

## 2022-08-22 NOTE — PROGRESS NOTES
4 Eyes Skin Assessment     The patient is being assess for   Admission    I agree that 2 RN's have performed a thorough Head to Toe Skin Assessment on the patient. ALL assessment sites listed below have been assessed. Areas assessed for pressure by both nurses:   [x]   Head, Face, and Ears   [x]   Shoulders, Back, and Chest, Abdomen  [x]   Arms, Elbows, and Hands   [x]   Coccyx, Sacrum, and Ischium  [x]   Legs, Feet, and Heels        Skin Assessed Under all Medical Devices by both nurses:  O2 device tubing and braces              All Mepilex Borders were peeled back and area peeked at by both nurses:  No: na  Please list where Mepilex Borders are located:  na             **SHARE this note so that the co-signing nurse is able to place an eSignature**    Co-signer eSignature: Electronically signed by Jamilah Beatty RN on 8/22/22 at 6:32 AM EDT    Does the Patient have Skin Breakdown related to pressure?   No              Abdiel Prevention initiated:  No   Wound Care Orders initiated:  No      [facility] nurse consulted for Pressure Injury (Stage 3,4, Unstageable, DTI, NWPT, Complex wounds)and New or Established Ostomies:  No      Primary Nurse eSignature: Electronically signed by Madison Rangel RN on 8/22/22 at 6:24 AM EDT

## 2022-08-22 NOTE — CARE COORDINATION
Case Management Assessment  Initial Evaluation      Patient Name: Whitney Whitman  YOB: 1950  Diagnosis: Diverticulitis of colon [K57.32]  Septicemia ([facility] [address] [A41.9]  Seizure-like activity ([facility] [address] [R56.9]  Frequent falls [R29.6]  Sepsis ([facility] [address] [A41.9]  Chest pain, unspecified type [R07.9]  Date / Time: 8/21/2022 11:31 AM    Admission status/Date:08/21/2022 Inpatient   Chart Reviewed: Yes      Patient Interviewed: Yes   Family Interviewed:  No      Hospitalization in the last 30 days:  No    Health Care Decision Maker :   Primary Decision Maker: Angeline Garibay  Efren - [phone]    (CM - must 1st enter selection under Navigator - emergency contact- Guan[place]ito Stevens Relationship and pick relationship)   Who do you trust or have selected to make healthcare decisions for you    Met with:  pt   Interview conducted  (bedside/phone): bedside    Current PCP: Ainsley Hodges [address] Medicare and [facility]  Precert required for SNF : Y          3 night stay required -  N    ADLS  Support Systems/Care Needs: Children, Family Members  Transportation:  self     Meal Preparation: self    Housing  Living Arrangements: Pt lives at home with her 2 daughter's whom are with pt 24/7 per pt report. Steps: ramp  Intent for return to present living arrangements: Yes  Identified Issues: [address] [address] [facility] with [address] : No Agency:(Services)  Type of Home Care Services: None  Passport/Waiver : No  :                      Phone Number:    Passport/Waiver Services: n/a          Durable Medical Equiptment   DME Provider: n/a  Equipment:   Walker_x__Cane_x__RTS___ BSC__x_Shower Chair___Hospital Bed___W/C__x__Other________  02 at ____Liter(s)---wears(frequency)_______ HHN ___ CPAP___ BiPap___   N/A____    Home O2 Use :  No      Community Service Affiliation  Dialysis:  No    Agency:  Location:  Dialysis Schedule:  Phone:   Fax:     Other Community Services: n/a    DISCHARGE PLAN: Explained Case Management role/services. Chart review completed. Met with pt at bedside. Pt stated that she is independent at home and will return when discharged. She stated that she won't need skilled [facility] when discharged. She stated she will have a ride home on discharge. She denied needs for CM at this time. CM will follow. Please notify CM if needs or concerns arise.      Teresa Doe MSW, ERNESTO
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
## 2022-08-22 NOTE — TELEPHONE ENCOUNTER
Patient's home phone listed is not correct. Dials to an automated service line. If patient calls to schedule, OK to offer appointment in the [place] office on 8/23/22 between 8 and 9:30 AM. Patient should be made aware there will be a wait. Patient may also be offered appointment on Wednesday 8/24/22 in [facility] office. OK to [employer] schedule that day for less of a wait.

## 2022-08-22 NOTE — FLOWSHEET NOTE
08/22/22 0800   Vitals   Temp 98.8 °F (37.1 °C)   Temp Source Oral   Heart Rate (!) 101   Heart Rate Source Monitor   Resp 20   BP (!) 160/63   MAP (mmHg) 88   BP Method Automatic   Level of Consciousness Alert (0)   MEWS Score 2   Oxygen Therapy   SpO2 97 %   O2 Device None (Room air)   Vital signs stable. BP much improved from previous. Remains off pressors. Pt is alert and oriented and complains of headache pain rated 6/10 at the moment. PRN tylenol provided per Pt request. Nothing new noted on head to toe assessment. Pt remains neurologically intact. Pt is ST on the monitor. Morning medication administration completed. K+ replete. Mag replete. Pt denies any further assistance at the moment. Will continue to monitor.

## 2022-08-22 NOTE — PROGRESS NOTES
Pt arrived to room 312 via wheelchair from ICU for transfer of care. Pt is caox3 , resp even denies pain or needs at this time .  Pt oriented to room , bed in low position call light within reach, vitals WNL pt positioned for comfort and food and beverage provided

## 2022-08-22 NOTE — PROGRESS NOTES
Pharmacy Vancomycin Consult     Vancomycin Day: 2  Current Dosing: Pulse  Current indication: Intra-abdominal infection    Recent Labs     08/21/22  1152 08/21/22  1240 08/22/22  0410   BUN  --  34* 25*   CREATININE  --  3.0* 1.7*   WBC 6.9  --  8.2       Estimated Creatinine Clearance: 30 mL/min (A) (based on SCr of 1.7 mg/dL (H)). Trough: 10.2    Assessment/Plan:  Will dose with 1500 mg x1 today and recheck a level with AM labs tomorrow.      Alo Cary, JaminD, [facility], 8/22/2022 5:32 AM

## 2022-08-23 VITALS
SYSTOLIC BLOOD PRESSURE: 134 MMHG | TEMPERATURE: 98 F | BODY MASS INDEX: 38.37 KG/M2 | WEIGHT: 208.5 LBS | HEIGHT: 62 IN | DIASTOLIC BLOOD PRESSURE: 60 MMHG | HEART RATE: 95 BPM | RESPIRATION RATE: 6 BRPM | OXYGEN SATURATION: 98 %

## 2022-08-23 PROBLEM — R56.9 SEIZURE-LIKE ACTIVITY (HCC): Status: ACTIVE | Noted: 2022-08-23

## 2022-08-23 PROBLEM — R07.9 CHEST PAIN: Status: ACTIVE | Noted: 2022-08-23

## 2022-08-23 PROBLEM — R29.6 FREQUENT FALLS: Status: ACTIVE | Noted: 2022-08-23

## 2022-08-23 LAB
ANION GAP SERPL CALCULATED.3IONS-SCNC: 11 MMOL/L (ref 3–16)
BASOPHILS ABSOLUTE: 0.1 K/UL (ref 0–0.2)
BASOPHILS RELATIVE PERCENT: 1.3 %
BUN BLDV-MCNC: 11 MG/DL (ref 7–20)
CALCIUM SERPL-MCNC: 8.2 MG/DL (ref 8.3–10.6)
CHLORIDE BLD-SCNC: 107 MMOL/L (ref 99–110)
CO2: 24 MMOL/L (ref 21–32)
CREAT SERPL-MCNC: 0.9 MG/DL (ref 0.6–1.2)
EOSINOPHILS ABSOLUTE: 0.5 K/UL (ref 0–0.6)
EOSINOPHILS RELATIVE PERCENT: 9.5 %
GFR AFRICAN AMERICAN: >60
GFR NON-AFRICAN AMERICAN: >60
GLUCOSE BLD-MCNC: 140 MG/DL (ref 70–99)
GLUCOSE BLD-MCNC: 149 MG/DL (ref 70–99)
GLUCOSE BLD-MCNC: 185 MG/DL (ref 70–99)
HCT VFR BLD CALC: 26.2 % (ref 36–48)
HEMOGLOBIN: 8.6 G/DL (ref 12–16)
LYMPHOCYTES ABSOLUTE: 1.1 K/UL (ref 1–5.1)
LYMPHOCYTES RELATIVE PERCENT: 18.6 %
MAGNESIUM: 1.6 MG/DL (ref 1.8–2.4)
MCH RBC QN AUTO: 27.5 PG (ref 26–34)
MCHC RBC AUTO-ENTMCNC: 32.9 G/DL (ref 31–36)
MCV RBC AUTO: 83.5 FL (ref 80–100)
MONOCYTES ABSOLUTE: 0.5 K/UL (ref 0–1.3)
MONOCYTES RELATIVE PERCENT: 8.2 %
NEUTROPHILS ABSOLUTE: 3.5 K/UL (ref 1.7–7.7)
NEUTROPHILS RELATIVE PERCENT: 62.4 %
PDW BLD-RTO: 17.7 % (ref 12.4–15.4)
PERFORMED ON: ABNORMAL
PERFORMED ON: ABNORMAL
PLATELET # BLD: 198 K/UL (ref 135–450)
PMV BLD AUTO: 8.9 FL (ref 5–10.5)
POTASSIUM SERPL-SCNC: 3.2 MMOL/L (ref 3.5–5.1)
RBC # BLD: 3.14 M/UL (ref 4–5.2)
SODIUM BLD-SCNC: 142 MMOL/L (ref 136–145)
VANCOMYCIN RANDOM: 11 UG/ML
WBC # BLD: 5.7 K/UL (ref 4–11)

## 2022-08-23 PROCEDURE — 97530 THERAPEUTIC ACTIVITIES: CPT

## 2022-08-23 PROCEDURE — 85025 COMPLETE CBC W/AUTO DIFF WBC: CPT

## 2022-08-23 PROCEDURE — 99239 HOSP IP/OBS DSCHRG MGMT >30: CPT | Performed by: INTERNAL MEDICINE

## 2022-08-23 PROCEDURE — 97166 OT EVAL MOD COMPLEX 45 MIN: CPT

## 2022-08-23 PROCEDURE — 36415 COLL VENOUS BLD VENIPUNCTURE: CPT

## 2022-08-23 PROCEDURE — 2580000003 HC RX 258: Performed by: INTERNAL MEDICINE

## 2022-08-23 PROCEDURE — 97535 SELF CARE MNGMENT TRAINING: CPT

## 2022-08-23 PROCEDURE — 80202 ASSAY OF VANCOMYCIN: CPT

## 2022-08-23 PROCEDURE — 6370000000 HC RX 637 (ALT 250 FOR IP): Performed by: INTERNAL MEDICINE

## 2022-08-23 PROCEDURE — 6360000002 HC RX W HCPCS: Performed by: INTERNAL MEDICINE

## 2022-08-23 PROCEDURE — 97116 GAIT TRAINING THERAPY: CPT

## 2022-08-23 PROCEDURE — 97162 PT EVAL MOD COMPLEX 30 MIN: CPT

## 2022-08-23 PROCEDURE — 80048 BASIC METABOLIC PNL TOTAL CA: CPT

## 2022-08-23 PROCEDURE — 83735 ASSAY OF MAGNESIUM: CPT

## 2022-08-23 PROCEDURE — 97110 THERAPEUTIC EXERCISES: CPT

## 2022-08-23 RX ORDER — AMOXICILLIN AND CLAVULANATE POTASSIUM 875; 125 MG/1; MG/1
1 TABLET, FILM COATED ORAL 3 TIMES DAILY
Status: DISCONTINUED | OUTPATIENT
Start: 2022-08-23 | End: 2022-08-23 | Stop reason: HOSPADM

## 2022-08-23 RX ORDER — LABETALOL 100 MG/1
50 TABLET, FILM COATED ORAL EVERY 6 HOURS PRN
Status: DISCONTINUED | OUTPATIENT
Start: 2022-08-23 | End: 2022-08-23 | Stop reason: HOSPADM

## 2022-08-23 RX ORDER — AMOXICILLIN AND CLAVULANATE POTASSIUM 875; 125 MG/1; MG/1
1 TABLET, FILM COATED ORAL 2 TIMES DAILY
Qty: 14 TABLET | Refills: 0 | Status: SHIPPED | OUTPATIENT
Start: 2022-08-23 | End: 2022-08-30

## 2022-08-23 RX ORDER — POTASSIUM CHLORIDE 20 MEQ/1
40 TABLET, EXTENDED RELEASE ORAL ONCE
Status: DISCONTINUED | OUTPATIENT
Start: 2022-08-23 | End: 2022-08-23

## 2022-08-23 RX ADMIN — AMOXICILLIN AND CLAVULANATE POTASSIUM 1 TABLET: 875; 125 TABLET, FILM COATED ORAL at 13:05

## 2022-08-23 RX ADMIN — DICYCLOMINE HYDROCHLORIDE 10 MG: 10 CAPSULE ORAL at 05:02

## 2022-08-23 RX ADMIN — SERTRALINE HYDROCHLORIDE 50 MG: 50 TABLET ORAL at 08:18

## 2022-08-23 RX ADMIN — LEVOTHYROXINE SODIUM 88 MCG: 88 TABLET ORAL at 08:18

## 2022-08-23 RX ADMIN — CEFEPIME 2000 MG: 2 INJECTION, POWDER, FOR SOLUTION INTRAVENOUS at 06:17

## 2022-08-23 RX ADMIN — MUPIROCIN: 20 OINTMENT TOPICAL at 08:19

## 2022-08-23 RX ADMIN — ACETAMINOPHEN 650 MG: 325 TABLET ORAL at 00:06

## 2022-08-23 RX ADMIN — DICYCLOMINE HYDROCHLORIDE 10 MG: 10 CAPSULE ORAL at 13:04

## 2022-08-23 RX ADMIN — SODIUM CHLORIDE: 9 INJECTION, SOLUTION INTRAVENOUS at 06:55

## 2022-08-23 RX ADMIN — POTASSIUM BICARBONATE 40 MEQ: 391 TABLET, EFFERVESCENT ORAL at 06:27

## 2022-08-23 RX ADMIN — PANTOPRAZOLE SODIUM 40 MG: 40 TABLET, DELAYED RELEASE ORAL at 05:03

## 2022-08-23 RX ADMIN — ALOGLIPTIN 6.25 MG: 6.25 TABLET, FILM COATED ORAL at 08:18

## 2022-08-23 RX ADMIN — MAGNESIUM SULFATE HEPTAHYDRATE 1000 MG: 1 INJECTION, SOLUTION INTRAVENOUS at 06:59

## 2022-08-23 RX ADMIN — Medication 10 ML: at 08:18

## 2022-08-23 RX ADMIN — VANCOMYCIN HYDROCHLORIDE 1750 MG: 1 INJECTION, POWDER, LYOPHILIZED, FOR SOLUTION INTRAVENOUS at 08:17

## 2022-08-23 RX ADMIN — ENOXAPARIN SODIUM 40 MG: 100 INJECTION SUBCUTANEOUS at 08:17

## 2022-08-23 RX ADMIN — ACETAMINOPHEN 650 MG: 325 TABLET ORAL at 13:31

## 2022-08-23 RX ADMIN — ATORVASTATIN CALCIUM 10 MG: 10 TABLET, FILM COATED ORAL at 08:18

## 2022-08-23 RX ADMIN — LABETALOL HYDROCHLORIDE 50 MG: 100 TABLET, FILM COATED ORAL at 00:42

## 2022-08-23 RX ADMIN — LISINOPRIL 5 MG: 5 TABLET ORAL at 08:18

## 2022-08-23 ASSESSMENT — PAIN DESCRIPTION - DESCRIPTORS
DESCRIPTORS: ACHING
DESCRIPTORS: ACHING

## 2022-08-23 ASSESSMENT — PAIN - FUNCTIONAL ASSESSMENT
PAIN_FUNCTIONAL_ASSESSMENT: ACTIVITIES ARE NOT PREVENTED
PAIN_FUNCTIONAL_ASSESSMENT: ACTIVITIES ARE NOT PREVENTED

## 2022-08-23 ASSESSMENT — PAIN SCALES - GENERAL
PAINLEVEL_OUTOF10: 5
PAINLEVEL_OUTOF10: 5
PAINLEVEL_OUTOF10: 0
PAINLEVEL_OUTOF10: 7

## 2022-08-23 ASSESSMENT — PAIN DESCRIPTION - LOCATION
LOCATION: HEAD
LOCATION: HEAD;ABDOMEN
LOCATION: HEAD

## 2022-08-23 NOTE — PROGRESS NOTES
Pharmacy Vancomycin Consult     Vancomycin Day: 3  Current Dosing: Pulse  Current indication: Intra-abdominal infection    Recent Labs     08/22/22  0410 08/23/22  0507   BUN 25* 11   CREATININE 1.7* 0.9   WBC 8.2 5.7       Estimated Creatinine Clearance: 61 mL/min (based on SCr of 0.9 mg/dL). Trough: 11.0    Assessment/Plan:  Will dose with 1750 mg x1 and recheck a level with AM labs tomorrow. Renal function appears to be better than baseline currently - if creatinine remains stable, may begin scheduled dosing.      Jumana Petit PharmD, [facility], 8/23/2022 5:54 AM

## 2022-08-23 NOTE — CARE COORDINATION
DISCHARGE ORDER  Date/Time 2022 3:39 PM  Completed by: Karrie Webber RN, Case Management    Patient Name: Nasrin Rodriguez      : 1950  Admitting Diagnosis: Diverticulitis of colon [K57.32]  Septicemia ([address] [A41.9]  Seizure-like activity ([facility] [R56.9]  Frequent falls [R29.6]  Sepsis ([facility] [address] [A41.9]  Chest pain, unspecified type [R07.9]      Admit order Date and Status:2022  (verify MD's last order for status of admission)      Noted discharge order. If applicable PT/OT recommendation at Discharge:   Discharge Recommendations: Home PRN assist     Discharge Plan: Chart reviewed. DC home today, No DCP needs. IPTA    Date of Last IMM Given: 2022    Reviewed chart. Role of discharge planner explained and patient verbalized understanding. Discharge order is noted. Has Home O2 in place on admit:  No  Informed of need to bring portable home O2 tank on day of discharge for nursing to connect prior to leaving:   No  Verbalized agreement/Understanding:   No  Pt is being d/c'd to home today. Pt's O2 sats are 98% on RA. Discharge timeout done with [facility], [facility].. All discharge needs and concerns addressed.

## 2022-08-23 NOTE — PROGRESS NOTES
Inpatient Physical Therapy Evaluation and Treatment    Unit: PCU  Date:  8/23/2022  Patient Name:    Akilah Lee  Admitting diagnosis:  Diverticulitis of colon [K57.32]  Septicemia ([facility] [A41.9]  Seizure-like activity ([facility] [address] [facility] [R56.9]  Frequent falls [R29.6]  Sepsis ([facility] [A41.9]  Chest pain, unspecified type [R07.9]  Admit Date:  8/21/2022  Precautions/Restrictions/WB Status/ Lines/ Wounds/ Oxygen: Fall risk, Bed/chair alarm, Lines -IV to abdomen, and Telemetry, seizure precautions. Treatment Time:  10:04-10:38  Treatment Number:  1   Timed Code Treatment Minutes: 24 minutes  Total Treatment Minutes:  34  minutes    Patient Goals for Therapy: denies needs. Discharge Recommendations: Home PRN assist   DME needs for discharge: Needs Met       Therapy recommendation for EMS Transport: can transport by wheelchair    Therapy recommendations for staff:   Stand by assist with use of rolling walker (RW) and gait belt for all transfers and ambulation within room  within halls    History of Present Illness: Per Dr. Ping Lerner progress note 8/22: \"Patient admitted with syncope, history of recurrent falls at home with right foot fracture and left wrist fracture possible seizure activity  Work-up revealed the patient to be in sepsis/septic shock. Started on IV fluids and on broad-spectrum IV antibiotics for diverticulitis\". Work up for sepsis 2/2 acute diverticulitis, possible UTI as well. Recent h/o syncope, recurrent falls, possible seizure activity. Was in ED on 8/19 following a fall. Imaging revealed L wrist fx and R 4th MT fx, orthopedics Dr. Miranda Rich was consulted. Per ED provider note on 8/19: \"Workup remarkable for nondisplaced fourth metatarsal fracture. Also showed possible acute ulnar styloid fracture. Patient has had previous surgery on her wrist and had an ORIF, unclear if there is any new fracture lines. I did discuss with Dr. Miranda Rich of orthopedics who reviewed the x-rays.   He had low suspicion that any of the fracture seen on wrist x-ray were new. He did not feel the patient required any reduction. Did recommend Velcro wrist splint. Patient also placed in orthopedic walking boot for fourth metatarsal fracture. In walking boot, patient is ambulatory. \"    PMHx including not limited to: HTN, DMII, stage III renal failure. Home Health S4 Level Recommendation:  NA  AM-PAC Mobility Score       AM-PAC Inpatient Mobility without Stair Climbing Raw Score : 20    Preadmission Environment    Pt. Lives with family (2 dtrs)- pt reports one dtr has Cerebral Palsy   Home environment:    one story home  Steps to enter first floor: ramp  Steps to second floor: N/A  Bathroom: tub/shower unit, walk in shower, grab bars, standard height commode and shower seat  Equipment owned: RW, wc (manual), and SPC,railing on bed      Preadmission Status:  Pt. Able to drive: Yes  Pt Fully independent with ADLs: No dtr  assists  Pt. Required assistance from family for: Cleaning, Cooking and Laundry . Pt helps out with  cooking and laundry   Pt. independent for transfers and gait and walked with Kofi Valles at times and sometimes no AD   History of falls Yes    Pain   Yes  Location: wrist, abdomen  Ratin /10  Pain Medicine Status: No request made    Cognition    A&O x4   Able to follow 2 step commands    Subjective  Patient lying supine in bed with no family present. Pt agreeable to this PT eval & tx. Eager to get up out of bed. Upper Extremity ROM/Strength  Please see OT evaluation. Lower Extremity ROM / Strength   AROM WFL: Yes with exception of R foot - not assessed, already in walking boot upon arrival.     BLE strength WFL, but not formally assessed with MMT. Lower Extremity Sensation    [facility]    Lower Extremity Proprioception:   WFL    Coordination and Tone  WFL    Balance  Sitting:  Good ; Independent  Comments: at EOB    Standing: Fair +; SBA  Comments: with walker. Able to stand statically without UE support.  Says she feels more comfortable with the walker. Agree with this assessment given current status in the boot with uneven stance and inc pain. Bed Mobility   Supine to Sit:    Modified Independent  Sit to Supine:   Not Tested  Rolling:   Modified Independent  Scooting in sitting: Independent (cautiously pushing through RUE, reduced force through that arm 2/2 wrist fx)  Scooting in supine:  Not Tested    Transfer Training     Sit to stand:   SBA  Stand to sit:   SBA  Bed to Chair:   SBA with use of gait belt and rolling walker (RW)    Gait gait completed as indicated below  Distance:      100 ft  Deviations (firm surface/linoleum):  decreased kathi and left lateral lean with hip drop 2/2 leg length difference with boot on R foot  and sock on L foot. Assistive Device Used:    gait belt and rolling walker (RW)  Level of Assist:    SBA progressing to supervision. Comment: Discussed benefits of shoe with good heel lift on CL leg to normalize stance. Stair Training deferred, pt does not have stairs in home environment    Activity Tolerance   Pt completed therapy session with No adverse symptoms noted w/activity. Supine in bed: SP02 96%  Sitting EOB: BP:148/70    SP02:96%  In chair following functional transer: BP:143/62   SP02:95%  In chair following functional mobility in gillis: HR:145/68   SP02:97%    Positioning Needs   Pt reclined in chair, alarm set, positioned in proper neutral alignment and pressure relief provided. Call light provided and all needs within reach    Exercises Initiated  Abril deferred secondary to treatment focus on functional mobility  Discussed and demonstrated AROM to fingers and toes on affected extremities. Pt able to demonstrate AROM WNL both extremities without pain. Other  None. Patient/Family Education   Pt educated on role of inpatient PT, POC, importance of continued activity, calling for assist with mobility.     Assessment  Pt seen for Physical Therapy evaluation in acute care setting. Pt is mobilizing safely at household level (including up to 50 ft ambulation) using a rolling walker. Grossly at Supervision/Mod IND level for all mobility. Gait deviations related to use of the walking boot are observed but no overt balance deficits. Recommending Home PRN assist upon discharge as patient functioning close to baseline level    Goals / Plan    1 time eval & treat only - no further acute PT warranted. Signature: Karyn Kuo, PT, DPT    If patient discharges from this facility prior to next visit, this note will serve as the Discharge Summary.

## 2022-08-23 NOTE — PROGRESS NOTES
Extended Infusion B-Lactam Antibiotics: Cefepime     Day: 3    Recent Labs     08/21/22  1240 08/22/22  0410 08/23/22  0507   CREATININE 3.0* 1.7* 0.9     Estimated Creatinine Clearance: 61 mL/min (based on SCr of 0.9 mg/dL). Recent Labs     08/21/22  1152 08/22/22  0410 08/23/22  0507   WBC 6.9 8.2 5.7       Cefepime-Extended Infusion (4-hour infusion) - Preferred Dosing Strategy   Renal Function (CrCl mL/min) ? 60 30 - 59 11 - 29 ? 10, HD PD CRRT   All indications - Loading dose of 2000 milligrams x 1 over 30 minutes or via IV push. Maintenance dose   should begin at the next regularly scheduled dosing interval based on indication/renal function. Intra-abdominal infections, Skin and soft tissue infections, Urinary tract infections 2000mg q12h 2000mg q24h 1000mg q24h 500mg q24h 1000mg q24h 2000mg q24h*   Bacteremia, CNS infections, Cystic fibrosis, Diabetic foot infections, Endocarditis, Febrile neutropenia, Healthcare-associated infections, Osteomyelitis/joint infections, Pneumonia, Sepsis, BMI > 40* 2000mg q8h 2000mg q12h 1000mg q12h 1000mg q24h 1000mg q24h 2000mg q12h         Renal function has improved. Will increase dose to 2000 mg q12h.     Callie Colin, JaminD, [facility], 8/23/2022 6:02 AM

## 2022-08-23 NOTE — PLAN OF CARE
Problem: Discharge Planning  Goal: Discharge to home or other facility with appropriate resources  8/23/2022 1553 by Gus Delacruz RN  Outcome: Completed  Flowsheets (Taken 8/23/2022 9925)  Discharge to home or other facility with appropriate resources:   Identify barriers to discharge with patient and caregiver   Arrange for needed discharge resources and transportation as appropriate  8/23/2022 0734 by Gus Delacruz RN  Outcome: Progressing     Problem: Pain  Goal: Verbalizes/displays adequate comfort level or baseline comfort level  8/23/2022 1553 by Gus Delacruz RN  Outcome: Completed  Flowsheets  Taken 8/23/2022 1331  Verbalizes/displays adequate comfort level or baseline comfort level:   Encourage patient to monitor pain and request assistance   Assess pain using appropriate pain scale  Taken 8/23/2022 0736  Verbalizes/displays adequate comfort level or baseline comfort level:   Encourage patient to monitor pain and request assistance   Assess pain using appropriate pain scale  8/23/2022 0734 by Gus Delacruz RN  Outcome: Progressing     Problem: Safety - Adult  Goal: Free from fall injury  8/23/2022 1553 by Gus Delacruz RN  Outcome: Completed  8/23/2022 0734 by Gus Delacruz RN  Outcome: Progressing     Problem: Chronic Conditions and Co-morbidities  Goal: Patient's chronic conditions and co-morbidity symptoms are monitored and maintained or improved  8/23/2022 1553 by Gus Delacruz RN  Outcome: Completed  Flowsheets (Taken 8/23/2022 0736)  Care Plan - Patient's Chronic Conditions and Co-Morbidity Symptoms are Monitored and Maintained or Improved:   Monitor and assess patient's chronic conditions and comorbid symptoms for stability, deterioration, or improvement   Collaborate with multidisciplinary team to address chronic and comorbid conditions and prevent exacerbation or deterioration  8/23/2022 0734 by Gus Delacruz RN  Outcome: Progressing

## 2022-08-23 NOTE — DISCHARGE SUMMARY
Name:  Hazel Goss  Room:  /7275-99  MRN:    [id number]    Discharge Summary      This discharge summary is in conjunction with a complete physical exam done on the day of discharge. Discharging Physician: Dr. Tierra Singh: 8/21/2022  Discharge:  8/23/22    HPI taken from admission H&P:    The patient is a 71 y.o. female with PMH below, presents with multiple episodes of LOC (syncope vs Sz), CP, L wrist Fx, R foot Fx. Pt reports that she had a Sz pta. When asked is she passed out or had a Sz, she says she is not sure but her daughters reported that she was shaking all over and \"frothing at the mouth. \"  Per ED notes, EMS reported that she did not appear post ictal at their arrival.  She says she does not remember today's episode. She reports that she has had several similar episodes over the last week. She says she was seen in the ER 2 days ago after one of these episodes. She was Dx w/ R 4th metatarsal Fx and L wrist pain (pt reports L wrist Fx but notes do not support that). She says that she had substernal, non-radiating CP prior to her passing out. Diagnoses this Admission and Hospital Course   #Sepsis/septic shock  -Likely secondary to diverticulitis; follow-up on cultures  - required pressors  -Weaned off of Levophed now, continue IV fluids  Can  transfer out of ICU --> in PCU   -BP is stable now, actually hypertensive      # Syncope  #Recurrent falls  #Possible seizure activity  -in seizure precautions  -continue to monitor in telemetry  - trt sepsis   -Consulted PT OT--.  Recommending home with prn assistance   -needs to follow up with neurosurgery outpatient if concern for seizure      # acute diverticulitis  - continue IV antibiotics-vancomycin and cefepime  -continue PO augmentin for 7 more days   -tolerating regular diet   -off IVF      #Possible UTI  -Follow-up on cultures, antibiotics as above  -urine culture negative      #History of hypertension   -Weaned off of Levophed now , blood pressure starting to trend up now started on lisinopril   -resume home regimen of lasix and lisinopril      # History of fall with left wrist fracture and right foot fracture  - left wrist is in splint, right foot boot . -needs to follow up with orthopedic surgery outpatient   -she sees provider at [facility]      # hypokalemia and hypomagnesemia   -repleted      # VIOLETA on CKD  -cr improved with IVF, back to baseline  -off IVF   -tolerating PO intake         #Diabetes mellitus type 2  -SSI  -resume home regimen     Procedures (Please Review Full Report for Details)  N/A     Consults    Pulmonology   Nephrology       Physical Exam at Discharge:    /60   Pulse 95   Temp 98 °F (36.7 °C) (Oral)   Resp (!) 6   Ht 5' 2\" (1.575 m)   Wt 208 lb 8 oz (94.6 kg)   SpO2 98%   BMI 38.14 kg/m²     Gen: No distress. Alert. Pleasant obese female   Eyes: PERRL. No sclera icterus. No conjunctival injection. Neck: No JVD. Trachea midline. Resp: No accessory muscle use. No crackles. No wheezes. No rhonchi. CV: Regular rate. Regular rhythm. No murmur. No rub. No edema. Peripheral Pulses: +2 palpable, equal bilaterally   GI: Non-distended. Normal bowel sounds. +Trace diffuse tenderness   Skin: Warm and dry. No nodule on exposed extremities. No rash on exposed extremities. +left wrist with ecchymosis, has velcro splint on, pulses and sensation intact bilaterally in upper and lower extremities   M/S: No cyanosis. No joint deformity. No clubbing. Neuro: Awake. Grossly nonfocal    Psych: Oriented x 3. No anxiety or agitation.        Lab Results   Component Value Date    WBC 5.7 08/23/2022    HGB 8.6 (L) 08/23/2022    HCT 26.2 (L) 08/23/2022    MCV 83.5 08/23/2022     08/23/2022     Lab Results   Component Value Date     08/23/2022    K 3.2 (L) 08/23/2022     08/23/2022    CO2 24 08/23/2022    BUN 11 08/23/2022    CREATININE 0.9 08/23/2022    GLUCOSE 140 (H) 08/23/2022    CALCIUM 8.2 (L) 08/23/2022    PROT 6.3 (L) 08/21/2022    LABALBU 3.2 (L) 08/21/2022    BILITOT <0.2 08/21/2022    ALKPHOS 158 (H) 08/21/2022    AST 18 08/21/2022    ALT 8 (L) 08/21/2022    LABGLOM >60 08/23/2022    GFRAA >60 08/23/2022    AGRATIO 1.0 (L) 08/21/2022    GLOB 3.5 03/22/2021             CULTURES  Urine culture negative   Blood cultures NGTD   COVID and Flu not detected     RADIOLOGY  CT THORACIC SPINE TRAUMA RECONSTRUCTION   Final Result   No acute osseous abnormality in the thoracic spine. CT LUMBAR SPINE TRAUMA RECONSTRUCTION   Final Result   No acute abnormality. CT CERVICAL SPINE WO CONTRAST   Final Result   No acute abnormality of the cervical spine. CT HEAD WO CONTRAST   Final Result   No acute intracranial abnormality. CT CHEST ABDOMEN PELVIS WO CONTRAST   Final Result   Chest:      No acute traumatic solid organ injury identified      Patchy ground-glass opacity and tree-in-bud nodularity in the right upper   lobe, likely postinflammatory-infectious      Abdomen and pelvis:      Wall thickening is seen involving the sigmoid colon with surrounding   pericolonic fat stranding suspicious for recurrent diverticulitis. Cirrhosis and splenomegaly. RECOMMENDATIONS:   Fleischner Society guidelines for follow-up and management of incidentally   detected pulmonary nodules: Marli Hankins Multiple Solid Nodules:      Nodule size equals 6-8 mm   In a low-risk patient, CT at 3-6 months, then consider CT at 18-24 months. In a high-risk patient, CT at 3-6 months, then CT at 18-24 months. Nodule size greater than 8 mm   In a low-risk patient, CT at 3-6 months, then consider CT at 18-24 months. In a high-risk patient, CT at 3-6 months, then CT at 18-24 months. - Low risk patients include individuals with minimal or absent history of   smoking and other known risk factors. - High risk patients include individuals with a history or smoking or known   risk factors. Radiology 2017 http://pubs. rsna.org/doi/full/10.1148/radiol. 8421183790         XR FOOT LEFT (2 VIEWS)   Final Result   No acute osseous abnormality.                Discharge Medications     Medication List        START taking these medications      amoxicillin-clavulanate 875-125 MG per tablet  Commonly known as: AUGMENTIN  Take 1 tablet by mouth 2 times daily for 7 days            CONTINUE taking these medications      dicyclomine 10 MG capsule  Commonly known as: BENTYL     fluocinolone acetonide 0.01 % external solution  Commonly known as: SYNALAR     fluticasone 50 MCG/ACT nasal spray  Commonly known as: FLONASE     furosemide 20 MG tablet  Commonly known as: LASIX  TAKE 1 TABLET (20MG) BY MOUTH DAILY     gabapentin 300 MG capsule  Commonly known as: NEURONTIN     ketoconazole 2 % shampoo  Commonly known as: NIZORAL     lisinopril 5 MG tablet  Commonly known as: PRINIVIL;ZESTRIL     meloxicam 7.5 MG tablet  Commonly known as: MOBIC     mirtazapine 7.5 MG tablet  Commonly known as: REMERON     naproxen 500 MG tablet  Commonly known as: NAPROSYN  Take 1 tablet by mouth 2 times daily as needed for Pain     NIFEdipine 30 MG extended release tablet  Commonly known as: ADALAT CC     nystatin [id number] UNIT/GM cream  Commonly known as: MYCOSTATIN     ondansetron 4 MG tablet  Commonly known as: ZOFRAN     sertraline 50 MG tablet  Commonly known as: ZOLOFT     simvastatin 20 MG tablet  Commonly known as: ZOCOR     SITagliptin 25 MG tablet  Commonly known as: JANUVIA     sucralfate 1 GM tablet  Commonly known as: CARAFATE     tiZANidine 4 MG tablet  Commonly known as: ZANAFLEX     traZODone 100 MG tablet  Commonly known as: DESYREL     triamcinolone 0.1 % ointment  Commonly known as: KENALOG     Trulance 3 MG Tabs  Generic drug: Plecanatide     Trulicity 7.61 GS/5.5XZ Sopn  Generic drug: Dulaglutide            STOP taking these medications      QUEtiapine 100 MG tablet  Commonly known as: SEROQUEL            ASK your doctor about these medications      levothyroxine 50 MCG tablet  Commonly known as: SYNTHROID  Take 1 tablet by mouth daily     pantoprazole 40 MG tablet  Commonly known as: PROTONIX  Take 1 tablet by mouth 2 times daily (before meals)               Where to Get Your Medications        These medications were sent to [id number] [facility], [address] [place] [address], [address] [address]      Phone: [phone]   amoxicillin-clavulanate 875-125 MG per tablet           Discharged in stable condition to home     Follow Up: Follow up with PCP in 1 week and orthopedic surgeon, make appointment with neurologist     More than 30 mts spent.      Sanford Cuevas  08/23/22  3:47 PM    Star Ortiz MD 9/2/2022 9:16 AM

## 2022-08-23 NOTE — PROGRESS NOTES
Patient educated on discharge instructions as well as new medications use, dosage, administration and possible side effects. Patient verified knowledge. IV removed without difficulty and dry dressing in place. Telemetry monitor removed and returned to [address]. Pt left facility in stable condition to Home with all of their personal belongings.       Pt ready for discharge waiting for meds to beds and transportation to show up

## 2022-08-23 NOTE — PROGRESS NOTES
Pt awake in bed. Assessment completed and medications given per MAR. VS as charted in flowsheet. Family at bed side. A&Ox4. Pt denies any further needs at this time. Call light in reach and bed in lowest position.

## 2022-08-23 NOTE — PROGRESS NOTES
Pt was discharged with meds to bed sna dphamracy closed prior to dc .  This RN called Augmentin 875/125 mg disp 14 take 2 tab BID until gone into [employer] for pickup

## 2022-08-23 NOTE — FLOWSHEET NOTE
08/23/22 0736   Vital Signs   Temp 98 °F (36.7 °C)   Temp Source Oral   Heart Rate 98   Heart Rate Source Monitor   Resp 16   BP (!) 142/75   BP Location Left Arm   MAP (Calculated) 97.33   Level of Consciousness Alert (0)   MEWS Score 1   Pain Assessment   Pain Assessment None - Denies Pain   Care Plan - Pain Goals   Verbalizes/displays adequate comfort level or baseline comfort level Encourage patient to monitor pain and request assistance;Assess pain using appropriate pain scale   Oxygen Therapy   SpO2 98 %   O2 Device None (Room air)   Pt resting quietly in bed no s/s of distress noted no needs voiced .  Call light within reach , bed in low position see flowsheet for full assessment

## 2022-08-23 NOTE — PROGRESS NOTES
Inpatient Occupational Therapy  Evaluation and Treatment    Unit: PCU  Date:  8/23/2022  Patient Name:    True Lara  Admitting diagnosis:  Diverticulitis of colon [K57.32]  Septicemia ([facility] [A41.9]  Seizure-like activity ([facility] [R56.9]  Frequent falls [R29.6]  Sepsis ([facility] [A41.9]  Chest pain, unspecified type [R07.9]  Admit Date:  8/21/2022  Precautions/Restrictions/WB Status/ Lines/ Wounds/ Oxygen: Fall risk, Lines -IV, Telemetry, Continuous pulse oximetry, and L wrist splint, R LE boot. Treatment Time: 668- 10:35  Treatment Number: 1   Timed code treatment minutes 30 minutes   Total Treatment minutes:   40   minutes    Patient Goals for Therapy:  None stated      Discharge Recommendations: Home PRN assist   DME needs for discharge: Needs Met       Therapy recommendations for staff:   Assist of 1 with use of rolling walker (RW) for all transfers and ambulation to/from chair    History of Present Illness: per H&P   71 y.o. female with a history of chronic kidney disease stage III, CHF, COPD, hypertension, hyperlipidemia, obesity, questionable history of seizures she is not on antiepileptic medicine presenting with concern for syncope versus seizure. According to paramedics, patient was found in her bedroom after reported seizure. She has multiple developmental delay daughters reported seizure-like activity. Patient was alert and responsive and not postictal when EMS arrived. Patient states that she over the past several days has had multiple syncopal episodes and she states that she has pain in the central area of her chest prior to the syncopal episodes. No fevers. She was seen in the emergency room last night after a fall and was diagnosed with a foot fracture as well as left wrist fracture. She is not on blood thinners. She thinks that she did hit her head. She is complaining of pain to the back and dorsal aspect the left foot which is new compared to last night.      Home Health S4 Level Recommendation:  Level 1 Standard  AM-PAC Score: AM-PAC Inpatient Daily Activity Raw Score: 19    Preadmission Environment    Pt. Lives with family (2 dtr )  Home environment:  one story home  Steps to enter first floor: ramp  Steps to second floor: N/A  Bathroom: tub/shower unit, walk in shower, grab bars, standard height commode, and shower chair- built in  . Equipment owned: RW, wc (manual ), BSC, SPC, and bed rail     Preadmission Status:  Pt. Able to drive: Yes  Pt Fully independent with ADLs: Yes. Dtrs assist with bathing and dressing when pt is not feeling well. Pt. Required assistance from family for: Independent PTA  Pt. independent for transfers and gait and walked with No Device  History of falls Yes    Pain  Yes  Ratin/10  Location:L wrist, R leg, stomach, HA. Pain Medicine Status: No request made      Cognition    A&O x4   Able to follow 2 step commands    Subjective  Patient lying supine in bed with no family present. Pt agreeable to this OT eval & tx. Upper Extremity ROM:    WFL    Upper Extremity Strength:    BUE strength impaired but not formally assessed w/ MMT d/t L wrist splint and Line placement on RUE. UE grossly WFL. Upper Extremity Sensation    WNL    Upper Extremity Proprioception:  WFL    Coordination and Tone  WFL    Balance  Functional Sitting Balance:  WFL- Static sitting,dynamic sitting to don pull up. Functional Standing Balance:WFL- Static/dynamic standing using RW. Bed mobility:    Supine to sit:   Modified Independent- HOB elevated. Sit to supine:   Not Tested  Rolling:    Not Tested  Scooting in sitting:  Not Tested  Scooting to head of bed:   Not Tested    Bridging:   Not Tested    Transfers:    Sit to stand:  CGA- VC for hand placement/technique. Stand to sit:  SBA- VC for hand placement/technique.   Bed to chair:   CGA- utilizing RW  Standard toilet: Not Tested  Bed to [facility]:  Not Tested    Dressing:      UE:   Not Tested  LE:    Mod A - to don/doff pullup over feet/boot. CGA to don/doff pull up over knee. Discontinued d/t line placement in R hip. Bathing:    UE:  Not Tested  LE:  Not Tested    Eating:   Not Tested    Toileting:  Not Tested    Activity Tolerance   Pt completed therapy session with No adverse symptoms noted w/activity, however, dyspnea with execution noted. Supine in bed: SP02 96%  Sitting EOB: BP:148/70    SP02:96%  In chair following functional transfer: BP:143/62   SP02:95%  In chair following functional mobility in gillis: HR:145/68   SP02:97%    Positioning Needs:   Pt up in chair, alarm set, positioned in proper neutral alignment and pressure relief provided. Exercise / Activities Initiated:   N/A    Patient/Family Education:   Role of OT  Safe RW use/hand placement    Assessment of Deficits: Pt seen for Occupational therapy evaluation in acute care setting. Pt demonstrated decreased Activity tolerance, ADLs, and Transfers. Pt demonstrating difficulty performing LB dressing d/t boot on RLE. Pt tolerated therapy well and has increased family support for ADLs and increased supervision at home. Pt functioning below baseline and will likely benefit from skilled occupational therapy services to maximize safety and independence. Goal(s) : To be met in 3 Visits:  1). Bed to toilet/BSC: Supervision    To be met in 5 Visits:  1). Supine to/from Sit:  Independent  2). Upper Body Bathing:   Independent  3). Lower Body Bathing:   Supervision  4). Upper Body Dressing:  Independent  5). Lower Body Dressing including Don/Doff boot:  Supervision  6). Pt to demonstrate UE exs x 15 reps with minimal cues    Rehabilitation Potential:  Good for goals listed above. Strengths for achieving goals include: Pt motivated, PLOF, Family Support, and Pt cooperative  Barriers to achieving goals include:  Complexity of condition and Pain     Plan:   To be seen for 1-2 visits while in acute care setting for therapeutic exercises, bed mobility, transfers, dressing, bathing, family/patient education, ADL/IADL retraining, energy conservation training. Plan to address LB dressing including don/doff LE boot next session.      Sallie Schwartz OTR/L  Lazara Anderson OTR/L [id number]           If patient discharges from this facility prior to next visit, this note will serve as the Discharge Summary

## 2022-08-23 NOTE — PROGRESS NOTES
Nephrology Progress Note   [facility]. com      Sub/interval history  Patient is resting in bed, feels better  Renal function has improved    Last 24 h uop 2.2 L    ROS: No chest pain/shortness of breath/fever/nausea/vomiting  PSFH: No visitor    Scheduled Meds:   amoxicillin-clavulanate  1 tablet Oral TID    lisinopril  5 mg Oral Daily    enoxaparin  40 mg SubCUTAneous Daily    mupirocin   Nasal BID    dicyclomine  10 mg Oral 4x Daily AC & HS    levothyroxine  88 mcg Oral Daily    pantoprazole  40 mg Oral BID AC    sertraline  50 mg Oral Daily    atorvastatin  10 mg Oral Daily    alogliptin  6.25 mg Oral Daily    sodium chloride flush  5-40 mL IntraVENous 2 times per day    insulin lispro  0-4 Units SubCUTAneous TID WC    insulin lispro  0-4 Units SubCUTAneous Nightly     Continuous Infusions:   dextrose      sodium chloride       PRN Meds:.labetalol, magnesium sulfate, potassium chloride **OR** potassium alternative oral replacement **OR** potassium chloride, glucose, dextrose bolus **OR** dextrose bolus, glucagon (rDNA), dextrose, sodium chloride flush, sodium chloride, ondansetron **OR** ondansetron, polyethylene glycol, acetaminophen **OR** acetaminophen    Objective/     Vitals:    08/23/22 0122 08/23/22 0150 08/23/22 0454 08/23/22 0736   BP:  (!) 122/58 (!) 132/58 (!) 142/75   Pulse:    98   Resp:    16   Temp:    98 °F (36.7 °C)   TempSrc:    Oral   SpO2:    98%   Weight: 208 lb 8 oz (94.6 kg)      Height:         24HR INTAKE/OUTPUT:    Intake/Output Summary (Last 24 hours) at 8/23/2022 1100  Last data filed at 8/23/2022 0500  Gross per 24 hour   Intake 490 ml   Output 1500 ml   Net -1010 ml       Gen: alert, awake  Neck: No JVD  Skin: Unremarkable  Cardiovascular:  S1, S2 without m/r/g   Respiratory: CTA B without w/r/r; respiratory effort normal  Abdomen:  soft, nt, nd,   Extremities: no lower extremity edema  Neuro/Psy: AAoriented times 3 ; moves all 4 ext    Data/  Recent Labs     08/21/22  1152 08/22/22  0410 08/23/22  0507   WBC 6.9 8.2 5.7   HGB 7.8* 9.0* 8.6*   HCT 24.6* 27.1* 26.2*   MCV 86.3 83.1 83.5    237 198       Recent Labs     08/21/22  1240 08/21/22  1704 08/22/22  0410 08/23/22  0507     --  143 142   K 3.4*  --  3.2* 3.2*   CL 99  --  107 107   CO2 21  --  22 24   GLUCOSE 225* 146 113* 140*   PHOS  --   --  2.9  --    MG  --   --  1.60* 1.60*   BUN 34*  --  25* 11   CREATININE 3.0*  --  1.7* 0.9   LABGLOM 15*  --  30* >60   GFRAA 19*  --  36* >60         Assessment/   -VIOLETA on CKD in the setting of hypotension/shock, prerenal in etiology. Improving  -Hypokalemia with hypomagnesemia  -Chronic kidney disease stage III, baseline creatinine of 1.1-1.4, follows with Dr. Sanjay Bazzi  -Septic shock, improving  Likely from diverticulitis on antibiotics  -Anemia  -Syncope/seizure per internal medicine  -History of VILLEGAS cirrhosis    Plan/   -Discontinue IV fluid  -Resume home dose antihypertensives   Lasix can be resumed in next few days  -Replace potassium and magnesium as ordered  -Keep systolic blood pressure more than 110  -Serial renal panel  -daily wts and strict i/o  -renal dose medications   -avoid nephrotoxins    Keep appointment with Dr. Sanjay Bazzi; okay to discharge from renal standpoint    Dillon Stiles MD  Office: [phone]  Fax:    [phone] [facility]

## 2022-08-24 ENCOUNTER — OFFICE VISIT (OUTPATIENT)
Dept: ORTHOPEDIC SURGERY | Age: 72
End: 2022-08-24
Payer: MEDICARE

## 2022-08-24 VITALS — WEIGHT: 208 LBS | HEIGHT: 62 IN | BODY MASS INDEX: 38.28 KG/M2

## 2022-08-24 DIAGNOSIS — S52.602A CLOSED FRACTURE OF DISTAL END OF LEFT ULNA, UNSPECIFIED FRACTURE MORPHOLOGY, INITIAL ENCOUNTER: Primary | ICD-10-CM

## 2022-08-24 PROCEDURE — 3017F COLORECTAL CA SCREEN DOC REV: CPT | Performed by: PHYSICIAN ASSISTANT

## 2022-08-24 PROCEDURE — G8417 CALC BMI ABV UP PARAM F/U: HCPCS | Performed by: PHYSICIAN ASSISTANT

## 2022-08-24 PROCEDURE — 1124F ACP DISCUSS-NO DSCNMKR DOCD: CPT | Performed by: PHYSICIAN ASSISTANT

## 2022-08-24 PROCEDURE — G8427 DOCREV CUR MEDS BY ELIG CLIN: HCPCS | Performed by: PHYSICIAN ASSISTANT

## 2022-08-24 PROCEDURE — 1111F DSCHRG MED/CURRENT MED MERGE: CPT | Performed by: PHYSICIAN ASSISTANT

## 2022-08-24 PROCEDURE — 1036F TOBACCO NON-USER: CPT | Performed by: PHYSICIAN ASSISTANT

## 2022-08-24 PROCEDURE — 1090F PRES/ABSN URINE INCON ASSESS: CPT | Performed by: PHYSICIAN ASSISTANT

## 2022-08-24 PROCEDURE — G8399 PT W/DXA RESULTS DOCUMENT: HCPCS | Performed by: PHYSICIAN ASSISTANT

## 2022-08-24 PROCEDURE — 99204 OFFICE O/P NEW MOD 45 MIN: CPT | Performed by: PHYSICIAN ASSISTANT

## 2022-08-24 NOTE — PROGRESS NOTES
8/24/22 1522: RN spoke with Ian Mckeon from pharmacy about meds to beds. RN told Ian Mckeon that we did not have a thee working yesterday to sign for the prescription. RN told her that Elgie Singleton called in a prescription. I transferred the call to CA at this time.
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
## 2022-08-25 LAB
BLOOD CULTURE, ROUTINE: NORMAL
CULTURE, BLOOD 2: NORMAL

## 2022-09-06 ENCOUNTER — HOSPITAL ENCOUNTER (INPATIENT)
Age: 72
LOS: 4 days | Discharge: HOME HEALTH | DRG: 291 | End: 2022-09-10
Attending: EMERGENCY MEDICINE | Admitting: HOSPITALIST
Payer: MEDICARE

## 2022-09-06 ENCOUNTER — APPOINTMENT (OUTPATIENT)
Dept: GENERAL RADIOLOGY | Age: 72
DRG: 291 | End: 2022-09-06
Payer: MEDICARE

## 2022-09-06 DIAGNOSIS — J44.1 COPD EXACERBATION (HCC): ICD-10-CM
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
## 2022-09-06 DIAGNOSIS — J18.9 HAP (HOSPITAL-ACQUIRED PNEUMONIA): ICD-10-CM

## 2022-09-06 DIAGNOSIS — J96.01 ACUTE RESPIRATORY FAILURE WITH HYPOXIA (HCC): Primary | ICD-10-CM

## 2022-09-06 DIAGNOSIS — I50.33 ACUTE ON CHRONIC DIASTOLIC CONGESTIVE HEART FAILURE (HCC): ICD-10-CM

## 2022-09-06 LAB
A/G RATIO: 1.2 (ref 1.1–2.2)
ALBUMIN SERPL-MCNC: 3.7 G/DL (ref 3.4–5)
ALP BLD-CCNC: 343 U/L (ref 40–129)
ALT SERPL-CCNC: 11 U/L (ref 10–40)
ANION GAP SERPL CALCULATED.3IONS-SCNC: 14 MMOL/L (ref 3–16)
AST SERPL-CCNC: 22 U/L (ref 15–37)
BASE EXCESS VENOUS: -1.4 MMOL/L (ref -3–3)
BASE EXCESS VENOUS: -2.1 MMOL/L (ref -3–3)
BASOPHILS ABSOLUTE: 0.1 K/UL (ref 0–0.2)
BASOPHILS RELATIVE PERCENT: 1.3 %
BILIRUB SERPL-MCNC: 0.3 MG/DL (ref 0–1)
BUN BLDV-MCNC: 25 MG/DL (ref 7–20)
CALCIUM SERPL-MCNC: 9 MG/DL (ref 8.3–10.6)
CARBOXYHEMOGLOBIN: 1.5 % (ref 0–1.5)
CARBOXYHEMOGLOBIN: 3 % (ref 0–1.5)
CHLORIDE BLD-SCNC: 103 MMOL/L (ref 99–110)
CO2: 23 MMOL/L (ref 21–32)
CREAT SERPL-MCNC: 1 MG/DL (ref 0.6–1.2)
EOSINOPHILS ABSOLUTE: 0.3 K/UL (ref 0–0.6)
EOSINOPHILS RELATIVE PERCENT: 4.8 %
GFR AFRICAN AMERICAN: >60
GFR NON-AFRICAN AMERICAN: 55
GLUCOSE BLD-MCNC: 162 MG/DL (ref 70–99)
HCO3 VENOUS: 22.2 MMOL/L (ref 23–29)
HCO3 VENOUS: 23.4 MMOL/L (ref 23–29)
HCT VFR BLD CALC: 23.5 % (ref 36–48)
HEMOGLOBIN: 7.5 G/DL (ref 12–16)
INFLUENZA A: NOT DETECTED
INFLUENZA B: NOT DETECTED
LACTIC ACID: 2.1 MMOL/L (ref 0.4–2)
LYMPHOCYTES ABSOLUTE: 1 K/UL (ref 1–5.1)
LYMPHOCYTES RELATIVE PERCENT: 16.4 %
MCH RBC QN AUTO: 26.9 PG (ref 26–34)
MCHC RBC AUTO-ENTMCNC: 31.8 G/DL (ref 31–36)
MCV RBC AUTO: 84.8 FL (ref 80–100)
METHEMOGLOBIN VENOUS: 0.3 %
METHEMOGLOBIN VENOUS: 0.3 %
MONOCYTES ABSOLUTE: 0.3 K/UL (ref 0–1.3)
MONOCYTES RELATIVE PERCENT: 5.5 %
NEUTROPHILS ABSOLUTE: 4.2 K/UL (ref 1.7–7.7)
NEUTROPHILS RELATIVE PERCENT: 72 %
O2 CONTENT, VEN: 11 VOL %
O2 SAT, VEN: 95 %
O2 SAT, VEN: 96 %
O2 THERAPY: ABNORMAL
O2 THERAPY: ABNORMAL
PCO2, VEN: 36 MMHG (ref 40–50)
PCO2, VEN: 39.6 MMHG (ref 40–50)
PDW BLD-RTO: 17.4 % (ref 12.4–15.4)
PH VENOUS: 7.39 (ref 7.35–7.45)
PH VENOUS: 7.41 (ref 7.35–7.45)
PLATELET # BLD: 264 K/UL (ref 135–450)
PMV BLD AUTO: 8.7 FL (ref 5–10.5)
PO2, VEN: 74.4 MMHG (ref 25–40)
PO2, VEN: 82.8 MMHG (ref 25–40)
POTASSIUM SERPL-SCNC: 4.1 MMOL/L (ref 3.5–5.1)
PRO-BNP: 1940 PG/ML (ref 0–124)
RBC # BLD: 2.77 M/UL (ref 4–5.2)
SARS-COV-2 RNA, RT PCR: NOT DETECTED
SODIUM BLD-SCNC: 140 MMOL/L (ref 136–145)
TCO2 CALC VENOUS: 23 MMOL/L
TCO2 CALC VENOUS: 25 MMOL/L
TOTAL PROTEIN: 6.8 G/DL (ref 6.4–8.2)
TROPONIN: <0.01 NG/ML
WBC # BLD: 5.8 K/UL (ref 4–11)

## 2022-09-06 PROCEDURE — 87636 SARSCOV2 & INF A&B AMP PRB: CPT

## 2022-09-06 PROCEDURE — 83880 ASSAY OF NATRIURETIC PEPTIDE: CPT

## 2022-09-06 PROCEDURE — 96374 THER/PROPH/DIAG INJ IV PUSH: CPT

## 2022-09-06 PROCEDURE — 85025 COMPLETE CBC W/AUTO DIFF WBC: CPT

## 2022-09-06 PROCEDURE — 2700000000 HC OXYGEN THERAPY PER DAY

## 2022-09-06 PROCEDURE — 87040 BLOOD CULTURE FOR BACTERIA: CPT

## 2022-09-06 PROCEDURE — 36415 COLL VENOUS BLD VENIPUNCTURE: CPT

## 2022-09-06 PROCEDURE — 99285 EMERGENCY DEPT VISIT HI MDM: CPT

## 2022-09-06 PROCEDURE — 83605 ASSAY OF LACTIC ACID: CPT

## 2022-09-06 PROCEDURE — 2580000003 HC RX 258: Performed by: EMERGENCY MEDICINE

## 2022-09-06 PROCEDURE — 84484 ASSAY OF TROPONIN QUANT: CPT

## 2022-09-06 PROCEDURE — 94660 CPAP INITIATION&MGMT: CPT

## 2022-09-06 PROCEDURE — 2000000000 HC ICU R&B

## 2022-09-06 PROCEDURE — 80053 COMPREHEN METABOLIC PANEL: CPT

## 2022-09-06 PROCEDURE — 2060000000 HC ICU INTERMEDIATE R&B

## 2022-09-06 PROCEDURE — 93005 ELECTROCARDIOGRAM TRACING: CPT | Performed by: EMERGENCY MEDICINE

## 2022-09-06 PROCEDURE — 6360000002 HC RX W HCPCS: Performed by: EMERGENCY MEDICINE

## 2022-09-06 PROCEDURE — 82803 BLOOD GASES ANY COMBINATION: CPT

## 2022-09-06 PROCEDURE — 94761 N-INVAS EAR/PLS OXIMETRY MLT: CPT

## 2022-09-06 PROCEDURE — 71045 X-RAY EXAM CHEST 1 VIEW: CPT

## 2022-09-06 PROCEDURE — 6370000000 HC RX 637 (ALT 250 FOR IP): Performed by: EMERGENCY MEDICINE

## 2022-09-06 RX ORDER — DICYCLOMINE HYDROCHLORIDE 10 MG/1
10 CAPSULE ORAL 4 TIMES DAILY PRN
Status: DISCONTINUED | OUTPATIENT
Start: 2022-09-07 | End: 2022-09-10 | Stop reason: HOSPADM

## 2022-09-06 RX ORDER — TRAZODONE HYDROCHLORIDE 100 MG/1
100 TABLET ORAL NIGHTLY
Status: DISCONTINUED | OUTPATIENT
Start: 2022-09-07 | End: 2022-09-08

## 2022-09-06 RX ORDER — ENOXAPARIN SODIUM 100 MG/ML
40 INJECTION SUBCUTANEOUS DAILY
Status: DISCONTINUED | OUTPATIENT
Start: 2022-09-07 | End: 2022-09-10 | Stop reason: HOSPADM

## 2022-09-06 RX ORDER — INSULIN LISPRO 100 [IU]/ML
0-4 INJECTION, SOLUTION INTRAVENOUS; SUBCUTANEOUS
Status: DISCONTINUED | OUTPATIENT
Start: 2022-09-07 | End: 2022-09-10 | Stop reason: HOSPADM

## 2022-09-06 RX ORDER — SODIUM CHLORIDE 0.9 % (FLUSH) 0.9 %
5-40 SYRINGE (ML) INJECTION EVERY 12 HOURS SCHEDULED
Status: DISCONTINUED | OUTPATIENT
Start: 2022-09-07 | End: 2022-09-10 | Stop reason: HOSPADM

## 2022-09-06 RX ORDER — NIFEDIPINE 30 MG/1
60 TABLET, EXTENDED RELEASE ORAL DAILY
Status: DISCONTINUED | OUTPATIENT
Start: 2022-09-07 | End: 2022-09-09

## 2022-09-06 RX ORDER — IPRATROPIUM BROMIDE AND ALBUTEROL SULFATE 2.5; .5 MG/3ML; MG/3ML
1 SOLUTION RESPIRATORY (INHALATION) ONCE
Status: COMPLETED | OUTPATIENT
Start: 2022-09-06 | End: 2022-09-06

## 2022-09-06 RX ORDER — LEVOTHYROXINE SODIUM 88 UG/1
88 TABLET ORAL DAILY
Status: DISCONTINUED | OUTPATIENT
Start: 2022-09-07 | End: 2022-09-10 | Stop reason: HOSPADM

## 2022-09-06 RX ORDER — FUROSEMIDE 10 MG/ML
40 INJECTION INTRAMUSCULAR; INTRAVENOUS ONCE
Status: COMPLETED | OUTPATIENT
Start: 2022-09-06 | End: 2022-09-06

## 2022-09-06 RX ORDER — MIRTAZAPINE 15 MG/1
7.5 TABLET, FILM COATED ORAL NIGHTLY
Status: DISCONTINUED | OUTPATIENT
Start: 2022-09-07 | End: 2022-09-08

## 2022-09-06 RX ORDER — PREDNISONE 20 MG/1
40 TABLET ORAL DAILY
Status: DISCONTINUED | OUTPATIENT
Start: 2022-09-07 | End: 2022-09-10 | Stop reason: HOSPADM

## 2022-09-06 RX ORDER — DEXTROSE MONOHYDRATE 100 MG/ML
INJECTION, SOLUTION INTRAVENOUS CONTINUOUS PRN
Status: DISCONTINUED | OUTPATIENT
Start: 2022-09-06 | End: 2022-09-10 | Stop reason: HOSPADM

## 2022-09-06 RX ORDER — ATORVASTATIN CALCIUM 10 MG/1
10 TABLET, FILM COATED ORAL DAILY
Status: DISCONTINUED | OUTPATIENT
Start: 2022-09-07 | End: 2022-09-10 | Stop reason: HOSPADM

## 2022-09-06 RX ORDER — FUROSEMIDE 10 MG/ML
20 INJECTION INTRAMUSCULAR; INTRAVENOUS 3 TIMES DAILY
Status: DISCONTINUED | OUTPATIENT
Start: 2022-09-07 | End: 2022-09-08

## 2022-09-06 RX ORDER — POLYETHYLENE GLYCOL 3350 17 G/17G
17 POWDER, FOR SOLUTION ORAL DAILY PRN
Status: DISCONTINUED | OUTPATIENT
Start: 2022-09-06 | End: 2022-09-10 | Stop reason: HOSPADM

## 2022-09-06 RX ORDER — INSULIN LISPRO 100 [IU]/ML
0-4 INJECTION, SOLUTION INTRAVENOUS; SUBCUTANEOUS NIGHTLY
Status: DISCONTINUED | OUTPATIENT
Start: 2022-09-07 | End: 2022-09-10 | Stop reason: HOSPADM

## 2022-09-06 RX ORDER — MELOXICAM 7.5 MG/1
7.5 TABLET ORAL DAILY
Status: DISCONTINUED | OUTPATIENT
Start: 2022-09-07 | End: 2022-09-07 | Stop reason: ALTCHOICE

## 2022-09-06 RX ORDER — LISINOPRIL 5 MG/1
5 TABLET ORAL DAILY
Status: DISCONTINUED | OUTPATIENT
Start: 2022-09-07 | End: 2022-09-10 | Stop reason: HOSPADM

## 2022-09-06 RX ORDER — ACETAMINOPHEN 325 MG/1
650 TABLET ORAL EVERY 6 HOURS PRN
Status: DISCONTINUED | OUTPATIENT
Start: 2022-09-06 | End: 2022-09-10 | Stop reason: HOSPADM

## 2022-09-06 RX ORDER — ONDANSETRON 2 MG/ML
4 INJECTION INTRAMUSCULAR; INTRAVENOUS EVERY 6 HOURS PRN
Status: DISCONTINUED | OUTPATIENT
Start: 2022-09-06 | End: 2022-09-10 | Stop reason: HOSPADM

## 2022-09-06 RX ORDER — TIZANIDINE 4 MG/1
8 TABLET ORAL DAILY PRN
Status: DISCONTINUED | OUTPATIENT
Start: 2022-09-06 | End: 2022-09-08

## 2022-09-06 RX ORDER — ONDANSETRON 4 MG/1
4 TABLET, ORALLY DISINTEGRATING ORAL EVERY 8 HOURS PRN
Status: DISCONTINUED | OUTPATIENT
Start: 2022-09-06 | End: 2022-09-10 | Stop reason: HOSPADM

## 2022-09-06 RX ORDER — METHYLPREDNISOLONE SODIUM SUCCINATE 125 MG/2ML
125 INJECTION, POWDER, LYOPHILIZED, FOR SOLUTION INTRAMUSCULAR; INTRAVENOUS ONCE
Status: COMPLETED | OUTPATIENT
Start: 2022-09-06 | End: 2022-09-06

## 2022-09-06 RX ORDER — PANTOPRAZOLE SODIUM 40 MG/1
40 TABLET, DELAYED RELEASE ORAL
Status: DISCONTINUED | OUTPATIENT
Start: 2022-09-07 | End: 2022-09-10 | Stop reason: HOSPADM

## 2022-09-06 RX ORDER — SODIUM CHLORIDE 0.9 % (FLUSH) 0.9 %
5-40 SYRINGE (ML) INJECTION PRN
Status: DISCONTINUED | OUTPATIENT
Start: 2022-09-06 | End: 2022-09-10 | Stop reason: HOSPADM

## 2022-09-06 RX ORDER — ACETAMINOPHEN 650 MG/1
650 SUPPOSITORY RECTAL EVERY 6 HOURS PRN
Status: DISCONTINUED | OUTPATIENT
Start: 2022-09-06 | End: 2022-09-10 | Stop reason: HOSPADM

## 2022-09-06 RX ORDER — FLUTICASONE PROPIONATE 50 MCG
1 SPRAY, SUSPENSION (ML) NASAL DAILY
Status: DISCONTINUED | OUTPATIENT
Start: 2022-09-07 | End: 2022-09-10 | Stop reason: HOSPADM

## 2022-09-06 RX ORDER — SODIUM CHLORIDE 9 MG/ML
INJECTION, SOLUTION INTRAVENOUS PRN
Status: DISCONTINUED | OUTPATIENT
Start: 2022-09-06 | End: 2022-09-10 | Stop reason: HOSPADM

## 2022-09-06 RX ORDER — IPRATROPIUM BROMIDE AND ALBUTEROL SULFATE 2.5; .5 MG/3ML; MG/3ML
1 SOLUTION RESPIRATORY (INHALATION)
Status: DISCONTINUED | OUTPATIENT
Start: 2022-09-07 | End: 2022-09-07

## 2022-09-06 RX ORDER — SUCRALFATE 1 G/1
1 TABLET ORAL 4 TIMES DAILY
Status: DISCONTINUED | OUTPATIENT
Start: 2022-09-07 | End: 2022-09-10 | Stop reason: HOSPADM

## 2022-09-06 RX ORDER — GABAPENTIN 300 MG/1
300 CAPSULE ORAL NIGHTLY
Status: DISCONTINUED | OUTPATIENT
Start: 2022-09-07 | End: 2022-09-07 | Stop reason: CLARIF

## 2022-09-06 RX ADMIN — CEFEPIME 2000 MG: 2 INJECTION, POWDER, FOR SOLUTION INTRAVENOUS at 21:25

## 2022-09-06 RX ADMIN — FUROSEMIDE 40 MG: 10 INJECTION, SOLUTION INTRAMUSCULAR; INTRAVENOUS at 21:20

## 2022-09-06 RX ADMIN — METHYLPREDNISOLONE SODIUM SUCCINATE 125 MG: 125 INJECTION, POWDER, FOR SOLUTION INTRAMUSCULAR; INTRAVENOUS at 20:19

## 2022-09-06 RX ADMIN — VANCOMYCIN HYDROCHLORIDE 1250 MG: 500 INJECTION, POWDER, LYOPHILIZED, FOR SOLUTION INTRAVENOUS at 21:33

## 2022-09-06 RX ADMIN — IPRATROPIUM BROMIDE AND ALBUTEROL SULFATE 1 AMPULE: 2.5; .5 SOLUTION RESPIRATORY (INHALATION) at 20:18

## 2022-09-06 RX ADMIN — IPRATROPIUM BROMIDE AND ALBUTEROL SULFATE 1 AMPULE: 2.5; .5 SOLUTION RESPIRATORY (INHALATION) at 20:19

## 2022-09-06 ASSESSMENT — PAIN DESCRIPTION - PAIN TYPE: TYPE: ACUTE PAIN

## 2022-09-06 ASSESSMENT — PAIN DESCRIPTION - ORIENTATION: ORIENTATION: LEFT

## 2022-09-06 ASSESSMENT — PAIN SCALES - GENERAL
PAINLEVEL_OUTOF10: 6
PAINLEVEL_OUTOF10: 0
PAINLEVEL_OUTOF10: 0

## 2022-09-06 ASSESSMENT — PAIN DESCRIPTION - FREQUENCY: FREQUENCY: CONTINUOUS

## 2022-09-06 ASSESSMENT — PAIN - FUNCTIONAL ASSESSMENT
PAIN_FUNCTIONAL_ASSESSMENT: NONE - DENIES PAIN
PAIN_FUNCTIONAL_ASSESSMENT: NONE - DENIES PAIN
PAIN_FUNCTIONAL_ASSESSMENT: 0-10

## 2022-09-06 ASSESSMENT — PAIN DESCRIPTION - LOCATION: LOCATION: CHEST

## 2022-09-06 ASSESSMENT — PAIN DESCRIPTION - DESCRIPTORS: DESCRIPTORS: TIGHTNESS

## 2022-09-07 PROBLEM — I50.33 ACUTE ON CHRONIC DIASTOLIC CONGESTIVE HEART FAILURE (HCC): Status: ACTIVE | Noted: 2017-04-13

## 2022-09-07 PROBLEM — E11.9 WELL CONTROLLED TYPE 2 DIABETES MELLITUS (HCC): Status: ACTIVE | Noted: 2017-12-28

## 2022-09-07 LAB
ANION GAP SERPL CALCULATED.3IONS-SCNC: 13 MMOL/L (ref 3–16)
BASOPHILS ABSOLUTE: 0 K/UL (ref 0–0.2)
BASOPHILS RELATIVE PERCENT: 0.4 %
BILIRUBIN URINE: NEGATIVE
BLOOD, URINE: NEGATIVE
BUN BLDV-MCNC: 22 MG/DL (ref 7–20)
CALCIUM SERPL-MCNC: 9.4 MG/DL (ref 8.3–10.6)
CHLORIDE BLD-SCNC: 102 MMOL/L (ref 99–110)
CLARITY: CLEAR
CO2: 24 MMOL/L (ref 21–32)
COLOR: YELLOW
CREAT SERPL-MCNC: 0.9 MG/DL (ref 0.6–1.2)
EKG ATRIAL RATE: 73 BPM
EKG DIAGNOSIS: NORMAL
EKG P AXIS: 46 DEGREES
EKG P-R INTERVAL: 144 MS
EKG Q-T INTERVAL: 424 MS
EKG QRS DURATION: 68 MS
EKG QTC CALCULATION (BAZETT): 467 MS
EKG R AXIS: 6 DEGREES
EKG T AXIS: 156 DEGREES
EKG VENTRICULAR RATE: 73 BPM
EOSINOPHILS ABSOLUTE: 0 K/UL (ref 0–0.6)
EOSINOPHILS RELATIVE PERCENT: 0.1 %
ESTIMATED AVERAGE GLUCOSE: 137 MG/DL
GFR AFRICAN AMERICAN: >60
GFR NON-AFRICAN AMERICAN: >60
GLUCOSE BLD-MCNC: 173 MG/DL (ref 70–99)
GLUCOSE BLD-MCNC: 204 MG/DL (ref 70–99)
GLUCOSE BLD-MCNC: 220 MG/DL (ref 70–99)
GLUCOSE BLD-MCNC: 265 MG/DL (ref 70–99)
GLUCOSE BLD-MCNC: 280 MG/DL (ref 70–99)
GLUCOSE BLD-MCNC: 282 MG/DL (ref 70–99)
GLUCOSE URINE: 100 MG/DL
HBA1C MFR BLD: 6.4 %
HCT VFR BLD CALC: 24.6 % (ref 36–48)
HEMOGLOBIN: 8.1 G/DL (ref 12–16)
KETONES, URINE: NEGATIVE MG/DL
LACTIC ACID: 1.2 MMOL/L (ref 0.4–2)
LACTIC ACID: 1.2 MMOL/L (ref 0.4–2)
LEUKOCYTE ESTERASE, URINE: NEGATIVE
LYMPHOCYTES ABSOLUTE: 0.3 K/UL (ref 1–5.1)
LYMPHOCYTES RELATIVE PERCENT: 7.3 %
MCH RBC QN AUTO: 27.7 PG (ref 26–34)
MCHC RBC AUTO-ENTMCNC: 32.9 G/DL (ref 31–36)
MCV RBC AUTO: 84.3 FL (ref 80–100)
MICROSCOPIC EXAMINATION: ABNORMAL
MONOCYTES ABSOLUTE: 0.1 K/UL (ref 0–1.3)
MONOCYTES RELATIVE PERCENT: 1.3 %
MRSA SCREEN RT-PCR: NORMAL
NEUTROPHILS ABSOLUTE: 4.3 K/UL (ref 1.7–7.7)
NEUTROPHILS RELATIVE PERCENT: 90.9 %
NITRITE, URINE: NEGATIVE
PDW BLD-RTO: 17.2 % (ref 12.4–15.4)
PERFORMED ON: ABNORMAL
PH UA: 6 (ref 5–8)
PLATELET # BLD: 271 K/UL (ref 135–450)
PMV BLD AUTO: 8.1 FL (ref 5–10.5)
POTASSIUM REFLEX MAGNESIUM: 4 MMOL/L (ref 3.5–5.1)
PROTEIN UA: NEGATIVE MG/DL
RBC # BLD: 2.92 M/UL (ref 4–5.2)
SODIUM BLD-SCNC: 139 MMOL/L (ref 136–145)
SPECIFIC GRAVITY UA: 1.01 (ref 1–1.03)
TROPONIN: 0.01 NG/ML
TROPONIN: <0.01 NG/ML
URINE TYPE: ABNORMAL
UROBILINOGEN, URINE: 0.2 E.U./DL
WBC # BLD: 4.7 K/UL (ref 4–11)

## 2022-09-07 PROCEDURE — 2700000000 HC OXYGEN THERAPY PER DAY

## 2022-09-07 PROCEDURE — 2000000000 HC ICU R&B

## 2022-09-07 PROCEDURE — 94660 CPAP INITIATION&MGMT: CPT

## 2022-09-07 PROCEDURE — 87086 URINE CULTURE/COLONY COUNT: CPT

## 2022-09-07 PROCEDURE — 87641 MR-STAPH DNA AMP PROBE: CPT

## 2022-09-07 PROCEDURE — 81003 URINALYSIS AUTO W/O SCOPE: CPT

## 2022-09-07 PROCEDURE — 83036 HEMOGLOBIN GLYCOSYLATED A1C: CPT

## 2022-09-07 PROCEDURE — 94761 N-INVAS EAR/PLS OXIMETRY MLT: CPT

## 2022-09-07 PROCEDURE — 84484 ASSAY OF TROPONIN QUANT: CPT

## 2022-09-07 PROCEDURE — 6370000000 HC RX 637 (ALT 250 FOR IP): Performed by: HOSPITALIST

## 2022-09-07 PROCEDURE — 94640 AIRWAY INHALATION TREATMENT: CPT

## 2022-09-07 PROCEDURE — 99223 1ST HOSP IP/OBS HIGH 75: CPT | Performed by: INTERNAL MEDICINE

## 2022-09-07 PROCEDURE — 36415 COLL VENOUS BLD VENIPUNCTURE: CPT

## 2022-09-07 PROCEDURE — 93010 ELECTROCARDIOGRAM REPORT: CPT | Performed by: INTERNAL MEDICINE

## 2022-09-07 PROCEDURE — 80048 BASIC METABOLIC PNL TOTAL CA: CPT

## 2022-09-07 PROCEDURE — 99233 SBSQ HOSP IP/OBS HIGH 50: CPT | Performed by: INTERNAL MEDICINE

## 2022-09-07 PROCEDURE — 51702 INSERT TEMP BLADDER CATH: CPT

## 2022-09-07 PROCEDURE — 83605 ASSAY OF LACTIC ACID: CPT

## 2022-09-07 PROCEDURE — 6370000000 HC RX 637 (ALT 250 FOR IP): Performed by: INTERNAL MEDICINE

## 2022-09-07 PROCEDURE — 6360000002 HC RX W HCPCS: Performed by: HOSPITALIST

## 2022-09-07 PROCEDURE — 85025 COMPLETE CBC W/AUTO DIFF WBC: CPT

## 2022-09-07 PROCEDURE — 2580000003 HC RX 258: Performed by: HOSPITALIST

## 2022-09-07 RX ORDER — HYDROXYZINE HYDROCHLORIDE 10 MG/1
10 TABLET, FILM COATED ORAL NIGHTLY PRN
COMMUNITY

## 2022-09-07 RX ORDER — GABAPENTIN 300 MG/1
300 CAPSULE ORAL 3 TIMES DAILY
Status: DISCONTINUED | OUTPATIENT
Start: 2022-09-07 | End: 2022-09-10 | Stop reason: HOSPADM

## 2022-09-07 RX ORDER — LEVOTHYROXINE SODIUM 88 UG/1
88 TABLET ORAL DAILY
COMMUNITY

## 2022-09-07 RX ORDER — RUXOLITINIB 15 MG/G
CREAM TOPICAL
COMMUNITY

## 2022-09-07 RX ORDER — IPRATROPIUM BROMIDE AND ALBUTEROL SULFATE 2.5; .5 MG/3ML; MG/3ML
1 SOLUTION RESPIRATORY (INHALATION) 4 TIMES DAILY
Status: DISCONTINUED | OUTPATIENT
Start: 2022-09-07 | End: 2022-09-08

## 2022-09-07 RX ORDER — IPRATROPIUM BROMIDE AND ALBUTEROL SULFATE 2.5; .5 MG/3ML; MG/3ML
1 SOLUTION RESPIRATORY (INHALATION) EVERY 4 HOURS PRN
Status: DISCONTINUED | OUTPATIENT
Start: 2022-09-07 | End: 2022-09-10 | Stop reason: HOSPADM

## 2022-09-07 RX ADMIN — IPRATROPIUM BROMIDE AND ALBUTEROL SULFATE 1 AMPULE: 2.5; .5 SOLUTION RESPIRATORY (INHALATION) at 15:21

## 2022-09-07 RX ADMIN — Medication 10 ML: at 08:40

## 2022-09-07 RX ADMIN — MUPIROCIN: 20 OINTMENT TOPICAL at 19:56

## 2022-09-07 RX ADMIN — LEVOTHYROXINE SODIUM 88 MCG: 88 TABLET ORAL at 08:38

## 2022-09-07 RX ADMIN — GABAPENTIN 300 MG: 300 CAPSULE ORAL at 14:14

## 2022-09-07 RX ADMIN — ONDANSETRON HYDROCHLORIDE 4 MG: 2 INJECTION, SOLUTION INTRAMUSCULAR; INTRAVENOUS at 14:29

## 2022-09-07 RX ADMIN — GABAPENTIN 300 MG: 300 CAPSULE ORAL at 19:50

## 2022-09-07 RX ADMIN — FLUTICASONE PROPIONATE 1 SPRAY: 50 SPRAY, METERED NASAL at 09:18

## 2022-09-07 RX ADMIN — IPRATROPIUM BROMIDE AND ALBUTEROL SULFATE 1 AMPULE: 2.5; .5 SOLUTION RESPIRATORY (INHALATION) at 11:15

## 2022-09-07 RX ADMIN — FUROSEMIDE 20 MG: 10 INJECTION, SOLUTION INTRAMUSCULAR; INTRAVENOUS at 08:39

## 2022-09-07 RX ADMIN — ENOXAPARIN SODIUM 40 MG: 100 INJECTION SUBCUTANEOUS at 08:39

## 2022-09-07 RX ADMIN — SUCRALFATE 1 G: 1 TABLET ORAL at 16:53

## 2022-09-07 RX ADMIN — IPRATROPIUM BROMIDE AND ALBUTEROL SULFATE 1 AMPULE: 2.5; .5 SOLUTION RESPIRATORY (INHALATION) at 07:19

## 2022-09-07 RX ADMIN — LISINOPRIL 5 MG: 5 TABLET ORAL at 08:39

## 2022-09-07 RX ADMIN — PANTOPRAZOLE SODIUM 40 MG: 40 TABLET, DELAYED RELEASE ORAL at 16:53

## 2022-09-07 RX ADMIN — PANTOPRAZOLE SODIUM 40 MG: 40 TABLET, DELAYED RELEASE ORAL at 06:52

## 2022-09-07 RX ADMIN — ACETAMINOPHEN 650 MG: 325 TABLET ORAL at 20:15

## 2022-09-07 RX ADMIN — CEFEPIME 2000 MG: 2 INJECTION, POWDER, FOR SOLUTION INTRAVENOUS at 20:10

## 2022-09-07 RX ADMIN — CEFEPIME 2000 MG: 2 INJECTION, POWDER, FOR SOLUTION INTRAVENOUS at 09:57

## 2022-09-07 RX ADMIN — VANCOMYCIN HYDROCHLORIDE 1250 MG: 500 INJECTION, POWDER, LYOPHILIZED, FOR SOLUTION INTRAVENOUS at 10:29

## 2022-09-07 RX ADMIN — TRAZODONE HYDROCHLORIDE 100 MG: 100 TABLET ORAL at 19:51

## 2022-09-07 RX ADMIN — SUCRALFATE 1 G: 1 TABLET ORAL at 09:52

## 2022-09-07 RX ADMIN — SERTRALINE HYDROCHLORIDE 50 MG: 50 TABLET ORAL at 09:52

## 2022-09-07 RX ADMIN — IPRATROPIUM BROMIDE AND ALBUTEROL SULFATE 1 AMPULE: 2.5; .5 SOLUTION RESPIRATORY (INHALATION) at 18:51

## 2022-09-07 RX ADMIN — MELOXICAM 7.5 MG: 7.5 TABLET ORAL at 09:18

## 2022-09-07 RX ADMIN — MIRTAZAPINE 7.5 MG: 15 TABLET, FILM COATED ORAL at 01:10

## 2022-09-07 RX ADMIN — ACETAMINOPHEN 650 MG: 325 TABLET ORAL at 10:35

## 2022-09-07 RX ADMIN — NIFEDIPINE 60 MG: 30 TABLET, FILM COATED, EXTENDED RELEASE ORAL at 08:38

## 2022-09-07 RX ADMIN — FUROSEMIDE 20 MG: 10 INJECTION, SOLUTION INTRAMUSCULAR; INTRAVENOUS at 19:50

## 2022-09-07 RX ADMIN — GABAPENTIN 300 MG: 300 CAPSULE ORAL at 08:39

## 2022-09-07 RX ADMIN — PREDNISONE 40 MG: 20 TABLET ORAL at 08:39

## 2022-09-07 RX ADMIN — INSULIN LISPRO 2 UNITS: 100 INJECTION, SOLUTION INTRAVENOUS; SUBCUTANEOUS at 08:00

## 2022-09-07 RX ADMIN — ATORVASTATIN CALCIUM 10 MG: 10 TABLET, FILM COATED ORAL at 09:06

## 2022-09-07 RX ADMIN — MIRTAZAPINE 7.5 MG: 15 TABLET, FILM COATED ORAL at 19:50

## 2022-09-07 RX ADMIN — Medication 10 ML: at 19:51

## 2022-09-07 RX ADMIN — INSULIN LISPRO 1 UNITS: 100 INJECTION, SOLUTION INTRAVENOUS; SUBCUTANEOUS at 16:53

## 2022-09-07 RX ADMIN — SUCRALFATE 1 G: 1 TABLET ORAL at 19:51

## 2022-09-07 RX ADMIN — SUCRALFATE 1 G: 1 TABLET ORAL at 12:34

## 2022-09-07 RX ADMIN — FUROSEMIDE 20 MG: 10 INJECTION, SOLUTION INTRAMUSCULAR; INTRAVENOUS at 14:14

## 2022-09-07 RX ADMIN — TIZANIDINE 8 MG: 4 TABLET ORAL at 14:29

## 2022-09-07 RX ADMIN — MUPIROCIN: 20 OINTMENT TOPICAL at 12:36

## 2022-09-07 ASSESSMENT — PAIN SCALES - GENERAL
PAINLEVEL_OUTOF10: 6
PAINLEVEL_OUTOF10: 6
PAINLEVEL_OUTOF10: 7
PAINLEVEL_OUTOF10: 8
PAINLEVEL_OUTOF10: 5

## 2022-09-07 ASSESSMENT — PAIN DESCRIPTION - LOCATION
LOCATION: HEAD;LEG
LOCATION: HEAD
LOCATION: FOOT
LOCATION_2: HEAD

## 2022-09-07 ASSESSMENT — PAIN DESCRIPTION - DESCRIPTORS
DESCRIPTORS: ACHING
DESCRIPTORS: ACHING

## 2022-09-07 ASSESSMENT — PAIN DESCRIPTION - ORIENTATION
ORIENTATION: LEFT
ORIENTATION: LEFT

## 2022-09-07 ASSESSMENT — PAIN - FUNCTIONAL ASSESSMENT: PAIN_FUNCTIONAL_ASSESSMENT: PREVENTS OR INTERFERES SOME ACTIVE ACTIVITIES AND ADLS

## 2022-09-07 ASSESSMENT — PAIN DESCRIPTION - INTENSITY: RATING_2: 6

## 2022-09-07 NOTE — PROGRESS NOTES
RT Inhaler-Nebulizer Bronchodilator Protocol Note    There is a bronchodilator order in the chart from a provider indicating to follow the RT Bronchodilator Protocol and there is an Initiate RT Inhaler-Nebulizer Bronchodilator Protocol order as well (see protocol at bottom of note). CXR Findings:  XR CHEST PORTABLE    Result Date: 9/6/2022  Congestive heart failure. Underlying pneumonia in the medial right base cannot be excluded. The findings from the last RT Protocol Assessment were as follows:   History Pulmonary Disease: Chronic pulmonary disease  Respiratory Pattern: Dyspnea on exertion or RR 21-25 bpm  Breath Sounds: Slightly diminished and/or crackles  Cough: Strong, spontaneous, non-productive  Indication for Bronchodilator Therapy: Decreased or absent breath sounds  Bronchodilator Assessment Score: 6    Aerosolized bronchodilator medication orders have been revised according to the RT Inhaler-Nebulizer Bronchodilator Protocol below. Respiratory Therapist to perform RT Therapy Protocol Assessment initially then follow the protocol. Repeat RT Therapy Protocol Assessment PRN for score 0-3 or on second treatment, BID, and PRN for scores above 3. No Indications - adjust the frequency to every 6 hours PRN wheezing or bronchospasm, if no treatments needed after 48 hours then discontinue using Per Protocol order mode. If indication present, adjust the RT bronchodilator orders based on the Bronchodilator Assessment Score as indicated below. Use Inhaler orders unless patient has one or more of the following: on home nebulizer, not able to hold breath for 10 seconds, is not alert and oriented, cannot activate and use MDI correctly, or respiratory rate 25 breaths per minute or more, then use the equivalent nebulizer order(s) with same Frequency and PRN reasons based on the score. If a patient is on this medication at home then do not decrease Frequency below that used at home.     0-3 - enter or revise RT bronchodilator order(s) to equivalent RT Bronchodilator order with Frequency of every 4 hours PRN for wheezing or increased work of breathing using Per Protocol order mode. 4-6 - enter or revise RT Bronchodilator order(s) to two equivalent RT bronchodilator orders with one order with BID Frequency and one order with Frequency of every 4 hours PRN wheezing or increased work of breathing using Per Protocol order mode. 7-10 - enter or revise RT Bronchodilator order(s) to two equivalent RT bronchodilator orders with one order with TID Frequency and one order with Frequency of every 4 hours PRN wheezing or increased work of breathing using Per Protocol order mode. 11-13 - enter or revise RT Bronchodilator order(s) to one equivalent RT bronchodilator order with QID Frequency and an Albuterol order with Frequency of every 4 hours PRN wheezing or increased work of breathing using Per Protocol order mode. Greater than 13 - enter or revise RT Bronchodilator order(s) to one equivalent RT bronchodilator order with every 4 hours Frequency and an Albuterol order with Frequency of every 2 hours PRN wheezing or increased work of breathing using Per Protocol order mode.          Electronically signed by Chandan Lou RCP on 9/7/2022 at 6:57 PM

## 2022-09-07 NOTE — PROGRESS NOTES
RT Inhaler-Nebulizer Bronchodilator Protocol Note    There is a bronchodilator order in the chart from a provider indicating to follow the RT Bronchodilator Protocol and there is an Initiate RT Inhaler-Nebulizer Bronchodilator Protocol order as well (see protocol at bottom of note). CXR Findings:  XR CHEST PORTABLE    Result Date: 9/6/2022  Congestive heart failure. Underlying pneumonia in the medial right base cannot be excluded. The findings from the last RT Protocol Assessment were as follows:   History Pulmonary Disease: Chronic pulmonary disease  Respiratory Pattern: Dyspnea on exertion or RR 21-25 bpm  Breath Sounds: Slightly diminished and/or crackles  Cough: Strong, spontaneous, non-productive  Indication for Bronchodilator Therapy: Decreased or absent breath sounds  Bronchodilator Assessment Score: 6    Aerosolized bronchodilator medication orders have been revised according to the RT Inhaler-Nebulizer Bronchodilator Protocol below. Respiratory Therapist to perform RT Therapy Protocol Assessment initially then follow the protocol. Repeat RT Therapy Protocol Assessment PRN for score 0-3 or on second treatment, BID, and PRN for scores above 3. No Indications - adjust the frequency to every 6 hours PRN wheezing or bronchospasm, if no treatments needed after 48 hours then discontinue using Per Protocol order mode. If indication present, adjust the RT bronchodilator orders based on the Bronchodilator Assessment Score as indicated below. Use Inhaler orders unless patient has one or more of the following: on home nebulizer, not able to hold breath for 10 seconds, is not alert and oriented, cannot activate and use MDI correctly, or respiratory rate 25 breaths per minute or more, then use the equivalent nebulizer order(s) with same Frequency and PRN reasons based on the score. If a patient is on this medication at home then do not decrease Frequency below that used at home.     0-3 - enter or revise RT bronchodilator order(s) to equivalent RT Bronchodilator order with Frequency of every 4 hours PRN for wheezing or increased work of breathing using Per Protocol order mode. 4-6 - enter or revise RT Bronchodilator order(s) to two equivalent RT bronchodilator orders with one order with BID Frequency and one order with Frequency of every 4 hours PRN wheezing or increased work of breathing using Per Protocol order mode. 7-10 - enter or revise RT Bronchodilator order(s) to two equivalent RT bronchodilator orders with one order with TID Frequency and one order with Frequency of every 4 hours PRN wheezing or increased work of breathing using Per Protocol order mode. 11-13 - enter or revise RT Bronchodilator order(s) to one equivalent RT bronchodilator order with QID Frequency and an Albuterol order with Frequency of every 4 hours PRN wheezing or increased work of breathing using Per Protocol order mode. Greater than 13 - enter or revise RT Bronchodilator order(s) to one equivalent RT bronchodilator order with every 4 hours Frequency and an Albuterol order with Frequency of every 2 hours PRN wheezing or increased work of breathing using Per Protocol order mode.          Electronically signed by Diane Adler RCP on 9/7/2022 at 1:21 AM

## 2022-09-07 NOTE — PROGRESS NOTES
09/06/22 2222   NIV Type   Mode Bilevel   Mask Type Full face mask   Mask Size Medium   Settings/Measurements   IPAP 16 cmH20   CPAP/EPAP 8 cmH2O   Vt (Measured) 692 mL   Rate Ordered 12   Resp 22   FiO2  30 %   Comfort Level Fair   Using Accessory Muscles Yes   SpO2 97

## 2022-09-07 NOTE — ED PROVIDER NOTES
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 TECHNIQUE: CT of the thoracic spine was performed without the administration of intravenous contrast. Multiplanar reformatted images are provided for review. Automated exposure control, iterative reconstruction, and/or weight based adjustment of the mA/kV was utilized to reduce the radiation dose to as low as reasonably achievable. COMPARISON: CT abdomen and pelvis dated 11/29/2021 HISTORY: ORDERING SYSTEM PROVIDED HISTORY: Fall, back pain TECHNOLOGIST PROVIDED HISTORY: Reason for exam:->Fall, back pain FINDINGS: BONES/ALIGNMENT: There is normal alignment of the spine. There is minimal anterior wedging at T11, which is unchanged and likely physiologic. The vertebral body heights are otherwise maintained. No osseous destructive lesion is seen. DEGENERATIVE CHANGES: There is mild multilevel disc space narrowing and endplate spurring. No gross spinal canal stenosis or bony neural foraminal narrowing of the thoracic spine. SOFT TISSUES: No paraspinal mass is seen. No acute osseous abnormality in the thoracic spine. No results found. Is this patient to be included in the SEP-1 Core Measure due to severe sepsis or septic shock?   No      ED COURSE/MDM  Patient seen and evaluated. Old records reviewed. Labs and imaging reviewed and results discussed with patient. This is a 61-year-old female presenting for evaluation of shortness of breath. She is 88% on room air on arrival, placed on 2 L nasal cannula. She is tachypneic, not tachycardic or febrile. On exam she is ill-appearing, no acute distress however is mildly tachypneic. She was given additional DuoNeb and also covered with Solu-Medrol. Work-up was obtained. She is not hypercapnic. EKG is nonischemic and troponin is negative. Her BNP is mildly elevated and she does have recent ankle swelling, she is given a dose of Lasix. She was also found to have what appears to be an infiltrate on chest x-ray, and clinically she did have rhonchi in the right hemithorax. Therefore she will be given vancomycin and cefepime given her recent hospitalization. Otherwise her lab work is largely unremarkable. COVID is negative. On reassessment of the patient, she did appear to have increased work of breathing, 1 more conversational dyspnea. A repeat blood gas is reassuring, however given her work of breathing and hypoxia I did place her on BiPAP. She will require ICU admission. I spoke with Dr. Iggy Muir who accepts for admission. The total Critical Care time is 37 minutes which excludes separately billable procedures.      During the patient's ED course, the patient was given:  Medications   cefepime (MAXIPIME) 2000 mg IVPB minibag (has no administration in time range)   predniSONE (DELTASONE) tablet 40 mg (has no administration in time range)   dicyclomine (BENTYL) capsule 10 mg (has no administration in time range)   fluticasone (FLONASE) 50 MCG/ACT nasal spray 1 spray (has no administration in time range)   furosemide (LASIX) injection 20 mg (has no administration in time range)   gabapentin (NEURONTIN) capsule 300 mg (300 mg Oral Not Given 9/7/22 0111)   levothyroxine (SYNTHROID) tablet 88 mcg (has no administration in time range)   lisinopril (PRINIVIL;ZESTRIL) tablet 5 mg (has no administration in time range)   meloxicam (MOBIC) tablet 7.5 mg (has no administration in time range)   mirtazapine (REMERON) tablet 7.5 mg (7.5 mg Oral Given 9/7/22 0110)   NIFEdipine (PROCARDIA XL) extended release tablet 60 mg (has no administration in time range)   pantoprazole (PROTONIX) tablet 40 mg (has no administration in time range)   sertraline (ZOLOFT) tablet 50 mg (has no administration in time range)   atorvastatin (LIPITOR) tablet 10 mg (has no administration in time range)   sucralfate (CARAFATE) tablet 1 g (1 g Oral Not Given 9/7/22 0111)   tiZANidine (ZANAFLEX) tablet 8 mg (has no administration in time range)   traZODone (DESYREL) tablet 100 mg (100 mg Oral Not Given 9/7/22 0111)   sodium chloride flush 0.9 % injection 5-40 mL (has no administration in time range)   sodium chloride flush 0.9 % injection 5-40 mL (has no administration in time range)   0.9 % sodium chloride infusion (has no administration in time range)   enoxaparin (LOVENOX) injection 40 mg (has no administration in time range)   ondansetron (ZOFRAN-ODT) disintegrating tablet 4 mg (has no administration in time range)     Or   ondansetron (ZOFRAN) injection 4 mg (has no administration in time range)   polyethylene glycol (GLYCOLAX) packet 17 g (has no administration in time range)   acetaminophen (TYLENOL) tablet 650 mg (has no administration in time range)     Or   acetaminophen (TYLENOL) suppository 650 mg (has no administration in time range)   glucose chewable tablet 16 g (has no administration in time range)   dextrose bolus 10% 125 mL (has no administration in time range)     Or   dextrose bolus 10% 250 mL (has no administration in time range)   glucagon (rDNA) injection 1 mg (has no administration in time range)   dextrose 10 % infusion (has no administration in time range)   insulin lispro (HUMALOG) injection vial 0-4 Units (has no

## 2022-09-07 NOTE — PROGRESS NOTES
Patient admitted to room 3002 from ED. Patient oriented to room, call light, bed rails, phone, lights and bathroom. Patient instructed about the schedule of the day including: vital sign frequency, lab draws, possible tests, frequency of MD and staff rounds, daily weights, I &O's and prescribed diet. Continuous Telemetry in place, patient aware of placement and reason. Bed locked, in lowest position, side rails up 2/4, call light within reach. Recliner Assessment  Patient is not able to demonstrate the ability to move from a reclining position to an upright position within the recliner due to BiPAP mask. 4 Eyes Skin Assessment     The patient is being assess for   Admission    I agree that 2 RN's have performed a thorough Head to Toe Skin Assessment on the patient. ALL assessment sites listed below have been assessed. Areas assessed for pressure by both nurses:   [x]   Head, Face, and Ears   [x]   Shoulders, Back, and Chest, Abdomen  [x]   Arms, Elbows, and Hands   [x]   Coccyx, Sacrum, and Ischium  [x]   Legs, Feet, and Heels    Skin pink, warm, dry. Scattered bruising & redness to josé area. Zinc cream applied. Pts left wrist in brace upon admission. Skin Assessed Under all Medical Devices by both nurses:  O2 tubing               All Mepilex Borders were peeled back and area peeked at by both nurses:  Yes  Please list where Mepilex Borders are located:  NA             **SHARE this note so that the co-signing nurse is able to place an eSignature**    Co-signer eSignature: Electronically signed by Taylor Camejo RN on 9/7/22 at 1:43 AM EDT    Does the Patient have Skin Breakdown related to pressure?   No          Abdiel Prevention initiated:  NA   Wound Care Orders initiated:  NA      [facility] nurse consulted for Pressure Injury (Stage 3,4, Unstageable, DTI, NWPT, Complex wounds)and New or Established Ostomies:  NA      Primary Nurse eSignature: Electronically signed by Omar Baker RN on 9/7/22 at 1:03 AM EDT

## 2022-09-07 NOTE — PROGRESS NOTES
Pt voided 900 via Ralph. Morning meds given. Coffee provided to patient. Pt remains on 4L NC. Bed in lowest position. Call light in hand. All pt needs addressed.
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
## 2022-09-07 NOTE — PROGRESS NOTES
Extended Infusion B-Lactam Antibiotics: Cefepime     Day: 1 of 7    Recent Labs     09/06/22  1907   CREATININE 1.0     Estimated Creatinine Clearance: 56 mL/min (based on SCr of 1 mg/dL). Recent Labs     09/06/22 1907   WBC 5.8       Cefepime-Extended Infusion (4-hour infusion) - Preferred Dosing Strategy   Renal Function (CrCl mL/min) ? 60 30 - 59 11 - 29 ? 10, HD PD CRRT   All indications - Loading dose of 2000 milligrams x 1 over 30 minutes or via IV push. Maintenance dose   should begin at the next regularly scheduled dosing interval based on indication/renal function.    Intra-abdominal infections, Skin and soft tissue infections, Urinary tract infections 2000mg q12h 2000mg q24h 1000mg q24h 500mg q24h 1000mg q24h 2000mg q24h*   Bacteremia, CNS infections, Cystic fibrosis, Diabetic foot infections, Endocarditis, Febrile neutropenia, Healthcare-associated infections, Osteomyelitis/joint infections, Pneumonia, Sepsis, BMI > 40* 2000mg q8h 2000mg q12h 1000mg q12h 1000mg q24h 1000mg q24h 2000mg q12h       Que [place], [address] [facility] 9/7/2022 12:10 AM

## 2022-09-07 NOTE — PROGRESS NOTES
RT Nebulizer Bronchodilator Protocol Note    There is a bronchodilator order in the chart from a provider indicating to follow the RT Bronchodilator Protocol and there is an Initiate RT Bronchodilator Protocol order as well (see protocol at bottom of note). CXR Findings:  XR CHEST PORTABLE    Result Date: 9/6/2022  Congestive heart failure. Underlying pneumonia in the medial right base cannot be excluded. The findings from the last RT Protocol Assessment were as follows:  Smoking: Chronic pulmonary disease  Respiratory Pattern: Dyspnea on exertion or RR 21-25 bpm  Breath Sounds: Slightly diminished and/or crackles  Cough: Strong, spontaneous, non-productive  Indication for Bronchodilator Therapy: Decreased or absent breath sounds  Bronchodilator Assessment Score: 6    Aerosolized bronchodilator medication orders have been revised according to the RT Nebulizer Bronchodilator Protocol below. Respiratory Therapist to perform RT Therapy Protocol Assessment initially then follow the protocol. Repeat RT Therapy Protocol Assessment PRN for score 0-3 or on second treatment, BID, and PRN for scores above 3. No Indications - adjust the frequency to every 6 hours PRN wheezing or bronchospasm, if no treatments needed after 48 hours then discontinue using Per Protocol order mode. If indication present, adjust the RT bronchodilator orders based on the Bronchodilator Assessment Score as indicated below. If a patient is on this medication at home then do not decrease Frequency below that used at home. 0-3 - enter or revise RT bronchodilator order(s) to equivalent RT Bronchodilator order with Frequency of every 4 hours PRN for wheezing or increased work of breathing using Per Protocol order mode.        4-6 - enter or revise RT Bronchodilator order(s) to two equivalent RT bronchodilator orders with one order with BID Frequency and one order with Frequency of every 4 hours PRN wheezing or increased work of breathing using Per Protocol order mode. 7-10 - enter or revise RT Bronchodilator order(s) to two equivalent RT bronchodilator orders with one order with TID Frequency and one order with Frequency of every 4 hours PRN wheezing or increased work of breathing using Per Protocol order mode. 11-13 - enter or revise RT Bronchodilator order(s) to one equivalent RT bronchodilator order with QID Frequency and an Albuterol order with Frequency of every 4 hours PRN wheezing or increased work of breathing using Per Protocol order mode. Greater than 13 - enter or revise RT Bronchodilator order(s) to one equivalent RT bronchodilator order with every 4 hours Frequency and an Albuterol order with Frequency of every 2 hours PRN wheezing or increased work of breathing using Per Protocol order mode. RT to enter RT Home Evaluation for COPD & MDI Assessment order using Per Protocol order mode.     Electronically signed by Nathalie Rousseau RCP on 9/7/2022 at 7:21 AM

## 2022-09-07 NOTE — PROGRESS NOTES
IM Progress Note    Admit Date:  9/6/2022  1    Interval history:      Subjective:  Ms. Dede Bains feels much improved with bipap overnight . Denies any chest pain or sob today   Remains on 3 L       Objective:   BP (!) 164/66   Pulse 96   Temp 98.4 °F (36.9 °C) (Oral)   Resp 18   Ht 5' 2\" (1.575 m)   Wt 210 lb 15.7 oz (95.7 kg)   SpO2 95%   BMI 38.59 kg/m²     Intake/Output Summary (Last 24 hours) at 9/7/2022 0700  Last data filed at 9/7/2022 0656  Gross per 24 hour   Intake 20 ml   Output 1400 ml   Net -1380 ml       Physical Exam:      General:  obese elderly female up in bed  Awake, alert and oriented. Appears to be not in any distress  Mucous Membranes:  Pink , anicteric  Neck: No JVD, no carotid bruit, no thyromegaly  Chest:  Clear to auscultation bilaterally,minimal basilar crackles  Cardiovascular:  RRR S1S2 heard, no murmurs or gallops  Abdomen:  Soft, undistended, non tender, no organomegaly, BS present  Extremities:resolving peripheral edema in both LE .  Distal pulses well felt  Neurological : grossly normal    Medications:   Scheduled Medications:    ipratropium-albuterol  1 ampule Inhalation 4x daily    vancomycin  1,250 mg IntraVENous Q18H    gabapentin  300 mg Oral TID    cefepime  2,000 mg IntraVENous Q12H    predniSONE  40 mg Oral Daily    fluticasone  1 spray Nasal Daily    furosemide  20 mg IntraVENous TID    levothyroxine  88 mcg Oral Daily    lisinopril  5 mg Oral Daily    meloxicam  7.5 mg Oral Daily    mirtazapine  7.5 mg Oral Nightly    NIFEdipine  60 mg Oral Daily    pantoprazole  40 mg Oral BID AC    sertraline  50 mg Oral Daily    atorvastatin  10 mg Oral Daily    sucralfate  1 g Oral 4x Daily    traZODone  100 mg Oral Nightly    sodium chloride flush  5-40 mL IntraVENous 2 times per day    enoxaparin  40 mg SubCUTAneous Daily    insulin lispro  0-4 Units SubCUTAneous TID WC    insulin lispro  0-4 Units SubCUTAneous Nightly     I   sodium chloride      dextrose ipratropium-albuterol, dicyclomine, tiZANidine, sodium chloride flush, sodium chloride, ondansetron **OR** ondansetron, polyethylene glycol, acetaminophen **OR** acetaminophen, glucose, dextrose bolus **OR** dextrose bolus, glucagon (rDNA), dextrose    Lab Data:  Recent Labs     09/06/22  1907 09/07/22  0354   WBC 5.8 4.7   HGB 7.5* 8.1*   HCT 23.5* 24.6*   MCV 84.8 84.3    271     Recent Labs     09/06/22  1907 09/07/22  0354    139   K 4.1 4.0    102   CO2 23 24   BUN 25* 22*   CREATININE 1.0 0.9     Recent Labs     09/07/22  0003 09/07/22  0354   TROPONINI 0.01 <0.01       Coagulation:   Lab Results   Component Value Date/Time    INR 0.99 06/27/2022 07:13 PM    APTT 46.9 11/08/2019 06:30 AM     Cardiac markers:   Lab Results   Component Value Date/Time    CKTOTAL 67 08/21/2022 12:40 PM    TROPONINI <0.01 09/07/2022 03:54 AM         Lab Results   Component Value Date    ALT 11 09/06/2022    AST 22 09/06/2022    ALKPHOS 343 (H) 09/06/2022    BILITOT 0.3 09/06/2022       Lab Results   Component Value Date    INR 0.99 06/27/2022    INR 1.01 07/29/2020    INR 1.12 11/02/2019    PROTIME 12.9 06/27/2022    PROTIME 11.7 07/29/2020    PROTIME 12.8 11/02/2019       Radiology    Chest xray   Congestive heart failure. Underlying pneumonia in the medial right base   cannot be excluded.              Cultures:   Covid neg     Assessment & Plan:    Acute on Chronic dCHF   Acute hypoxic respiratory failure  - improving with bipap and diuresis   - continue monitoring in ICU today   - wean o2 as able  - low salt diet, daily weights  - obtain Echo        #COPD no AE  -Continue home regimen  - imaging with no pna- can deescalate abx       #Diabetes mellitus type 2  -Last HgbA1c 6.4 ;  -Hold home medication  -POCT glucose  -Low-dose SSI      #Hypothyroidism  -Continue Synthroid        #GERD  -Continue Protonix     #IBS  -Continue home bowel regimen     #Depression  -Continue zoloft     History of recent left wrist fracture and right foot fracture  - off splint, start PT when able     Hx of cirrhosis /chronic anemia - stable with  no acute issues     Dvt prophylaxis  Full code  [id number] Essie Mckenzie for PCU         Jessee Hubbard MD, 9/7/2022 7:00 AM

## 2022-09-07 NOTE — CONSULTS
Pharmacy Note  Vancomycin Consult    Esequiel Haider is a 71 y.o. female started on Vancomycin for HAP (7 days); consult received from Dr. Rosalva Potter to manage therapy. Also receiving the following antibiotics: cefepime. Recent Labs     09/06/22  1907 09/07/22  0354   BUN 25* 22*   CREATININE 1.0 0.9   WBC 5.8 4.7       Estimated Creatinine Clearance: 62 mL/min (based on SCr of 0.9 mg/dL). Goal Trough Level: 15-20 mcg/mL  Goal AUC: 400-600 mg/L    Assessment/Plan:  Will initiate Vancomycin with a one time loading dose of 1250 mg x1, followed by 1250 mg IV every 18 hours. Per [employer]k-insight:  ZLV85,HL: 474 mg/L.hr  Probability of AUC24 > 400: 67 %  Ctrough,ss: 14.1 mg/L  Probability of Ctrough,ss > 20: 20 %  Probability of nephrotoxicity (Lodise ANTONIA 2009): 9 %    Next trough: 9/8 @ 0200    Thank you for the consult. Will continue to follow.     Maggy Rosales, PharmD, [facility], 9/7/2022 5:28 AM

## 2022-09-07 NOTE — PROGRESS NOTES
Pt admitted to ICU for BiPAP & PNA present on C-xray in middle right base, Covid negative. Pt arrived on 4L of Oxygen. VSS. A&O x 4. Pt given bath and changed into gown. Pt wears no Oxygen at home. Pt c/o SOBOE and at rest. Abbey Unger implemented with no success, Ralph placed for retention, and urine sample sent to lab. 40 IV Lasix given in ED along with Solumedrol, Cefepime, and Vanc. Pt has two PIV, 20G LAC & 22G RFA. Both IV's flushed and capped. Beverage and snack offered to patient earlier. Pt tolerated 2 hours on BiPAP this shift. Bed in lowest position, call light in reach, pt denies any further needs. Will continue to monitor.
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
## 2022-09-07 NOTE — PROGRESS NOTES
9.7.22/Prayer in doorway visit.    09/07/22 1539   Encounter Summary   Encounter Overview/Reason  Initial Encounter   Service Provided For: Patient   Referral/Consult From: Rounding   Support System Unknown   Last Encounter  09/07/22   Complexity of Encounter Moderate   Begin Time 1521   End Time  1526   Total Time Calculated 5 min

## 2022-09-07 NOTE — PROGRESS NOTES
09/07/22 0119   NIV Type   Equipment Type v60   Mode Bilevel   Mask Type Full face mask   Mask Size Medium   Settings/Measurements   IPAP 16 cmH20   CPAP/EPAP 8 cmH2O   Vt (Measured) 674 mL   Rate Ordered 12   Resp 19   FiO2  30 %   Comfort Level Good   Using Accessory Muscles No   SpO2 95   Patient's Home Machine No   Breath Sounds   Right Upper Lobe Diminished   Right Middle Lobe Diminished   Right Lower Lobe Diminished   Left Upper Lobe Diminished   Left Lower Lobe Diminished   Alarm Settings   Alarms On Y   Low Pressure (cmH2O) 8 cmH2O   High Pressure  30 cmH2O   Delay Alarm 20 sec(s)   RR Low (bpm) 12   RR High (bpm) 45 br/min

## 2022-09-07 NOTE — PROGRESS NOTES
Per [facility], pt has been picking up Januvia monthly. This was marked as \"not taking\" on home medication list. Please verify if pt is taking and order if needed. Per [facility], sertraline was last filled for a 30-day supply on 5/19/22. Is pt still taking? Per [facility], sucralfate was last filled for a 30-day supply on 5/19/22. Is pt still taking? Pt takes gabapentin 300 mg TID. It was only ordered as 300 mg QD. OK to increase? Pt has home prescriptions for hydroxyzine 10 mg daily PRN, as well as many creams and ointments. Should any of these be reordered while pt is here? Please let pharmacy know when these concerns have been addressed.     Que Galloway, JaminD, [facility], 9/7/2022 1:29 AM

## 2022-09-07 NOTE — ACP (ADVANCE CARE PLANNING)
Advance Care Planning     General Advance Care Planning (ACP) Conversation    Date of Conversation: 9/6/2022  Conducted with: Patient with Decision Making Capacity    Healthcare Decision Maker:    Primary Decision Maker: Bro Joe Schwartz - [phone]  Click here to complete Healthcare Decision Makers including selection of the Healthcare Decision Maker Relationship (ie \"Primary\"). Today we documented Decision Maker(s) consistent with Legal Next of Kin hierarchy.     Content/Action Overview:    Reviewed DNR/DNI and patient elects Full Code (Attempt Resuscitation)    Length of Voluntary ACP Conversation in minutes:  <16 minutes (Non-Billable)    [facility] MSW, JENELLEW-S

## 2022-09-07 NOTE — PROGRESS NOTES
Triple whammy (ACE, diuretic, NSAID) - very nephrotoxic. Pt currently does not have an VIOLETA. Per Dr. Andrea Cordova, ok to d/c meloxicam before pt develops renal complications. Spoke with pt to let her know if her arthritic pain was bothering her to speak to her nurse and we could look at other less harmful options like lidocaine patches.      Jumana Petit, PharmD, [facility], 9/7/2022 11:09 AM

## 2022-09-07 NOTE — PLAN OF CARE
Problem: Safety - Adult  Goal: Free from fall injury  Outcome: Progressing     Problem: ABCDS Injury Assessment  Goal: Absence of physical injury  Outcome: Progressing  Flowsheets (Taken 9/7/2022 0007)  Absence of Physical Injury: Implement safety measures based on patient assessment

## 2022-09-08 ENCOUNTER — APPOINTMENT (OUTPATIENT)
Dept: GENERAL RADIOLOGY | Age: 72
DRG: 291 | End: 2022-09-08
Payer: MEDICARE

## 2022-09-08 PROBLEM — R01.1 SYSTOLIC MURMUR: Status: ACTIVE | Noted: 2022-09-08

## 2022-09-08 LAB
ANION GAP SERPL CALCULATED.3IONS-SCNC: 10 MMOL/L (ref 3–16)
BUN BLDV-MCNC: 28 MG/DL (ref 7–20)
CALCIUM SERPL-MCNC: 9.2 MG/DL (ref 8.3–10.6)
CHLORIDE BLD-SCNC: 101 MMOL/L (ref 99–110)
CO2: 27 MMOL/L (ref 21–32)
CREAT SERPL-MCNC: 1 MG/DL (ref 0.6–1.2)
EKG ATRIAL RATE: 86 BPM
EKG DIAGNOSIS: NORMAL
EKG P AXIS: 53 DEGREES
EKG P-R INTERVAL: 140 MS
EKG Q-T INTERVAL: 376 MS
EKG QRS DURATION: 68 MS
EKG QTC CALCULATION (BAZETT): 449 MS
EKG R AXIS: -25 DEGREES
EKG T AXIS: 85 DEGREES
EKG VENTRICULAR RATE: 86 BPM
GFR AFRICAN AMERICAN: >60
GFR NON-AFRICAN AMERICAN: 55
GLUCOSE BLD-MCNC: 121 MG/DL (ref 70–99)
GLUCOSE BLD-MCNC: 125 MG/DL (ref 70–99)
GLUCOSE BLD-MCNC: 146 MG/DL (ref 70–99)
GLUCOSE BLD-MCNC: 194 MG/DL (ref 70–99)
GLUCOSE BLD-MCNC: 204 MG/DL (ref 70–99)
HCT VFR BLD CALC: 22.7 % (ref 36–48)
HEMOGLOBIN: 7.6 G/DL (ref 12–16)
LV EF: 65 %
LVEF MODALITY: NORMAL
MAGNESIUM: 1.8 MG/DL (ref 1.8–2.4)
MCH RBC QN AUTO: 28 PG (ref 26–34)
MCHC RBC AUTO-ENTMCNC: 33.6 G/DL (ref 31–36)
MCV RBC AUTO: 83.1 FL (ref 80–100)
PDW BLD-RTO: 16.6 % (ref 12.4–15.4)
PERFORMED ON: ABNORMAL
PLATELET # BLD: 271 K/UL (ref 135–450)
PMV BLD AUTO: 7.8 FL (ref 5–10.5)
POTASSIUM SERPL-SCNC: 4.2 MMOL/L (ref 3.5–5.1)
PROCALCITONIN: 0.28 NG/ML (ref 0–0.15)
RBC # BLD: 2.73 M/UL (ref 4–5.2)
SODIUM BLD-SCNC: 138 MMOL/L (ref 136–145)
TROPONIN: 0.02 NG/ML
TROPONIN: 0.02 NG/ML
TROPONIN: <0.01 NG/ML
URINE CULTURE, ROUTINE: NORMAL
VANCOMYCIN TROUGH: 17.9 UG/ML (ref 10–20)
WBC # BLD: 5.7 K/UL (ref 4–11)

## 2022-09-08 PROCEDURE — 6370000000 HC RX 637 (ALT 250 FOR IP): Performed by: INTERNAL MEDICINE

## 2022-09-08 PROCEDURE — 83735 ASSAY OF MAGNESIUM: CPT

## 2022-09-08 PROCEDURE — 6360000002 HC RX W HCPCS: Performed by: HOSPITALIST

## 2022-09-08 PROCEDURE — 2580000003 HC RX 258: Performed by: HOSPITALIST

## 2022-09-08 PROCEDURE — 6360000002 HC RX W HCPCS: Performed by: INTERNAL MEDICINE

## 2022-09-08 PROCEDURE — 6370000000 HC RX 637 (ALT 250 FOR IP): Performed by: HOSPITALIST

## 2022-09-08 PROCEDURE — 71045 X-RAY EXAM CHEST 1 VIEW: CPT

## 2022-09-08 PROCEDURE — 2580000003 HC RX 258: Performed by: INTERNAL MEDICINE

## 2022-09-08 PROCEDURE — 94640 AIRWAY INHALATION TREATMENT: CPT

## 2022-09-08 PROCEDURE — 84484 ASSAY OF TROPONIN QUANT: CPT

## 2022-09-08 PROCEDURE — 94761 N-INVAS EAR/PLS OXIMETRY MLT: CPT

## 2022-09-08 PROCEDURE — 94660 CPAP INITIATION&MGMT: CPT

## 2022-09-08 PROCEDURE — 99223 1ST HOSP IP/OBS HIGH 75: CPT | Performed by: INTERNAL MEDICINE

## 2022-09-08 PROCEDURE — 84145 PROCALCITONIN (PCT): CPT

## 2022-09-08 PROCEDURE — 80202 ASSAY OF VANCOMYCIN: CPT

## 2022-09-08 PROCEDURE — 36415 COLL VENOUS BLD VENIPUNCTURE: CPT

## 2022-09-08 PROCEDURE — 99233 SBSQ HOSP IP/OBS HIGH 50: CPT | Performed by: INTERNAL MEDICINE

## 2022-09-08 PROCEDURE — 93005 ELECTROCARDIOGRAM TRACING: CPT | Performed by: INTERNAL MEDICINE

## 2022-09-08 PROCEDURE — 93306 TTE W/DOPPLER COMPLETE: CPT

## 2022-09-08 PROCEDURE — 93010 ELECTROCARDIOGRAM REPORT: CPT | Performed by: INTERNAL MEDICINE

## 2022-09-08 PROCEDURE — 85027 COMPLETE CBC AUTOMATED: CPT

## 2022-09-08 PROCEDURE — 2060000000 HC ICU INTERMEDIATE R&B

## 2022-09-08 PROCEDURE — 2700000000 HC OXYGEN THERAPY PER DAY

## 2022-09-08 PROCEDURE — 80048 BASIC METABOLIC PNL TOTAL CA: CPT

## 2022-09-08 RX ORDER — LIDOCAINE 4 G/G
1 PATCH TOPICAL DAILY
Status: DISCONTINUED | OUTPATIENT
Start: 2022-09-08 | End: 2022-09-10 | Stop reason: HOSPADM

## 2022-09-08 RX ORDER — NITROGLYCERIN 0.4 MG/1
0.4 TABLET SUBLINGUAL EVERY 5 MIN PRN
Status: DISCONTINUED | OUTPATIENT
Start: 2022-09-08 | End: 2022-09-10 | Stop reason: HOSPADM

## 2022-09-08 RX ORDER — TIZANIDINE 4 MG/1
4 TABLET ORAL DAILY PRN
Status: DISCONTINUED | OUTPATIENT
Start: 2022-09-08 | End: 2022-09-10 | Stop reason: HOSPADM

## 2022-09-08 RX ORDER — MIRTAZAPINE 15 MG/1
7.5 TABLET, FILM COATED ORAL NIGHTLY PRN
Status: DISCONTINUED | OUTPATIENT
Start: 2022-09-08 | End: 2022-09-10 | Stop reason: HOSPADM

## 2022-09-08 RX ORDER — IPRATROPIUM BROMIDE AND ALBUTEROL SULFATE 2.5; .5 MG/3ML; MG/3ML
1 SOLUTION RESPIRATORY (INHALATION) 2 TIMES DAILY
Status: DISCONTINUED | OUTPATIENT
Start: 2022-09-08 | End: 2022-09-10 | Stop reason: HOSPADM

## 2022-09-08 RX ORDER — FUROSEMIDE 40 MG/1
40 TABLET ORAL DAILY
Status: DISCONTINUED | OUTPATIENT
Start: 2022-09-09 | End: 2022-09-10 | Stop reason: HOSPADM

## 2022-09-08 RX ADMIN — SUCRALFATE 1 G: 1 TABLET ORAL at 09:08

## 2022-09-08 RX ADMIN — NITROGLYCERIN 0.4 MG: 0.4 TABLET SUBLINGUAL at 06:58

## 2022-09-08 RX ADMIN — SUCRALFATE 1 G: 1 TABLET ORAL at 11:58

## 2022-09-08 RX ADMIN — PREDNISONE 40 MG: 20 TABLET ORAL at 09:08

## 2022-09-08 RX ADMIN — Medication 10 ML: at 21:25

## 2022-09-08 RX ADMIN — ACETAMINOPHEN 650 MG: 325 TABLET ORAL at 04:47

## 2022-09-08 RX ADMIN — LISINOPRIL 5 MG: 5 TABLET ORAL at 09:08

## 2022-09-08 RX ADMIN — MIRTAZAPINE 7.5 MG: 15 TABLET, FILM COATED ORAL at 23:14

## 2022-09-08 RX ADMIN — Medication 10 ML: at 09:10

## 2022-09-08 RX ADMIN — NITROGLYCERIN 0.4 MG: 0.4 TABLET SUBLINGUAL at 07:06

## 2022-09-08 RX ADMIN — PANTOPRAZOLE SODIUM 40 MG: 40 TABLET, DELAYED RELEASE ORAL at 14:41

## 2022-09-08 RX ADMIN — SERTRALINE HYDROCHLORIDE 50 MG: 50 TABLET ORAL at 09:08

## 2022-09-08 RX ADMIN — SUCRALFATE 1 G: 1 TABLET ORAL at 21:24

## 2022-09-08 RX ADMIN — CEFTRIAXONE SODIUM 1000 MG: 1 INJECTION, POWDER, FOR SOLUTION INTRAMUSCULAR; INTRAVENOUS at 11:57

## 2022-09-08 RX ADMIN — FUROSEMIDE 20 MG: 10 INJECTION, SOLUTION INTRAMUSCULAR; INTRAVENOUS at 09:08

## 2022-09-08 RX ADMIN — NITROGLYCERIN 0.4 MG: 0.4 TABLET SUBLINGUAL at 07:13

## 2022-09-08 RX ADMIN — IPRATROPIUM BROMIDE AND ALBUTEROL SULFATE 1 AMPULE: 2.5; .5 SOLUTION RESPIRATORY (INHALATION) at 07:16

## 2022-09-08 RX ADMIN — VANCOMYCIN HYDROCHLORIDE 1000 MG: 1 INJECTION, POWDER, LYOPHILIZED, FOR SOLUTION INTRAVENOUS at 02:56

## 2022-09-08 RX ADMIN — ENOXAPARIN SODIUM 40 MG: 100 INJECTION SUBCUTANEOUS at 09:08

## 2022-09-08 RX ADMIN — LEVOTHYROXINE SODIUM 88 MCG: 88 TABLET ORAL at 09:07

## 2022-09-08 RX ADMIN — PANTOPRAZOLE SODIUM 40 MG: 40 TABLET, DELAYED RELEASE ORAL at 04:52

## 2022-09-08 RX ADMIN — IPRATROPIUM BROMIDE AND ALBUTEROL SULFATE 1 AMPULE: 2.5; .5 SOLUTION RESPIRATORY (INHALATION) at 18:55

## 2022-09-08 RX ADMIN — NIFEDIPINE 60 MG: 30 TABLET, FILM COATED, EXTENDED RELEASE ORAL at 09:08

## 2022-09-08 RX ADMIN — GABAPENTIN 300 MG: 300 CAPSULE ORAL at 09:07

## 2022-09-08 RX ADMIN — INSULIN LISPRO 1 UNITS: 100 INJECTION, SOLUTION INTRAVENOUS; SUBCUTANEOUS at 17:21

## 2022-09-08 RX ADMIN — MUPIROCIN: 20 OINTMENT TOPICAL at 21:24

## 2022-09-08 RX ADMIN — GABAPENTIN 300 MG: 300 CAPSULE ORAL at 21:24

## 2022-09-08 RX ADMIN — SUCRALFATE 1 G: 1 TABLET ORAL at 17:15

## 2022-09-08 RX ADMIN — GABAPENTIN 300 MG: 300 CAPSULE ORAL at 14:41

## 2022-09-08 RX ADMIN — FUROSEMIDE 20 MG: 10 INJECTION, SOLUTION INTRAMUSCULAR; INTRAVENOUS at 14:41

## 2022-09-08 RX ADMIN — FLUTICASONE PROPIONATE 1 SPRAY: 50 SPRAY, METERED NASAL at 09:09

## 2022-09-08 RX ADMIN — CEFEPIME 2000 MG: 2 INJECTION, POWDER, FOR SOLUTION INTRAVENOUS at 09:17

## 2022-09-08 RX ADMIN — MUPIROCIN: 20 OINTMENT TOPICAL at 09:09

## 2022-09-08 RX ADMIN — ATORVASTATIN CALCIUM 10 MG: 10 TABLET, FILM COATED ORAL at 09:08

## 2022-09-08 ASSESSMENT — PAIN DESCRIPTION - DESCRIPTORS
DESCRIPTORS: ACHING;SHARP;PRESSURE
DESCRIPTORS: ACHING
DESCRIPTORS: ACHING;SHARP;PRESSURE
DESCRIPTORS: ACHING

## 2022-09-08 ASSESSMENT — PAIN DESCRIPTION - ORIENTATION
ORIENTATION: MID

## 2022-09-08 ASSESSMENT — PAIN SCALES - GENERAL
PAINLEVEL_OUTOF10: 6
PAINLEVEL_OUTOF10: 7
PAINLEVEL_OUTOF10: 7
PAINLEVEL_OUTOF10: 4
PAINLEVEL_OUTOF10: 6
PAINLEVEL_OUTOF10: 7
PAINLEVEL_OUTOF10: 5

## 2022-09-08 ASSESSMENT — PAIN DESCRIPTION - LOCATION
LOCATION: CHEST
LOCATION: CHEST
LOCATION: HEAD
LOCATION: CHEST
LOCATION: CHEST

## 2022-09-08 ASSESSMENT — PAIN DESCRIPTION - DIRECTION: RADIATING_TOWARDS: LEFT ARM

## 2022-09-08 ASSESSMENT — PAIN - FUNCTIONAL ASSESSMENT
PAIN_FUNCTIONAL_ASSESSMENT: PREVENTS OR INTERFERES SOME ACTIVE ACTIVITIES AND ADLS
PAIN_FUNCTIONAL_ASSESSMENT: PREVENTS OR INTERFERES SOME ACTIVE ACTIVITIES AND ADLS

## 2022-09-08 NOTE — PROGRESS NOTES
09/07/22 2331   NIV Type   NIV Started/Stopped (S)  On   Equipment Type v60   Mode Bilevel   Mask Type Full face mask   Mask Size Medium   Settings/Measurements   IPAP 16 cmH20   CPAP/EPAP 8 cmH2O   Vt (Measured) 574 mL   Rate Ordered 12   Resp 14   FiO2  30 %   I Time/ I Time % 1 s   Minute Volume (L/min) 10.4 Liters   Mask Leak (lpm) 4 lpm   Comfort Level Good   Using Accessory Muscles No   SpO2 96   Alarm Settings   Alarms On Y   Low Pressure (cmH2O) 5 cmH2O   High Pressure  30 cmH2O   Delay Alarm 20 sec(s)   RR Low (bpm) 12   RR High (bpm) 45 br/min   Oxygen Therapy/Pulse Ox   Heart Rate 78   SpO2 95 %

## 2022-09-08 NOTE — PROGRESS NOTES
09/08/22 0235   NIV Type   $NIV $Daily Charge   NIV Started/Stopped On   Equipment Type v60   Mode Bilevel   Mask Type Full face mask   Mask Size Medium   Settings/Measurements   IPAP 16 cmH20   CPAP/EPAP 8 cmH2O   Vt (Measured) 520 mL   Rate Ordered 12   Resp 12   FiO2  30 %   I Time/ I Time % 1 s   Minute Volume (L/min) 8 Liters   Mask Leak (lpm) 1 lpm   Comfort Level Good   Using Accessory Muscles No   SpO2 96   Oxygen Therapy/Pulse Ox   $Oxygen $Daily Charge   Heart Rate 72   SpO2 95 %   $Pulse Oximeter $Spot check (multiple/continuous)

## 2022-09-08 NOTE — PROGRESS NOTES
09/06/22  1907 09/07/22  0354 09/08/22  0213    139 138   K 4.1 4.0 4.2    102 101   CO2 23 24 27   BUN 25* 22* 28*   CREATININE 1.0 0.9 1.0         MG:   Lab Results   Component Value Date/Time    MG 1.80 09/08/2022 06:36 AM     Ca/Phos:   Lab Results   Component Value Date    CALCIUM 9.2 09/08/2022    PHOS 2.9 08/22/2022     LIVER PROFILE:   Recent Labs     09/06/22 1907   AST 22   ALT 11   BILITOT 0.3   ALKPHOS 343*         PT/INR: No results for input(s): PROTIME, INR in the last 72 hours. APTT: No results for input(s): APTT in the last 72 hours. Cardiac Enzymes:  Lab Results   Component Value Date    CKTOTAL 67 08/21/2022    TROPONINI <0.01 09/08/2022     CT :  Patchy ground-glass opacity and tree-in-bud nodularity in the right upper   lobe, likely postinflammatory-infectious     Assessment:       -Acute Hypoxic resp failure  -Pulmonary edema   -Diastolic CHF   -Possible COPD   -Anemia       Plan:      *-work up for pneumonia ,deescalate abx   *-agree with IV diuresis adjust as needed  *-BD   *-reviewed 2 Echo June 2021 ,consider repeat   ,pending reading as was done this am   *_ follow up ct next 1-2 months ,last ct August shows some nodules   *- Aggressive Pulmonary toielt   *-DVT px   *-Bactroban   *- check Procal  *- keep cefepime /vancom,one more if all culture neg will change Rocephin   -Discuss with cardiology ,pending echo to decide about further work up     Thank you very much for allowing me to participate in the care of this pleasant patient , should you have any questions ,please do not hesitate to contact me      Anup Mullins MD,[facility]  Pulmonary&Critical Care Medicine    D[facility]    NOTE: This report was transcribed using voice recognition software. Every effort was made to ensure accuracy; however, inadvertent computerized transcription errors may be present.

## 2022-09-08 NOTE — PLAN OF CARE
Problem: Discharge Planning  Goal: Discharge to home or other facility with appropriate resources  Outcome: Progressing     Problem: Safety - Adult  Goal: Free from fall injury  Outcome: Progressing  Flowsheets (Taken 9/7/2022 2103)  Free From Fall Injury:   Instruct family/caregiver on patient safety   Based on caregiver fall risk screen, instruct family/caregiver to ask for assistance with transferring infant if caregiver noted to have fall risk factors     Problem: ABCDS Injury Assessment  Goal: Absence of physical injury  Outcome: Progressing  Flowsheets (Taken 9/7/2022 2103)  Absence of Physical Injury: Implement safety measures based on patient assessment     Problem: Pain  Goal: Verbalizes/displays adequate comfort level or baseline comfort level  Outcome: Progressing

## 2022-09-08 NOTE — PROGRESS NOTES
CM-SR, VSS, pt remains afebrile. UO WNL. Pt is on 3 liters per NC & tolerating well. She is up in the bedside chair. Min standby assist needed for transfer from bed to chair. Pt is w/out evidence of distress or discomfort @ this time. Troponin levels noted. Will continue to monitor.      Latest Reference Range & Units 9/8/22 06:36 9/8/22 12:33   Troponin <0.01 ng/mL <0.01 0.02 (H)   (H): Data is abnormally high

## 2022-09-08 NOTE — PROGRESS NOTES
RT Inhaler-Nebulizer Bronchodilator Protocol Note    There is a bronchodilator order in the chart from a provider indicating to follow the RT Bronchodilator Protocol and there is an Initiate RT Inhaler-Nebulizer Bronchodilator Protocol order as well (see protocol at bottom of note). CXR Findings:  XR CHEST PORTABLE    Result Date: 9/8/2022  Near complete resolution of previously seen right lower lobe airspace opacity and small bilateral pleural effusions. XR CHEST PORTABLE    Result Date: 9/6/2022  Congestive heart failure. Underlying pneumonia in the medial right base cannot be excluded. The findings from the last RT Protocol Assessment were as follows:   History Pulmonary Disease: Chronic pulmonary disease  Respiratory Pattern: Regular pattern and RR 12-20 bpm  Breath Sounds: Slightly diminished and/or crackles  Cough: Strong, spontaneous, non-productive  Indication for Bronchodilator Therapy: Decreased or absent breath sounds  Bronchodilator Assessment Score: 4    Aerosolized bronchodilator medication orders have been revised according to the RT Inhaler-Nebulizer Bronchodilator Protocol below. Respiratory Therapist to perform RT Therapy Protocol Assessment initially then follow the protocol. Repeat RT Therapy Protocol Assessment PRN for score 0-3 or on second treatment, BID, and PRN for scores above 3. No Indications - adjust the frequency to every 6 hours PRN wheezing or bronchospasm, if no treatments needed after 48 hours then discontinue using Per Protocol order mode. If indication present, adjust the RT bronchodilator orders based on the Bronchodilator Assessment Score as indicated below.   Use Inhaler orders unless patient has one or more of the following: on home nebulizer, not able to hold breath for 10 seconds, is not alert and oriented, cannot activate and use MDI correctly, or respiratory rate 25 breaths per minute or more, then use the equivalent nebulizer order(s) with same Frequency and PRN reasons based on the score. If a patient is on this medication at home then do not decrease Frequency below that used at home. 0-3 - enter or revise RT bronchodilator order(s) to equivalent RT Bronchodilator order with Frequency of every 4 hours PRN for wheezing or increased work of breathing using Per Protocol order mode. 4-6 - enter or revise RT Bronchodilator order(s) to two equivalent RT bronchodilator orders with one order with BID Frequency and one order with Frequency of every 4 hours PRN wheezing or increased work of breathing using Per Protocol order mode. 7-10 - enter or revise RT Bronchodilator order(s) to two equivalent RT bronchodilator orders with one order with TID Frequency and one order with Frequency of every 4 hours PRN wheezing or increased work of breathing using Per Protocol order mode. 11-13 - enter or revise RT Bronchodilator order(s) to one equivalent RT bronchodilator order with QID Frequency and an Albuterol order with Frequency of every 4 hours PRN wheezing or increased work of breathing using Per Protocol order mode. Greater than 13 - enter or revise RT Bronchodilator order(s) to one equivalent RT bronchodilator order with every 4 hours Frequency and an Albuterol order with Frequency of every 2 hours PRN wheezing or increased work of breathing using Per Protocol order mode.        Electronically signed by Nik Madison RCP on 9/8/2022 at 6:58 PM

## 2022-09-08 NOTE — PROGRESS NOTES
EKG done. Stat troponin drawn. Nitro S/L X 3 Given, which not much improvement. Pt rates her pain 6/10.

## 2022-09-08 NOTE — PROGRESS NOTES
RT Inhaler-Nebulizer Bronchodilator Protocol Note    There is a bronchodilator order in the chart from a provider indicating to follow the RT Bronchodilator Protocol and there is an Initiate RT Inhaler-Nebulizer Bronchodilator Protocol order as well (see protocol at bottom of note). CXR Findings:  XR CHEST PORTABLE    Result Date: 9/6/2022  Congestive heart failure. Underlying pneumonia in the medial right base cannot be excluded. The findings from the last RT Protocol Assessment were as follows:   History Pulmonary Disease: (P) Chronic pulmonary disease  Respiratory Pattern: (P) Regular pattern and RR 12-20 bpm  Breath Sounds: (P) Slightly diminished and/or crackles  Cough: (P) Strong, spontaneous, non-productive  Indication for Bronchodilator Therapy: (P) Decreased or absent breath sounds  Bronchodilator Assessment Score: (P) 4    Aerosolized bronchodilator medication orders have been revised according to the RT Inhaler-Nebulizer Bronchodilator Protocol below. Respiratory Therapist to perform RT Therapy Protocol Assessment initially then follow the protocol. Repeat RT Therapy Protocol Assessment PRN for score 0-3 or on second treatment, BID, and PRN for scores above 3. No Indications - adjust the frequency to every 6 hours PRN wheezing or bronchospasm, if no treatments needed after 48 hours then discontinue using Per Protocol order mode. If indication present, adjust the RT bronchodilator orders based on the Bronchodilator Assessment Score as indicated below. Use Inhaler orders unless patient has one or more of the following: on home nebulizer, not able to hold breath for 10 seconds, is not alert and oriented, cannot activate and use MDI correctly, or respiratory rate 25 breaths per minute or more, then use the equivalent nebulizer order(s) with same Frequency and PRN reasons based on the score.   If a patient is on this medication at home then do not decrease Frequency below that used at home.    0-3 - enter or revise RT bronchodilator order(s) to equivalent RT Bronchodilator order with Frequency of every 4 hours PRN for wheezing or increased work of breathing using Per Protocol order mode. 4-6 - enter or revise RT Bronchodilator order(s) to two equivalent RT bronchodilator orders with one order with BID Frequency and one order with Frequency of every 4 hours PRN wheezing or increased work of breathing using Per Protocol order mode. 7-10 - enter or revise RT Bronchodilator order(s) to two equivalent RT bronchodilator orders with one order with TID Frequency and one order with Frequency of every 4 hours PRN wheezing or increased work of breathing using Per Protocol order mode. 11-13 - enter or revise RT Bronchodilator order(s) to one equivalent RT bronchodilator order with QID Frequency and an Albuterol order with Frequency of every 4 hours PRN wheezing or increased work of breathing using Per Protocol order mode. Greater than 13 - enter or revise RT Bronchodilator order(s) to one equivalent RT bronchodilator order with every 4 hours Frequency and an Albuterol order with Frequency of every 2 hours PRN wheezing or increased work of breathing using Per Protocol order mode.          Electronically signed by Chandan Layton RCP on 9/8/2022 at 7:20 AM

## 2022-09-08 NOTE — PROGRESS NOTES
09/08/22 1103   Encounter Summary   Encounter Overview/Reason  Initial Encounter;Spiritual/Emotional Needs   Service Provided For: Patient   Support System Children   Last Encounter  09/08/22  (Long visit, processed coping.)   Complexity of Encounter Moderate   Begin Time 1000   End Time  1104   Total Time Calculated 64 min   Encounter    Type Initial Screen/Assessment   Spiritual/Emotional needs   Type Spiritual Support   Grief, Loss, and Adjustments   Type Adjustment to illness   Assessment/Intervention/Outcome   Assessment Calm;Coping   Intervention Active listening;Discussed meaning/purpose;Explored/Affirmed feelings, thoughts, concerns;Nurtured Hope   Outcome Coping;Engaged in conversation;Expressed feelings, needs, and concerns;Expressed Gratitude;Receptive   Plan and Referrals   Plan/Referrals Continue Support (comment)  (Continue follow-up prn support as desired and able.)    visit to assess spiritual needs and offer support. Patient openly sharing her life struggles with raising her children and having minimal support growing up. She has a strong spirit and describes herself as straight forward. She sees her main source of hope as her kids. One has developmental disabilities per patient. She describes knowing who she is and is a survivor. It sounds like she lives in [facility], where there has been open drug use. She states she feels safe there. She describes setting clear and strong boundaries. She has little support, which does not bother her. She is not [employer] and sees her source of strength as within. Her life experience has inspired her to be better, not falling into cycles of her past.  Continue prn support to offer an opportunity to process thoughts and feelings.

## 2022-09-08 NOTE — PROGRESS NOTES
IM Progress Note    Admit Date:  9/6/2022  2    Interval history:      Subjective:  Ms. Shelley Lorenzo feels much improved with bipap overnight . Denies any chest pain or sob today   Remains on 3 L   Reports feeling drowsy with sedative meds last night     Objective:   BP (!) 162/59   Pulse 85   Temp 98 °F (36.7 °C) (Oral)   Resp 22   Ht 5' 2\" (1.575 m)   Wt 203 lb 11.3 oz (92.4 kg)   SpO2 95%   BMI 37.26 kg/m²     Intake/Output Summary (Last 24 hours) at 9/8/2022 0704  Last data filed at 9/8/2022 0455  Gross per 24 hour   Intake 2029.99 ml   Output 3325 ml   Net -1295.01 ml         Physical Exam:      General:  obese elderly female up in bed  Awake, alert and oriented. Appears to be not in any distress  Mucous Membranes:  Pink , anicteric  Neck: No JVD, no carotid bruit, no thyromegaly  Chest:  Clear to auscultation bilaterally,minimal basilar crackles  Cardiovascular:  RRR S1S2 heard, + ASM  Abdomen:  Soft, undistended, non tender, no organomegaly, BS present  Extremities:resolving peripheral edema in both LE .  Distal pulses well felt  Neurological : grossly normal    Medications:   Scheduled Medications:    vancomycin  1,000 mg IntraVENous Q18H    ipratropium-albuterol  1 ampule Inhalation 4x daily    gabapentin  300 mg Oral TID    mupirocin   Nasal BID    cefepime  2,000 mg IntraVENous Q12H    predniSONE  40 mg Oral Daily    fluticasone  1 spray Nasal Daily    furosemide  20 mg IntraVENous TID    levothyroxine  88 mcg Oral Daily    lisinopril  5 mg Oral Daily    mirtazapine  7.5 mg Oral Nightly    NIFEdipine  60 mg Oral Daily    pantoprazole  40 mg Oral BID AC    sertraline  50 mg Oral Daily    atorvastatin  10 mg Oral Daily    sucralfate  1 g Oral 4x Daily    traZODone  100 mg Oral Nightly    sodium chloride flush  5-40 mL IntraVENous 2 times per day    enoxaparin  40 mg SubCUTAneous Daily    insulin lispro  0-4 Units SubCUTAneous TID WC    insulin lispro  0-4 Units SubCUTAneous Nightly     I   sodium chloride dextrose       nitroGLYCERIN, ipratropium-albuterol, perflutren lipid microspheres, dicyclomine, tiZANidine, sodium chloride flush, sodium chloride, ondansetron **OR** ondansetron, polyethylene glycol, acetaminophen **OR** acetaminophen, glucose, dextrose bolus **OR** dextrose bolus, glucagon (rDNA), dextrose    Lab Data:  Recent Labs     09/06/22  1907 09/07/22  0354 09/08/22 0213   WBC 5.8 4.7 5.7   HGB 7.5* 8.1* 7.6*   HCT 23.5* 24.6* 22.7*   MCV 84.8 84.3 83.1    271 271       Recent Labs     09/06/22 1907 09/07/22 0354 09/08/22 0213    139 138   K 4.1 4.0 4.2    102 101   CO2 23 24 27   BUN 25* 22* 28*   CREATININE 1.0 0.9 1.0       Recent Labs     09/07/22  0003 09/07/22 0354   TROPONINI 0.01 <0.01         Coagulation:   Lab Results   Component Value Date/Time    INR 0.99 06/27/2022 07:13 PM    APTT 46.9 11/08/2019 06:30 AM     Cardiac markers:   Lab Results   Component Value Date/Time    CKTOTAL 67 08/21/2022 12:40 PM    TROPONINI <0.01 09/07/2022 03:54 AM         Lab Results   Component Value Date    ALT 11 09/06/2022    AST 22 09/06/2022    ALKPHOS 343 (H) 09/06/2022    BILITOT 0.3 09/06/2022       Lab Results   Component Value Date    INR 0.99 06/27/2022    INR 1.01 07/29/2020    INR 1.12 11/02/2019    PROTIME 12.9 06/27/2022    PROTIME 11.7 07/29/2020    PROTIME 12.8 11/02/2019       Radiology    Chest xray   Congestive heart failure. Underlying pneumonia in the medial right base   cannot be excluded. Cultures:   Covid neg     ECHO    LV systolic function is hyperdynamic with EF estimated at >65%. No regional wall motion abnormalities. There is mild concentric left ventricular hypertrophy. Grade II diastolic dysfunction with elevated left ventricular filling   pressure. The left atrium is severely dilated. The right atrium is mildly dilated. Moderate mitral annular calcification. Mild-to-moderate mitral stenosis. Mild mitral regurgitation.    The maximal transaortic velocity is 2.99m/s which gives peak pressure   gradient= 36mmHg and mean pressure gradient= 20mmHg which is c/w mild aortic   stenosis. There is a possible pleural effusion.       Assessment & Plan:    Acute on Chronic dCHF   Acute hypoxic respiratory failure  - improving with bipap and diuresis   - continue monitoring in ICU today   - wean o2 as able  - low salt diet, daily weights  - obtained Echo with normal EF , no RWMA, grade 2 DD        #COPD no AE  -Continue home regimen  - imaging with no pna- can deescalate abx       #Diabetes mellitus type 2  -Last HgbA1c 6.4 ;  -Hold home medication  -POCT glucose  -Low-dose SSI      #Hypothyroidism  -Continue Synthroid        #GERD  -Continue Protonix     #IBS  -Continue home bowel regimen     #Depression  -Continue zoloft - stop trazodone     History of recent left wrist fracture and right foot fracture  - off splint, start PT when able     Hx of cirrhosis /chronic anemia - stable with  no acute issues     Dvt prophylaxis  Full code  Jaxon Safer for PCU         Berta Fisher MD, 9/8/2022 7:04 AM

## 2022-09-08 NOTE — PROGRESS NOTES
9/8  Vanc trough = 17.9 mcg/mL at 0205. Calculated AUC of 611. Will slightly decrease vancomycin to 1000 mg q18. Recheck vanc trough tomorrow (9/9 at 1500).   Julia Duron, PharmD  9/8/2022 2:45 AM

## 2022-09-08 NOTE — PROGRESS NOTES
Pt A/O. VSS. Assessment complete as charted. Repositioned for comfort. Call light in reach. Denies needs. Will continue to monitor. Pt on intermittent I/V antibiotics. Ralph in place. Pt on 3 lt of oxygen.
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
## 2022-09-09 LAB
ANION GAP SERPL CALCULATED.3IONS-SCNC: 8 MMOL/L (ref 3–16)
BUN BLDV-MCNC: 28 MG/DL (ref 7–20)
CALCIUM SERPL-MCNC: 8.9 MG/DL (ref 8.3–10.6)
CHLORIDE BLD-SCNC: 101 MMOL/L (ref 99–110)
CO2: 31 MMOL/L (ref 21–32)
CREAT SERPL-MCNC: 0.8 MG/DL (ref 0.6–1.2)
GFR AFRICAN AMERICAN: >60
GFR NON-AFRICAN AMERICAN: >60
GLUCOSE BLD-MCNC: 108 MG/DL (ref 70–99)
GLUCOSE BLD-MCNC: 130 MG/DL (ref 70–99)
GLUCOSE BLD-MCNC: 135 MG/DL (ref 70–99)
GLUCOSE BLD-MCNC: 152 MG/DL (ref 70–99)
GLUCOSE BLD-MCNC: 216 MG/DL (ref 70–99)
PERFORMED ON: ABNORMAL
POTASSIUM SERPL-SCNC: 4.1 MMOL/L (ref 3.5–5.1)
PROCALCITONIN: 0.26 NG/ML (ref 0–0.15)
SODIUM BLD-SCNC: 140 MMOL/L (ref 136–145)
TROPONIN: 0.01 NG/ML
VANCOMYCIN TROUGH: 9.9 UG/ML (ref 10–20)

## 2022-09-09 PROCEDURE — 84484 ASSAY OF TROPONIN QUANT: CPT

## 2022-09-09 PROCEDURE — 2580000003 HC RX 258: Performed by: INTERNAL MEDICINE

## 2022-09-09 PROCEDURE — 84145 PROCALCITONIN (PCT): CPT

## 2022-09-09 PROCEDURE — 6370000000 HC RX 637 (ALT 250 FOR IP): Performed by: INTERNAL MEDICINE

## 2022-09-09 PROCEDURE — 97166 OT EVAL MOD COMPLEX 45 MIN: CPT

## 2022-09-09 PROCEDURE — 99233 SBSQ HOSP IP/OBS HIGH 50: CPT | Performed by: INTERNAL MEDICINE

## 2022-09-09 PROCEDURE — 2700000000 HC OXYGEN THERAPY PER DAY

## 2022-09-09 PROCEDURE — 6360000002 HC RX W HCPCS: Performed by: INTERNAL MEDICINE

## 2022-09-09 PROCEDURE — 97530 THERAPEUTIC ACTIVITIES: CPT

## 2022-09-09 PROCEDURE — 80048 BASIC METABOLIC PNL TOTAL CA: CPT

## 2022-09-09 PROCEDURE — 97161 PT EVAL LOW COMPLEX 20 MIN: CPT

## 2022-09-09 PROCEDURE — 94640 AIRWAY INHALATION TREATMENT: CPT

## 2022-09-09 PROCEDURE — 80202 ASSAY OF VANCOMYCIN: CPT

## 2022-09-09 PROCEDURE — 94761 N-INVAS EAR/PLS OXIMETRY MLT: CPT

## 2022-09-09 PROCEDURE — 94660 CPAP INITIATION&MGMT: CPT

## 2022-09-09 PROCEDURE — 36415 COLL VENOUS BLD VENIPUNCTURE: CPT

## 2022-09-09 PROCEDURE — 2060000000 HC ICU INTERMEDIATE R&B

## 2022-09-09 PROCEDURE — 97116 GAIT TRAINING THERAPY: CPT

## 2022-09-09 PROCEDURE — 94669 MECHANICAL CHEST WALL OSCILL: CPT

## 2022-09-09 RX ORDER — METOPROLOL SUCCINATE 25 MG/1
25 TABLET, EXTENDED RELEASE ORAL DAILY
Status: DISCONTINUED | OUTPATIENT
Start: 2022-09-09 | End: 2022-09-10 | Stop reason: HOSPADM

## 2022-09-09 RX ADMIN — Medication 10 ML: at 21:28

## 2022-09-09 RX ADMIN — NIFEDIPINE 60 MG: 30 TABLET, FILM COATED, EXTENDED RELEASE ORAL at 08:47

## 2022-09-09 RX ADMIN — IPRATROPIUM BROMIDE AND ALBUTEROL SULFATE 1 AMPULE: 2.5; .5 SOLUTION RESPIRATORY (INHALATION) at 18:52

## 2022-09-09 RX ADMIN — SUCRALFATE 1 G: 1 TABLET ORAL at 08:47

## 2022-09-09 RX ADMIN — PANTOPRAZOLE SODIUM 40 MG: 40 TABLET, DELAYED RELEASE ORAL at 06:25

## 2022-09-09 RX ADMIN — SUCRALFATE 1 G: 1 TABLET ORAL at 12:32

## 2022-09-09 RX ADMIN — LEVOTHYROXINE SODIUM 88 MCG: 88 TABLET ORAL at 08:47

## 2022-09-09 RX ADMIN — PANTOPRAZOLE SODIUM 40 MG: 40 TABLET, DELAYED RELEASE ORAL at 17:02

## 2022-09-09 RX ADMIN — SERTRALINE HYDROCHLORIDE 50 MG: 50 TABLET ORAL at 08:47

## 2022-09-09 RX ADMIN — INSULIN LISPRO 1 UNITS: 100 INJECTION, SOLUTION INTRAVENOUS; SUBCUTANEOUS at 17:02

## 2022-09-09 RX ADMIN — PREDNISONE 40 MG: 20 TABLET ORAL at 08:47

## 2022-09-09 RX ADMIN — FUROSEMIDE 40 MG: 40 TABLET ORAL at 08:47

## 2022-09-09 RX ADMIN — SUCRALFATE 1 G: 1 TABLET ORAL at 21:28

## 2022-09-09 RX ADMIN — FLUTICASONE PROPIONATE 1 SPRAY: 50 SPRAY, METERED NASAL at 08:48

## 2022-09-09 RX ADMIN — IPRATROPIUM BROMIDE AND ALBUTEROL SULFATE 1 AMPULE: 2.5; .5 SOLUTION RESPIRATORY (INHALATION) at 07:21

## 2022-09-09 RX ADMIN — SUCRALFATE 1 G: 1 TABLET ORAL at 17:02

## 2022-09-09 RX ADMIN — ENOXAPARIN SODIUM 40 MG: 100 INJECTION SUBCUTANEOUS at 08:48

## 2022-09-09 RX ADMIN — CEFTRIAXONE SODIUM 1000 MG: 1 INJECTION, POWDER, FOR SOLUTION INTRAMUSCULAR; INTRAVENOUS at 12:33

## 2022-09-09 RX ADMIN — LISINOPRIL 5 MG: 5 TABLET ORAL at 08:47

## 2022-09-09 RX ADMIN — GABAPENTIN 300 MG: 300 CAPSULE ORAL at 21:28

## 2022-09-09 RX ADMIN — ATORVASTATIN CALCIUM 10 MG: 10 TABLET, FILM COATED ORAL at 08:47

## 2022-09-09 RX ADMIN — GABAPENTIN 300 MG: 300 CAPSULE ORAL at 13:34

## 2022-09-09 RX ADMIN — Medication 10 ML: at 08:48

## 2022-09-09 RX ADMIN — GABAPENTIN 300 MG: 300 CAPSULE ORAL at 08:47

## 2022-09-09 NOTE — PROGRESS NOTES
09/09/22 0232   NIV Type   NIV Started/Stopped On   Equipment Type v60   Mode Bilevel   Mask Type Full face mask   Mask Size Medium   Settings/Measurements   IPAP 16 cmH20   CPAP/EPAP 8 cmH2O   Vt (Measured) 656 mL   Rate Ordered 12   Resp 16   FiO2  30 %   I Time/ I Time % 1 s   Minute Volume (L/min) 10.9 Liters   Mask Leak (lpm) 8 lpm   Comfort Level Good   Using Accessory Muscles No   SpO2 95   Oxygen Therapy/Pulse Ox   Heart Rate 58   SpO2 95 %

## 2022-09-09 NOTE — PROGRESS NOTES
Patient up in chair after am bath, good urine output, denies further needs, call light in reach, will continue to monitor.

## 2022-09-09 NOTE — PROGRESS NOTES
[facility]                                 Pulmonary Consult /Progress Note :                                                                  CHIEF COMPLAINT: SOB     Consulting provider: alma     HPI:   No chest pain  Doing great    On 3.5 L   EKG was ok   Stat echo done  [facility] cardiology consult  No chest pain   Wear her BiPAP 16/8       Objective:   PHYSICAL EXAM:    Blood pressure (!) 152/51, pulse 74, temperature 96.9 °F (36.1 °C), temperature source Axillary, resp. rate 15, height 5' 2\" (1.575 m), weight 203 lb 11.3 oz (92.4 kg), SpO2 96 %, not currently breastfeeding.' on RA  Gen: No distress. Eyes: PERRL. No sclera icterus. No conjunctival injection. ENT: No discharge. Pharynx clear. Neck: Trachea midline. No obvious mass. Resp: rhonchi /rales   CV: Regular rate. Regular rhythm. No murmur or rub. No edema. GI: Non-tender. Non-distended. No hernia. Skin: Warm and dry. No nodule on exposed extremities. Lymph: No cervical LAD. No supraclavicular LAD. M/S: No cyanosis. No joint deformity. No clubbing. Neuro: Awake. Alert. Moves all four extremities. Psych: Oriented x 3. No anxiety.            DATA reviewed by me:         LABS/IMAGING:    CBC:  Lab Results   Component Value Date    WBC 5.7 09/08/2022    HGB 7.6 (L) 09/08/2022    HCT 22.7 (L) 09/08/2022    MCV 83.1 09/08/2022     09/08/2022    LYMPHOPCT 7.3 09/07/2022    RBC 2.73 (L) 09/08/2022    MCH 28.0 09/08/2022    MCHC 33.6 09/08/2022    RDW 16.6 (H) 09/08/2022    NEUTOPHILPCT 90.9 09/07/2022    MONOPCT 1.3 09/07/2022    EOSPCT 0.0 03/06/2011    BASOPCT 0.4 09/07/2022    NEUTROABS 4.3 09/07/2022    LYMPHSABS 0.3 (L) 09/07/2022    MONOSABS 0.1 09/07/2022    EOSABS 0.0 09/07/2022    BASOSABS 0.0 09/07/2022       Recent Labs     09/08/22  0213 09/07/22  0354 09/06/22  1907   WBC 5.7 4.7 5.8   HGB 7.6* 8.1* 7.5*   HCT 22.7* 24.6* 23.5*   MCV 83.1 84.3 84.8    271 264         BMP:   Recent Labs     09/07/22  0354 09/08/22  0213 09/09/22  0418    138 140   K 4.0 4.2 4.1    101 101   CO2 24 27 31   BUN 22* 28* 28*   CREATININE 0.9 1.0 0.8         MG:   Lab Results   Component Value Date/Time    MG 1.80 09/08/2022 06:36 AM     Ca/Phos:   Lab Results   Component Value Date    CALCIUM 8.9 09/09/2022    PHOS 2.9 08/22/2022     LIVER PROFILE:   Recent Labs     09/06/22  1907   AST 22   ALT 11   BILITOT 0.3   ALKPHOS 343*         PT/INR: No results for input(s): PROTIME, INR in the last 72 hours. APTT: No results for input(s): APTT in the last 72 hours.     Cardiac Enzymes:  Lab Results   Component Value Date    CKTOTAL 67 08/21/2022    TROPONINI 0.01 09/09/2022     CT :  Patchy ground-glass opacity and tree-in-bud nodularity in the right upper   lobe, likely postinflammatory-infectious     Assessment:       -Acute Hypoxic resp failure  -Pulmonary edema   -Diastolic CHF   -Possible COPD   -Anemia       Plan:      *-work up for pneumonia ,deescalate abx ,now matteo Rocephin X 5-7 days to stop on 9/13   *-had  IV diuresis adjust as needed,on 40 PO now   *-BD   *-reviewed 2 Echo June 2021 ,consider repeat   ,pending reading as was done this am   *_ follow up ct next 1-2 months ,last ct August shows some nodules   *- Aggressive Pulmonary toielt   *-DVT px   *-Bactroban   *- check Procal  *- keep cefepime /vancom,one more if all culture neg will change Rocephin   -Discuss with cardiology ,pending echo to decide about further work up   *- Hb drop 7.6 ,seems base line 9-10 will get stool hemoccult and repeat if further drop GI and work up as per hospitality in floor     Thank you very much for allowing me to participate in the care of this pleasant patient , should you have any questions ,please do not hesitate to contact me      Silviano Mullins MD,[facility]  Pulmonary&Critical Care Medicine    Brisa Morales    NOTE: This report was transcribed using voice recognition

## 2022-09-09 NOTE — PROGRESS NOTES
IM Progress Note    Admit Date:  9/6/2022  3    Interval history:      Subjective:  Ms. Xin Zayas feels much improved with bipap overnight . Denies any chest pain or sob today   Remains on 2 L   Reports feeling dizzy with ambulation today       Objective:   BP (!) 152/51   Pulse 82   Temp 98.8 °F (37.1 °C) (Axillary)   Resp 19   Ht 5' 2\" (1.575 m)   Wt 203 lb 11.3 oz (92.4 kg)   SpO2 95%   BMI 37.26 kg/m²     Intake/Output Summary (Last 24 hours) at 9/9/2022 1324  Last data filed at 9/9/2022 1158  Gross per 24 hour   Intake 1358 ml   Output 3600 ml   Net -2242 ml       Physical Exam:      General:  obese elderly female up in bed  Awake, alert and oriented. Appears to be not in any distress  Mucous Membranes:  Pink , anicteric  Neck: No JVD, no carotid bruit, no thyromegaly  Chest:  Clear to auscultation bilaterally,minimal basilar crackles  Cardiovascular:  RRR S1S2 heard, + ASM  Abdomen:  Soft, undistended, non tender, no organomegaly, BS present  Extremities:resolving peripheral edema in both LE .  Distal pulses well felt  Neurological : grossly normal    Medications:   Scheduled Medications:    metoprolol succinate  25 mg Oral Daily    ipratropium-albuterol  1 ampule Inhalation BID    cefTRIAXone (ROCEPHIN) IV  1,000 mg IntraVENous Q24H    lidocaine  1 patch TransDERmal Daily    furosemide  40 mg Oral Daily    gabapentin  300 mg Oral TID    predniSONE  40 mg Oral Daily    fluticasone  1 spray Nasal Daily    levothyroxine  88 mcg Oral Daily    lisinopril  5 mg Oral Daily    pantoprazole  40 mg Oral BID AC    sertraline  50 mg Oral Daily    atorvastatin  10 mg Oral Daily    sucralfate  1 g Oral 4x Daily    sodium chloride flush  5-40 mL IntraVENous 2 times per day    enoxaparin  40 mg SubCUTAneous Daily    insulin lispro  0-4 Units SubCUTAneous TID WC    insulin lispro  0-4 Units SubCUTAneous Nightly     I   sodium chloride      dextrose       nitroGLYCERIN, mirtazapine, tiZANidine, ipratropium-albuterol, perflutren lipid microspheres, dicyclomine, sodium chloride flush, sodium chloride, ondansetron **OR** ondansetron, polyethylene glycol, acetaminophen **OR** acetaminophen, glucose, dextrose bolus **OR** dextrose bolus, glucagon (rDNA), dextrose    Lab Data:  Recent Labs     09/06/22  1907 09/07/22  0354 09/08/22  0213   WBC 5.8 4.7 5.7   HGB 7.5* 8.1* 7.6*   HCT 23.5* 24.6* 22.7*   MCV 84.8 84.3 83.1    271 271     Recent Labs     09/07/22  0354 09/08/22  0213 09/09/22  0418    138 140   K 4.0 4.2 4.1    101 101   CO2 24 27 31   BUN 22* 28* 28*   CREATININE 0.9 1.0 0.8     Recent Labs     09/08/22  1806 09/09/22  0040   TROPONINI 0.02* 0.01       Coagulation:   Lab Results   Component Value Date/Time    INR 0.99 06/27/2022 07:13 PM    APTT 46.9 11/08/2019 06:30 AM     Cardiac markers:   Lab Results   Component Value Date/Time    CKTOTAL 67 08/21/2022 12:40 PM    TROPONINI 0.01 09/09/2022 12:40 AM         Lab Results   Component Value Date    ALT 11 09/06/2022    AST 22 09/06/2022    ALKPHOS 343 (H) 09/06/2022    BILITOT 0.3 09/06/2022       Lab Results   Component Value Date    INR 0.99 06/27/2022    INR 1.01 07/29/2020    INR 1.12 11/02/2019    PROTIME 12.9 06/27/2022    PROTIME 11.7 07/29/2020    PROTIME 12.8 11/02/2019       Radiology    Chest xray   Congestive heart failure. Underlying pneumonia in the medial right base   cannot be excluded. Cultures:   Covid neg     ECHO    LV systolic function is hyperdynamic with EF estimated at >65%. No regional wall motion abnormalities. There is mild concentric left ventricular hypertrophy. Grade II diastolic dysfunction with elevated left ventricular filling   pressure. The left atrium is severely dilated. The right atrium is mildly dilated. Moderate mitral annular calcification. Mild-to-moderate mitral stenosis. Mild mitral regurgitation.    The maximal transaortic velocity is 2.99m/s which gives peak pressure   gradient= 36mmHg and mean pressure gradient= 20mmHg which is c/w mild aortic   stenosis. There is a possible pleural effusion.       Assessment & Plan:    Acute on Chronic dCHF   Acute hypoxic respiratory failure  - improving with bipap and diuresis   - changed lasix to oral 40 mg daily   - wean o2 as able  - low salt diet, daily weights  - cardiology consulted   - obtained Echo with normal EF , no RWMA, grade 2 DD   - may need home o2     HTN - nifedipine changed to toprol , continue lisinopril      #COPD no AE  -Continue home regimen  - imaging with no pna- can deescalate abx or stop        #Diabetes mellitus type 2  -Last HgbA1c 6.4 ;  -Hold home medication  -POCT glucose  -Low-dose SSI      #Hypothyroidism  -Continue Synthroid        #GERD  -Continue Protonix     #IBS  -Continue home bowel regimen     #Depression  -Continue zoloft - stop trazodone     History of recent left wrist fracture and right foot fracture  - off splint, start PT when able     Hx of cirrhosis /chronic anemia - stable with  no acute issues     Dvt prophylaxis  Full code  [id number] Essie Mckenzie for PCU         Romana Just, MD, 9/9/2022 1:24 PM

## 2022-09-09 NOTE — PROGRESS NOTES
No new order for sleeping medication received, currently eyes closed, bipap in place. Call light in reach, will continue to monitor.

## 2022-09-09 NOTE — PROGRESS NOTES
coughing. SOB progressed prompting ER consultation where she presented 88% on room air. She was noted to become less responsive during ER eval, and placed on bipap. Home Health S4 Level Recommendation:  Level 1 Standard  AM-PAC Mobility Score       AM-PAC Inpatient Mobility without Stair Climbing Raw Score : 19    Preadmission Environment    Pt. Lives with family (2 dtr,34&54)One has CP  Home environment:    mobile home/trailer  Steps to enter first floor:   ramp            Steps to second floor: N/A  Laundry:          first floor  Bathroom:       Walk-in Shower and Grab bars  Equipment owned:      [employer], [address] [place] [address] (), [employer] W/C, and [facility]          Preadmission Status / PLOF:  History of falls             Yes- 2 recent falls about 3 weeks ago. Pt. Able to drive          Yes  Pt Fully independent with ADL's         Yes- \"dtrs help when I need it\"  Pt. Required assistance from family for: Independent PTA    Pt. Fully independent for transfers and gait and walked with: Walker          Pain   No  Location:   Rating: NA /10  Pain Medicine Status: Denies need    Cognition    A&O x4   Able to follow 2 step commands    Subjective  Patient lying supine in bed with no family present. Pt agreeable to this PT eval & tx. Upper Extremity ROM/Strength  Please see OT evaluation. Lower Extremity ROM / Strength   AROM WFL: Yes  ROM limitations:     BLE strength WFL, but not formally assessed with MMT.      Lower Extremity Sensation    WFL    Lower Extremity Proprioception:   NT    Coordination and Tone  NT    Balance  Sitting:  Good ; SBA  Comments: EOB    Standing: Good - ; SBA  Comments:     Bed Mobility   Supine to Sit:    Supervision  Sit to Supine:   Not Tested  Rolling:   Not Tested  Scooting in sitting: Supervision  Scooting in supine:  Not Tested    Transfer Training     Sit to stand:   SBA  Stand to sit:   SBA  Bed to Chair:   Not Tested with use of N/A    Gait gait completed as indicated below  Distance: 40 ft + 15 ft  Deviations (firm surface/linoleum):  decreased kathi, step through pattern, and decreased step length bilaterally  Assistive Device Used:    No AD and gait belt  Level of Assist:    SBA  Comment:     Stair Training deferred, pt unsafe/ not appropriate to complete stairs at this time    Activity Tolerance   Pt completed therapy session with No adverse symptoms noted w/activity  HR: 77bpm  SpO2: 98% on 3L O2    Positioning Needs   Pt in , in direct care of transport in route to PCU, positioned in proper neutral alignment and pressure relief provided. Call light provided and all needs within reach    Exercises Initiated  Abril deferred secondary to treatment focus on functional mobility  NA    Other  None. Patient/Family Education   Pt educated on role of inpatient PT, POC, importance of continued activity, DC recommendations, safety awareness, transfer techniques, pacing activity, and calling for assist with mobility. Assessment  Pt seen for Physical Therapy evaluation in acute care setting. Pt demonstrated decreased Activity tolerance, Balance, and Safety as well as decreased independence with Ambulation and Transfers. Recommending Home PRN assist and with home PT upon discharge as patient functioning below baseline level, close to baseline level, and would benefit from continued therapy services    Goals : To be met in 3 visits:  1). Independent with LE Ex x 10 reps    To be met in 6 visits:  1). Supine to/from sit: Independent  2). Sit to/from stand: Independent  3). Bed to chair: Independent  4). Gait: Ambulate 50 ft.  with  Independent and use of No AD  5).   Tolerate B LE exercises 3 sets of 10-15 reps    Rehabilitation Potential: Good  Strengths for achieving goals include:   Pt motivated, PLOF, Family Support, and Pt cooperative   Barriers to achieving goals include:    No Barriers    Plan    To be seen 3-5 x / week  while in acute care setting for therapeutic exercises, bed mobility, transfers, progressive gait training, balance training, and family/patient education. Signature: Brionna Wagner PT, DPT, OMT-C  #[id number]      If patient discharges from this facility prior to next visit, this note will serve as the Discharge Summary.

## 2022-09-09 NOTE — PROGRESS NOTES
has CP  Home environment:  mobile home/trailer  Steps to enter first floor:   ramp    Steps to second floor: N/A  Laundry: first floor  Bathroom:  Walk-in Shower and Grab bars  Equipment owned:  [facility] [address], [address] (), manual W/C, and [facility]        Preadmission Status / PLOF:  History of falls   Yes- 2 recent falls about 3 weeks ago. Pt. Able to drive   Yes  Pt Fully independent with ADL's  Yes- \"dtrs help when I need it\"  Pt. Required assistance from family for: Independent PTA    Pt. Fully independent for transfers and gait and walked with: Neptali Diaz      Subjective  Patient lying supine in bed with no family present   Pt agreeable to this OT eval & tx. Cognition    A&O x4   Able to follow 2 step commands    Pain  No  Rating:NA  Location:NA  Pain Medicine Status: No request made        Upper Extremity ROM:    WFL,  pt able to perform all bed mobility, transfers, and gait without ROM limitation. Upper Extremity Strength:    Strength Assessment (measured on a 0-5 scale):    RUE:  Shoulder flexion 4      LUE:  Shoulder flexion 4  Elbow and wrist not tested d/t fx. Upper Extremity Sensation    WFL- pt reports paresthesia in L hand.     Upper Extremity Proprioception:  WNL    Coordination and Tone  WNL    Balance  Static Sitting:  Good   Dynamic Sitting: Not tested  Static Standing:  Good   Dynamic Standing: Good - without device    Bed mobility:    Supine to sit:   SBA  Sit to supine:   Not Tested  Rolling:    Not Tested  Scooting in sitting:  SBA  Scooting to head of bed:   Not Tested    Bridging:   Not Tested    Transfers:    Sit to stand:  SBA  Stand to sit:  SBA  Bed to chair:   SBA  Standard toilet: Not Tested  Bed to [facility]:  Not Tested    Dressing:      UE:   Not Tested  LE:    Not Tested    Bathing:    UE:  Not Tested  LE:  Not Tested    Eating:   Not Tested    Toileting:  Not Tested    Grooming/hygiene: Not Tested    Activity Tolerance   No adverse symptoms noted w/activity         BP (mmHg)  HR (bpm) SpO2 (%)   Semifowlers before activity   77 98       Positioning Needs:   Pt left in WC with transport to new unit. Exercise / Activities Initiated:   N/A    Patient/Family Education:   Role of OT  Recommendations for DC    Assessment of Deficits: Pt seen for Occupational therapy evaluation in acute care setting. Pt demonstrated decreased Activity tolerance, ADLs, and Transfers. Pt functioning below baseline and will likely benefit from skilled occupational therapy services to maximize safety and independence. Goal(s) : To be met in 3 Visits:  1). Bed to toilet/BSC: Supervision    To be met in 5 Visits:  1). Supine to/from Sit:  Independent  2). Upper Body Bathing:   Supervision  3). Lower Body Bathing:   Supervision  4). Upper Body Dressing:  Independent  5). Lower Body Dressing:  Supervision  6). Pt to demonstrate UE exs x 15 reps with minimal cues    Rehabilitation Potential:  Good for goals listed above. Strengths for achieving goals include: Pt motivated, PLOF, Family Support, and Pt cooperative  Barriers to achieving goals include:  Complexity of condition     Plan: To be seen 3-5 x/wk while in acute care setting for therapeutic exercises, bed mobility, transfers, dressing, bathing, family/patient education, ADL/IADL retraining, energy conservation training.       Pranav Later OTR/L #[id number]      If patient discharges from this facility prior to next visit, this note will serve as the Discharge Summary

## 2022-09-09 NOTE — PROGRESS NOTES
Patient requests sleeping medication, unable to sleep on BiPAP, PRN Remeron for sleep ineffective. Message sent to hospitalist.      White oblong pill with R180 imprinted on it found on patient gown, states it fell out of her wallet at bedside, did not take any pills from home, states it just fell out. Pill identified as tizanidine 4mg. Reminded patient not to take any pills from home.

## 2022-09-09 NOTE — PROGRESS NOTES
O2 Sat at rest on room air is 89 %. O2 Sat with activity on room air is 84 %. O2 Sat at rest with oxygen @  3.5 lpm is 91%. O2 Sat with activity with oxygen @ 3.5 lpm is 84%. After walking patient states she felt very dizzy. MD made aware.

## 2022-09-09 NOTE — FLOWSHEET NOTE
09/09/22 1358   Vital Signs   Temp 97 °F (36.1 °C)   Temp Source Oral   Heart Rate 75   Heart Rate Source Monitor   Resp 16   BP (!) 121/52   BP Location Right upper arm   BP Method Automatic   MAP (Calculated) 75   Patient Position Semi fowlers   Level of Consciousness 0   MEWS Score 1   Pain Assessment   Pain Assessment None - Denies Pain   Oxygen Therapy   SpO2 96 %   Pulse Oximetry Type Intermittent   O2 Device Nasal cannula   O2 Flow Rate (L/min) 3.5 L/min   Patient transferred to PCU at this time. Orders released. Telmetry confirmed with CM[facility]. Patient denies any needs.

## 2022-09-09 NOTE — PROGRESS NOTES
Patient resting in bed, report given to [facility] RN for transfer of care. PT/OT at bedside working with patient. Request placed for transport to take patient to room 301 per wheelchair at this time.

## 2022-09-09 NOTE — CARE COORDINATION
INTERDISCIPLINARY PLAN OF CARE CONFERENCE    Date/Time: 9/9/2022 12:10 PM  Completed by: ERNESTO López  Case Management      Patient Name:  Gretel Mosquera  YOB: 1950  Admitting Diagnosis: Acute hypoxemic respiratory failure ([facility] [address] [J96.01]     Admit Date/Time:  9/6/2022  6:45 PM    Chart reviewed. Interdisciplinary team contacted or reviewed plan related to patient progress and discharge plans. Disciplines included Case Management, Nursing, and Dietitian. Current Status:ongoing   PT/OT recommendation for discharge plan of care: n/a    Expected D/C Disposition:  Home  Confirmed plan with patient and/or family Yes confirmed with: (name) pt  Met with:pt    Discharge Plan Comments: Chart review completed. Met with pt at bedside. Pt confirmed she will return home on discharge. Discussed the need for home o2. Pt stated referral to [address] [place] [address]. Referral given to Za Hernandez at [address] [place] [address] via phone call.      Home O2 in place on admit: No
Schedule:  Phone:   Fax: Other Community Services: Meals on Wheels    DISCHARGE PLAN: Explained Case Management role/services. Chart review completed. Met with pt at bedside. Pt stated she is independent at home and will return when discharged. Discussed additional services for home as pt is a readmit and pt declined needing additional services at this time. She is aware CM will follow for possible home o2 and she stated agreement. Provided her with a DME list to review in case she qualifies for home o2. CM will follow. Please notify CM if needs or concerns arise.      Edmundo Cintron MSW, JENELLEW-S

## 2022-09-09 NOTE — PROGRESS NOTES
Patient taken to PCU per transport with belongings and oxygen at 3.5L per NC. Patient transported out in stable condition.

## 2022-09-09 NOTE — PROGRESS NOTES
Patient resting in bed with eyes closed. Dr. Kimo Forte into see patient. Okay for patient to transfer to PCU, waiting on a bed. Will continue to monitor.

## 2022-09-09 NOTE — PROGRESS NOTES
[facility]   Progress Note  Cardiology      HPI: Seeing Ms. Tamia Hagan today for f/u CP. She reports CP is better today and lidoderm patch has helped. Tele reviewed NSR 70bpm      Physical Examination:    Vitals:    09/09/22 0700   BP:    Pulse: 80   Resp: 18   Temp:    SpO2: 95%          Constitutional and General Appearance: NAD   Respiratory:  Normal excursion and expansion without use of accessory muscles  Resp Auscultation: Normal breath sounds without dullness  Cardiovascular: The apical impulses not displaced  Heart tones are crisp and normal  Cervical veins are not engorged  The carotid upstroke is normal in amplitude and contour without delay or bruit  Normal S1S2, No S3, +II/VI FANI  Peripheral pulses are symmetrical and full  There is no clubbing, cyanosis of the extremities. No edema  Pedal Pulses: 2+ and equal   Abdomen:  No masses or tenderness  Liver/Spleen: No Abnormalities Noted  Neurological/Psychiatric:  Alert and oriented in all spheres  Moves all extremities well  No abnormalities of mood, affect, memory, mentation, or behavior are noted  Skin: warm and dry      Lab Results   Component Value Date    WBC 5.7 09/08/2022    HGB 7.6 (L) 09/08/2022    HCT 22.7 (L) 09/08/2022    MCV 83.1 09/08/2022     09/08/2022     Lab Results   Component Value Date    CREATININE 0.8 09/09/2022    BUN 28 (H) 09/09/2022     09/09/2022    K 4.1 09/09/2022     09/09/2022    CO2 31 09/09/2022     Lab Results   Component Value Date    INR 0.99 06/27/2022    PROTIME 12.9 06/27/2022         ECHO 9/8/22 Summary   LV systolic function is hyperdynamic with EF estimated at >65%. No regional wall motion abnormalities. There is mild concentric left ventricular hypertrophy. Grade II diastolic dysfunction with elevated left ventricular filling pressure. The left atrium is severely dilated. The right atrium is mildly dilated. Moderate mitral annular calcification.    Mild-to-moderate mitral stenosis. Mild mitral regurgitation. The maximal transaortic velocity is 2.99m/s which gives peak pressure   gradient= 36mmHg and MPG= 20mmHg which is c/w mild aortic stenosis. There is a possible pleural effusion    Assessment: David Vasquez is a 71 y.o. patient who presented to [facility] 9/6/2022 with c/o SOB. She has PMH: HTN, DM, diastolic CHF, COPD, Depression, methamphetamine abuse, anxiety, and hypothyroidism. Note 48 hour holter 4/17/15 NSR; Rare PVC's. Frequent atrial ectopy with short runs of SVT. Note ECHO 11/7/16 EF=60%. Grade I DD Occasional, mild WARREN of mitral valve with LVOT velocity=2.16m/s giving peak pressure gradient=19mmHgc/w very mild HCM of elderly; mild AR. Most recent GXT myoview 10/18/17 negative for ischemia. Most recent Limited ECHO 7/10/2021 EF=60-65%. She arrived by EMS to ED with c/o SOB x 2 days. She satted 88% on RA and was placed on 2L NC. Now c/o left CP; sharp and tight; radiated to left arm; lasted 20mins assoc with mild nausea; much improved but still present. .  CXR noted CHF with underlying pneumonia in the medial right base. EKG noted NSR with poss LA enlargement; nonspecific ST and T wave abnormality; 73 bpm. LABS: , Kt+ 4.1, BUN/Cr 25/1.0, BNP 1,940, Alk Phos 343, ALT 11, AST 22, H/H 7.5/ 23.5, Chris <0.1 x4. She was given a dose of Lasix and given vancomycin and cefepime. Admitted 8/21-8/23/22 for multiple episodes of ? LOC vs seizure. Treated for septic shock/sepsis. Diagnosis of unspecified CP, systolic murmur, and acute on chronic diastolic CHF in older female with multiple med issues and possible PNA. Recs: Chris enzymes <0.01 x 4, 0.02 x 2, 0.01. No ischemic EKG change and equivocal Chris 0.02 with improved CP with lidocaine patch. Her diastolic CHF can explain it and would not pursue ischemia w/u unless worsening CP and/or EKG changes. ECHO reviewed EF>65%; mild-mod MS; mild AS; LVH; DD+ elevated filling. Continue po lasix 40mg daily. Continue lipitor 10mg qd lisinopril 5mg   D/C nifedipine given reflex tachycardia side effects and high HR during ECHO with hyperdynamic function. Will switch to Toprol XL 25mg daily for lower HR and longer diastolic filling time.   Abx per ICU team     Patient Active Problem List   Diagnosis    GERD (gastroesophageal reflux disease)    Environmental allergies    Chronic neck, back, & bilateral LE pain    Diverticulosis    SIRS (systemic inflammatory response syndrome) ([facility])    Acute respiratory failure with hypoxia (HCC)    Obesity    Hypothyroidism    Acute encephalopathy    Septic shock (HCC)    Acute renal failure (ARF) (HCC)    Acute on chronic diastolic congestive heart failure (HCC)    Benign essential HTN    Anxiety and depression    Elevated LFTs    Acute hyperglycemia    Well controlled type 2 diabetes mellitus (HCC)    Hypovitaminosis D    Possible/questionable hx of seizures    Right hand weakness & numbness    Acute-on-chronic low back pain with radicular symptoms    Trimalleolar fracture of left ankle, closed, initial encounter    Ankle fracture, bimalleolar, closed, left, initial encounter    Acute respiratory failure with hypoxemia (HCC)    Closed fracture of left ankle    Aspiration pneumonitis ([facility])    Acute respiratory failure (HCC)    OD (overdose of drug)    Drug ingestion    Toxic encephalopathy    History of depression    Misuse of prescription only drugs    GI bleed    Elevated lactic acid level    Hyperkalemia    Leukocytosis    Thrombocytosis    High anion gap metabolic acidosis    Shock ([address]    NSTEMI (non-ST elevated myocardial infarction) ([facility])    Abnormal CXR    PSVT (paroxysmal supraventricular tachycardia) ([facility])    PAF (paroxysmal atrial fibrillation) ([address]    Community acquired pneumonia of left lower lobe of lung    Acute focal neurological deficit    Diverticulitis    Diverticulitis of colon    Acute hyponatremia    COPD without exacerbation ([facility])    Generalized abdominal pain SOB (shortness of breath)    Delusions (HCC)    Leg edema    Sepsis ([facility] [address]    Chest pain    Frequent falls    Seizure-like activity (HCC)    Acute hypoxemic respiratory failure (HCC)    Systolic murmur

## 2022-09-09 NOTE — PROGRESS NOTES
RT Inhaler-Nebulizer Bronchodilator Protocol Note    There is a bronchodilator order in the chart from a provider indicating to follow the RT Bronchodilator Protocol and there is an Initiate RT Inhaler-Nebulizer Bronchodilator Protocol order as well (see protocol at bottom of note). CXR Findings:  XR CHEST PORTABLE    Result Date: 9/8/2022  Near complete resolution of previously seen right lower lobe airspace opacity and small bilateral pleural effusions. The findings from the last RT Protocol Assessment were as follows:   History Pulmonary Disease: (P) Chronic pulmonary disease  Respiratory Pattern: (P) Regular pattern and RR 12-20 bpm  Breath Sounds: (P) Slightly diminished and/or crackles  Cough: (P) Strong, spontaneous, non-productive  Indication for Bronchodilator Therapy: (P) Decreased or absent breath sounds  Bronchodilator Assessment Score: (P) 4    Aerosolized bronchodilator medication orders have been revised according to the RT Inhaler-Nebulizer Bronchodilator Protocol below. Respiratory Therapist to perform RT Therapy Protocol Assessment initially then follow the protocol. Repeat RT Therapy Protocol Assessment PRN for score 0-3 or on second treatment, BID, and PRN for scores above 3. No Indications - adjust the frequency to every 6 hours PRN wheezing or bronchospasm, if no treatments needed after 48 hours then discontinue using Per Protocol order mode. If indication present, adjust the RT bronchodilator orders based on the Bronchodilator Assessment Score as indicated below. Use Inhaler orders unless patient has one or more of the following: on home nebulizer, not able to hold breath for 10 seconds, is not alert and oriented, cannot activate and use MDI correctly, or respiratory rate 25 breaths per minute or more, then use the equivalent nebulizer order(s) with same Frequency and PRN reasons based on the score.   If a patient is on this medication at home then do not decrease Frequency below that used at home. 0-3 - enter or revise RT bronchodilator order(s) to equivalent RT Bronchodilator order with Frequency of every 4 hours PRN for wheezing or increased work of breathing using Per Protocol order mode. 4-6 - enter or revise RT Bronchodilator order(s) to two equivalent RT bronchodilator orders with one order with BID Frequency and one order with Frequency of every 4 hours PRN wheezing or increased work of breathing using Per Protocol order mode. 7-10 - enter or revise RT Bronchodilator order(s) to two equivalent RT bronchodilator orders with one order with TID Frequency and one order with Frequency of every 4 hours PRN wheezing or increased work of breathing using Per Protocol order mode. 11-13 - enter or revise RT Bronchodilator order(s) to one equivalent RT bronchodilator order with QID Frequency and an Albuterol order with Frequency of every 4 hours PRN wheezing or increased work of breathing using Per Protocol order mode. Greater than 13 - enter or revise RT Bronchodilator order(s) to one equivalent RT bronchodilator order with every 4 hours Frequency and an Albuterol order with Frequency of every 2 hours PRN wheezing or increased work of breathing using Per Protocol order mode.        Electronically signed by Jermaine Hoff RCP on 9/9/2022 at 7:30 AM

## 2022-09-09 NOTE — PROGRESS NOTES
RT Inhaler-Nebulizer Bronchodilator Protocol Note    There is a bronchodilator order in the chart from a provider indicating to follow the RT Bronchodilator Protocol and there is an Initiate RT Inhaler-Nebulizer Bronchodilator Protocol order as well (see protocol at bottom of note). CXR Findings:  XR CHEST PORTABLE    Result Date: 9/8/2022  Near complete resolution of previously seen right lower lobe airspace opacity and small bilateral pleural effusions. The findings from the last RT Protocol Assessment were as follows:   History Pulmonary Disease: (P) Chronic pulmonary disease  Respiratory Pattern: (P) Dyspnea on exertion or RR 21-25 bpm  Breath Sounds: (P) Slightly diminished and/or crackles  Cough: (P) Strong, spontaneous, non-productive  Indication for Bronchodilator Therapy: (P) Decreased or absent breath sounds  Bronchodilator Assessment Score: (P) 6    Aerosolized bronchodilator medication orders have been revised according to the RT Inhaler-Nebulizer Bronchodilator Protocol below. Respiratory Therapist to perform RT Therapy Protocol Assessment initially then follow the protocol. Repeat RT Therapy Protocol Assessment PRN for score 0-3 or on second treatment, BID, and PRN for scores above 3. No Indications - adjust the frequency to every 6 hours PRN wheezing or bronchospasm, if no treatments needed after 48 hours then discontinue using Per Protocol order mode. If indication present, adjust the RT bronchodilator orders based on the Bronchodilator Assessment Score as indicated below. Use Inhaler orders unless patient has one or more of the following: on home nebulizer, not able to hold breath for 10 seconds, is not alert and oriented, cannot activate and use MDI correctly, or respiratory rate 25 breaths per minute or more, then use the equivalent nebulizer order(s) with same Frequency and PRN reasons based on the score.   If a patient is on this medication at home then do not decrease Frequency below that used at home. 0-3 - enter or revise RT bronchodilator order(s) to equivalent RT Bronchodilator order with Frequency of every 4 hours PRN for wheezing or increased work of breathing using Per Protocol order mode. 4-6 - enter or revise RT Bronchodilator order(s) to two equivalent RT bronchodilator orders with one order with BID Frequency and one order with Frequency of every 4 hours PRN wheezing or increased work of breathing using Per Protocol order mode. 7-10 - enter or revise RT Bronchodilator order(s) to two equivalent RT bronchodilator orders with one order with TID Frequency and one order with Frequency of every 4 hours PRN wheezing or increased work of breathing using Per Protocol order mode. 11-13 - enter or revise RT Bronchodilator order(s) to one equivalent RT bronchodilator order with QID Frequency and an Albuterol order with Frequency of every 4 hours PRN wheezing or increased work of breathing using Per Protocol order mode. Greater than 13 - enter or revise RT Bronchodilator order(s) to one equivalent RT bronchodilator order with every 4 hours Frequency and an Albuterol order with Frequency of every 2 hours PRN wheezing or increased work of breathing using Per Protocol order mode.        Electronically signed by Cecilio Tarango RCP on 9/9/2022 at 6:55 PM

## 2022-09-10 VITALS
WEIGHT: 205.31 LBS | HEIGHT: 62 IN | BODY MASS INDEX: 37.78 KG/M2 | HEART RATE: 60 BPM | SYSTOLIC BLOOD PRESSURE: 134 MMHG | RESPIRATION RATE: 16 BRPM | DIASTOLIC BLOOD PRESSURE: 75 MMHG | OXYGEN SATURATION: 96 % | TEMPERATURE: 98 F

## 2022-09-10 LAB
ANION GAP SERPL CALCULATED.3IONS-SCNC: 9 MMOL/L (ref 3–16)
BASOPHILS ABSOLUTE: 0.1 K/UL (ref 0–0.2)
BASOPHILS RELATIVE PERCENT: 0.8 %
BLOOD CULTURE, ROUTINE: NORMAL
BUN BLDV-MCNC: 34 MG/DL (ref 7–20)
CALCIUM SERPL-MCNC: 9.1 MG/DL (ref 8.3–10.6)
CHLORIDE BLD-SCNC: 101 MMOL/L (ref 99–110)
CO2: 30 MMOL/L (ref 21–32)
CREAT SERPL-MCNC: 1.1 MG/DL (ref 0.6–1.2)
CULTURE, BLOOD 2: NORMAL
EOSINOPHILS ABSOLUTE: 0.1 K/UL (ref 0–0.6)
EOSINOPHILS RELATIVE PERCENT: 2 %
GFR AFRICAN AMERICAN: 59
GFR NON-AFRICAN AMERICAN: 49
GLUCOSE BLD-MCNC: 106 MG/DL (ref 70–99)
GLUCOSE BLD-MCNC: 113 MG/DL (ref 70–99)
GLUCOSE BLD-MCNC: 152 MG/DL (ref 70–99)
HCT VFR BLD CALC: 26.1 % (ref 36–48)
HEMOGLOBIN: 8.3 G/DL (ref 12–16)
LYMPHOCYTES ABSOLUTE: 2.2 K/UL (ref 1–5.1)
LYMPHOCYTES RELATIVE PERCENT: 34.1 %
MCH RBC QN AUTO: 26.8 PG (ref 26–34)
MCHC RBC AUTO-ENTMCNC: 31.6 G/DL (ref 31–36)
MCV RBC AUTO: 85 FL (ref 80–100)
MONOCYTES ABSOLUTE: 0.8 K/UL (ref 0–1.3)
MONOCYTES RELATIVE PERCENT: 12 %
NEUTROPHILS ABSOLUTE: 3.3 K/UL (ref 1.7–7.7)
NEUTROPHILS RELATIVE PERCENT: 51.1 %
PDW BLD-RTO: 16.6 % (ref 12.4–15.4)
PERFORMED ON: ABNORMAL
PERFORMED ON: ABNORMAL
PLATELET # BLD: 299 K/UL (ref 135–450)
PMV BLD AUTO: 8.8 FL (ref 5–10.5)
POTASSIUM SERPL-SCNC: 3.9 MMOL/L (ref 3.5–5.1)
RBC # BLD: 3.08 M/UL (ref 4–5.2)
SODIUM BLD-SCNC: 140 MMOL/L (ref 136–145)
WBC # BLD: 6.5 K/UL (ref 4–11)

## 2022-09-10 PROCEDURE — 36415 COLL VENOUS BLD VENIPUNCTURE: CPT

## 2022-09-10 PROCEDURE — 99232 SBSQ HOSP IP/OBS MODERATE 35: CPT | Performed by: INTERNAL MEDICINE

## 2022-09-10 PROCEDURE — 94669 MECHANICAL CHEST WALL OSCILL: CPT

## 2022-09-10 PROCEDURE — 2580000003 HC RX 258: Performed by: INTERNAL MEDICINE

## 2022-09-10 PROCEDURE — 6370000000 HC RX 637 (ALT 250 FOR IP): Performed by: INTERNAL MEDICINE

## 2022-09-10 PROCEDURE — 99239 HOSP IP/OBS DSCHRG MGMT >30: CPT | Performed by: INTERNAL MEDICINE

## 2022-09-10 PROCEDURE — 97530 THERAPEUTIC ACTIVITIES: CPT

## 2022-09-10 PROCEDURE — 85025 COMPLETE CBC W/AUTO DIFF WBC: CPT

## 2022-09-10 PROCEDURE — 97535 SELF CARE MNGMENT TRAINING: CPT

## 2022-09-10 PROCEDURE — 94640 AIRWAY INHALATION TREATMENT: CPT

## 2022-09-10 PROCEDURE — 6360000002 HC RX W HCPCS: Performed by: INTERNAL MEDICINE

## 2022-09-10 PROCEDURE — 80048 BASIC METABOLIC PNL TOTAL CA: CPT

## 2022-09-10 PROCEDURE — 99233 SBSQ HOSP IP/OBS HIGH 50: CPT | Performed by: INTERNAL MEDICINE

## 2022-09-10 PROCEDURE — 94761 N-INVAS EAR/PLS OXIMETRY MLT: CPT

## 2022-09-10 PROCEDURE — 2700000000 HC OXYGEN THERAPY PER DAY

## 2022-09-10 RX ORDER — PREDNISONE 10 MG/1
TABLET ORAL
Qty: 14 TABLET | Refills: 0 | Status: SHIPPED | OUTPATIENT
Start: 2022-09-10

## 2022-09-10 RX ORDER — FUROSEMIDE 40 MG/1
40 TABLET ORAL DAILY
Qty: 30 TABLET | Refills: 1 | Status: SHIPPED | OUTPATIENT
Start: 2022-09-10

## 2022-09-10 RX ORDER — METOPROLOL SUCCINATE 25 MG/1
25 TABLET, EXTENDED RELEASE ORAL DAILY
Qty: 30 TABLET | Refills: 3 | Status: SHIPPED | OUTPATIENT
Start: 2022-09-10

## 2022-09-10 RX ADMIN — ATORVASTATIN CALCIUM 10 MG: 10 TABLET, FILM COATED ORAL at 09:17

## 2022-09-10 RX ADMIN — FUROSEMIDE 40 MG: 40 TABLET ORAL at 09:17

## 2022-09-10 RX ADMIN — SERTRALINE HYDROCHLORIDE 50 MG: 50 TABLET ORAL at 09:18

## 2022-09-10 RX ADMIN — ENOXAPARIN SODIUM 40 MG: 100 INJECTION SUBCUTANEOUS at 09:17

## 2022-09-10 RX ADMIN — SUCRALFATE 1 G: 1 TABLET ORAL at 09:17

## 2022-09-10 RX ADMIN — METOPROLOL SUCCINATE 25 MG: 25 TABLET, EXTENDED RELEASE ORAL at 09:17

## 2022-09-10 RX ADMIN — LEVOTHYROXINE SODIUM 88 MCG: 88 TABLET ORAL at 09:17

## 2022-09-10 RX ADMIN — IPRATROPIUM BROMIDE AND ALBUTEROL SULFATE 1 AMPULE: 2.5; .5 SOLUTION RESPIRATORY (INHALATION) at 07:02

## 2022-09-10 RX ADMIN — PREDNISONE 40 MG: 20 TABLET ORAL at 09:17

## 2022-09-10 RX ADMIN — PANTOPRAZOLE SODIUM 40 MG: 40 TABLET, DELAYED RELEASE ORAL at 06:13

## 2022-09-10 RX ADMIN — GABAPENTIN 300 MG: 300 CAPSULE ORAL at 09:17

## 2022-09-10 RX ADMIN — LISINOPRIL 5 MG: 5 TABLET ORAL at 09:17

## 2022-09-10 RX ADMIN — Medication 10 ML: at 09:19

## 2022-09-10 NOTE — PROGRESS NOTES
Pt is lying in bed with their eyes closed. Respirations are easy and even. Call light within reach bed in lowest position with the wheels locked. Will continue to monitor.  Fabrizio Marin RN

## 2022-09-10 NOTE — PROGRESS NOTES
Patient 95 at rest on RA. Patient1 00  on 3 L at rest.  Patient 86 on RA ambulating  Patient 95 at  2L ambulating.

## 2022-09-10 NOTE — PLAN OF CARE
Problem: Discharge Planning  Goal: Discharge to home or other facility with appropriate resources  Outcome: Progressing     Problem: Pain  Goal: Verbalizes/displays adequate comfort level or baseline comfort level  Outcome: Progressing     Problem: Safety - Adult  Goal: Free from fall injury  Outcome: Progressing     Problem: ABCDS Injury Assessment  Goal: Absence of physical injury  Outcome: Progressing     Problem: Chronic Conditions and Co-morbidities  Goal: Patient's chronic conditions and co-morbidity symptoms are monitored and maintained or improved  Outcome: Progressing Personality disorder

## 2022-09-10 NOTE — PROGRESS NOTES
Patient educated on discharge instructions as well as new medications use, dosage, administration and possible side effects. Patient verified knowledge. IV removed without difficulty and dry dressing in place. Telemetry monitor removed and returned to [facility]. Pt left facility in stable condition to Home with all of their personal belongings.      Pt escorted to front entrance in  wheelchair placed in care of family

## 2022-09-10 NOTE — DISCHARGE SUMMARY
Hospital Medicine Discharge Summary    Patient ID: Anibal Rick      Patient's PCP: Naamn Melchor DO    Admit Date: 9/6/2022     Discharge Date:   9/10/2022    Admitting Provider: Jules Roach MD     Discharge Provider: Ketty Khalil MD     Discharge Diagnoses: Active Hospital Problems    Diagnosis     Systolic murmur [J06.1]      Priority: Medium    Acute hypoxemic respiratory failure (HCC) [J96.01]      Priority: Medium    Well controlled type 2 diabetes mellitus ([facility] [E11.9]     Acute on chronic diastolic CHF (congestive heart failure) ([facility] [facility] [I50.33]        The patient was seen and examined on day of discharge and this discharge summary is in conjunction with any daily progress note from day of discharge. Hospital Course: 78yo woman presented form home with severe dyspnea. Acute on Chronic dCHF   Acute hypoxic respiratory failure  - improving with bipap and diuresis   - changed lasix to oral 40 mg daily   - wean o2 as able  - low salt diet, daily weights  - cardiology consulted   - obtained Echo with normal EF , no RWMA, grade 2 DD   - home o2 eval done today - stable on RA at rest but drops to 86% with ambulatin - corrects with 2l/min NC supplement - will set up for home o2 2l/min with activity via NC. Discussed with pt.       HTN - nifedipine changed to toprol , continue lisinopril      #COPD no AE  -Continue home regimen  - imaging with no pna- can stop Abx         #Diabetes mellitus type 2  -Last HgbA1c 6.4 ;  -Resume home medication  -POCT glucose  -Low-dose SSI        #Hypothyroidism  -Continue Synthroid        #GERD  -Continue Protonix     #IBS  -Continue home bowel regimen     #Depression  -Continue zoloft - stop trazodone      History of recent left wrist fracture and right foot fracture  - off splint, start PT when able      Hx of cirrhosis /chronic anemia - stable with  no acute issues       Physical Exam Performed:     /75   Pulse 60   Temp 98 °F (36.7 °C) resolves      simvastatin (ZOCOR) 20 MG tablet Take 20 mg by mouth nightly      tiZANidine (ZANAFLEX) 4 MG tablet Take 8 mg by mouth daily as needed      ketoconazole (NIZORAL) 2 % shampoo Apply topically daily as needed for Itching Apply to scalp/behind ears 2-3 times a week, leave in 5-10 min then rinse when clear use one time a month for maintenance      fluticasone (FLONASE) 50 MCG/ACT nasal spray 1 spray by Nasal route daily      ondansetron (ZOFRAN) 4 MG tablet Take 4 mg by mouth every 8 hours as needed for Nausea or Vomiting      dicyclomine (BENTYL) 10 MG capsule Take 10 mg by mouth 4 times daily (before meals and nightly)      pantoprazole (PROTONIX) 40 MG tablet Take 1 tablet by mouth 2 times daily (before meals)  Qty: 60 tablet, Refills: 0             Time Spent on discharge is more than 30 minutes in the examination, evaluation, counseling and review of medications and discharge plan. Signed:    Eric Cruz MD   9/10/2022      Thank you Kelli Muniz DO for the opportunity to be involved in this patient's care. If you have any questions or concerns, please feel free to contact me at [phone].

## 2022-09-10 NOTE — FLOWSHEET NOTE
09/10/22 0815   Vital Signs   Temp 98 °F (36.7 °C)   Temp Source Oral   Heart Rate 60   Heart Rate Source Monitor   Resp 16   /75   BP Location Right Arm   BP Method Automatic   MAP (Calculated) 94.67   Level of Consciousness 0   MEWS Score 1   Pain Assessment   Pain Assessment 0-10   Care Plan - Pain Goals   Verbalizes/displays adequate comfort level or baseline comfort level Encourage patient to monitor pain and request assistance;Assess pain using appropriate pain scale   Oxygen Therapy   SpO2 96 %   O2 Device Nasal cannula   O2 Flow Rate (L/min) 2 L/min   See flowsheet for full assessment , call light within reach bed in low position

## 2022-09-10 NOTE — FLOWSHEET NOTE
09/09/22 2108   Vital Signs   Temp 97 °F (36.1 °C)   Temp Source Oral   Heart Rate 81   Heart Rate Source Monitor   Resp 18   BP (!) 151/71   BP Location Right upper arm   BP Method Automatic   MAP (Calculated) 97.67   Patient Position Semi fowlers   Level of Consciousness 0   MEWS Score 1   Oxygen Therapy   SpO2 97 %   O2 Device Nasal cannula   O2 Flow Rate (L/min) 2 L/min   Pt assessment complete. Pt lying quietly in bed. No s/s of distress noted. Lung sounds diminished. Pt on 02 2 liters. Ralph catheter in place draining clear yellow urine. Nightly medications given. Coffee provided per patient request.  No other needs at this time. Call light within reach.

## 2022-09-10 NOTE — PROGRESS NOTES
[facility]                                 Pulmonary Consult /Progress Note :                                                                  CHIEF COMPLAINT: SOB     Consulting provider: alma     HPI:   No chest pain  Doing great    On 2  L   Stat echo done,reviewed   No chest pain   Wear her BiPAP 16/8   Up to chair     Objective:   PHYSICAL EXAM:    Blood pressure 134/75, pulse 60, temperature 98 °F (36.7 °C), temperature source Oral, resp. rate 16, height 5' 2\" (1.575 m), weight 205 lb 5 oz (93.1 kg), SpO2 96 %, not currently breastfeeding.' on RA  Gen: No distress. Eyes: PERRL. No sclera icterus. No conjunctival injection. ENT: No discharge. Pharynx clear. Neck: Trachea midline. No obvious mass. Resp: rhonchi /rales   CV: Regular rate. Regular rhythm. No murmur or rub. No edema. GI: Non-tender. Non-distended. No hernia. Skin: Warm and dry. No nodule on exposed extremities. Lymph: No cervical LAD. No supraclavicular LAD. M/S: No cyanosis. No joint deformity. No clubbing. Neuro: Awake. Alert. Moves all four extremities. Psych: Oriented x 3. No anxiety.            DATA reviewed by me:         LABS/IMAGING:    CBC:  Lab Results   Component Value Date    WBC 6.5 09/10/2022    HGB 8.3 (L) 09/10/2022    HCT 26.1 (L) 09/10/2022    MCV 85.0 09/10/2022     09/10/2022    LYMPHOPCT 34.1 09/10/2022    RBC 3.08 (L) 09/10/2022    MCH 26.8 09/10/2022    MCHC 31.6 09/10/2022    RDW 16.6 (H) 09/10/2022    NEUTOPHILPCT 51.1 09/10/2022    MONOPCT 12.0 09/10/2022    EOSPCT 0.0 03/06/2011    BASOPCT 0.8 09/10/2022    NEUTROABS 3.3 09/10/2022    LYMPHSABS 2.2 09/10/2022    MONOSABS 0.8 09/10/2022    EOSABS 0.1 09/10/2022    BASOSABS 0.1 09/10/2022       Recent Labs     09/10/22  0416 09/08/22  0213 09/07/22  0354   WBC 6.5 5.7 4.7   HGB 8.3* 7.6* 8.1*   HCT 26.1* 22.7* 24.6*   MCV 85.0 83.1 84.3    271 271         BMP:   Recent Labs 09/08/22  0213 09/09/22  0418 09/10/22  0416    140 140   K 4.2 4.1 3.9    101 101   CO2 27 31 30   BUN 28* 28* 34*   CREATININE 1.0 0.8 1.1         MG:   Lab Results   Component Value Date/Time    MG 1.80 09/08/2022 06:36 AM     Ca/Phos:   Lab Results   Component Value Date    CALCIUM 9.1 09/10/2022    PHOS 2.9 08/22/2022     LIVER PROFILE:   No results for input(s): AST, ALT, LIPASE, BILIDIR, BILITOT, ALKPHOS in the last 72 hours. Invalid input(s): AMYLASE,  ALB      PT/INR: No results for input(s): PROTIME, INR in the last 72 hours. APTT: No results for input(s): APTT in the last 72 hours. Cardiac Enzymes:  Lab Results   Component Value Date    CKTOTAL 67 08/21/2022    TROPONINI 0.01 09/09/2022     CT :  Patchy ground-glass opacity and tree-in-bud nodularity in the right upper   lobe, likely postinflammatory-infectious     Assessment:       -Acute Hypoxic resp failure  -Pulmonary edema   -Diastolic CHF   -Possible COPD   -Anemia       Plan:      *-IV  Rocephin X 5-7 days to stop on 9/13 ,can be Omnicef to to be discharged ,procal coming down   *-had  IV diuresis adjust as needed,on 40 PO now   *-BD   *_ follow up ct next 1-2 months ,last ct August shows some nodules ,will arrange OP follow up   *- Aggressive Pulmonary toielt   *-DVT px   *-Bactroban   *-Procal coming down   -Discuss with cardiology ,follow as OP   about further work up   *- Hb 8.3   Follow hemoccult and repeat if further drop GI and work up as per hospitality in floor     Thank you very much for allowing me to participate in the care of this pleasant patient , should you have any questions ,please do not hesitate to contact me      Hawk Mullins MD,[facility]  Pulmonary&Critical Care Medicine    Chela Rutledge    NOTE: This report was transcribed using voice recognition software. Every effort was made to ensure accuracy; however, inadvertent computerized transcription errors may be present.

## 2022-09-10 NOTE — PLAN OF CARE
Problem: Discharge Planning  Goal: Discharge to home or other facility with appropriate resources  9/10/2022 1109 by Wilfrido Porter RN  Outcome: Completed  9/10/2022 0725 by Wilfrido Porter RN  Outcome: Progressing     Problem: Safety - Adult  Goal: Free from fall injury  9/10/2022 1109 by Wilfrido Porter RN  Outcome: Completed  9/10/2022 0725 by Wilfrido Porter RN  Outcome: Progressing     Problem: ABCDS Injury Assessment  Goal: Absence of physical injury  9/10/2022 1109 by Wilfrido Porter RN  Outcome: Completed  9/10/2022 0725 by Wilfrido Porter RN  Outcome: Progressing     Problem: Pain  Goal: Verbalizes/displays adequate comfort level or baseline comfort level  9/10/2022 1109 by Wilfrido Porter RN  Outcome: Completed  Flowsheets (Taken 9/10/2022 0815)  Verbalizes/displays adequate comfort level or baseline comfort level:   Encourage patient to monitor pain and request assistance   Assess pain using appropriate pain scale  9/10/2022 0725 by Wilfrido Porter RN  Outcome: Progressing

## 2022-09-10 NOTE — DISCHARGE INSTR - COC
Continuity of Care Form    Patient Name: Cornelia Hernandez   :  1950  MRN:  [id number]    Admit date:  2022  Discharge date:  9/10/2022    Code Status Order: Full Code   Advance Directives:     Admitting Physician:  Makayla Bennett MD  PCP: Everton Baca DO    Discharging Nurse: [facility] Unit/Room#: /5091-79  Discharging Unit Phone Number:     Emergency Contact:   Extended Emergency Contact Information  Primary Emergency Contact: [facility]  Address: [address] Phone: [phone]  Mobile Phone: [phone]  Relation: Child   needed?  No    Past Surgical History:  Past Surgical History:   Procedure Laterality Date    CATARACT REMOVAL WITH IMPLANT Left 15    CATARACT REMOVAL WITH IMPLANT Right 1/2/15    phacoemulsification with initraocular lens implant     SECTION      x3    COLONOSCOPY  13    HYSTERECTOMY (CERVIX STATUS UNKNOWN)      TONSILLECTOMY      TUBAL LIGATION      UPPER GASTROINTESTINAL ENDOSCOPY  2014    gastric polyp    UPPER GASTROINTESTINAL ENDOSCOPY  2014    gastric polyp    UPPER GASTROINTESTINAL ENDOSCOPY  2018    gastritis       Immunization History:   Immunization History   Administered Date(s) Administered    COVID-19, MODERNA BLUE border, Primary or Immunocompromised, (age 12y+), IM, 100 mcg/0.5mL 04/15/2021, 2021    Influenza Virus Vaccine 2010, 2014, 2017, 2018, 2018, 2019, 2019, 2019    Influenza Whole 2010    Influenza, FLUARIX, FLULAVAL, Coretha Bloch (age 10 mo+) AND AFLURIA, (age 1 y+), PF, 0.5mL 10/07/2016, 2018    Pneumococcal Conjugate 13-valent ([id number]) 2016    Pneumococcal Polysaccharide ([id number]) 2011, 2017, 2017, 2018, 2018, 2019, 2019, 2019    Td, unspecified formulation 2017       Active Problems:  Patient Active Problem List   Diagnosis Code    GERD (gastroesophageal reflux disease) K21.9    Environmental allergies Z91.09    Chronic neck, back, & bilateral LE pain M79.604, M79.605    Diverticulosis K57.90    SIRS (systemic inflammatory response syndrome) ([facility]) R65.10    Acute respiratory failure with hypoxia ([facility]) J96.01    Obesity E66.9    Hypothyroidism E03.9    Acute encephalopathy G93.40    Septic shock ([facility]) A41.9, R65.21    Acute renal failure (ARF) ([facility]) N17.9    Acute on chronic diastolic CHF (congestive heart failure) ([facility]) I50.33    Benign essential HTN I10    Anxiety and depression F41.9, F32. A    Elevated LFTs R79.89    Acute hyperglycemia R73.9    Well controlled type 2 diabetes mellitus ([facility] [address] E11.9    Hypovitaminosis D E55.9    Possible/questionable hx of seizures R56.9    Right hand weakness & numbness R29.898    Acute-on-chronic low back pain with radicular symptoms M54.50    Trimalleolar fracture of left ankle, closed, initial encounter S82.852A    Ankle fracture, bimalleolar, closed, left, initial encounter S82.842A    Acute respiratory failure with hypoxemia ([facility]) J96.01    Closed fracture of left ankle S82.892A    Aspiration pneumonitis ([facility]) J69.0    Acute respiratory failure ([facility]) J96.00    OD (overdose of drug) T50.901A    Drug ingestion [id number]    Toxic encephalopathy G92.9    History of depression Z86.59    Misuse of prescription only drugs F19.90    GI bleed K92.2    Elevated lactic acid level R79.89    Hyperkalemia E87.5    Leukocytosis D72.829    Thrombocytosis D75.839    High anion gap metabolic acidosis C13.2    Shock ([facility]) R57.9    NSTEMI (non-ST elevated myocardial infarction) ([facility]) I21.4    Abnormal CXR R93.89    PSVT (paroxysmal supraventricular tachycardia) ([facility]) I47.1    PAF (paroxysmal atrial fibrillation) ([facility]) I48.0    Community acquired pneumonia of left lower lobe of lung J18.9    Acute focal neurological deficit R29.818    Diverticulitis K57.92    Diverticulitis of colon K57.32    Acute hyponatremia E87.1    COPD without exacerbation (HCC) J44.9    Generalized abdominal pain R10.84    SOB (shortness of breath) R06.02    Delusions (HCC) F22    Leg edema R60.0    Sepsis (HCC) A41.9    Chest pain R07.9    Frequent falls R29.6    Seizure-like activity (HCC) R56.9    Acute hypoxemic respiratory failure (HCC) V32.07    Systolic murmur G06.0       Isolation/Infection:   Isolation            No Isolation          Patient Infection Status       Infection Onset Added Last Indicated Last Indicated By Review Planned Expiration Resolved Resolved By    None active    Resolved    COVID-19 (Rule Out) 09/06/22 09/06/22 09/06/22 COVID-19 & Influenza Combo (Ordered)   09/06/22 Rule-Out Test Resulted    COVID-19 (Rule Out) 08/21/22 08/21/22 08/21/22 COVID-19 & Influenza Combo (Ordered)   08/21/22 Rule-Out Test Resulted    COVID-19 (Rule Out) 06/27/22 06/27/22 06/27/22 COVID-19 & Influenza Combo (Ordered)   06/27/22 Rule-Out Test Resulted    COVID-19 (Rule Out) 11/29/21 11/29/21 11/29/21 COVID-19 & Influenza Combo (Ordered)   11/29/21 Rule-Out Test Resulted    C-diff Rule Out 03/20/21 03/20/21 03/20/21 Clostridium difficile toxin/antigen (Ordered)   11/30/21 Gabby Spotted, RN    COVID-19 (Rule Out) 07/29/20 07/29/20 07/29/20 COVID-19 (Ordered)   07/31/20 Rule-Out Test Resulted            Nurse Assessment:  Last Vital Signs: /75   Pulse 60   Temp 98 °F (36.7 °C) (Oral)   Resp 16   Ht 5' 2\" (1.575 m)   Wt 205 lb 5 oz (93.1 kg)   SpO2 96%   BMI 37.55 kg/m²     Last documented pain score (0-10 scale): Pain Level: 4  Last Weight:   Wt Readings from Last 1 Encounters:   09/09/22 205 lb 5 oz (93.1 kg)     Mental Status:  alert    IV Access:  - None    Nursing Mobility/ADLs:  Walking   Independent  Transfer  Independent  Bathing  Independent  Dressing  Independent  Toileting  Independent  Feeding  Independent  Med Admin  Independent  Med Delivery   none    Wound Care Documentation and Therapy: Elimination:  Continence: Bowel: Yes  Bladder: Yes  Urinary Catheter: Removal Date 03/10/2022    Colostomy/Ileostomy/Ileal Conduit: No       Date of Last BM: 09/10/2022      Intake/Output Summary (Last 24 hours) at 9/10/2022 0824  Last data filed at 9/9/2022 2350  Gross per 24 hour   Intake 582 ml   Output 2200 ml   Net -1618 ml     I/O last 3 completed shifts: In: 842 [P.O.:842]  Out: 3700 [Urine:3700]    Safety Concerns: At Risk for Falls    Impairments/Disabilities:      None    Nutrition Therapy:  Current Nutrition Therapy:   - Oral Diet:  General    Routes of Feeding: Oral  Liquids: No Restrictions  Daily Fluid Restriction: yes - amount 1500ml  Last Modified Barium Swallow with Video (Video Swallowing Test): not done    Treatments at the Time of Hospital Discharge:   Respiratory Treatments:   Oxygen Therapy:  is on oxygen at 2 L/min per nasal cannula.  With ambulation   Ventilator:    - No ventilator support    Rehab Therapies: Physical Therapy and Occupational Therapy  Weight Bearing Status/Restrictions: No weight bearing restrictions  Other Medical Equipment (for information only, NOT a DME order):  walker  Other Treatments:     Patient's personal belongings (please select all that are sent with patient):  Glasses, Hearing Aides bilateral, Dentures upper and lower    RN SIGNATURE:  Electronically signed by Enzo Henson RN on 9/10/22 at 1:02 PM EDT    CASE MANAGEMENT/SOCIAL WORK SECTION    Inpatient Status Date: 9/6/2022    Readmission Risk Assessment Score:  Readmission Risk              Risk of Unplanned Readmission:  43           Discharging to Facility/ Agency   Name: [facility]  Address:  [facility]:[phone]  Fax:    Dialysis Facility (if applicable)   Name:  Address:  Dialysis Schedule:  Phone:  Fax:    / signature: Electronically signed by Ariel Briones RN on 9/10/22 at 12:41 PM EDT    PHYSICIAN SECTION    Prognosis: Fair    Condition at Discharge: Stable    Rehab Potential (if transferring to Rehab): Fair    Recommended Labs or Other Treatments After Discharge:     Home care with PTOT. Please eval if will qualify for motorized scooter. Physician Certification: I certify the above information and transfer of Shirley Gray  is necessary for the continuing treatment of the diagnosis listed and that she requires Home Care for greater 30 days.      Update Admission H&P: No change in H&P    PHYSICIAN SIGNATURE:  Electronically signed by Junaid Villareal MD on 9/10/22 at 8:25 AM EDT

## 2022-09-10 NOTE — PROGRESS NOTES
RT Inhaler-Nebulizer Bronchodilator Protocol Note    There is a bronchodilator order in the chart from a provider indicating to follow the RT Bronchodilator Protocol and there is an Initiate RT Inhaler-Nebulizer Bronchodilator Protocol order as well (see protocol at bottom of note). CXR Findings:  XR CHEST PORTABLE    Result Date: 9/8/2022  Near complete resolution of previously seen right lower lobe airspace opacity and small bilateral pleural effusions. The findings from the last RT Protocol Assessment were as follows:   History Pulmonary Disease: (P) Chronic pulmonary disease  Respiratory Pattern: (P) Regular pattern and RR 12-20 bpm  Breath Sounds: (P) Slightly diminished and/or crackles  Cough: (P) Strong, spontaneous, non-productive  Indication for Bronchodilator Therapy: (P) Decreased or absent breath sounds  Bronchodilator Assessment Score: (P) 4    Aerosolized bronchodilator medication orders have been revised according to the RT Inhaler-Nebulizer Bronchodilator Protocol below. Respiratory Therapist to perform RT Therapy Protocol Assessment initially then follow the protocol. Repeat RT Therapy Protocol Assessment PRN for score 0-3 or on second treatment, BID, and PRN for scores above 3. No Indications - adjust the frequency to every 6 hours PRN wheezing or bronchospasm, if no treatments needed after 48 hours then discontinue using Per Protocol order mode. If indication present, adjust the RT bronchodilator orders based on the Bronchodilator Assessment Score as indicated below. Use Inhaler orders unless patient has one or more of the following: on home nebulizer, not able to hold breath for 10 seconds, is not alert and oriented, cannot activate and use MDI correctly, or respiratory rate 25 breaths per minute or more, then use the equivalent nebulizer order(s) with same Frequency and PRN reasons based on the score.   If a patient is on this medication at home then do not decrease Frequency below that used at home. 0-3 - enter or revise RT bronchodilator order(s) to equivalent RT Bronchodilator order with Frequency of every 4 hours PRN for wheezing or increased work of breathing using Per Protocol order mode. 4-6 - enter or revise RT Bronchodilator order(s) to two equivalent RT bronchodilator orders with one order with BID Frequency and one order with Frequency of every 4 hours PRN wheezing or increased work of breathing using Per Protocol order mode. 7-10 - enter or revise RT Bronchodilator order(s) to two equivalent RT bronchodilator orders with one order with TID Frequency and one order with Frequency of every 4 hours PRN wheezing or increased work of breathing using Per Protocol order mode. 11-13 - enter or revise RT Bronchodilator order(s) to one equivalent RT bronchodilator order with QID Frequency and an Albuterol order with Frequency of every 4 hours PRN wheezing or increased work of breathing using Per Protocol order mode. Greater than 13 - enter or revise RT Bronchodilator order(s) to one equivalent RT bronchodilator order with every 4 hours Frequency and an Albuterol order with Frequency of every 2 hours PRN wheezing or increased work of breathing using Per Protocol order mode.          Electronically signed by Raven Perkins RCP on 9/10/2022 at 7:05 AM

## 2022-09-10 NOTE — PROGRESS NOTES
Occupational Therapy Daily Treatment Note    Unit: PCU  Date:  9/10/2022  Patient Name:    Shirley Gray  Admitting diagnosis:  Acute hypoxemic respiratory failure ([facility] [address] [J96.01]  Admit Date:  9/6/2022  Precautions/Restrictions:  fall risk, bed/chair alarm, vu catheter , supplemental O2 (3L), telemetry, and continuous pulse ox        Discharge Recommendations: Home with PRN assist and home therapy  DME needs for discharge: Shower Chair       Therapy recommendations for staff:   Stand By Assist with use of No AD for all transfers and ambulation to/from BSC/chair  within room    AM-PAC Score: AM-PAC Inpatient Daily Activity Raw Score: 20  Home Health S4 Level: Level 1- Standard       Treatment Time:  0454-6708  Treatment number:  2    Total Treatment Time:   55 minutes    History of Present Illness:  Per Dr. Ghassan Tabares" 71 y.o. female with  no history of smoking but significant second hand smoking and also history of grade I dCHF, COPD, not on home O2, presents with 2 day history of worsening sob and PETERSON . She recall waking up feeling sob, noticed increased peterson when ambulating to her bathroom. She denies fever, chills, but has had a non productive cough. She has had exertional chest discomfort, left sided, lasting 20 minutes, which also worsened with coughing. SOB progressed prompting ER consultation where she presented 88% on room air. She was noted to become less responsive during ER eval, and placed on bipap. She is now on 3 L  She does not use home O2   She received IV and she ad good urine output\"     XR Chest 9/6     Congestive heart failure. Underlying pneumonia in the medial right base   cannot be excluded. XR chest 9/9  Near complete resolution of previously seen right lower lobe airspace opacity   and small bilateral pleural effusions. Subjective:  Patient supine in bed and agreeable to therapy.     Pain   No  Bed Mobility:   Supine to Sit:  Supervision  Sit to Supine: Supervision  Rolling:           Not Tested  Scooting:        Supervision    Transfer Training:   Sit to stand:   SBA  Stand to sit:  SBA  Bed to Chair:  SBA  Bed to [facility]:   Not Tested  Standard toilet:   SBA    Activity Tolerance   Pt completed therapy session with No adverse symptoms noted w/activity    EOB   SpO2: 96 with 3L  HR: 64  BP: 153/69  After Walk to Chair without o2  SpO2: 92 %   HR: 84  After Walking from chair to bathroom  SpO2: 86 without o2 but recovered 93 within 2 minutes with purse breathing  HR: 84    Balance  Sitting Balance :     Supervision  Standing Balance   SBA    ADL Training:   Upper body dressing:  Not Tested  Upper body bathing:  Not Tested  Lower body dressing:  Not Tested  Lower body bathing:  Not Tested  Toileting:   Not Tested  Grooming/Hygiene:  Independent  Feeding :   Independent    Therapeutic Exercise:   N/A    Patient Education:   Role of OT  Recommendations for DC  Use of AE  Purse Breathing    Positioning Needs:   Reclined in chair with call light and needs in reach. Alarm Set    Family Present:  No    Assessment: Patient was agreeable to treatment. Doctor requested saturation check in order to determine o2 need at home. Patient was able to ambulate to chair from bed with 93% saturation but it declined with ambulation to/from the bathroom along with commode transfer. After in room ambulation, de saturation of 86% but recovered to 93% after purse breathing and 2+ minutes. Results shared with doctor and she will need o2 at home upon discharge. Patient stable at rest for all vitals. Goal(s) : To be met in 3 Visits:  1). Bed to toilet/BSC: Supervision     To be met in 5 Visits:  1). Supine to/from Sit:             Independent  2). Upper Body Bathing:         Supervision  3). Lower Body Bathing:         Supervision  4). Upper Body Dressing:       Independent  5). Lower Body Dressing:       Supervision  6).  Pt to demonstrate UE exs x 15 reps with minimal cues    Plan:

## 2022-09-22 ENCOUNTER — HOSPITAL ENCOUNTER (OUTPATIENT)
Age: 72
Discharge: HOME | End: 2022-09-22
Payer: MEDICARE

## 2022-09-22 LAB
A/G RATIO: 1.3 (ref 1.1–2.2)
ALBUMIN SERPL-MCNC: 4 G/DL (ref 3.4–5)
ALP BLD-CCNC: 343 U/L (ref 40–129)
ALT SERPL-CCNC: 23 U/L (ref 10–40)
ANION GAP SERPL CALCULATED.3IONS-SCNC: 14 MMOL/L (ref 3–16)
AST SERPL-CCNC: 27 U/L (ref 15–37)
BACTERIA: ABNORMAL /HPF
BILIRUB SERPL-MCNC: 0.5 MG/DL (ref 0–1)
BILIRUBIN URINE: ABNORMAL
BLOOD, URINE: NEGATIVE
BUN BLDV-MCNC: 20 MG/DL (ref 7–20)
CALCIUM SERPL-MCNC: 9.2 MG/DL (ref 8.3–10.6)
CHLORIDE BLD-SCNC: 97 MMOL/L (ref 99–110)
CLARITY: CLEAR
CO2: 29 MMOL/L (ref 21–32)
COLOR: YELLOW
CREAT SERPL-MCNC: 1.6 MG/DL (ref 0.6–1.2)
GFR AFRICAN AMERICAN: 38
GFR NON-AFRICAN AMERICAN: 32
GLUCOSE BLD-MCNC: 125 MG/DL (ref 70–99)
GLUCOSE URINE: NEGATIVE MG/DL
HYALINE CASTS: >40 /LPF (ref 0–2)
KETONES, URINE: ABNORMAL MG/DL
LEUKOCYTE ESTERASE, URINE: ABNORMAL
MICROSCOPIC EXAMINATION: YES
MUCUS: ABNORMAL /LPF
NITRITE, URINE: NEGATIVE
PH UA: 6 (ref 5–8)
POTASSIUM SERPL-SCNC: 3.5 MMOL/L (ref 3.5–5.1)
PROTEIN UA: NEGATIVE MG/DL
RBC UA: ABNORMAL /HPF (ref 0–4)
SODIUM BLD-SCNC: 140 MMOL/L (ref 136–145)
SPECIFIC GRAVITY UA: 1.02 (ref 1–1.03)
TOTAL PROTEIN: 7.1 G/DL (ref 6.4–8.2)
URINE TYPE: ABNORMAL
UROBILINOGEN, URINE: 0.2 E.U./DL
WBC UA: ABNORMAL /HPF (ref 0–5)

## 2022-09-22 PROCEDURE — 81001 URINALYSIS AUTO W/SCOPE: CPT

## 2022-09-22 PROCEDURE — 83036 HEMOGLOBIN GLYCOSYLATED A1C: CPT

## 2022-09-22 PROCEDURE — 85025 COMPLETE CBC W/AUTO DIFF WBC: CPT

## 2022-09-22 PROCEDURE — 87086 URINE CULTURE/COLONY COUNT: CPT

## 2022-09-22 PROCEDURE — 36415 COLL VENOUS BLD VENIPUNCTURE: CPT

## 2022-09-22 PROCEDURE — 80053 COMPREHEN METABOLIC PANEL: CPT

## 2022-09-23 LAB
BASOPHILS ABSOLUTE: 0 K/UL (ref 0–0.2)
BASOPHILS RELATIVE PERCENT: 0.6 %
EOSINOPHILS ABSOLUTE: 0.6 K/UL (ref 0–0.6)
EOSINOPHILS RELATIVE PERCENT: 8.3 %
ESTIMATED AVERAGE GLUCOSE: 145.6 MG/DL
HBA1C MFR BLD: 6.7 %
HCT VFR BLD CALC: 25.9 % (ref 36–48)
HEMOGLOBIN: 8.5 G/DL (ref 12–16)
LYMPHOCYTES ABSOLUTE: 1.2 K/UL (ref 1–5.1)
LYMPHOCYTES RELATIVE PERCENT: 17.5 %
MCH RBC QN AUTO: 27.9 PG (ref 26–34)
MCHC RBC AUTO-ENTMCNC: 32.9 G/DL (ref 31–36)
MCV RBC AUTO: 84.8 FL (ref 80–100)
MONOCYTES ABSOLUTE: 0.5 K/UL (ref 0–1.3)
MONOCYTES RELATIVE PERCENT: 7.9 %
NEUTROPHILS ABSOLUTE: 4.4 K/UL (ref 1.7–7.7)
NEUTROPHILS RELATIVE PERCENT: 65.7 %
PDW BLD-RTO: 16.2 % (ref 12.4–15.4)
PLATELET # BLD: 268 K/UL (ref 135–450)
PMV BLD AUTO: 9.2 FL (ref 5–10.5)
RBC # BLD: 3.05 M/UL (ref 4–5.2)
URINE CULTURE, ROUTINE: NORMAL
WBC # BLD: 6.7 K/UL (ref 4–11)

## 2023-01-08 ENCOUNTER — APPOINTMENT (OUTPATIENT)
Dept: CT IMAGING | Age: 73
End: 2023-01-08
Payer: MEDICARE

## 2023-01-08 ENCOUNTER — APPOINTMENT (OUTPATIENT)
Dept: GENERAL RADIOLOGY | Age: 73
End: 2023-01-08
Payer: MEDICARE

## 2023-01-08 ENCOUNTER — HOSPITAL ENCOUNTER (EMERGENCY)
Age: 73
Discharge: ANOTHER HOSPITAL | End: 2023-01-10
Attending: EMERGENCY MEDICINE
Payer: MEDICARE

## 2023-01-08 DIAGNOSIS — R79.89 ELEVATED LACTIC ACID LEVEL: ICD-10-CM

## 2023-01-08 DIAGNOSIS — D64.9 ANEMIA, UNSPECIFIED TYPE: ICD-10-CM

## 2023-01-08 DIAGNOSIS — K57.32 DIVERTICULITIS OF COLON: ICD-10-CM

## 2023-01-08 DIAGNOSIS — R56.9 SEIZURE-LIKE ACTIVITY (HCC): Primary | ICD-10-CM

## 2023-01-08 LAB
A/G RATIO: 0.9 (ref 1.1–2.2)
ABO/RH: NORMAL
ALBUMIN SERPL-MCNC: 3.2 G/DL (ref 3.4–5)
ALP BLD-CCNC: 201 U/L (ref 40–129)
ALT SERPL-CCNC: 8 U/L (ref 10–40)
ANION GAP SERPL CALCULATED.3IONS-SCNC: 10 MMOL/L (ref 3–16)
AST SERPL-CCNC: 21 U/L (ref 15–37)
BASOPHILS ABSOLUTE: 0.1 K/UL (ref 0–0.2)
BASOPHILS RELATIVE PERCENT: 1.3 %
BILIRUB SERPL-MCNC: <0.2 MG/DL (ref 0–1)
BUN BLDV-MCNC: 20 MG/DL (ref 7–20)
CALCIUM SERPL-MCNC: 8.3 MG/DL (ref 8.3–10.6)
CHLORIDE BLD-SCNC: 103 MMOL/L (ref 99–110)
CHP ED QC CHECK: YES
CO2: 22 MMOL/L (ref 21–32)
CREAT SERPL-MCNC: 1.2 MG/DL (ref 0.6–1.2)
EOSINOPHILS ABSOLUTE: 0.7 K/UL (ref 0–0.6)
EOSINOPHILS RELATIVE PERCENT: 11.9 %
ETHANOL: NORMAL MG/DL (ref 0–0.08)
GFR SERPL CREATININE-BSD FRML MDRD: 48 ML/MIN/{1.73_M2}
GLUCOSE BLD-MCNC: 290 MG/DL (ref 70–99)
GLUCOSE BLD-MCNC: 324 MG/DL
GLUCOSE BLD-MCNC: 324 MG/DL (ref 70–99)
HCT VFR BLD CALC: 24.7 % (ref 36–48)
HEMOGLOBIN: 7.9 G/DL (ref 12–16)
INFLUENZA A: NOT DETECTED
INFLUENZA B: NOT DETECTED
LACTIC ACID: 3.2 MMOL/L (ref 0.4–2)
LYMPHOCYTES ABSOLUTE: 1.6 K/UL (ref 1–5.1)
LYMPHOCYTES RELATIVE PERCENT: 26.7 %
MCH RBC QN AUTO: 25.4 PG (ref 26–34)
MCHC RBC AUTO-ENTMCNC: 31.8 G/DL (ref 31–36)
MCV RBC AUTO: 79.9 FL (ref 80–100)
MONOCYTES ABSOLUTE: 0.5 K/UL (ref 0–1.3)
MONOCYTES RELATIVE PERCENT: 8 %
NEUTROPHILS ABSOLUTE: 3 K/UL (ref 1.7–7.7)
NEUTROPHILS RELATIVE PERCENT: 52.1 %
PDW BLD-RTO: 18.9 % (ref 12.4–15.4)
PERFORMED ON: ABNORMAL
PLATELET # BLD: 239 K/UL (ref 135–450)
PMV BLD AUTO: 8.7 FL (ref 5–10.5)
POTASSIUM REFLEX MAGNESIUM: 4.3 MMOL/L (ref 3.5–5.1)
PRO-BNP: 367 PG/ML (ref 0–124)
RBC # BLD: 3.09 M/UL (ref 4–5.2)
SARS-COV-2 RNA, RT PCR: NOT DETECTED
SODIUM BLD-SCNC: 135 MMOL/L (ref 136–145)
TOTAL PROTEIN: 6.9 G/DL (ref 6.4–8.2)
TSH REFLEX: 5.55 UIU/ML (ref 0.27–4.2)
WBC # BLD: 5.8 K/UL (ref 4–11)

## 2023-01-08 PROCEDURE — 96361 HYDRATE IV INFUSION ADD-ON: CPT

## 2023-01-08 PROCEDURE — 86900 BLOOD TYPING SEROLOGIC ABO: CPT

## 2023-01-08 PROCEDURE — 71260 CT THORAX DX C+: CPT | Performed by: EMERGENCY MEDICINE

## 2023-01-08 PROCEDURE — 70450 CT HEAD/BRAIN W/O DYE: CPT

## 2023-01-08 PROCEDURE — 93005 ELECTROCARDIOGRAM TRACING: CPT | Performed by: EMERGENCY MEDICINE

## 2023-01-08 PROCEDURE — 96376 TX/PRO/DX INJ SAME DRUG ADON: CPT

## 2023-01-08 PROCEDURE — 74177 CT ABD & PELVIS W/CONTRAST: CPT

## 2023-01-08 PROCEDURE — 87636 SARSCOV2 & INF A&B AMP PRB: CPT

## 2023-01-08 PROCEDURE — 83880 ASSAY OF NATRIURETIC PEPTIDE: CPT

## 2023-01-08 PROCEDURE — 85025 COMPLETE CBC W/AUTO DIFF WBC: CPT

## 2023-01-08 PROCEDURE — 6360000002 HC RX W HCPCS: Performed by: EMERGENCY MEDICINE

## 2023-01-08 PROCEDURE — 96366 THER/PROPH/DIAG IV INF ADDON: CPT

## 2023-01-08 PROCEDURE — 96375 TX/PRO/DX INJ NEW DRUG ADDON: CPT

## 2023-01-08 PROCEDURE — 84439 ASSAY OF FREE THYROXINE: CPT

## 2023-01-08 PROCEDURE — 80053 COMPREHEN METABOLIC PANEL: CPT

## 2023-01-08 PROCEDURE — 80179 DRUG ASSAY SALICYLATE: CPT

## 2023-01-08 PROCEDURE — 80143 DRUG ASSAY ACETAMINOPHEN: CPT

## 2023-01-08 PROCEDURE — 6360000004 HC RX CONTRAST MEDICATION: Performed by: EMERGENCY MEDICINE

## 2023-01-08 PROCEDURE — 99285 EMERGENCY DEPT VISIT HI MDM: CPT

## 2023-01-08 PROCEDURE — 36415 COLL VENOUS BLD VENIPUNCTURE: CPT

## 2023-01-08 PROCEDURE — 86901 BLOOD TYPING SEROLOGIC RH(D): CPT

## 2023-01-08 PROCEDURE — 96367 TX/PROPH/DG ADDL SEQ IV INF: CPT

## 2023-01-08 PROCEDURE — 96365 THER/PROPH/DIAG IV INF INIT: CPT

## 2023-01-08 PROCEDURE — 87040 BLOOD CULTURE FOR BACTERIA: CPT

## 2023-01-08 PROCEDURE — 86850 RBC ANTIBODY SCREEN: CPT

## 2023-01-08 PROCEDURE — 83605 ASSAY OF LACTIC ACID: CPT

## 2023-01-08 PROCEDURE — 71045 X-RAY EXAM CHEST 1 VIEW: CPT

## 2023-01-08 PROCEDURE — 84443 ASSAY THYROID STIM HORMONE: CPT

## 2023-01-08 PROCEDURE — 86870 RBC ANTIBODY IDENTIFICATION: CPT

## 2023-01-08 PROCEDURE — 82077 ASSAY SPEC XCP UR&BREATH IA: CPT

## 2023-01-08 PROCEDURE — 2580000003 HC RX 258: Performed by: EMERGENCY MEDICINE

## 2023-01-08 RX ORDER — 0.9 % SODIUM CHLORIDE 0.9 %
1000 INTRAVENOUS SOLUTION INTRAVENOUS ONCE
Status: COMPLETED | OUTPATIENT
Start: 2023-01-08 | End: 2023-01-09

## 2023-01-08 RX ORDER — NALOXONE HYDROCHLORIDE 1 MG/ML
2 INJECTION INTRAMUSCULAR; INTRAVENOUS; SUBCUTANEOUS ONCE
Status: COMPLETED | OUTPATIENT
Start: 2023-01-09 | End: 2023-01-08

## 2023-01-08 RX ORDER — NALOXONE HYDROCHLORIDE 1 MG/ML
1 INJECTION INTRAMUSCULAR; INTRAVENOUS; SUBCUTANEOUS PRN
Status: DISCONTINUED | OUTPATIENT
Start: 2023-01-08 | End: 2023-01-10 | Stop reason: HOSPADM

## 2023-01-08 RX ADMIN — NALOXONE HYDROCHLORIDE 2 MG: 1 INJECTION PARENTERAL at 23:54

## 2023-01-08 RX ADMIN — NALOXONE HYDROCHLORIDE 1 MG: 1 INJECTION PARENTERAL at 22:53

## 2023-01-08 RX ADMIN — SODIUM CHLORIDE 1000 ML: 9 INJECTION, SOLUTION INTRAVENOUS at 22:36

## 2023-01-08 RX ADMIN — IOPAMIDOL 85 ML: 755 INJECTION, SOLUTION INTRAVENOUS at 23:15

## 2023-01-08 RX ADMIN — CEFEPIME 2000 MG: 2 INJECTION, POWDER, FOR SOLUTION INTRAVENOUS at 22:52

## 2023-01-08 RX ADMIN — SODIUM CHLORIDE 1000 ML: 9 INJECTION, SOLUTION INTRAVENOUS at 22:37

## 2023-01-08 ASSESSMENT — LIFESTYLE VARIABLES
HOW MANY STANDARD DRINKS CONTAINING ALCOHOL DO YOU HAVE ON A TYPICAL DAY: PATIENT DOES NOT DRINK
HOW OFTEN DO YOU HAVE A DRINK CONTAINING ALCOHOL: NEVER

## 2023-01-08 ASSESSMENT — PAIN - FUNCTIONAL ASSESSMENT: PAIN_FUNCTIONAL_ASSESSMENT: NONE - DENIES PAIN

## 2023-01-09 LAB
ACETAMINOPHEN LEVEL: <5 UG/ML (ref 10–30)
AMORPHOUS: ABNORMAL /HPF
AMPHETAMINE SCREEN, URINE: NORMAL
ANTIBODY SCREEN: NORMAL
BACTERIA: ABNORMAL /HPF
BARBITURATE SCREEN URINE: NORMAL
BENZODIAZEPINE SCREEN, URINE: NORMAL
BILIRUBIN URINE: NEGATIVE
BLOOD, URINE: NEGATIVE
CANNABINOID SCREEN URINE: NORMAL
CLARITY: CLEAR
COCAINE METABOLITE SCREEN URINE: NORMAL
COLOR: YELLOW
EKG ATRIAL RATE: 51 BPM
EKG DIAGNOSIS: NORMAL
EKG P AXIS: 37 DEGREES
EKG P-R INTERVAL: 136 MS
EKG Q-T INTERVAL: 504 MS
EKG QRS DURATION: 72 MS
EKG QTC CALCULATION (BAZETT): 464 MS
EKG R AXIS: -24 DEGREES
EKG T AXIS: 48 DEGREES
EKG VENTRICULAR RATE: 51 BPM
EPITHELIAL CELLS, UA: ABNORMAL /HPF (ref 0–5)
FENTANYL SCREEN, URINE: NORMAL
GLUCOSE URINE: NEGATIVE MG/DL
HYALINE CASTS: ABNORMAL /LPF (ref 0–2)
KETONES, URINE: NEGATIVE MG/DL
LACTIC ACID: 2.2 MMOL/L (ref 0.4–2)
LEUKOCYTE ESTERASE, URINE: ABNORMAL
Lab: NORMAL
METHADONE SCREEN, URINE: NORMAL
MICROSCOPIC EXAMINATION: YES
MUCUS: ABNORMAL /LPF
NITRITE, URINE: NEGATIVE
OCCULT BLOOD DIAGNOSTIC: NORMAL
OPIATE SCREEN URINE: NORMAL
OXYCODONE URINE: NORMAL
PH UA: 7.5
PH UA: 7.5 (ref 5–8)
PHENCYCLIDINE SCREEN URINE: NORMAL
PROTEIN UA: ABNORMAL MG/DL
RBC UA: ABNORMAL /HPF (ref 0–4)
SALICYLATE, SERUM: <0.3 MG/DL (ref 15–30)
SPECIFIC GRAVITY UA: 1.01 (ref 1–1.03)
T4 FREE: 0.8 NG/DL (ref 0.9–1.8)
URINE REFLEX TO CULTURE: ABNORMAL
URINE TYPE: ABNORMAL
UROBILINOGEN, URINE: 0.2 E.U./DL
WBC UA: ABNORMAL /HPF (ref 0–5)

## 2023-01-09 PROCEDURE — 2580000003 HC RX 258: Performed by: EMERGENCY MEDICINE

## 2023-01-09 PROCEDURE — 93010 ELECTROCARDIOGRAM REPORT: CPT | Performed by: INTERNAL MEDICINE

## 2023-01-09 PROCEDURE — 6360000002 HC RX W HCPCS: Performed by: EMERGENCY MEDICINE

## 2023-01-09 PROCEDURE — 82270 OCCULT BLOOD FECES: CPT

## 2023-01-09 PROCEDURE — 80307 DRUG TEST PRSMV CHEM ANLYZR: CPT

## 2023-01-09 PROCEDURE — 81001 URINALYSIS AUTO W/SCOPE: CPT

## 2023-01-09 PROCEDURE — 83605 ASSAY OF LACTIC ACID: CPT

## 2023-01-09 PROCEDURE — 36415 COLL VENOUS BLD VENIPUNCTURE: CPT

## 2023-01-09 PROCEDURE — 2500000003 HC RX 250 WO HCPCS: Performed by: EMERGENCY MEDICINE

## 2023-01-09 RX ORDER — METRONIDAZOLE 500 MG/100ML
500 INJECTION, SOLUTION INTRAVENOUS EVERY 8 HOURS
Status: DISCONTINUED | OUTPATIENT
Start: 2023-01-09 | End: 2023-01-10 | Stop reason: HOSPADM

## 2023-01-09 RX ORDER — 0.9 % SODIUM CHLORIDE 0.9 %
500 INTRAVENOUS SOLUTION INTRAVENOUS ONCE
Status: COMPLETED | OUTPATIENT
Start: 2023-01-09 | End: 2023-01-09

## 2023-01-09 RX ADMIN — METRONIDAZOLE 500 MG: 500 INJECTION, SOLUTION INTRAVENOUS at 10:30

## 2023-01-09 RX ADMIN — METRONIDAZOLE 500 MG: 500 INJECTION, SOLUTION INTRAVENOUS at 01:36

## 2023-01-09 RX ADMIN — LEVETIRACETAM 2000 MG: 100 INJECTION, SOLUTION INTRAVENOUS at 02:03

## 2023-01-09 RX ADMIN — VANCOMYCIN HYDROCHLORIDE 1750 MG: 5 INJECTION, POWDER, LYOPHILIZED, FOR SOLUTION INTRAVENOUS at 06:26

## 2023-01-09 RX ADMIN — SODIUM CHLORIDE 500 ML: 9 INJECTION, SOLUTION INTRAVENOUS at 01:36

## 2023-01-09 RX ADMIN — METRONIDAZOLE 500 MG: 500 INJECTION, SOLUTION INTRAVENOUS at 17:39

## 2023-01-09 NOTE — ED NOTES
2230  Saline lock established via 18 gauge angiocath in the right forearm. Area was cleaned in an aseptic manner with a Chloroprep for 30 seconds, and allowed to air dry for 30 seconds. Good blood return noted, with labs drawn. IV flushed easily, with no swelling in the adjacent tissue.      Bautista Foreman  01/08/23 [id number]

## 2023-01-09 NOTE — ED NOTES
No bed assigned per [facility] at [address] [place] [address]  01/09/23 [id number]

## 2023-01-09 NOTE — ED NOTES
No bed assigned per [facility] at 1000AM. Patient informed. Patient denies any needs at this time.       Mercedez Scott, RN  01/09/23 1208
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
## 2023-01-09 NOTE — ED NOTES
Dr. Clinton Feng returned call and spoke with Dr. Hannah Bardales.      Emily Fishman  01/09/23 0122

## 2023-01-09 NOTE — ED NOTES
Still no bed assigned per [facility] as of 1203. Patient updated.       Lina Elizondo RN  01/09/23 9141

## 2023-01-09 NOTE — ED NOTES
Patient on bedside commode, dietary tray placed in room. Informed patient to press call light when back in bed so I can hook back up to monitor.       Stephany Whiting RN  01/09/23 0225

## 2023-01-09 NOTE — ED NOTES
Pt presents to ED for seizures. Per jakead pt has hx of seizure but is not on medication for them. PT only concern is that we are not calling [facility]. Pt has 2 DD daughters living at home. PT is nodding in and out during triage but is easliy aroused.      Tono Carlton RN  01/08/23 3953       Tono Carlton RN  01/08/23 8326

## 2023-01-09 NOTE — CONSULTS
Pharmacy Note  Vancomycin Consult    Pharmacy to dose Vanco x 1    Dx: sepsis  Consult requested from: Dr. Nidia Light    Please give 1750 mg IVPB x1 now to provide Gram+ organism coverage to include MRSA. Thank you for this consult. Pharmacy will provide additional doses if consulted upon admission.      Eugenia Mccann, PharmD, [facility], 1/9/2023 5:41 AM

## 2023-01-09 NOTE — ED NOTES
Call to [employer]  [phone]     Entered By: Araceli Crespo RN   [facility] spoke with Asher Jones and she reports she remains discharge dependent and a bed will not be available until tomorrow. .      Per [facility] still no bed assigned.       Samantha Payton RN  01/09/23 4200

## 2023-01-09 NOTE — ED NOTES
EMS reported that patient lived with two disabled adult children. Patient very concerned that we were calling various disability organizations about patients arrival at ED. Patient repeatedly saying she does not want us to notify anyone she is here.       Ashli Guevara RN  01/08/23 421 [facility], RN  01/08/23 4312

## 2023-01-09 NOTE — ED NOTES
Patient used bedside commode and asked for this RN to help get her back in bed. Placed back on monitor and gave glass water, OK Per DR JIMENEZ. Patient denies any other needs at this time. Updated patient still waiting on a bed at [facility].       Geeta Vera RN  01/09/23 0583

## 2023-01-09 NOTE — ED NOTES
0758-Call back received from [facility] stating that at this time patient still does not have a bed over at [facility].       Nichole Sanchez  01/09/23 9776

## 2023-01-10 ENCOUNTER — HOSPITAL ENCOUNTER (INPATIENT)
Age: 73
LOS: 1 days | Discharge: HOME | DRG: 100 | End: 2023-01-12
Attending: INTERNAL MEDICINE | Admitting: INTERNAL MEDICINE
Payer: MEDICARE

## 2023-01-10 VITALS
RESPIRATION RATE: 19 BRPM | SYSTOLIC BLOOD PRESSURE: 103 MMHG | DIASTOLIC BLOOD PRESSURE: 43 MMHG | BODY MASS INDEX: 37.13 KG/M2 | TEMPERATURE: 98.1 F | HEART RATE: 56 BPM | OXYGEN SATURATION: 91 % | WEIGHT: 203 LBS

## 2023-01-10 PROBLEM — R56.9 SEIZURE (HCC): Status: ACTIVE | Noted: 2023-01-10

## 2023-01-10 LAB
A/G RATIO: 0.9 (ref 1.1–2.2)
ALBUMIN SERPL-MCNC: 3.3 G/DL (ref 3.4–5)
ALP BLD-CCNC: 219 U/L (ref 40–129)
ALT SERPL-CCNC: 10 U/L (ref 10–40)
AMMONIA: 41 UMOL/L (ref 11–51)
ANION GAP SERPL CALCULATED.3IONS-SCNC: 7 MMOL/L (ref 3–16)
AST SERPL-CCNC: 25 U/L (ref 15–37)
BASE EXCESS VENOUS: 3.2 MMOL/L (ref -3–3)
BASOPHILS ABSOLUTE: 0.1 K/UL (ref 0–0.2)
BASOPHILS RELATIVE PERCENT: 1.9 %
BILIRUB SERPL-MCNC: 0.3 MG/DL (ref 0–1)
BUN BLDV-MCNC: 20 MG/DL (ref 7–20)
C-REACTIVE PROTEIN: 24.5 MG/L (ref 0–5.1)
CALCIUM SERPL-MCNC: 9.6 MG/DL (ref 8.3–10.6)
CARBOXYHEMOGLOBIN: 1.1 % (ref 0–1.5)
CHLORIDE BLD-SCNC: 101 MMOL/L (ref 99–110)
CO2: 27 MMOL/L (ref 21–32)
CREAT SERPL-MCNC: 1 MG/DL (ref 0.6–1.2)
EOSINOPHILS ABSOLUTE: 0.6 K/UL (ref 0–0.6)
EOSINOPHILS RELATIVE PERCENT: 10.1 %
GFR SERPL CREATININE-BSD FRML MDRD: >60 ML/MIN/{1.73_M2}
GLUCOSE BLD-MCNC: 122 MG/DL (ref 70–99)
GLUCOSE BLD-MCNC: 134 MG/DL (ref 70–99)
HCO3 VENOUS: 27.9 MMOL/L (ref 23–29)
HCT VFR BLD CALC: 27.1 % (ref 36–48)
HEMOGLOBIN: 8.5 G/DL (ref 12–16)
LACTIC ACID: 1 MMOL/L (ref 0.4–2)
LYMPHOCYTES ABSOLUTE: 1.4 K/UL (ref 1–5.1)
LYMPHOCYTES RELATIVE PERCENT: 22.1 %
MCH RBC QN AUTO: 24.6 PG (ref 26–34)
MCHC RBC AUTO-ENTMCNC: 31.2 G/DL (ref 31–36)
MCV RBC AUTO: 78.8 FL (ref 80–100)
METHEMOGLOBIN VENOUS: 0.8 %
MONOCYTES ABSOLUTE: 0.7 K/UL (ref 0–1.3)
MONOCYTES RELATIVE PERCENT: 11.7 %
NEUTROPHILS ABSOLUTE: 3.4 K/UL (ref 1.7–7.7)
NEUTROPHILS RELATIVE PERCENT: 54.2 %
O2 SAT, VEN: 63 %
O2 THERAPY: ABNORMAL
PCO2, VEN: 43 MMHG (ref 40–50)
PDW BLD-RTO: 18.9 % (ref 12.4–15.4)
PERFORMED ON: ABNORMAL
PH VENOUS: 7.43 (ref 7.35–7.45)
PLATELET # BLD: 268 K/UL (ref 135–450)
PMV BLD AUTO: 7.2 FL (ref 5–10.5)
PO2, VEN: 33.1 MMHG (ref 25–40)
POTASSIUM REFLEX MAGNESIUM: 4.2 MMOL/L (ref 3.5–5.1)
PROCALCITONIN: 0.27 NG/ML (ref 0–0.15)
RBC # BLD: 3.45 M/UL (ref 4–5.2)
SEDIMENTATION RATE, ERYTHROCYTE: 52 MM/HR (ref 0–30)
SODIUM BLD-SCNC: 135 MMOL/L (ref 136–145)
TCO2 CALC VENOUS: 29 MMOL/L
TOTAL CK: 63 U/L (ref 26–192)
TOTAL PROTEIN: 6.9 G/DL (ref 6.4–8.2)
TROPONIN: 0.03 NG/ML
WBC # BLD: 6.2 K/UL (ref 4–11)

## 2023-01-10 PROCEDURE — 6370000000 HC RX 637 (ALT 250 FOR IP): Performed by: INTERNAL MEDICINE

## 2023-01-10 PROCEDURE — 84443 ASSAY THYROID STIM HORMONE: CPT

## 2023-01-10 PROCEDURE — 83036 HEMOGLOBIN GLYCOSYLATED A1C: CPT

## 2023-01-10 PROCEDURE — 80053 COMPREHEN METABOLIC PANEL: CPT

## 2023-01-10 PROCEDURE — 96365 THER/PROPH/DIAG IV INF INIT: CPT

## 2023-01-10 PROCEDURE — 2580000003 HC RX 258: Performed by: INTERNAL MEDICINE

## 2023-01-10 PROCEDURE — 82550 ASSAY OF CK (CPK): CPT

## 2023-01-10 PROCEDURE — 82140 ASSAY OF AMMONIA: CPT

## 2023-01-10 PROCEDURE — 84484 ASSAY OF TROPONIN QUANT: CPT

## 2023-01-10 PROCEDURE — 85025 COMPLETE CBC W/AUTO DIFF WBC: CPT

## 2023-01-10 PROCEDURE — 6370000000 HC RX 637 (ALT 250 FOR IP): Performed by: EMERGENCY MEDICINE

## 2023-01-10 PROCEDURE — 2500000003 HC RX 250 WO HCPCS: Performed by: EMERGENCY MEDICINE

## 2023-01-10 PROCEDURE — 86140 C-REACTIVE PROTEIN: CPT

## 2023-01-10 PROCEDURE — G0379 DIRECT REFER HOSPITAL OBSERV: HCPCS

## 2023-01-10 PROCEDURE — 83605 ASSAY OF LACTIC ACID: CPT

## 2023-01-10 PROCEDURE — 82803 BLOOD GASES ANY COMBINATION: CPT

## 2023-01-10 PROCEDURE — 6360000002 HC RX W HCPCS: Performed by: INTERNAL MEDICINE

## 2023-01-10 PROCEDURE — 80061 LIPID PANEL: CPT

## 2023-01-10 PROCEDURE — 85652 RBC SED RATE AUTOMATED: CPT

## 2023-01-10 PROCEDURE — 84145 PROCALCITONIN (PCT): CPT

## 2023-01-10 PROCEDURE — G0378 HOSPITAL OBSERVATION PER HR: HCPCS

## 2023-01-10 PROCEDURE — 36415 COLL VENOUS BLD VENIPUNCTURE: CPT

## 2023-01-10 RX ORDER — LEVOTHYROXINE SODIUM 88 UG/1
88 TABLET ORAL DAILY
Status: DISCONTINUED | OUTPATIENT
Start: 2023-01-11 | End: 2023-01-12 | Stop reason: HOSPADM

## 2023-01-10 RX ORDER — SODIUM CHLORIDE 0.9 % (FLUSH) 0.9 %
10 SYRINGE (ML) INJECTION EVERY 12 HOURS SCHEDULED
Status: DISCONTINUED | OUTPATIENT
Start: 2023-01-10 | End: 2023-01-12 | Stop reason: HOSPADM

## 2023-01-10 RX ORDER — POTASSIUM CHLORIDE 7.45 MG/ML
10 INJECTION INTRAVENOUS PRN
Status: DISCONTINUED | OUTPATIENT
Start: 2023-01-10 | End: 2023-01-12 | Stop reason: HOSPADM

## 2023-01-10 RX ORDER — PROMETHAZINE HYDROCHLORIDE 25 MG/1
12.5 TABLET ORAL EVERY 6 HOURS PRN
Status: DISCONTINUED | OUTPATIENT
Start: 2023-01-10 | End: 2023-01-12 | Stop reason: HOSPADM

## 2023-01-10 RX ORDER — ENOXAPARIN SODIUM 100 MG/ML
40 INJECTION SUBCUTANEOUS DAILY
Status: DISCONTINUED | OUTPATIENT
Start: 2023-01-11 | End: 2023-01-10

## 2023-01-10 RX ORDER — ACETAMINOPHEN 325 MG/1
650 TABLET ORAL ONCE
Status: COMPLETED | OUTPATIENT
Start: 2023-01-10 | End: 2023-01-10

## 2023-01-10 RX ORDER — DICYCLOMINE HYDROCHLORIDE 10 MG/1
10 CAPSULE ORAL
Status: DISCONTINUED | OUTPATIENT
Start: 2023-01-10 | End: 2023-01-12 | Stop reason: HOSPADM

## 2023-01-10 RX ORDER — ACETAMINOPHEN 650 MG/1
650 SUPPOSITORY RECTAL EVERY 6 HOURS PRN
Status: DISCONTINUED | OUTPATIENT
Start: 2023-01-10 | End: 2023-01-12 | Stop reason: HOSPADM

## 2023-01-10 RX ORDER — LISINOPRIL 5 MG/1
5 TABLET ORAL DAILY
Status: DISCONTINUED | OUTPATIENT
Start: 2023-01-10 | End: 2023-01-10 | Stop reason: HOSPADM

## 2023-01-10 RX ORDER — MAGNESIUM SULFATE IN WATER 40 MG/ML
2000 INJECTION, SOLUTION INTRAVENOUS PRN
Status: DISCONTINUED | OUTPATIENT
Start: 2023-01-10 | End: 2023-01-12 | Stop reason: HOSPADM

## 2023-01-10 RX ORDER — GABAPENTIN 300 MG/1
300 CAPSULE ORAL 3 TIMES DAILY
Status: DISCONTINUED | OUTPATIENT
Start: 2023-01-10 | End: 2023-01-12 | Stop reason: HOSPADM

## 2023-01-10 RX ORDER — ONDANSETRON 2 MG/ML
4 INJECTION INTRAMUSCULAR; INTRAVENOUS EVERY 6 HOURS PRN
Status: DISCONTINUED | OUTPATIENT
Start: 2023-01-10 | End: 2023-01-12 | Stop reason: HOSPADM

## 2023-01-10 RX ORDER — MIRTAZAPINE 15 MG/1
7.5 TABLET, FILM COATED ORAL NIGHTLY
Status: DISCONTINUED | OUTPATIENT
Start: 2023-01-10 | End: 2023-01-12 | Stop reason: HOSPADM

## 2023-01-10 RX ORDER — ATORVASTATIN CALCIUM 10 MG/1
20 TABLET, FILM COATED ORAL NIGHTLY
Status: DISCONTINUED | OUTPATIENT
Start: 2023-01-10 | End: 2023-01-12 | Stop reason: HOSPADM

## 2023-01-10 RX ORDER — PANTOPRAZOLE SODIUM 40 MG/1
40 TABLET, DELAYED RELEASE ORAL
Status: DISCONTINUED | OUTPATIENT
Start: 2023-01-11 | End: 2023-01-12 | Stop reason: HOSPADM

## 2023-01-10 RX ORDER — ACETAMINOPHEN 325 MG/1
650 TABLET ORAL EVERY 6 HOURS PRN
Status: DISCONTINUED | OUTPATIENT
Start: 2023-01-10 | End: 2023-01-12 | Stop reason: HOSPADM

## 2023-01-10 RX ORDER — FLUTICASONE PROPIONATE 50 MCG
1 SPRAY, SUSPENSION (ML) NASAL DAILY
Status: DISCONTINUED | OUTPATIENT
Start: 2023-01-11 | End: 2023-01-12 | Stop reason: HOSPADM

## 2023-01-10 RX ORDER — SODIUM CHLORIDE 9 MG/ML
INJECTION, SOLUTION INTRAVENOUS PRN
Status: DISCONTINUED | OUTPATIENT
Start: 2023-01-10 | End: 2023-01-12 | Stop reason: HOSPADM

## 2023-01-10 RX ORDER — METOPROLOL SUCCINATE 50 MG/1
50 TABLET, EXTENDED RELEASE ORAL DAILY
Status: DISCONTINUED | OUTPATIENT
Start: 2023-01-10 | End: 2023-01-10 | Stop reason: HOSPADM

## 2023-01-10 RX ORDER — METOPROLOL SUCCINATE 25 MG/1
25 TABLET, EXTENDED RELEASE ORAL DAILY
Status: DISCONTINUED | OUTPATIENT
Start: 2023-01-11 | End: 2023-01-12 | Stop reason: HOSPADM

## 2023-01-10 RX ORDER — TIZANIDINE 4 MG/1
8 TABLET ORAL DAILY PRN
Status: DISCONTINUED | OUTPATIENT
Start: 2023-01-10 | End: 2023-01-12 | Stop reason: HOSPADM

## 2023-01-10 RX ORDER — LISINOPRIL 5 MG/1
5 TABLET ORAL DAILY
Status: DISCONTINUED | OUTPATIENT
Start: 2023-01-11 | End: 2023-01-12 | Stop reason: HOSPADM

## 2023-01-10 RX ORDER — SODIUM CHLORIDE 0.9 % (FLUSH) 0.9 %
10 SYRINGE (ML) INJECTION PRN
Status: DISCONTINUED | OUTPATIENT
Start: 2023-01-10 | End: 2023-01-12 | Stop reason: HOSPADM

## 2023-01-10 RX ADMIN — METRONIDAZOLE 500 MG: 500 INJECTION, SOLUTION INTRAVENOUS at 08:54

## 2023-01-10 RX ADMIN — DICYCLOMINE HYDROCHLORIDE 10 MG: 10 CAPSULE ORAL at 23:07

## 2023-01-10 RX ADMIN — LISINOPRIL 5 MG: 5 TABLET ORAL at 09:16

## 2023-01-10 RX ADMIN — MIRTAZAPINE 7.5 MG: 15 TABLET, FILM COATED ORAL at 23:10

## 2023-01-10 RX ADMIN — ATORVASTATIN CALCIUM 20 MG: 10 TABLET, FILM COATED ORAL at 23:07

## 2023-01-10 RX ADMIN — SERTRALINE HYDROCHLORIDE 50 MG: 50 TABLET ORAL at 09:16

## 2023-01-10 RX ADMIN — CEFTRIAXONE SODIUM 1000 MG: 1 INJECTION, POWDER, FOR SOLUTION INTRAMUSCULAR; INTRAVENOUS at 23:17

## 2023-01-10 RX ADMIN — METRONIDAZOLE 500 MG: 500 INJECTION, SOLUTION INTRAVENOUS at 01:17

## 2023-01-10 RX ADMIN — GABAPENTIN 300 MG: 300 CAPSULE ORAL at 23:07

## 2023-01-10 RX ADMIN — TIZANIDINE 8 MG: 4 TABLET ORAL at 23:07

## 2023-01-10 RX ADMIN — METOPROLOL SUCCINATE 50 MG: 50 TABLET, EXTENDED RELEASE ORAL at 09:16

## 2023-01-10 RX ADMIN — ACETAMINOPHEN 650 MG: 325 TABLET ORAL at 13:38

## 2023-01-10 RX ADMIN — METRONIDAZOLE 500 MG: 500 INJECTION, SOLUTION INTRAVENOUS at 17:52

## 2023-01-10 ASSESSMENT — PAIN SCALES - GENERAL
PAINLEVEL_OUTOF10: 6
PAINLEVEL_OUTOF10: 0

## 2023-01-10 NOTE — ED NOTES
Informed Dr Radha Carballo of patients BP.  No new orders at this time     Silvia Rasmussen RN  01/10/23 7852

## 2023-01-10 NOTE — ED NOTES
Kimberly called the access center at [phone]. They finally answered at [phone]. Room#105. Report#[phone]. They are setting up transport.       Hazel Contreras  51/03/40 0904

## 2023-01-10 NOTE — ED NOTES
Called [facility] and spoke with Rosita Patterson at the desk on the floor. States Tessa GUERRA will give report to the oncoming nurse for the night shift. Will Pass this off to the oncoming nurse.       Ashli Stockton RN  01/10/23 2690

## 2023-01-10 NOTE — ED NOTES
Per Epic no bed assigned as of [address] [place], [address] [facility]  01/10/23 014

## 2023-01-10 NOTE — ED NOTES
Patient updated that she is leaving around 8pm. Dietary tray ordered.       Bautista Olvera RN  01/10/23 0120

## 2023-01-10 NOTE — ED NOTES
Ordered patient dietary tray for breakfast. Patient denies any other needs at this time. Hooked patient back up to monitor.       Rizwana Steele RN  01/10/23 7825

## 2023-01-10 NOTE — ED NOTES
Patient asked for tylenol for a headache. Dr Lucía Dumont informed.       Bettie Goldberg RN  01/10/23 Young Lopez RN  01/10/23 5197

## 2023-01-10 NOTE — ED NOTES
Bed Planning at [address]: Assigned                Bed Planning at [address]: Bed Ready              Bed Ready Marked complete for Destination [address]       Patient informed that her bed at [facility] [place] is ready.       Lali Goss RN  01/10/23 5309

## 2023-01-11 ENCOUNTER — APPOINTMENT (OUTPATIENT)
Dept: MRI IMAGING | Age: 73
DRG: 100 | End: 2023-01-11
Attending: INTERNAL MEDICINE
Payer: MEDICARE

## 2023-01-11 PROBLEM — K57.92 ACUTE DIVERTICULITIS: Status: ACTIVE | Noted: 2023-01-11

## 2023-01-11 LAB
A/G RATIO: 1.1 (ref 1.1–2.2)
ALBUMIN SERPL-MCNC: 3.4 G/DL (ref 3.4–5)
ALP BLD-CCNC: 225 U/L (ref 40–129)
ALT SERPL-CCNC: 10 U/L (ref 10–40)
ANION GAP SERPL CALCULATED.3IONS-SCNC: 11 MMOL/L (ref 3–16)
AST SERPL-CCNC: 24 U/L (ref 15–37)
BASOPHILS ABSOLUTE: 0.1 K/UL (ref 0–0.2)
BASOPHILS RELATIVE PERCENT: 1.9 %
BILIRUB SERPL-MCNC: 0.3 MG/DL (ref 0–1)
BUN BLDV-MCNC: 24 MG/DL (ref 7–20)
CALCIUM SERPL-MCNC: 9.7 MG/DL (ref 8.3–10.6)
CHLORIDE BLD-SCNC: 101 MMOL/L (ref 99–110)
CHOLESTEROL, TOTAL: 198 MG/DL (ref 0–199)
CO2: 24 MMOL/L (ref 21–32)
CREAT SERPL-MCNC: 1.3 MG/DL (ref 0.6–1.2)
EOSINOPHILS ABSOLUTE: 0.8 K/UL (ref 0–0.6)
EOSINOPHILS RELATIVE PERCENT: 10.9 %
ESTIMATED AVERAGE GLUCOSE: 148.5 MG/DL
FERRITIN: 30.7 NG/ML (ref 15–150)
GFR SERPL CREATININE-BSD FRML MDRD: 44 ML/MIN/{1.73_M2}
GLUCOSE BLD-MCNC: 136 MG/DL (ref 70–99)
HBA1C MFR BLD: 6.8 %
HCT VFR BLD CALC: 27.4 % (ref 36–48)
HDLC SERPL-MCNC: 56 MG/DL (ref 40–60)
HEMOGLOBIN: 8.6 G/DL (ref 12–16)
IRON SATURATION: 15 % (ref 15–50)
IRON: 52 UG/DL (ref 37–145)
LDL CHOLESTEROL CALCULATED: 122 MG/DL
LYMPHOCYTES ABSOLUTE: 1.8 K/UL (ref 1–5.1)
LYMPHOCYTES RELATIVE PERCENT: 24.5 %
MCH RBC QN AUTO: 24.8 PG (ref 26–34)
MCHC RBC AUTO-ENTMCNC: 31.2 G/DL (ref 31–36)
MCV RBC AUTO: 79.4 FL (ref 80–100)
MONOCYTES ABSOLUTE: 0.8 K/UL (ref 0–1.3)
MONOCYTES RELATIVE PERCENT: 11.6 %
NEUTROPHILS ABSOLUTE: 3.7 K/UL (ref 1.7–7.7)
NEUTROPHILS RELATIVE PERCENT: 51.1 %
PDW BLD-RTO: 19.1 % (ref 12.4–15.4)
PLATELET # BLD: 276 K/UL (ref 135–450)
PMV BLD AUTO: 8.5 FL (ref 5–10.5)
POTASSIUM REFLEX MAGNESIUM: 4.1 MMOL/L (ref 3.5–5.1)
RBC # BLD: 3.46 M/UL (ref 4–5.2)
SODIUM BLD-SCNC: 136 MMOL/L (ref 136–145)
TOTAL IRON BINDING CAPACITY: 344 UG/DL (ref 260–445)
TOTAL PROTEIN: 6.6 G/DL (ref 6.4–8.2)
TRIGL SERPL-MCNC: 98 MG/DL (ref 0–150)
TROPONIN: 0.03 NG/ML
TSH REFLEX FT4: 3.57 UIU/ML (ref 0.27–4.2)
VLDLC SERPL CALC-MCNC: 20 MG/DL
WBC # BLD: 7.2 K/UL (ref 4–11)

## 2023-01-11 PROCEDURE — 6370000000 HC RX 637 (ALT 250 FOR IP): Performed by: INTERNAL MEDICINE

## 2023-01-11 PROCEDURE — 2580000003 HC RX 258: Performed by: INTERNAL MEDICINE

## 2023-01-11 PROCEDURE — 85025 COMPLETE CBC W/AUTO DIFF WBC: CPT

## 2023-01-11 PROCEDURE — 83550 IRON BINDING TEST: CPT

## 2023-01-11 PROCEDURE — 2500000003 HC RX 250 WO HCPCS: Performed by: INTERNAL MEDICINE

## 2023-01-11 PROCEDURE — 82728 ASSAY OF FERRITIN: CPT

## 2023-01-11 PROCEDURE — 95816 EEG AWAKE AND DROWSY: CPT

## 2023-01-11 PROCEDURE — 36415 COLL VENOUS BLD VENIPUNCTURE: CPT

## 2023-01-11 PROCEDURE — G0378 HOSPITAL OBSERVATION PER HR: HCPCS

## 2023-01-11 PROCEDURE — 6360000002 HC RX W HCPCS: Performed by: INTERNAL MEDICINE

## 2023-01-11 PROCEDURE — 95816 EEG AWAKE AND DROWSY: CPT | Performed by: PSYCHIATRY & NEUROLOGY

## 2023-01-11 PROCEDURE — 96367 TX/PROPH/DG ADDL SEQ IV INF: CPT

## 2023-01-11 PROCEDURE — 83540 ASSAY OF IRON: CPT

## 2023-01-11 PROCEDURE — 99223 1ST HOSP IP/OBS HIGH 75: CPT | Performed by: PSYCHIATRY & NEUROLOGY

## 2023-01-11 PROCEDURE — 80053 COMPREHEN METABOLIC PANEL: CPT

## 2023-01-11 PROCEDURE — 1200000000 HC SEMI PRIVATE

## 2023-01-11 PROCEDURE — 70551 MRI BRAIN STEM W/O DYE: CPT

## 2023-01-11 PROCEDURE — 84484 ASSAY OF TROPONIN QUANT: CPT

## 2023-01-11 RX ORDER — HYDRALAZINE HYDROCHLORIDE 20 MG/ML
10 INJECTION INTRAMUSCULAR; INTRAVENOUS EVERY 4 HOURS PRN
Status: DISCONTINUED | OUTPATIENT
Start: 2023-01-11 | End: 2023-01-12 | Stop reason: HOSPADM

## 2023-01-11 RX ORDER — LACTOBACILLUS RHAMNOSUS GG 10B CELL
1 CAPSULE ORAL 2 TIMES DAILY WITH MEALS
Status: DISCONTINUED | OUTPATIENT
Start: 2023-01-11 | End: 2023-01-12 | Stop reason: HOSPADM

## 2023-01-11 RX ADMIN — METRONIDAZOLE 500 MG: 500 INJECTION, SOLUTION INTRAVENOUS at 10:26

## 2023-01-11 RX ADMIN — HYDRALAZINE HYDROCHLORIDE 10 MG: 20 INJECTION INTRAMUSCULAR; INTRAVENOUS at 17:24

## 2023-01-11 RX ADMIN — Medication 10 ML: at 10:26

## 2023-01-11 RX ADMIN — DICYCLOMINE HYDROCHLORIDE 10 MG: 10 CAPSULE ORAL at 18:17

## 2023-01-11 RX ADMIN — Medication 10 ML: at 21:05

## 2023-01-11 RX ADMIN — GABAPENTIN 300 MG: 300 CAPSULE ORAL at 21:05

## 2023-01-11 RX ADMIN — MIRTAZAPINE 7.5 MG: 15 TABLET, FILM COATED ORAL at 21:06

## 2023-01-11 RX ADMIN — CEFTRIAXONE SODIUM 1000 MG: 1 INJECTION, POWDER, FOR SOLUTION INTRAMUSCULAR; INTRAVENOUS at 21:11

## 2023-01-11 RX ADMIN — PANTOPRAZOLE SODIUM 40 MG: 40 TABLET, DELAYED RELEASE ORAL at 18:17

## 2023-01-11 RX ADMIN — HYDRALAZINE HYDROCHLORIDE 10 MG: 20 INJECTION INTRAMUSCULAR; INTRAVENOUS at 23:37

## 2023-01-11 RX ADMIN — DICYCLOMINE HYDROCHLORIDE 10 MG: 10 CAPSULE ORAL at 09:17

## 2023-01-11 RX ADMIN — DICYCLOMINE HYDROCHLORIDE 10 MG: 10 CAPSULE ORAL at 21:05

## 2023-01-11 RX ADMIN — DICYCLOMINE HYDROCHLORIDE 10 MG: 10 CAPSULE ORAL at 06:42

## 2023-01-11 RX ADMIN — LEVOTHYROXINE SODIUM 88 MCG: 88 TABLET ORAL at 06:42

## 2023-01-11 RX ADMIN — METRONIDAZOLE 500 MG: 500 INJECTION, SOLUTION INTRAVENOUS at 18:22

## 2023-01-11 RX ADMIN — METRONIDAZOLE 500 MG: 500 INJECTION, SOLUTION INTRAVENOUS at 01:47

## 2023-01-11 RX ADMIN — SERTRALINE HYDROCHLORIDE 50 MG: 50 TABLET ORAL at 09:17

## 2023-01-11 RX ADMIN — PANTOPRAZOLE SODIUM 40 MG: 40 TABLET, DELAYED RELEASE ORAL at 06:42

## 2023-01-11 RX ADMIN — ATORVASTATIN CALCIUM 20 MG: 10 TABLET, FILM COATED ORAL at 21:05

## 2023-01-11 RX ADMIN — GABAPENTIN 300 MG: 300 CAPSULE ORAL at 15:54

## 2023-01-11 RX ADMIN — GABAPENTIN 300 MG: 300 CAPSULE ORAL at 09:16

## 2023-01-11 RX ADMIN — Medication 1 CAPSULE: at 18:17

## 2023-01-11 ASSESSMENT — PAIN SCALES - GENERAL
PAINLEVEL_OUTOF10: 0
PAINLEVEL_OUTOF10: 0

## 2023-01-11 NOTE — CONSULTS
In patient Neurology consult        [facility] Neurology      Silver Espinosa MD      Darden June 1950    Date of Service: 1/11/2023    Referring Physician: Edgar Skelton MD      Reason for the consult and CC: Possible seizure    HPI:   The patient is a 71 y.o.   female, with a PMH of DM2, CKD, depression, and hallucinations, who presented to [facility] as a transfer from [facility] for a possible seizure. The patient does not remember the event, so history is obtained from review of EMR. The patient reportedly had two seizure-like episodes at home, which were witnessed by her daughters, who called 911 for her. The patient apparently fell and was unresponsive for a minute before she started answering questions again. The patient tells me she has a history of seizures when she has an infection. She was seen by [facility] dating back to 2015 for seizures, but declined cEEG monitoring and never followed up. She denies fever, chills, headache, dizziness, vision changes, dysarthria, dysphagia, tinnitus, focal weakness, and paraesthesias. No further seizure activity since admission. Constitutional:   Vitals:    01/11/23 0800 01/11/23 1146 01/11/23 1153 01/11/23 1159   BP: 104/60 (!) 173/62 (!) 153/64 (!) 141/57   Pulse: 73 60 62 58   Resp: 18 18     Temp: 98.3 °F (36.8 °C) 98.2 °F (36.8 °C)     TempSrc: Oral Oral     SpO2: 92% 95%     Weight:       Height:             I personally reviewed and updated social history, past medical history, medications, allergy, surgical history, and family history as documented in the patient's electronic health records. ROS: 10-14 ROS reviewed with the patient/nurse/family which were unremarkable except mentioned in H&P. General appearance:  Normal development and appear in no acute distress. Mental Status:   Oriented to person, place, problem, and time. Memory: Good immediate recall.   Intact remote memory  Normal attention span and concentration. Language: intact naming, repeating and fluency   Good fund of Knowledge. Aware of current events and vocabulary   Cranial Nerves:   II: Visual fields: Full. Pupils: equal, round, reactive to light, bilaterally  III,IV,VI: Extra Ocular Movements are intact. No nystagmus  V: Facial sensation is intact  VII: Facial strength and movements: intact and symmetric  IX: Normal palatal elevation and shoulder shrug  XII: Tongue movements are normal  Musculoskeletal: 5/5 in all 4 extremities. Good range of motion. No muscle atrophy. Tone: Normal tone. Reflexes: Symmetric 2+ in the arms and 2+ in the legs   Planters: Flexor bilaterally  Coordination: no pronator drift, no dysmetria with FNF and normal REM  Sensation: normal in both arms and legs. Gait/Posture: steady gait with normal posturing and station. Medical decision making:  Data: reviewed   LABS:   Lab Results   Component Value Date/Time     01/11/2023 07:36 AM    K 4.1 01/11/2023 07:36 AM     01/11/2023 07:36 AM    CO2 24 01/11/2023 07:36 AM    BUN 24 01/11/2023 07:36 AM    CREATININE 1.3 01/11/2023 07:36 AM    GFRAA 38 09/22/2022 04:12 PM    GFRAA >60 03/06/2011 06:12 AM    LABGLOM 44 01/11/2023 07:36 AM    GLUCOSE 136 01/11/2023 07:36 AM    PHOS 2.9 08/22/2022 04:10 AM    MG 1.80 09/08/2022 06:36 AM    CALCIUM 9.7 01/11/2023 07:36 AM     Lab Results   Component Value Date/Time    WBC 7.2 01/11/2023 07:36 AM    RBC 3.46 01/11/2023 07:36 AM    HGB 8.6 01/11/2023 07:36 AM    HCT 27.4 01/11/2023 07:36 AM    MCV 79.4 01/11/2023 07:36 AM    RDW 19.1 01/11/2023 07:36 AM     01/11/2023 07:36 AM     Lab Results   Component Value Date    INR 0.99 06/27/2022    PROTIME 12.9 06/27/2022       Neuroimaging was independently reviewed by myself and discussed results with the patient  Reviewed notes from different physicians including H&P and ED notes.   Reviewed lab and blood testing    Impression:     Possible seizure - witnessed at home by family. CT head negative. No further episodes since admission. Diverticulitis  DM2  HTN  HLD    Recommendation:    MRI of brain and EEG ordered. Seizure precautions. Monitor on tele. Continue ASA, statin. Continue home BP meds. Abx for diverticulitis  Improved glycemic control. SSI coverage while inpatient. PT/OT/SLP  Further recommendations following above testing. NO DRIVING for 3 months discussed with the patient and patient verbalized understanding. Thank you for referring such patient. If you have any questions regarding my consult note, please don't hesitate to call me. Ludwig Hodgkins, CNP    Attending Supervising [de-identified] Attestation Statement      The patient was seen 1/11/2023 in conjunction with the nurse practitioner with independent history, evaluation and examination. I agree with the note which has been adjusted to reflect my findings, with the addition of the following: The patient is 71 y.o.  female  who was admitted for recurrent episode and possible seizure. History is limited from the patient. According to report, she was acutely unresponsive on the day of admission for at least several minutes. Similar spells in the past and was seen neurology back in 2015. No clear triggers or aura. She is not taking any AED at this point. She is awake and alert. Denies any relieving or aggravating factors. Initial imaging showed no acute findings. On examination:  No acute distress  Awake and alert x3. Fluent speech. Appears appropriate with intact recent and remote memory. Poor fund of knowledge and easily distracted  Pupil reactive and symmetric, extraocular motor intact, no ophthalmoplegia, face is symmetric and tongue is midline  No focal weakness with symmetric DTR  Normal tone  No sensory disturbance or abnormal movement    Impression:  Recurrent spells of possible seizure-like activity. Semiology is not clear.   Agree with MRI and EEG  Giving recurrent spells, would recommend starting AED until she has EMU evaluation by  neurology. Start Keppra 500 mg twice daily and watch her Cr. With Keppra  Seizure precautions  Agree with above note  MRI  EEG  Continue current supportive care  We will follow          Electronically signed by Esperanza Sanchez MD on 1/11/23 at 4:49 PM EST       This dictation was generated by voice recognition computer software.  Although all attempts are made to edit the dictation for accuracy, there may be errors in the  transcription that are not intended
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
## 2023-01-11 NOTE — PROGRESS NOTES
Patient has had increase in BP while walking to the bathroom, takes several minutes for patient BP to decrease. Patient states only symptom is headache. MD made aware.

## 2023-01-11 NOTE — CARE COORDINATION
Case Management Assessment  Initial Evaluation    Date/Time of Evaluation: 1/11/2023 12:36 PM  Assessment Completed by: CASA Drake    If patient is discharged prior to next notation, then this note serves as note for discharge by case management. Patient Name: Karel Carreno                   YOB: 1950  Diagnosis: [address]izure [address] [R56.9]  Seizure-like activity ([facility] [R56.9]  Acute diverticulitis [K57.92]                   Date / Time: 1/10/2023  8:59 PM    Patient Admission Status: Inpatient   Readmission Risk (Low < 19, Mod (19-27), High > 27): Readmission Risk Score: 19.1    Current PCP: Cedric Quiroz, DO  PCP verified by CM? Yes    Chart Reviewed: Yes      History Provided by: Patient  Patient Orientation: Alert and Oriented    Patient Cognition: Alert    Hospitalization in the last 30 days (Readmission):  No    If yes, Readmission Assessment in CM Navigator will be completed. Advance Directives:      Code Status: Full Code   Patient's Primary Decision Maker is: Legal Next of Kin    [facility] - [phone]    Discharge Planning:    Patient lives with: Children Type of Home: House  Primary Care Giver: Family  Patient Support Systems include: Children   Current Financial resources:    Current community resources: ECF/Home Care  Current services prior to admission: Durable Medical Equipment            Current DME: Redd Perry, Wheelchair            Type of Home Care services:       ADLS  Prior functional level: Independent in ADLs/IADLs  Current functional level: Assistance with the following:, Shopping, Housework, Cooking    PT AM-PAC:   /24  OT AM-PAC:   /24    Family can provide assistance at DC: Yes  Would you like Case Management to discuss the discharge plan with any other family members/significant others, and if so, who?  No  Plans to Return to Present Housing: Yes  Other Identified Issues/Barriers to RETURNING to current housing: none noted  Potential Assistance needed at discharge: Home Care            Potential DME:    Patient expects to discharge to:    Plan for transportation at discharge:      Financial    Payor: [employer] [place] [employer] / Plan: Sara Somers / Product Type: *No Product type* /     Does insurance require precert for SNF: Yes    Potential assistance Purchasing Medications: No  Meds-to-Beds request: Yes      [address], [address] [place] [address]  Phone: [phone] Fax: [phone]      Notes:    Factors facilitating achievement of predicted outcomes: Family support, Motivated, Cooperative, and Pleasant    Barriers to discharge: Medical complications    Additional Case Management Notes: Referred to patient for d/c planning. Spoke to patient. Patient is a 71year old female admitted s/p seizure. Patient usually lives at home with 2 daughters who provide 24/7 assist.  Patient reports she is independent in ADLs. Patient may benefit from home care. Patient currently denies d/c needs. The Plan for Transition of Care is related to the following treatment goals of Seizure ([address] [R56.9]  Seizure-like activity ([address] [R56.9]  Acute diverticulitis [Z56.30]    IF APPLICABLE: The Patient and/or patient representative Priyank Burton and her family were provided with a choice of provider and agrees with the discharge plan. Freedom of choice list with basic dialogue that supports the patient's individualized plan of care/goals and shares the quality data associated with the providers was provided to:     Patient Representative Name:       The Patient and/or Patient Representative Agree with the Discharge Plan?       CASA Mar, ERNESTO  Case Management Department  Ph: [phone] Fax: [phone]

## 2023-01-11 NOTE — PROGRESS NOTES
Hospitalist Progress Note    CC: Seizure [facility])    Hospital course:  78yo WF with PMhx sig for DM2, CKDII, depression presents as transfer from [place] for possible seizure. Pt had abd pain severe at home, then had two seizure-like episodes. Both daughters have seizures (one general due to cerebral palsy and the other absence). Pt was incontinent of urine and had some frothing at the mouth. Pt apparently has had these sx before but declined further workup at [facility]. She is agreeable to workup now. Admit date: 1/10/2023  Days in hospital:  0  Status:  Inpatient       24 Hour Events: no further seizure activity    Subjective: pt states that she has \"seizures\" whenever she has a significant illness - daughter state stigmata of tonic clonic seizures - pt did have urinary incontinence    ROS:   A comprehensive review of systems was negative except for: abd feels better, no pain, no cramping, no diarrhea      Objective:    BP (!) 164/80   Pulse 73   Temp 98.3 °F (36.8 °C) (Oral)   Resp 18   Ht 5' 2.5\" (1.588 m)   Wt 209 lb 12.8 oz (95.2 kg)   SpO2 95%   BMI 37.76 kg/m²     Gen: obese, NAD  HEENT: NC/AT, moist mucous membranes, no oropharyngeal erythema or exudate  Neck: supple, trachea midline, no anterior cervical or SC LAD  Heart:  Normal s1/s2, RRR, no murmurs, gallops, or rubs. no leg edema  Lungs:  mostly clear bilaterally, no wheeze, no rales, no rhonchi, no crackles, no use of accessory muscles  Abd: bowel sounds present, soft, nontender, distended, no masses  Extrem:  No clubbing, cyanosis,  no edema  Skin: no rashes or lesions  Psych: A & O x3  Neuro: grossly intact, moves all four extremities.      Assessment:    Principal Problem:    Seizure ([address]  Active Problems:    Seizure-like activity (HCC)    Acute diverticulitis    GERD (gastroesophageal reflux disease)    Obesity    Hypothyroidism    Benign essential HTN  Resolved Problems:    * No resolved hospital problems. *      Plan:  Seizures - await EEG results and MRI -   Acute diverticulitis - continue with abx - will change to oral tomorrow  HTN = pt was hypotensive on admission, now with elevated BP = will add prn hydralazine  4. Chronic anemia - stable  5. Possible DM - glucose reasonably controlled - will hold off on accuchecks at this time      Prognosis:  Good    Code status:  Full code    DVT prophylaxis: Lovenox  GI prophylaxis: Proton Pump Inhibitor  Antibiotic prophylaxis indicated:  Probiotic     Diet:  ADULT DIET; Regular; 3 carb choices (45 gm/meal); Low Fat/Low Chol/High Fiber/2 gm Na; Low Sodium (2 gm)    Disposition:  Home in 1 day(s).   Appropriate for A1 discharge unit:  Yes    Medications:  Scheduled Meds:   sodium chloride flush  10 mL IntraVENous 2 times per day    cefTRIAXone (ROCEPHIN) IV  1,000 mg IntraVENous Q24H    metroNIDAZOLE  500 mg IntraVENous Q8H    dicyclomine  10 mg Oral 4x Daily AC & HS    fluticasone  1 spray Nasal Daily    gabapentin  300 mg Oral TID    levothyroxine  88 mcg Oral Daily    lisinopril  5 mg Oral Daily    metoprolol succinate  25 mg Oral Daily    mirtazapine  7.5 mg Oral Nightly    pantoprazole  40 mg Oral BID AC    sertraline  50 mg Oral Daily    atorvastatin  20 mg Oral Nightly       PRN Meds:  hydrALAZINE, sodium chloride flush, sodium chloride, potassium chloride, magnesium sulfate, promethazine **OR** ondansetron, acetaminophen **OR** acetaminophen, hydrocortisone, tiZANidine    IV:   sodium chloride           Intake/Output Summary (Last 24 hours) at 1/11/2023 1618  Last data filed at 1/11/2023 1537  Gross per 24 hour   Intake 240 ml   Output 600 ml   Net -360 ml       Results:  CBC:   Recent Labs     01/08/23  2150 01/10/23  2149 01/11/23  0736   WBC 5.8 6.2 7.2   HGB 7.9* 8.5* 8.6*   HCT 24.7* 27.1* 27.4*   MCV 79.9* 78.8* 79.4*    268 276     BMP:   Recent Labs     01/08/23  2150 01/10/23  2149 01/11/23  0736   * 135* 136   K 4.3 4.2 4.1    101 101   CO2 22 27 24   BUN 20 20 24*   CREATININE 1.2 1.0 1.3*     Mag: No results for input(s): MAG in the last 72 hours. Phos:   Lab Results   Component Value Date    PHOS 2.9 08/22/2022     No results found for: GLU    LIVER PROFILE:   Recent Labs     01/08/23  2150 01/10/23  2149 01/11/23  0736   AST 21 25 24   ALT 8* 10 10   BILITOT <0.2 0.3 0.3   ALKPHOS 201* 219* 225*     PT/INR: No results for input(s): PROTIME, INR in the last 72 hours. APTT: No results for input(s): APTT in the last 72 hours.   UA:  Recent Labs     01/09/23  0001   COLORU Yellow   PHUR 7.5  7.5   WBCUA 3-5   RBCUA 0-2   MUCUS Rare*   BACTERIA Rare*   CLARITYU Clear   SPECGRAV 1.010   LEUKOCYTESUR TRACE*   UROBILINOGEN 0.2   BILIRUBINUR Negative   BLOODU Negative   GLUCOSEU Negative   AMORPHOUS Rare       Invalid input(s): ABG  Lab Results   Component Value Date    CALCIUM 9.7 01/11/2023    PHOS 2.9 08/22/2022               Electronically signed by Sara Rodriguez MD on 1/11/2023 at 4:18 PM

## 2023-01-11 NOTE — PROCEDURES
Patient: Quynh Eli    MR Number: [id number]  YOB: 1950  Date of Visit: 1/11/2023    Clinical History:  The patient is a 71y.o. years old female with possible recurrent seizures      Method: The EEG was performed utilizing the international 10/20 of electrode placements of both referential and bipolar montages. The patient was awake and drowsy through out the recording. Photic stimulation was performed. Findings: The background of the EEG showed normal alpha posterior background of 8-9 - HZ and amplitude of 20-40 UV. This background was symmetric, waxing and waning, and reactive with eye opening and closure. As the patient became drowsy, generalized diffuse slowing was seen through recording at 6-7 HZ. This generalized slowing was symmetric, non rhythmical, and continuous. No spike or sharp waves were seen. Photic stimulation did not activate EEG. Impression: This EEG  is within normal limits. There is no evidence of epileptiform discharges, focal, or lateralizing abnormalities.       Alexus Jovel MD      Board certified in clinical neurophysiology

## 2023-01-12 VITALS
DIASTOLIC BLOOD PRESSURE: 77 MMHG | BODY MASS INDEX: 37.17 KG/M2 | WEIGHT: 209.8 LBS | RESPIRATION RATE: 20 BRPM | HEIGHT: 63 IN | TEMPERATURE: 97.7 F | HEART RATE: 85 BPM | OXYGEN SATURATION: 95 % | SYSTOLIC BLOOD PRESSURE: 138 MMHG

## 2023-01-12 LAB
A/G RATIO: 1.2 (ref 1.1–2.2)
ALBUMIN SERPL-MCNC: 4.2 G/DL (ref 3.4–5)
ALP BLD-CCNC: 257 U/L (ref 40–129)
ALT SERPL-CCNC: 12 U/L (ref 10–40)
ANION GAP SERPL CALCULATED.3IONS-SCNC: 13 MMOL/L (ref 3–16)
ANTIBODY IDENTIFICATION: NORMAL
ANTIBODY IDENTIFICATION: NORMAL
AST SERPL-CCNC: 28 U/L (ref 15–37)
BASOPHILS ABSOLUTE: 0.2 K/UL (ref 0–0.2)
BASOPHILS RELATIVE PERCENT: 1.5 %
BILIRUB SERPL-MCNC: 0.4 MG/DL (ref 0–1)
BUN BLDV-MCNC: 20 MG/DL (ref 7–20)
CALCIUM SERPL-MCNC: 9.7 MG/DL (ref 8.3–10.6)
CHLORIDE BLD-SCNC: 104 MMOL/L (ref 99–110)
CO2: 23 MMOL/L (ref 21–32)
CREAT SERPL-MCNC: 0.8 MG/DL (ref 0.6–1.2)
EOSINOPHILS ABSOLUTE: 0.9 K/UL (ref 0–0.6)
EOSINOPHILS RELATIVE PERCENT: 8.2 %
GFR SERPL CREATININE-BSD FRML MDRD: >60 ML/MIN/{1.73_M2}
GLUCOSE BLD-MCNC: 144 MG/DL (ref 70–99)
HCT VFR BLD CALC: 32.4 % (ref 36–48)
HEMOGLOBIN: 10 G/DL (ref 12–16)
LYMPHOCYTES ABSOLUTE: 2.2 K/UL (ref 1–5.1)
LYMPHOCYTES RELATIVE PERCENT: 19.9 %
MCH RBC QN AUTO: 24.3 PG (ref 26–34)
MCHC RBC AUTO-ENTMCNC: 30.9 G/DL (ref 31–36)
MCV RBC AUTO: 78.5 FL (ref 80–100)
MONOCYTES ABSOLUTE: 1.2 K/UL (ref 0–1.3)
MONOCYTES RELATIVE PERCENT: 10.8 %
NEUTROPHILS ABSOLUTE: 6.6 K/UL (ref 1.7–7.7)
NEUTROPHILS RELATIVE PERCENT: 59.6 %
PDW BLD-RTO: 19.4 % (ref 12.4–15.4)
PLATELET # BLD: 377 K/UL (ref 135–450)
PMV BLD AUTO: 8.3 FL (ref 5–10.5)
POTASSIUM REFLEX MAGNESIUM: 4.2 MMOL/L (ref 3.5–5.1)
RBC # BLD: 4.12 M/UL (ref 4–5.2)
SODIUM BLD-SCNC: 140 MMOL/L (ref 136–145)
TOTAL PROTEIN: 7.7 G/DL (ref 6.4–8.2)
WBC # BLD: 11 K/UL (ref 4–11)

## 2023-01-12 PROCEDURE — 2580000003 HC RX 258: Performed by: INTERNAL MEDICINE

## 2023-01-12 PROCEDURE — 6370000000 HC RX 637 (ALT 250 FOR IP): Performed by: NURSE PRACTITIONER

## 2023-01-12 PROCEDURE — 6370000000 HC RX 637 (ALT 250 FOR IP): Performed by: INTERNAL MEDICINE

## 2023-01-12 PROCEDURE — 85025 COMPLETE CBC W/AUTO DIFF WBC: CPT

## 2023-01-12 PROCEDURE — 80053 COMPREHEN METABOLIC PANEL: CPT

## 2023-01-12 PROCEDURE — 99232 SBSQ HOSP IP/OBS MODERATE 35: CPT | Performed by: NURSE PRACTITIONER

## 2023-01-12 PROCEDURE — 2500000003 HC RX 250 WO HCPCS: Performed by: INTERNAL MEDICINE

## 2023-01-12 PROCEDURE — 36415 COLL VENOUS BLD VENIPUNCTURE: CPT

## 2023-01-12 RX ORDER — LACTOBACILLUS RHAMNOSUS GG 10B CELL
1 CAPSULE ORAL 2 TIMES DAILY WITH MEALS
Qty: 8 CAPSULE | Refills: 0 | Status: ON HOLD | OUTPATIENT
Start: 2023-01-12 | End: 2023-01-17 | Stop reason: HOSPADM

## 2023-01-12 RX ORDER — FUROSEMIDE 40 MG/1
40 TABLET ORAL DAILY
COMMUNITY
Start: 2023-01-12

## 2023-01-12 RX ORDER — NIFEDIPINE 30 MG/1
30 TABLET, EXTENDED RELEASE ORAL ONCE
Status: COMPLETED | OUTPATIENT
Start: 2023-01-12 | End: 2023-01-12

## 2023-01-12 RX ORDER — METRONIDAZOLE 500 MG/1
500 TABLET ORAL 3 TIMES DAILY
Qty: 12 TABLET | Refills: 0 | Status: ON HOLD | OUTPATIENT
Start: 2023-01-12 | End: 2023-01-17 | Stop reason: HOSPADM

## 2023-01-12 RX ORDER — NIFEDIPINE 30 MG/1
30 TABLET, FILM COATED, EXTENDED RELEASE ORAL ONCE
Status: DISCONTINUED | OUTPATIENT
Start: 2023-01-12 | End: 2023-01-12

## 2023-01-12 RX ORDER — LEVETIRACETAM 500 MG/1
500 TABLET ORAL 2 TIMES DAILY
Qty: 60 TABLET | Refills: 3 | Status: SHIPPED | OUTPATIENT
Start: 2023-01-12

## 2023-01-12 RX ORDER — CIPROFLOXACIN 500 MG/1
500 TABLET, FILM COATED ORAL 2 TIMES DAILY
Qty: 8 TABLET | Refills: 0 | Status: ON HOLD | OUTPATIENT
Start: 2023-01-12 | End: 2023-01-17 | Stop reason: HOSPADM

## 2023-01-12 RX ORDER — LEVETIRACETAM 500 MG/1
500 TABLET ORAL 2 TIMES DAILY
Status: DISCONTINUED | OUTPATIENT
Start: 2023-01-12 | End: 2023-01-12 | Stop reason: HOSPADM

## 2023-01-12 RX ORDER — NIFEDIPINE 30 MG/1
30 TABLET, FILM COATED, EXTENDED RELEASE ORAL DAILY
Qty: 30 TABLET | Refills: 5 | Status: ON HOLD | OUTPATIENT
Start: 2023-01-12 | End: 2023-01-17 | Stop reason: HOSPADM

## 2023-01-12 RX ADMIN — LEVOTHYROXINE SODIUM 88 MCG: 88 TABLET ORAL at 06:39

## 2023-01-12 RX ADMIN — METRONIDAZOLE 500 MG: 500 INJECTION, SOLUTION INTRAVENOUS at 02:18

## 2023-01-12 RX ADMIN — FLUTICASONE PROPIONATE 1 SPRAY: 50 SPRAY, METERED NASAL at 11:49

## 2023-01-12 RX ADMIN — LISINOPRIL 5 MG: 5 TABLET ORAL at 09:39

## 2023-01-12 RX ADMIN — GABAPENTIN 300 MG: 300 CAPSULE ORAL at 14:10

## 2023-01-12 RX ADMIN — NIFEDIPINE 30 MG: 30 TABLET, EXTENDED RELEASE ORAL at 14:10

## 2023-01-12 RX ADMIN — Medication 10 ML: at 09:00

## 2023-01-12 RX ADMIN — DICYCLOMINE HYDROCHLORIDE 10 MG: 10 CAPSULE ORAL at 11:50

## 2023-01-12 RX ADMIN — PANTOPRAZOLE SODIUM 40 MG: 40 TABLET, DELAYED RELEASE ORAL at 06:39

## 2023-01-12 RX ADMIN — LEVETIRACETAM 500 MG: 500 TABLET, FILM COATED ORAL at 11:50

## 2023-01-12 RX ADMIN — METOPROLOL SUCCINATE 25 MG: 25 TABLET, EXTENDED RELEASE ORAL at 09:39

## 2023-01-12 RX ADMIN — METRONIDAZOLE 500 MG: 500 INJECTION, SOLUTION INTRAVENOUS at 09:43

## 2023-01-12 RX ADMIN — Medication 1 CAPSULE: at 09:37

## 2023-01-12 RX ADMIN — DICYCLOMINE HYDROCHLORIDE 10 MG: 10 CAPSULE ORAL at 06:39

## 2023-01-12 RX ADMIN — SERTRALINE HYDROCHLORIDE 50 MG: 50 TABLET ORAL at 09:38

## 2023-01-12 RX ADMIN — GABAPENTIN 300 MG: 300 CAPSULE ORAL at 09:38

## 2023-01-12 ASSESSMENT — PAIN SCALES - GENERAL
PAINLEVEL_OUTOF10: 0

## 2023-01-12 NOTE — DISCHARGE INSTR - MEDS
Do NOT take tizanidine (zanaflex) until you have completed your antibiotics.   Do not take tizanidine until 1/17/2023

## 2023-01-12 NOTE — PROGRESS NOTES
Pt ok for discharge per MD. Discharge instructions given. IV's removed. Telemetry monitor removed and [facility] notified. No questions or concerns at this time. Transported by wheelchair to cab with all belongings.

## 2023-01-12 NOTE — PLAN OF CARE
Problem: Safety - Adult  Goal: Free from fall injury  Outcome: Progressing     Problem: Pain  Goal: Verbalizes/displays adequate comfort level or baseline comfort level  Outcome: Progressing     Problem: Discharge Planning  Goal: Discharge to home or other facility with appropriate resources  Outcome: Progressing

## 2023-01-12 NOTE — DISCHARGE SUMMARY
Hospitalist Discharge Summary    Patient ID:  Elma Guardado  [id number]  71 y.o.  1950    Admit date: 1/10/2023    Discharge date: 1/12/2023    Disposition: home    Admission Diagnoses:   Patient Active Problem List   Diagnosis    GERD (gastroesophageal reflux disease)    Environmental allergies    Chronic neck, back, & bilateral LE pain    Diverticulosis    SIRS (systemic inflammatory response syndrome) (HCC)    Acute respiratory failure with hypoxia (HCC)    Obesity    Hypothyroidism    Acute encephalopathy    Septic shock (HCC)    Acute renal failure (ARF) (HCC)    Acute on chronic diastolic CHF (congestive heart failure) (HCC)    Benign essential HTN    Anxiety and depression    Elevated LFTs    Acute hyperglycemia    Well controlled type 2 diabetes mellitus (HCC)    Hypovitaminosis D    Possible/questionable hx of seizures    Right hand weakness & numbness    Acute-on-chronic low back pain with radicular symptoms    Trimalleolar fracture of left ankle, closed, initial encounter    Ankle fracture, bimalleolar, closed, left, initial encounter    Acute respiratory failure with hypoxemia (HCC)    Closed fracture of left ankle    Aspiration pneumonitis (HCC)    Acute respiratory failure (HCC)    OD (overdose of drug)    Drug ingestion    Toxic encephalopathy    History of depression    Misuse of prescription only drugs    GI bleed    Elevated lactic acid level    Hyperkalemia    Leukocytosis    Thrombocytosis    High anion gap metabolic acidosis    Shock ([address]    NSTEMI (non-ST elevated myocardial infarction) ([facility])    Abnormal CXR    PSVT (paroxysmal supraventricular tachycardia) ([facility])    PAF (paroxysmal atrial fibrillation) ([address]    Community acquired pneumonia of left lower lobe of lung    Acute focal neurological deficit    Diverticulitis    Diverticulitis of colon    Acute hyponatremia    COPD without exacerbation (HCC)    Generalized abdominal pain    SOB (shortness of breath) Delusions ([address]    Leg edema    Sepsis ([address]    Chest pain    Frequent falls    Seizure-like activity ([address]    Acute hypoxemic respiratory failure (HCC)    Systolic murmur    Seizure ([facility]    Acute diverticulitis       Discharge Diagnoses: Principal Problem:    Seizure ([facility]  Active Problems:    Seizure-like activity ([address]    Acute diverticulitis    GERD (gastroesophageal reflux disease)    Obesity    Hypothyroidism    Benign essential HTN  Resolved Problems:    * No resolved hospital problems. *      Code Status:  Full Code    Condition:  Stable    Discharge Diet: Diet:  ADULT DIET; Regular; 4 carb choices (60 gm/meal); Low Fat/Low Chol/High Fiber/2 gm Na; Low Sodium (2 gm)    PCP to do list:  pt will now be on keppra bid, follow up on BP control    Hospital Course: 78yo WF with PMhx sig for DM2, CKDII, depression presents as transfer from [place] for possible seizure. Pt had abd pain severe at home, then had two seizure-like episodes. Both daughters have seizures (one general due to cerebral palsy and the other absence). Pt was incontinent of urine and had some frothing at the mouth. Pt apparently has had these sx before but declined further workup at [facility]. She is agreeable to workup now. EEG and MRI negative, however event with seizure occurred a few days ago. Pt had been started on keppra 500mg bid and plan is for her to remain on this. Discharge Medications:   Current Discharge Medication List        Current Discharge Medication List        Current Discharge Medication List        CONTINUE these medications which have NOT CHANGED    Details   metoprolol succinate (TOPROL XL) 25 MG extended release tablet Take 1 tablet by mouth daily  Qty: 30 tablet, Refills: 3      predniSONE (DELTASONE) 10 MG tablet Take 2 tabs by mouth once daily for 4 days. Then take 1 tab by mouth once daily for 4 days. Then take 1/2 tab by mouth once daily for 4 days. Then stop.  (14tabs)  Qty: 14 tablet, Refills: 0 furosemide (LASIX) 40 MG tablet Take 1 tablet by mouth daily  Qty: 30 tablet, Refills: 1      levothyroxine (SYNTHROID) 88 MCG tablet Take 88 mcg by mouth Daily      hydrOXYzine HCl (ATARAX) 10 MG tablet Take 10 mg by mouth nightly as needed for Itching      Ruxolitinib Phosphate (OPZELURA) 1.5 % CREA APPLY TO RASH TWICE A DAY AS TOLERATED. STOP USE WHEN RASH RESOLVES      mupirocin (BACTROBAN) 2 % ointment APPLY TO OPEN AREAS UP TO TWICE A DAY AFTER CLEANSING, COVER WITH BANDAGE.      lisinopril (PRINIVIL;ZESTRIL) 5 MG tablet Take 5 mg by mouth daily Take one tab by mouth daily      fluocinolone acetonide (SYNALAR) 0.01 % external solution Apply topically daily Apply to affected scalp for up to twice daily Mon-Fri off sat & sun stop when rash/itch resolves      sertraline (ZOLOFT) 50 MG tablet Take 50 mg by mouth daily RX take one tab by mouth once a day with food      Plecanatide (TRULANCE) 3 MG TABS Take by mouth daily Take 1 tab by mouth daily: treatment for constipation or IBS      gabapentin (NEURONTIN) 300 MG capsule Take 300 mg by mouth 3 times daily. mirtazapine (REMERON) 7.5 MG tablet Take 7.5 mg by mouth nightly For sleep      nystatin (MYCOSTATIN) 139875 UNIT/GM cream Apply topically 2 times daily Apply topically 2 times daily. Apply small amount To inner thighs      sucralfate (CARAFATE) 1 GM tablet Take 1 g by mouth 4 times daily      triamcinolone (KENALOG) 0.1 % ointment Apply topically 2 times daily Apply topically 2 times daily.  For 2 weeks with 1 week break stop when rash resolves      simvastatin (ZOCOR) 20 MG tablet Take 20 mg by mouth nightly      tiZANidine (ZANAFLEX) 4 MG tablet Take 8 mg by mouth daily as needed      ketoconazole (NIZORAL) 2 % shampoo Apply topically daily as needed for Itching Apply to scalp/behind ears 2-3 times a week, leave in 5-10 min then rinse when clear use one time a month for maintenance      fluticasone (FLONASE) 50 MCG/ACT nasal spray 1 spray by Nasal route daily      ondansetron (ZOFRAN) 4 MG tablet Take 4 mg by mouth every 8 hours as needed for Nausea or Vomiting      dicyclomine (BENTYL) 10 MG capsule Take 10 mg by mouth 4 times daily (before meals and nightly)      pantoprazole (PROTONIX) 40 MG tablet Take 1 tablet by mouth 2 times daily (before meals)  Qty: 60 tablet, Refills: 0           Current Discharge Medication List        STOP taking these medications       NIFEdipine (ADALAT CC) 30 MG extended release tablet Comments:   Reason for Stopping:                   Procedures: EEG - no acute evidence of seizure    Assessment on Discharge: Stable, improved     Discharge ROS:  A complete review of systems was asked and negative except for she feels better, denies abd pain, no confusion or seizure activity    Discharge Exam:  BP (!) 154/68   Pulse 98   Temp 98.6 °F (37 °C) (Oral)   Resp 18   Ht 5' 2.5\" (1.588 m)   Wt 209 lb 12.8 oz (95.2 kg)   SpO2 94%   BMI 37.76 kg/m²     Gen: NAD  HEENT: NC/AT, moist mucous membranes, no oropharyngeal erythema or exudate  Neck: supple, trachea midline, no anterior cervical or SC LAD  Heart:  Normal s1/s2, RRR, no murmurs, gallops, or rubs. no leg edema  Lungs:  diminished bilaterally, no wheeze,no rales or rhonchi, no use of accessory muscles  Abd: bowel sounds present, soft, nontender, nondistended, no masses  Extrem:  No clubbing, cyanosis,  no edema  Skin: no lesion or masses  Psych:  A & O x3  Neuro: grossly intact, moves all four extremities    Pertinent Studies During Hospital Stay:  Radiology:  CT HEAD WO CONTRAST    Result Date: 1/8/2023  EXAMINATION: CT OF THE HEAD WITHOUT CONTRAST  1/8/2023 11:14 pm TECHNIQUE: CT of the head was performed without the administration of intravenous contrast. Automated exposure control, iterative reconstruction, and/or weight based adjustment of the mA/kV was utilized to reduce the radiation dose to as low as reasonably achievable. COMPARISON: None.  HISTORY: ORDERING SYSTEM PROVIDED HISTORY: seiozure like activity. ams TECHNOLOGIST PROVIDED HISTORY: Has a \"code stroke\" or \"stroke alert\" been called? ->No Reason for exam:->seiozure like activity. ams Decision Support Exception - unselect if not a suspected or confirmed emergency medical condition->Emergency Medical Condition (MA) Reason for Exam: seizure like activity. ams FINDINGS: BRAIN/VENTRICLES: The ventricles are borderline enlarged and there is diffuse mild prominence of the cortical sulci. There is mild periventricular low density bilaterally. No intracranial hemorrhage or edema is seen. There is no extra-axial fluid collection or mass ORBITS: The visualized portion of the orbits demonstrate no acute abnormality. SINUSES: The visualized paranasal sinuses and mastoid air cells demonstrate no acute abnormality. SOFT TISSUES/SKULL:  No acute abnormality of the visualized skull or soft tissues. Mild atrophy and mild chronic microischemic changes scattered in the deep white matter which is unchanged with no acute abnormality seen. CT ABDOMEN PELVIS W IV CONTRAST Additional Contrast? None    Result Date: 1/8/2023  EXAMINATION: CT OF THE ABDOMEN AND PELVIS WITH CONTRAST; CTA OF THE CHEST 1/8/2023 11:15 pm TECHNIQUE: CT of the abdomen and pelvis was performed with the administration of intravenous contrast. Multiplanar reformatted images are provided for review. Automated exposure control, iterative reconstruction, and/or weight based adjustment of the mA/kV was utilized to reduce the radiation dose to as low as reasonably achievable.; CTA of the chest was performed after the administration of intravenous contrast.  Multiplanar reformatted images are provided for review. MIP images are provided for review. Automated exposure control, iterative reconstruction, and/or weight based adjustment of the mA/kV was utilized to reduce the radiation dose to as low as reasonably achievable.  COMPARISON: 08/21/2022 HISTORY: [id number] PROVIDED HISTORY: diarrhea. TECHNOLOGIST PROVIDED HISTORY: Reason for exam:->diarrhea. Additional Contrast?->None Decision Support Exception - unselect if not a suspected or confirmed emergency medical condition->Emergency Medical Condition (MA) Reason for Exam: diarrhea.; ORDERING SYSTEM PROVIDED HISTORY: passed out today? seizure like activity TECHNOLOGIST PROVIDED HISTORY: Reason for exam:->passed out today? seizure like activity Decision Support Exception - unselect if not a suspected or confirmed emergency medical condition->Emergency Medical Condition (MA) Reason for Exam: passed out today? seizure like activity FINDINGS: CHEST CT: Pulmonary Arteries: Pulmonary arteries are adequately opacified for evaluation. Some streak artifact is noted projecting over the main pulmonary arteries, however there is no convincing evidence for acute pulmonary embolism. Main pulmonary artery is normal in caliber. Mediastinum: No evidence of mediastinal lymphadenopathy. The heart and pericardium demonstrate no acute abnormality. There is no acute abnormality of the thoracic aorta. Lungs/pleura: No focal consolidation or pulmonary edema. No evidence of pleural effusion or pneumothorax. Pleuroparenchymal calcification in the lung apices. Minimal ground-glass in the dependent lingula and left lower lobe. Soft Tissues/Bones: No acute bone or soft tissue abnormality. ABDOMEN PELVIS: Organs: Cirrhotic liver morphology with small caliber varices and splenic enlargement again noted. No focal liver lesion identified. Cholecystectomy. The pancreas, spleen, adrenals and kidneys reveal no acute findings. GI/Bowel: No evidence for bowel obstruction. Diverticulosis. Mild wall thickening of the sigmoid colon with small volume fluid and fat stranding is noted consistent with diverticulitis. No evidence for gross perforation or abscess formation. Mild amount of liquid stool in the proximal colon. Pelvis:  The bladder reveals no acute findings. Peritoneum/Retroperitoneum: No free air or free fluid. The aorta is normal in caliber. The visceral branches are patent. No lymphadenopathy. Bones/Soft Tissues: No abnormality identified. Chronic compression deformities of L2 and L4. Small fat containing umbilical hernia. *Unless otherwise specified, incidental findings do not require dedicated imaging follow-up. 1.  No evidence for acute pulmonary embolism. Minimal dependent ground-glass in the posterior lingula and left lower lobe may in part represent atelectasis. Developing inflammatory process or infection is considered less likely. 2.  Sigmoid diverticulitis without gross perforation, abscess or obstruction. Follow-up imaging or colonoscopy is recommended to ensure resolution. XR CHEST PORTABLE    Result Date: 1/8/2023  EXAMINATION: ONE XRAY VIEW OF THE CHEST 1/8/2023 10:22 pm COMPARISON: 09/08/2022 HISTORY: ORDERING SYSTEM PROVIDED HISTORY: AMS TECHNOLOGIST PROVIDED HISTORY: Reason for exam:->AMS Reason for Exam: ams/low blood pressure FINDINGS: The heart is less prominent. The pulmonary vessels are less prominent. There are mild increased interstitial markings throughout which is more prominent. No consolidation or effusion is seen. Slight decrease in the heart size slowly resolving central pulmonary congestion or residual pulmonary artery hypertension Mild increased interstitial throughout which is prominent and could represent early interstitial pneumonitis. CT CHEST PULMONARY EMBOLISM W CONTRAST    Result Date: 1/8/2023  EXAMINATION: CT OF THE ABDOMEN AND PELVIS WITH CONTRAST; CTA OF THE CHEST 1/8/2023 11:15 pm TECHNIQUE: CT of the abdomen and pelvis was performed with the administration of intravenous contrast. Multiplanar reformatted images are provided for review.  Automated exposure control, iterative reconstruction, and/or weight based adjustment of the mA/kV was utilized to reduce the radiation dose to as low as reasonably achievable.; CTA of the chest was performed after the administration of intravenous contrast.  Multiplanar reformatted images are provided for review. MIP images are provided for review. Automated exposure control, iterative reconstruction, and/or weight based adjustment of the mA/kV was utilized to reduce the radiation dose to as low as reasonably achievable. COMPARISON: 08/21/2022 HISTORY: ORDERING SYSTEM PROVIDED HISTORY: diarrhea. TECHNOLOGIST PROVIDED HISTORY: Reason for exam:->diarrhea. Additional Contrast?->None Decision Support Exception - unselect if not a suspected or confirmed emergency medical condition->Emergency Medical Condition (MA) Reason for Exam: diarrhea.; ORDERING SYSTEM PROVIDED HISTORY: passed out today? seizure like activity TECHNOLOGIST PROVIDED HISTORY: Reason for exam:->passed out today? seizure like activity Decision Support Exception - unselect if not a suspected or confirmed emergency medical condition->Emergency Medical Condition (MA) Reason for Exam: passed out today? seizure like activity FINDINGS: CHEST CT: Pulmonary Arteries: Pulmonary arteries are adequately opacified for evaluation. Some streak artifact is noted projecting over the main pulmonary arteries, however there is no convincing evidence for acute pulmonary embolism. Main pulmonary artery is normal in caliber. Mediastinum: No evidence of mediastinal lymphadenopathy. The heart and pericardium demonstrate no acute abnormality. There is no acute abnormality of the thoracic aorta. Lungs/pleura: No focal consolidation or pulmonary edema. No evidence of pleural effusion or pneumothorax. Pleuroparenchymal calcification in the lung apices. Minimal ground-glass in the dependent lingula and left lower lobe. Soft Tissues/Bones: No acute bone or soft tissue abnormality. ABDOMEN PELVIS: Organs: Cirrhotic liver morphology with small caliber varices and splenic enlargement again noted.   No focal liver lesion identified. Cholecystectomy. The pancreas, spleen, adrenals and kidneys reveal no acute findings. GI/Bowel: No evidence for bowel obstruction. Diverticulosis. Mild wall thickening of the sigmoid colon with small volume fluid and fat stranding is noted consistent with diverticulitis. No evidence for gross perforation or abscess formation. Mild amount of liquid stool in the proximal colon. Pelvis: The bladder reveals no acute findings. Peritoneum/Retroperitoneum: No free air or free fluid. The aorta is normal in caliber. The visceral branches are patent. No lymphadenopathy. Bones/Soft Tissues: No abnormality identified. Chronic compression deformities of L2 and L4. Small fat containing umbilical hernia. *Unless otherwise specified, incidental findings do not require dedicated imaging follow-up. 1.  No evidence for acute pulmonary embolism. Minimal dependent ground-glass in the posterior lingula and left lower lobe may in part represent atelectasis. Developing inflammatory process or infection is considered less likely. 2.  Sigmoid diverticulitis without gross perforation, abscess or obstruction. Follow-up imaging or colonoscopy is recommended to ensure resolution. MRI BRAIN WO CONTRAST    Result Date: 1/11/2023  EXAMINATION: MRI OF THE BRAIN WITHOUT CONTRAST  1/11/2023 2:51 pm TECHNIQUE: Multiplanar multisequence MRI of the brain was performed without the administration of intravenous contrast. COMPARISON: None. HISTORY: ORDERING SYSTEM PROVIDED HISTORY: possible seizure TECHNOLOGIST PROVIDED HISTORY: Reason for exam:->possible seizure Initial evaluation. FINDINGS: INTRACRANIAL STRUCTURES/VENTRICLES: There is no acute infarct. The hippocampal structures are symmetric. No abnormal T2 FLAIR signal within the medial temporal lobes. No mass effect or midline shift. No evidence of an acute intracranial hemorrhage.   Areas of T2 FLAIR hyperintensity are seen in the periventricular and subcortical white matter, which are nonspecific, but may represent chronic microvascular ischemic change. There is minimal global parenchymal volume loss. Otherwise, the ventricles and sulci are normal in size and configuration. The sellar/suprasellar regions appear unremarkable. The normal signal voids within the major intracranial vessels appear maintained. ORBITS: The visualized portion of the orbits demonstrate no acute abnormality. SINUSES: The visualized paranasal sinuses and mastoid air cells demonstrate no acute abnormality. BONES/SOFT TISSUES: The bone marrow signal intensity appears normal. The soft tissues demonstrate no acute abnormality. 1. No acute intracranial abnormality. No acute infarct. 2. Minimal global parenchymal volume loss with minimal chronic microvascular ischemic changes.             Last Labs on Discharge:     Recent Results (from the past 24 hour(s))   Comprehensive Metabolic Panel w/ Reflex to MG    Collection Time: 01/11/23  7:36 AM   Result Value Ref Range    Sodium 136 136 - 145 mmol/L    Potassium reflex Magnesium 4.1 3.5 - 5.1 mmol/L    Chloride 101 99 - 110 mmol/L    CO2 24 21 - 32 mmol/L    Anion Gap 11 3 - 16    Glucose 136 (H) 70 - 99 mg/dL    BUN 24 (H) 7 - 20 mg/dL    Creatinine 1.3 (H) 0.6 - 1.2 mg/dL    Est, Glom Filt Rate 44 (A) >60    Calcium 9.7 8.3 - 10.6 mg/dL    Total Protein 6.6 6.4 - 8.2 g/dL    Albumin 3.4 3.4 - 5.0 g/dL    Albumin/Globulin Ratio 1.1 1.1 - 2.2    Total Bilirubin 0.3 0.0 - 1.0 mg/dL    Alkaline Phosphatase 225 (H) 40 - 129 U/L    ALT 10 10 - 40 U/L    AST 24 15 - 37 U/L   CBC auto differential    Collection Time: 01/11/23  7:36 AM   Result Value Ref Range    WBC 7.2 4.0 - 11.0 K/uL    RBC 3.46 (L) 4.00 - 5.20 M/uL    Hemoglobin 8.6 (L) 12.0 - 16.0 g/dL    Hematocrit 27.4 (L) 36.0 - 48.0 %    MCV 79.4 (L) 80.0 - 100.0 fL    MCH 24.8 (L) 26.0 - 34.0 pg    MCHC 31.2 31.0 - 36.0 g/dL    RDW 19.1 (H) 12.4 - 15.4 %    Platelets 065 222 - 087 K/uL    MPV 8.5 5.0 - 10.5 fL    Neutrophils % 51.1 %    Lymphocytes % 24.5 %    Monocytes % 11.6 %    Eosinophils % 10.9 %    Basophils % 1.9 %    Neutrophils Absolute 3.7 1.7 - 7.7 K/uL    Lymphocytes Absolute 1.8 1.0 - 5.1 K/uL    Monocytes Absolute 0.8 0.0 - 1.3 K/uL    Eosinophils Absolute 0.8 (H) 0.0 - 0.6 K/uL    Basophils Absolute 0.1 0.0 - 0.2 K/uL   Troponin    Collection Time: 01/11/23  7:36 AM   Result Value Ref Range    Troponin 0.03 (H) <0.01 ng/mL   Iron and TIBC    Collection Time: 01/11/23  7:36 AM   Result Value Ref Range    Iron 52 37 - 145 ug/dL    TIBC 344 260 - 445 ug/dL    Iron Saturation 15 15 - 50 %   Ferritin    Collection Time: 01/11/23  7:36 AM   Result Value Ref Range    Ferritin 30.7 15.0 - 150.0 ng/mL         Follow up: with Lina Bolivar DO    Note that 35 minutes was spent in preparing discharge papers, discussing discharge with patient, medication review, etc.    Thank you Lina Bolivar DO for the opportunity to be involved in this patient's care. If you have any questions or concerns please feel free to contact me at [phone].       Electronically signed by Annette Stephenson MD on 1/12/2023 at 7:27 AM

## 2023-01-12 NOTE — PROGRESS NOTES
Karen Suarez  Neurology Follow-up  [facility] Neurology    Date of Service: 1/12/2023    Subjective:   CC: Follow up today regarding: Possible seizure    Events noted. Chart and lab reviewed. Feels well. No further seizure-like activity. ROS : A 10-12 system review obtained and updated today and is unremarkable except as mentioned  in my interval history. Past medical history, social history, medication and family history reviewed. Objective:  Exam:   Constitutional:   Vitals:    01/12/23 0007 01/12/23 0645 01/12/23 0807 01/12/23 0939   BP: (!) 164/68 (!) 154/68 (!) 160/81 (!) 160/81   Pulse: (!) 106 98 91    Resp: 18  18    Temp:  98.6 °F (37 °C) 98.6 °F (37 °C)    TempSrc:  Oral Oral    SpO2:       Weight:       Height:         General appearance:  Normal development and appear in no acute distress. Mental Status:   Oriented to person, place, problem, and time. Memory: Good immediate recall. Intact remote memory  Normal attention span and concentration. Language: intact naming, repeating and fluency   Good fund of Knowledge. Cranial Nerves:   II:   Pupils: equal, round, reactive to light  III,IV,VI: Extra Ocular Movements are intact. No nystagmus  V: Facial sensation is intact  VII: Facial strength and movements: intact and symmetric  XII: Tongue movements are normal  Musculoskeletal: 5/5 in all 4 extremities. Tone: Normal tone. Reflexes: Symmetric 2+ in both arms and legs.   Coordination: no pronator drift, no dysmetria with FNF  Sensation: normal.  Gait/Posture: steady gait        Data:  LABS:   Lab Results   Component Value Date/Time     01/12/2023 07:32 AM    K 4.2 01/12/2023 07:32 AM     01/12/2023 07:32 AM    CO2 23 01/12/2023 07:32 AM    BUN 20 01/12/2023 07:32 AM    CREATININE 0.8 01/12/2023 07:32 AM    GFRAA 38 09/22/2022 04:12 PM    GFRAA >60 03/06/2011 06:12 AM    LABGLOM >60 01/12/2023 07:32 AM    GLUCOSE 144 01/12/2023 07:32 AM    PHOS 2.9 08/22/2022 04:10 AM    MG 1.80 09/08/2022 06:36 AM    CALCIUM 9.7 01/12/2023 07:32 AM     Lab Results   Component Value Date/Time    WBC 11.0 01/12/2023 07:31 AM    RBC 4.12 01/12/2023 07:31 AM    HGB 10.0 01/12/2023 07:31 AM    HCT 32.4 01/12/2023 07:31 AM    MCV 78.5 01/12/2023 07:31 AM    RDW 19.4 01/12/2023 07:31 AM     01/12/2023 07:31 AM     Lab Results   Component Value Date    INR 0.99 06/27/2022    PROTIME 12.9 06/27/2022       Neuroimaging was independently reviewed by me and discussed results with the patient  I reviewed blood testing and other test results and discussed results with the patient      Impression:    Possible seizure - witnessed at home by family. CT head negative. No further episodes since admission. MRI of brain negative for acute findings. EEG without evidence of seizure activity. Given she has had multiple spells, will start on AED. Diverticulitis  DM2  HTN  HLD       Recommendation    Started on Keppra 500 mg BID and should continue at discharge. Side effects discussed with patient. Seizure precautions. Monitor on tele. Continue ASA, statin. Continue home BP meds. Abx for diverticulitis  Improved glycemic control. SSI coverage while inpatient. PT/OT/SLP     NO DRIVING for 3 months discussed with the patient and patient verbalized understanding. She may be discharged from a neurological perspective when medically stable. She should f/u with  Neurology EMU. Carmenza Barrera CNP      This dictation was generated by voice recognition computer software. Although all attempts are made to edit the dictation for accuracy, there may be errors in the transcription that are not intended.

## 2023-01-13 ENCOUNTER — APPOINTMENT (OUTPATIENT)
Dept: GENERAL RADIOLOGY | Age: 73
DRG: 871 | End: 2023-01-13
Payer: MEDICARE

## 2023-01-13 ENCOUNTER — APPOINTMENT (OUTPATIENT)
Dept: CT IMAGING | Age: 73
DRG: 871 | End: 2023-01-13
Payer: MEDICARE

## 2023-01-13 ENCOUNTER — HOSPITAL ENCOUNTER (INPATIENT)
Age: 73
LOS: 4 days | Discharge: HOME HEALTH | DRG: 871 | End: 2023-01-17
Attending: STUDENT IN AN ORGANIZED HEALTH CARE EDUCATION/TRAINING PROGRAM | Admitting: HOSPITALIST
Payer: MEDICARE

## 2023-01-13 DIAGNOSIS — K57.32 DIVERTICULITIS OF COLON: ICD-10-CM

## 2023-01-13 DIAGNOSIS — A41.9 SEPTIC SHOCK (HCC): Primary | ICD-10-CM

## 2023-01-13 DIAGNOSIS — I50.9 ACUTE ON CHRONIC CONGESTIVE HEART FAILURE, UNSPECIFIED HEART FAILURE TYPE (HCC): ICD-10-CM

## 2023-01-13 DIAGNOSIS — N17.9 ACUTE KIDNEY INJURY (HCC): ICD-10-CM

## 2023-01-13 DIAGNOSIS — N30.00 ACUTE CYSTITIS WITHOUT HEMATURIA: ICD-10-CM

## 2023-01-13 DIAGNOSIS — R65.21 SEPTIC SHOCK (HCC): Primary | ICD-10-CM

## 2023-01-13 LAB
A/G RATIO: 1.1 (ref 1.1–2.2)
ALBUMIN SERPL-MCNC: 3.4 G/DL (ref 3.4–5)
ALP BLD-CCNC: 220 U/L (ref 40–129)
ALT SERPL-CCNC: 28 U/L (ref 10–40)
ANION GAP SERPL CALCULATED.3IONS-SCNC: 14 MMOL/L (ref 3–16)
APTT: 36 SEC (ref 23–34.3)
AST SERPL-CCNC: 85 U/L (ref 15–37)
BACTERIA: ABNORMAL /HPF
BASE EXCESS VENOUS: -6.8 MMOL/L (ref -3–3)
BASE EXCESS VENOUS: -7.7 MMOL/L (ref -3–3)
BASOPHILS ABSOLUTE: 0.1 K/UL (ref 0–0.2)
BASOPHILS RELATIVE PERCENT: 1 %
BILIRUB SERPL-MCNC: 0.4 MG/DL (ref 0–1)
BILIRUBIN URINE: NEGATIVE
BLOOD, URINE: NEGATIVE
BUN BLDV-MCNC: 34 MG/DL (ref 7–20)
CALCIUM SERPL-MCNC: 8.6 MG/DL (ref 8.3–10.6)
CARBOXYHEMOGLOBIN: 2 % (ref 0–1.5)
CARBOXYHEMOGLOBIN: 3.1 % (ref 0–1.5)
CHLORIDE BLD-SCNC: 101 MMOL/L (ref 99–110)
CLARITY: CLEAR
CO2: 18 MMOL/L (ref 21–32)
COLOR: YELLOW
CREAT SERPL-MCNC: 3.5 MG/DL (ref 0.6–1.2)
EOSINOPHILS ABSOLUTE: 0.2 K/UL (ref 0–0.6)
EOSINOPHILS RELATIVE PERCENT: 1.4 %
EPITHELIAL CELLS, UA: ABNORMAL /HPF (ref 0–5)
GFR SERPL CREATININE-BSD FRML MDRD: 13 ML/MIN/{1.73_M2}
GLUCOSE BLD-MCNC: 179 MG/DL (ref 70–99)
GLUCOSE BLD-MCNC: 234 MG/DL (ref 70–99)
GLUCOSE URINE: NEGATIVE MG/DL
HCO3 VENOUS: 16.8 MMOL/L (ref 23–29)
HCO3 VENOUS: 18.5 MMOL/L (ref 23–29)
HCT VFR BLD CALC: 28.5 % (ref 36–48)
HEMOGLOBIN: 8.8 G/DL (ref 12–16)
INFLUENZA A: NOT DETECTED
INFLUENZA B: NOT DETECTED
INR BLD: 1.37 (ref 0.87–1.14)
KETONES, URINE: NEGATIVE MG/DL
LACTIC ACID, SEPSIS: 1.3 MMOL/L (ref 0.4–1.9)
LACTIC ACID, SEPSIS: 2.2 MMOL/L (ref 0.4–1.9)
LACTIC ACID: 1.5 MMOL/L (ref 0.4–2)
LEUKOCYTE ESTERASE, URINE: ABNORMAL
LYMPHOCYTES ABSOLUTE: 2.8 K/UL (ref 1–5.1)
LYMPHOCYTES RELATIVE PERCENT: 19.8 %
MCH RBC QN AUTO: 25.3 PG (ref 26–34)
MCHC RBC AUTO-ENTMCNC: 30.9 G/DL (ref 31–36)
MCV RBC AUTO: 81.9 FL (ref 80–100)
METHEMOGLOBIN VENOUS: 0.1 %
METHEMOGLOBIN VENOUS: 0.2 %
MICROSCOPIC EXAMINATION: YES
MONOCYTES ABSOLUTE: 1.1 K/UL (ref 0–1.3)
MONOCYTES RELATIVE PERCENT: 7.9 %
MUCUS: ABNORMAL /LPF
NEUTROPHILS ABSOLUTE: 9.7 K/UL (ref 1.7–7.7)
NEUTROPHILS RELATIVE PERCENT: 69.9 %
NITRITE, URINE: NEGATIVE
O2 CONTENT, VEN: 14 VOL %
O2 SAT, VEN: 95 %
O2 SAT, VEN: 98 %
O2 THERAPY: ABNORMAL
O2 THERAPY: ABNORMAL
PCO2, VEN: 30.7 MMHG (ref 40–50)
PCO2, VEN: 35.9 MMHG (ref 40–50)
PDW BLD-RTO: 20.3 % (ref 12.4–15.4)
PERFORMED ON: ABNORMAL
PH UA: 6.5 (ref 5–8)
PH VENOUS: 7.33 (ref 7.35–7.45)
PH VENOUS: 7.36 (ref 7.35–7.45)
PLATELET # BLD: 335 K/UL (ref 135–450)
PMV BLD AUTO: 9.3 FL (ref 5–10.5)
PO2, VEN: 125.3 MMHG (ref 25–40)
PO2, VEN: 76.9 MMHG (ref 25–40)
POTASSIUM REFLEX MAGNESIUM: 5.2 MMOL/L (ref 3.5–5.1)
PRO-BNP: 1726 PG/ML (ref 0–124)
PROTEIN UA: ABNORMAL MG/DL
PROTHROMBIN TIME: 16.7 SEC (ref 11.7–14.5)
RBC # BLD: 3.48 M/UL (ref 4–5.2)
RBC UA: ABNORMAL /HPF (ref 0–4)
RENAL EPITHELIAL, UA: ABNORMAL /HPF (ref 0–1)
SARS-COV-2 RNA, RT PCR: NOT DETECTED
SODIUM BLD-SCNC: 133 MMOL/L (ref 136–145)
SPECIFIC GRAVITY UA: 1.02 (ref 1–1.03)
TCO2 CALC VENOUS: 18 MMOL/L
TCO2 CALC VENOUS: 20 MMOL/L
TOTAL PROTEIN: 6.6 G/DL (ref 6.4–8.2)
TROPONIN: 0.02 NG/ML
TROPONIN: 0.03 NG/ML
URINE TYPE: ABNORMAL
UROBILINOGEN, URINE: 0.2 E.U./DL
WBC # BLD: 13.9 K/UL (ref 4–11)
WBC UA: ABNORMAL /HPF (ref 0–5)

## 2023-01-13 PROCEDURE — 2580000003 HC RX 258: Performed by: STUDENT IN AN ORGANIZED HEALTH CARE EDUCATION/TRAINING PROGRAM

## 2023-01-13 PROCEDURE — 81001 URINALYSIS AUTO W/SCOPE: CPT

## 2023-01-13 PROCEDURE — 85610 PROTHROMBIN TIME: CPT

## 2023-01-13 PROCEDURE — 85025 COMPLETE CBC W/AUTO DIFF WBC: CPT

## 2023-01-13 PROCEDURE — 36415 COLL VENOUS BLD VENIPUNCTURE: CPT

## 2023-01-13 PROCEDURE — 80053 COMPREHEN METABOLIC PANEL: CPT

## 2023-01-13 PROCEDURE — 2580000003 HC RX 258: Performed by: EMERGENCY MEDICINE

## 2023-01-13 PROCEDURE — 96366 THER/PROPH/DIAG IV INF ADDON: CPT

## 2023-01-13 PROCEDURE — 6360000002 HC RX W HCPCS: Performed by: STUDENT IN AN ORGANIZED HEALTH CARE EDUCATION/TRAINING PROGRAM

## 2023-01-13 PROCEDURE — 2580000003 HC RX 258: Performed by: HOSPITALIST

## 2023-01-13 PROCEDURE — 96365 THER/PROPH/DIAG IV INF INIT: CPT

## 2023-01-13 PROCEDURE — 71045 X-RAY EXAM CHEST 1 VIEW: CPT

## 2023-01-13 PROCEDURE — 87636 SARSCOV2 & INF A&B AMP PRB: CPT

## 2023-01-13 PROCEDURE — 83880 ASSAY OF NATRIURETIC PEPTIDE: CPT

## 2023-01-13 PROCEDURE — 6360000002 HC RX W HCPCS: Performed by: HOSPITALIST

## 2023-01-13 PROCEDURE — 2500000003 HC RX 250 WO HCPCS: Performed by: EMERGENCY MEDICINE

## 2023-01-13 PROCEDURE — 99285 EMERGENCY DEPT VISIT HI MDM: CPT

## 2023-01-13 PROCEDURE — 2700000000 HC OXYGEN THERAPY PER DAY

## 2023-01-13 PROCEDURE — 82803 BLOOD GASES ANY COMBINATION: CPT

## 2023-01-13 PROCEDURE — 36556 INSERT NON-TUNNEL CV CATH: CPT

## 2023-01-13 PROCEDURE — 94761 N-INVAS EAR/PLS OXIMETRY MLT: CPT

## 2023-01-13 PROCEDURE — 83605 ASSAY OF LACTIC ACID: CPT

## 2023-01-13 PROCEDURE — 85730 THROMBOPLASTIN TIME PARTIAL: CPT

## 2023-01-13 PROCEDURE — 2000000000 HC ICU R&B

## 2023-01-13 PROCEDURE — 51702 INSERT TEMP BLADDER CATH: CPT

## 2023-01-13 PROCEDURE — 93005 ELECTROCARDIOGRAM TRACING: CPT | Performed by: STUDENT IN AN ORGANIZED HEALTH CARE EDUCATION/TRAINING PROGRAM

## 2023-01-13 PROCEDURE — 2500000003 HC RX 250 WO HCPCS: Performed by: STUDENT IN AN ORGANIZED HEALTH CARE EDUCATION/TRAINING PROGRAM

## 2023-01-13 PROCEDURE — 6370000000 HC RX 637 (ALT 250 FOR IP): Performed by: HOSPITALIST

## 2023-01-13 PROCEDURE — 6360000004 HC RX CONTRAST MEDICATION: Performed by: STUDENT IN AN ORGANIZED HEALTH CARE EDUCATION/TRAINING PROGRAM

## 2023-01-13 PROCEDURE — 84484 ASSAY OF TROPONIN QUANT: CPT

## 2023-01-13 PROCEDURE — 74177 CT ABD & PELVIS W/CONTRAST: CPT

## 2023-01-13 PROCEDURE — 70450 CT HEAD/BRAIN W/O DYE: CPT

## 2023-01-13 PROCEDURE — 87040 BLOOD CULTURE FOR BACTERIA: CPT

## 2023-01-13 RX ORDER — GABAPENTIN 300 MG/1
300 CAPSULE ORAL 3 TIMES DAILY
Status: DISCONTINUED | OUTPATIENT
Start: 2023-01-13 | End: 2023-01-17 | Stop reason: HOSPADM

## 2023-01-13 RX ORDER — PANTOPRAZOLE SODIUM 40 MG/1
40 TABLET, DELAYED RELEASE ORAL
Status: DISCONTINUED | OUTPATIENT
Start: 2023-01-14 | End: 2023-01-17 | Stop reason: HOSPADM

## 2023-01-13 RX ORDER — ACETAMINOPHEN 325 MG/1
650 TABLET ORAL EVERY 6 HOURS PRN
Status: DISCONTINUED | OUTPATIENT
Start: 2023-01-13 | End: 2023-01-17 | Stop reason: HOSPADM

## 2023-01-13 RX ORDER — SUCRALFATE 1 G/1
1 TABLET ORAL 4 TIMES DAILY
Status: DISCONTINUED | OUTPATIENT
Start: 2023-01-13 | End: 2023-01-17 | Stop reason: HOSPADM

## 2023-01-13 RX ORDER — LEVETIRACETAM 500 MG/1
500 TABLET ORAL 2 TIMES DAILY
Status: DISCONTINUED | OUTPATIENT
Start: 2023-01-13 | End: 2023-01-17 | Stop reason: HOSPADM

## 2023-01-13 RX ORDER — ONDANSETRON 4 MG/1
4 TABLET, ORALLY DISINTEGRATING ORAL EVERY 8 HOURS PRN
Status: DISCONTINUED | OUTPATIENT
Start: 2023-01-13 | End: 2023-01-17 | Stop reason: HOSPADM

## 2023-01-13 RX ORDER — POLYETHYLENE GLYCOL 3350 17 G/17G
17 POWDER, FOR SOLUTION ORAL DAILY PRN
Status: DISCONTINUED | OUTPATIENT
Start: 2023-01-13 | End: 2023-01-17 | Stop reason: HOSPADM

## 2023-01-13 RX ORDER — SODIUM CHLORIDE 9 MG/ML
INJECTION, SOLUTION INTRAVENOUS CONTINUOUS
Status: DISCONTINUED | OUTPATIENT
Start: 2023-01-13 | End: 2023-01-16

## 2023-01-13 RX ORDER — LEVOTHYROXINE SODIUM 88 UG/1
88 TABLET ORAL DAILY
Status: DISCONTINUED | OUTPATIENT
Start: 2023-01-14 | End: 2023-01-17 | Stop reason: HOSPADM

## 2023-01-13 RX ORDER — ONDANSETRON 2 MG/ML
4 INJECTION INTRAMUSCULAR; INTRAVENOUS EVERY 6 HOURS PRN
Status: DISCONTINUED | OUTPATIENT
Start: 2023-01-13 | End: 2023-01-17 | Stop reason: HOSPADM

## 2023-01-13 RX ORDER — TIZANIDINE 4 MG/1
8 TABLET ORAL DAILY PRN
Status: DISCONTINUED | OUTPATIENT
Start: 2023-01-13 | End: 2023-01-17 | Stop reason: HOSPADM

## 2023-01-13 RX ORDER — SODIUM CHLORIDE 0.9 % (FLUSH) 0.9 %
5-40 SYRINGE (ML) INJECTION EVERY 12 HOURS SCHEDULED
Status: DISCONTINUED | OUTPATIENT
Start: 2023-01-13 | End: 2023-01-17 | Stop reason: HOSPADM

## 2023-01-13 RX ORDER — ATORVASTATIN CALCIUM 10 MG/1
10 TABLET, FILM COATED ORAL DAILY
Status: DISCONTINUED | OUTPATIENT
Start: 2023-01-14 | End: 2023-01-17 | Stop reason: HOSPADM

## 2023-01-13 RX ORDER — SODIUM CHLORIDE 0.9 % (FLUSH) 0.9 %
5-40 SYRINGE (ML) INJECTION PRN
Status: DISCONTINUED | OUTPATIENT
Start: 2023-01-13 | End: 2023-01-17 | Stop reason: HOSPADM

## 2023-01-13 RX ORDER — SODIUM CHLORIDE 9 MG/ML
INJECTION, SOLUTION INTRAVENOUS PRN
Status: DISCONTINUED | OUTPATIENT
Start: 2023-01-13 | End: 2023-01-17 | Stop reason: HOSPADM

## 2023-01-13 RX ORDER — NIFEDIPINE 30 MG/1
30 TABLET, EXTENDED RELEASE ORAL DAILY
Status: DISCONTINUED | OUTPATIENT
Start: 2023-01-14 | End: 2023-01-16

## 2023-01-13 RX ORDER — ACETAMINOPHEN 650 MG/1
650 SUPPOSITORY RECTAL EVERY 6 HOURS PRN
Status: DISCONTINUED | OUTPATIENT
Start: 2023-01-13 | End: 2023-01-17 | Stop reason: HOSPADM

## 2023-01-13 RX ORDER — ENOXAPARIN SODIUM 100 MG/ML
30 INJECTION SUBCUTANEOUS DAILY
Status: DISCONTINUED | OUTPATIENT
Start: 2023-01-14 | End: 2023-01-15

## 2023-01-13 RX ADMIN — Medication 10 ML: at 23:42

## 2023-01-13 RX ADMIN — SUCRALFATE 1 G: 1 TABLET ORAL at 23:37

## 2023-01-13 RX ADMIN — SODIUM CHLORIDE: 9 INJECTION, SOLUTION INTRAVENOUS at 23:40

## 2023-01-13 RX ADMIN — IOPAMIDOL 75 ML: 755 INJECTION, SOLUTION INTRAVENOUS at 19:39

## 2023-01-13 RX ADMIN — NOREPINEPHRINE BITARTRATE 10 MCG/MIN: 1 INJECTION, SOLUTION, CONCENTRATE INTRAVENOUS at 17:10

## 2023-01-13 RX ADMIN — CEFEPIME 2000 MG: 2 INJECTION, POWDER, FOR SOLUTION INTRAVENOUS at 18:25

## 2023-01-13 RX ADMIN — MEROPENEM 1000 MG: 1 INJECTION, POWDER, FOR SOLUTION INTRAVENOUS at 23:41

## 2023-01-13 RX ADMIN — GABAPENTIN 300 MG: 300 CAPSULE ORAL at 23:37

## 2023-01-13 RX ADMIN — LEVETIRACETAM 500 MG: 500 TABLET, FILM COATED ORAL at 23:37

## 2023-01-13 RX ADMIN — VASOPRESSIN 0.03 UNITS/MIN: 20 INJECTION PARENTERAL at 17:18

## 2023-01-13 RX ADMIN — VANCOMYCIN HYDROCHLORIDE 1000 MG: 1 INJECTION, POWDER, LYOPHILIZED, FOR SOLUTION INTRAVENOUS at 23:43

## 2023-01-13 RX ADMIN — SODIUM CHLORIDE: 9 INJECTION, SOLUTION INTRAVENOUS at 22:31

## 2023-01-13 SDOH — ECONOMIC STABILITY: TRANSPORTATION INSECURITY
IN THE PAST 12 MONTHS, HAS LACK OF TRANSPORTATION KEPT YOU FROM MEETINGS, WORK, OR FROM GETTING THINGS NEEDED FOR DAILY LIVING?: NO

## 2023-01-13 SDOH — ECONOMIC STABILITY: TRANSPORTATION INSECURITY
IN THE PAST 12 MONTHS, HAS THE LACK OF TRANSPORTATION KEPT YOU FROM MEDICAL APPOINTMENTS OR FROM GETTING MEDICATIONS?: NO

## 2023-01-13 ASSESSMENT — PAIN SCALES - GENERAL: PAINLEVEL_OUTOF10: 0

## 2023-01-13 ASSESSMENT — LIFESTYLE VARIABLES
HOW OFTEN DO YOU HAVE A DRINK CONTAINING ALCOHOL: NEVER
HOW MANY STANDARD DRINKS CONTAINING ALCOHOL DO YOU HAVE ON A TYPICAL DAY: PATIENT DOES NOT DRINK

## 2023-01-13 ASSESSMENT — PAIN - FUNCTIONAL ASSESSMENT: PAIN_FUNCTIONAL_ASSESSMENT: NONE - DENIES PAIN

## 2023-01-13 NOTE — ED NOTES
Emergent central line placement in progress per Dr. Manisha Benjamin.      Carmen Wright RN  01/13/23 5135

## 2023-01-13 NOTE — PROGRESS NOTES
Physician Progress Note      PATIENT:               Isrrael Atkinson  CSN #:                  [id number]  :                       1950  ADMIT DATE:       1/10/2023 8:59 PM  [facility] DATE:        2023 3:07 PM  RESPONDING  PROVIDER #:        Pat Bolanos MD          QUERY TEXT:    Patient admitted with seizure. Documentation reflects type 2 MI in H&P. If   possible, please document in the progress notes and discharge summary if Type   2 MI was: The medical record reflects the following:  Risk Factors: Seizure, diverticulitis, hypotension  Clinical Indicators: Per H&P NSTEMI: Suspected to be type II. Trend troponin\". troponin 0.03  Treatment: Serial labs, supportive care, neuro consult    Thank you,  Luis Gordillo RN BSN  Options provided:  -- Type 2 MI confirmed after study  -- Type 2 MI ruled out after study  -- Other - I will add my own diagnosis  -- Disagree - Not applicable / Not valid  -- Disagree - Clinically unable to determine / Unknown  -- Refer to Clinical Documentation Reviewer    PROVIDER RESPONSE TEXT:    Type 2 MI ruled out after study.     Query created by: Bernardino Desir on 2023 9:17 AM      Electronically signed by:  Pat Bolanos MD 2023 2:22 PM

## 2023-01-14 PROBLEM — R57.1 HYPOVOLEMIC SHOCK (HCC): Status: ACTIVE | Noted: 2019-11-02

## 2023-01-14 PROBLEM — G93.41 METABOLIC ENCEPHALOPATHY: Status: ACTIVE | Noted: 2023-01-14

## 2023-01-14 LAB
ALBUMIN SERPL-MCNC: 3.6 G/DL (ref 3.4–5)
ALP BLD-CCNC: 226 U/L (ref 40–129)
ALT SERPL-CCNC: 56 U/L (ref 10–40)
ANION GAP SERPL CALCULATED.3IONS-SCNC: 12 MMOL/L (ref 3–16)
AST SERPL-CCNC: 144 U/L (ref 15–37)
BASOPHILS ABSOLUTE: 0.1 K/UL (ref 0–0.2)
BASOPHILS RELATIVE PERCENT: 0.5 %
BILIRUB SERPL-MCNC: 0.5 MG/DL (ref 0–1)
BILIRUBIN DIRECT: <0.2 MG/DL (ref 0–0.3)
BILIRUBIN, INDIRECT: ABNORMAL MG/DL (ref 0–1)
BLOOD CULTURE, ROUTINE: NORMAL
BUN BLDV-MCNC: 34 MG/DL (ref 7–20)
CALCIUM SERPL-MCNC: 8.6 MG/DL (ref 8.3–10.6)
CHLORIDE BLD-SCNC: 98 MMOL/L (ref 99–110)
CO2: 21 MMOL/L (ref 21–32)
CREAT SERPL-MCNC: 3 MG/DL (ref 0.6–1.2)
CULTURE, BLOOD 2: NORMAL
EKG ATRIAL RATE: 61 BPM
EKG DIAGNOSIS: NORMAL
EKG P AXIS: 27 DEGREES
EKG P-R INTERVAL: 140 MS
EKG Q-T INTERVAL: 374 MS
EKG QRS DURATION: 70 MS
EKG QTC CALCULATION (BAZETT): 376 MS
EKG R AXIS: 88 DEGREES
EKG T AXIS: -18 DEGREES
EKG VENTRICULAR RATE: 61 BPM
EOSINOPHILS ABSOLUTE: 0 K/UL (ref 0–0.6)
EOSINOPHILS RELATIVE PERCENT: 0.1 %
GFR SERPL CREATININE-BSD FRML MDRD: 16 ML/MIN/{1.73_M2}
GLUCOSE BLD-MCNC: 208 MG/DL (ref 70–99)
GLUCOSE BLD-MCNC: 224 MG/DL (ref 70–99)
HCT VFR BLD CALC: 30.2 % (ref 36–48)
HCT VFR BLD CALC: 31.7 % (ref 36–48)
HCT VFR BLD CALC: 31.8 % (ref 36–48)
HEMOGLOBIN: 9.7 G/DL (ref 12–16)
HEMOGLOBIN: 9.8 G/DL (ref 12–16)
HEMOGLOBIN: 9.9 G/DL (ref 12–16)
LACTIC ACID: 1.4 MMOL/L (ref 0.4–2)
LYMPHOCYTES ABSOLUTE: 1.4 K/UL (ref 1–5.1)
LYMPHOCYTES RELATIVE PERCENT: 9.4 %
MCH RBC QN AUTO: 25.1 PG (ref 26–34)
MCHC RBC AUTO-ENTMCNC: 32 G/DL (ref 31–36)
MCV RBC AUTO: 78.3 FL (ref 80–100)
MONOCYTES ABSOLUTE: 0.9 K/UL (ref 0–1.3)
MONOCYTES RELATIVE PERCENT: 5.8 %
NEUTROPHILS ABSOLUTE: 12.9 K/UL (ref 1.7–7.7)
NEUTROPHILS RELATIVE PERCENT: 84.2 %
PDW BLD-RTO: 19.4 % (ref 12.4–15.4)
PERFORMED ON: ABNORMAL
PLATELET # BLD: 490 K/UL (ref 135–450)
PMV BLD AUTO: 8.2 FL (ref 5–10.5)
POTASSIUM REFLEX MAGNESIUM: 4.2 MMOL/L (ref 3.5–5.1)
RBC # BLD: 3.85 M/UL (ref 4–5.2)
SODIUM BLD-SCNC: 131 MMOL/L (ref 136–145)
TOTAL PROTEIN: 7.9 G/DL (ref 6.4–8.2)
TROPONIN: <0.01 NG/ML
TROPONIN: <0.01 NG/ML
VANCOMYCIN RANDOM: 15.5 UG/ML
WBC # BLD: 15.3 K/UL (ref 4–11)

## 2023-01-14 PROCEDURE — 99233 SBSQ HOSP IP/OBS HIGH 50: CPT | Performed by: INTERNAL MEDICINE

## 2023-01-14 PROCEDURE — 99223 1ST HOSP IP/OBS HIGH 75: CPT | Performed by: INTERNAL MEDICINE

## 2023-01-14 PROCEDURE — 6360000002 HC RX W HCPCS: Performed by: INTERNAL MEDICINE

## 2023-01-14 PROCEDURE — 94761 N-INVAS EAR/PLS OXIMETRY MLT: CPT

## 2023-01-14 PROCEDURE — 85018 HEMOGLOBIN: CPT

## 2023-01-14 PROCEDURE — 6360000002 HC RX W HCPCS: Performed by: HOSPITALIST

## 2023-01-14 PROCEDURE — 93010 ELECTROCARDIOGRAM REPORT: CPT | Performed by: INTERNAL MEDICINE

## 2023-01-14 PROCEDURE — 80048 BASIC METABOLIC PNL TOTAL CA: CPT

## 2023-01-14 PROCEDURE — 2580000003 HC RX 258: Performed by: INTERNAL MEDICINE

## 2023-01-14 PROCEDURE — 1200000000 HC SEMI PRIVATE

## 2023-01-14 PROCEDURE — 85014 HEMATOCRIT: CPT

## 2023-01-14 PROCEDURE — 6370000000 HC RX 637 (ALT 250 FOR IP): Performed by: HOSPITALIST

## 2023-01-14 PROCEDURE — 84484 ASSAY OF TROPONIN QUANT: CPT

## 2023-01-14 PROCEDURE — 80202 ASSAY OF VANCOMYCIN: CPT

## 2023-01-14 PROCEDURE — 36415 COLL VENOUS BLD VENIPUNCTURE: CPT

## 2023-01-14 PROCEDURE — 83605 ASSAY OF LACTIC ACID: CPT

## 2023-01-14 PROCEDURE — 2580000003 HC RX 258: Performed by: HOSPITALIST

## 2023-01-14 PROCEDURE — 80076 HEPATIC FUNCTION PANEL: CPT

## 2023-01-14 PROCEDURE — 2700000000 HC OXYGEN THERAPY PER DAY

## 2023-01-14 PROCEDURE — 85025 COMPLETE CBC W/AUTO DIFF WBC: CPT

## 2023-01-14 RX ADMIN — VANCOMYCIN HYDROCHLORIDE 750 MG: 750 INJECTION, POWDER, LYOPHILIZED, FOR SOLUTION INTRAVENOUS at 08:08

## 2023-01-14 RX ADMIN — LEVETIRACETAM 500 MG: 500 TABLET, FILM COATED ORAL at 10:26

## 2023-01-14 RX ADMIN — GABAPENTIN 300 MG: 300 CAPSULE ORAL at 21:25

## 2023-01-14 RX ADMIN — Medication 10 ML: at 10:27

## 2023-01-14 RX ADMIN — MEROPENEM 1000 MG: 1 INJECTION, POWDER, FOR SOLUTION INTRAVENOUS at 10:30

## 2023-01-14 RX ADMIN — PIPERACILLIN AND TAZOBACTAM 3375 MG: 3; .375 INJECTION, POWDER, FOR SOLUTION INTRAVENOUS at 14:44

## 2023-01-14 RX ADMIN — SUCRALFATE 1 G: 1 TABLET ORAL at 21:24

## 2023-01-14 RX ADMIN — ATORVASTATIN CALCIUM 10 MG: 10 TABLET, FILM COATED ORAL at 10:26

## 2023-01-14 RX ADMIN — SUCRALFATE 1 G: 1 TABLET ORAL at 14:41

## 2023-01-14 RX ADMIN — PANTOPRAZOLE SODIUM 40 MG: 40 TABLET, DELAYED RELEASE ORAL at 16:26

## 2023-01-14 RX ADMIN — SODIUM CHLORIDE: 9 INJECTION, SOLUTION INTRAVENOUS at 19:09

## 2023-01-14 RX ADMIN — LEVOTHYROXINE SODIUM 88 MCG: 88 TABLET ORAL at 05:57

## 2023-01-14 RX ADMIN — LEVETIRACETAM 500 MG: 500 TABLET, FILM COATED ORAL at 21:25

## 2023-01-14 RX ADMIN — GABAPENTIN 300 MG: 300 CAPSULE ORAL at 14:41

## 2023-01-14 RX ADMIN — SUCRALFATE 1 G: 1 TABLET ORAL at 16:46

## 2023-01-14 RX ADMIN — SUCRALFATE 1 G: 1 TABLET ORAL at 10:26

## 2023-01-14 RX ADMIN — PANTOPRAZOLE SODIUM 40 MG: 40 TABLET, DELAYED RELEASE ORAL at 05:57

## 2023-01-14 RX ADMIN — GABAPENTIN 300 MG: 300 CAPSULE ORAL at 10:26

## 2023-01-14 RX ADMIN — ENOXAPARIN SODIUM 30 MG: 100 INJECTION SUBCUTANEOUS at 10:26

## 2023-01-14 RX ADMIN — SERTRALINE HYDROCHLORIDE 50 MG: 50 TABLET ORAL at 10:26

## 2023-01-14 ASSESSMENT — PAIN SCALES - GENERAL: PAINLEVEL_OUTOF10: 0

## 2023-01-14 NOTE — PROGRESS NOTES
Pharmacy Note  Vancomycin Consult    Nanette Cordero is a 71 y.o. female started on Vancomycin for Sepsis; consult received from Dr. Rolanda Kendrick to manage therapy. Also receiving the following antibiotics: . Allergies:  Erythromycin, Keflex [cephalexin], Sulfa antibiotics, and Tetracyclines & related     Tmax: Merrem    Recent Labs     01/12/23  0732 01/13/23  1700   CREATININE 0.8 3.5*       Recent Labs     01/12/23  0731 01/13/23  1700   WBC 11.0 13.9*       Estimated Creatinine Clearance: 16 mL/min (A) (based on SCr of 3.5 mg/dL (H)). Intake/Output Summary (Last 24 hours) at 1/13/2023 2248  Last data filed at 1/13/2023 2202  Gross per 24 hour   Intake --   Output 1100 ml   Net -1100 ml       Wt Readings from Last 1 Encounters:   01/13/23 210 lb (95.3 kg)         Body mass index is 37.8 kg/m². Culture Date      Source                       Results      Loading dose (critically ill or in ICU, require dialysis or renal replacement therapy): Vancomycin 25 mg/kg IVPB x 1 (maximum 2500 mg). Pulse dose: fluctuating renal function, VIOLETA, ESRD   Goal Vancomycin trough: 15-20 mcg/mL   Goal Vancomycin AUC: 400-600     Assessment/Plan:  Will initiate Vancomycin with a one time loading dose of 1000 mg x1. Based on patient age and poor renal function, will pulse dose patient to maintain vancomycin levels > 15 mcg/mL. Vanc random tomorrow in the AM (1/14 at 0600). Timing of trough level will be  determined based on culture results, renal function, and clinical response. Thank you for the consult.   Sury Cline, PharmD  1/13/2023 10:49 PM
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
## 2023-01-14 NOTE — ED NOTES
Unable to complete medication reconciliation d/t pt condition.       Michelle Karimi, CHARLIE  01/13/23 5513

## 2023-01-14 NOTE — PROGRESS NOTES
1256 CVC line removed per v/o Dr. Jonny Renteria. Pressure held for 5 minutes. No hematoma or bleeding noted. Site covered.   Rechecked area now and no s/s bleed/hematoma

## 2023-01-14 NOTE — PROGRESS NOTES
Dr Jeff Osorio at bedside okjulio c to transfer pt.  -remove central line  -kimberly to DC strict bedrest

## 2023-01-14 NOTE — PROGRESS NOTES
Shift assessment completed, see flow sheet. Pt is A/O x4. Pt denies any SOB, CP or ABD pain. Denies any N/V  Pt off all pressors this AM w//48      SpO2 97%. Respirations are easy, even, and unlabored. Bilateral lung sounds clear on 2 L.     VSS  NSR on the monitor      CVC/PIV, WNL with NaCl infusing at 50 ml/hr    All lines and monitoring devices in place. Ramo is patent and secured. .  Bed in lowest position with wheels locked. No needs expressed at this time. Will continue to monitor.

## 2023-01-14 NOTE — PROGRESS NOTES
Vanc random = 15.5 mcg/mL at 0540. Give vancomycin 750 mg x1. Check Vanc level tomorrow in the AM (1/15 at 0600).   Miki Hernandez PharmD  2023 6:41 AM

## 2023-01-14 NOTE — PROGRESS NOTES
Admit: 2023    Name:  Bee Ryder  Room:  [address] [place] [place] [address]  MRN:    [id number]    Critical Care Daily Progress Note for 2023   78-year-old female with recent admission to [facility] for altered mental status. Seizure suspected. EEG and MRI were negative. Discharged on [address]  Return to the emergency room last night due to altered mental status at home. She was minimally responsive and hypotensive, started on pressors  Interval History:   Currently she is alert and oriented. Off pressors.     Scheduled Meds:   meropenem  1,000 mg IntraVENous Q12H    gabapentin  300 mg Oral TID    levETIRAcetam  500 mg Oral BID    levothyroxine  88 mcg Oral Daily    [Held by provider] NIFEdipine  30 mg Oral Daily    pantoprazole  40 mg Oral BID AC    sertraline  50 mg Oral Daily    atorvastatin  10 mg Oral Daily    sucralfate  1 g Oral 4x Daily    sodium chloride flush  5-40 mL IntraVENous 2 times per day    enoxaparin  30 mg SubCUTAneous Daily    vancomycin (VANCOCIN) intermittent dosing (placeholder)   Other RX Placeholder       Continuous Infusions:   norepinephrine Stopped (23 6762)    vasopressin (Septic Shock) infusion Stopped (23 0235)    sodium chloride 5 mL/hr at 23 1133    sodium chloride 50 mL/hr at 23 1133       PRN Meds:  tiZANidine, sodium chloride flush, sodium chloride, ondansetron **OR** ondansetron, polyethylene glycol, acetaminophen **OR** acetaminophen                  Objective:     Temp  Av °F (36.7 °C)  Min: 97.4 °F (36.3 °C)  Max: 98.5 °F (36.9 °C)  Pulse  Av  Min: 53  Max: 87  BP  Min: 71/41  Max: 135/55  SpO2  Av.2 %  Min: 83 %  Max: 100 %  Patient Vitals for the past 4 hrs:   BP Temp Temp src Pulse Resp SpO2   23 1100 110/68 -- -- 87 19 (!) 83 %   23 1000 (!) 101/37 -- -- 66 18 97 %   23 0900 (!) 126/44 97.4 °F (36.3 °C) Axillary 68 17 99 %   23 0800 (!) 119/48 -- -- 70 15 99 %         Intake/Output Summary (Last 24 hours) at 2023 DIANA Nath 67 filed at 1/14/2023 1133  Gross per 24 hour   Intake 1762.26 ml   Output 3650 ml   Net -1887.74 ml       Physical Exam:  General:  Awake, alert and oriented. Appears to be not in any distress  Mucous Membranes:  Pink , anicteric  Neck: No JVD, no carotid bruit, no thyromegaly  Chest:  Clear to auscultation bilaterally, no added sounds  Cardiovascular:  RRR S1S2 heard, no murmurs or gallops  Abdomen:  Soft, undistended, non tender, no organomegaly, BS present  Extremities: No edema or cyanosis. Distal pulses well felt  Neurological : no focal deficits    Lab Data:  CBC:   Recent Labs     01/12/23  0731 01/13/23  1700 01/14/23  0345 01/14/23  0950   WBC 11.0 13.9* 15.3*  --    RBC 4.12 3.48* 3.85*  --    HGB 10.0* 8.8* 9.7* 9.9*   HCT 32.4* 28.5* 30.2* 31.7*   MCV 78.5* 81.9 78.3*  --    RDW 19.4* 20.3* 19.4*  --     335 490*  --      BMP:   Recent Labs     01/12/23  0732 01/13/23  1700 01/14/23 0345    133* 131*   K 4.2 5.2* 4.2    101 98*   CO2 23 18* 21   BUN 20 34* 34*   CREATININE 0.8 3.5* 3.0*     BNP: No results for input(s): BNP in the last 72 hours. PT/INR:   Recent Labs     01/13/23 1700   PROTIME 16.7*   INR 1.37*     APTT:  Recent Labs     01/13/23 1700   APTT 36.0*     CARDIAC ENZYMES:   Recent Labs     01/13/23  2330 01/14/23  0345 01/14/23  0540   TROPONINI 0.02* <0.01 <0.01     FASTING LIPID PANEL:  Lab Results   Component Value Date/Time    CHOL 198 01/10/2023 09:49 PM    HDL 56 01/10/2023 09:49 PM    HDL 81 10/06/2010 02:16 PM    TRIG 98 01/10/2023 09:49 PM     LIVER PROFILE:   Recent Labs     01/12/23  0732 01/13/23  1700 01/14/23  0345   AST 28 85* 144*   ALT 12 28 56*   BILIDIR  --   --  <0.2   BILITOT 0.4 0.4 0.5   ALKPHOS 257* 220* 226*         CT ABDOMEN PELVIS W IV CONTRAST Additional Contrast? None   Final Result   1. Diverticulosis worse in the sigmoid with mild inflammatory changes   suggestive of mild acute diverticulitis.    2. No evidence of bowel obstruction or perforation. 3. No evidence of intra-abdominal or pelvic abscess. 4. Mild bilateral lower lobe atelectatic changes. 5. Stable fat-containing periumbilical hernia. CT HEAD WO CONTRAST   Final Result   No acute intracranial abnormality. XR CHEST PORTABLE   Final Result   Worsening compared to the previous evaluation with more pronounced congestion   and perihilar edema suggestive of CHF.                Assessment & Plan:     Patient Active Problem List    Diagnosis Date Noted    Acute diverticulitis 01/11/2023    Seizure ([address] 06/83/5874    Systolic murmur 97/96/4378    Acute hypoxemic respiratory failure ([address] 09/06/2022    Chest pain 08/23/2022    Frequent falls 08/23/2022    Seizure-like activity ([address] 08/23/2022    Sepsis ([address] 08/21/2022    Delusions ([address]     Leg edema     SOB (shortness of breath) 06/27/2022    Generalized abdominal pain     Diverticulitis of colon     Acute hyponatremia     COPD without exacerbation ([address]     Diverticulitis 11/29/2021    Acute focal neurological deficit 03/20/2021    Community acquired pneumonia of left lower lobe of lung     PAF (paroxysmal atrial fibrillation) (HCC)     PSVT (paroxysmal supraventricular tachycardia) ([address]     Abnormal CXR 11/05/2019    NSTEMI (non-ST elevated myocardial infarction) ([address]     Shock ([address] 11/02/2019    GI bleed 03/31/2018    Elevated lactic acid level 03/31/2018    Hyperkalemia 03/31/2018    Leukocytosis 03/31/2018    Thrombocytosis 03/31/2018    High anion gap metabolic acidosis 49/87/7884    History of depression     Misuse of prescription only drugs     Toxic encephalopathy     Acute respiratory failure ([address] 03/04/2018    OD (overdose of drug) 03/04/2018    Drug ingestion     Acute respiratory failure with hypoxemia (HCC)     Closed fracture of left ankle     Aspiration pneumonitis (HCC)     Ankle fracture, bimalleolar, closed, left, initial encounter 01/09/2018    Trimalleolar fracture of left ankle, closed, initial encounter 01/08/2018    Anxiety and depression 12/28/2017    Elevated LFTs 12/28/2017    Acute hyperglycemia 12/28/2017    Well controlled type 2 diabetes mellitus ([address] 12/28/2017    Hypovitaminosis D 12/28/2017    Right hand weakness & numbness 12/28/2017    Acute-on-chronic low back pain with radicular symptoms 12/28/2017    Benign essential HTN 05/18/2017    Acute on chronic diastolic CHF (congestive heart failure) ([address] 04/13/2017    Possible/questionable hx of seizures 01/01/2017    Septic shock ([address] 12/10/2016    Acute renal failure (ARF) ([address] 12/10/2016    Acute encephalopathy 11/04/2016    Hypothyroidism 09/16/2016    Obesity 02/09/2016    SIRS (systemic inflammatory response syndrome) ([address] 02/08/2016    Acute respiratory failure with hypoxia ([address] 02/08/2016    Diverticulosis 01/26/2015    Chronic neck, back, & bilateral LE pain 11/03/2014    GERD (gastroesophageal reflux disease) 03/29/2010    Environmental allergies 77/65/2272     Metabolic encephalopathy  Secondary to hypotension acute kidney injury  Resolved    Hypovolemic shock  Resolving with IV fluids and Levophed  Now off pressors    Acute kidney injury  Secondary to hypotension from multiple antihypertensive medications. Nephrology consults  Continue IV fluids  Creatinine levels decreasing    Mild uncomplicated diverticulitis  Patient is asymptomatic  Continue Zosyn for now    Hypertension  Hold all home hypertension medications given low blood pressure readings    Chronic diastolic congestive heart failure  Stable    History of possible seizures  Seen by neurology recently at [facility]    Full Code  Heparin for DVT prophylaxis    To 2 RADHA Jimenez MD
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
## 2023-01-14 NOTE — PROGRESS NOTES
6890 pt form Er to ICU via strecther.  See fow sheets for assessment ,  pt awake and alert X4.  0014  consult call to Dr Mg as per Dr Lynne instructions, Dr Mg returned page and updated on pts status

## 2023-01-15 PROBLEM — I48.92 ATRIAL FLUTTER WITH RAPID VENTRICULAR RESPONSE (HCC): Status: ACTIVE | Noted: 2023-01-15

## 2023-01-15 LAB
ANION GAP SERPL CALCULATED.3IONS-SCNC: 13 MMOL/L (ref 3–16)
BUN BLDV-MCNC: 33 MG/DL (ref 7–20)
CALCIUM SERPL-MCNC: 8.3 MG/DL (ref 8.3–10.6)
CHLORIDE BLD-SCNC: 103 MMOL/L (ref 99–110)
CO2: 18 MMOL/L (ref 21–32)
CREAT SERPL-MCNC: 1.9 MG/DL (ref 0.6–1.2)
EKG ATRIAL RATE: 300 BPM
EKG ATRIAL RATE: 381 BPM
EKG DIAGNOSIS: NORMAL
EKG DIAGNOSIS: NORMAL
EKG P AXIS: 255 DEGREES
EKG Q-T INTERVAL: 310 MS
EKG Q-T INTERVAL: 322 MS
EKG QRS DURATION: 72 MS
EKG QRS DURATION: 80 MS
EKG QTC CALCULATION (BAZETT): 447 MS
EKG QTC CALCULATION (BAZETT): 489 MS
EKG R AXIS: -48 DEGREES
EKG R AXIS: -52 DEGREES
EKG T AXIS: 108 DEGREES
EKG T AXIS: 156 DEGREES
EKG VENTRICULAR RATE: 116 BPM
EKG VENTRICULAR RATE: 150 BPM
GFR SERPL CREATININE-BSD FRML MDRD: 28 ML/MIN/{1.73_M2}
GLUCOSE BLD-MCNC: 185 MG/DL (ref 70–99)
GLUCOSE BLD-MCNC: 193 MG/DL (ref 70–99)
GLUCOSE BLD-MCNC: 199 MG/DL (ref 70–99)
GLUCOSE BLD-MCNC: 200 MG/DL (ref 70–99)
GLUCOSE BLD-MCNC: 229 MG/DL (ref 70–99)
GLUCOSE BLD-MCNC: 240 MG/DL (ref 70–99)
PERFORMED ON: ABNORMAL
POTASSIUM SERPL-SCNC: 3.6 MMOL/L (ref 3.5–5.1)
SODIUM BLD-SCNC: 134 MMOL/L (ref 136–145)
TROPONIN: <0.01 NG/ML
TSH REFLEX: 0.41 UIU/ML (ref 0.27–4.2)

## 2023-01-15 PROCEDURE — 2500000003 HC RX 250 WO HCPCS: Performed by: HOSPITALIST

## 2023-01-15 PROCEDURE — 6360000002 HC RX W HCPCS: Performed by: INTERNAL MEDICINE

## 2023-01-15 PROCEDURE — 2580000003 HC RX 258: Performed by: HOSPITALIST

## 2023-01-15 PROCEDURE — 6370000000 HC RX 637 (ALT 250 FOR IP): Performed by: INTERNAL MEDICINE

## 2023-01-15 PROCEDURE — 99233 SBSQ HOSP IP/OBS HIGH 50: CPT | Performed by: INTERNAL MEDICINE

## 2023-01-15 PROCEDURE — 6370000000 HC RX 637 (ALT 250 FOR IP): Performed by: HOSPITALIST

## 2023-01-15 PROCEDURE — 93010 ELECTROCARDIOGRAM REPORT: CPT | Performed by: INTERNAL MEDICINE

## 2023-01-15 PROCEDURE — 2060000000 HC ICU INTERMEDIATE R&B

## 2023-01-15 PROCEDURE — 84484 ASSAY OF TROPONIN QUANT: CPT

## 2023-01-15 PROCEDURE — 84443 ASSAY THYROID STIM HORMONE: CPT

## 2023-01-15 PROCEDURE — 2580000003 HC RX 258: Performed by: INTERNAL MEDICINE

## 2023-01-15 PROCEDURE — 80048 BASIC METABOLIC PNL TOTAL CA: CPT

## 2023-01-15 PROCEDURE — 36415 COLL VENOUS BLD VENIPUNCTURE: CPT

## 2023-01-15 PROCEDURE — 93005 ELECTROCARDIOGRAM TRACING: CPT | Performed by: HOSPITALIST

## 2023-01-15 RX ORDER — METOPROLOL TARTRATE 5 MG/5ML
5 INJECTION INTRAVENOUS ONCE
Status: COMPLETED | OUTPATIENT
Start: 2023-01-15 | End: 2023-01-15

## 2023-01-15 RX ORDER — INSULIN LISPRO 100 [IU]/ML
0-4 INJECTION, SOLUTION INTRAVENOUS; SUBCUTANEOUS NIGHTLY
Status: DISCONTINUED | OUTPATIENT
Start: 2023-01-15 | End: 2023-01-17 | Stop reason: HOSPADM

## 2023-01-15 RX ORDER — LANOLIN ALCOHOL/MO/W.PET/CERES
3 CREAM (GRAM) TOPICAL NIGHTLY PRN
Status: DISCONTINUED | OUTPATIENT
Start: 2023-01-15 | End: 2023-01-17 | Stop reason: HOSPADM

## 2023-01-15 RX ORDER — ENOXAPARIN SODIUM 100 MG/ML
1 INJECTION SUBCUTANEOUS DAILY
Status: DISCONTINUED | OUTPATIENT
Start: 2023-01-15 | End: 2023-01-16

## 2023-01-15 RX ORDER — INSULIN LISPRO 100 [IU]/ML
0-8 INJECTION, SOLUTION INTRAVENOUS; SUBCUTANEOUS
Status: DISCONTINUED | OUTPATIENT
Start: 2023-01-15 | End: 2023-01-17 | Stop reason: HOSPADM

## 2023-01-15 RX ORDER — DILTIAZEM HYDROCHLORIDE 5 MG/ML
10 INJECTION INTRAVENOUS ONCE
Status: COMPLETED | OUTPATIENT
Start: 2023-01-15 | End: 2023-01-15

## 2023-01-15 RX ORDER — DICYCLOMINE HCL 20 MG
20 TABLET ORAL
Status: DISCONTINUED | OUTPATIENT
Start: 2023-01-15 | End: 2023-01-15

## 2023-01-15 RX ORDER — DICYCLOMINE HCL 20 MG
20 TABLET ORAL
Status: DISCONTINUED | OUTPATIENT
Start: 2023-01-15 | End: 2023-01-16

## 2023-01-15 RX ORDER — ENOXAPARIN SODIUM 100 MG/ML
1 INJECTION SUBCUTANEOUS DAILY
Status: DISCONTINUED | OUTPATIENT
Start: 2023-01-15 | End: 2023-01-15

## 2023-01-15 RX ADMIN — PANTOPRAZOLE SODIUM 40 MG: 40 TABLET, DELAYED RELEASE ORAL at 05:01

## 2023-01-15 RX ADMIN — Medication 3 MG: at 21:19

## 2023-01-15 RX ADMIN — PIPERACILLIN AND TAZOBACTAM 3375 MG: 3; .375 INJECTION, POWDER, FOR SOLUTION INTRAVENOUS at 02:08

## 2023-01-15 RX ADMIN — DILTIAZEM HYDROCHLORIDE 10 MG: 5 INJECTION INTRAVENOUS at 03:47

## 2023-01-15 RX ADMIN — SODIUM CHLORIDE: 9 INJECTION, SOLUTION INTRAVENOUS at 15:08

## 2023-01-15 RX ADMIN — GABAPENTIN 300 MG: 300 CAPSULE ORAL at 13:26

## 2023-01-15 RX ADMIN — PANTOPRAZOLE SODIUM 40 MG: 40 TABLET, DELAYED RELEASE ORAL at 16:43

## 2023-01-15 RX ADMIN — SUCRALFATE 1 G: 1 TABLET ORAL at 21:19

## 2023-01-15 RX ADMIN — LEVOTHYROXINE SODIUM 88 MCG: 88 TABLET ORAL at 05:01

## 2023-01-15 RX ADMIN — SERTRALINE HYDROCHLORIDE 50 MG: 50 TABLET ORAL at 08:12

## 2023-01-15 RX ADMIN — DILTIAZEM HYDROCHLORIDE 10 MG/HR: 5 INJECTION, SOLUTION INTRAVENOUS at 15:07

## 2023-01-15 RX ADMIN — LEVETIRACETAM 500 MG: 500 TABLET, FILM COATED ORAL at 08:12

## 2023-01-15 RX ADMIN — DICYCLOMINE HYDROCHLORIDE 20 MG: 20 TABLET ORAL at 12:06

## 2023-01-15 RX ADMIN — SUCRALFATE 1 G: 1 TABLET ORAL at 12:06

## 2023-01-15 RX ADMIN — LEVETIRACETAM 500 MG: 500 TABLET, FILM COATED ORAL at 21:19

## 2023-01-15 RX ADMIN — SUCRALFATE 1 G: 1 TABLET ORAL at 16:43

## 2023-01-15 RX ADMIN — ENOXAPARIN SODIUM 100 MG: 100 INJECTION SUBCUTANEOUS at 03:12

## 2023-01-15 RX ADMIN — GABAPENTIN 300 MG: 300 CAPSULE ORAL at 08:12

## 2023-01-15 RX ADMIN — SUCRALFATE 1 G: 1 TABLET ORAL at 08:12

## 2023-01-15 RX ADMIN — ATORVASTATIN CALCIUM 10 MG: 10 TABLET, FILM COATED ORAL at 08:12

## 2023-01-15 RX ADMIN — GABAPENTIN 300 MG: 300 CAPSULE ORAL at 21:19

## 2023-01-15 RX ADMIN — DILTIAZEM HYDROCHLORIDE 5 MG/HR: 5 INJECTION, SOLUTION INTRAVENOUS at 03:50

## 2023-01-15 RX ADMIN — PIPERACILLIN AND TAZOBACTAM 3375 MG: 3; .375 INJECTION, POWDER, FOR SOLUTION INTRAVENOUS at 13:27

## 2023-01-15 RX ADMIN — METOPROLOL TARTRATE 5 MG: 5 INJECTION, SOLUTION INTRAVENOUS at 02:43

## 2023-01-15 ASSESSMENT — PAIN DESCRIPTION - DESCRIPTORS
DESCRIPTORS: ACHING
DESCRIPTORS: SHARP;SQUEEZING

## 2023-01-15 ASSESSMENT — PAIN DESCRIPTION - LOCATION
LOCATION: CHEST
LOCATION: ABDOMEN

## 2023-01-15 ASSESSMENT — PAIN SCALES - GENERAL
PAINLEVEL_OUTOF10: 3
PAINLEVEL_OUTOF10: 6

## 2023-01-15 ASSESSMENT — PAIN DESCRIPTION - ORIENTATION
ORIENTATION: RIGHT
ORIENTATION: MID

## 2023-01-15 NOTE — PROGRESS NOTES
[facility] Progress Note           Subjective/   67-year-old female past medical history of CHF, COPD, diabetes mellitus, hypertension hyperlipidemia, seizures, thyroid disease, anemia brought in by family on 1/13/2023 for altered mental status. Patient reportedly was minimally responsive and hypotensive. No acute events in the last 24 hrs  Urine output of 2.5 L.  2.5 and. Objective/     GEN:  Chronically ill, /88   Pulse (!) 117   Temp 98.3 °F (36.8 °C) (Oral)   Resp 20   Ht 5' 1\" (1.549 m)   Wt 217 lb (98.4 kg)   SpO2 95%   BMI 41.00 kg/m²   Gen:  awake, nad    Skin: no rash, turgor wnl  Heent:  eomi, mmm  Neck: supple, No jvd noted   Cardiovascular:  RRR, S1, S2  Respiratory: CTAB , symmetric movement  Abdomen:  soft , non tender   Ext: nontraumatic , no pedal edema  Psychiatric: mood and affect appropriate  Musculoskeletal:  Rom, muscular strength intact    Data/  Recent Labs     01/13/23  1700 01/14/23  0345 01/14/23  0950 01/14/23  1649   WBC 13.9* 15.3*  --   --    HGB 8.8* 9.7* 9.9* 9.8*   HCT 28.5* 30.2* 31.7* 31.8*   MCV 81.9 78.3*  --   --     490*  --   --      Recent Labs     01/13/23  1700 01/14/23  0345   * 131*   K 5.2* 4.2    98*   CO2 18* 21   GLUCOSE 179* 224*   BUN 34* 34*   CREATININE 3.5* 3.0*   LABGLOM 13* 16*         Assessment:     #  Non-Oliguric Acute Kidney Injury        - Most Likely Due to ischemic ATN in setting of septic shock. - Baseline Cr 1.0-1.3, worsening to 3.5 on presentation this admission.   Patient creatinine was 1.0   recently- , 1.3 on 1/11/2023 improving to 0.8 on 1/12/2023      - U/A shows WBC 10-20, RBC 5-10, Pr trace, moderate leukoesterase, negative nitrites, 6-10 epithelial cells      -CT without any hydronephrosis or obstructionUrinary bladder contains Ralph catheter     # Electrolytes :   -Hyponatremia  -Hyperkalemia in setting of VIOLETA     # Volume status :  -Not overtly hypervolemic    -proBNP 2286     # Acid Base :  -Non anion gap metabolic acidosis              # Anemia :   -Normocytic anemia, leukocytosis  -Ferritin 30, iron saturation 15     # Hypertension :  -  Diastolic heart failure     Septic shock  On pressors. Plan:   -Continue gentle IV fluids. Saline at 50 cc/h over next 20 hours  -Strict I's and O's.  -Repeat labs. -Check urine sodium and urine creatinine.  -Please dose gabapentin to at this point. 300 mg nightly. -Vancomycin levels  -Hold lisinopril,  -Change Protonix to Pepcid  -Check CPK levels  -Nephrology will continue to follow        Thank you for the consultation. Please do not hesitate to call with questions. ______________________________  Katty Mc MD  [facility]  [email or web address]  Office: [phone]

## 2023-01-15 NOTE — PROGRESS NOTES
DR Kaylin Hong at bedside orders received for dose of Lovenox now and to transfer pt to PCU. Also notified MD that after giving pt lopressor 5 mg IV hr only down to 113-120's for a few minutes then back up to 140's.  Notified charge nurse and clinical.

## 2023-01-15 NOTE — PROGRESS NOTES
0321 Received from 2W to -1. Atrial fib 130-140's on telemetry. /74. Continuous pulse ox 92% on RA. IV site left forearm leaking. Orientation provided. Call in easy reach. Bed alarm on.    0345 PIV right wrist #22 & left hand #22.    0347  Atrial fib 144. Given Cardizem 10mg IVP bolus per MD order. 0350  Cardizem drip 5mg/hr begun per MD order. Continue to monitor closely.   Moe Rocha RN

## 2023-01-15 NOTE — PLAN OF CARE
Problem: Discharge Planning  Goal: Discharge to home or other facility with appropriate resources  1/15/2023 0415 by Robin Pulido RN  Outcome: Progressing     Problem: ABCDS Injury Assessment  Goal: Absence of physical injury  Outcome: Progressing     Problem: Pain  Goal: Verbalizes/displays adequate comfort level or baseline comfort level  Outcome: Progressing

## 2023-01-15 NOTE — PROGRESS NOTES
Atrial fib 110-120's now with Cardizem drip 7.5 mg/hr.  /65.  Patient states she's feeling better now.  Continue to monitor closely.  Renetta Kamara RN

## 2023-01-15 NOTE — SIGNIFICANT EVENT
Patient with prior h/o pafib,flipped into afib rvr,started on cardizem gtts, therapuetic lovenox, trending trop

## 2023-01-15 NOTE — ACP (ADVANCE CARE PLANNING)
Advance Care Planning     General Advance Care Planning (ACP) Conversation    Date of Conversation: 1/13/2023  Conducted with: Patient with Decision Making Capacity    Healthcare Decision Maker:    Primary Decision Maker: Margaret Franz - Child - [phone]  Click here to complete Healthcare Decision Makers including selection of the Healthcare Decision Maker Relationship (ie \"Primary\").   Today we documented Decision Maker(s) consistent with Legal Next of Kin hierarchy.    Content/Action Overview:  Has ACP document(s) on file - do NOT reflect the patient's care preferences, patient to provide new document(s)  Reviewed DNR/DNI and patient elects Full Code (Attempt Resuscitation)    Length of Voluntary ACP Conversation in minutes:  <16 minutes (Non-Billable)    Maira Ward RN

## 2023-01-15 NOTE — FLOWSHEET NOTE
01/14/23 2123   Vitals   Temp 98.1 °F (36.7 °C)   Temp Source Oral   Heart Rate 97   Heart Rate Source Monitor   Resp 20   BP (!) 148/60   MAP (Calculated) 89   BP Location Left upper arm   BP Method Automatic   Patient Position Semi fowlers   Level of Consciousness 0   MEWS Score 1   Pain Assessment   Pain Assessment None - Denies Pain   Oxygen Therapy   SpO2 95 %   O2 Device None (Room air)   Shift assessment complete. Scheduled med's given. See MAR. Patients head-toe complete, VS are logged  No needs are noted at this time. The bed is locked and is in the lowest position. Call light and bedside table are within reach.

## 2023-01-15 NOTE — CARE COORDINATION
Case Management Assessment  Initial Evaluation      Patient Name: Saritha Olvera  YOB: 1950  Diagnosis: Diverticulitis of colon [K57.32]  Acute kidney injury ([facility] [N17.9]  Acute cystitis without hematuria [N30.00]  Septic shock ([facility] [A41.9, R65.21]  Sepsis ([facility] [A41.9]  Acute on chronic congestive heart failure, unspecified heart failure type ([facility] [I50.9]  Date / Time: 1/13/2023  4:53 PM    Admission status/Date:1/13/2023 inpt  Chart Reviewed: Yes      Patient Interviewed: Yes   Family Interviewed:  No      Hospitalization in the last 30 days:  Yes      Health Care Decision Maker :   Primary Decision Maker: Kvng Wu - Child - [phone]    (CM - must 1st enter selection under Navigator - emergency contact- [place] Relationship and pick relationship)   Who do you trust or have selected to make healthcare decisions for you      Met with: pt  Interview conducted  (bedside/phone):bedside    Current PCP:   Kell Yun,       Financial  Commercial  Precert required for SNF : Y          3 night stay required - [employer]  Support Systems/Care Needs: Children  Transportation: self    Meal Preparation: dtr [employer]: lives in house with 2 dtrs  Steps: ramp  Intent for return to present living arrangements: Yes  Identified Issues:     [address] with [address] : No Agency:(Services)  Type of Home Care Services: None  Passport/Waiver : No  :                      Phone Number:    Passport/Waiver Services:           Durable Medical Equiptment   DME Provider: Jacquelin Andres pt states wears PRN  Equipment:   Walker_X__Cane_X__RTS___ BSC___Shower Chair__X_Hospital Bed___W/C__C__Other________  02 at ____Liter(s)---wears(frequency)_______ HHN ___ CPAP___ BiPap___   N/A____  Pt does not use DME    Home O2 Use :  Yes  -PRN Currenlty on RA  If No for home O2---if presently on O2 during hospitalization:  No  if yes CM to follow for potential DC O2 need  Informed of need for care provider to bring portable home O2 tank on day of discharge for nursing to connect prior to leaving:   Not Indicated    Community Service Affiliation  Dialysis:  No      Other Community Services:none     DISCHARGE PLAN: Explained Case Management role/services. Chart reviewed. Pt from home with 2 dtr;s and plan is to return. Pt ambulates free of device. +drives. Active with [facility], uses PRN. Follow for possible home care.

## 2023-01-15 NOTE — FLOWSHEET NOTE
01/15/23 0734   Vital Signs   Temp 98.3 °F (36.8 °C)   Temp Source Oral   Heart Rate (!) 117   Heart Rate Source Monitor   Resp 20   /88   MAP (Calculated) 101   BP Location Left upper arm   BP Method Automatic   Patient Position Semi fowlers   Level of Consciousness 0   MEWS Score 3   Oxygen Therapy   SpO2 95 %   O2 Device None (Room air)   Patient resting quietly in bed. No s/s of distress noted. Shift assessment complete, see flow sheet. Cardizem gtt continues at 7.5mg/hr. Denies needs at this time. Call light in reach. Will monitor.

## 2023-01-15 NOTE — PROGRESS NOTES
Admit: 2023    Name:  Ashley Galeas  Room:  /7451-16  MRN:    [id number]    Critical Care Daily Progress Note for 1/15/2023   81-year-old female with recent admission to [facility] for altered mental status. Seizure suspected. EEG and MRI were negative. Discharged on [address]  Return to the emergency room last night due to altered mental status at home. She was minimally responsive and hypotensive, started on pressors  Interval History:     She was transferred out of ICU to [facility] yesterday. Transferred back to PCU because of tachycardia. Found to be in atrial flutter, placed on Cardizem drip    Complains of right-sided abdominal discomfort.   No nausea vomiting or diarrhea    Labs not available yet today      Scheduled Meds:   enoxaparin  1 mg/kg SubCUTAneous Daily    insulin lispro  0-8 Units SubCUTAneous TID WC    insulin lispro  0-4 Units SubCUTAneous Nightly    piperacillin-tazobactam  3,375 mg IntraVENous Q12H    gabapentin  300 mg Oral TID    levETIRAcetam  500 mg Oral BID    levothyroxine  88 mcg Oral Daily    [Held by provider] NIFEdipine  30 mg Oral Daily    pantoprazole  40 mg Oral BID AC    sertraline  50 mg Oral Daily    atorvastatin  10 mg Oral Daily    sucralfate  1 g Oral 4x Daily    sodium chloride flush  5-40 mL IntraVENous 2 times per day       Continuous Infusions:   dilTIAZem 7.5 mg/hr (01/15/23 0421)    sodium chloride 5 mL/hr at 23 1133    sodium chloride 50 mL/hr at 01/15/23 0450       PRN Meds:  tiZANidine, sodium chloride flush, sodium chloride, ondansetron **OR** ondansetron, polyethylene glycol, acetaminophen **OR** acetaminophen                  Objective:     Temp  Av.4 °F (36.9 °C)  Min: 97.5 °F (36.4 °C)  Max: 99.3 °F (37.4 °C)  Pulse  Av.5  Min: 80  Max: 156  BP  Min: 110/68  Max: 169/68  SpO2  Av.8 %  Min: 83 %  Max: 99 %  Patient Vitals for the past 4 hrs:   BP Temp Temp src Pulse Resp SpO2   01/15/23 0734 128/88 98.3 °F (36.8 °C) Oral (!) 117 20 95 % Intake/Output Summary (Last 24 hours) at 1/15/2023 1048  Last data filed at 1/15/2023 0839  Gross per 24 hour   Intake 1971.61 ml   Output 3000 ml   Net -1028.39 ml         Physical Exam:  General appearance:  No apparent distress, appears stated age and cooperative. HEENT:  Normal cephalic, atraumatic without obvious deformity. Pupils equal, round, and reactive to light. Extra ocular muscles intact. Conjunctivae/corneas clear. Neck: Supple, with full range of motion. No jugular venous distention. Trachea midline. Respiratory:  Normal respiratory effort. Clear to auscultation, bilaterally without Rales/Wheezes/Rhonchi. Cardiovascular:  Regular rate and rhythm with normal S1/S2 without murmurs, rubs or gallops. Abdomen: Soft, mild right-sided tenderness, non-distended with normal bowel sounds. Musculoskeletal:  No clubbing, cyanosis or edema bilaterally. Full range of motion without deformity. Skin: Skin color, texture, turgor normal.  No rashes or lesions. Neurologic: Alert oriented x3. No focal signs    Lab Data:  CBC:   Recent Labs     01/13/23  1700 01/14/23  0345 01/14/23  0950 01/14/23  1649   WBC 13.9* 15.3*  --   --    RBC 3.48* 3.85*  --   --    HGB 8.8* 9.7* 9.9* 9.8*   HCT 28.5* 30.2* 31.7* 31.8*   MCV 81.9 78.3*  --   --    RDW 20.3* 19.4*  --   --     490*  --   --        BMP:   Recent Labs     01/13/23  1700 01/14/23 0345   * 131*   K 5.2* 4.2    98*   CO2 18* 21   BUN 34* 34*   CREATININE 3.5* 3.0*       BNP: No results for input(s): BNP in the last 72 hours.   PT/INR:   Recent Labs     01/13/23 1700   PROTIME 16.7*   INR 1.37*       APTT:  Recent Labs     01/13/23 1700   APTT 36.0*       CARDIAC ENZYMES:   Recent Labs     01/14/23  0345 01/14/23  0540 01/15/23  0321   TROPONINI <0.01 <0.01 <0.01       FASTING LIPID PANEL:  Lab Results   Component Value Date/Time    CHOL 198 01/10/2023 09:49 PM    HDL 56 01/10/2023 09:49 PM    HDL 81 10/06/2010 02:16 PM    TRIG 98 01/10/2023 09:49 PM     LIVER PROFILE:   Recent Labs     01/13/23  1700 01/14/23  0345   AST 85* 144*   ALT 28 56*   BILIDIR  --  <0.2   BILITOT 0.4 0.5   ALKPHOS 220* 226*           CT ABDOMEN PELVIS W IV CONTRAST Additional Contrast? None   Final Result   1. Diverticulosis worse in the sigmoid with mild inflammatory changes   suggestive of mild acute diverticulitis. 2. No evidence of bowel obstruction or perforation. 3. No evidence of intra-abdominal or pelvic abscess. 4. Mild bilateral lower lobe atelectatic changes. 5. Stable fat-containing periumbilical hernia. CT HEAD WO CONTRAST   Final Result   No acute intracranial abnormality. XR CHEST PORTABLE   Final Result   Worsening compared to the previous evaluation with more pronounced congestion   and perihilar edema suggestive of CHF. ECHO  9/3/5011   LV systolic function is hyperdynamic with EF estimated at >65%. No regional wall motion abnormalities. There is mild concentric left ventricular hypertrophy. Grade II diastolic dysfunction with elevated left ventricular filling   pressure. The left atrium is severely dilated. The right atrium is mildly dilated. Moderate mitral annular calcification. Mild-to-moderate mitral stenosis. Mild mitral regurgitation. The maximal transaortic velocity is 2.99m/s which gives peak pressure   gradient= 36mmHg and mean pressure gradient= 20mmHg which is c/w mild aortic   stenosis. There is a possible pleural effusion.   Assessment & Plan:     Patient Active Problem List    Diagnosis Date Noted    Metabolic encephalopathy 48/47/4037    Acute diverticulitis 01/11/2023    Seizure ([address] 68/36/7150    Systolic murmur 47/04/8574    Acute hypoxemic respiratory failure ([address] 09/06/2022    Chest pain 08/23/2022    Frequent falls 08/23/2022    Seizure-like activity ([address] 08/23/2022    Sepsis ([address] 08/21/2022    Delusions ([address]     Leg edema     SOB (shortness of breath) 06/27/2022 Generalized abdominal pain     Diverticulitis of colon     Acute hyponatremia     COPD without exacerbation ([address]     Diverticulitis 11/29/2021    Acute focal neurological deficit 03/20/2021    Community acquired pneumonia of left lower lobe of lung     PAF (paroxysmal atrial fibrillation) (HCC)     PSVT (paroxysmal supraventricular tachycardia) ([address]     Abnormal CXR 11/05/2019    NSTEMI (non-ST elevated myocardial infarction) ([address]     Hypovolemic shock ([address] 11/02/2019    GI bleed 03/31/2018    Elevated lactic acid level 03/31/2018    Hyperkalemia 03/31/2018    Leukocytosis 03/31/2018    Thrombocytosis 03/31/2018    High anion gap metabolic acidosis 96/17/4357    History of depression     Misuse of prescription only drugs     Toxic encephalopathy     Acute respiratory failure ([address] 03/04/2018    OD (overdose of drug) 03/04/2018    Drug ingestion     Acute respiratory failure with hypoxemia (HCC)     Closed fracture of left ankle     Aspiration pneumonitis (HCC)     Ankle fracture, bimalleolar, closed, left, initial encounter 01/09/2018    Trimalleolar fracture of left ankle, closed, initial encounter 01/08/2018    Anxiety and depression 12/28/2017    Elevated LFTs 12/28/2017    Acute hyperglycemia 12/28/2017    Well controlled type 2 diabetes mellitus ([address] 12/28/2017    Hypovitaminosis D 12/28/2017    Right hand weakness & numbness 12/28/2017    Acute-on-chronic low back pain with radicular symptoms 12/28/2017    Benign essential HTN 05/18/2017    Acute on chronic diastolic CHF (congestive heart failure) ([address] 04/13/2017    Possible/questionable hx of seizures 01/01/2017    Septic shock ([address] 12/10/2016    Acute kidney injury ([address] 12/10/2016    Acute cystitis without hematuria 11/26/2016    Acute encephalopathy 11/04/2016    Hypothyroidism 09/16/2016    Obesity 02/09/2016    SIRS (systemic inflammatory response syndrome) ([address] 02/08/2016    Acute respiratory failure with hypoxia ([address] 02/08/2016 Diverticulosis 01/26/2015    Chronic neck, back, & bilateral LE pain 11/03/2014    GERD (gastroesophageal reflux disease) 03/29/2010    Environmental allergies 37/48/8762     Metabolic encephalopathy  Secondary to hypotension acute kidney injury  Resolved    Hypovolemic shock  Resolving with IV fluids and Levophed  Now off pressors    Acute kidney injury  Secondary to hypotension from multiple antihypertensive medications. Nephrology consults  Continue IV fluids  Creatinine levels decreasing    Atrial flutter with RVR  Dilt drip  Recent echo shows mild to moderate mitral stenosis with normal EF  Lovenox- will adjust to full dose once CRTN normalizes   Check TSH  Cardiology consult    Mild uncomplicated diverticulitis  Patient not have any tenderness left lower quadrant  Continue Zosyn for now  Complains of right-sided abdominal discomfort. No hematuria.   Try dicyclomine    Hypertension  Hold all home hypertension medications given low blood pressure readings    Chronic diastolic congestive heart failure  Stable    History of possible seizures  Seen by neurology recently at [address]    Full Code  Lovenox once daily       Rich Garland MD

## 2023-01-15 NOTE — FLOWSHEET NOTE
01/15/23 0229   Vitals   Temp 98.4 °F (36.9 °C)   Temp Source Oral   Heart Rate (!) 156   Heart Rate Source Monitor   Resp 18   BP (!) 147/72   MAP (Calculated) 97   BP Location Left upper arm   BP Method Automatic   Patient Position Semi fowlers   Level of Consciousness 0   MEWS Score 4   Pain Assessment   Pain Assessment 0-10   Pain Level 6   Pain Location Chest   Pain Orientation Mid   Pain Descriptors Sharp;Squeezing   Monitor room called and reported  pt had 5 beats of v-tach and then a few minutes later pt HR up to 140-150, assisted pt to BR at 0210 . Pt is C/O CP 6/10. DR Inocencia Ruff on floor notified of pt change in condition and placed orders for EKG. orders received for lopressor 5 mg IV and re-check EKG with HR below 120 . Pt aware.

## 2023-01-15 NOTE — PROGRESS NOTES
BP (!) 142/65   Pulse 99   Temp 99.3 °F (37.4 °C) (Oral)   Resp 18   Ht 5' 1\" (1.549 m)   Wt 211 lb 14.4 oz (96.1 kg)   SpO2 95%   BMI 40.04 kg/m²       4 Eyes Skin Assessment     The patient is being assess for   Transfer to New Unit    I agree that 2 RN's have performed a thorough Head to Toe Skin Assessment on the patient. ALL assessment sites listed below have been assessed. Areas assessed for pressure by both nurses:   [x]   Head, Face, and Ears   [x]   Shoulders, Back, and Chest, Abdomen     Arms, Elbows, and Hands   [x][x]   Coccyx, Sacrum, and Ischium  [x]   Legs, Feet, and Heels        Small skin tear that is dry on left arm, bruises to bilat arms. **SHARE this note so that the co-signing nurse is able to place an eSignature**    Co-signer eSignature: Electronically signed by Rashel Doll RN   Does the Patient have Skin Breakdown related to pressure? No              Abdiel Prevention initiated:  No   Wound Care Orders initiated:  No      [facility] nurse consulted for Pressure Injury (Stage 3,4, Unstageable, DTI, NWPT, Complex wounds)and New or Established Ostomies:  No      Primary Nurse eSignature: Electronically signed by Caren Adkins RN on 1/14/23 at 7:29 PM EST      Bedside Mobility Assessment Tool (BMAT):     Assessment Level 1- Sit and Shake    1. From a semi-reclined position, ask patient to sit up and rotate to a seated position at the side of the bed. Can use the bedrail. 2. Ask patient to reach out and grab your hand and shake making sure patient reaches across his/her midline. Pass- Patient is able to come to a seated position, maintain core strength. Maintains seated balance while reaching across midline. Move on to Assessment Level 2. Assessment Level 2- Stretch and Point   1. With patient in seated position at the side of the bed, have patient place both feet on the floor (or stool) with knees no higher than hips.     2. Ask patient to stretch one leg and straighten the knee, then bend the ankle/flex and point the toes. If appropriate, repeat with the other leg. Pass- Patient is able to demonstrate appropriate quad strength on intended weight bearing limb(s). Move onto Assessment Level 3. Assessment Level 3- Stand   1. Ask patient to elevate off the bed or chair (seated to standing) using an assistive device (cane, bedrail). 2. Patient should be able to raise buttocks off be and hold for a count of five. May repeat once. Pass- Patient maintains standing stability for at least 5 seconds, proceed to assessment level 4. Assessment Level 4- Walk   1. Ask patient to march in place at bedside. 2. Then ask patient to advance step and return each foot. Some medical conditions may render a patient from stepping backwards, use your best clinical judgement. Pass- Patient demonstrates balance while shifting weight and ability to step, takes independent steps, does not use assistive device patient is MOBILITY LEVEL 4. Mobility Level- 4    .

## 2023-01-15 NOTE — PROGRESS NOTES
[facility]   Everton Unger RN

## 2023-01-15 NOTE — PROGRESS NOTES
Atrial fib 130's. Cardizem drip increased to 7.5 mg/hr per MD order. Continue to monitor closely.   Neville Estrella RN

## 2023-01-16 PROBLEM — I50.9 ACUTE ON CHRONIC CONGESTIVE HEART FAILURE (HCC): Status: ACTIVE | Noted: 2023-01-16

## 2023-01-16 PROBLEM — I48.91 ATRIAL FIBRILLATION WITH RVR (HCC): Status: ACTIVE | Noted: 2023-01-16

## 2023-01-16 LAB
ANION GAP SERPL CALCULATED.3IONS-SCNC: 10 MMOL/L (ref 3–16)
BUN BLDV-MCNC: 24 MG/DL (ref 7–20)
CALCIUM SERPL-MCNC: 7.7 MG/DL (ref 8.3–10.6)
CHLORIDE BLD-SCNC: 108 MMOL/L (ref 99–110)
CO2: 20 MMOL/L (ref 21–32)
CREAT SERPL-MCNC: 1.1 MG/DL (ref 0.6–1.2)
GFR SERPL CREATININE-BSD FRML MDRD: 54 ML/MIN/{1.73_M2}
GLUCOSE BLD-MCNC: 128 MG/DL (ref 70–99)
GLUCOSE BLD-MCNC: 158 MG/DL (ref 70–99)
GLUCOSE BLD-MCNC: 187 MG/DL (ref 70–99)
GLUCOSE BLD-MCNC: 212 MG/DL (ref 70–99)
GLUCOSE BLD-MCNC: 212 MG/DL (ref 70–99)
HCT VFR BLD CALC: 27.9 % (ref 36–48)
HEMOGLOBIN: 9 G/DL (ref 12–16)
MCH RBC QN AUTO: 25.5 PG (ref 26–34)
MCHC RBC AUTO-ENTMCNC: 32.1 G/DL (ref 31–36)
MCV RBC AUTO: 79.3 FL (ref 80–100)
OCCULT BLOOD DIAGNOSTIC: NORMAL
PDW BLD-RTO: 19.5 % (ref 12.4–15.4)
PERFORMED ON: ABNORMAL
PLATELET # BLD: 319 K/UL (ref 135–450)
PMV BLD AUTO: 8.3 FL (ref 5–10.5)
POTASSIUM SERPL-SCNC: 3.3 MMOL/L (ref 3.5–5.1)
RBC # BLD: 3.52 M/UL (ref 4–5.2)
SODIUM BLD-SCNC: 138 MMOL/L (ref 136–145)
WBC # BLD: 10.5 K/UL (ref 4–11)

## 2023-01-16 PROCEDURE — 6370000000 HC RX 637 (ALT 250 FOR IP): Performed by: INTERNAL MEDICINE

## 2023-01-16 PROCEDURE — 85027 COMPLETE CBC AUTOMATED: CPT

## 2023-01-16 PROCEDURE — 80048 BASIC METABOLIC PNL TOTAL CA: CPT

## 2023-01-16 PROCEDURE — 93005 ELECTROCARDIOGRAM TRACING: CPT | Performed by: NURSE PRACTITIONER

## 2023-01-16 PROCEDURE — 2580000003 HC RX 258: Performed by: HOSPITALIST

## 2023-01-16 PROCEDURE — 6360000002 HC RX W HCPCS: Performed by: INTERNAL MEDICINE

## 2023-01-16 PROCEDURE — 2500000003 HC RX 250 WO HCPCS: Performed by: HOSPITALIST

## 2023-01-16 PROCEDURE — 6360000002 HC RX W HCPCS: Performed by: NURSE PRACTITIONER

## 2023-01-16 PROCEDURE — 2580000003 HC RX 258: Performed by: NURSE PRACTITIONER

## 2023-01-16 PROCEDURE — 99223 1ST HOSP IP/OBS HIGH 75: CPT | Performed by: NURSE PRACTITIONER

## 2023-01-16 PROCEDURE — 2580000003 HC RX 258: Performed by: INTERNAL MEDICINE

## 2023-01-16 PROCEDURE — 99233 SBSQ HOSP IP/OBS HIGH 50: CPT | Performed by: INTERNAL MEDICINE

## 2023-01-16 PROCEDURE — 36415 COLL VENOUS BLD VENIPUNCTURE: CPT

## 2023-01-16 PROCEDURE — 2060000000 HC ICU INTERMEDIATE R&B

## 2023-01-16 PROCEDURE — 6370000000 HC RX 637 (ALT 250 FOR IP): Performed by: HOSPITALIST

## 2023-01-16 PROCEDURE — 82270 OCCULT BLOOD FECES: CPT

## 2023-01-16 RX ORDER — DILTIAZEM HYDROCHLORIDE 120 MG/1
120 CAPSULE, EXTENDED RELEASE ORAL DAILY
Status: DISCONTINUED | OUTPATIENT
Start: 2023-01-16 | End: 2023-01-17 | Stop reason: HOSPADM

## 2023-01-16 RX ORDER — DICYCLOMINE HYDROCHLORIDE 10 MG/1
10 CAPSULE ORAL
Status: DISCONTINUED | OUTPATIENT
Start: 2023-01-16 | End: 2023-01-17 | Stop reason: HOSPADM

## 2023-01-16 RX ORDER — ENOXAPARIN SODIUM 100 MG/ML
1 INJECTION SUBCUTANEOUS 2 TIMES DAILY
Status: DISCONTINUED | OUTPATIENT
Start: 2023-01-16 | End: 2023-01-17 | Stop reason: HOSPADM

## 2023-01-16 RX ORDER — SODIUM CHLORIDE AND POTASSIUM CHLORIDE 150; 450 MG/100ML; MG/100ML
INJECTION, SOLUTION INTRAVENOUS CONTINUOUS
Status: DISCONTINUED | OUTPATIENT
Start: 2023-01-16 | End: 2023-01-17

## 2023-01-16 RX ADMIN — SUCRALFATE 1 G: 1 TABLET ORAL at 20:15

## 2023-01-16 RX ADMIN — LEVOTHYROXINE SODIUM 88 MCG: 88 TABLET ORAL at 05:15

## 2023-01-16 RX ADMIN — GABAPENTIN 300 MG: 300 CAPSULE ORAL at 20:15

## 2023-01-16 RX ADMIN — DILTIAZEM HYDROCHLORIDE 120 MG: 120 CAPSULE, EXTENDED RELEASE ORAL at 12:05

## 2023-01-16 RX ADMIN — PANTOPRAZOLE SODIUM 40 MG: 40 TABLET, DELAYED RELEASE ORAL at 05:15

## 2023-01-16 RX ADMIN — DICYCLOMINE HYDROCHLORIDE 10 MG: 10 CAPSULE ORAL at 16:34

## 2023-01-16 RX ADMIN — ATORVASTATIN CALCIUM 10 MG: 10 TABLET, FILM COATED ORAL at 08:04

## 2023-01-16 RX ADMIN — IRON SUCROSE 100 MG: 20 INJECTION, SOLUTION INTRAVENOUS at 17:18

## 2023-01-16 RX ADMIN — SUCRALFATE 1 G: 1 TABLET ORAL at 16:34

## 2023-01-16 RX ADMIN — PANTOPRAZOLE SODIUM 40 MG: 40 TABLET, DELAYED RELEASE ORAL at 16:34

## 2023-01-16 RX ADMIN — ENOXAPARIN SODIUM 100 MG: 100 INJECTION SUBCUTANEOUS at 08:04

## 2023-01-16 RX ADMIN — ENOXAPARIN SODIUM 100 MG: 100 INJECTION SUBCUTANEOUS at 20:16

## 2023-01-16 RX ADMIN — DICYCLOMINE HYDROCHLORIDE 20 MG: 20 TABLET ORAL at 05:15

## 2023-01-16 RX ADMIN — LEVETIRACETAM 500 MG: 500 TABLET, FILM COATED ORAL at 20:15

## 2023-01-16 RX ADMIN — PIPERACILLIN AND TAZOBACTAM 3375 MG: 3; .375 INJECTION, POWDER, FOR SOLUTION INTRAVENOUS at 02:26

## 2023-01-16 RX ADMIN — POTASSIUM CHLORIDE: 2 INJECTION, SOLUTION, CONCENTRATE INTRAVENOUS at 06:20

## 2023-01-16 RX ADMIN — INSULIN LISPRO 2 UNITS: 100 INJECTION, SOLUTION INTRAVENOUS; SUBCUTANEOUS at 11:35

## 2023-01-16 RX ADMIN — SUCRALFATE 1 G: 1 TABLET ORAL at 13:06

## 2023-01-16 RX ADMIN — DICYCLOMINE HYDROCHLORIDE 20 MG: 20 TABLET ORAL at 11:28

## 2023-01-16 RX ADMIN — SERTRALINE HYDROCHLORIDE 50 MG: 50 TABLET ORAL at 08:04

## 2023-01-16 RX ADMIN — SUCRALFATE 1 G: 1 TABLET ORAL at 08:04

## 2023-01-16 RX ADMIN — DILTIAZEM HYDROCHLORIDE 10 MG/HR: 5 INJECTION, SOLUTION INTRAVENOUS at 02:25

## 2023-01-16 RX ADMIN — LEVETIRACETAM 500 MG: 500 TABLET, FILM COATED ORAL at 08:04

## 2023-01-16 RX ADMIN — GABAPENTIN 300 MG: 300 CAPSULE ORAL at 13:06

## 2023-01-16 RX ADMIN — GABAPENTIN 300 MG: 300 CAPSULE ORAL at 08:04

## 2023-01-16 RX ADMIN — PIPERACILLIN AND TAZOBACTAM 3375 MG: 3; .375 INJECTION, POWDER, FOR SOLUTION INTRAVENOUS at 18:12

## 2023-01-16 RX ADMIN — POTASSIUM CHLORIDE AND SODIUM CHLORIDE: 450; 150 INJECTION, SOLUTION INTRAVENOUS at 18:16

## 2023-01-16 RX ADMIN — PIPERACILLIN AND TAZOBACTAM 3375 MG: 3; .375 INJECTION, POWDER, FOR SOLUTION INTRAVENOUS at 09:54

## 2023-01-16 ASSESSMENT — ENCOUNTER SYMPTOMS
RESPIRATORY NEGATIVE: 1
GASTROINTESTINAL NEGATIVE: 1

## 2023-01-16 NOTE — PROGRESS NOTES
Pt in bed, awake, A/O X4. Shift assessment complete, night meds given. No C/o pain at this time. . No other needs expressed. Call light in reach. Bed check in place. Will monitor.  Kristian Lindsey RN

## 2023-01-16 NOTE — CARE COORDINATION
INTERDISCIPLINARY PLAN OF CARE CONFERENCE    Date/Time: 1/16/2023 3:46 PM  Completed by: Carlitos CORMIER, ERNESTO   Case Management      Patient Name:  Sánchez Bourgeois  YOB: 1950  Admitting Diagnosis: Diverticulitis of colon [K57.32]  Acute kidney injury [facility]) [N17.9]  Acute cystitis without hematuria [N30.00]  Septic shock ([facility] [A41.9, R65.21]  Sepsis ([address] [A41.9]  Acute on chronic congestive heart failure, unspecified heart failure type ([facility] [I50.9]     Admit Date/Time:  1/13/2023  4:53 PM    Chart reviewed. Interdisciplinary team contacted or reviewed plan related to patient progress and discharge plans. Disciplines included Case Management, Nursing, and Dietitian. Current Status:ongoing   PT/OT recommendation for discharge plan of care: n/a    Expected D/C Disposition:  Home  Confirmed plan with patient and/or family Yes confirmed with: (name) pt  Met with:pt    Discharge Plan Comments: Chart review completed. Met with pt at bedside. Pt confirmed she is returning home when discharged and thinks she may need skilled home care which she has had in the past. Home care list provided to pt to review.      Home O2 in place on admit: Yes

## 2023-01-16 NOTE — PROGRESS NOTES
Admit: 2023    Name:  Charma Hodgkins  Room:  /6140-72  MRN:    [id number]        IM daily progress note      78-year-old female with recent admission to [facility] for altered mental status. Seizure suspected. EEG and MRI were negative. Discharged on [address]  Return to the emergency room last night due to altered mental status at home.   She was minimally responsive and hypotensive, started on pressors    Interval History:     Transferred back to PCU because of [employer] with RVR  Converted to NSR on cardizem gtt    Good uop with IVF , creatinine back to normal   No fevers  Tolerating diet  Abd pain improving   No nausea vomiting or diarrhea      Scheduled Meds:   enoxaparin  1 mg/kg SubCUTAneous Daily    insulin lispro  0-8 Units SubCUTAneous TID WC    insulin lispro  0-4 Units SubCUTAneous Nightly    dicyclomine  20 mg Oral TID AC    piperacillin-tazobactam  3,375 mg IntraVENous Q12H    gabapentin  300 mg Oral TID    levETIRAcetam  500 mg Oral BID    levothyroxine  88 mcg Oral Daily    [Held by provider] NIFEdipine  30 mg Oral Daily    pantoprazole  40 mg Oral BID AC    sertraline  50 mg Oral Daily    atorvastatin  10 mg Oral Daily    sucralfate  1 g Oral 4x Daily    sodium chloride flush  5-40 mL IntraVENous 2 times per day       Continuous Infusions:   IV infusion builder 50 mL/hr at 23 0620    dilTIAZem 10 mg/hr (23 0225)    sodium chloride Stopped (23 1446)       PRN Meds:  melatonin, tiZANidine, sodium chloride flush, sodium chloride, ondansetron **OR** ondansetron, polyethylene glycol, acetaminophen **OR** acetaminophen                  Objective:     Temp  Av.2 °F (36.8 °C)  Min: 98.1 °F (36.7 °C)  Max: 98.3 °F (36.8 °C)  Pulse  Av.6  Min: 75  Max: 117  BP  Min: 128/88  Max: 164/64  SpO2  Av.2 %  Min: 92 %  Max: 95 %  Patient Vitals for the past 4 hrs:   BP Temp Temp src Pulse Resp SpO2   23 0710 (!) 153/68 98.1 °F (36.7 °C) Axillary 75 18 92 %           Intake/Output Summary (Last 24 hours) at 1/16/2023 0731  Last data filed at 1/16/2023 8807  Gross per 24 hour   Intake 2565.46 ml   Output 3225 ml   Net -659.54 ml         Physical Exam:      General: elderly female up inbed  Awake, alert and oriented. Appears to be not in any distress  Mucous Membranes:  Pink , anicteric  Neck: No JVD, no carotid bruit, no thyromegaly  Chest:  Clear to auscultation bilaterally, no added sounds  Cardiovascular:  RRR S1S2 heard, no murmurs or gallops  Abdomen:  Soft, undistended, non tender, no organomegaly, BS present  Extremities: No edema or cyanosis. Distal pulses well felt  Neurological : grossly normal      Lab Data:  CBC:   Recent Labs     01/13/23  1700 01/14/23  0345 01/14/23  0950 01/14/23  1649 01/16/23  0442   WBC 13.9* 15.3*  --   --  10.5   RBC 3.48* 3.85*  --   --  3.52*   HGB 8.8* 9.7* 9.9* 9.8* 9.0*   HCT 28.5* 30.2* 31.7* 31.8* 27.9*   MCV 81.9 78.3*  --   --  79.3*   RDW 20.3* 19.4*  --   --  19.5*    490*  --   --  319       BMP:   Recent Labs     01/14/23  0345 01/15/23  0321 01/16/23  0442   * 134* 138   K 4.2 3.6 3.3*   CL 98* 103 108   CO2 21 18* 20*   BUN 34* 33* 24*   CREATININE 3.0* 1.9* 1.1       BNP: No results for input(s): BNP in the last 72 hours. PT/INR:   Recent Labs     01/13/23  1700   PROTIME 16.7*   INR 1.37*       APTT:  Recent Labs     01/13/23  1700   APTT 36.0*       CARDIAC ENZYMES:   Recent Labs     01/14/23  0345 01/14/23  0540 01/15/23  0321   TROPONINI <0.01 <0.01 <0.01       FASTING LIPID PANEL:  Lab Results   Component Value Date/Time    CHOL 198 01/10/2023 09:49 PM    HDL 56 01/10/2023 09:49 PM    HDL 81 10/06/2010 02:16 PM    TRIG 98 01/10/2023 09:49 PM     LIVER PROFILE:   Recent Labs     01/13/23  1700 01/14/23  0345   AST 85* 144*   ALT 28 56*   BILIDIR  --  <0.2   BILITOT 0.4 0.5   ALKPHOS 220* 226*           CT ABDOMEN PELVIS W IV CONTRAST Additional Contrast? None   Final Result   1.  Diverticulosis worse in the sigmoid with mild inflammatory changes   suggestive of mild acute diverticulitis. 2. No evidence of bowel obstruction or perforation. 3. No evidence of intra-abdominal or pelvic abscess. 4. Mild bilateral lower lobe atelectatic changes. 5. Stable fat-containing periumbilical hernia. CT HEAD WO CONTRAST   Final Result   No acute intracranial abnormality. XR CHEST PORTABLE   Final Result   Worsening compared to the previous evaluation with more pronounced congestion   and perihilar edema suggestive of CHF. ECHO  9/6/9633   LV systolic function is hyperdynamic with EF estimated at >65%. No regional wall motion abnormalities. There is mild concentric left ventricular hypertrophy. Grade II diastolic dysfunction with elevated left ventricular filling   pressure. The left atrium is severely dilated. The right atrium is mildly dilated. Moderate mitral annular calcification. Mild-to-moderate mitral stenosis. Mild mitral regurgitation. The maximal transaortic velocity is 2.99m/s which gives peak pressure   gradient= 36mmHg and mean pressure gradient= 20mmHg which is c/w mild aortic stenosis. There is a possible pleural effusion. Assessment & Plan:       Acute Metabolic encephalopathy  Secondary to hypotension and sepsis , acute kidney injury  Resolved quickly     Hypovolemic vs septic  shock  Acute sigmoid  diverticulitis   Resolved with IV fluids and Levophed  Now off pressors  Continue abx as below    Acute kidney injury  Secondary to ATN from  hypotension from multiple antihypertensive medications. Nephrology consulted  Improved with IV fluids    Atrial flutter with RVR  Likely with sepsis, converted to NSR with IV cardizem gtt  Change to oral meds.  Stop home nifedipine  Recent echo shows mild to moderate mitral stenosis with normal EF  Lovenox bid  Check TSH  Cardiology consulted    Acute sigmoid  diverticulitis  Patient not have any tenderness left lower quadrant  Continue Zosyn for now  No fever,s wbc improved  Pt reports normal colonoscopy a year ago    Chronic diastolic congestive heart failure  Stable  Resume diuretics in am    History of possible seizures  Seen by neurology recently at [address]    Full Code  Lovenox bid     Wanda Corral MD, 1/16/2023 12:04 PM

## 2023-01-16 NOTE — PROGRESS NOTES
[facility] Progress Note           Subjective/   58-year-old female past medical history of CHF, COPD, diabetes mellitus, hypertension hyperlipidemia, seizures, thyroid disease, anemia brought in by family on 1/13/2023 for altered mental status. Patient reportedly was minimally responsive and hypotensive. No acute events in the last 24 hrs  Urine output of 2.5 L.  2.5 and. Objective/     GEN:  Chronically ill, BP (!) 153/68   Pulse 75   Temp 98.1 °F (36.7 °C) (Axillary)   Resp 18   Ht 5' 1\" (1.549 m)   Wt 206 lb 6.4 oz (93.6 kg)   SpO2 92%   BMI 39.00 kg/m²   Gen:  awake, nad    Skin: no rash, turgor wnl  Heent:  eomi, mmm  Neck: supple, No jvd noted   Cardiovascular:  RRR, S1, S2  Respiratory: CTAB , symmetric movement  Abdomen:  soft , non tender   Ext: nontraumatic , no pedal edema  Psychiatric: mood and affect appropriate  Musculoskeletal:  Rom, muscular strength intact    Data/  Recent Labs     01/13/23  1700 01/14/23  0345 01/14/23  0950 01/14/23  1649 01/16/23  0442   WBC 13.9* 15.3*  --   --  10.5   HGB 8.8* 9.7* 9.9* 9.8* 9.0*   HCT 28.5* 30.2* 31.7* 31.8* 27.9*   MCV 81.9 78.3*  --   --  79.3*    490*  --   --  319       Recent Labs     01/14/23  0345 01/15/23  0321 01/16/23  0442   * 134* 138   K 4.2 3.6 3.3*   CL 98* 103 108   CO2 21 18* 20*   GLUCOSE 224* 229* 128*   BUN 34* 33* 24*   CREATININE 3.0* 1.9* 1.1   LABGLOM 16* 28* 54*           Assessment:     #  Non-Oliguric Acute Kidney Injury        - Most Likely Due to ischemic ATN in setting of septic shock. - Baseline Cr 1.0-1.3, worsening to 3.5 on presentation this admission.   Patient creatinine was 1.0   recently- , 1.3 on 1/11/2023 improving to 0.8 on 1/12/2023      - U/A shows WBC 10-20, RBC 5-10, Pr trace, moderate leukoesterase, negative nitrites, 6-10 epithelial cells      -CT without any hydronephrosis or obstruction Urinary bladder contains Ralph catheter     # Electrolytes :   -Hyponatremia  -Hyperkalemia in setting of VIOLETA     # Volume status :  -Not overtly hypervolemic    -proBNP 1726     # Acid Base :  -Non anion gap metabolic acidosis              # Anemia :   -Normocytic anemia, leukocytosis  -Ferritin 30, iron saturation 15     # Hypertension :  -  Diastolic heart failure     Septic shock  On pressors.     Plan:    -Patient's creatinine improving to 1.1.  Urine output adequate.  -Patient making more than 3 L of urine.?  Polyuric phase of ATN.  -If continues to have polyuria, we will add half saline with sodium bicarbonate for 1 L but monitor urine output for now.  -Can add p.o. sodium bicarbonate 650 mg 2 times daily daily bicarbonate is 24 or more  -Blood sugar control  -Hold lisinopril to the outpatient in 3 to 4 weeks.  -Replace potassium.          Thank you for the consultation.  Please do not hesitate to call with questions.    ______________________________  Rose Rosario MD  [facility]  [email or web address]  Office: [phone]
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
## 2023-01-16 NOTE — FLOWSHEET NOTE
01/16/23 0710   Vital Signs   Temp 98.1 °F (36.7 °C)   Temp Source Axillary   Heart Rate 75   Resp 18   BP (!) 153/68   MAP (Calculated) 96   BP Location Left upper arm   BP Method Automatic   Patient Position Semi fowlers   Level of Consciousness 0   MEWS Score 1   Oxygen Therapy   SpO2 92 %   O2 Device None (Room air)   Patient resting quietly in bed. No s/s of distress noted. Shift assessment complete, see flow sheet. Cardizem gtt continues at 10mg/hr. Remains in NSR. Call light in reach. Will monitor.
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
## 2023-01-16 NOTE — PROGRESS NOTES
Pt in bed, eyes closed. No distress noted. Call light in reach. Will continue to monitor.   Ghassan Lee RN

## 2023-01-16 NOTE — DISCHARGE INSTRUCTIONS
F/w cardiology in 2 weeks  Avoid nsaids as you are now on blood thinners  High fiber diet  F.w PCP in 2  weeks

## 2023-01-17 VITALS
WEIGHT: 207.2 LBS | HEART RATE: 71 BPM | DIASTOLIC BLOOD PRESSURE: 63 MMHG | HEIGHT: 61 IN | BODY MASS INDEX: 39.12 KG/M2 | TEMPERATURE: 98.2 F | RESPIRATION RATE: 20 BRPM | OXYGEN SATURATION: 94 % | SYSTOLIC BLOOD PRESSURE: 154 MMHG

## 2023-01-17 LAB
BLOOD CULTURE, ROUTINE: NORMAL
CULTURE, BLOOD 2: NORMAL
EKG ATRIAL RATE: 82 BPM
EKG DIAGNOSIS: NORMAL
EKG P AXIS: 17 DEGREES
EKG P-R INTERVAL: 152 MS
EKG Q-T INTERVAL: 360 MS
EKG QRS DURATION: 74 MS
EKG QTC CALCULATION (BAZETT): 420 MS
EKG R AXIS: -46 DEGREES
EKG T AXIS: 127 DEGREES
EKG VENTRICULAR RATE: 82 BPM
GLUCOSE BLD-MCNC: 126 MG/DL (ref 70–99)
GLUCOSE BLD-MCNC: 176 MG/DL (ref 70–99)
PERFORMED ON: ABNORMAL
PERFORMED ON: ABNORMAL

## 2023-01-17 PROCEDURE — 6370000000 HC RX 637 (ALT 250 FOR IP): Performed by: HOSPITALIST

## 2023-01-17 PROCEDURE — 6370000000 HC RX 637 (ALT 250 FOR IP): Performed by: INTERNAL MEDICINE

## 2023-01-17 PROCEDURE — 6360000002 HC RX W HCPCS: Performed by: INTERNAL MEDICINE

## 2023-01-17 PROCEDURE — 2580000003 HC RX 258: Performed by: INTERNAL MEDICINE

## 2023-01-17 PROCEDURE — 2580000003 HC RX 258: Performed by: NURSE PRACTITIONER

## 2023-01-17 PROCEDURE — 6360000002 HC RX W HCPCS: Performed by: NURSE PRACTITIONER

## 2023-01-17 RX ORDER — AMOXICILLIN AND CLAVULANATE POTASSIUM 875; 125 MG/1; MG/1
1 TABLET, FILM COATED ORAL 2 TIMES DAILY
Qty: 20 TABLET | Refills: 0 | Status: SHIPPED | OUTPATIENT
Start: 2023-01-17 | End: 2023-01-27

## 2023-01-17 RX ORDER — DILTIAZEM HYDROCHLORIDE 120 MG/1
120 CAPSULE, EXTENDED RELEASE ORAL DAILY
Qty: 30 CAPSULE | Refills: 3 | Status: SHIPPED | OUTPATIENT
Start: 2023-01-17

## 2023-01-17 RX ORDER — METOPROLOL SUCCINATE 25 MG/1
25 TABLET, EXTENDED RELEASE ORAL DAILY
Status: DISCONTINUED | OUTPATIENT
Start: 2023-01-17 | End: 2023-01-17 | Stop reason: HOSPADM

## 2023-01-17 RX ORDER — FUROSEMIDE 20 MG/1
20 TABLET ORAL DAILY
Status: DISCONTINUED | OUTPATIENT
Start: 2023-01-17 | End: 2023-01-17 | Stop reason: HOSPADM

## 2023-01-17 RX ORDER — FERROUS SULFATE 325(65) MG
325 TABLET ORAL
Qty: 90 TABLET | Refills: 0 | Status: SHIPPED | OUTPATIENT
Start: 2023-01-17

## 2023-01-17 RX ADMIN — SUCRALFATE 1 G: 1 TABLET ORAL at 12:08

## 2023-01-17 RX ADMIN — LEVOTHYROXINE SODIUM 88 MCG: 88 TABLET ORAL at 05:23

## 2023-01-17 RX ADMIN — GABAPENTIN 300 MG: 300 CAPSULE ORAL at 08:49

## 2023-01-17 RX ADMIN — FUROSEMIDE 20 MG: 20 TABLET ORAL at 08:49

## 2023-01-17 RX ADMIN — METOPROLOL SUCCINATE 25 MG: 25 TABLET, EXTENDED RELEASE ORAL at 08:49

## 2023-01-17 RX ADMIN — PIPERACILLIN AND TAZOBACTAM 3375 MG: 3; .375 INJECTION, POWDER, FOR SOLUTION INTRAVENOUS at 02:08

## 2023-01-17 RX ADMIN — DICYCLOMINE HYDROCHLORIDE 10 MG: 10 CAPSULE ORAL at 05:23

## 2023-01-17 RX ADMIN — ATORVASTATIN CALCIUM 10 MG: 10 TABLET, FILM COATED ORAL at 08:49

## 2023-01-17 RX ADMIN — Medication 3 MG: at 02:09

## 2023-01-17 RX ADMIN — DICYCLOMINE HYDROCHLORIDE 10 MG: 10 CAPSULE ORAL at 12:08

## 2023-01-17 RX ADMIN — DILTIAZEM HYDROCHLORIDE 120 MG: 120 CAPSULE, EXTENDED RELEASE ORAL at 08:50

## 2023-01-17 RX ADMIN — ENOXAPARIN SODIUM 100 MG: 100 INJECTION SUBCUTANEOUS at 08:50

## 2023-01-17 RX ADMIN — GABAPENTIN 300 MG: 300 CAPSULE ORAL at 14:38

## 2023-01-17 RX ADMIN — PANTOPRAZOLE SODIUM 40 MG: 40 TABLET, DELAYED RELEASE ORAL at 05:23

## 2023-01-17 RX ADMIN — PIPERACILLIN AND TAZOBACTAM 3375 MG: 3; .375 INJECTION, POWDER, FOR SOLUTION INTRAVENOUS at 10:36

## 2023-01-17 RX ADMIN — SUCRALFATE 1 G: 1 TABLET ORAL at 08:50

## 2023-01-17 RX ADMIN — LEVETIRACETAM 500 MG: 500 TABLET, FILM COATED ORAL at 08:50

## 2023-01-17 RX ADMIN — IRON SUCROSE 100 MG: 20 INJECTION, SOLUTION INTRAVENOUS at 14:40

## 2023-01-17 RX ADMIN — SERTRALINE HYDROCHLORIDE 50 MG: 50 TABLET ORAL at 08:49

## 2023-01-17 NOTE — DISCHARGE SUMMARY
Name:  Angelina Gonzalez  Room:  /8836-34  MRN:    [id number]    Discharge Summary      This discharge summary is in conjunction with a complete physical exam done on the day of discharge. Discharging Physician: Dr. Rodney Tran MD    Admit: 1/13/2023  Discharge:  1/17/2023     HPI taken from admission H&P:    71 y.o. female discharged yesterday following admission for diverticulitis, brought in by family due to AMS from home. Patient presented to the ER minimally responsive and hypotensive, requiring central line placement and initiation of pressor support. Patient is currently see in the ICU, minimally responsive, on NC O2, in no respiratory distress, unable to contribute to hpi. Diagnoses this Admission and Hospital Course     Acute Metabolic encephalopathy  Secondary to hypotension and sepsis , acute kidney injury  Resolved quickly with correction of above  Mentation back to normal     Hypovolemic vs septic  shock- favor septic  Acute sigmoid  diverticulitis   Resolved with IV fluids and Levophed  Now off pressors  Continue abx as below     Acute kidney injury  Secondary to ATN from  hypotension from multiple antihypertensive medications. Nephrology consulted  Improved with IV fluids and creatinine back to normal  Nifedipine changed to cardizem for Aflutter  Resume toprol, low dose ACEI  Lasix as needed        Atrial flutter with RVR  Likely with sepsis, converted to NSR with IV cardizem gtt  Change to oral meds.  Stop home nifedipine  Recent echo shows mild to moderate mitral stenosis with normal EF  Lovenox bid  Cardiology consulted  Change to eliquis at [place]  Cardiology f.w outpt     Acute sigmoid  diverticulitis  Patient not have any tenderness left lower quadrant  Tx with Zosyn   No fever, wbc improved  Pt reports normal colonoscopy a year ago      Chronic diastolic congestive heart failure  Stable  Resume diuretics      History of possible seizures  Seen by neurology recently at [address] [place] [address] Keppra     Full Code  Lovenox bid > eliquis     Ok to dc     Procedures (Please Review Full Report for Details)  none    Consults    Nephrology  Pulmonology  Cardiology      Physical Exam at Discharge:    BP (!) 154/63   Pulse 71   Temp 98.2 °F (36.8 °C) (Oral)   Resp 20   Ht 5' 1\" (1.549 m)   Wt 207 lb 3.2 oz (94 kg)   SpO2 94%   BMI 39.15 kg/m²   General: elderly female up inbed  Awake, alert and oriented. Appears to be not in any distress  Mucous Membranes:  Pink , anicteric  Neck: No JVD, no carotid bruit, no thyromegaly  Chest:  Clear to auscultation bilaterally, no added sounds  Cardiovascular:  RRR S1S2 heard, no murmurs or gallops  Abdomen:  Soft, undistended, non tender, no organomegaly, BS present  Extremities: No edema or cyanosis. Distal pulses well felt  Neurological : grossly normal        Lab Data:  CBC:         Recent Labs     01/14/23  0950 01/14/23  1649 01/16/23 0442   WBC  --   --  10.5   RBC  --   --  3.52*   HGB 9.9* 9.8* 9.0*   HCT 31.7* 31.8* 27.9*   MCV  --   --  79.3*   RDW  --   --  19.5*   PLT  --   --  319         BMP:        Recent Labs     01/15/23  0321 01/16/23 0442   * 138   K 3.6 3.3*    108   CO2 18* 20*   BUN 33* 24*   CREATININE 1.9* 1.1         BNP: No results for input(s): BNP in the last 72 hours. PT/INR:   No results for input(s): PROTIME, INR in the last 72 hours. APTT:  No results for input(s): APTT in the last 72 hours. CARDIAC ENZYMES:       Recent Labs     01/15/23  0321   TROPONINI <0.01         FASTING LIPID PANEL:        Lab Results   Component Value Date/Time     CHOL 198 01/10/2023 09:49 PM     HDL 56 01/10/2023 09:49 PM     HDL 81 10/06/2010 02:16 PM     TRIG 98 01/10/2023 09:49 PM      LIVER PROFILE:   No results for input(s): AST, ALT, ALB, BILIDIR, BILITOT, ALKPHOS in the last 72 hours. CT ABDOMEN PELVIS W IV CONTRAST Additional Contrast? None   Final Result   1.  Diverticulosis worse in the sigmoid with mild inflammatory changes   suggestive of mild acute diverticulitis. 2. No evidence of bowel obstruction or perforation. 3. No evidence of intra-abdominal or pelvic abscess. 4. Mild bilateral lower lobe atelectatic changes. 5. Stable fat-containing periumbilical hernia. CT HEAD WO CONTRAST   Final Result   No acute intracranial abnormality. XR CHEST PORTABLE   Final Result   Worsening compared to the previous evaluation with more pronounced congestion   and perihilar edema suggestive of CHF. ECHO  9/9/3189   LV systolic function is hyperdynamic with EF estimated at >65%. No regional wall motion abnormalities. There is mild concentric left ventricular hypertrophy. Grade II diastolic dysfunction with elevated left ventricular filling   pressure. The left atrium is severely dilated. The right atrium is mildly dilated. Moderate mitral annular calcification. Mild-to-moderate mitral stenosis. Mild mitral regurgitation. The maximal transaortic velocity is 2.99m/s which gives peak pressure   gradient= 36mmHg and mean pressure gradient= 20mmHg which is c/w mild aortic stenosis. There is a possible pleural effusion. Discharge Medications     Medication List        START taking these medications      amoxicillin-clavulanate 875-125 MG per tablet  Commonly known as:  Augmentin  Take 1 tablet by mouth 2 times daily for 10 days     apixaban 5 MG Tabs tablet  Commonly known as: Eliquis  Take 1 tablet by mouth 2 times daily     dilTIAZem 120 MG extended release capsule  Commonly known as: TIAZAC  Take 1 capsule by mouth daily     ferrous sulfate 325 (65 Fe) MG tablet  Commonly known as: IRON 325  Take 1 tablet by mouth daily (with breakfast)            CONTINUE taking these medications      dicyclomine 10 MG capsule  Commonly known as: BENTYL     fluocinolone acetonide 0.01 % external solution  Commonly known as: SYNALAR     fluticasone 50 MCG/ACT nasal spray  Commonly known as: FLONASE     furosemide 40 MG tablet  Commonly known as: LASIX  Take 1 tablet by mouth daily Taking daily as needed for leg swelling     gabapentin 300 MG capsule  Commonly known as: NEURONTIN     ketoconazole 2 % shampoo  Commonly known as: NIZORAL     levETIRAcetam 500 MG tablet  Commonly known as: KEPPRA  Take 1 tablet by mouth 2 times daily     levothyroxine 88 MCG tablet  Commonly known as: SYNTHROID     lisinopril 5 MG tablet  Commonly known as: PRINIVIL;ZESTRIL     metoprolol succinate 25 MG extended release tablet  Commonly known as: TOPROL XL  Take 1 tablet by mouth daily     mirtazapine 7.5 MG tablet  Commonly known as: REMERON     nystatin 189862 UNIT/GM cream  Commonly known as: MYCOSTATIN     ondansetron 4 MG tablet  Commonly known as: ZOFRAN     Opzelura 1.5 % Crea  Generic drug: Ruxolitinib Phosphate     pantoprazole 40 MG tablet  Commonly known as: PROTONIX  Take 1 tablet by mouth 2 times daily (before meals)     sertraline 50 MG tablet  Commonly known as: ZOLOFT     simvastatin 20 MG tablet  Commonly known as: ZOCOR     sucralfate 1 GM tablet  Commonly known as: CARAFATE     tiZANidine 4 MG tablet  Commonly known as: ZANAFLEX     triamcinolone 0.1 % ointment  Commonly known as: KENALOG     Trulance 3 MG Tabs  Generic drug: Plecanatide            STOP taking these medications      ciprofloxacin 500 MG tablet  Commonly known as: CIPRO     lactobacillus capsule     metroNIDAZOLE 500 MG tablet  Commonly known as: FLAGYL     mupirocin 2 % ointment  Commonly known as: BACTROBAN     NIFEdipine 30 MG extended release tablet  Commonly known as: ADALAT CC               Where to Get Your Medications        These medications were sent to [address] [facility], [address] [facility],[address]  [id number], [address] [address]      Phone: [phone]   apixaban 5 MG Tabs tablet  dilTIAZem 120 MG extended release capsule  ferrous sulfate 325 (65 Fe) MG tablet       You can get these medications from any pharmacy    Bring a paper prescription for each of these medications  amoxicillin-clavulanate 875-125 MG per tablet           Discharged in stable condition to HOME     Follow Up:   Follow up with PCP in 1 week    Shira Park MD, 1/18/2023 7:11 PM

## 2023-01-17 NOTE — PROGRESS NOTES
Admit: 2023    Name:  Hang Izquierdo  Room:  /3657-62  MRN:    [id number]        IM daily progress note      67-year-old female with recent admission to [facility] for altered mental status. Seizure suspected. EEG and MRI were negative. Discharged on [address]  Return to the emergency room last night due to altered mental status at home. She was minimally responsive and hypotensive, started on pressors    Interval History:     Transferred back to PCU because of AFibb with RVR  Converted to NSR on cardizem gtt  Good uop with IVF , creatinine back to normal       Ms Dorota Meza seen up in bed, feels good with no new complaints  No fevers  Tolerating diet  Abd pain resolved   No nausea vomiting or diarrhea      Scheduled Meds:   piperacillin-tazobactam  3,375 mg IntraVENous Q8H    enoxaparin  1 mg/kg SubCUTAneous BID    dilTIAZem  120 mg Oral Daily    dicyclomine  10 mg Oral TID AC    iron sucrose (VENOFER) iv piggyback 100 mL  100 mg IntraVENous Q24H    insulin lispro  0-8 Units SubCUTAneous TID WC    insulin lispro  0-4 Units SubCUTAneous Nightly    gabapentin  300 mg Oral TID    levETIRAcetam  500 mg Oral BID    levothyroxine  88 mcg Oral Daily    pantoprazole  40 mg Oral BID AC    sertraline  50 mg Oral Daily    atorvastatin  10 mg Oral Daily    sucralfate  1 g Oral 4x Daily    sodium chloride flush  5-40 mL IntraVENous 2 times per day       Continuous Infusions:   sodium chloride 5 mL/hr at 23 0242       PRN Meds:  melatonin, tiZANidine, sodium chloride flush, sodium chloride, ondansetron **OR** ondansetron, polyethylene glycol, acetaminophen **OR** acetaminophen                  Objective:     Temp  Av.9 °F (36.6 °C)  Min: 97.6 °F (36.4 °C)  Max: 98.2 °F (36.8 °C)  Pulse  Av.7  Min: 80  Max: 100  BP  Min: 148/69  Max: 149/63  SpO2  Av.3 %  Min: 94 %  Max: 98 %  No data found.         Intake/Output Summary (Last 24 hours) at 2023 0716  Last data filed at 2023 0536  Gross per 24 hour Intake 2714.01 ml   Output 1700 ml   Net 1014.01 ml         Physical Exam:      General: elderly female up inbed  Awake, alert and oriented. Appears to be not in any distress  Mucous Membranes:  Pink , anicteric  Neck: No JVD, no carotid bruit, no thyromegaly  Chest:  Clear to auscultation bilaterally, no added sounds  Cardiovascular:  RRR S1S2 heard, no murmurs or gallops  Abdomen:  Soft, undistended, non tender, no organomegaly, BS present  Extremities: No edema or cyanosis. Distal pulses well felt  Neurological : grossly normal      Lab Data:  CBC:   Recent Labs     01/14/23  0950 01/14/23  1649 01/16/23 0442   WBC  --   --  10.5   RBC  --   --  3.52*   HGB 9.9* 9.8* 9.0*   HCT 31.7* 31.8* 27.9*   MCV  --   --  79.3*   RDW  --   --  19.5*   PLT  --   --  319       BMP:   Recent Labs     01/15/23  0321 01/16/23 0442   * 138   K 3.6 3.3*    108   CO2 18* 20*   BUN 33* 24*   CREATININE 1.9* 1.1       BNP: No results for input(s): BNP in the last 72 hours. PT/INR:   No results for input(s): PROTIME, INR in the last 72 hours. APTT:  No results for input(s): APTT in the last 72 hours. CARDIAC ENZYMES:   Recent Labs     01/15/23  0321   TROPONINI <0.01       FASTING LIPID PANEL:  Lab Results   Component Value Date/Time    CHOL 198 01/10/2023 09:49 PM    HDL 56 01/10/2023 09:49 PM    HDL 81 10/06/2010 02:16 PM    TRIG 98 01/10/2023 09:49 PM     LIVER PROFILE:   No results for input(s): AST, ALT, ALB, BILIDIR, BILITOT, ALKPHOS in the last 72 hours. CT ABDOMEN PELVIS W IV CONTRAST Additional Contrast? None   Final Result   1. Diverticulosis worse in the sigmoid with mild inflammatory changes   suggestive of mild acute diverticulitis. 2. No evidence of bowel obstruction or perforation. 3. No evidence of intra-abdominal or pelvic abscess. 4. Mild bilateral lower lobe atelectatic changes. 5. Stable fat-containing periumbilical hernia.          CT HEAD WO CONTRAST   Final Result   No acute intracranial abnormality. XR CHEST PORTABLE   Final Result   Worsening compared to the previous evaluation with more pronounced congestion   and perihilar edema suggestive of CHF. ECHO  5/9/7843   LV systolic function is hyperdynamic with EF estimated at >65%. No regional wall motion abnormalities. There is mild concentric left ventricular hypertrophy. Grade II diastolic dysfunction with elevated left ventricular filling   pressure. The left atrium is severely dilated. The right atrium is mildly dilated. Moderate mitral annular calcification. Mild-to-moderate mitral stenosis. Mild mitral regurgitation. The maximal transaortic velocity is 2.99m/s which gives peak pressure   gradient= 36mmHg and mean pressure gradient= 20mmHg which is c/w mild aortic stenosis. There is a possible pleural effusion. Assessment & Plan:       Acute Metabolic encephalopathy  Secondary to hypotension and sepsis , acute kidney injury  Resolved quickly with correction of above  Mentation back to normal    Hypovolemic vs septic  shock- favor septic  Acute sigmoid  diverticulitis   Resolved with IV fluids and Levophed  Now off pressors  Continue abx as below    Acute kidney injury  Secondary to ATN from  hypotension from multiple antihypertensive medications. Nephrology consulted  Improved with IV fluids and creatinine back to normal  Nifedipine changed to cardizem for Aflutter  Resume toprol, low dose ACEI  Lasix as needed      Atrial flutter with RVR  Likely with sepsis, converted to NSR with IV cardizem gtt  Change to oral meds.  Stop home nifedipine  Recent echo shows mild to moderate mitral stenosis with normal EF  Lovenox bid  Cardiology consulted  Change to eliquis at dc  Cardiology f.w outpt    Acute sigmoid  diverticulitis  Patient not have any tenderness left lower quadrant  Tx with Zosyn   No fever, wbc improved  Pt reports normal colonoscopy a year ago     Chronic diastolic congestive heart failure  Stable  Resume diuretics     History of possible seizures  Seen [facility] neurology [facility] at [facility]  Continue Keppra    Full Code  Lovenox bid > eliquis    Ok to dc     Drew Mercado MD, 1/17/2023 7:16 AM
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
## 2023-01-17 NOTE — CARE COORDINATION
[facility]  Received referral regarding [facility]. services from [address] [place] [address]. Noted in epic pt has used [facility]. recently for [facility] services. Telephone call to [facility] with [facility]. LM. Spoke with Anahy at [facility]. Per [facility], pt was recently discharged in November 2022 and [facility] is able to service pt for SN, PT/OT. Aware of DC home today. [facility] has epic access and can pull HC orders. Collaborated with CM to arrange HC.       Electronically signed by Magali Bird RN on 1/17/2023 at 9:49 AM

## 2023-01-17 NOTE — CARE COORDINATION
DISCHARGE ORDER  Date/Time 2023 9:48 AM  Completed by: Sotero Sin MSW, ERNESTO  Case Management    Patient Name: Gideon Dakin      : 1950  Admitting Diagnosis: Diverticulitis of colon [K57.32]  Acute kidney injury ([address] [N17.9]  Acute cystitis without hematuria [N30.00]  Septic shock ([address] [A41.9, R65.21]  Sepsis ([address] [A41.9]  Acute on chronic congestive heart failure, unspecified heart failure type ([address] [I50.9]      Admit order Date and Status:2023 Inpatient   (verify MD's last order for status of admission)      Noted discharge order. If applicable PT/OT recommendation at Discharge: n/a  DME recommendation by PT/OT:n/a    Confirmed discharge plan: Yes  with whom pt  If pt confirmed DC plan does family need to be contacted by CM No if yes who n/a    Discharge Plan:     Date of Last IMM Given: today; pt in agreement with discharge    Reviewed chart. Role of discharge planner explained and patient verbalized understanding. Discharge order is noted. Met with pt at bedside. Pt confirmed she is returning home and will need a cab today. She states she may be able to pay depending on how much it is. She requested referral to [facility]) as she has had them in the past and if they can't take her, okay with [facility] assisting with locating another agency. She denied all other needs from . Referral called to [facility] for RN/PT/OT with discharge today; requested start of care information once obtained. Addendum at 12:04pm: received voicemail from [facility] stating they aren't able to accept but pt recently had [address] [place] [address] care (sept-dec 22) and Anahy at [facility] stated they are able to accept. Hemant Zhong has access to epic and aware of discharge home with today with start of care to start within 24-48 hours. Pt updated at bedside and in agreement. She states her address is the same and phone number is [phone].  Pt aware to call [facility] if they don't call her by tomorrow which she stated understanding. Jennifer Abdi at [facility] confirmed they have current number for pt. Yonis Diamond RN updated    Has Home O2 in place on admit:  Yes    Pt is being d/c'd to home today. Pt's O2 sats are 95% on Room Air per vitals in epic. Discharge timeout done with [facility]. All discharge needs and concerns addressed.     Addendum at 4:21pm: Noted Elidarienuis on the AVS. Spoke with Willian at [facility] to beds where RX went per AVS who states no issues with Eliquis as it was marked sold in their system to pt

## 2023-01-17 NOTE — PROGRESS NOTES
Discharge instructions given/explained to patient, all questions answered. IV and telemetry removed. Discharged to home in stable condition with all belongings via taxi with taxi cab voucher.   Cinthya Ortiz RN  1/17/2023

## 2023-01-17 NOTE — DISCHARGE INSTR - COC
Continuity of Care Form    Patient Name: Dorota Lew   :  1950  MRN:  [id number]    Admit date:  2023  Discharge date:  2023    Code Status Order: Full Code   Advance Directives:     Admitting Physician:  Joyce Luz MD  PCP: Yudelka Cardoza DO    Discharging Nurse: [facility] Unit/Room#: /1019-44  Discharging Unit Phone Number: ***    Emergency Contact:   Extended Emergency Contact Information  Primary Emergency Contact: [facility]  Address: [address] Phone: [phone]  Mobile Phone: [phone]  Relation: Child   needed?  No    Past Surgical History:  Past Surgical History:   Procedure Laterality Date    CATARACT REMOVAL WITH IMPLANT Left 14    CATARACT REMOVAL WITH IMPLANT Right 1/2/15    phacoemulsification with initraocular lens implant     SECTION      x3    COLONOSCOPY  12    HYSTERECTOMY (CERVIX STATUS UNKNOWN)      TONSILLECTOMY      TUBAL LIGATION      UPPER GASTROINTESTINAL ENDOSCOPY  2014    gastric polyp    UPPER GASTROINTESTINAL ENDOSCOPY  2014    gastric polyp    UPPER GASTROINTESTINAL ENDOSCOPY  2018    gastritis       Immunization History:   Immunization History   Administered Date(s) Administered    COVID-19, MODERNA BLUE border, Primary or Immunocompromised, (age 12y+), IM, 100 mcg/0.5mL 04/15/2021, 2021    Influenza Virus Vaccine 2010, 2014, 2017, 2018, 2018, 2019, 2019, 2019    Influenza Whole 2010    Influenza, FLUARIX, FLULAVAL, Louvenia Mike (age 10 mo+) AND AFLURIA, (age 1 y+), PF, 0.5mL 10/07/2016, 2018    Pneumococcal Conjugate 13-valent ([id number]) 2016    Pneumococcal Polysaccharide (Ecphidmac22) 2011, 2017, 2017, 2018, 2018, 2019, 2019, 2019    Td, unspecified formulation 2017       Active Problems:  Patient Active Problem List   Diagnosis Code    GERD (gastroesophageal reflux disease) K21.9    Environmental allergies Z91.09    Chronic neck, back, & bilateral LE pain M79.604, M79.605    Diverticulosis K57.90    SIRS (systemic inflammatory response syndrome) ([facility]) R65.10    Acute respiratory failure with hypoxia ([facility]) J96.01    Obesity E66.9    Hypothyroidism E03.9    Acute encephalopathy G93.40    Acute cystitis without hematuria N30.00    Septic shock ([facility]) A41.9, R65.21    Acute kidney injury ([facility] N17.9    Acute on chronic diastolic CHF (congestive heart failure) ([facility]) I50.33    Benign essential HTN I10    Anxiety and depression F41.9, F32. A    Elevated LFTs R79.89    Acute hyperglycemia R73.9    Well controlled type 2 diabetes mellitus ([facility] E11.9    Hypovitaminosis D E55.9    Possible/questionable hx of seizures R56.9    Right hand weakness & numbness R29.898    Acute-on-chronic low back pain with radicular symptoms M54.50    Trimalleolar fracture of left ankle, closed, initial encounter S82.852A    Ankle fracture, bimalleolar, closed, left, initial encounter S82.842A    Acute respiratory failure with hypoxemia ([facility]) J96.01    Closed fracture of left ankle S82.892A    Aspiration pneumonitis ([facility]) J69.0    Acute respiratory failure ([facility]) J96.00    OD (overdose of drug) T50.901A    Drug ingestion BCP4058    Toxic encephalopathy G92.9    History of depression Z86.59    Misuse of prescription only drugs F19.90    GI bleed K92.2    Elevated lactic acid level R79.89    Hyperkalemia E87.5    Leukocytosis D72.829    Thrombocytosis D75.839    High anion gap metabolic acidosis Q48.75    Hypovolemic shock ([facility]) R57.1    NSTEMI (non-ST elevated myocardial infarction) ([facility]) I21.4    Abnormal CXR R93.89    PSVT (paroxysmal supraventricular tachycardia) ([facility]) I47.1    PAF (paroxysmal atrial fibrillation) ([facility]) I48.0    Community acquired pneumonia of left lower lobe of lung J18.9    Acute focal neurological deficit R29.818 Diverticulitis K57.92    Diverticulitis of colon K57.32    Acute hyponatremia E87.1    COPD without exacerbation ([facility]) J44.9    Generalized abdominal pain R10.84    SOB (shortness of breath) R06.02    Delusions ([facility]) F22    Leg edema R60.0    Sepsis ([facility]) A41.9    Chest pain R07.9    Frequent falls R29.6    Seizure-like activity ([facility]) R56.9    Acute hypoxemic respiratory failure ([facility]) J96.01    Systolic murmur R01.1    Seizure ([facility]) R56.9    Acute diverticulitis K57.92    Metabolic encephalopathy G93.41    Atrial flutter with rapid ventricular response ([facility]) I48.92    Acute on chronic congestive heart failure ([facility]) I50.9    Atrial fibrillation with RVR ([facility]) I48.91       Isolation/Infection:   Isolation            No Isolation          Patient Infection Status       Infection Onset Added Last Indicated Last Indicated By Review Planned Expiration Resolved Resolved By    None active    Resolved    COVID-19 (Rule Out) 01/13/23 01/13/23 01/13/23 COVID-19 & Influenza Combo (Ordered)   01/13/23 Rule-Out Test Resulted    COVID-19 (Rule Out) 01/08/23 01/08/23 01/08/23 COVID-19 & Influenza Combo (Ordered)   01/08/23 Rule-Out Test Resulted    COVID-19 (Rule Out) 09/06/22 09/06/22 09/06/22 COVID-19 & Influenza Combo (Ordered)   09/06/22 Rule-Out Test Resulted    COVID-19 (Rule Out) 08/21/22 08/21/22 08/21/22 COVID-19 & Influenza Combo (Ordered)   08/21/22 Rule-Out Test Resulted    COVID-19 (Rule Out) 06/27/22 06/27/22 06/27/22 COVID-19 & Influenza Combo (Ordered)   06/27/22 Rule-Out Test Resulted    COVID-19 (Rule Out) 11/29/21 11/29/21 11/29/21 COVID-19 & Influenza Combo (Ordered)   11/29/21 Rule-Out Test Resulted    C-diff Rule Out 03/20/21 03/20/21 03/20/21 Clostridium difficile toxin/antigen (Ordered)   11/30/21 Benny Jackson RN    COVID-19 (Rule Out) 07/29/20 07/29/20 07/29/20 COVID-19 (Ordered)   07/31/20 Rule-Out Test Resulted            Nurse Assessment:  Last Vital Signs: BP (!) 143/66   Pulse 77   Temp 98.1 °F (36.7  °C) (Oral)   Resp 20   Ht 5' 1\" (1.549 m)   Wt 207 lb 3.2 oz (94 kg)   SpO2 95%   BMI 39.15 kg/m²     Last documented pain score (0-10 scale): Pain Level: 3  Last Weight:   Wt Readings from Last 1 Encounters:   01/17/23 207 lb 3.2 oz (94 kg)     Mental Status:  {IP PT MENTAL STATUS:16156}    IV Access:  { CYNTHIA IV ACCESS:768157046}    Nursing Mobility/ADLs:  Walking   {CHP DME JAFS:230595720}  Transfer  {CHP DME VZTK:410535106}  Bathing  {CHP DME FXZQ:002973312}  Dressing  {CHP DME IRVY:843773310}  Toileting  {CHP DME HCNA:874970552}  Feeding  {CHP DME EYLB:239296036}  Med Admin  {P DME MYXF:832463808}  Med Delivery   { CYNTHIA MED Delivery:044688802}    Wound Care Documentation and Therapy:        Elimination:  Continence: Bowel: {YES / GA:54755}  Bladder: {YES / JZ:87897}  Urinary Catheter: {Urinary Catheter:931590001}   Colostomy/Ileostomy/Ileal Conduit: {YES / XP:63287}       Date of Last BM: ***    Intake/Output Summary (Last 24 hours) at 1/17/2023 0949  Last data filed at 1/17/2023 0936  Gross per 24 hour   Intake 2334.01 ml   Output 1800 ml   Net 534.01 ml     I/O last 3 completed shifts:   In: 3649.8 [P.O.:2056; I.V.:1342.7; IV Piggyback:251.2]  Out: 3150 [Urine:3150]    Safety Concerns:     [employer] Safety Concerns:828011347}    Impairments/Disabilities:      [employer] Impairments/Disabilities:765208217}    Nutrition Therapy:  Current Nutrition Therapy:   [employer] Diet List:272062035}    Routes of Feeding: {CHP DME Other Feedings:205781813}  Liquids: {Slp liquid thickness:22044}  Daily Fluid Restriction: {CHP DME Yes amt example:369687930}  Last Modified Barium Swallow with Video (Video Swallowing Test): {Done Not Done [facility]:849289609}    Treatments at the Time of Hospital Discharge:   Respiratory Treatments: ***  Oxygen Therapy:  {Therapy; copd oxygen:78897}  Ventilator:    {GARTH FRAGA Vent [facility]:830533658}    Rehab Therapies: {THERAPEUTIC INTERVENTION:8934694906}  Weight Bearing Status/Restrictions: {GARTH FRAGA Weight Bearin}  Other Medical Equipment (for information only, NOT a DME order):  {EQUIPMENT:649321912}  Other Treatments: ***    Patient's personal belongings (please select all that are sent with patient):  {CHP DME Belongings:847283884}    RN SIGNATURE:  {Esignature:614680466}    CASE MANAGEMENT/SOCIAL WORK SECTION  Inpatient Status Date: 2023    Readmission Risk Assessment Score:  Readmission Risk              Risk of Unplanned Readmission:  49           [facility]   [facility]  Address:   Phone: [phone]  Fax:      / signature: Electronically signed by Kylee Flowers RN on 23 at 9:49 AM EST    PHYSICIAN SECTION    Prognosis: Good    Condition at Discharge: Stable    Rehab Potential (if transferring to Rehab):     Recommended Labs or Other Treatments After Discharge: SN, PT/OT    Physician Certification: I certify the above information and transfer of Bernardo Reus  is necessary for the continuing treatment of the diagnosis listed and that she requires Home Care for less 30 days.      Update Admission H&P: Changes in H&P as follows - see attached    PHYSICIAN SIGNATURE: Dr Rodney Tran MD/ Electronically signed by Kylee Flowers RN on 23 at 9:50 AM EST

## 2023-01-17 NOTE — PROGRESS NOTES
[facility] Progress Note           Subjective/   52-year-old female past medical history of CHF, COPD, diabetes mellitus, hypertension hyperlipidemia, seizures, thyroid disease, anemia brought in by family on 1/13/2023 for altered mental status. Patient reportedly was minimally responsive and hypotensive. No acute events in the last 24 hrs  Urine output of 2.5 L.  2.5 and. Objective/     GEN:  Chronically ill, BP (!) 149/63   Pulse 100   Temp 98.2 °F (36.8 °C) (Oral)   Resp 18   Ht 5' 1\" (1.549 m)   Wt 207 lb 3.2 oz (94 kg)   SpO2 98%   BMI 39.15 kg/m²   Gen:  awake, nad    Skin: no rash, turgor wnl  Heent:  eomi, mmm  Neck: supple, No jvd noted   Cardiovascular:  RRR, S1, S2  Respiratory: CTAB , symmetric movement  Abdomen:  soft , non tender   Ext: nontraumatic , no pedal edema  Psychiatric: mood and affect appropriate  Musculoskeletal:  Rom, muscular strength intact    Data/  Recent Labs     01/14/23  0950 01/14/23  1649 01/16/23  0442   WBC  --   --  10.5   HGB 9.9* 9.8* 9.0*   HCT 31.7* 31.8* 27.9*   MCV  --   --  79.3*   PLT  --   --  319       Recent Labs     01/15/23  0321 01/16/23  0442   * 138   K 3.6 3.3*    108   CO2 18* 20*   GLUCOSE 229* 128*   BUN 33* 24*   CREATININE 1.9* 1.1   LABGLOM 28* 54*           Assessment:     #  Non-Oliguric Acute Kidney Injury        - Most Likely Due to ischemic ATN in setting of septic shock. - Baseline Cr 1.0-1.3, worsening to 3.5 on presentation this admission.   Patient creatinine was 1.0   recently- , 1.3 on 1/11/2023 improving to 0.8 on 1/12/2023      - U/A shows WBC 10-20, RBC 5-10, Pr trace, moderate leukoesterase, negative nitrites, 6-10 epithelial cells      -CT without any hydronephrosis or obstruction Urinary bladder contains Ralph catheter     # Electrolytes :   -Hyponatremia  -Hyperkalemia in setting of VIOLETA     # Volume status :  -Not overtly hypervolemic    -proBNP 1726     # Acid Base :  -Non anion gap metabolic acidosis              # Anemia :   -Normocytic anemia, leukocytosis  -Ferritin 30, iron saturation 15     # Hypertension :  -  Diastolic heart failure     Septic shock  On pressors. Plan:    -Patient's creatinine improving to 1.1. Urine output adequate.  -Can add p.o. sodium bicarbonate 650 mg 2 times daily daily bicarbonate is 24 or more  -Blood sugar control  -Hold lisinopril to the outpatient in 3 to 4 weeks. Thank you for the consultation. Please do not hesitate to call with questions. ______________________________  Rusty Riojas MD  [facility]  [email or web address]  Office: [phone]

## 2023-01-17 NOTE — PROGRESS NOTES
Pt in bed, awake, A/O X4. Shift assessment complete, night meds given. No C/O pain at this time. Up to [facility] X1 tolerated well. . No other needs expressed. Call light in reach. Bed check in place. Will monitor.  Katy Silva RN

## 2023-01-17 NOTE — PROGRESS NOTES
Pt in bed, eyes closed. No distress noted. Call light in reach. Will continue to monitor.   Acacia Velasco RN

## 2023-01-19 ENCOUNTER — FOLLOWUP TELEPHONE ENCOUNTER (OUTPATIENT)
Dept: ADMINISTRATIVE | Age: 73
End: 2023-01-19

## 2023-01-19 NOTE — TELEPHONE ENCOUNTER
Heart Failure Follow-up Call:     1st Attempt; No Answer- Left HIPAA compliant voicemail with Non-Urgent Heart Failure Resource Line number for call back.     Electronically signed by Tian Stout RN on 1/19/2023 at 1:11 PM

## 2023-01-20 ENCOUNTER — FOLLOWUP TELEPHONE ENCOUNTER (OUTPATIENT)
Dept: ADMINISTRATIVE | Age: 73
End: 2023-01-20

## 2023-01-20 NOTE — TELEPHONE ENCOUNTER
Heart Failure Follow-Up Call:    Call within 72 Hours of Discharge: Yes     Patient: Armand Friedman   Patient : 1950   MRN: [id number]    Date of discharge: 23    Discharge department/facility: [facility]    Discharge Disposition: Home    RARS: Readmission Risk Score: 24.5    Spoke with: Jimmy Lockett / Patient     Patient is up moving and ambulating in home. No oxygen requirements when discharged. Stated she had some mild chest pain of 3/10 that resolved. Denied any further pain. Provided the Patient with Heart Failure education on: signs/symptoms to monitor, medications, daily weights, low sodium diet, 2000 ml fluid restriction, and activity. Patient stated she has not used the scale yet that was given to her. Reinforced the importance of daily weights and to report weight gain of 3 lbs in one day and 5 lbs in one week to Provider. Patient said she would start weighing her self tomorrow. Reminded patient of follow-up appointment on 23 at 1 pm with PCP office. Patient has two daughters at home who are helping. Stated she has not heard from [employer] on when they will be out. Called [facility] for patient and they had no paperwork on patient and was going to check on it and give me a call back. Provided patient with Heart Failure nurse resource number for any further questions/assistance.      Follow Up:    23 at 1 pm with PCP Kashif Loco    Future Appointments   Date Time Provider Natalee Bolaños   2023  9:30 AM BLUE Minaya - CNP P [facility]       Electronically signed by Asha Giordano RN on 2023 at 12:13 PM

## 2023-04-19 ENCOUNTER — OFFICE VISIT (OUTPATIENT)
Dept: ENT CLINIC | Age: 73
End: 2023-04-19
Payer: MEDICARE

## 2023-04-19 VITALS
HEART RATE: 95 BPM | DIASTOLIC BLOOD PRESSURE: 70 MMHG | WEIGHT: 207 LBS | OXYGEN SATURATION: 95 % | HEIGHT: 61 IN | TEMPERATURE: 98.3 F | SYSTOLIC BLOOD PRESSURE: 137 MMHG | BODY MASS INDEX: 39.08 KG/M2

## 2023-04-19 DIAGNOSIS — R04.0 EPISTAXIS: Primary | ICD-10-CM

## 2023-04-19 PROCEDURE — 3017F COLORECTAL CA SCREEN DOC REV: CPT | Performed by: OTOLARYNGOLOGY

## 2023-04-19 PROCEDURE — G8427 DOCREV CUR MEDS BY ELIG CLIN: HCPCS | Performed by: OTOLARYNGOLOGY

## 2023-04-19 PROCEDURE — 1090F PRES/ABSN URINE INCON ASSESS: CPT | Performed by: OTOLARYNGOLOGY

## 2023-04-19 PROCEDURE — 3075F SYST BP GE 130 - 139MM HG: CPT | Performed by: OTOLARYNGOLOGY

## 2023-04-19 PROCEDURE — 1123F ACP DISCUSS/DSCN MKR DOCD: CPT | Performed by: OTOLARYNGOLOGY

## 2023-04-19 PROCEDURE — 99203 OFFICE O/P NEW LOW 30 MIN: CPT | Performed by: OTOLARYNGOLOGY

## 2023-04-19 PROCEDURE — 3078F DIAST BP <80 MM HG: CPT | Performed by: OTOLARYNGOLOGY

## 2023-04-19 PROCEDURE — G8417 CALC BMI ABV UP PARAM F/U: HCPCS | Performed by: OTOLARYNGOLOGY

## 2023-04-19 PROCEDURE — 1036F TOBACCO NON-USER: CPT | Performed by: OTOLARYNGOLOGY

## 2023-04-19 PROCEDURE — G8399 PT W/DXA RESULTS DOCUMENT: HCPCS | Performed by: OTOLARYNGOLOGY

## 2023-04-19 PROCEDURE — 31575 DIAGNOSTIC LARYNGOSCOPY: CPT | Performed by: OTOLARYNGOLOGY

## 2023-04-19 RX ORDER — ECHINACEA PURPUREA EXTRACT 125 MG
1 TABLET ORAL
Qty: 60 ML | Refills: 4 | Status: SHIPPED | OUTPATIENT
Start: 2023-04-19 | End: 2023-05-19

## 2023-04-19 ASSESSMENT — ENCOUNTER SYMPTOMS
SHORTNESS OF BREATH: 0
WHEEZING: 0
ABDOMINAL PAIN: 0
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
## 2023-05-01 ENCOUNTER — TELEPHONE (OUTPATIENT)
Dept: ENT CLINIC | Age: 73
End: 2023-05-01

## 2023-08-21 ENCOUNTER — APPOINTMENT (OUTPATIENT)
Dept: GENERAL RADIOLOGY | Age: 73
DRG: 193 | End: 2023-08-21
Payer: MEDICARE

## 2023-08-21 ENCOUNTER — HOSPITAL ENCOUNTER (INPATIENT)
Age: 73
LOS: 2 days | Discharge: HOME | DRG: 193 | End: 2023-08-23
Attending: EMERGENCY MEDICINE | Admitting: INTERNAL MEDICINE
Payer: MEDICARE

## 2023-08-21 DIAGNOSIS — R06.02 SHORTNESS OF BREATH: ICD-10-CM

## 2023-08-21 DIAGNOSIS — J44.1 COPD EXACERBATION (HCC): ICD-10-CM

## 2023-08-21 DIAGNOSIS — J18.9 PNEUMONIA OF BOTH LUNGS DUE TO INFECTIOUS ORGANISM, UNSPECIFIED PART OF LUNG: Primary | ICD-10-CM

## 2023-08-21 PROBLEM — D68.69 SECONDARY HYPERCOAGULABLE STATE (HCC): Status: ACTIVE | Noted: 2023-08-21

## 2023-08-21 LAB
ALBUMIN SERPL-MCNC: 3.2 G/DL (ref 3.4–5)
ALBUMIN/GLOB SERPL: 0.8 {RATIO} (ref 1.1–2.2)
ALP SERPL-CCNC: 228 U/L (ref 40–129)
ALT SERPL-CCNC: 11 U/L (ref 10–40)
ANION GAP SERPL CALCULATED.3IONS-SCNC: 12 MMOL/L (ref 3–16)
AST SERPL-CCNC: 16 U/L (ref 15–37)
BASE EXCESS BLDV CALC-SCNC: -3.5 MMOL/L (ref -3–3)
BASOPHILS # BLD: 0.1 K/UL (ref 0–0.2)
BASOPHILS NFR BLD: 1.3 %
BILIRUB SERPL-MCNC: 0.6 MG/DL (ref 0–1)
BILIRUB UR QL STRIP.AUTO: NEGATIVE
BUN SERPL-MCNC: 23 MG/DL (ref 7–20)
CALCIUM SERPL-MCNC: 8.9 MG/DL (ref 8.3–10.6)
CHLORIDE SERPL-SCNC: 101 MMOL/L (ref 99–110)
CLARITY UR: CLEAR
CO2 BLDV-SCNC: 23 MMOL/L
CO2 SERPL-SCNC: 21 MMOL/L (ref 21–32)
COHGB MFR BLDV: 0.4 % (ref 0–1.5)
COLOR UR: YELLOW
CREAT SERPL-MCNC: 1.2 MG/DL (ref 0.6–1.2)
DEPRECATED RDW RBC AUTO: 14 % (ref 12.4–15.4)
EKG ATRIAL RATE: 58 BPM
EKG DIAGNOSIS: NORMAL
EKG P AXIS: -3 DEGREES
EKG P-R INTERVAL: 144 MS
EKG Q-T INTERVAL: 444 MS
EKG QRS DURATION: 70 MS
EKG QTC CALCULATION (BAZETT): 435 MS
EKG R AXIS: -18 DEGREES
EKG T AXIS: 41 DEGREES
EKG VENTRICULAR RATE: 58 BPM
EOSINOPHIL # BLD: 0.8 K/UL (ref 0–0.6)
EOSINOPHIL NFR BLD: 9.1 %
FLUAV RNA RESP QL NAA+PROBE: NOT DETECTED
FLUBV RNA RESP QL NAA+PROBE: NOT DETECTED
GFR SERPLBLD CREATININE-BSD FMLA CKD-EPI: 48 ML/MIN/{1.73_M2}
GLUCOSE BLD-MCNC: 312 MG/DL (ref 70–99)
GLUCOSE BLD-MCNC: 357 MG/DL (ref 70–99)
GLUCOSE BLD-MCNC: 418 MG/DL (ref 70–99)
GLUCOSE BLD-MCNC: 418 MG/DL (ref 70–99)
GLUCOSE SERPL-MCNC: 273 MG/DL (ref 70–99)
GLUCOSE UR STRIP.AUTO-MCNC: 500 MG/DL
HCO3 BLDV-SCNC: 21.3 MMOL/L (ref 23–29)
HCT VFR BLD AUTO: 29.5 % (ref 36–48)
HGB BLD-MCNC: 10 G/DL (ref 12–16)
HGB UR QL STRIP.AUTO: NEGATIVE
KETONES UR STRIP.AUTO-MCNC: NEGATIVE MG/DL
LEUKOCYTE ESTERASE UR QL STRIP.AUTO: NEGATIVE
LYMPHOCYTES # BLD: 1.3 K/UL (ref 1–5.1)
LYMPHOCYTES NFR BLD: 14.4 %
MCH RBC QN AUTO: 30.8 PG (ref 26–34)
MCHC RBC AUTO-ENTMCNC: 34 G/DL (ref 31–36)
MCV RBC AUTO: 90.6 FL (ref 80–100)
METHGB MFR BLDV: 0 %
MONOCYTES # BLD: 0.8 K/UL (ref 0–1.3)
MONOCYTES NFR BLD: 8.3 %
NEUTROPHILS # BLD: 6.1 K/UL (ref 1.7–7.7)
NEUTROPHILS NFR BLD: 66.9 %
NITRITE UR QL STRIP.AUTO: NEGATIVE
NT-PROBNP SERPL-MCNC: 659 PG/ML (ref 0–124)
O2 CT VFR BLDV CALC: 15 VOL %
O2 THERAPY: ABNORMAL
PCO2 BLDV: 37.7 MMHG (ref 40–50)
PERFORMED ON: ABNORMAL
PH BLDV: 7.37 [PH] (ref 7.35–7.45)
PH UR STRIP.AUTO: 6 [PH] (ref 5–8)
PLATELET # BLD AUTO: 151 K/UL (ref 135–450)
PMV BLD AUTO: 9.1 FL (ref 5–10.5)
PO2 BLDV: 82.8 MMHG (ref 25–40)
POTASSIUM SERPL-SCNC: 4 MMOL/L (ref 3.5–5.1)
PROCALCITONIN SERPL IA-MCNC: 0.73 NG/ML (ref 0–0.15)
PROT SERPL-MCNC: 7 G/DL (ref 6.4–8.2)
PROT UR STRIP.AUTO-MCNC: NEGATIVE MG/DL
RBC # BLD AUTO: 3.26 M/UL (ref 4–5.2)
SAO2 % BLDV: 96 %
SARS-COV-2 RNA RESP QL NAA+PROBE: NOT DETECTED
SODIUM SERPL-SCNC: 134 MMOL/L (ref 136–145)
SP GR UR STRIP.AUTO: 1.01 (ref 1–1.03)
TROPONIN, HIGH SENSITIVITY: 11 NG/L (ref 0–14)
TROPONIN, HIGH SENSITIVITY: 19 NG/L (ref 0–14)
TSH SERPL DL<=0.005 MIU/L-ACNC: 1.23 UIU/ML (ref 0.27–4.2)
UA COMPLETE W REFLEX CULTURE PNL UR: ABNORMAL
UA DIPSTICK W REFLEX MICRO PNL UR: ABNORMAL
URN SPEC COLLECT METH UR: ABNORMAL
UROBILINOGEN UR STRIP-ACNC: 1 E.U./DL
WBC # BLD AUTO: 9.1 K/UL (ref 4–11)

## 2023-08-21 PROCEDURE — 87070 CULTURE OTHR SPECIMN AEROBIC: CPT

## 2023-08-21 PROCEDURE — 6370000000 HC RX 637 (ALT 250 FOR IP)

## 2023-08-21 PROCEDURE — 87040 BLOOD CULTURE FOR BACTERIA: CPT

## 2023-08-21 PROCEDURE — 71045 X-RAY EXAM CHEST 1 VIEW: CPT

## 2023-08-21 PROCEDURE — 82803 BLOOD GASES ANY COMBINATION: CPT

## 2023-08-21 PROCEDURE — 94669 MECHANICAL CHEST WALL OSCILL: CPT

## 2023-08-21 PROCEDURE — 99285 EMERGENCY DEPT VISIT HI MDM: CPT

## 2023-08-21 PROCEDURE — 81003 URINALYSIS AUTO W/O SCOPE: CPT

## 2023-08-21 PROCEDURE — 99223 1ST HOSP IP/OBS HIGH 75: CPT

## 2023-08-21 PROCEDURE — 93005 ELECTROCARDIOGRAM TRACING: CPT | Performed by: EMERGENCY MEDICINE

## 2023-08-21 PROCEDURE — 6370000000 HC RX 637 (ALT 250 FOR IP): Performed by: EMERGENCY MEDICINE

## 2023-08-21 PROCEDURE — 83880 ASSAY OF NATRIURETIC PEPTIDE: CPT

## 2023-08-21 PROCEDURE — 2060000000 HC ICU INTERMEDIATE R&B

## 2023-08-21 PROCEDURE — 94640 AIRWAY INHALATION TREATMENT: CPT

## 2023-08-21 PROCEDURE — 83036 HEMOGLOBIN GLYCOSYLATED A1C: CPT

## 2023-08-21 PROCEDURE — 96365 THER/PROPH/DIAG IV INF INIT: CPT

## 2023-08-21 PROCEDURE — 99223 1ST HOSP IP/OBS HIGH 75: CPT | Performed by: INTERNAL MEDICINE

## 2023-08-21 PROCEDURE — 2580000003 HC RX 258: Performed by: EMERGENCY MEDICINE

## 2023-08-21 PROCEDURE — 80053 COMPREHEN METABOLIC PANEL: CPT

## 2023-08-21 PROCEDURE — 87636 SARSCOV2 & INF A&B AMP PRB: CPT

## 2023-08-21 PROCEDURE — 6360000002 HC RX W HCPCS: Performed by: EMERGENCY MEDICINE

## 2023-08-21 PROCEDURE — 94010 BREATHING CAPACITY TEST: CPT

## 2023-08-21 PROCEDURE — 94761 N-INVAS EAR/PLS OXIMETRY MLT: CPT

## 2023-08-21 PROCEDURE — 84484 ASSAY OF TROPONIN QUANT: CPT

## 2023-08-21 PROCEDURE — 2700000000 HC OXYGEN THERAPY PER DAY

## 2023-08-21 PROCEDURE — 6360000002 HC RX W HCPCS

## 2023-08-21 PROCEDURE — 36415 COLL VENOUS BLD VENIPUNCTURE: CPT

## 2023-08-21 PROCEDURE — 2580000003 HC RX 258

## 2023-08-21 PROCEDURE — 96375 TX/PRO/DX INJ NEW DRUG ADDON: CPT

## 2023-08-21 PROCEDURE — 87205 SMEAR GRAM STAIN: CPT

## 2023-08-21 PROCEDURE — 85025 COMPLETE CBC W/AUTO DIFF WBC: CPT

## 2023-08-21 PROCEDURE — 84443 ASSAY THYROID STIM HORMONE: CPT

## 2023-08-21 PROCEDURE — 93010 ELECTROCARDIOGRAM REPORT: CPT | Performed by: INTERNAL MEDICINE

## 2023-08-21 PROCEDURE — 84145 PROCALCITONIN (PCT): CPT

## 2023-08-21 RX ORDER — GABAPENTIN 300 MG/1
300 CAPSULE ORAL 3 TIMES DAILY
Status: DISCONTINUED | OUTPATIENT
Start: 2023-08-21 | End: 2023-08-21

## 2023-08-21 RX ORDER — LEVOTHYROXINE SODIUM 88 UG/1
88 TABLET ORAL DAILY
Status: DISCONTINUED | OUTPATIENT
Start: 2023-08-21 | End: 2023-08-23 | Stop reason: HOSPADM

## 2023-08-21 RX ORDER — SODIUM CHLORIDE 0.9 % (FLUSH) 0.9 %
5-40 SYRINGE (ML) INJECTION PRN
Status: DISCONTINUED | OUTPATIENT
Start: 2023-08-21 | End: 2023-08-23 | Stop reason: HOSPADM

## 2023-08-21 RX ORDER — FERROUS SULFATE 325(65) MG
325 TABLET ORAL
Status: DISCONTINUED | OUTPATIENT
Start: 2023-08-22 | End: 2023-08-23 | Stop reason: HOSPADM

## 2023-08-21 RX ORDER — INSULIN LISPRO 100 [IU]/ML
0-4 INJECTION, SOLUTION INTRAVENOUS; SUBCUTANEOUS NIGHTLY
Status: DISCONTINUED | OUTPATIENT
Start: 2023-08-21 | End: 2023-08-22

## 2023-08-21 RX ORDER — IPRATROPIUM BROMIDE AND ALBUTEROL SULFATE 2.5; .5 MG/3ML; MG/3ML
1 SOLUTION RESPIRATORY (INHALATION) EVERY 4 HOURS PRN
Status: DISCONTINUED | OUTPATIENT
Start: 2023-08-21 | End: 2023-08-23 | Stop reason: HOSPADM

## 2023-08-21 RX ORDER — GUAIFENESIN/DEXTROMETHORPHAN 100-10MG/5
5 SYRUP ORAL EVERY 4 HOURS PRN
Status: DISCONTINUED | OUTPATIENT
Start: 2023-08-21 | End: 2023-08-23 | Stop reason: HOSPADM

## 2023-08-21 RX ORDER — IPRATROPIUM BROMIDE AND ALBUTEROL SULFATE 2.5; .5 MG/3ML; MG/3ML
1 SOLUTION RESPIRATORY (INHALATION)
Status: DISCONTINUED | OUTPATIENT
Start: 2023-08-21 | End: 2023-08-23 | Stop reason: HOSPADM

## 2023-08-21 RX ORDER — AZITHROMYCIN 250 MG/1
500 TABLET, FILM COATED ORAL EVERY 24 HOURS
Status: DISCONTINUED | OUTPATIENT
Start: 2023-08-22 | End: 2023-08-23 | Stop reason: HOSPADM

## 2023-08-21 RX ORDER — SODIUM CHLORIDE 9 MG/ML
INJECTION, SOLUTION INTRAVENOUS PRN
Status: DISCONTINUED | OUTPATIENT
Start: 2023-08-21 | End: 2023-08-23 | Stop reason: HOSPADM

## 2023-08-21 RX ORDER — METHYLPREDNISOLONE SODIUM SUCCINATE 1 G/16ML
125 INJECTION, POWDER, LYOPHILIZED, FOR SOLUTION INTRAMUSCULAR; INTRAVENOUS DAILY
Status: DISCONTINUED | OUTPATIENT
Start: 2023-08-21 | End: 2023-08-21

## 2023-08-21 RX ORDER — IPRATROPIUM BROMIDE AND ALBUTEROL SULFATE 2.5; .5 MG/3ML; MG/3ML
1 SOLUTION RESPIRATORY (INHALATION)
Status: DISCONTINUED | OUTPATIENT
Start: 2023-08-21 | End: 2023-08-21 | Stop reason: SDUPTHER

## 2023-08-21 RX ORDER — ATORVASTATIN CALCIUM 10 MG/1
10 TABLET, FILM COATED ORAL DAILY
Status: DISCONTINUED | OUTPATIENT
Start: 2023-08-21 | End: 2023-08-23 | Stop reason: HOSPADM

## 2023-08-21 RX ORDER — POLYETHYLENE GLYCOL 3350 17 G/17G
17 POWDER, FOR SOLUTION ORAL DAILY PRN
Status: DISCONTINUED | OUTPATIENT
Start: 2023-08-21 | End: 2023-08-23 | Stop reason: HOSPADM

## 2023-08-21 RX ORDER — INSULIN LISPRO 100 [IU]/ML
0-8 INJECTION, SOLUTION INTRAVENOUS; SUBCUTANEOUS
Status: DISCONTINUED | OUTPATIENT
Start: 2023-08-21 | End: 2023-08-22

## 2023-08-21 RX ORDER — ACETAMINOPHEN 650 MG/1
650 SUPPOSITORY RECTAL EVERY 6 HOURS PRN
Status: DISCONTINUED | OUTPATIENT
Start: 2023-08-21 | End: 2023-08-23 | Stop reason: HOSPADM

## 2023-08-21 RX ORDER — ONDANSETRON 4 MG/1
4 TABLET, ORALLY DISINTEGRATING ORAL EVERY 8 HOURS PRN
Status: DISCONTINUED | OUTPATIENT
Start: 2023-08-21 | End: 2023-08-23 | Stop reason: HOSPADM

## 2023-08-21 RX ORDER — ONDANSETRON 2 MG/ML
4 INJECTION INTRAMUSCULAR; INTRAVENOUS EVERY 6 HOURS PRN
Status: DISCONTINUED | OUTPATIENT
Start: 2023-08-21 | End: 2023-08-23 | Stop reason: HOSPADM

## 2023-08-21 RX ORDER — DEXTROSE MONOHYDRATE 100 MG/ML
INJECTION, SOLUTION INTRAVENOUS CONTINUOUS PRN
Status: DISCONTINUED | OUTPATIENT
Start: 2023-08-21 | End: 2023-08-23 | Stop reason: HOSPADM

## 2023-08-21 RX ORDER — SODIUM CHLORIDE 0.9 % (FLUSH) 0.9 %
5-40 SYRINGE (ML) INJECTION EVERY 12 HOURS SCHEDULED
Status: DISCONTINUED | OUTPATIENT
Start: 2023-08-21 | End: 2023-08-23 | Stop reason: HOSPADM

## 2023-08-21 RX ORDER — INSULIN LISPRO 100 [IU]/ML
0-4 INJECTION, SOLUTION INTRAVENOUS; SUBCUTANEOUS
Status: DISCONTINUED | OUTPATIENT
Start: 2023-08-21 | End: 2023-08-21

## 2023-08-21 RX ORDER — INSULIN LISPRO 100 [IU]/ML
0-4 INJECTION, SOLUTION INTRAVENOUS; SUBCUTANEOUS NIGHTLY
Status: DISCONTINUED | OUTPATIENT
Start: 2023-08-21 | End: 2023-08-21

## 2023-08-21 RX ORDER — ACETAMINOPHEN 325 MG/1
650 TABLET ORAL EVERY 6 HOURS PRN
Status: DISCONTINUED | OUTPATIENT
Start: 2023-08-21 | End: 2023-08-23 | Stop reason: HOSPADM

## 2023-08-21 RX ORDER — LEVETIRACETAM 500 MG/1
500 TABLET ORAL 2 TIMES DAILY
Status: DISCONTINUED | OUTPATIENT
Start: 2023-08-21 | End: 2023-08-23 | Stop reason: HOSPADM

## 2023-08-21 RX ADMIN — IPRATROPIUM BROMIDE AND ALBUTEROL SULFATE 1 DOSE: 2.5; .5 SOLUTION RESPIRATORY (INHALATION) at 23:33

## 2023-08-21 RX ADMIN — Medication 10 ML: at 21:51

## 2023-08-21 RX ADMIN — SERTRALINE HYDROCHLORIDE 50 MG: 50 TABLET ORAL at 16:24

## 2023-08-21 RX ADMIN — ATORVASTATIN CALCIUM 10 MG: 10 TABLET, FILM COATED ORAL at 16:24

## 2023-08-21 RX ADMIN — METHYLPREDNISOLONE SODIUM SUCCINATE 125 MG: 125 INJECTION INTRAMUSCULAR; INTRAVENOUS at 11:22

## 2023-08-21 RX ADMIN — METHYLPREDNISOLONE SODIUM SUCCINATE 40 MG: 40 INJECTION INTRAMUSCULAR; INTRAVENOUS at 23:41

## 2023-08-21 RX ADMIN — IPRATROPIUM BROMIDE AND ALBUTEROL SULFATE 1 DOSE: 2.5; .5 SOLUTION RESPIRATORY (INHALATION) at 11:03

## 2023-08-21 RX ADMIN — INSULIN LISPRO 4 UNITS: 100 INJECTION, SOLUTION INTRAVENOUS; SUBCUTANEOUS at 21:50

## 2023-08-21 RX ADMIN — APIXABAN 5 MG: 5 TABLET, FILM COATED ORAL at 21:49

## 2023-08-21 RX ADMIN — IPRATROPIUM BROMIDE AND ALBUTEROL SULFATE 1 DOSE: 2.5; .5 SOLUTION RESPIRATORY (INHALATION) at 15:51

## 2023-08-21 RX ADMIN — LEVETIRACETAM 500 MG: 500 TABLET, FILM COATED ORAL at 21:49

## 2023-08-21 RX ADMIN — AZITHROMYCIN MONOHYDRATE 500 MG: 500 INJECTION, POWDER, LYOPHILIZED, FOR SOLUTION INTRAVENOUS at 11:36

## 2023-08-21 RX ADMIN — CEFTRIAXONE SODIUM 1000 MG: 1 INJECTION, POWDER, FOR SOLUTION INTRAMUSCULAR; INTRAVENOUS at 11:06

## 2023-08-21 RX ADMIN — LEVOTHYROXINE SODIUM 88 MCG: 88 TABLET ORAL at 16:24

## 2023-08-21 RX ADMIN — IPRATROPIUM BROMIDE AND ALBUTEROL SULFATE 1 DOSE: 2.5; .5 SOLUTION RESPIRATORY (INHALATION) at 19:53

## 2023-08-21 RX ADMIN — INSULIN LISPRO 6 UNITS: 100 INJECTION, SOLUTION INTRAVENOUS; SUBCUTANEOUS at 16:24

## 2023-08-21 ASSESSMENT — COPD QUESTIONNAIRES
QUESTION7_SLEEPQUALITY: 3
CAT_TOTALSCORE: 17
QUESTION3_CHESTTIGHTNESS: 1
QUESTION2_CHESTPHLEGM: 2
QUESTION1_COUGHFREQUENCY: 1
QUESTION5_HOMEACTIVITIES: 3
QUESTION4_WALKINCLINE: 3
QUESTION6_LEAVINGHOUSE: 2
QUESTION8_ENERGYLEVEL: 2

## 2023-08-21 ASSESSMENT — PAIN DESCRIPTION - FREQUENCY: FREQUENCY: CONTINUOUS

## 2023-08-21 ASSESSMENT — PAIN DESCRIPTION - LOCATION: LOCATION: BACK

## 2023-08-21 ASSESSMENT — PAIN - FUNCTIONAL ASSESSMENT: PAIN_FUNCTIONAL_ASSESSMENT: 0-10

## 2023-08-21 ASSESSMENT — PAIN DESCRIPTION - ORIENTATION: ORIENTATION: LOWER

## 2023-08-21 ASSESSMENT — PAIN SCALES - GENERAL: PAINLEVEL_OUTOF10: 4

## 2023-08-21 ASSESSMENT — PAIN DESCRIPTION - DESCRIPTORS: DESCRIPTORS: DULL

## 2023-08-21 ASSESSMENT — PAIN DESCRIPTION - PAIN TYPE: TYPE: CHRONIC PAIN
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
## 2023-08-21 NOTE — ED NOTES
Reviewing medication list with patient. Below are responses for why she is not taking medications that are on her list.    Eliquis \"hasn't taken in a long time. \"  Diltiazem \"I don't ever remember that one\"  Lasix \"only take when he ankles are swollen. Took over a week ago. \"  Gabapentin Sheilda Bryant takes when having a flare up\" Took it last week sometime. Keppra \"stopped taking cause it makes her crazy. \"    Plecanitide \"not taking\" not a fan of the Dr. The put her on it.   Succralafate \"not taking\"     Lisa Calix, RN  08/21/23 5320

## 2023-08-21 NOTE — CARE COORDINATION
Case Management Assessment  Initial Evaluation    Date/Time of Evaluation: 8/21/2023 4:09 PM  Assessment Completed by: Venkat Echeverria RN    If patient is discharged prior to next notation, then this note serves as note for discharge by case management. Patient Name: Selene Mckinney                   YOB: 1950  Diagnosis: Shortness of breath [R06.02]  COPD exacerbation ([address]) [J44.1]  Pneumonia due to infectious agent [J18.9]  Pneumonia of both lungs due to infectious organism, unspecified part of lung [J18.9]                   Date / Time: 8/21/2023  8:52 AM    Patient Admission Status: Inpatient   Readmission Risk (Low < 19, Mod (19-27), High > 27): Readmission Risk Score: 17.7    Current PCP: Leida Lee MD  PCP verified by CM? Yes (cappuro)    Chart Reviewed: Yes      History Provided by: Patient  Patient Orientation: Alert and Oriented    Patient Cognition: Alert    Hospitalization in the last 30 days (Readmission):  No    If yes, Readmission Assessment in CM Navigator will be completed. Advance Directives:      Code Status: Full Code   Patient's Primary Decision Maker is: Named in Scanned ACP Document    Primary Decision MakerVibha Perez Child - [phone]    Discharge Planning:    Patient lives with: Children Type of Home: Trailer/Mobile Home  Primary Care Giver: Self  Patient Support Systems include: Children, Family Members   Current Financial resources: Medicare, Medicaid  Current community resources: None  Current services prior to admission: Durable Medical Equipment, Oxygen Therapy            Current DME: Ana Gasmen, Shower Chair, Walker, Wheelchair, Oxygen Therapy (Comment) (?[facility])            Type of Home Care services:  None    ADLS  Prior functional level: Independent in ADLs/IADLs  Current functional level: Independent in ADLs/IADLs    PT AM-PAC:   /24  OT AM-PAC:   /24    Family can provide assistance at DC:  Yes  Would you like Case Management to discuss the discharge plan with
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
## 2023-08-21 NOTE — ACP (ADVANCE CARE PLANNING)
Advance Care Planning     General Advance Care Planning (ACP) Conversation    Date of Conversation: 8/21/2023  Conducted with: Patient with Decision Making Capacity    Healthcare Decision Maker:    Primary Decision Maker: Induvane Schwartz - [phone]  Click here to complete [address] including selection of the Healthcare Decision Maker Relationship (ie \"Primary\"). Today we referred to ACP Clinical Specialist for assistance. Content/Action Overview:   Has ACP document(s) on file - do NOT reflect the patient's care preferences, patient to provide new document(s)  Reviewed DNR/DNI and patient elects Full Code (Attempt Resuscitation)        Length of Voluntary ACP Conversation in minutes:  <16 minutes (Non-Billable)    Ashly Mackenzie RN

## 2023-08-21 NOTE — ED PROVIDER NOTES
patient is ill-appearing with tachypnea and increased respiratory effort on 2 L home oxygen. Patient's heart is regular in rate and rhythm. Patient did have mild subcostal retractions and accessory muscle use with rales noted to bilateral lung fields. Hemoglobin was stable at 10.0. Patient did not have a leukocytosis. Troponin was mildly elevated 19 likely secondary to demand ischemia, no evidence of acute ischemic changes on EKG. Chest x-ray demonstrated mild streaky opacities in the lung bases with left side greater than right likely representing pneumonia and patient was also noted to have mild vascular prominence which may represent superimposed early CHF/volume overload. I believe the patient's dyspnea is multifactorial in nature secondary to COPD exacerbation, CHF exacerbation, and community-acquired pneumonia. Patient was given IV Solu-Medrol, nebulized breathing treatment, and started on antibiotics with Rocephin and azithromycin to treat pneumonia. Patient's blood pressure remained labile while here in the emergency department and given her BNP was not impressive and she does not appear to be clinically decompensated, I do not believe that she requires any IV diuresis at this time. Patient is agreeable with plan for admission to the hospital, all questions and concerns were addressed at the bedside. Case discussed with JOHANA Rojas, who accepts admission to the hospital and assumes care of the patient. CRITICAL CARE TIME   Total Critical Care time was 34 minutes, excluding separately reportable procedures. There was a high probability of clinically significant/life threatening deterioration in the patient's condition which required my urgent intervention.       CONSULTS:  IP CONSULT TO INTERNAL MEDICINE  IP CONSULT TO PULMONOLOGY  IP CONSULT TO SPIRITUAL SERVICES    PROCEDURES:  Unless otherwise noted below, none     EKG 12 Lead    Date/Time: 8/21/2023 10:52 AM  Performed by: Gerber Olson

## 2023-08-21 NOTE — ED NOTES
Blood Culture number 1 drawn and held from right forearm with PIV insertion Site cleaned with chlorhexidine swab for 30 seconds and allowed to dry for 30 seconds. Noted site was fully dry before cultures drawn. Blood Culture number 2 drawn from right medial forearm at 1101. Site cleaned with chlorhexidine swab for 30 seconds and allowed to dry for 30 seconds. Noted site was fully dry before cultures drawn.         Yonis Haywood RN  08/21/23 1118

## 2023-08-21 NOTE — DISCHARGE INSTRUCTIONS
Please do low carb diet and reduced sugar intake while on steroids.    Monitor blood glucose at home and follow up with PCP

## 2023-08-22 LAB
ANION GAP SERPL CALCULATED.3IONS-SCNC: 11 MMOL/L (ref 3–16)
BASOPHILS # BLD: 0 K/UL (ref 0–0.2)
BASOPHILS NFR BLD: 0.1 %
BUN SERPL-MCNC: 27 MG/DL (ref 7–20)
CALCIUM SERPL-MCNC: 9 MG/DL (ref 8.3–10.6)
CHLORIDE SERPL-SCNC: 103 MMOL/L (ref 99–110)
CO2 SERPL-SCNC: 21 MMOL/L (ref 21–32)
CREAT SERPL-MCNC: 1 MG/DL (ref 0.6–1.2)
DEPRECATED RDW RBC AUTO: 13.7 % (ref 12.4–15.4)
EOSINOPHIL # BLD: 0 K/UL (ref 0–0.6)
EOSINOPHIL NFR BLD: 0 %
EST. AVERAGE GLUCOSE BLD GHB EST-MCNC: 137 MG/DL
GFR SERPLBLD CREATININE-BSD FMLA CKD-EPI: 60 ML/MIN/{1.73_M2}
GLUCOSE BLD-MCNC: 221 MG/DL (ref 70–99)
GLUCOSE BLD-MCNC: 300 MG/DL (ref 70–99)
GLUCOSE BLD-MCNC: 350 MG/DL (ref 70–99)
GLUCOSE BLD-MCNC: 401 MG/DL (ref 70–99)
GLUCOSE SERPL-MCNC: 357 MG/DL (ref 70–99)
HBA1C MFR BLD: 6.4 %
HCT VFR BLD AUTO: 30.1 % (ref 36–48)
HGB BLD-MCNC: 10.2 G/DL (ref 12–16)
LYMPHOCYTES # BLD: 0.6 K/UL (ref 1–5.1)
LYMPHOCYTES NFR BLD: 10.8 %
MCH RBC QN AUTO: 30.7 PG (ref 26–34)
MCHC RBC AUTO-ENTMCNC: 34 G/DL (ref 31–36)
MCV RBC AUTO: 90.3 FL (ref 80–100)
MONOCYTES # BLD: 0.1 K/UL (ref 0–1.3)
MONOCYTES NFR BLD: 1.7 %
NEUTROPHILS # BLD: 4.9 K/UL (ref 1.7–7.7)
NEUTROPHILS NFR BLD: 87.4 %
PERFORMED ON: ABNORMAL
PLATELET # BLD AUTO: 170 K/UL (ref 135–450)
PMV BLD AUTO: 9.4 FL (ref 5–10.5)
POTASSIUM SERPL-SCNC: 4.5 MMOL/L (ref 3.5–5.1)
RBC # BLD AUTO: 3.33 M/UL (ref 4–5.2)
SODIUM SERPL-SCNC: 135 MMOL/L (ref 136–145)
WBC # BLD AUTO: 5.7 K/UL (ref 4–11)

## 2023-08-22 PROCEDURE — 80048 BASIC METABOLIC PNL TOTAL CA: CPT

## 2023-08-22 PROCEDURE — 6370000000 HC RX 637 (ALT 250 FOR IP)

## 2023-08-22 PROCEDURE — 99232 SBSQ HOSP IP/OBS MODERATE 35: CPT

## 2023-08-22 PROCEDURE — 99233 SBSQ HOSP IP/OBS HIGH 50: CPT | Performed by: INTERNAL MEDICINE

## 2023-08-22 PROCEDURE — 2700000000 HC OXYGEN THERAPY PER DAY

## 2023-08-22 PROCEDURE — 6360000002 HC RX W HCPCS

## 2023-08-22 PROCEDURE — 94669 MECHANICAL CHEST WALL OSCILL: CPT

## 2023-08-22 PROCEDURE — 36415 COLL VENOUS BLD VENIPUNCTURE: CPT

## 2023-08-22 PROCEDURE — 2580000003 HC RX 258

## 2023-08-22 PROCEDURE — 94640 AIRWAY INHALATION TREATMENT: CPT

## 2023-08-22 PROCEDURE — 6370000000 HC RX 637 (ALT 250 FOR IP): Performed by: INTERNAL MEDICINE

## 2023-08-22 PROCEDURE — 85025 COMPLETE CBC W/AUTO DIFF WBC: CPT

## 2023-08-22 PROCEDURE — 2060000000 HC ICU INTERMEDIATE R&B

## 2023-08-22 PROCEDURE — 94761 N-INVAS EAR/PLS OXIMETRY MLT: CPT

## 2023-08-22 RX ORDER — INSULIN LISPRO 100 [IU]/ML
0-16 INJECTION, SOLUTION INTRAVENOUS; SUBCUTANEOUS
Status: DISCONTINUED | OUTPATIENT
Start: 2023-08-22 | End: 2023-08-23 | Stop reason: HOSPADM

## 2023-08-22 RX ORDER — INSULIN GLARGINE 100 [IU]/ML
10 INJECTION, SOLUTION SUBCUTANEOUS NIGHTLY
Status: DISCONTINUED | OUTPATIENT
Start: 2023-08-22 | End: 2023-08-22

## 2023-08-22 RX ORDER — DEXTROSE MONOHYDRATE 100 MG/ML
INJECTION, SOLUTION INTRAVENOUS CONTINUOUS PRN
Status: DISCONTINUED | OUTPATIENT
Start: 2023-08-22 | End: 2023-08-22 | Stop reason: SDUPTHER

## 2023-08-22 RX ORDER — INSULIN GLARGINE 100 [IU]/ML
20 INJECTION, SOLUTION SUBCUTANEOUS NIGHTLY
Status: DISCONTINUED | OUTPATIENT
Start: 2023-08-22 | End: 2023-08-23 | Stop reason: HOSPADM

## 2023-08-22 RX ADMIN — LEVOTHYROXINE SODIUM 88 MCG: 88 TABLET ORAL at 04:01

## 2023-08-22 RX ADMIN — LEVETIRACETAM 500 MG: 500 TABLET, FILM COATED ORAL at 20:57

## 2023-08-22 RX ADMIN — IPRATROPIUM BROMIDE AND ALBUTEROL SULFATE 1 DOSE: 2.5; .5 SOLUTION RESPIRATORY (INHALATION) at 11:33

## 2023-08-22 RX ADMIN — IPRATROPIUM BROMIDE AND ALBUTEROL SULFATE 1 DOSE: 2.5; .5 SOLUTION RESPIRATORY (INHALATION) at 23:50

## 2023-08-22 RX ADMIN — INSULIN LISPRO 16 UNITS: 100 INJECTION, SOLUTION INTRAVENOUS; SUBCUTANEOUS at 16:42

## 2023-08-22 RX ADMIN — FERROUS SULFATE TAB 325 MG (65 MG ELEMENTAL FE) 325 MG: 325 (65 FE) TAB at 08:05

## 2023-08-22 RX ADMIN — AZITHROMYCIN 500 MG: 250 TABLET, FILM COATED ORAL at 12:14

## 2023-08-22 RX ADMIN — CEFTRIAXONE SODIUM 1000 MG: 1 INJECTION, POWDER, FOR SOLUTION INTRAMUSCULAR; INTRAVENOUS at 12:15

## 2023-08-22 RX ADMIN — IPRATROPIUM BROMIDE AND ALBUTEROL SULFATE 1 DOSE: 2.5; .5 SOLUTION RESPIRATORY (INHALATION) at 16:04

## 2023-08-22 RX ADMIN — INSULIN GLARGINE 10 UNITS: 100 INJECTION, SOLUTION SUBCUTANEOUS at 01:06

## 2023-08-22 RX ADMIN — SERTRALINE HYDROCHLORIDE 50 MG: 50 TABLET ORAL at 08:04

## 2023-08-22 RX ADMIN — APIXABAN 5 MG: 5 TABLET, FILM COATED ORAL at 08:05

## 2023-08-22 RX ADMIN — LEVETIRACETAM 500 MG: 500 TABLET, FILM COATED ORAL at 08:04

## 2023-08-22 RX ADMIN — IPRATROPIUM BROMIDE AND ALBUTEROL SULFATE 1 DOSE: 2.5; .5 SOLUTION RESPIRATORY (INHALATION) at 09:13

## 2023-08-22 RX ADMIN — Medication 10 ML: at 20:58

## 2023-08-22 RX ADMIN — IPRATROPIUM BROMIDE AND ALBUTEROL SULFATE 1 DOSE: 2.5; .5 SOLUTION RESPIRATORY (INHALATION) at 20:41

## 2023-08-22 RX ADMIN — INSULIN LISPRO 16 UNITS: 100 INJECTION, SOLUTION INTRAVENOUS; SUBCUTANEOUS at 12:14

## 2023-08-22 RX ADMIN — INSULIN LISPRO 12 UNITS: 100 INJECTION, SOLUTION INTRAVENOUS; SUBCUTANEOUS at 08:06

## 2023-08-22 RX ADMIN — INSULIN GLARGINE 20 UNITS: 100 INJECTION, SOLUTION SUBCUTANEOUS at 20:57

## 2023-08-22 RX ADMIN — ATORVASTATIN CALCIUM 10 MG: 10 TABLET, FILM COATED ORAL at 08:05

## 2023-08-22 RX ADMIN — Medication 10 ML: at 08:06

## 2023-08-22 RX ADMIN — METHYLPREDNISOLONE SODIUM SUCCINATE 40 MG: 40 INJECTION INTRAMUSCULAR; INTRAVENOUS at 12:14

## 2023-08-22 RX ADMIN — APIXABAN 5 MG: 5 TABLET, FILM COATED ORAL at 20:57

## 2023-08-22 ASSESSMENT — PAIN DESCRIPTION - ORIENTATION
ORIENTATION: RIGHT;LEFT
ORIENTATION: LEFT;RIGHT
ORIENTATION: LEFT;RIGHT

## 2023-08-22 ASSESSMENT — PAIN DESCRIPTION - LOCATION
LOCATION: LEG

## 2023-08-22 ASSESSMENT — PAIN DESCRIPTION - DESCRIPTORS
DESCRIPTORS: ACHING

## 2023-08-22 ASSESSMENT — PAIN SCALES - GENERAL
PAINLEVEL_OUTOF10: 4

## 2023-08-22 NOTE — ACP (ADVANCE CARE PLANNING)
Advance Care Planning     Advance Care Planning Inpatient Note  Spiritual Care Department    Today's Date: 8/22/2023  Unit: [facility] PCU TELEMETRY    Received request from patient. Upon review of chart and communication with care team, patient's decision making abilities are not in question. . Patient was/were present in the room during visit. Goals of ACP Conversation:  Discuss advance care planning documents  Facilitate a discussion related to patient's goals of care as they align with the patient's values and beliefs. Health Care Decision Makers:      Primary Decision Maker: Javier Lozano - [phone]    Secondary Decision Maker: Nimo Mins - [phone]    Supplemental (Other) Decision Maker: Mini Schwartz - [phone]  Summary:  Verified [facility] [address]    Advance Care Planning Documents (Patient Wishes):  Healthcare Power of /Advance Directive Appointment of Juice WVangie Hamm wishes for her daughter Mark Swain to remain involved in her care (updated). Friends Yamelquemarlamounika Hebert and Josefina Andersen are primary decision makers for healthcare, confirmed with pt this is accurate. Electronically signed by Chaplain Leighann on 8/22/2023 at 11:23 AM        Thank you for consulting [address] [address]    If you would like a 's presence for emotional, spiritual, grief or comfort care,   please dial \"0\" and ask for the [address] [place] [address] on-call to be paged.     For help with [phone]  for Healthcare or Living Will forms, you may also call us directly:    [phone] Bharat  [phone]) James Noble  [phone]) Outpatient    -[address] [place] [address]

## 2023-08-23 VITALS
DIASTOLIC BLOOD PRESSURE: 79 MMHG | RESPIRATION RATE: 16 BRPM | BODY MASS INDEX: 40.48 KG/M2 | HEIGHT: 61 IN | OXYGEN SATURATION: 91 % | SYSTOLIC BLOOD PRESSURE: 159 MMHG | TEMPERATURE: 98.4 F | HEART RATE: 76 BPM | WEIGHT: 214.4 LBS

## 2023-08-23 LAB
ANION GAP SERPL CALCULATED.3IONS-SCNC: 13 MMOL/L (ref 3–16)
BACTERIA SPEC RESP CULT: NORMAL
BASOPHILS # BLD: 0 K/UL (ref 0–0.2)
BASOPHILS NFR BLD: 0.3 %
BUN SERPL-MCNC: 34 MG/DL (ref 7–20)
CALCIUM SERPL-MCNC: 9 MG/DL (ref 8.3–10.6)
CHLORIDE SERPL-SCNC: 106 MMOL/L (ref 99–110)
CO2 SERPL-SCNC: 19 MMOL/L (ref 21–32)
CREAT SERPL-MCNC: 0.8 MG/DL (ref 0.6–1.2)
DEPRECATED RDW RBC AUTO: 14.4 % (ref 12.4–15.4)
EOSINOPHIL # BLD: 0 K/UL (ref 0–0.6)
EOSINOPHIL NFR BLD: 0.2 %
GFR SERPLBLD CREATININE-BSD FMLA CKD-EPI: >60 ML/MIN/{1.73_M2}
GLUCOSE BLD-MCNC: 145 MG/DL (ref 70–99)
GLUCOSE BLD-MCNC: 158 MG/DL (ref 70–99)
GLUCOSE BLD-MCNC: 226 MG/DL (ref 70–99)
GLUCOSE SERPL-MCNC: 181 MG/DL (ref 70–99)
GRAM STN SPEC: NORMAL
HCT VFR BLD AUTO: 31.7 % (ref 36–48)
HGB BLD-MCNC: 10.4 G/DL (ref 12–16)
LYMPHOCYTES # BLD: 1.6 K/UL (ref 1–5.1)
LYMPHOCYTES NFR BLD: 16.2 %
MCH RBC QN AUTO: 30.4 PG (ref 26–34)
MCHC RBC AUTO-ENTMCNC: 32.8 G/DL (ref 31–36)
MCV RBC AUTO: 92.6 FL (ref 80–100)
MONOCYTES # BLD: 0.5 K/UL (ref 0–1.3)
MONOCYTES NFR BLD: 5.3 %
NEUTROPHILS # BLD: 7.6 K/UL (ref 1.7–7.7)
NEUTROPHILS NFR BLD: 78 %
PERFORMED ON: ABNORMAL
PLATELET # BLD AUTO: 197 K/UL (ref 135–450)
PLATELET BLD QL SMEAR: ADEQUATE
PMV BLD AUTO: 9.4 FL (ref 5–10.5)
POTASSIUM SERPL-SCNC: 4.6 MMOL/L (ref 3.5–5.1)
RBC # BLD AUTO: 3.42 M/UL (ref 4–5.2)
SLIDE REVIEW: ABNORMAL
SODIUM SERPL-SCNC: 138 MMOL/L (ref 136–145)
WBC # BLD AUTO: 9.7 K/UL (ref 4–11)

## 2023-08-23 PROCEDURE — 80048 BASIC METABOLIC PNL TOTAL CA: CPT

## 2023-08-23 PROCEDURE — 94640 AIRWAY INHALATION TREATMENT: CPT

## 2023-08-23 PROCEDURE — 94669 MECHANICAL CHEST WALL OSCILL: CPT

## 2023-08-23 PROCEDURE — 2580000003 HC RX 258

## 2023-08-23 PROCEDURE — 6370000000 HC RX 637 (ALT 250 FOR IP)

## 2023-08-23 PROCEDURE — 99233 SBSQ HOSP IP/OBS HIGH 50: CPT

## 2023-08-23 PROCEDURE — 85025 COMPLETE CBC W/AUTO DIFF WBC: CPT

## 2023-08-23 PROCEDURE — 36415 COLL VENOUS BLD VENIPUNCTURE: CPT

## 2023-08-23 PROCEDURE — 99233 SBSQ HOSP IP/OBS HIGH 50: CPT | Performed by: INTERNAL MEDICINE

## 2023-08-23 PROCEDURE — 6370000000 HC RX 637 (ALT 250 FOR IP): Performed by: INTERNAL MEDICINE

## 2023-08-23 PROCEDURE — 94761 N-INVAS EAR/PLS OXIMETRY MLT: CPT

## 2023-08-23 RX ORDER — IPRATROPIUM BROMIDE AND ALBUTEROL SULFATE 2.5; .5 MG/3ML; MG/3ML
1 SOLUTION RESPIRATORY (INHALATION) ONCE
Status: DISCONTINUED | OUTPATIENT
Start: 2023-08-23 | End: 2023-08-23 | Stop reason: HOSPADM

## 2023-08-23 RX ORDER — PREDNISONE 10 MG/1
TABLET ORAL
Qty: 17 TABLET | Refills: 0 | Status: SHIPPED | OUTPATIENT
Start: 2023-08-23 | End: 2023-08-23 | Stop reason: SDUPTHER

## 2023-08-23 RX ORDER — ALBUTEROL SULFATE 90 UG/1
2 AEROSOL, METERED RESPIRATORY (INHALATION) 4 TIMES DAILY PRN
Qty: 18 G | Refills: 0 | Status: SHIPPED | OUTPATIENT
Start: 2023-08-23

## 2023-08-23 RX ORDER — AZITHROMYCIN 250 MG/1
250 TABLET, FILM COATED ORAL DAILY
Qty: 4 TABLET | Refills: 0 | Status: SHIPPED | OUTPATIENT
Start: 2023-08-23 | End: 2023-08-27

## 2023-08-23 RX ORDER — GUAIFENESIN 600 MG/1
600 TABLET, EXTENDED RELEASE ORAL 2 TIMES DAILY
Qty: 14 TABLET | Refills: 0 | Status: SHIPPED | OUTPATIENT
Start: 2023-08-23 | End: 2023-08-30

## 2023-08-23 RX ORDER — AMOXICILLIN AND CLAVULANATE POTASSIUM 875; 125 MG/1; MG/1
1 TABLET, FILM COATED ORAL 2 TIMES DAILY
Qty: 14 TABLET | Refills: 0 | Status: SHIPPED | OUTPATIENT
Start: 2023-08-23 | End: 2023-08-30

## 2023-08-23 RX ORDER — PREDNISONE 10 MG/1
TABLET ORAL
Qty: 17 TABLET | Refills: 0 | Status: SHIPPED | OUTPATIENT
Start: 2023-08-23

## 2023-08-23 RX ADMIN — APIXABAN 5 MG: 5 TABLET, FILM COATED ORAL at 09:14

## 2023-08-23 RX ADMIN — IPRATROPIUM BROMIDE AND ALBUTEROL SULFATE 1 DOSE: 2.5; .5 SOLUTION RESPIRATORY (INHALATION) at 11:21

## 2023-08-23 RX ADMIN — SERTRALINE HYDROCHLORIDE 50 MG: 50 TABLET ORAL at 09:14

## 2023-08-23 RX ADMIN — FERROUS SULFATE TAB 325 MG (65 MG ELEMENTAL FE) 325 MG: 325 (65 FE) TAB at 09:14

## 2023-08-23 RX ADMIN — Medication 10 ML: at 09:16

## 2023-08-23 RX ADMIN — IPRATROPIUM BROMIDE AND ALBUTEROL SULFATE 1 DOSE: 2.5; .5 SOLUTION RESPIRATORY (INHALATION) at 07:56

## 2023-08-23 RX ADMIN — LEVETIRACETAM 500 MG: 500 TABLET, FILM COATED ORAL at 09:14

## 2023-08-23 RX ADMIN — LEVOTHYROXINE SODIUM 88 MCG: 88 TABLET ORAL at 05:07

## 2023-08-23 RX ADMIN — ATORVASTATIN CALCIUM 10 MG: 10 TABLET, FILM COATED ORAL at 09:14

## 2023-08-23 RX ADMIN — PREDNISONE 30 MG: 20 TABLET ORAL at 09:14

## 2023-08-23 ASSESSMENT — PAIN SCALES - GENERAL: PAINLEVEL_OUTOF10: 0
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
## 2023-08-23 NOTE — CARE COORDINATION
DISCHARGE ORDER  Date/Time 2023 11:41 AM  Completed by: Kev Buchanan RN, Case Management    Patient Name: Anatoliy Levi      : 1950  Admitting Diagnosis: Shortness of breath [R06.02]  COPD exacerbation ([address]) [J44.1]  Pneumonia due to infectious agent [J18.9]  Pneumonia of both lungs due to infectious organism, unspecified part of lung [J18.9]      Admit order Date and Status:ip 23  (verify MD's last order for status of admission)      Noted discharge order. If applicable PT/OT recommendation at Discharge: na  DME recommendation by PT/OT:na  Confirmed discharge plan  (): Yes  with whom_____dajuan__________  If pt confirmed DC plan does family need to be contacted by CM No     Discharge Plan: met with pt at bedside. Pt is agreeable to current DC plan. Plan is for pt to DC to home. Pt may need cab to get home. No additional DME or DC needs identified. Date of Last IMM Given: 23    Reviewed chart. Role of discharge planner explained and patient verbalized understanding. Discharge order is noted. Has Home O2 in place on admit:  Yes  Informed of need to bring portable home O2 tank on day of discharge for nursing to connect prior to leaving:   Yes  Verbalized agreement/Understanding:   Yes  Pt is being d/c'd to home today. Pt's O2 sats are 91% on ra. Discharge timeout done with rn, cm, pt. All discharge needs and concerns addressed.

## 2023-08-25 LAB
BACTERIA BLD CULT ORG #2: NORMAL
BACTERIA BLD CULT: NORMAL

## 2023-10-15 ENCOUNTER — APPOINTMENT (OUTPATIENT)
Dept: GENERAL RADIOLOGY | Age: 73
End: 2023-10-15
Payer: MEDICARE

## 2023-10-15 ENCOUNTER — APPOINTMENT (OUTPATIENT)
Dept: CT IMAGING | Age: 73
End: 2023-10-15
Payer: MEDICARE

## 2023-10-15 ENCOUNTER — HOSPITAL ENCOUNTER (EMERGENCY)
Age: 73
Discharge: ANOTHER HOSPITAL | End: 2023-10-15
Attending: EMERGENCY MEDICINE
Payer: MEDICARE

## 2023-10-15 VITALS
SYSTOLIC BLOOD PRESSURE: 93 MMHG | OXYGEN SATURATION: 96 % | DIASTOLIC BLOOD PRESSURE: 59 MMHG | HEART RATE: 63 BPM | HEIGHT: 61 IN | RESPIRATION RATE: 19 BRPM | WEIGHT: 200 LBS | TEMPERATURE: 97.8 F | BODY MASS INDEX: 37.76 KG/M2

## 2023-10-15 DIAGNOSIS — S52.032A CLOSED DISPLACED INTRA-ARTICULAR FRACTURE OF OLECRANON PROCESS OF LEFT ULNA, INITIAL ENCOUNTER: ICD-10-CM

## 2023-10-15 DIAGNOSIS — S72.002A CLOSED FRACTURE OF LEFT HIP, INITIAL ENCOUNTER (HCC): Primary | ICD-10-CM

## 2023-10-15 DIAGNOSIS — E86.0 DEHYDRATION: ICD-10-CM

## 2023-10-15 DIAGNOSIS — R73.9 HYPERGLYCEMIA: ICD-10-CM

## 2023-10-15 LAB
ALBUMIN SERPL-MCNC: 3.4 G/DL (ref 3.4–5)
ALBUMIN/GLOB SERPL: 1 {RATIO} (ref 1.1–2.2)
ALP SERPL-CCNC: 219 U/L (ref 40–129)
ALT SERPL-CCNC: 14 U/L (ref 10–40)
ANION GAP SERPL CALCULATED.3IONS-SCNC: 13 MMOL/L (ref 3–16)
AST SERPL-CCNC: 19 U/L (ref 15–37)
BASE EXCESS BLDV CALC-SCNC: -0.6 MMOL/L (ref -3–3)
BASOPHILS # BLD: 0.1 K/UL (ref 0–0.2)
BASOPHILS NFR BLD: 0.6 %
BILIRUB SERPL-MCNC: 0.3 MG/DL (ref 0–1)
BILIRUB UR QL STRIP.AUTO: NEGATIVE
BUN SERPL-MCNC: 34 MG/DL (ref 7–20)
CALCIUM SERPL-MCNC: 9.1 MG/DL (ref 8.3–10.6)
CHLORIDE SERPL-SCNC: 97 MMOL/L (ref 99–110)
CLARITY UR: CLEAR
CO2 BLDV-SCNC: 25 MMOL/L
CO2 SERPL-SCNC: 25 MMOL/L (ref 21–32)
COHGB MFR BLDV: 0.9 % (ref 0–1.5)
COLOR UR: YELLOW
CREAT SERPL-MCNC: 1.7 MG/DL (ref 0.6–1.2)
DEPRECATED RDW RBC AUTO: 15.7 % (ref 12.4–15.4)
EKG ATRIAL RATE: 52 BPM
EKG DIAGNOSIS: NORMAL
EKG P AXIS: 49 DEGREES
EKG P-R INTERVAL: 150 MS
EKG Q-T INTERVAL: 496 MS
EKG QRS DURATION: 74 MS
EKG QTC CALCULATION (BAZETT): 461 MS
EKG R AXIS: -19 DEGREES
EKG T AXIS: 55 DEGREES
EKG VENTRICULAR RATE: 52 BPM
EOSINOPHIL # BLD: 0.3 K/UL (ref 0–0.6)
EOSINOPHIL NFR BLD: 4.1 %
GFR SERPLBLD CREATININE-BSD FMLA CKD-EPI: 32 ML/MIN/{1.73_M2}
GLUCOSE BLD-MCNC: 385 MG/DL (ref 70–99)
GLUCOSE SERPL-MCNC: 410 MG/DL (ref 70–99)
GLUCOSE UR STRIP.AUTO-MCNC: >=1000 MG/DL
HCO3 BLDV-SCNC: 23.5 MMOL/L (ref 23–29)
HCT VFR BLD AUTO: 29.5 % (ref 36–48)
HGB BLD-MCNC: 10.1 G/DL (ref 12–16)
HGB UR QL STRIP.AUTO: NEGATIVE
KETONES UR STRIP.AUTO-MCNC: NEGATIVE MG/DL
LACTATE BLDV-SCNC: 1.8 MMOL/L (ref 0.4–1.9)
LEUKOCYTE ESTERASE UR QL STRIP.AUTO: NEGATIVE
LYMPHOCYTES # BLD: 1.3 K/UL (ref 1–5.1)
LYMPHOCYTES NFR BLD: 15.3 %
MCH RBC QN AUTO: 30.1 PG (ref 26–34)
MCHC RBC AUTO-ENTMCNC: 34.2 G/DL (ref 31–36)
MCV RBC AUTO: 88.2 FL (ref 80–100)
METHGB MFR BLDV: 0.3 %
MONOCYTES # BLD: 0.5 K/UL (ref 0–1.3)
MONOCYTES NFR BLD: 6.3 %
NEUTROPHILS # BLD: 6.2 K/UL (ref 1.7–7.7)
NEUTROPHILS NFR BLD: 73.7 %
NITRITE UR QL STRIP.AUTO: NEGATIVE
O2 THERAPY: ABNORMAL
PCO2 BLDV: 36.4 MMHG (ref 40–50)
PERFORMED ON: ABNORMAL
PH BLDV: 7.43 [PH] (ref 7.35–7.45)
PH UR STRIP.AUTO: 7 [PH] (ref 5–8)
PLATELET # BLD AUTO: 218 K/UL (ref 135–450)
PMV BLD AUTO: 8.7 FL (ref 5–10.5)
PO2 BLDV: 74.5 MMHG (ref 25–40)
POTASSIUM SERPL-SCNC: 3.7 MMOL/L (ref 3.5–5.1)
PROT SERPL-MCNC: 6.7 G/DL (ref 6.4–8.2)
PROT UR STRIP.AUTO-MCNC: NEGATIVE MG/DL
RBC # BLD AUTO: 3.35 M/UL (ref 4–5.2)
SAO2 % BLDV: 95 %
SODIUM SERPL-SCNC: 135 MMOL/L (ref 136–145)
SP GR UR STRIP.AUTO: 1.01 (ref 1–1.03)
TROPONIN, HIGH SENSITIVITY: 29 NG/L (ref 0–14)
UA COMPLETE W REFLEX CULTURE PNL UR: ABNORMAL
UA DIPSTICK W REFLEX MICRO PNL UR: ABNORMAL
URN SPEC COLLECT METH UR: ABNORMAL
UROBILINOGEN UR STRIP-ACNC: 0.2 E.U./DL
WBC # BLD AUTO: 8.5 K/UL (ref 4–11)

## 2023-10-15 PROCEDURE — 82803 BLOOD GASES ANY COMBINATION: CPT

## 2023-10-15 PROCEDURE — 80053 COMPREHEN METABOLIC PANEL: CPT

## 2023-10-15 PROCEDURE — 81003 URINALYSIS AUTO W/O SCOPE: CPT

## 2023-10-15 PROCEDURE — 96361 HYDRATE IV INFUSION ADD-ON: CPT

## 2023-10-15 PROCEDURE — 73080 X-RAY EXAM OF ELBOW: CPT

## 2023-10-15 PROCEDURE — 6360000002 HC RX W HCPCS: Performed by: EMERGENCY MEDICINE

## 2023-10-15 PROCEDURE — 83605 ASSAY OF LACTIC ACID: CPT

## 2023-10-15 PROCEDURE — 72125 CT NECK SPINE W/O DYE: CPT

## 2023-10-15 PROCEDURE — 84484 ASSAY OF TROPONIN QUANT: CPT

## 2023-10-15 PROCEDURE — 71045 X-RAY EXAM CHEST 1 VIEW: CPT

## 2023-10-15 PROCEDURE — 93010 ELECTROCARDIOGRAM REPORT: CPT | Performed by: INTERNAL MEDICINE

## 2023-10-15 PROCEDURE — 74176 CT ABD & PELVIS W/O CONTRAST: CPT

## 2023-10-15 PROCEDURE — 36415 COLL VENOUS BLD VENIPUNCTURE: CPT

## 2023-10-15 PROCEDURE — 96374 THER/PROPH/DIAG INJ IV PUSH: CPT

## 2023-10-15 PROCEDURE — 70450 CT HEAD/BRAIN W/O DYE: CPT

## 2023-10-15 PROCEDURE — 85025 COMPLETE CBC W/AUTO DIFF WBC: CPT

## 2023-10-15 PROCEDURE — 99285 EMERGENCY DEPT VISIT HI MDM: CPT

## 2023-10-15 PROCEDURE — 96376 TX/PRO/DX INJ SAME DRUG ADON: CPT

## 2023-10-15 PROCEDURE — 2580000003 HC RX 258: Performed by: EMERGENCY MEDICINE

## 2023-10-15 PROCEDURE — 93005 ELECTROCARDIOGRAM TRACING: CPT | Performed by: EMERGENCY MEDICINE

## 2023-10-15 RX ORDER — FENTANYL CITRATE 50 UG/ML
50 INJECTION, SOLUTION INTRAMUSCULAR; INTRAVENOUS ONCE
Status: COMPLETED | OUTPATIENT
Start: 2023-10-15 | End: 2023-10-15

## 2023-10-15 RX ORDER — 0.9 % SODIUM CHLORIDE 0.9 %
1000 INTRAVENOUS SOLUTION INTRAVENOUS ONCE
Status: COMPLETED | OUTPATIENT
Start: 2023-10-15 | End: 2023-10-15

## 2023-10-15 RX ADMIN — FENTANYL CITRATE 50 MCG: 50 INJECTION, SOLUTION INTRAMUSCULAR; INTRAVENOUS at 05:48

## 2023-10-15 RX ADMIN — FENTANYL CITRATE 50 MCG: 50 INJECTION, SOLUTION INTRAMUSCULAR; INTRAVENOUS at 08:20

## 2023-10-15 RX ADMIN — SODIUM CHLORIDE 1000 ML: 9 INJECTION, SOLUTION INTRAVENOUS at 01:53

## 2023-10-15 ASSESSMENT — PAIN SCALES - GENERAL
PAINLEVEL_OUTOF10: 7
PAINLEVEL_OUTOF10: 9
PAINLEVEL_OUTOF10: 5
PAINLEVEL_OUTOF10: 10

## 2023-10-15 ASSESSMENT — ENCOUNTER SYMPTOMS
GASTROINTESTINAL NEGATIVE: 1
EYES NEGATIVE: 1
RESPIRATORY NEGATIVE: 1

## 2023-10-15 ASSESSMENT — PATIENT HEALTH QUESTIONNAIRE - PHQ9
SUM OF ALL RESPONSES TO PHQ QUESTIONS 1-9: 0
SUM OF ALL RESPONSES TO PHQ QUESTIONS 1-9: 0
1. LITTLE INTEREST OR PLEASURE IN DOING THINGS: 0
2. FEELING DOWN, DEPRESSED OR HOPELESS: 0
SUM OF ALL RESPONSES TO PHQ9 QUESTIONS 1 & 2: 0
SUM OF ALL RESPONSES TO PHQ QUESTIONS 1-9: 0
SUM OF ALL RESPONSES TO PHQ QUESTIONS 1-9: 0

## 2023-10-15 ASSESSMENT — PAIN DESCRIPTION - LOCATION: LOCATION: HIP;BACK

## 2023-10-15 ASSESSMENT — PAIN DESCRIPTION - FREQUENCY: FREQUENCY: CONTINUOUS

## 2023-10-15 ASSESSMENT — PAIN DESCRIPTION - PAIN TYPE: TYPE: ACUTE PAIN

## 2023-10-15 ASSESSMENT — PAIN DESCRIPTION - DESCRIPTORS: DESCRIPTORS: PATIENT UNABLE TO DESCRIBE

## 2023-10-15 ASSESSMENT — PAIN - FUNCTIONAL ASSESSMENT
PAIN_FUNCTIONAL_ASSESSMENT: PREVENTS OR INTERFERES SOME ACTIVE ACTIVITIES AND ADLS
PAIN_FUNCTIONAL_ASSESSMENT: ACTIVITIES ARE NOT PREVENTED
PAIN_FUNCTIONAL_ASSESSMENT: 0-10

## 2023-10-15 ASSESSMENT — PAIN DESCRIPTION - ORIENTATION
ORIENTATION: LEFT
ORIENTATION: LEFT

## 2023-10-15 ASSESSMENT — PAIN DESCRIPTION - ONSET: ONSET: ON-GOING

## 2023-10-15 NOTE — ED NOTES
Report received from [facility], [address]  10/15/23 9959

## 2023-10-15 NOTE — ED NOTES
Ed to ed transfer, unable to find transport at this time.  Transfer center is working on transportation      [facility], [facility], [address] [place] [address]  10/15/23 8359

## 2023-10-15 NOTE — ED NOTES
[address] call to [facility] for stat consult and transfer to Erin Lin  10/15/23 0061 [id number] [facility] returned phone call with UC on the line who spoke directly to Dr. Nyasia Welch  10/15/23 3519

## 2023-10-15 NOTE — ED NOTES
Transport team at bedside report given. Pt reporting that she is in a lot of pain. Dr. Isaias Welsh placed prn fentanyl order for ride to .       Sara Kaur RN  10/15/23 7296

## 2023-10-15 NOTE — ED NOTES
SPO2 running low at 88-89%. Placed pt on 2l o2 nasal cannula. Pt denies SOB.      Chapin Catalan RN  10/15/23 4472

## 2023-10-15 NOTE — ED PROVIDER NOTES
Probable dependent subsegmental atelectasis in the lung bases. CT C-Spine W/O Contrast   Final Result   No acute abnormality of the cervical spine. CT Head W/O Contrast   Final Result   No acute intracranial abnormality. XR ELBOW LEFT (MIN 3 VIEWS)   Final Result   Transverse mildly displaced intra-articular fracture through the proximal   ulna. XR CHEST PORTABLE   Final Result   Shallow inflation with findings favoring subsegmental atelectasis. ED BEDSIDE ULTRASOUND:   Performed by ED Physician - none    LABS:  Labs Reviewed   CBC WITH AUTO DIFFERENTIAL - Abnormal; Notable for the following components:       Result Value    RBC 3.35 (*)     Hemoglobin 10.1 (*)     Hematocrit 29.5 (*)     RDW 15.7 (*)     All other components within normal limits   COMPREHENSIVE METABOLIC PANEL W/ REFLEX TO MG FOR LOW K - Abnormal; Notable for the following components:    Sodium 135 (*)     Chloride 97 (*)     Glucose 410 (*)     BUN 34 (*)     Creatinine 1.7 (*)     Est, Glom Filt Rate 32 (*)     Albumin/Globulin Ratio 1.0 (*)     Alkaline Phosphatase 219 (*)     All other components within normal limits   TROPONIN - Abnormal; Notable for the following components:    Troponin, High Sensitivity 29 (*)     All other components within normal limits   BLOOD GAS, VENOUS - Abnormal; Notable for the following components:    pCO2, Marty 36.4 (*)     pO2, Marty 74.5 (*)     All other components within normal limits   LACTATE, SEPSIS   LACTATE, SEPSIS   URINALYSIS WITH REFLEX TO CULTURE       All other labs were within normal range or not returned as of this dictation.     EMERGENCY DEPARTMENT COURSE and DIFFERENTIAL DIAGNOSIS/MDM:   Vitals:    Vitals:    10/15/23 0311 10/15/23 0322 10/15/23 0342 10/15/23 0421   BP: (!) 103/46 (!) 100/47 (!) 92/45 (!) 100/48   Pulse: 63 62 60 64   Resp: 16 20 18 16   Temp:       TempSrc:       SpO2: 97% 97% 96%    Weight:       Height:               Medical Decision

## 2023-11-21 ENCOUNTER — APPOINTMENT (OUTPATIENT)
Dept: CT IMAGING | Age: 73
End: 2023-11-21
Payer: MEDICARE

## 2023-11-21 ENCOUNTER — HOSPITAL ENCOUNTER (EMERGENCY)
Age: 73
Discharge: ANOTHER HOSPITAL | End: 2023-11-21
Attending: EMERGENCY MEDICINE
Payer: MEDICARE

## 2023-11-21 VITALS
TEMPERATURE: 97.9 F | OXYGEN SATURATION: 97 % | SYSTOLIC BLOOD PRESSURE: 108 MMHG | BODY MASS INDEX: 37.91 KG/M2 | RESPIRATION RATE: 16 BRPM | HEART RATE: 58 BPM | WEIGHT: 200.62 LBS | DIASTOLIC BLOOD PRESSURE: 48 MMHG

## 2023-11-21 DIAGNOSIS — I95.9 HYPOTENSION, UNSPECIFIED HYPOTENSION TYPE: ICD-10-CM

## 2023-11-21 DIAGNOSIS — R55 SYNCOPE AND COLLAPSE: ICD-10-CM

## 2023-11-21 DIAGNOSIS — K62.5 BRBPR (BRIGHT RED BLOOD PER RECTUM): ICD-10-CM
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
## 2023-11-21 LAB
ABO + RH BLD: NORMAL
ABO + RH BLD: NORMAL
ALBUMIN SERPL-MCNC: 3.3 G/DL (ref 3.4–5)
ALBUMIN/GLOB SERPL: 1.1 {RATIO} (ref 1.1–2.2)
ALP SERPL-CCNC: 216 U/L (ref 40–129)
ALT SERPL-CCNC: 12 U/L (ref 10–40)
ANION GAP SERPL CALCULATED.3IONS-SCNC: 8 MMOL/L (ref 3–16)
ANTI-XA UNFRAC HEPARIN: <0.1 IU/ML (ref 0.3–0.7)
AST SERPL-CCNC: 29 U/L (ref 15–37)
BASOPHILS # BLD: 0 K/UL (ref 0–0.2)
BASOPHILS NFR BLD: 0.6 %
BILIRUB SERPL-MCNC: 0.4 MG/DL (ref 0–1)
BILIRUB UR QL STRIP.AUTO: NEGATIVE
BLD GP AB SCN SERPL QL: NORMAL
BUN SERPL-MCNC: 24 MG/DL (ref 7–20)
CALCIUM SERPL-MCNC: 8.5 MG/DL (ref 8.3–10.6)
CHLORIDE SERPL-SCNC: 99 MMOL/L (ref 99–110)
CLARITY UR: CLEAR
CO2 SERPL-SCNC: 25 MMOL/L (ref 21–32)
COLOR UR: YELLOW
CREAT SERPL-MCNC: 1.1 MG/DL (ref 0.6–1.2)
DEPRECATED RDW RBC AUTO: 16.1 % (ref 12.4–15.4)
EOSINOPHIL # BLD: 0.3 K/UL (ref 0–0.6)
EOSINOPHIL NFR BLD: 4.7 %
EPI CELLS #/AREA URNS HPF: ABNORMAL /HPF (ref 0–5)
GFR SERPLBLD CREATININE-BSD FMLA CKD-EPI: 53 ML/MIN/{1.73_M2}
GLUCOSE BLD-MCNC: 319 MG/DL (ref 70–99)
GLUCOSE SERPL-MCNC: 305 MG/DL (ref 70–99)
GLUCOSE UR STRIP.AUTO-MCNC: 100 MG/DL
HCT VFR BLD AUTO: 27.8 % (ref 36–48)
HEMOCCULT STL QL: ABNORMAL
HGB BLD-MCNC: 9.1 G/DL (ref 12–16)
HGB UR QL STRIP.AUTO: NEGATIVE
INR PPP: 1.26 (ref 0.84–1.16)
KETONES UR STRIP.AUTO-MCNC: NEGATIVE MG/DL
LEUKOCYTE ESTERASE UR QL STRIP.AUTO: NEGATIVE
LIPASE SERPL-CCNC: 45 U/L (ref 13–60)
LYMPHOCYTES # BLD: 0.6 K/UL (ref 1–5.1)
LYMPHOCYTES NFR BLD: 10.7 %
MCH RBC QN AUTO: 29.2 PG (ref 26–34)
MCHC RBC AUTO-ENTMCNC: 32.9 G/DL (ref 31–36)
MCV RBC AUTO: 88.8 FL (ref 80–100)
MONOCYTES # BLD: 0.5 K/UL (ref 0–1.3)
MONOCYTES NFR BLD: 8.6 %
MUCOUS THREADS #/AREA URNS LPF: ABNORMAL /LPF
NEUTROPHILS # BLD: 4.1 K/UL (ref 1.7–7.7)
NEUTROPHILS NFR BLD: 75.4 %
NITRITE UR QL STRIP.AUTO: NEGATIVE
NT-PROBNP SERPL-MCNC: 418 PG/ML (ref 0–124)
PERFORMED ON: ABNORMAL
PH UR STRIP.AUTO: 6.5 [PH] (ref 5–8)
PLATELET # BLD AUTO: 226 K/UL (ref 135–450)
PMV BLD AUTO: 8.4 FL (ref 5–10.5)
POTASSIUM SERPL-SCNC: 4.7 MMOL/L (ref 3.5–5.1)
PROT SERPL-MCNC: 6.4 G/DL (ref 6.4–8.2)
PROT UR STRIP.AUTO-MCNC: ABNORMAL MG/DL
PROTHROMBIN TIME: 15.8 SEC (ref 11.5–14.8)
RBC # BLD AUTO: 3.13 M/UL (ref 4–5.2)
RBC #/AREA URNS HPF: ABNORMAL /HPF (ref 0–4)
SODIUM SERPL-SCNC: 132 MMOL/L (ref 136–145)
SP GR UR STRIP.AUTO: 1.01 (ref 1–1.03)
TROPONIN, HIGH SENSITIVITY: 23 NG/L (ref 0–14)
TROPONIN, HIGH SENSITIVITY: 25 NG/L (ref 0–14)
UA COMPLETE W REFLEX CULTURE PNL UR: ABNORMAL
UA DIPSTICK W REFLEX MICRO PNL UR: YES
URN SPEC COLLECT METH UR: ABNORMAL
UROBILINOGEN UR STRIP-ACNC: 0.2 E.U./DL
WBC # BLD AUTO: 5.4 K/UL (ref 4–11)
WBC #/AREA URNS HPF: ABNORMAL /HPF (ref 0–5)

## 2023-11-21 PROCEDURE — 36415 COLL VENOUS BLD VENIPUNCTURE: CPT

## 2023-11-21 PROCEDURE — 72125 CT NECK SPINE W/O DYE: CPT

## 2023-11-21 PROCEDURE — 85025 COMPLETE CBC W/AUTO DIFF WBC: CPT

## 2023-11-21 PROCEDURE — 85610 PROTHROMBIN TIME: CPT

## 2023-11-21 PROCEDURE — 70450 CT HEAD/BRAIN W/O DYE: CPT

## 2023-11-21 PROCEDURE — 83880 ASSAY OF NATRIURETIC PEPTIDE: CPT

## 2023-11-21 PROCEDURE — 86850 RBC ANTIBODY SCREEN: CPT

## 2023-11-21 PROCEDURE — 84484 ASSAY OF TROPONIN QUANT: CPT

## 2023-11-21 PROCEDURE — 93005 ELECTROCARDIOGRAM TRACING: CPT | Performed by: EMERGENCY MEDICINE

## 2023-11-21 PROCEDURE — 83690 ASSAY OF LIPASE: CPT

## 2023-11-21 PROCEDURE — 85520 HEPARIN ASSAY: CPT

## 2023-11-21 PROCEDURE — 96365 THER/PROPH/DIAG IV INF INIT: CPT

## 2023-11-21 PROCEDURE — 99285 EMERGENCY DEPT VISIT HI MDM: CPT

## 2023-11-21 PROCEDURE — 86905 BLOOD TYPING RBC ANTIGENS: CPT

## 2023-11-21 PROCEDURE — 6360000002 HC RX W HCPCS: Performed by: EMERGENCY MEDICINE

## 2023-11-21 PROCEDURE — 83605 ASSAY OF LACTIC ACID: CPT

## 2023-11-21 PROCEDURE — 96375 TX/PRO/DX INJ NEW DRUG ADDON: CPT

## 2023-11-21 PROCEDURE — 86900 BLOOD TYPING SEROLOGIC ABO: CPT

## 2023-11-21 PROCEDURE — 82270 OCCULT BLOOD FECES: CPT

## 2023-11-21 PROCEDURE — 80053 COMPREHEN METABOLIC PANEL: CPT

## 2023-11-21 PROCEDURE — 71260 CT THORAX DX C+: CPT

## 2023-11-21 PROCEDURE — 81001 URINALYSIS AUTO W/SCOPE: CPT

## 2023-11-21 PROCEDURE — 2580000003 HC RX 258: Performed by: EMERGENCY MEDICINE

## 2023-11-21 PROCEDURE — 6360000004 HC RX CONTRAST MEDICATION: Performed by: EMERGENCY MEDICINE

## 2023-11-21 PROCEDURE — 86901 BLOOD TYPING SEROLOGIC RH(D): CPT

## 2023-11-21 PROCEDURE — 76376 3D RENDER W/INTRP POSTPROCES: CPT

## 2023-11-21 RX ORDER — 0.9 % SODIUM CHLORIDE 0.9 %
1000 INTRAVENOUS SOLUTION INTRAVENOUS ONCE
Status: COMPLETED | OUTPATIENT
Start: 2023-11-21 | End: 2023-11-21

## 2023-11-21 RX ORDER — CALCIUM CHLORIDE 100 MG/ML
1000 INJECTION INTRAVENOUS; INTRAVENTRICULAR PRN
Status: DISCONTINUED | OUTPATIENT
Start: 2023-11-21 | End: 2023-11-21

## 2023-11-21 RX ORDER — NALOXONE HYDROCHLORIDE 0.4 MG/ML
0.4 INJECTION, SOLUTION INTRAMUSCULAR; INTRAVENOUS; SUBCUTANEOUS ONCE
Status: COMPLETED | OUTPATIENT
Start: 2023-11-21 | End: 2023-11-21

## 2023-11-21 RX ORDER — SODIUM CHLORIDE 9 MG/ML
50 INJECTION, SOLUTION INTRAVENOUS ONCE
Status: COMPLETED | OUTPATIENT
Start: 2023-11-21 | End: 2023-11-21

## 2023-11-21 RX ORDER — SODIUM CHLORIDE 9 MG/ML
INJECTION, SOLUTION INTRAVENOUS PRN
Status: DISCONTINUED | OUTPATIENT
Start: 2023-11-21 | End: 2023-11-22 | Stop reason: HOSPADM

## 2023-11-21 RX ADMIN — NALOXONE HYDROCHLORIDE 0.4 MG: 0.4 INJECTION, SOLUTION INTRAMUSCULAR; INTRAVENOUS; SUBCUTANEOUS at 21:01

## 2023-11-21 RX ADMIN — SODIUM CHLORIDE 1000 ML: 9 INJECTION, SOLUTION INTRAVENOUS at 19:54

## 2023-11-21 RX ADMIN — IOPAMIDOL 75 ML: 755 INJECTION, SOLUTION INTRAVENOUS at 19:22

## 2023-11-21 RX ADMIN — SODIUM CHLORIDE 1000 ML: 9 INJECTION, SOLUTION INTRAVENOUS at 19:14

## 2023-11-21 RX ADMIN — PROTHROMBIN, COAGULATION FACTOR VII HUMAN, COAGULATION FACTOR IX HUMAN, COAGULATION FACTOR X HUMAN, PROTEIN C, PROTEIN S HUMAN, AND WATER 4500 UNITS: KIT at 21:17

## 2023-11-21 RX ADMIN — SODIUM CHLORIDE 50 ML: 9 INJECTION, SOLUTION INTRAVENOUS at 21:18

## 2023-11-22 LAB
EKG ATRIAL RATE: 56 BPM
EKG DIAGNOSIS: NORMAL
EKG P AXIS: 38 DEGREES
EKG P-R INTERVAL: 132 MS
EKG Q-T INTERVAL: 448 MS
EKG QRS DURATION: 62 MS
EKG QTC CALCULATION (BAZETT): 432 MS
EKG R AXIS: -16 DEGREES
EKG T AXIS: 37 DEGREES
EKG VENTRICULAR RATE: 56 BPM

## 2023-11-22 PROCEDURE — 93010 ELECTROCARDIOGRAM REPORT: CPT | Performed by: INTERNAL MEDICINE

## 2023-11-22 NOTE — ED NOTES
I have discussed with the daughter, Patience Spring, the rationale for blood component transfusion; its benefits in treating or preventing fatigue, organ damage, or death; and its risk which includes mild transfusion reactions, rare risk of blood borne infection, or more serious but rare reactions. I have discussed the alternatives to transfusion, including the risk and consequences of not receiving transfusion. The daughter,Margaret, had an opportunity to ask questions and had agreed to proceed with transfusion of blood components.        Diana Li RN  11/21/23 2100

## 2023-11-22 NOTE — ED NOTES
Pt was transported to [facility] via mobile care. Pt's transfusions were completed prior to being transported. Report given to Dipak Marcelo in the ER.       Ashli Betancourt RN  11/21/23 8569

## 2023-11-22 NOTE — ED NOTES
2033 placed a ADT20 for UC transfer    2034 UC called Dr. Arash Mario back.       Luigi Fay  11/21/23 2034

## 2023-11-22 NOTE — ED NOTES
MD is aware of BP readings, 2 liters of normal saline hung at this time.  Will obtain manual blood pressure     Dioni Garrido RN  11/21/23 2003
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
## 2023-12-04 ENCOUNTER — APPOINTMENT (OUTPATIENT)
Dept: CT IMAGING | Age: 73
End: 2023-12-04
Payer: MEDICARE

## 2023-12-04 ENCOUNTER — HOSPITAL ENCOUNTER (INPATIENT)
Age: 73
LOS: 4 days | Discharge: HOME | End: 2023-12-08
Attending: EMERGENCY MEDICINE | Admitting: INTERNAL MEDICINE
Payer: MEDICARE

## 2023-12-04 ENCOUNTER — APPOINTMENT (OUTPATIENT)
Dept: GENERAL RADIOLOGY | Age: 73
End: 2023-12-04
Payer: MEDICARE

## 2023-12-04 DIAGNOSIS — J18.9 PNEUMONIA DUE TO INFECTIOUS ORGANISM, UNSPECIFIED LATERALITY, UNSPECIFIED PART OF LUNG: ICD-10-CM

## 2023-12-04 DIAGNOSIS — R10.11 RIGHT UPPER QUADRANT ABDOMINAL PAIN: ICD-10-CM

## 2023-12-04 DIAGNOSIS — K20.90 ESOPHAGITIS: ICD-10-CM

## 2023-12-04 DIAGNOSIS — I48.91 ATRIAL FIBRILLATION WITH RAPID VENTRICULAR RESPONSE (HCC): Primary | ICD-10-CM

## 2023-12-04 LAB
ALBUMIN SERPL-MCNC: 3.4 G/DL (ref 3.4–5)
ALBUMIN SERPL-MCNC: 3.9 G/DL (ref 3.4–5)
ALBUMIN/GLOB SERPL: 0.9 {RATIO} (ref 1.1–2.2)
ALP SERPL-CCNC: 204 U/L (ref 40–129)
ALP SERPL-CCNC: 224 U/L (ref 40–129)
ALT SERPL-CCNC: 12 U/L (ref 10–40)
ALT SERPL-CCNC: 18 U/L (ref 10–40)
AMMONIA PLAS-SCNC: 22 UMOL/L (ref 11–51)
AMPHETAMINES UR QL SCN>1000 NG/ML: NORMAL
ANION GAP SERPL CALCULATED.3IONS-SCNC: 13 MMOL/L (ref 3–16)
ANION GAP SERPL CALCULATED.3IONS-SCNC: 15 MMOL/L (ref 3–16)
ANION GAP SERPL CALCULATED.3IONS-SCNC: 19 MMOL/L (ref 3–16)
AST SERPL-CCNC: 30 U/L (ref 15–37)
AST SERPL-CCNC: 66 U/L (ref 15–37)
BARBITURATES UR QL SCN>200 NG/ML: NORMAL
BASOPHILS # BLD: 0.1 K/UL (ref 0–0.2)
BASOPHILS NFR BLD: 0.3 %
BENZODIAZ UR QL SCN>200 NG/ML: NORMAL
BILIRUB DIRECT SERPL-MCNC: <0.2 MG/DL (ref 0–0.3)
BILIRUB INDIRECT SERPL-MCNC: ABNORMAL MG/DL (ref 0–1)
BILIRUB SERPL-MCNC: 0.7 MG/DL (ref 0–1)
BILIRUB SERPL-MCNC: 0.8 MG/DL (ref 0–1)
BILIRUB UR QL STRIP.AUTO: NEGATIVE
BUN SERPL-MCNC: 24 MG/DL (ref 7–20)
BUN SERPL-MCNC: 27 MG/DL (ref 7–20)
BUN SERPL-MCNC: 27 MG/DL (ref 7–20)
CALCIUM SERPL-MCNC: 10 MG/DL (ref 8.3–10.6)
CALCIUM SERPL-MCNC: 9 MG/DL (ref 8.3–10.6)
CALCIUM SERPL-MCNC: 9.4 MG/DL (ref 8.3–10.6)
CANNABINOIDS UR QL SCN>50 NG/ML: NORMAL
CHLORIDE SERPL-SCNC: 95 MMOL/L (ref 99–110)
CHLORIDE SERPL-SCNC: 99 MMOL/L (ref 99–110)
CHLORIDE SERPL-SCNC: 99 MMOL/L (ref 99–110)
CLARITY UR: CLEAR
CO2 SERPL-SCNC: 22 MMOL/L (ref 21–32)
CO2 SERPL-SCNC: 25 MMOL/L (ref 21–32)
CO2 SERPL-SCNC: 26 MMOL/L (ref 21–32)
COCAINE UR QL SCN: NORMAL
COLOR UR: YELLOW
CREAT SERPL-MCNC: 0.8 MG/DL (ref 0.6–1.2)
CREAT SERPL-MCNC: 0.9 MG/DL (ref 0.6–1.2)
CREAT SERPL-MCNC: 0.9 MG/DL (ref 0.6–1.2)
D DIMER: 6.48 UG/ML FEU (ref 0–0.6)
DEPRECATED RDW RBC AUTO: 17.2 % (ref 12.4–15.4)
DRUG SCREEN COMMENT UR-IMP: NORMAL
EKG ATRIAL RATE: 103 BPM
EKG ATRIAL RATE: 166 BPM
EKG DIAGNOSIS: NORMAL
EKG DIAGNOSIS: NORMAL
EKG P AXIS: 62 DEGREES
EKG P-R INTERVAL: 132 MS
EKG Q-T INTERVAL: 296 MS
EKG Q-T INTERVAL: 334 MS
EKG QRS DURATION: 66 MS
EKG QRS DURATION: 70 MS
EKG QTC CALCULATION (BAZETT): 437 MS
EKG QTC CALCULATION (BAZETT): 494 MS
EKG R AXIS: -38 DEGREES
EKG R AXIS: -41 DEGREES
EKG T AXIS: -83 DEGREES
EKG T AXIS: 75 DEGREES
EKG VENTRICULAR RATE: 103 BPM
EKG VENTRICULAR RATE: 168 BPM
EOSINOPHIL # BLD: 0.1 K/UL (ref 0–0.6)
EOSINOPHIL NFR BLD: 0.4 %
ETHANOLAMINE SERPL-MCNC: NORMAL MG/DL (ref 0–0.08)
FENTANYL SCREEN, URINE: NORMAL
GFR SERPLBLD CREATININE-BSD FMLA CKD-EPI: >60 ML/MIN/{1.73_M2}
GLUCOSE BLD-MCNC: 150 MG/DL (ref 70–99)
GLUCOSE BLD-MCNC: 155 MG/DL (ref 70–99)
GLUCOSE BLD-MCNC: 155 MG/DL (ref 70–99)
GLUCOSE SERPL-MCNC: 151 MG/DL (ref 70–99)
GLUCOSE SERPL-MCNC: 163 MG/DL (ref 70–99)
GLUCOSE SERPL-MCNC: 168 MG/DL (ref 70–99)
GLUCOSE UR STRIP.AUTO-MCNC: NEGATIVE MG/DL
HCT VFR BLD AUTO: 36.5 % (ref 36–48)
HCT VFR BLD AUTO: 37 % (ref 36–48)
HCT VFR BLD AUTO: 41.1 % (ref 36–48)
HGB BLD-MCNC: 11.5 G/DL (ref 12–16)
HGB BLD-MCNC: 11.6 G/DL (ref 12–16)
HGB BLD-MCNC: 13.4 G/DL (ref 12–16)
HGB UR QL STRIP.AUTO: NEGATIVE
KETONES UR STRIP.AUTO-MCNC: NEGATIVE MG/DL
LACTATE BLDV-SCNC: 2.1 MMOL/L (ref 0.4–1.9)
LACTATE BLDV-SCNC: 2.1 MMOL/L (ref 0.4–2)
LACTATE BLDV-SCNC: 2.3 MMOL/L (ref 0.4–1.9)
LEUKOCYTE ESTERASE UR QL STRIP.AUTO: NEGATIVE
LIPASE SERPL-CCNC: 25 U/L (ref 13–60)
LYMPHOCYTES # BLD: 2.1 K/UL (ref 1–5.1)
LYMPHOCYTES NFR BLD: 13 %
MAGNESIUM SERPL-MCNC: 1.7 MG/DL (ref 1.8–2.4)
MAGNESIUM SERPL-MCNC: 1.7 MG/DL (ref 1.8–2.4)
MAGNESIUM SERPL-MCNC: 2.3 MG/DL (ref 1.8–2.4)
MCH RBC QN AUTO: 28.8 PG (ref 26–34)
MCHC RBC AUTO-ENTMCNC: 32.7 G/DL (ref 31–36)
MCV RBC AUTO: 88.2 FL (ref 80–100)
METHADONE UR QL SCN>300 NG/ML: NORMAL
MONOCYTES # BLD: 1.4 K/UL (ref 0–1.3)
MONOCYTES NFR BLD: 8.4 %
NEUTROPHILS # BLD: 12.6 K/UL (ref 1.7–7.7)
NEUTROPHILS NFR BLD: 77.9 %
NITRITE UR QL STRIP.AUTO: NEGATIVE
NT-PROBNP SERPL-MCNC: ABNORMAL PG/ML (ref 0–124)
OPIATES UR QL SCN>300 NG/ML: NORMAL
OXYCODONE UR QL SCN: NORMAL
PCP UR QL SCN>25 NG/ML: NORMAL
PERFORMED ON: ABNORMAL
PH UR STRIP.AUTO: 5.5 [PH] (ref 5–8)
PH UR STRIP: 5.5 [PH]
PHOSPHATE SERPL-MCNC: 3.3 MG/DL (ref 2.5–4.9)
PLATELET # BLD AUTO: 405 K/UL (ref 135–450)
PMV BLD AUTO: 8.8 FL (ref 5–10.5)
POTASSIUM SERPL-SCNC: 3.1 MMOL/L (ref 3.5–5.1)
POTASSIUM SERPL-SCNC: 3.6 MMOL/L (ref 3.5–5.1)
POTASSIUM SERPL-SCNC: 5.3 MMOL/L (ref 3.5–5.1)
PROCALCITONIN SERPL IA-MCNC: 0.21 NG/ML (ref 0–0.15)
PROCALCITONIN SERPL IA-MCNC: 0.22 NG/ML (ref 0–0.15)
PROT SERPL-MCNC: 7.1 G/DL (ref 6.4–8.2)
PROT SERPL-MCNC: 8.4 G/DL (ref 6.4–8.2)
PROT UR STRIP.AUTO-MCNC: NEGATIVE MG/DL
RBC # BLD AUTO: 4.67 M/UL (ref 4–5.2)
SODIUM SERPL-SCNC: 136 MMOL/L (ref 136–145)
SODIUM SERPL-SCNC: 137 MMOL/L (ref 136–145)
SODIUM SERPL-SCNC: 140 MMOL/L (ref 136–145)
SP GR UR STRIP.AUTO: 1.02 (ref 1–1.03)
TROPONIN, HIGH SENSITIVITY: 103 NG/L (ref 0–14)
TROPONIN, HIGH SENSITIVITY: 89 NG/L (ref 0–14)
TROPONIN, HIGH SENSITIVITY: 93 NG/L (ref 0–14)
TROPONIN, HIGH SENSITIVITY: 96 NG/L (ref 0–14)
TROPONIN, HIGH SENSITIVITY: 99 NG/L (ref 0–14)
TSH SERPL DL<=0.005 MIU/L-ACNC: 2.07 UIU/ML (ref 0.27–4.2)
UA COMPLETE W REFLEX CULTURE PNL UR: NORMAL
UA DIPSTICK W REFLEX MICRO PNL UR: NORMAL
URN SPEC COLLECT METH UR: NORMAL
UROBILINOGEN UR STRIP-ACNC: 0.2 E.U./DL
WBC # BLD AUTO: 16.2 K/UL (ref 4–11)

## 2023-12-04 PROCEDURE — 74176 CT ABD & PELVIS W/O CONTRAST: CPT

## 2023-12-04 PROCEDURE — 83690 ASSAY OF LIPASE: CPT

## 2023-12-04 PROCEDURE — 6370000000 HC RX 637 (ALT 250 FOR IP): Performed by: INTERNAL MEDICINE

## 2023-12-04 PROCEDURE — 81003 URINALYSIS AUTO W/O SCOPE: CPT

## 2023-12-04 PROCEDURE — 83735 ASSAY OF MAGNESIUM: CPT

## 2023-12-04 PROCEDURE — 93010 ELECTROCARDIOGRAM REPORT: CPT | Performed by: INTERNAL MEDICINE

## 2023-12-04 PROCEDURE — 6360000002 HC RX W HCPCS

## 2023-12-04 PROCEDURE — 93005 ELECTROCARDIOGRAM TRACING: CPT | Performed by: EMERGENCY MEDICINE

## 2023-12-04 PROCEDURE — 36415 COLL VENOUS BLD VENIPUNCTURE: CPT

## 2023-12-04 PROCEDURE — C9113 INJ PANTOPRAZOLE SODIUM, VIA: HCPCS

## 2023-12-04 PROCEDURE — 71260 CT THORAX DX C+: CPT

## 2023-12-04 PROCEDURE — 2500000003 HC RX 250 WO HCPCS: Performed by: EMERGENCY MEDICINE

## 2023-12-04 PROCEDURE — 6370000000 HC RX 637 (ALT 250 FOR IP)

## 2023-12-04 PROCEDURE — 85018 HEMOGLOBIN: CPT

## 2023-12-04 PROCEDURE — 99223 1ST HOSP IP/OBS HIGH 75: CPT | Performed by: PSYCHIATRY & NEUROLOGY

## 2023-12-04 PROCEDURE — 85379 FIBRIN DEGRADATION QUANT: CPT

## 2023-12-04 PROCEDURE — 84145 PROCALCITONIN (PCT): CPT

## 2023-12-04 PROCEDURE — 80053 COMPREHEN METABOLIC PANEL: CPT

## 2023-12-04 PROCEDURE — 85025 COMPLETE CBC W/AUTO DIFF WBC: CPT

## 2023-12-04 PROCEDURE — 70450 CT HEAD/BRAIN W/O DYE: CPT

## 2023-12-04 PROCEDURE — 84484 ASSAY OF TROPONIN QUANT: CPT

## 2023-12-04 PROCEDURE — 82077 ASSAY SPEC XCP UR&BREATH IA: CPT

## 2023-12-04 PROCEDURE — 2580000003 HC RX 258: Performed by: EMERGENCY MEDICINE

## 2023-12-04 PROCEDURE — 87040 BLOOD CULTURE FOR BACTERIA: CPT

## 2023-12-04 PROCEDURE — 99285 EMERGENCY DEPT VISIT HI MDM: CPT

## 2023-12-04 PROCEDURE — 6360000002 HC RX W HCPCS: Performed by: INTERNAL MEDICINE

## 2023-12-04 PROCEDURE — 2060000000 HC ICU INTERMEDIATE R&B

## 2023-12-04 PROCEDURE — 71046 X-RAY EXAM CHEST 2 VIEWS: CPT

## 2023-12-04 PROCEDURE — 6360000004 HC RX CONTRAST MEDICATION: Performed by: EMERGENCY MEDICINE

## 2023-12-04 PROCEDURE — C9113 INJ PANTOPRAZOLE SODIUM, VIA: HCPCS | Performed by: EMERGENCY MEDICINE

## 2023-12-04 PROCEDURE — 83605 ASSAY OF LACTIC ACID: CPT

## 2023-12-04 PROCEDURE — 80307 DRUG TEST PRSMV CHEM ANLYZR: CPT

## 2023-12-04 PROCEDURE — 84443 ASSAY THYROID STIM HORMONE: CPT

## 2023-12-04 PROCEDURE — 99223 1ST HOSP IP/OBS HIGH 75: CPT | Performed by: INTERNAL MEDICINE

## 2023-12-04 PROCEDURE — 83880 ASSAY OF NATRIURETIC PEPTIDE: CPT

## 2023-12-04 PROCEDURE — 2580000003 HC RX 258

## 2023-12-04 PROCEDURE — 96375 TX/PRO/DX INJ NEW DRUG ADDON: CPT

## 2023-12-04 PROCEDURE — 82140 ASSAY OF AMMONIA: CPT

## 2023-12-04 PROCEDURE — 84100 ASSAY OF PHOSPHORUS: CPT

## 2023-12-04 PROCEDURE — 96365 THER/PROPH/DIAG IV INF INIT: CPT

## 2023-12-04 PROCEDURE — 6360000002 HC RX W HCPCS: Performed by: EMERGENCY MEDICINE

## 2023-12-04 PROCEDURE — 85014 HEMATOCRIT: CPT

## 2023-12-04 RX ORDER — SODIUM CHLORIDE 0.9 % (FLUSH) 0.9 %
5-40 SYRINGE (ML) INJECTION EVERY 12 HOURS SCHEDULED
Status: DISCONTINUED | OUTPATIENT
Start: 2023-12-04 | End: 2023-12-08 | Stop reason: HOSPADM

## 2023-12-04 RX ORDER — SODIUM CHLORIDE 0.9 % (FLUSH) 0.9 %
10 SYRINGE (ML) INJECTION PRN
Status: DISCONTINUED | OUTPATIENT
Start: 2023-12-04 | End: 2023-12-08 | Stop reason: HOSPADM

## 2023-12-04 RX ORDER — LEVETIRACETAM 500 MG/1
500 TABLET ORAL 2 TIMES DAILY
Status: DISCONTINUED | OUTPATIENT
Start: 2023-12-04 | End: 2023-12-08 | Stop reason: HOSPADM

## 2023-12-04 RX ORDER — FAMOTIDINE 20 MG/1
20 TABLET, FILM COATED ORAL DAILY
Status: ON HOLD | COMMUNITY
End: 2023-12-08 | Stop reason: HOSPADM

## 2023-12-04 RX ORDER — CLOBETASOL PROPIONATE 0.46 MG/ML
SOLUTION TOPICAL PRN
COMMUNITY

## 2023-12-04 RX ORDER — POTASSIUM CHLORIDE 7.45 MG/ML
10 INJECTION INTRAVENOUS PRN
Status: DISCONTINUED | OUTPATIENT
Start: 2023-12-04 | End: 2023-12-08 | Stop reason: HOSPADM

## 2023-12-04 RX ORDER — MAGNESIUM SULFATE 1 G/100ML
1000 INJECTION INTRAVENOUS ONCE
Status: COMPLETED | OUTPATIENT
Start: 2023-12-04 | End: 2023-12-04

## 2023-12-04 RX ORDER — DILTIAZEM HYDROCHLORIDE 5 MG/ML
10 INJECTION INTRAVENOUS ONCE
Status: COMPLETED | OUTPATIENT
Start: 2023-12-04 | End: 2023-12-04

## 2023-12-04 RX ORDER — PANTOPRAZOLE SODIUM 40 MG/10ML
40 INJECTION, POWDER, LYOPHILIZED, FOR SOLUTION INTRAVENOUS ONCE
Status: COMPLETED | OUTPATIENT
Start: 2023-12-04 | End: 2023-12-04

## 2023-12-04 RX ORDER — ONDANSETRON 2 MG/ML
4 INJECTION INTRAMUSCULAR; INTRAVENOUS ONCE
Status: COMPLETED | OUTPATIENT
Start: 2023-12-04 | End: 2023-12-04

## 2023-12-04 RX ORDER — METOPROLOL SUCCINATE 25 MG/1
25 TABLET, EXTENDED RELEASE ORAL DAILY
Status: DISCONTINUED | OUTPATIENT
Start: 2023-12-04 | End: 2023-12-08 | Stop reason: HOSPADM

## 2023-12-04 RX ORDER — ACETAMINOPHEN 325 MG/1
650 TABLET ORAL EVERY 6 HOURS PRN
Status: DISCONTINUED | OUTPATIENT
Start: 2023-12-04 | End: 2023-12-08 | Stop reason: HOSPADM

## 2023-12-04 RX ORDER — DESONIDE 0.5 MG/G
CREAM TOPICAL 2 TIMES DAILY
COMMUNITY

## 2023-12-04 RX ORDER — PANTOPRAZOLE SODIUM 40 MG/10ML
40 INJECTION, POWDER, LYOPHILIZED, FOR SOLUTION INTRAVENOUS 2 TIMES DAILY
Status: DISCONTINUED | OUTPATIENT
Start: 2023-12-04 | End: 2023-12-06

## 2023-12-04 RX ORDER — ONDANSETRON 2 MG/ML
4 INJECTION INTRAMUSCULAR; INTRAVENOUS EVERY 6 HOURS PRN
Status: DISCONTINUED | OUTPATIENT
Start: 2023-12-04 | End: 2023-12-08 | Stop reason: HOSPADM

## 2023-12-04 RX ORDER — SODIUM CHLORIDE 9 MG/ML
INJECTION, SOLUTION INTRAVENOUS PRN
Status: DISCONTINUED | OUTPATIENT
Start: 2023-12-04 | End: 2023-12-08 | Stop reason: HOSPADM

## 2023-12-04 RX ORDER — POLYETHYLENE GLYCOL 3350 17 G/17G
17 POWDER, FOR SOLUTION ORAL DAILY PRN
Status: DISCONTINUED | OUTPATIENT
Start: 2023-12-04 | End: 2023-12-08 | Stop reason: HOSPADM

## 2023-12-04 RX ORDER — DULAGLUTIDE 0.75 MG/.5ML
0.75 INJECTION, SOLUTION SUBCUTANEOUS WEEKLY
COMMUNITY

## 2023-12-04 RX ORDER — ALBUTEROL SULFATE 90 UG/1
2 AEROSOL, METERED RESPIRATORY (INHALATION) 4 TIMES DAILY PRN
Status: DISCONTINUED | OUTPATIENT
Start: 2023-12-04 | End: 2023-12-08 | Stop reason: HOSPADM

## 2023-12-04 RX ORDER — SENNA AND DOCUSATE SODIUM 50; 8.6 MG/1; MG/1
2 TABLET, FILM COATED ORAL 2 TIMES DAILY
COMMUNITY

## 2023-12-04 RX ORDER — POLYETHYLENE GLYCOL 3350 17 G/17G
17 POWDER, FOR SOLUTION ORAL 2 TIMES DAILY
COMMUNITY

## 2023-12-04 RX ORDER — ACETAMINOPHEN 650 MG/1
650 SUPPOSITORY RECTAL EVERY 6 HOURS PRN
Status: DISCONTINUED | OUTPATIENT
Start: 2023-12-04 | End: 2023-12-08 | Stop reason: HOSPADM

## 2023-12-04 RX ORDER — POTASSIUM CHLORIDE 20 MEQ/1
40 TABLET, EXTENDED RELEASE ORAL PRN
Status: DISCONTINUED | OUTPATIENT
Start: 2023-12-04 | End: 2023-12-08 | Stop reason: HOSPADM

## 2023-12-04 RX ORDER — METRONIDAZOLE 500 MG/100ML
500 INJECTION, SOLUTION INTRAVENOUS EVERY 8 HOURS
Status: DISCONTINUED | OUTPATIENT
Start: 2023-12-04 | End: 2023-12-08 | Stop reason: HOSPADM

## 2023-12-04 RX ORDER — ONDANSETRON 2 MG/ML
INJECTION INTRAMUSCULAR; INTRAVENOUS
Status: COMPLETED
Start: 2023-12-04 | End: 2023-12-04

## 2023-12-04 RX ORDER — ACETAMINOPHEN 325 MG/1
650 TABLET ORAL EVERY 8 HOURS
COMMUNITY

## 2023-12-04 RX ORDER — MAGNESIUM SULFATE 1 G/100ML
1000 INJECTION INTRAVENOUS PRN
Status: DISCONTINUED | OUTPATIENT
Start: 2023-12-04 | End: 2023-12-08 | Stop reason: HOSPADM

## 2023-12-04 RX ORDER — ONDANSETRON 4 MG/1
4 TABLET, ORALLY DISINTEGRATING ORAL EVERY 8 HOURS PRN
Status: DISCONTINUED | OUTPATIENT
Start: 2023-12-04 | End: 2023-12-08 | Stop reason: HOSPADM

## 2023-12-04 RX ORDER — ERGOCALCIFEROL 1.25 MG/1
50000 CAPSULE ORAL WEEKLY
COMMUNITY

## 2023-12-04 RX ORDER — PANTOPRAZOLE SODIUM 40 MG/1
40 TABLET, DELAYED RELEASE ORAL 2 TIMES DAILY
COMMUNITY

## 2023-12-04 RX ADMIN — LEVETIRACETAM 500 MG: 500 TABLET, FILM COATED ORAL at 13:20

## 2023-12-04 RX ADMIN — DILTIAZEM HYDROCHLORIDE 10 MG/HR: 5 INJECTION, SOLUTION INTRAVENOUS at 04:57

## 2023-12-04 RX ADMIN — PIPERACILLIN AND TAZOBACTAM 3375 MG: 3; .375 INJECTION, POWDER, LYOPHILIZED, FOR SOLUTION INTRAVENOUS at 06:37

## 2023-12-04 RX ADMIN — LEVETIRACETAM 500 MG: 500 TABLET, FILM COATED ORAL at 21:20

## 2023-12-04 RX ADMIN — DILTIAZEM HYDROCHLORIDE 10 MG: 5 INJECTION INTRAVENOUS at 03:12

## 2023-12-04 RX ADMIN — METOPROLOL SUCCINATE 25 MG: 25 TABLET, EXTENDED RELEASE ORAL at 16:35

## 2023-12-04 RX ADMIN — METRONIDAZOLE 500 MG: 500 INJECTION, SOLUTION INTRAVENOUS at 14:21

## 2023-12-04 RX ADMIN — PANTOPRAZOLE SODIUM 40 MG: 40 INJECTION, POWDER, FOR SOLUTION INTRAVENOUS at 14:23

## 2023-12-04 RX ADMIN — IOPAMIDOL 75 ML: 755 INJECTION, SOLUTION INTRAVENOUS at 05:46

## 2023-12-04 RX ADMIN — PANTOPRAZOLE SODIUM 40 MG: 40 INJECTION, POWDER, FOR SOLUTION INTRAVENOUS at 21:20

## 2023-12-04 RX ADMIN — VANCOMYCIN HYDROCHLORIDE 750 MG: 750 INJECTION, POWDER, LYOPHILIZED, FOR SOLUTION INTRAVENOUS at 14:23

## 2023-12-04 RX ADMIN — SODIUM CHLORIDE: 9 INJECTION, SOLUTION INTRAVENOUS at 13:26

## 2023-12-04 RX ADMIN — MAGNESIUM SULFATE HEPTAHYDRATE 1000 MG: 1 INJECTION, SOLUTION INTRAVENOUS at 17:54

## 2023-12-04 RX ADMIN — METRONIDAZOLE 500 MG: 500 INJECTION, SOLUTION INTRAVENOUS at 21:20

## 2023-12-04 RX ADMIN — ONDANSETRON 4 MG: 2 INJECTION INTRAMUSCULAR; INTRAVENOUS at 03:16

## 2023-12-04 RX ADMIN — SODIUM CHLORIDE, PRESERVATIVE FREE 10 ML: 5 INJECTION INTRAVENOUS at 21:20

## 2023-12-04 RX ADMIN — SODIUM CHLORIDE: 9 INJECTION, SOLUTION INTRAVENOUS at 14:22

## 2023-12-04 RX ADMIN — DILTIAZEM HYDROCHLORIDE 7.5 MG/HR: 5 INJECTION, SOLUTION INTRAVENOUS at 04:20

## 2023-12-04 RX ADMIN — VANCOMYCIN HYDROCHLORIDE 750 MG: 750 INJECTION, POWDER, LYOPHILIZED, FOR SOLUTION INTRAVENOUS at 22:38

## 2023-12-04 RX ADMIN — ACETAMINOPHEN 650 MG: 325 TABLET ORAL at 16:42

## 2023-12-04 RX ADMIN — CEFTRIAXONE SODIUM 1000 MG: 1 INJECTION, POWDER, FOR SOLUTION INTRAMUSCULAR; INTRAVENOUS at 13:27

## 2023-12-04 RX ADMIN — DILTIAZEM HYDROCHLORIDE 10 MG/HR: 5 INJECTION, SOLUTION INTRAVENOUS at 06:36

## 2023-12-04 RX ADMIN — PANTOPRAZOLE SODIUM 40 MG: 40 INJECTION, POWDER, FOR SOLUTION INTRAVENOUS at 07:44

## 2023-12-04 RX ADMIN — DILTIAZEM HYDROCHLORIDE 5 MG/HR: 5 INJECTION, SOLUTION INTRAVENOUS at 03:46

## 2023-12-04 RX ADMIN — POTASSIUM BICARBONATE 40 MEQ: 782 TABLET, EFFERVESCENT ORAL at 18:00

## 2023-12-04 ASSESSMENT — ENCOUNTER SYMPTOMS
ALLERGIC/IMMUNOLOGIC NEGATIVE: 1
VOMITING: 1
ABDOMINAL PAIN: 1
NAUSEA: 1
EYES NEGATIVE: 1

## 2023-12-04 ASSESSMENT — PAIN - FUNCTIONAL ASSESSMENT: PAIN_FUNCTIONAL_ASSESSMENT: 0-10

## 2023-12-04 ASSESSMENT — PAIN DESCRIPTION - PAIN TYPE: TYPE: ACUTE PAIN

## 2023-12-04 ASSESSMENT — PAIN DESCRIPTION - FREQUENCY: FREQUENCY: CONTINUOUS

## 2023-12-04 ASSESSMENT — PAIN SCALES - GENERAL: PAINLEVEL_OUTOF10: 9
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
## 2023-12-04 NOTE — ED NOTES
Increased confusion, pt removed blankets clothing lying in bed, repositioned pt, covered pt back up, bed low locked rails up x 2     Shazia MarieDickens, [place]  12/04/23 3532

## 2023-12-04 NOTE — ED NOTES
0694-Call back received from Dipika VAUGHN spoke with Dr. Zohra Melvin regarding consult.       Osvaldo Grimes  12/04/23 5096
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
## 2023-12-04 NOTE — ED NOTES
Bedside report given to [employer]. Pt placed on telemetry, transported to PCU via stretcher in stable condition. Personal belongings placed in bag and sent with pt. CHARLIE Bashir RN  12/04/23 0618

## 2023-12-04 NOTE — ED NOTES
Pt alert and verbal at this time. Repeat lactic drawn. Pt unable to verify home medications, pt unable to tell this RN name of pharmacy either. Pt is noted to be picking at BP cuff and pulse oximeter, pt is andrew to be re directed however she frequently returns to removing monitoring devices.  CHARLIE Olivo RN  12/04/23 4677
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
## 2023-12-04 NOTE — CONSULTS
Pharmacy Medication Reconciliation Note     List of medications Adriel Barrios is currently taking is in progress. Source of information:   1. 11/21/23 [facility] hospital discharge medications  2. [employer] dispense report    Notes regarding home medications:   1. Removed the following medications:     Triamcinolone   Gabapentin   Prednisone   Opzelura     2. Unsure if pt is still taking the following medications based on fill history, although they are listed in recent [facility] discharge to continue taking. Eliquis last filled 01/2023   Desonide cream last filled 2017   Clobetasol last filled 2020   Premarin last filled 2021   Keppra last filled 04/2023   Synthroid last filled 03/2023   Singulair last filled 2011    Rn should confirm if pt is still taking the medications above. Provider to determine the appropriateness of continuing home medications    Africa Wing  [facility]  12/4/2023  2:07 PM

## 2023-12-04 NOTE — PROGRESS NOTES
Admission assessment completed. Pt is a poor historian; admission questions could not be completed at this time. Vital signs stable. Call light in reach and standard safety measures in place. Pt resting in bed; no needs or complaints at this time.

## 2023-12-04 NOTE — CONSULTS
CARDIOLOGY CONSULTATION        Patient Name: Tomasz Junior  Date of admission: 12/4/2023  2:37 AM  Admission Dx: Esophagitis [K20.90]  Atrial fibrillation with rapid ventricular response ([address]) [I48.91]  Atrial fibrillation with RVR ([address]) [I48.91]  Right upper quadrant abdominal pain [R10.11]  Pneumonia due to infectious organism, unspecified laterality, unspecified part of lung [J18.9]  Requesting Physician: Jonelle Armando MD  Primary Care physician: Christina Tavares MD    Reason for Consultation/Chief Complaint:Atrial fibrillation     History of Present Illness:     Tomasz Junior is a 68 y.o. patient with prior medical history notable for CVA, HFpEF, Hx atrial flutter, COPD, Diabetes mellitus, hypertension, hyperlipidemia, thyroid disease who presented to the hospital with complaints of n/v/abd pain x 1 day. She was found to be in atrial fibrillation with RVR. ED course/Vital signs on presentation: Initial EKG ~2:50 AM with atrial fibrillation with RVR, 168 bpm, ST&T wave abnormality concerning for inferior/anterior subendocardial ischemia. Follow up EKG 1.5 hrs later showed sinus with PACs, persistent ST/T wave abnormality. CXR neg. CT PE protocol showed possible esophagitis, cirrhotic appearing liver with portal hypertension/splenomegaly/possible EV, possible colitis, mild interstitial edema vs. PNA and pleural plaque disease. Labs shows azoetmia, WBC 16.2, HS-Trop 21,12,49,783. Mg 1.7, Pro BNP 11k. Today, patient is evaluated. She was noted confused presently per my discussion with bedside nurse but does answer questions appropriately on my evaluation. Does confirm she came in with complaints of abdominal discomfort, nausea and vomiting. She points to her anterior abdomen and not to her left side where there is a known rash, possible zoster that is healing. She has had no chest pressure, heaviness or discomfort.   She she states she currently feels short of breath although she does not Anel Leggett MD   metoprolol succinate (TOPROL XL) 25 MG extended release tablet Take 1 tablet by mouth daily 9/10/22   Marco Valdivia MD   levothyroxine (SYNTHROID) 88 MCG tablet Take 1 tablet by mouth Daily    Luke Purcell MD   Ruxolitinib Phosphate (OPZELURA) 1.5 % CREA APPLY TO RASH TWICE A DAY AS TOLERATED. STOP USE WHEN RASH RESOLVES    Luke Purcell MD   lisinopril (PRINIVIL;ZESTRIL) 5 MG tablet Take 1 tablet by mouth daily Take one tab by mouth daily    Luke Purcell MD   sertraline (ZOLOFT) 50 MG tablet Take 2 tablets by mouth daily RX take one tab by mouth once a day with food    Luke Purcell MD   gabapentin (NEURONTIN) 300 MG capsule Take 1 capsule by mouth 3 times daily. BLUE Portillo NP   mirtazapine (REMERON) 7.5 MG tablet Take 1 tablet by mouth nightly For sleep    Luke Purcell MD   nystatin (MYCOSTATIN) 658088 UNIT/GM cream Apply topically 2 times daily Apply topically 2 times daily. Apply small amount To inner thighs    Luke Purcell MD   triamcinolone (KENALOG) 0.1 % ointment Apply topically 2 times daily Apply topically 2 times daily.  For 2 weeks with 1 week break stop when rash resolves    Luke Purcell MD   simvastatin (ZOCOR) 20 MG tablet Take 1 tablet by mouth nightly    Raymond Lira DO   tiZANidine (ZANAFLEX) 2 MG tablet Take 1 tablet by mouth nightly as needed (muscle spasms)    Luke Purcell MD   ketoconazole (NIZORAL) 2 % shampoo Apply topically daily as needed for Itching Apply to scalp/behind ears 2-3 times a week, leave in 5-10 min then rinse when clear use one time a month for maintenance    Elvira Morris PA   fluticasone (FLONASE) 50 MCG/ACT nasal spray 1 spray by Nasal route daily    Luke Purcell MD   ondansetron (ZOFRAN) 4 MG tablet Take 1 tablet by mouth every 8 hours as needed for Nausea or Vomiting    Luke Purcell MD   dicyclomine (BENTYL) 10 MG capsule Take 1

## 2023-12-04 NOTE — ED NOTES
Attempt made to contact pharmacy. Pharmacy not open at this time, will re attempt. CHARLIE Couch RN  12/04/23 0800
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
## 2023-12-05 LAB
ALBUMIN SERPL-MCNC: 2.9 G/DL (ref 3.4–5)
ALP SERPL-CCNC: 178 U/L (ref 40–129)
ALT SERPL-CCNC: 12 U/L (ref 10–40)
ANION GAP SERPL CALCULATED.3IONS-SCNC: 11 MMOL/L (ref 3–16)
AST SERPL-CCNC: 27 U/L (ref 15–37)
BASOPHILS # BLD: 0 K/UL (ref 0–0.2)
BASOPHILS NFR BLD: 0.3 %
BILIRUB DIRECT SERPL-MCNC: <0.2 MG/DL (ref 0–0.3)
BILIRUB INDIRECT SERPL-MCNC: ABNORMAL MG/DL (ref 0–1)
BILIRUB SERPL-MCNC: 1.3 MG/DL (ref 0–1)
BUN SERPL-MCNC: 21 MG/DL (ref 7–20)
CALCIUM SERPL-MCNC: 8.6 MG/DL (ref 8.3–10.6)
CHLORIDE SERPL-SCNC: 100 MMOL/L (ref 99–110)
CO2 SERPL-SCNC: 24 MMOL/L (ref 21–32)
CREAT SERPL-MCNC: 0.6 MG/DL (ref 0.6–1.2)
DEPRECATED RDW RBC AUTO: 17.8 % (ref 12.4–15.4)
EOSINOPHIL # BLD: 0.2 K/UL (ref 0–0.6)
EOSINOPHIL NFR BLD: 1.8 %
GFR SERPLBLD CREATININE-BSD FMLA CKD-EPI: >60 ML/MIN/{1.73_M2}
GLUCOSE BLD-MCNC: 132 MG/DL (ref 70–99)
GLUCOSE BLD-MCNC: 139 MG/DL (ref 70–99)
GLUCOSE BLD-MCNC: 140 MG/DL (ref 70–99)
GLUCOSE SERPL-MCNC: 124 MG/DL (ref 70–99)
HCT VFR BLD AUTO: 34.3 % (ref 36–48)
HCT VFR BLD AUTO: 34.5 % (ref 36–48)
HCT VFR BLD AUTO: 35.3 % (ref 36–48)
HCT VFR BLD AUTO: 36.5 % (ref 36–48)
HEMOCCULT STL QL: NORMAL
HGB BLD-MCNC: 11 G/DL (ref 12–16)
HGB BLD-MCNC: 11.5 G/DL (ref 12–16)
HGB BLD-MCNC: 11.5 G/DL (ref 12–16)
HGB BLD-MCNC: 12 G/DL (ref 12–16)
LYMPHOCYTES # BLD: 1.9 K/UL (ref 1–5.1)
LYMPHOCYTES NFR BLD: 16.9 %
MAGNESIUM SERPL-MCNC: 2.3 MG/DL (ref 1.8–2.4)
MCH RBC QN AUTO: 29.3 PG (ref 26–34)
MCHC RBC AUTO-ENTMCNC: 31.9 G/DL (ref 31–36)
MCV RBC AUTO: 91.7 FL (ref 80–100)
MONOCYTES # BLD: 0.8 K/UL (ref 0–1.3)
MONOCYTES NFR BLD: 7 %
NEUTROPHILS # BLD: 8.3 K/UL (ref 1.7–7.7)
NEUTROPHILS NFR BLD: 74 %
PERFORMED ON: ABNORMAL
PLATELET # BLD AUTO: 277 K/UL (ref 135–450)
PMV BLD AUTO: 8.1 FL (ref 5–10.5)
POTASSIUM SERPL-SCNC: 3.4 MMOL/L (ref 3.5–5.1)
PROT SERPL-MCNC: 6.4 G/DL (ref 6.4–8.2)
RBC # BLD AUTO: 3.74 M/UL (ref 4–5.2)
SODIUM SERPL-SCNC: 135 MMOL/L (ref 136–145)
VANCOMYCIN TROUGH SERPL-MCNC: 11.6 UG/ML (ref 10–20)
WBC # BLD AUTO: 11.2 K/UL (ref 4–11)

## 2023-12-05 PROCEDURE — 99233 SBSQ HOSP IP/OBS HIGH 50: CPT | Performed by: PSYCHIATRY & NEUROLOGY

## 2023-12-05 PROCEDURE — 6370000000 HC RX 637 (ALT 250 FOR IP)

## 2023-12-05 PROCEDURE — 6360000002 HC RX W HCPCS

## 2023-12-05 PROCEDURE — 83735 ASSAY OF MAGNESIUM: CPT

## 2023-12-05 PROCEDURE — 80202 ASSAY OF VANCOMYCIN: CPT

## 2023-12-05 PROCEDURE — 2580000003 HC RX 258

## 2023-12-05 PROCEDURE — 95816 EEG AWAKE AND DROWSY: CPT | Performed by: PSYCHIATRY & NEUROLOGY

## 2023-12-05 PROCEDURE — 80048 BASIC METABOLIC PNL TOTAL CA: CPT

## 2023-12-05 PROCEDURE — 95816 EEG AWAKE AND DROWSY: CPT

## 2023-12-05 PROCEDURE — 80076 HEPATIC FUNCTION PANEL: CPT

## 2023-12-05 PROCEDURE — 6370000000 HC RX 637 (ALT 250 FOR IP): Performed by: INTERNAL MEDICINE

## 2023-12-05 PROCEDURE — 99232 SBSQ HOSP IP/OBS MODERATE 35: CPT | Performed by: NURSE PRACTITIONER

## 2023-12-05 PROCEDURE — 85014 HEMATOCRIT: CPT

## 2023-12-05 PROCEDURE — C9113 INJ PANTOPRAZOLE SODIUM, VIA: HCPCS

## 2023-12-05 PROCEDURE — 82270 OCCULT BLOOD FECES: CPT

## 2023-12-05 PROCEDURE — 85018 HEMOGLOBIN: CPT

## 2023-12-05 PROCEDURE — 87506 IADNA-DNA/RNA PROBE TQ 6-11: CPT

## 2023-12-05 PROCEDURE — 36415 COLL VENOUS BLD VENIPUNCTURE: CPT

## 2023-12-05 PROCEDURE — 85025 COMPLETE CBC W/AUTO DIFF WBC: CPT

## 2023-12-05 PROCEDURE — 99232 SBSQ HOSP IP/OBS MODERATE 35: CPT | Performed by: INTERNAL MEDICINE

## 2023-12-05 PROCEDURE — 2060000000 HC ICU INTERMEDIATE R&B

## 2023-12-05 RX ADMIN — METRONIDAZOLE 500 MG: 500 INJECTION, SOLUTION INTRAVENOUS at 03:48

## 2023-12-05 RX ADMIN — PANTOPRAZOLE SODIUM 40 MG: 40 INJECTION, POWDER, FOR SOLUTION INTRAVENOUS at 20:38

## 2023-12-05 RX ADMIN — SODIUM CHLORIDE, PRESERVATIVE FREE 10 ML: 5 INJECTION INTRAVENOUS at 20:38

## 2023-12-05 RX ADMIN — METRONIDAZOLE 500 MG: 500 INJECTION, SOLUTION INTRAVENOUS at 14:15

## 2023-12-05 RX ADMIN — LEVETIRACETAM 500 MG: 500 TABLET, FILM COATED ORAL at 20:40

## 2023-12-05 RX ADMIN — PANTOPRAZOLE SODIUM 40 MG: 40 INJECTION, POWDER, FOR SOLUTION INTRAVENOUS at 09:22

## 2023-12-05 RX ADMIN — VANCOMYCIN HYDROCHLORIDE 750 MG: 750 INJECTION, POWDER, LYOPHILIZED, FOR SOLUTION INTRAVENOUS at 11:19

## 2023-12-05 RX ADMIN — ONDANSETRON 4 MG: 4 TABLET, ORALLY DISINTEGRATING ORAL at 23:46

## 2023-12-05 RX ADMIN — LEVETIRACETAM 500 MG: 500 TABLET, FILM COATED ORAL at 09:22

## 2023-12-05 RX ADMIN — METOPROLOL SUCCINATE 25 MG: 25 TABLET, EXTENDED RELEASE ORAL at 09:22

## 2023-12-05 RX ADMIN — VANCOMYCIN HYDROCHLORIDE 750 MG: 750 INJECTION, POWDER, LYOPHILIZED, FOR SOLUTION INTRAVENOUS at 22:01

## 2023-12-05 RX ADMIN — WATER: 100 IRRIGANT IRRIGATION at 14:15

## 2023-12-05 RX ADMIN — METRONIDAZOLE 500 MG: 500 INJECTION, SOLUTION INTRAVENOUS at 20:40

## 2023-12-05 RX ADMIN — SODIUM CHLORIDE, PRESERVATIVE FREE 10 ML: 5 INJECTION INTRAVENOUS at 09:22

## 2023-12-05 RX ADMIN — CEFTRIAXONE SODIUM 1000 MG: 1 INJECTION, POWDER, FOR SOLUTION INTRAMUSCULAR; INTRAVENOUS at 09:25

## 2023-12-05 ASSESSMENT — ENCOUNTER SYMPTOMS
RESPIRATORY NEGATIVE: 1
GASTROINTESTINAL NEGATIVE: 1

## 2023-12-05 NOTE — CARE COORDINATION
Case Management Assessment  Initial Evaluation    Date/Time of Evaluation: 12/5/2023 9:48 AM  Assessment Completed by: Himanshu Chin RN    If patient is discharged prior to next notation, then this note serves as note for discharge by case management. Patient Name: Dori Thrasher                   YOB: 1950  Diagnosis: Esophagitis [K20.90]  Atrial fibrillation with rapid ventricular response ([address]) [I48.91]  Atrial fibrillation with RVR ([address]) [I48.91]  Right upper quadrant abdominal pain [R10.11]  Pneumonia due to infectious organism, unspecified laterality, unspecified part of lung [J18.9]                   Date / Time: 12/4/2023  2:37 AM    Patient Admission Status: Inpatient   Readmission Risk (Low < 19, Mod (19-27), High > 27): Readmission Risk Score: 21.1    Current PCP: Denisse So MD  PCP verified by CM? Yes Denisse So MD)    Chart Reviewed: Yes      History Provided by: Patient  Patient Orientation: Alert and Oriented    Patient Cognition: Alert    Hospitalization in the last 30 days (Readmission):  No    If yes, Readmission Assessment in CM Navigator will be completed. Advance Directives:      Code Status: Full Code   Patient's Primary Decision Maker is: Legal Next of Kin    Primary Decision Maker: Sameer Sargent - [phone]    Secondary Decision Maker: Ryan Santiago - [phone]    Supplemental (Other) Decision Maker: Priscilla Carrizales - Child - [phone]    Discharge Planning:    Patient lives with: Family Members Type of Home: Trailer/Mobile Home  Primary Care Giver: Self  Patient Support Systems include: Family Members   Current Financial resources: Medicare, Medicaid  Current community resources: None  Current services prior to admission: Durable Medical Equipment            Current DME: Marla Andrae            Type of Home Care services:  None    ADLS  Prior functional level: Independent in ADLs/IADLs  Current functional level:  Independent in ADLs/IADLs    PT Patient Representative Agree with the Discharge Plan?       Luis Fountain RN  Case Management Department  Ph: [phone]

## 2023-12-05 NOTE — PLAN OF CARE
Problem: Discharge Planning  Goal: Discharge to home or other facility with appropriate resources  Outcome: Progressing     Problem: Pain  Goal: Verbalizes/displays adequate comfort level or baseline comfort level  Outcome: Progressing     Problem: Chronic Conditions and Co-morbidities  Goal: Patient's chronic conditions and co-morbidity symptoms are monitored and maintained or improved  Outcome: Progressing     Problem: Safety - Adult  Goal: Free from fall injury  Outcome: Progressing     Problem: Confusion  Goal: Confusion, delirium, dementia, or psychosis is improved or at baseline  Description: INTERVENTIONS:  1. Assess for possible contributors to thought disturbance, including medications, impaired vision or hearing, underlying metabolic abnormalities, dehydration, psychiatric diagnoses, and notify attending LIP  2. [place] high risk fall precautions, as indicated  3. Provide frequent short contacts to provide reality reorientation, refocusing and direction  4. Decrease environmental stimuli, including noise as appropriate  5. Monitor and intervene to maintain adequate nutrition, hydration, elimination, sleep and activity  6. If unable to ensure safety without constant attention obtain sitter and review sitter guidelines with assigned personnel  7.  Initiate Psychosocial CNS and Spiritual Care consult, as indicated  Outcome: Progressing

## 2023-12-05 NOTE — PROGRESS NOTES
Handoff report given to Marj Gibson. Patient is in stable condition and has no needs at this time. Call light is in reach and bed is in the lowest position. Care is transferred at this time.

## 2023-12-06 ENCOUNTER — APPOINTMENT (OUTPATIENT)
Dept: MRI IMAGING | Age: 73
End: 2023-12-06
Payer: MEDICARE

## 2023-12-06 ENCOUNTER — TELEPHONE (OUTPATIENT)
Age: 73
End: 2023-12-06

## 2023-12-06 LAB
ANION GAP SERPL CALCULATED.3IONS-SCNC: 9 MMOL/L (ref 3–16)
BASOPHILS # BLD: 0.1 K/UL (ref 0–0.2)
BASOPHILS NFR BLD: 0.7 %
BUN SERPL-MCNC: 15 MG/DL (ref 7–20)
CALCIUM SERPL-MCNC: 8.7 MG/DL (ref 8.3–10.6)
CHLORIDE SERPL-SCNC: 100 MMOL/L (ref 99–110)
CO2 SERPL-SCNC: 24 MMOL/L (ref 21–32)
CREAT SERPL-MCNC: 0.6 MG/DL (ref 0.6–1.2)
DEPRECATED RDW RBC AUTO: 17.7 % (ref 12.4–15.4)
EOSINOPHIL # BLD: 0.4 K/UL (ref 0–0.6)
EOSINOPHIL NFR BLD: 4.8 %
GFR SERPLBLD CREATININE-BSD FMLA CKD-EPI: >60 ML/MIN/{1.73_M2}
GI PATHOGENS PNL STL NAA+PROBE: NORMAL
GLUCOSE BLD-MCNC: 123 MG/DL (ref 70–99)
GLUCOSE BLD-MCNC: 142 MG/DL (ref 70–99)
GLUCOSE BLD-MCNC: 168 MG/DL (ref 70–99)
GLUCOSE SERPL-MCNC: 129 MG/DL (ref 70–99)
HCT VFR BLD AUTO: 33.6 % (ref 36–48)
HCT VFR BLD AUTO: 34.5 % (ref 36–48)
HCT VFR BLD AUTO: 34.5 % (ref 36–48)
HCT VFR BLD AUTO: 34.7 % (ref 36–48)
HCT VFR BLD AUTO: 36.7 % (ref 36–48)
HGB BLD-MCNC: 10.7 G/DL (ref 12–16)
HGB BLD-MCNC: 11.2 G/DL (ref 12–16)
HGB BLD-MCNC: 11.3 G/DL (ref 12–16)
HGB BLD-MCNC: 11.3 G/DL (ref 12–16)
HGB BLD-MCNC: 11.9 G/DL (ref 12–16)
LYMPHOCYTES # BLD: 1.5 K/UL (ref 1–5.1)
LYMPHOCYTES NFR BLD: 19.3 %
MAGNESIUM SERPL-MCNC: 1.8 MG/DL (ref 1.8–2.4)
MCH RBC QN AUTO: 29.8 PG (ref 26–34)
MCHC RBC AUTO-ENTMCNC: 32.6 G/DL (ref 31–36)
MCV RBC AUTO: 91.4 FL (ref 80–100)
MONOCYTES # BLD: 0.6 K/UL (ref 0–1.3)
MONOCYTES NFR BLD: 7.6 %
NEUTROPHILS # BLD: 5.2 K/UL (ref 1.7–7.7)
NEUTROPHILS NFR BLD: 67.6 %
PERFORMED ON: ABNORMAL
PLATELET # BLD AUTO: 240 K/UL (ref 135–450)
PMV BLD AUTO: 8.2 FL (ref 5–10.5)
POTASSIUM SERPL-SCNC: 3.1 MMOL/L (ref 3.5–5.1)
RBC # BLD AUTO: 3.79 M/UL (ref 4–5.2)
SODIUM SERPL-SCNC: 133 MMOL/L (ref 136–145)
WBC # BLD AUTO: 7.7 K/UL (ref 4–11)

## 2023-12-06 PROCEDURE — 2580000003 HC RX 258

## 2023-12-06 PROCEDURE — 36415 COLL VENOUS BLD VENIPUNCTURE: CPT

## 2023-12-06 PROCEDURE — 6360000002 HC RX W HCPCS

## 2023-12-06 PROCEDURE — 99232 SBSQ HOSP IP/OBS MODERATE 35: CPT | Performed by: INTERNAL MEDICINE

## 2023-12-06 PROCEDURE — 6370000000 HC RX 637 (ALT 250 FOR IP)

## 2023-12-06 PROCEDURE — 99233 SBSQ HOSP IP/OBS HIGH 50: CPT | Performed by: INTERNAL MEDICINE

## 2023-12-06 PROCEDURE — 85018 HEMOGLOBIN: CPT

## 2023-12-06 PROCEDURE — 80048 BASIC METABOLIC PNL TOTAL CA: CPT

## 2023-12-06 PROCEDURE — 6370000000 HC RX 637 (ALT 250 FOR IP): Performed by: INTERNAL MEDICINE

## 2023-12-06 PROCEDURE — 85025 COMPLETE CBC W/AUTO DIFF WBC: CPT

## 2023-12-06 PROCEDURE — 99233 SBSQ HOSP IP/OBS HIGH 50: CPT | Performed by: PSYCHIATRY & NEUROLOGY

## 2023-12-06 PROCEDURE — 2060000000 HC ICU INTERMEDIATE R&B

## 2023-12-06 PROCEDURE — C9113 INJ PANTOPRAZOLE SODIUM, VIA: HCPCS

## 2023-12-06 PROCEDURE — 99232 SBSQ HOSP IP/OBS MODERATE 35: CPT | Performed by: NURSE PRACTITIONER

## 2023-12-06 PROCEDURE — 70551 MRI BRAIN STEM W/O DYE: CPT

## 2023-12-06 PROCEDURE — 85014 HEMATOCRIT: CPT

## 2023-12-06 PROCEDURE — 83735 ASSAY OF MAGNESIUM: CPT

## 2023-12-06 RX ORDER — LACTULOSE 10 G/15ML
20 SOLUTION ORAL 2 TIMES DAILY
Status: DISCONTINUED | OUTPATIENT
Start: 2023-12-06 | End: 2023-12-06

## 2023-12-06 RX ORDER — LACTULOSE 10 G/15ML
20 SOLUTION ORAL 3 TIMES DAILY
Status: DISCONTINUED | OUTPATIENT
Start: 2023-12-06 | End: 2023-12-08 | Stop reason: HOSPADM

## 2023-12-06 RX ORDER — LACTULOSE 10 G/15ML
20 SOLUTION ORAL 3 TIMES DAILY
Status: DISCONTINUED | OUTPATIENT
Start: 2023-12-06 | End: 2023-12-06

## 2023-12-06 RX ORDER — PANTOPRAZOLE SODIUM 40 MG/1
40 TABLET, DELAYED RELEASE ORAL
Status: DISCONTINUED | OUTPATIENT
Start: 2023-12-06 | End: 2023-12-08 | Stop reason: HOSPADM

## 2023-12-06 RX ADMIN — PANTOPRAZOLE SODIUM 40 MG: 40 INJECTION, POWDER, FOR SOLUTION INTRAVENOUS at 08:04

## 2023-12-06 RX ADMIN — METRONIDAZOLE 500 MG: 500 INJECTION, SOLUTION INTRAVENOUS at 20:23

## 2023-12-06 RX ADMIN — SODIUM CHLORIDE: 9 INJECTION, SOLUTION INTRAVENOUS at 08:13

## 2023-12-06 RX ADMIN — LEVETIRACETAM 500 MG: 500 TABLET, FILM COATED ORAL at 20:23

## 2023-12-06 RX ADMIN — VANCOMYCIN HYDROCHLORIDE 750 MG: 750 INJECTION, POWDER, LYOPHILIZED, FOR SOLUTION INTRAVENOUS at 22:54

## 2023-12-06 RX ADMIN — LACTULOSE 20 G: 10 SOLUTION ORAL at 16:18

## 2023-12-06 RX ADMIN — CEFTRIAXONE SODIUM 1000 MG: 1 INJECTION, POWDER, FOR SOLUTION INTRAMUSCULAR; INTRAVENOUS at 08:23

## 2023-12-06 RX ADMIN — SODIUM CHLORIDE, PRESERVATIVE FREE 10 ML: 5 INJECTION INTRAVENOUS at 20:23

## 2023-12-06 RX ADMIN — SODIUM CHLORIDE: 9 INJECTION, SOLUTION INTRAVENOUS at 08:12

## 2023-12-06 RX ADMIN — POTASSIUM CHLORIDE 40 MEQ: 1500 TABLET, EXTENDED RELEASE ORAL at 15:58

## 2023-12-06 RX ADMIN — LEVETIRACETAM 500 MG: 500 TABLET, FILM COATED ORAL at 08:01

## 2023-12-06 RX ADMIN — VANCOMYCIN HYDROCHLORIDE 750 MG: 750 INJECTION, POWDER, LYOPHILIZED, FOR SOLUTION INTRAVENOUS at 11:52

## 2023-12-06 RX ADMIN — PANTOPRAZOLE SODIUM 40 MG: 40 TABLET, DELAYED RELEASE ORAL at 16:18

## 2023-12-06 RX ADMIN — SODIUM CHLORIDE, PRESERVATIVE FREE 10 ML: 5 INJECTION INTRAVENOUS at 08:03

## 2023-12-06 RX ADMIN — METRONIDAZOLE 500 MG: 500 INJECTION, SOLUTION INTRAVENOUS at 11:53

## 2023-12-06 RX ADMIN — METOPROLOL SUCCINATE 25 MG: 25 TABLET, EXTENDED RELEASE ORAL at 08:01

## 2023-12-06 RX ADMIN — METRONIDAZOLE 500 MG: 500 INJECTION, SOLUTION INTRAVENOUS at 04:05

## 2023-12-06 ASSESSMENT — ENCOUNTER SYMPTOMS
GASTROINTESTINAL NEGATIVE: 1
RESPIRATORY NEGATIVE: 1
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
## 2023-12-06 NOTE — PROGRESS NOTES
Vancomycin Day: 3  Current Regimen: 750 mg IV every 12 hours    Patient's labs, cultures, vitals, and vancomycin regimen reviewed.    SCr stable  U/O not measured/well documented  [facility] updated    Plan:   Continue with same rx  Rosales Muta Pharm D 12/6/202312:32 PM  .

## 2023-12-06 NOTE — PROGRESS NOTES
Bedside report and transfer of care given to [place], [place]. Pt currently resting in bed with the call light within reach. Pt denies any other care needs at this time. Pt stable at this time.

## 2023-12-06 NOTE — PROGRESS NOTES
12/5  Vanc trough = 11.6 mcg/mL at 2108. Calculated AUC of 411. Continue current dose and frequency of vancomycin. Will monitor and adjust dose accordingly.   Man Francis PharmD  12/5/2023 10:08 PM

## 2023-12-06 NOTE — PROGRESS NOTES
Telemetry monitor: , PACs    Telemetry independently reviewed and interpreted by me    Physical Exam:  Constitutional: Appears older than stated age, elderly female  Eyes: PERRL, sclera nonicteric  Neck:  Supple, no masses, no thyroidmegaly, no JVD  Skin:  Warm and dry; no rash or lesions  Heart:  Regular, normal apex, S1 and S2 normal, no M/G/R  Lungs:  Normal respiratory effort; clear; no wheezing/rhonchi/rales  Abdomen: soft, non tender, + bowel sounds  Extremities:  No edema or cyanosis; no clubbing  Neuro: alert and oriented, moves legs and arms equally, normal mood and affect    Data Reviewed:    Echo 11/2023:  - Left ventricle: The cavity size is normal. Wall thickness was increased in     a pattern of mild LVH. Systolic function is normal. The estimated ejection     fraction is 60-65%. Wall motion is normal; there are no regional wall     motion abnormalities. Grade II diastolic dysfunction.   - Right ventricle: Systolic function is normal.   - Aortic valve: TrileafletThe leaflets are mildly thickened and mildly     calcified. Velocity is increased. There is mild stenosis. The Vmax is     2.7m/s with mean gradient of 17mmHg. There is mild regurgitation.   - Mitral valve: The annulus is mildly calcified. - Left atrium: The atrium is mildly dilated. - Pulmonary arteries: Systolic pressure could not be accurately estimated. - Inferior vena cava: The IVC is normal-sized. Stress test 2019:   Normal LV function. There is uniform isotope uptake at stress and rest. There is no evidence of    myocardial ischemia. Possible basal lateral scar.      Lab Reviewed:     Renal Profile:  Lab Results   Component Value Date/Time    CREATININE 0.6 12/06/2023 07:50 AM    BUN 15 12/06/2023 07:50 AM     12/06/2023 07:50 AM    K 3.1 12/06/2023 07:50 AM     12/06/2023 07:50 AM    CO2 24 12/06/2023 07:50 AM     CBC:    Lab Results   Component Value Date/Time    WBC 7.7 12/06/2023 07:50 AM    RBC 3.79

## 2023-12-06 NOTE — TELEPHONE ENCOUNTER
Per nurse in PICU, pt needs order put in for EEG. As ordered by Dr. Sourav Santiago on 12/4/23 during a hospital consult. ASSESSMENT     1. Encephalopathy. 2.  Colitis. 3.  History of seizure disorder. 4.  Atrial fibrillation. The patient has no focal deficit outside disorientation and confusion. The preliminary CT brain showed no acute ischemic injury or intracerebral hemorrhage. Normal ammonia level. No major electrolyte imbalance. EEG still pending. From neurology perspective, encephalopathy can be resulted from subclinical seizure or metabolic/toxic/infection etiology. The patient is more alert when compared to yesterday. However, the patient remains disoriented to time. Plan:     1. EEG. 2.  Will consider MRI brain after the EEG. 3.  Continue levetiracetam.  4.  Seizure precaution. 5.  Correction of infection per GI and primary team.  6.  Minimize sedation and anticholinergic medication. 7.  The CT brain did not show major ischemic stroke, so the patient may continue apixaban for stroke prophylaxis.

## 2023-12-06 NOTE — TELEPHONE ENCOUNTER
Dr. Babak Rebolledo was informed of need for EEG order. Order has been entered in [address] [place] [address].

## 2023-12-06 NOTE — PLAN OF CARE
Problem: Discharge Planning  Goal: Discharge to home or other facility with appropriate resources  Outcome: Progressing  Flowsheets (Taken 12/5/2023 0917 by Rachel Bartholomew, RN)  Discharge to home or other facility with appropriate resources: Identify barriers to discharge with patient and caregiver     Problem: Pain  Goal: Verbalizes/displays adequate comfort level or baseline comfort level  Outcome: Progressing     Problem: Chronic Conditions and Co-morbidities  Goal: Patient's chronic conditions and co-morbidity symptoms are monitored and maintained or improved  Outcome: Progressing  Flowsheets (Taken 12/5/2023 0917 by Rachel Bartholomew RN)  Care Plan - Patient's Chronic Conditions and Co-Morbidity Symptoms are Monitored and Maintained or Improved: Monitor and assess patient's chronic conditions and comorbid symptoms for stability, deterioration, or improvement     Problem: Safety - Adult  Goal: Free from fall injury  Outcome: Progressing     Problem: Confusion  Goal: Confusion, delirium, dementia, or psychosis is improved or at baseline  Description: INTERVENTIONS:  1. Assess for possible contributors to thought disturbance, including medications, impaired vision or hearing, underlying metabolic abnormalities, dehydration, psychiatric diagnoses, and notify attending LIP  2. [place] high risk fall precautions, as indicated  3. Provide frequent short contacts to provide reality reorientation, refocusing and direction  4. Decrease environmental stimuli, including noise as appropriate  5. Monitor and intervene to maintain adequate nutrition, hydration, elimination, sleep and activity  6. If unable to ensure safety without constant attention obtain sitter and review sitter guidelines with assigned personnel  7.  Initiate Psychosocial CNS and Spiritual Care consult, as indicated  Outcome: Progressing  Flowsheets (Taken 12/5/2023 0917 by Rachel Bartholomew RN)  Effect of thought disturbance (confusion, delirium,

## 2023-12-07 LAB
25(OH)D3 SERPL-MCNC: 24.5 NG/ML
ANION GAP SERPL CALCULATED.3IONS-SCNC: 11 MMOL/L (ref 3–16)
ANION GAP SERPL CALCULATED.3IONS-SCNC: 16 MMOL/L (ref 3–16)
BASOPHILS # BLD: 0.1 K/UL (ref 0–0.2)
BASOPHILS NFR BLD: 0.8 %
BUN SERPL-MCNC: 10 MG/DL (ref 7–20)
BUN SERPL-MCNC: 11 MG/DL (ref 7–20)
CALCIUM SERPL-MCNC: 8.5 MG/DL (ref 8.3–10.6)
CALCIUM SERPL-MCNC: 8.5 MG/DL (ref 8.3–10.6)
CHLORIDE SERPL-SCNC: 106 MMOL/L (ref 99–110)
CHLORIDE SERPL-SCNC: 107 MMOL/L (ref 99–110)
CO2 SERPL-SCNC: 14 MMOL/L (ref 21–32)
CO2 SERPL-SCNC: 19 MMOL/L (ref 21–32)
CREAT SERPL-MCNC: 0.6 MG/DL (ref 0.6–1.2)
CREAT SERPL-MCNC: <0.5 MG/DL (ref 0.6–1.2)
DEPRECATED RDW RBC AUTO: 18.4 % (ref 12.4–15.4)
EOSINOPHIL # BLD: 0.3 K/UL (ref 0–0.6)
EOSINOPHIL NFR BLD: 5 %
FOLATE SERPL-MCNC: 10.31 NG/ML (ref 4.78–24.2)
GFR SERPLBLD CREATININE-BSD FMLA CKD-EPI: >60 ML/MIN/{1.73_M2}
GFR SERPLBLD CREATININE-BSD FMLA CKD-EPI: >60 ML/MIN/{1.73_M2}
GLUCOSE BLD-MCNC: 104 MG/DL (ref 70–99)
GLUCOSE BLD-MCNC: 106 MG/DL (ref 70–99)
GLUCOSE BLD-MCNC: 116 MG/DL (ref 70–99)
GLUCOSE BLD-MCNC: 129 MG/DL (ref 70–99)
GLUCOSE BLD-MCNC: 169 MG/DL (ref 70–99)
GLUCOSE SERPL-MCNC: 121 MG/DL (ref 70–99)
GLUCOSE SERPL-MCNC: 125 MG/DL (ref 70–99)
HCT VFR BLD AUTO: 34.7 % (ref 36–48)
HCT VFR BLD AUTO: 36.2 % (ref 36–48)
HGB BLD-MCNC: 10.9 G/DL (ref 12–16)
HGB BLD-MCNC: 11.2 G/DL (ref 12–16)
LYMPHOCYTES # BLD: 1.3 K/UL (ref 1–5.1)
LYMPHOCYTES NFR BLD: 18.4 %
MCH RBC QN AUTO: 30 PG (ref 26–34)
MCHC RBC AUTO-ENTMCNC: 30.9 G/DL (ref 31–36)
MCV RBC AUTO: 97.1 FL (ref 80–100)
MONOCYTES # BLD: 0.4 K/UL (ref 0–1.3)
MONOCYTES NFR BLD: 6.5 %
NEUTROPHILS # BLD: 4.8 K/UL (ref 1.7–7.7)
NEUTROPHILS NFR BLD: 69.3 %
PERFORMED ON: ABNORMAL
PLATELET # BLD AUTO: 250 K/UL (ref 135–450)
PMV BLD AUTO: 9.1 FL (ref 5–10.5)
POTASSIUM SERPL-SCNC: 3.8 MMOL/L (ref 3.5–5.1)
POTASSIUM SERPL-SCNC: 4.1 MMOL/L (ref 3.5–5.1)
RBC # BLD AUTO: 3.73 M/UL (ref 4–5.2)
SODIUM SERPL-SCNC: 136 MMOL/L (ref 136–145)
SODIUM SERPL-SCNC: 137 MMOL/L (ref 136–145)
VIT B12 SERPL-MCNC: 1196 PG/ML (ref 211–911)
WBC # BLD AUTO: 6.9 K/UL (ref 4–11)

## 2023-12-07 PROCEDURE — 99232 SBSQ HOSP IP/OBS MODERATE 35: CPT | Performed by: INTERNAL MEDICINE

## 2023-12-07 PROCEDURE — 99152 MOD SED SAME PHYS/QHP 5/>YRS: CPT | Performed by: INTERNAL MEDICINE

## 2023-12-07 PROCEDURE — 85018 HEMOGLOBIN: CPT

## 2023-12-07 PROCEDURE — 85014 HEMATOCRIT: CPT

## 2023-12-07 PROCEDURE — 6360000002 HC RX W HCPCS: Performed by: INTERNAL MEDICINE

## 2023-12-07 PROCEDURE — 0DJ08ZZ INSPECTION OF UPPER INTESTINAL TRACT, VIA NATURAL OR ARTIFICIAL OPENING ENDOSCOPIC: ICD-10-PCS | Performed by: INTERNAL MEDICINE

## 2023-12-07 PROCEDURE — 97535 SELF CARE MNGMENT TRAINING: CPT

## 2023-12-07 PROCEDURE — 7100000011 HC PHASE II RECOVERY - ADDTL 15 MIN: Performed by: INTERNAL MEDICINE

## 2023-12-07 PROCEDURE — 82306 VITAMIN D 25 HYDROXY: CPT

## 2023-12-07 PROCEDURE — 6370000000 HC RX 637 (ALT 250 FOR IP): Performed by: INTERNAL MEDICINE

## 2023-12-07 PROCEDURE — 82746 ASSAY OF FOLIC ACID SERUM: CPT

## 2023-12-07 PROCEDURE — 97530 THERAPEUTIC ACTIVITIES: CPT

## 2023-12-07 PROCEDURE — 2060000000 HC ICU INTERMEDIATE R&B

## 2023-12-07 PROCEDURE — 99233 SBSQ HOSP IP/OBS HIGH 50: CPT | Performed by: PSYCHIATRY & NEUROLOGY

## 2023-12-07 PROCEDURE — 97166 OT EVAL MOD COMPLEX 45 MIN: CPT

## 2023-12-07 PROCEDURE — 97161 PT EVAL LOW COMPLEX 20 MIN: CPT

## 2023-12-07 PROCEDURE — 3609017100 HC EGD: Performed by: INTERNAL MEDICINE

## 2023-12-07 PROCEDURE — 6360000002 HC RX W HCPCS

## 2023-12-07 PROCEDURE — 2709999900 HC NON-CHARGEABLE SUPPLY: Performed by: INTERNAL MEDICINE

## 2023-12-07 PROCEDURE — 2580000003 HC RX 258

## 2023-12-07 PROCEDURE — 6370000000 HC RX 637 (ALT 250 FOR IP)

## 2023-12-07 PROCEDURE — 85025 COMPLETE CBC W/AUTO DIFF WBC: CPT

## 2023-12-07 PROCEDURE — 36415 COLL VENOUS BLD VENIPUNCTURE: CPT

## 2023-12-07 PROCEDURE — 82607 VITAMIN B-12: CPT

## 2023-12-07 PROCEDURE — 80048 BASIC METABOLIC PNL TOTAL CA: CPT

## 2023-12-07 PROCEDURE — 97116 GAIT TRAINING THERAPY: CPT

## 2023-12-07 PROCEDURE — 7100000010 HC PHASE II RECOVERY - FIRST 15 MIN: Performed by: INTERNAL MEDICINE

## 2023-12-07 RX ORDER — SUCRALFATE 1 G/1
1 TABLET ORAL EVERY 6 HOURS SCHEDULED
Status: DISCONTINUED | OUTPATIENT
Start: 2023-12-07 | End: 2023-12-08 | Stop reason: HOSPADM

## 2023-12-07 RX ORDER — MIDAZOLAM HYDROCHLORIDE 5 MG/ML
INJECTION INTRAMUSCULAR; INTRAVENOUS PRN
Status: DISCONTINUED | OUTPATIENT
Start: 2023-12-07 | End: 2023-12-07 | Stop reason: ALTCHOICE

## 2023-12-07 RX ORDER — FENTANYL CITRATE 50 UG/ML
INJECTION, SOLUTION INTRAMUSCULAR; INTRAVENOUS PRN
Status: DISCONTINUED | OUTPATIENT
Start: 2023-12-07 | End: 2023-12-07 | Stop reason: ALTCHOICE

## 2023-12-07 RX ADMIN — SUCRALFATE 1 G: 1 TABLET ORAL at 18:19

## 2023-12-07 RX ADMIN — RIFAXIMIN 400 MG: 200 TABLET ORAL at 15:18

## 2023-12-07 RX ADMIN — METRONIDAZOLE 500 MG: 500 INJECTION, SOLUTION INTRAVENOUS at 15:00

## 2023-12-07 RX ADMIN — LEVETIRACETAM 500 MG: 500 TABLET, FILM COATED ORAL at 20:04

## 2023-12-07 RX ADMIN — METOPROLOL SUCCINATE 25 MG: 25 TABLET, EXTENDED RELEASE ORAL at 09:04

## 2023-12-07 RX ADMIN — VANCOMYCIN HYDROCHLORIDE 750 MG: 750 INJECTION, POWDER, LYOPHILIZED, FOR SOLUTION INTRAVENOUS at 13:17

## 2023-12-07 RX ADMIN — CEFTRIAXONE SODIUM 1000 MG: 1 INJECTION, POWDER, FOR SOLUTION INTRAMUSCULAR; INTRAVENOUS at 09:10

## 2023-12-07 RX ADMIN — SUCRALFATE 1 G: 1 TABLET ORAL at 22:37

## 2023-12-07 RX ADMIN — PANTOPRAZOLE SODIUM 40 MG: 40 TABLET, DELAYED RELEASE ORAL at 14:58

## 2023-12-07 RX ADMIN — PANTOPRAZOLE SODIUM 40 MG: 40 TABLET, DELAYED RELEASE ORAL at 09:04

## 2023-12-07 RX ADMIN — SUCRALFATE 1 G: 1 TABLET ORAL at 15:18

## 2023-12-07 RX ADMIN — SODIUM CHLORIDE, PRESERVATIVE FREE 10 ML: 5 INJECTION INTRAVENOUS at 09:11

## 2023-12-07 RX ADMIN — METRONIDAZOLE 500 MG: 500 INJECTION, SOLUTION INTRAVENOUS at 20:07

## 2023-12-07 RX ADMIN — METRONIDAZOLE 500 MG: 500 INJECTION, SOLUTION INTRAVENOUS at 04:04

## 2023-12-07 RX ADMIN — VANCOMYCIN HYDROCHLORIDE 750 MG: 750 INJECTION, POWDER, LYOPHILIZED, FOR SOLUTION INTRAVENOUS at 22:37

## 2023-12-07 RX ADMIN — RIFAXIMIN 400 MG: 200 TABLET ORAL at 20:04

## 2023-12-07 RX ADMIN — LEVETIRACETAM 500 MG: 500 TABLET, FILM COATED ORAL at 09:04

## 2023-12-07 ASSESSMENT — PAIN - FUNCTIONAL ASSESSMENT: PAIN_FUNCTIONAL_ASSESSMENT: 0-10

## 2023-12-07 ASSESSMENT — PAIN DESCRIPTION - DESCRIPTORS: DESCRIPTORS: ACHING

## 2023-12-07 NOTE — PROGRESS NOTES
Neurology Progress Note    ID: Alexis Johnson is a 68 y.o. female    : 1950     LOS: 3 days     ASSESSMENT     1. Encephalopathy. 2.  Colitis. 3.  History of seizure disorder. 4.  Atrial fibrillation. The patient has no focal deficit outside disorientation and confusion. The preliminary CT brain showed no acute ischemic injury or intracerebral hemorrhage. Normal ammonia level. No major electrolyte imbalance. EEG shows moderate degree of encephalopathy with rare triphasic waves. The MRI brain showed brain atrophy with mild to moderate white matter disease and hyperintensity at the basal ganglia. From neurology perspective, the EEG is suggestive of metabolic/toxic encephalopathy. The patient is also suspicious to have underlying dementia. The MRI findings were consistent with portal hypertension from endoscopic. So the patient may need longer time to recover from encephalopathy due to suspicion of underlying dementia. Plan:     1. Restart Eliquis if there is no contraindication. 2.  Continue levetiracetam.  3.  Seizure precaution. 4.  Minimize sedation and anticholinergic medication. 5.  Vitamin D and B12 level.     Medications:  Scheduled Meds:    pantoprazole  40 mg Oral BID AC    lactulose  20 g Oral TID    sodium chloride flush  5-40 mL IntraVENous 2 times per day    cefTRIAXone (ROCEPHIN) IV  1,000 mg IntraVENous Q24H    levETIRAcetam  500 mg Oral BID    vancomycin  750 mg IntraVENous Q12H    metroNIDAZOLE  500 mg IntraVENous Q8H    metoprolol succinate  25 mg Oral Daily     Continuous Infusions:    sodium chloride 5 mL/hr at 23 0813     PRN Meds: albuterol sulfate HFA, sodium chloride flush, sodium chloride, potassium chloride **OR** potassium alternative oral replacement **OR** potassium chloride, magnesium sulfate, ondansetron **OR** ondansetron, polyethylene glycol, acetaminophen **OR** acetaminophen, perflutren lipid microspheres    OBJECTIVE  Vital signs in the last

## 2023-12-07 NOTE — BRIEF OP NOTE
Brief Postoperative Note      Patient: Lavell Park  YOB: 1950  MRN: [id number]    Date of Procedure: 12/7/2023    Pre-Op Diagnosis Codes:     * Esophageal thickening [K22.89]    Post-Op Diagnosis:  severe esophagitis, hiatal hernia, portal HTN gastropathy       Procedure(s):  EGD    Surgeon(s):  Justin Oneill MD    Assistant:  * No surgical staff found *    Anesthesia: IV Sedation    Estimated Blood Loss (mL): Minimal    Complications: None    Specimens:   * No specimens in log *    Implants:  * No implants in log *      Drains: * No LDAs found *    Findings: severe esophagitis, hiatal hernia, portal HTN gastropathy    Recommendation  Continue supportive care  PPI BID   Add carafate QID  Continue lactulose   Can add xifaxan  Resume diet  Repeat EGD in 8wks to confirm healing and to eval for underlying varices/barretts  Avoid narcotics and anxiolytics  PT/OT      Electronically signed by Justin Oneill MD on 12/7/2023 at 12:09 PM

## 2023-12-07 NOTE — PROGRESS NOTES
Shift assessment completed. Vital signs stable. Call light in reach and standard safety measures in place. Pt resting in bed; no needs or complaints at this time. MD Mahin, 25 mcg at 12/08/20 0524    lactobacillus (CULTURELLE) capsule 1 capsule, 1 capsule, Oral, Daily with breakfast, Yenny Ghosh MD, 1 capsule at 12/07/20 0937    psyllium (KONSYL) 28.3 % packet 1 packet, 1 packet, Oral, Daily, Yenny Ghosh MD, 1 packet at 12/07/20 0935    potassium chloride 10 mEq/100 mL IVPB (Peripheral Line), 10 mEq, Intravenous, PRN, Yenny Ghosh MD    magnesium sulfate 2 g in 50 mL IVPB premix, 2 g, Intravenous, PRN, Yenny Ghosh MD    acetaminophen (TYLENOL) tablet 650 mg, 650 mg, Oral, Q6H PRN, 650 mg at 12/06/20 2029 **OR** acetaminophen (TYLENOL) suppository 650 mg, 650 mg, Rectal, Q6H PRN, Yenny Ghosh MD    magnesium hydroxide (MILK OF MAGNESIA) 400 MG/5ML suspension 30 mL, 30 mL, Oral, Daily PRN, Yenny Ghosh MD    enoxaparin (LOVENOX) injection 40 mg, 40 mg, Subcutaneous, Nightly, Donavan Stockton MD, 40 mg at 12/07/20 2002    melatonin tablet 6 mg, 6 mg, Oral, Nightly PRN, MARY LOU Hearn CNP, 6 mg at 12/06/20 2029    Exam  Blood pressure (!) 162/83, pulse 99, temperature 97.2 °F (36.2 °C), temperature source Oral, resp. rate 13, weight 119 lb 0.8 oz (54 kg), SpO2 97 %, not currently breastfeeding. Constitutional                          Vital signs: BP, HR, and RR reviewed            General alert, no distress  Eyes: unable to visualize the fundi  Cardiovascular: pulses symmetric in all 4 extremities. Psychiatric: cooperative with examination, no psychotic behavior noted. Neurologic  Mental status:   orientation to person, place                Attention intact as able to attend well to the exam                Language fluent in conversation              Comprehension intact; follows simple commands  Cranial nerves:   CN2: visual fields full  CN 3,4,6: extraocular muscles intact  CN7: face symmetric without dysarthria  CN8: a bit hard of hearing.     CN11: trap strength decreased on L.    CN12: tongue midline with protrusion  Strength: LUE/LLE weakness.    Sensory: light touch intact in all 4 extremities.    Cerebellar/coordination: finger nose finger difficult to assess given weakness.    Tone: normal in all 4 extremities  Gait: deferred at this time for safety given weakness. ROS  Constitutional- No weight loss or fevers  Eyes- No diplopia. No photophobia. Ears/nose/throat- No dysphagia. No Dysarthria  Cardiovascular- No palpitations. No chest pain  Respiratory- No dyspnea. No Cough  Gastrointestinal- No Abdominal pain. No Vomiting. Genitourinary- No incontinence. No urinary retention  Musculoskeletal- No myalgia. No arthralgia  Skin- No rash. No easy bruising. Psychiatric- No depression. No anxiety  Endocrine- No diabetes. No thyroid issues. Hematologic- No bleeding difficulty. No fatigue  Neurologic- + weakness. No Headache. Labs  HgA1c 6.0     Cholesterol, Total 203 (H)   HDL Cholesterol 76 (H)   LDL Calculated 104 (H)   Triglycerides 117   VLDL Cholesterol Calculated 23      TSH 1.2    COVID negative  UA negative    Studies  MRI brain w/o 12/8/20, independently reviewed  Acute infarction in the right thalamus.         Old infarctions in the bilateral basal ganglia and bilateral thalami.         Curvilinear susceptibility artifacts in the right frontal lobe and bilateral    basal ganglia, likely related to sequela of old hemorrhage.  Scattered tiny    foci of susceptibility artifacts, likely related to old petechial hemorrhage    or small cavernomas versus amyloid angiopathy.         Mild to moderate parenchymal volume loss.         Mild to moderate chronic microvascular disease. MRA head/neck 12/8/20, independently reviewed  80% focal stenosis in the proximal P2 segment of the left PCA. 80% stenosis in the P3 segment of the right PCA. Unremarkable MRA of the neck. TTE 12/7/20   Estimated ejection fraction of 60-65 %. No regional wall motion abnormalities are seen.    The right ventricle is not well visualized but appears grossly normal in size and function. Mild mitral and tricuspid regurgitation. Impression  1. The patient presented after a fall w/ LUE/LLE, which is ongoing. MRI brain done today has identified a small R lacunar infarct, likely microvascular. Severe bilateral PCA stenosis is noted, though unrelated to her acute stroke. Recommendations  1. Continue 81 mg aspirin, high intensity statin . 2.  Normotension. Normoglycemia. 3.  Telemetry. 4.  PT/OT/SLP evaluations ongoing. 5.  Can follow up w/ Dr. Quinones Service after discharge. Will sign off. Please call back w/ any additional questions or concerns.       Leatha Dueñas NP  [address] [place] [address] [address] Neurology    A copy of this note was provided for Dr Miguel Lew MD

## 2023-12-07 NOTE — PLAN OF CARE
Problem: Discharge Planning  Goal: Discharge to home or other facility with appropriate resources  Outcome: Progressing     Problem: Pain  Goal: Verbalizes/displays adequate comfort level or baseline comfort level  Outcome: Progressing     Problem: Chronic Conditions and Co-morbidities  Goal: Patient's chronic conditions and co-morbidity symptoms are monitored and maintained or improved  Outcome: Progressing     Problem: Safety - Adult  Goal: Free from fall injury  Outcome: Progressing     Problem: Confusion  Goal: Confusion, delirium, dementia, or psychosis is improved or at baseline  Description: INTERVENTIONS:  1. Assess for possible contributors to thought disturbance, including medications, impaired vision or hearing, underlying metabolic abnormalities, dehydration, psychiatric diagnoses, and notify attending LIP  2. [place] high risk fall precautions, as indicated  3. Provide frequent short contacts to provide reality reorientation, refocusing and direction  4. Decrease environmental stimuli, including noise as appropriate  5. Monitor and intervene to maintain adequate nutrition, hydration, elimination, sleep and activity  6. If unable to ensure safety without constant attention obtain sitter and review sitter guidelines with assigned personnel  7.  Initiate Psychosocial CNS and Spiritual Care consult, as indicated  Outcome: Progressing
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
## 2023-12-07 NOTE — PLAN OF CARE
Problem: Discharge Planning  Goal: Discharge to home or other facility with appropriate resources  Outcome: Progressing     Problem: Pain  Goal: Verbalizes/displays adequate comfort level or baseline comfort level  Outcome: Progressing     Problem: Chronic Conditions and Co-morbidities  Goal: Patient's chronic conditions and co-morbidity symptoms are monitored and maintained or improved  Outcome: Progressing     Problem: Safety - Adult  Goal: Free from fall injury  Outcome: Progressing     Problem: Confusion  Goal: Confusion, delirium, dementia, or psychosis is improved or at baseline  Description: INTERVENTIONS:  1. Assess for possible contributors to thought disturbance, including medications, impaired vision or hearing, underlying metabolic abnormalities, dehydration, psychiatric diagnoses, and notify attending LIP  2. [place] high risk fall precautions, as indicated  3. Provide frequent short contacts to provide reality reorientation, refocusing and direction  4. Decrease environmental stimuli, including noise as appropriate  5. Monitor and intervene to maintain adequate nutrition, hydration, elimination, sleep and activity  6. If unable to ensure safety without constant attention obtain sitter and review sitter guidelines with assigned personnel  7.  Initiate Psychosocial CNS and Spiritual Care consult, as indicated  Outcome: Progressing

## 2023-12-07 NOTE — PROGRESS NOTES
activity ([address]) 08/23/2022    Sepsis ([address]) 08/21/2022    Delusions ([address])     Leg edema     Shortness of breath 06/27/2022    Right upper quadrant abdominal pain 12/04/2023    Atrial fibrillation with rapid ventricular response ([address]) 12/04/2023    Pneumonia due to infectious agent 08/21/2023    Secondary hypercoagulable state ([address]) 08/21/2023    Generalized abdominal pain     Diverticulitis of colon     Acute hyponatremia     COPD exacerbation ([address])     Diverticulitis 11/29/2021    Acute focal neurological deficit 03/20/2021    Pneumonia of both lungs due to infectious organism     PAF (paroxysmal atrial fibrillation) (HCC)     PSVT (paroxysmal supraventricular tachycardia)     Abnormal CXR 11/05/2019    NSTEMI (non-ST elevated myocardial infarction) ([address])     Elevated troponin     Hypovolemic shock ([address]) 11/02/2019    GI bleed 03/31/2018    Elevated lactic acid level 03/31/2018    Hyperkalemia 03/31/2018    Leukocytosis 03/31/2018    Thrombocytosis 03/31/2018    High anion gap metabolic acidosis 79/85/1628    History of depression     Misuse of prescription only drugs     Toxic encephalopathy     Acute respiratory failure ([address]) 03/04/2018    OD (overdose of drug) 03/04/2018    Drug ingestion     Acute respiratory failure with hypoxemia (HCC)     Closed fracture of left ankle     Aspiration pneumonitis (HCC)     Ankle fracture, bimalleolar, closed, left, initial encounter 01/09/2018    Trimalleolar fracture of left ankle, closed, initial encounter 01/08/2018    Anxiety and depression 12/28/2017    Elevated LFTs 12/28/2017    Acute hyperglycemia 12/28/2017    Well controlled type 2 diabetes mellitus ([address]) 12/28/2017    Hypovitaminosis D 12/28/2017    Right hand weakness & numbness 12/28/2017    Acute-on-chronic low back pain with radicular symptoms 12/28/2017    Benign essential HTN 05/18/2017    Acute on chronic diastolic CHF (congestive heart failure) ([address]) 04/13/2017    Possible/questionable hx of seizures outpatient colonoscopy  -H&H stable, trend   -IV PPI  -GI consulted. EGD today     #Pleural plaques  #HCAP? 2/2 gram negative organism  -stable on 2L O2 prn  -CT shows interval improved RML pneumonia  -per chart review, no recent hx pneumonia  -sputum and blood cultures pending   -on IV rocephin and vancomycin   -pulmonology consulted.      #Asthma  -stable, no AE  -continue home inhalers     #Shingles  -diagnosed during recent admission at [facility]  -started on valacyclovir and gabapentin, continue      #Chronic diastolic CHF  -does not appear acutely decompensated  -holding home lasix   -daily weights, intake and output       #VILLEGAS cirrhosis   -without evidence of decompensation  -ammonia WNLs     #A4HK  -hold home trulicty  -SSI     #CKD3  -renal function appears stable, monitor     #Seizure disorder   -on keppra   -seizure precautions     #HTN  -continue lisinopril and metoprolol     #berna deficiency anemia   -H&H stable   -continue ferrous sulfate      #Hypothyroidism   -continue synthroid   -TSH normal.     #Anxiety   #Depression   #Hx parasitic delusions  -continue zoloft and seroquel      DVT Prophylaxis: SCDs  Diet: gen   Code Status: Full Code    Pt/ot        Tessa Lefort, MD

## 2023-12-07 NOTE — PROGRESS NOTES
Inpatient Occupational Therapy Evaluation and Treatment    Unit: PCU  Date:  2023  Patient Name:    Dori Thrasher  Admitting diagnosis:  Esophagitis [K20.90]  Atrial fibrillation with rapid ventricular response ([address]) [I48.91]  Atrial fibrillation with RVR ([address]) [I48.91]  Right upper quadrant abdominal pain [R10.11]  Pneumonia due to infectious organism, unspecified laterality, unspecified part of lung [J18.9]  Admit Date:  2023  Precautions/Restrictions/WB Status/ Lines/ Wounds/ Oxygen: Fall risk, Bed/chair alarm, and Isolation Precautions: Contact and Contact Plus    Pt seen for cotreatment this date due to limited functional status information    Treatment Time:  3362-5570  Treatment Number:  1  Timed Code Treatment Minutes: 25 minutes  Total Treatment Minutes:  35  minutes    Patient Goals for Therapy: \"go home\"          Discharge Recommendations: Home with  assist initially  DME needs for discharge: Needs Met       Therapy recommendations for staff:   Assist of 1 for ambulation with use of rolling walker (RW) and gait belt to/from [facility]  to/from chair  to/from bathroom  within room    History of Present Illness: per Dr Tasia Zuniga H&P 23:  \"72 year old female with history of DM, HTN, HLD, COPD, CHF, A fib, CKD, VILLEGAS cirrhosis decompensated by varices admitted with abdominal pain and colitis. CT showed esophageal thickening concerning for esophagitis vs varices\"    Home Health S4 Level Recommendation:  NA    AM-PAC Score: AM-PAC Inpatient Daily Activity Raw Score: 19     Subjective:  Patient lying reclined in bed with no family present. Pt agreeable to this OT session.      Cognition:    A&O x4   Able to follow 2 step commands    Pain:   Yes  Location: low back and abdomen  Ratin /10  Pain Medicine Status: Received pain med prior to tx    Preadmission Environment:   Pt. Lives     with family (2 daughters, assist available )  Home environment:    mobile home/trailer  Steps to enter first seated    Grooming/hygiene: Not Tested    Activity Tolerance:   Pt completed therapy session with pain     BP (mmHg) HR (bpm) SpO2 (%) on RA Comments   Supine at rest       Seated at EOB       Standing       End of session         Positioning Needs:   Pt up in chair, alarm set, call light provided and all needs within reach . Ther Ex / Activities Initiated:   N/A    Patient/Family Education:   Pt educated on role of inpatient OT, plan of care, importance of continued activity, DC recommendations, Functional transfer/mobility safety, and Calling for assist with mobility. CHF Education  N/A    Assessment:  Pt seen for Occupational therapy evaluation in acute care setting. Pt demonstrated decreased Activity tolerance, ADLs, IADLs, and Strength. Pt functioning below baseline and will likely benefit from skilled occupational therapy services to maximize safety and independence. Recommending Home PRN assist upon discharge as patient functioning below baseline level    Goal(s) : To be met in 3 Visits:  Bed to toilet/BSC:       Modified Independent  Pt will complete 3/3 CHF goals     N/A    To be met in 5 Visits:  Supine to/from Sit in preparation for ADL task:   SBA  Toileting        SBA  Grooming       SBA  Upper Body Dressing:      SBA  Lower Body Dressing:      SBA  Pt to demonstrate UE therapeutic exs x 15 reps with minimal cues    Rehabilitation Potential: Good  Strengths for achieving goals include: PLOF, Family Support, and Pt cooperative   Barriers to achieving goals include:  Complexity of condition    Plan: To be seen 3-5 x/wk while in acute care setting for therapeutic exercises, bed mobility, transfers, family/patient education, ADL/IADL retraining, and energy conservation training.     Electronically signed by Chelsi Raya OT on 12/7/2023 at 4:13 PM      If patient discharges from this facility prior to next visit, this note will serve as the Discharge Summary

## 2023-12-07 NOTE — PROGRESS NOTES
Inpatient Physical Therapy Evaluation & Treatment    Unit: PCU  Date:  2023  Patient Name:    Robb Hi  Admitting diagnosis:  Esophagitis [K20.90]  Atrial fibrillation with rapid ventricular response ([address]) [I48.91]  Atrial fibrillation with RVR ([address]) [I48.91]  Right upper quadrant abdominal pain [R10.11]  Pneumonia due to infectious organism, unspecified laterality, unspecified part of lung [J18.9]  Admit Date:  2023  Precautions/Restrictions/WB Status/ Lines/ Wounds/ Oxygen: Fall risk, Bed/chair alarm, Lines (IV), Telemetry, and Isolation Precautions: Contact      Pt seen for cotreatment this date due to patient endurance, acute illness/injury, and limited functional status information    Treatment Time:  1425- 1600  Treatment Number:  1   Timed Code Treatment Minutes: 25 minutes  Total Treatment Minutes:  35  minutes    Patient Stated Goals for Therapy: \" to go home \"          Discharge Recommendations: Home with 24hr assistance and Home PT  DME needs for discharge: Needs Met       Therapy recommendation for EMS Transport: NA    Therapy recommendations for staff:   Assist of 1 for ambulation with use of rolling walker (RW) and gait belt to/from [facility]  to/from chair  to/from bathroom  within room    History of Present Illness:   Per Dr Aga Linder H&P:  Pt is a 68year old female with history of DM, HTN, HLD, COPD, CHF, A fib, CKD, VILLEGAS cirrhosis decompensated by varices admitted with abdominal pain and colitis. CT showed esophageal thickening concerning for esophagitis vs varices     Home Health S4 Level Recommendation:  Level 1 Standard        AM-PAC Mobility Score       AM-PAC Inpatient Mobility without Stair Climbing Raw Score : 17      Subjective  Patient lying reclined in bed with no family present. Pt agreeable to this PT session.      Cognition    A&O x4   Able to follow 2 step commands    Pain   Yes  Location: low back and abdomen  Ratin /10  Pain Medicine Status: Received pain med prior skilled PT services to promote increased strength, balance and functional activity tolerance. Recommend continued skilled PT services upon discharge. Recommending Home 24 hr assist and with home PT upon discharge as patient functioning close to baseline level    Goals : To be met in 3 visits:  1). Independent with LE Ex x 10 reps  2). Sit to/from stand: Independent  3). Bed to chair: Independent      To be met in 6 visits:  1). Supine to/from sit: Independent  2). Gait: Ambulate 50 ft.  with Independent and use of LRAD (least restrictive assistive device)  3). Tolerate B LE exercises 3 sets of 10-15 reps      Rehabilitation Potential: Fair  Strengths for achieving goals include:   Pt motivated, PLOF, Family Support, and Pt cooperative   Barriers to achieving goals include:    Complexity of condition, Chronicity of condition, and Weakness    Plan    To be seen 3-5 x / week  while in acute care setting for therapeutic exercises, bed mobility, transfers, progressive gait training, balance training, and family/patient education. Signature: Elaine De Jesus PT     If patient discharges from this facility prior to next visit, this note will serve as the Discharge Summary.     CHF Education  N/A

## 2023-12-07 NOTE — PROGRESS NOTES
Pt in Endo for EGD. CMU called and verified that the pt can be seen on the tele monitor. Pt is a/o to self situation and place. VSS.

## 2023-12-07 NOTE — OP NOTE
[facility]                                OPERATIVE REPORT    PATIENT NAME: Ramona Marvin                      :        1950  MED REC NO:   [id number]                          ROOM:         ACCOUNT NO:   [de-identified]                           ADMIT DATE: 2023  PROVIDER:     Rosemary Christianson MD    DATE OF PROCEDURE:  2023    PREPROCEDURE DIAGNOSES:  1. Abnormal imaging. 2.  Esophageal thickening per CT scan. PROCEDURE:  EGD. POSTPROCEDURE DIAGNOSES:  1. Severe LA class D esophagitis. 2.  A 4-cm hiatal hernia. 3.  Moderate portal hypertensive gastropathy. PROCEDURE INDICATION:  A 71-year-old female with a history of diabetes,  hypertension, hyperlipidemia, COPD, congestive heart failure, atrial  fibrillation, chronic kidney disease, VILLEGAS cirrhosis, decompensated by  varices admitted with abdominal pain and colitis. CT of the abdomen  also showed esophageal thickening concerning for underlying varices  presents to the operating room with esophagitis. EGD is being performed  for diagnostic purposes. MEDICATIONS:  Versed _____ mg, fentanyl 25 mcg. PROCEDURE DETAILS:  Informed consent obtained after discussing risks,  benefits, and alternatives. Full history and physical was performed. The patient was classified ASA class III. Medications were sequentially  given to achieve adequate sedation. Cardiopulmonary status was  continuously monitored throughout the procedure. The patient was placed  in left lateral decubitus position. Once adequately positioned, a  standard upper gastroscope was inserted in the mouth and advanced under  direct visualization to the second portion of the duodenum.   The entire  mucosa of the esophagus, stomach (retroflexed and forward views),  duodenum (bulb, sweep, and second portion) were examined carefully  during withdrawal.  The patient

## 2023-12-07 NOTE — PROGRESS NOTES
Handoff report given to [facility]. Patient is in stable condition and has no needs at this time. Call light is in reach and bed is in the lowest position. Care is transferred at this time.

## 2023-12-07 NOTE — PROGRESS NOTES
AM assessment completed. Scheduled medications given per MAR. VSS room air, A/O x4, patient does not appear in any distress and denies any needs at this time. Critical lab value CO2, MD made aware. Call light in reach, will monitor, bed alarm on.

## 2023-12-08 VITALS
BODY MASS INDEX: 38.53 KG/M2 | DIASTOLIC BLOOD PRESSURE: 56 MMHG | HEIGHT: 61 IN | OXYGEN SATURATION: 98 % | HEART RATE: 66 BPM | TEMPERATURE: 97 F | WEIGHT: 204.1 LBS | SYSTOLIC BLOOD PRESSURE: 127 MMHG | RESPIRATION RATE: 18 BRPM

## 2023-12-08 LAB
ANION GAP SERPL CALCULATED.3IONS-SCNC: 7 MMOL/L (ref 3–16)
BACTERIA BLD CULT ORG #2: NORMAL
BACTERIA BLD CULT: NORMAL
BUN SERPL-MCNC: 8 MG/DL (ref 7–20)
CALCIUM SERPL-MCNC: 8.3 MG/DL (ref 8.3–10.6)
CHLORIDE SERPL-SCNC: 107 MMOL/L (ref 99–110)
CO2 SERPL-SCNC: 18 MMOL/L (ref 21–32)
CREAT SERPL-MCNC: <0.5 MG/DL (ref 0.6–1.2)
DEPRECATED RDW RBC AUTO: 18.6 % (ref 12.4–15.4)
GFR SERPLBLD CREATININE-BSD FMLA CKD-EPI: >60 ML/MIN/{1.73_M2}
GLUCOSE BLD-MCNC: 113 MG/DL (ref 70–99)
GLUCOSE BLD-MCNC: 129 MG/DL (ref 70–99)
GLUCOSE SERPL-MCNC: 127 MG/DL (ref 70–99)
HCT VFR BLD AUTO: 34.3 % (ref 36–48)
HGB BLD-MCNC: 10.5 G/DL (ref 12–16)
MCH RBC QN AUTO: 29.9 PG (ref 26–34)
MCHC RBC AUTO-ENTMCNC: 30.7 G/DL (ref 31–36)
MCV RBC AUTO: 97.3 FL (ref 80–100)
PERFORMED ON: ABNORMAL
PERFORMED ON: ABNORMAL
PLATELET # BLD AUTO: 212 K/UL (ref 135–450)
PMV BLD AUTO: 8.6 FL (ref 5–10.5)
POTASSIUM SERPL-SCNC: 3.5 MMOL/L (ref 3.5–5.1)
RBC # BLD AUTO: 3.53 M/UL (ref 4–5.2)
SODIUM SERPL-SCNC: 132 MMOL/L (ref 136–145)
WBC # BLD AUTO: 7.1 K/UL (ref 4–11)

## 2023-12-08 PROCEDURE — 6370000000 HC RX 637 (ALT 250 FOR IP): Performed by: INTERNAL MEDICINE

## 2023-12-08 PROCEDURE — 80048 BASIC METABOLIC PNL TOTAL CA: CPT

## 2023-12-08 PROCEDURE — 99233 SBSQ HOSP IP/OBS HIGH 50: CPT | Performed by: PSYCHIATRY & NEUROLOGY

## 2023-12-08 PROCEDURE — 99239 HOSP IP/OBS DSCHRG MGMT >30: CPT | Performed by: INTERNAL MEDICINE

## 2023-12-08 PROCEDURE — 2580000003 HC RX 258

## 2023-12-08 PROCEDURE — 6360000002 HC RX W HCPCS

## 2023-12-08 PROCEDURE — 6370000000 HC RX 637 (ALT 250 FOR IP)

## 2023-12-08 PROCEDURE — 85027 COMPLETE CBC AUTOMATED: CPT

## 2023-12-08 PROCEDURE — 36415 COLL VENOUS BLD VENIPUNCTURE: CPT

## 2023-12-08 RX ORDER — LACTULOSE 10 G/15ML
20 SOLUTION ORAL 3 TIMES DAILY
Qty: 2700 ML | Refills: 0 | Status: SHIPPED | OUTPATIENT
Start: 2023-12-08

## 2023-12-08 RX ORDER — SUCRALFATE 1 G/1
1 TABLET ORAL 4 TIMES DAILY
Qty: 120 TABLET | Refills: 0 | Status: SHIPPED | OUTPATIENT
Start: 2023-12-08

## 2023-12-08 RX ORDER — AMOXICILLIN AND CLAVULANATE POTASSIUM 875; 125 MG/1; MG/1
1 TABLET, FILM COATED ORAL 2 TIMES DAILY
Qty: 8 TABLET | Refills: 0 | Status: SHIPPED | OUTPATIENT
Start: 2023-12-08 | End: 2023-12-12

## 2023-12-08 RX ADMIN — SUCRALFATE 1 G: 1 TABLET ORAL at 05:04

## 2023-12-08 RX ADMIN — CEFTRIAXONE SODIUM 1000 MG: 1 INJECTION, POWDER, FOR SOLUTION INTRAMUSCULAR; INTRAVENOUS at 08:16

## 2023-12-08 RX ADMIN — METRONIDAZOLE 500 MG: 500 INJECTION, SOLUTION INTRAVENOUS at 03:33

## 2023-12-08 RX ADMIN — METOPROLOL SUCCINATE 25 MG: 25 TABLET, EXTENDED RELEASE ORAL at 08:09

## 2023-12-08 RX ADMIN — METRONIDAZOLE 500 MG: 500 INJECTION, SOLUTION INTRAVENOUS at 11:51

## 2023-12-08 RX ADMIN — ONDANSETRON 4 MG: 2 INJECTION INTRAMUSCULAR; INTRAVENOUS at 08:13

## 2023-12-08 RX ADMIN — LEVETIRACETAM 500 MG: 500 TABLET, FILM COATED ORAL at 08:09

## 2023-12-08 RX ADMIN — VANCOMYCIN HYDROCHLORIDE 750 MG: 750 INJECTION, POWDER, LYOPHILIZED, FOR SOLUTION INTRAVENOUS at 10:23

## 2023-12-08 RX ADMIN — RIFAXIMIN 400 MG: 200 TABLET ORAL at 08:09

## 2023-12-08 RX ADMIN — PANTOPRAZOLE SODIUM 40 MG: 40 TABLET, DELAYED RELEASE ORAL at 05:04

## 2023-12-08 RX ADMIN — SODIUM CHLORIDE, PRESERVATIVE FREE 10 ML: 5 INJECTION INTRAVENOUS at 09:37

## 2023-12-08 RX ADMIN — SUCRALFATE 1 G: 1 TABLET ORAL at 11:52

## 2023-12-08 NOTE — PROGRESS NOTES
Neurology Progress Note    ID: Roberto Mayorga is a 68 y.o. female    : 1950     LOS: 4 days     ASSESSMENT     1. Encephalopathy. 2.  Colitis. 3.  History of seizure disorder. 4.  Atrial fibrillation. The patient has no focal deficit outside disorientation and confusion. The preliminary CT brain showed no acute ischemic injury or intracerebral hemorrhage. Normal ammonia level. No major electrolyte imbalance. EEG shows moderate degree of encephalopathy with rare triphasic waves. The MRI brain showed brain atrophy with mild to moderate white matter disease and hyperintensity at the basal ganglia. Vitamin D deficiency is noted. From neurology perspective, the EEG is suggestive of metabolic/toxic encephalopathy. The patient is also suspicious to have underlying dementia. The MRI and EEG findings were consistent with portal hypertension from endoscopic. So the patient may need longer time to recover from encephalopathy due to suspicion of underlying dementia. Plan:     1. Restart Eliquis if there is no contraindication. 2.  Continue levetiracetam.  3.  Seizure precaution. 4.  Minimize sedation and anticholinergic medication. 5.  Vitamin D supplement. Neurology will sign off as the patient is almost back to her baseline. Follow-up with neurology in 1 to 2 months for dementia.     Medications:  Scheduled Meds:    sucralfate  1 g Oral 4 times per day    rifAXIMin  400 mg Oral TID    pantoprazole  40 mg Oral BID AC    lactulose  20 g Oral TID    sodium chloride flush  5-40 mL IntraVENous 2 times per day    cefTRIAXone (ROCEPHIN) IV  1,000 mg IntraVENous Q24H    levETIRAcetam  500 mg Oral BID    vancomycin  750 mg IntraVENous Q12H    metroNIDAZOLE  500 mg IntraVENous Q8H    metoprolol succinate  25 mg Oral Daily     Continuous Infusions:    sodium chloride 5 mL/hr at 23 0813     PRN Meds: albuterol sulfate HFA, sodium chloride flush, sodium chloride, potassium chloride **OR**

## 2023-12-08 NOTE — PROGRESS NOTES
AM assessment completed. Scheduled medications given per MAR. VSS room air, A/O x4,patient complaining of nausea, PRN zofran given per MAR. Patient does not appear in distress and denies any needs at this time.  Call light in reach, will monitor,

## 2023-12-08 NOTE — PROGRESS NOTES
Hand off report given to  [facility], RN. Patient is stable showing no signs of distress and has no current needs at this time. Call light is in reach and bed is in lowest position. Care is transferred at this time.

## 2023-12-08 NOTE — PLAN OF CARE
Problem: Discharge Planning  Goal: Discharge to home or other facility with appropriate resources  Outcome: Progressing  Flowsheets (Taken 12/7/2023 1954 by Tasia Saldaña RN)  Discharge to home or other facility with appropriate resources: Identify barriers to discharge with patient and caregiver     Problem: Pain  Goal: Verbalizes/displays adequate comfort level or baseline comfort level  Outcome: Progressing     Problem: Chronic Conditions and Co-morbidities  Goal: Patient's chronic conditions and co-morbidity symptoms are monitored and maintained or improved  Outcome: Progressing  Flowsheets (Taken 12/7/2023 1954 by Tasia Saldaña RN)  Care Plan - Patient's Chronic Conditions and Co-Morbidity Symptoms are Monitored and Maintained or Improved: Monitor and assess patient's chronic conditions and comorbid symptoms for stability, deterioration, or improvement     Problem: Safety - Adult  Goal: Free from fall injury  Outcome: Progressing     Problem: Confusion  Goal: Confusion, delirium, dementia, or psychosis is improved or at baseline  Description: INTERVENTIONS:  1. Assess for possible contributors to thought disturbance, including medications, impaired vision or hearing, underlying metabolic abnormalities, dehydration, psychiatric diagnoses, and notify attending LIP  2. [place] high risk fall precautions, as indicated  3. Provide frequent short contacts to provide reality reorientation, refocusing and direction  4. Decrease environmental stimuli, including noise as appropriate  5. Monitor and intervene to maintain adequate nutrition, hydration, elimination, sleep and activity  6. If unable to ensure safety without constant attention obtain sitter and review sitter guidelines with assigned personnel  7.  Initiate Psychosocial CNS and Spiritual Care consult, as indicated  Outcome: Progressing

## 2023-12-08 NOTE — CARE COORDINATION
DISCHARGE ORDER  Date/Time 2023 11:22 AM  Completed by: Maria Elena Longo RN, Case Management    Patient Name: Deshawn Santacruz      : 1950  Admitting Diagnosis: Esophagitis [K20.90]  Atrial fibrillation with rapid ventricular response ([address]) [I48.91]  Atrial fibrillation with RVR ([address]) [I48.91]  Right upper quadrant abdominal pain [R10.11]  Pneumonia due to infectious organism, unspecified laterality, unspecified part of lung [J18.9]      Admit order Date and Status:ip   (verify MD's last order for status of admission)      Noted discharge order. If applicable PT/OT recommendation at Discharge: Home with 24hr assistance and Home PT   DME recommendation by PT/OT:needs met  Confirmed discharge plan  (): Yes  with whom______[email or web address]_________  If pt confirmed DC plan does family need to be contacted by CM No     Discharge Plan: met with pt at bedside. Pt states she is ready to Dc home. Pt states she has family that will provide transport home. Pt declines offer for [facility] at this time. No additional DC or DME needs identified. Date of Last IMM Given:     Reviewed chart. Role of discharge planner explained and patient verbalized understanding. Discharge order is noted. Has Home O2 in place on admit:  No  Informed of need to bring portable home O2 tank on day of discharge for nursing to connect prior to leaving:   Not Indicated  Verbalized agreement/Understanding:   Not Indicated  Pt is being d/c'd to home today. Pt's O2 sats are 98% on ra. Discharge timeout done with rn, cm, pt. All discharge needs and concerns addressed.

## 2023-12-08 NOTE — PROGRESS NOTES
organomegaly. Normal bowel sounds. No hernia. Skin: Confluent papulopustular lesions with some scabbing on the left flank  Lymph: No cervical LAD. No supraclavicular LAD. M/S: No cyanosis. No joint deformity. No clubbing. Neuro:awake, alert, oriented   No focal neurological signs. Lab Data:  CBC:   Recent Labs     12/06/23  0750 12/06/23  1405 12/07/23  0527 12/07/23  1038 12/08/23  0534   WBC 7.7  --  6.9  --  7.1   RBC 3.79*  --  3.73*  --  3.53*   HGB 11.3*  11.3*   < > 11.2* 10.9* 10.5*   HCT 34.5*  34.7*   < > 36.2 34.7* 34.3*   MCV 91.4  --  97.1  --  97.3   RDW 17.7*  --  18.4*  --  18.6*     --  250  --  212    < > = values in this interval not displayed. BMP:   Recent Labs     12/07/23  0527 12/07/23  1324 12/08/23  0534    136 132*   K 4.1 3.8 3.5    106 107   CO2 14* 19* 18*   BUN 11 10 8   CREATININE <0.5* 0.6 <0.5*       BNP: No results for input(s): \"BNP\" in the last 72 hours. PT/INR: No results for input(s): \"PROTIME\", \"INR\" in the last 72 hours. APTT:No results for input(s): \"APTT\" in the last 72 hours. CARDIAC ENZYMES: No results for input(s): \"CKMB\", \"CKMBINDEX\", \"TROPONINI\" in the last 72 hours. Invalid input(s): \"CKTOTAL;3\"  FASTING LIPID PANEL:  Lab Results   Component Value Date/Time    CHOL 198 01/10/2023 09:49 PM    HDL 56 01/10/2023 09:49 PM    HDL 81 10/06/2010 02:16 PM    TRIG 98 01/10/2023 09:49 PM     LIVER PROFILE:   No results for input(s): \"AST\", \"ALT\", \"ALB\", \"BILIDIR\", \"BILITOT\", \"ALKPHOS\" in the last 72 hours. MRI BRAIN WO CONTRAST   Final Result   Mild cerebral atrophy. Mild chronic small vessel ischemic changes. High   signal in the basal ganglia bilaterally which could be related to liver   disease or chronic TPN. No acute brain parenchymal abnormality. CT HEAD WO CONTRAST   Final Result   No acute intracranial abnormality. CT CHEST PULMONARY EMBOLISM W CONTRAST   Final Result   1. No pulmonary embolism.    2. gram negative organism  -stable on 2L O2 prn  -CT shows interval improved RML pneumonia  -per chart review, no recent hx pneumonia  -sputum and blood cultures pending   -on IV rocephin and vancomycin   -pulmonology consulted.      #Asthma  -stable, no AE  -continue home inhalers     #Shingles  -diagnosed during recent admission at [facility]  -started on valacyclovir and gabapentin, continue      #Chronic diastolic CHF  -does not appear acutely decompensated  -holding home lasix   -daily weights, intake and output       #VILLEGAS cirrhosis   -without evidence of decompensation  -ammonia WNLs     #W5ZT  -hold home trulicty  -SSI     #CKD3  -renal function appears stable, monitor     #Seizure disorder   -on keppra   -seizure precautions     #HTN  -continue lisinopril and metoprolol     #berna deficiency anemia   -H&H stable   -continue ferrous sulfate      #Hypothyroidism   -continue synthroid   -TSH normal.     #Anxiety   #Depression   #Hx parasitic delusions  -continue zoloft and seroquel      DVT Prophylaxis: SCDs  Diet: gen   Code Status: Full Code    Pt/ot    Dc home         Chon Flor MD

## 2023-12-08 NOTE — DISCHARGE SUMMARY
Name:  Ashli Silva  Room:  /7641-62  MRN:    [id number]    Discharge Summary      This discharge summary is in conjunction with a complete physical exam done on the day of discharge. Discharging Physician: Hawa Lyon MD      Admit: 12/4/2023  Discharge:  12/8/2023     Diagnoses this Admission    Principal Problem:    Atrial fibrillation with RVR (HCC)  Active Problems:    Elevated troponin    Right upper quadrant abdominal pain    Atrial fibrillation with rapid ventricular response ([address])  Resolved Problems:    * No resolved hospital problems. *          Procedures (Please Review Full Report for Details)    EGD    Consults    IP CONSULT TO PHARMACY  IP CONSULT TO CARDIOLOGY  IP CONSULT TO PULMONOLOGY  IP CONSULT TO GI  PHARMACY TO DOSE VANCOMYCIN  IP CONSULT TO NEUROLOGY      HPI:    The patient is a 68 y.o. female with anxiety, depression, seizure disorder, hypothyroidism, iron deficiency anemia, HTN, diastolic CHF and hx of atrial flutter who presented to [facility] ED with complaint of nausea, vomiting and abdominal pain x1 day. Patient found to be in atrial fibrillation with rapid ventricular response, with ST depressions noted on EKG. Also found to have questionable right middle lobe pneumonia and esophageal varices and possible acute esophagitis. Patient is a poor historian and additional history is limited. Admitted for further evaluation and management.        Hospital Course      #Sepsis, POA  -criteria HR, WBC, lactic acidosis  -multiple possible sources   -IV vanc, rocephin and flagyl started  -follow up cultures- negative     #Acute metabolic encephalopathy  -ammonia WNLs  -ethanol negative  -UDS negative  -UA negative  -CT head negative  -neuro consult   EEG- metabolic encephalopathy  - MRI brain reviewed - negative     #Atrial fibrillation with RVR  #Hx of atrial flutter   #Secondary hypercoagulable state   -started on diltiazem gtt with rate improvement   -on eliquis for AC, holding normal.     #Anxiety   #Depression   #Hx parasitic delusions  -continue zoloft and seroquel      MRI BRAIN WO CONTRAST   Final Result   Mild cerebral atrophy. Mild chronic small vessel ischemic changes. High   signal in the basal ganglia bilaterally which could be related to liver   disease or chronic TPN. No acute brain parenchymal abnormality. CT HEAD WO CONTRAST   Final Result   No acute intracranial abnormality. CT CHEST PULMONARY EMBOLISM W CONTRAST   Final Result   1. No pulmonary embolism. 2. Diffuse thoracic esophageal wall thickening with a small degree of   intraluminal fluid. Findings suggestive of acute esophagitis, possibly   reflux in nature. 3. Cirrhotic morphology of the liver with splenomegaly and upper abdominal as   well as esophageal varices. 4. Mild ground-glass change which may represent multilobar mild interstitial   edema or possible pneumonia. 5. Nonspecific pleural plaque with calcification at the lung apices. This   can be seen in the setting of prior asbestos exposure, though sequela of   prior empyema or hemothorax or other considerations. XR CHEST (2 VW)   Final Result   No acute pulmonary process is seen. CT ABDOMEN PELVIS WO CONTRAST Additional Contrast? None   Final Result   1. Cirrhotic morphology of the liver, with portal hypertension, splenomegaly   and upper abdominal as well as esophageal varices. 2. Increased thickening of the distal thoracic esophagus, which may be   related to esophagitis or possibly related to dilated varices. 3. Prominent wall thickness noted involving the proximal and sigmoid colon,   though this may be secondary to poor distension, with infectious or   inflammatory colitis or possible portal colopathy in the differential.   4. Cholecystectomy. Hysterectomy. 5. Interval improved right middle lobe pneumonia. Bibasilar atelectasis.                 Discharge Medications     Medication List        START

## 2023-12-11 ENCOUNTER — TELEPHONE (OUTPATIENT)
Dept: PULMONOLOGY | Age: 73
End: 2023-12-11

## 2023-12-11 NOTE — TELEPHONE ENCOUNTER
Inpatient Notes  Received: 3 days ago  Reno Armstrong MD Jones, Amanda N, MA  Follow with dr gibson in 4-6 weeks

## 2023-12-11 NOTE — TELEPHONE ENCOUNTER
Spoke with pt offering to schedule appt but she states that she is on the other line with [facility].  She will try to call back later.

## 2024-01-16 ENCOUNTER — APPOINTMENT (OUTPATIENT)
Dept: GENERAL RADIOLOGY | Age: 74
DRG: 871 | End: 2024-01-16
Payer: MEDICARE

## 2024-01-16 ENCOUNTER — APPOINTMENT (OUTPATIENT)
Dept: CT IMAGING | Age: 74
DRG: 871 | End: 2024-01-16
Payer: MEDICARE

## 2024-01-16 ENCOUNTER — HOSPITAL ENCOUNTER (INPATIENT)
Age: 74
LOS: 8 days | Discharge: NURSING, REHAB OR LONG-TERM CARE | DRG: 871 | End: 2024-01-24
Attending: STUDENT IN AN ORGANIZED HEALTH CARE EDUCATION/TRAINING PROGRAM | Admitting: INTERNAL MEDICINE
Payer: MEDICARE

## 2024-01-16 ENCOUNTER — APPOINTMENT (OUTPATIENT)
Dept: INTERVENTIONAL RADIOLOGY/VASCULAR | Age: 74
DRG: 871 | End: 2024-01-16
Payer: MEDICARE

## 2024-01-16 ENCOUNTER — APPOINTMENT (OUTPATIENT)
Dept: VASCULAR LAB | Age: 74
DRG: 871 | End: 2024-01-16
Payer: MEDICARE

## 2024-01-16 DIAGNOSIS — K20.90 ESOPHAGITIS: ICD-10-CM

## 2024-01-16 DIAGNOSIS — R65.20 SEPSIS WITH ACUTE RENAL FAILURE WITHOUT SEPTIC SHOCK, DUE TO UNSPECIFIED ORGANISM, UNSPECIFIED ACUTE RENAL FAILURE TYPE (HCC): Primary | ICD-10-CM

## 2024-01-16 DIAGNOSIS — A41.9 SEPSIS WITH ACUTE RENAL FAILURE WITHOUT SEPTIC SHOCK, DUE TO UNSPECIFIED ORGANISM, UNSPECIFIED ACUTE RENAL FAILURE TYPE (HCC): Primary | ICD-10-CM

## 2024-01-16 DIAGNOSIS — K92.2 GASTROINTESTINAL HEMORRHAGE, UNSPECIFIED GASTROINTESTINAL HEMORRHAGE TYPE: ICD-10-CM

## 2024-01-16 DIAGNOSIS — I48.91 ATRIAL FIBRILLATION WITH RVR (HCC): ICD-10-CM

## 2024-01-16 DIAGNOSIS — N17.9 SEPSIS WITH ACUTE RENAL FAILURE WITHOUT SEPTIC SHOCK, DUE TO UNSPECIFIED ORGANISM, UNSPECIFIED ACUTE RENAL FAILURE TYPE (HCC): Primary | ICD-10-CM

## 2024-01-16 DIAGNOSIS — J18.9 PNEUMONIA OF BOTH LUNGS DUE TO INFECTIOUS ORGANISM, UNSPECIFIED PART OF LUNG: ICD-10-CM

## 2024-01-16 DIAGNOSIS — J96.21 ACUTE ON CHRONIC RESPIRATORY FAILURE WITH HYPOXIA (HCC): ICD-10-CM

## 2024-01-16 DIAGNOSIS — R11.2 INTRACTABLE NAUSEA AND VOMITING: ICD-10-CM

## 2024-01-16 PROBLEM — J45.41 MODERATE PERSISTENT ASTHMA WITH EXACERBATION: Status: ACTIVE | Noted: 2024-01-16

## 2024-01-16 PROBLEM — R09.02 HYPOXIA: Status: ACTIVE | Noted: 2024-01-16

## 2024-01-16 LAB
ALBUMIN SERPL-MCNC: 3.6 G/DL (ref 3.4–5)
ALBUMIN/GLOB SERPL: 0.7 {RATIO} (ref 1.1–2.2)
ALP SERPL-CCNC: 329 U/L (ref 40–129)
ALT SERPL-CCNC: 11 U/L (ref 10–40)
AMMONIA PLAS-SCNC: 12 UMOL/L (ref 11–51)
ANION GAP SERPL CALCULATED.3IONS-SCNC: 18 MMOL/L (ref 3–16)
ANISOCYTOSIS BLD QL SMEAR: ABNORMAL
AST SERPL-CCNC: 23 U/L (ref 15–37)
BACTERIA URNS QL MICRO: ABNORMAL /HPF
BASE EXCESS BLDV CALC-SCNC: 1.7 MMOL/L (ref -3–3)
BASOPHILS # BLD: 0 K/UL (ref 0–0.2)
BASOPHILS NFR BLD: 0 %
BILIRUB SERPL-MCNC: 0.5 MG/DL (ref 0–1)
BILIRUB UR QL STRIP.AUTO: NEGATIVE
BUN SERPL-MCNC: 57 MG/DL (ref 7–20)
CALCIUM SERPL-MCNC: 9.8 MG/DL (ref 8.3–10.6)
CHLORIDE SERPL-SCNC: 91 MMOL/L (ref 99–110)
CLARITY UR: CLEAR
CO2 BLDV-SCNC: 25 MMOL/L
CO2 SERPL-SCNC: 24 MMOL/L (ref 21–32)
COHGB MFR BLDV: 5.2 % (ref 0–1.5)
COLOR UR: YELLOW
CREAT SERPL-MCNC: 2.4 MG/DL (ref 0.6–1.2)
D DIMER: 3.01 UG/ML FEU (ref 0–0.6)
DEPRECATED RDW RBC AUTO: 17.9 % (ref 12.4–15.4)
EKG ATRIAL RATE: 153 BPM
EKG DIAGNOSIS: NORMAL
EKG Q-T INTERVAL: 284 MS
EKG QRS DURATION: 62 MS
EKG QTC CALCULATION (BAZETT): 457 MS
EKG R AXIS: -28 DEGREES
EKG T AXIS: 96 DEGREES
EKG VENTRICULAR RATE: 156 BPM
EOSINOPHIL # BLD: 0 K/UL (ref 0–0.6)
EOSINOPHIL NFR BLD: 0 %
EPI CELLS #/AREA URNS HPF: ABNORMAL /HPF (ref 0–5)
FLUAV RNA RESP QL NAA+PROBE: NOT DETECTED
FLUBV RNA RESP QL NAA+PROBE: NOT DETECTED
GFR SERPLBLD CREATININE-BSD FMLA CKD-EPI: 21 ML/MIN/{1.73_M2}
GLUCOSE SERPL-MCNC: 166 MG/DL (ref 70–99)
GLUCOSE UR STRIP.AUTO-MCNC: >=1000 MG/DL
HCO3 BLDV-SCNC: 23.9 MMOL/L (ref 23–29)
HCT VFR BLD AUTO: 32.8 % (ref 36–48)
HCT VFR BLD AUTO: 38.6 % (ref 36–48)
HGB BLD-MCNC: 11 G/DL (ref 12–16)
HGB BLD-MCNC: 13 G/DL (ref 12–16)
HGB UR QL STRIP.AUTO: ABNORMAL
HYPOCHROMIA BLD QL SMEAR: ABNORMAL
KETONES UR STRIP.AUTO-MCNC: NEGATIVE MG/DL
LACTATE BLDV-SCNC: 2.3 MMOL/L (ref 0.4–1.9)
LACTATE BLDV-SCNC: 2.4 MMOL/L (ref 0.4–1.9)
LACTATE BLDV-SCNC: 2.5 MMOL/L (ref 0.4–2)
LEUKOCYTE ESTERASE UR QL STRIP.AUTO: NEGATIVE
LYMPHOCYTES # BLD: 2.1 K/UL (ref 1–5.1)
LYMPHOCYTES NFR BLD: 7 %
MCH RBC QN AUTO: 30.8 PG (ref 26–34)
MCHC RBC AUTO-ENTMCNC: 33.7 G/DL (ref 31–36)
MCV RBC AUTO: 91.5 FL (ref 80–100)
METHGB MFR BLDV: 0.3 %
MONOCYTES # BLD: 1.8 K/UL (ref 0–1.3)
MONOCYTES NFR BLD: 9 %
MUCOUS THREADS #/AREA URNS LPF: ABNORMAL /LPF
NEUTROPHILS # BLD: 15.6 K/UL (ref 1.7–7.7)
NEUTROPHILS NFR BLD: 80 %
NITRITE UR QL STRIP.AUTO: NEGATIVE
O2 THERAPY: ABNORMAL
PCO2 BLDV: 30.9 MMHG (ref 40–50)
PH BLDV: 7.51 [PH] (ref 7.35–7.45)
PH UR STRIP.AUTO: 6 [PH] (ref 5–8)
PLATELET # BLD AUTO: 441 K/UL (ref 135–450)
PLATELET BLD QL SMEAR: ABNORMAL
PMV BLD AUTO: 8.6 FL (ref 5–10.5)
PO2 BLDV: 92.1 MMHG (ref 25–40)
POIKILOCYTOSIS BLD QL SMEAR: ABNORMAL
POLYCHROMASIA BLD QL SMEAR: ABNORMAL
POTASSIUM SERPL-SCNC: 4.2 MMOL/L (ref 3.5–5.1)
PROCALCITONIN SERPL IA-MCNC: 0.54 NG/ML (ref 0–0.15)
PROCALCITONIN SERPL IA-MCNC: 0.74 NG/ML (ref 0–0.15)
PROT SERPL-MCNC: 8.5 G/DL (ref 6.4–8.2)
PROT UR STRIP.AUTO-MCNC: 30 MG/DL
RBC # BLD AUTO: 4.22 M/UL (ref 4–5.2)
RBC #/AREA URNS HPF: ABNORMAL /HPF (ref 0–4)
SAO2 % BLDV: 98 %
SARS-COV-2 RNA RESP QL NAA+PROBE: NOT DETECTED
SLIDE REVIEW: ABNORMAL
SODIUM SERPL-SCNC: 133 MMOL/L (ref 136–145)
SP GR UR STRIP.AUTO: 1.02 (ref 1–1.03)
TROPONIN, HIGH SENSITIVITY: 85 NG/L (ref 0–14)
TROPONIN, HIGH SENSITIVITY: 88 NG/L (ref 0–14)
TSH SERPL DL<=0.005 MIU/L-ACNC: 1.43 UIU/ML (ref 0.27–4.2)
UA COMPLETE W REFLEX CULTURE PNL UR: YES
UA DIPSTICK W REFLEX MICRO PNL UR: YES
URN SPEC COLLECT METH UR: ABNORMAL
UROBILINOGEN UR STRIP-ACNC: 0.2 E.U./DL
VARIANT LYMPHS NFR BLD MANUAL: 4 % (ref 0–6)
WBC # BLD AUTO: 19.5 K/UL (ref 4–11)
WBC #/AREA URNS HPF: ABNORMAL /HPF (ref 0–5)

## 2024-01-16 PROCEDURE — 6370000000 HC RX 637 (ALT 250 FOR IP): Performed by: INTERNAL MEDICINE

## 2024-01-16 PROCEDURE — 82140 ASSAY OF AMMONIA: CPT

## 2024-01-16 PROCEDURE — 93005 ELECTROCARDIOGRAM TRACING: CPT | Performed by: STUDENT IN AN ORGANIZED HEALTH CARE EDUCATION/TRAINING PROGRAM

## 2024-01-16 PROCEDURE — C9113 INJ PANTOPRAZOLE SODIUM, VIA: HCPCS

## 2024-01-16 PROCEDURE — 94761 N-INVAS EAR/PLS OXIMETRY MLT: CPT

## 2024-01-16 PROCEDURE — 99223 1ST HOSP IP/OBS HIGH 75: CPT

## 2024-01-16 PROCEDURE — 36415 COLL VENOUS BLD VENIPUNCTURE: CPT

## 2024-01-16 PROCEDURE — 96375 TX/PRO/DX INJ NEW DRUG ADDON: CPT

## 2024-01-16 PROCEDURE — 2580000003 HC RX 258

## 2024-01-16 PROCEDURE — 96366 THER/PROPH/DIAG IV INF ADDON: CPT

## 2024-01-16 PROCEDURE — 85018 HEMOGLOBIN: CPT

## 2024-01-16 PROCEDURE — 96365 THER/PROPH/DIAG IV INF INIT: CPT

## 2024-01-16 PROCEDURE — 84443 ASSAY THYROID STIM HORMONE: CPT

## 2024-01-16 PROCEDURE — 87633 RESP VIRUS 12-25 TARGETS: CPT

## 2024-01-16 PROCEDURE — 87641 MR-STAPH DNA AMP PROBE: CPT

## 2024-01-16 PROCEDURE — 71045 X-RAY EXAM CHEST 1 VIEW: CPT

## 2024-01-16 PROCEDURE — 80053 COMPREHEN METABOLIC PANEL: CPT

## 2024-01-16 PROCEDURE — 36410 VNPNXR 3YR/> PHY/QHP DX/THER: CPT

## 2024-01-16 PROCEDURE — 71250 CT THORAX DX C-: CPT

## 2024-01-16 PROCEDURE — 6370000000 HC RX 637 (ALT 250 FOR IP)

## 2024-01-16 PROCEDURE — C1751 CATH, INF, PER/CENT/MIDLINE: HCPCS

## 2024-01-16 PROCEDURE — 93970 EXTREMITY STUDY: CPT

## 2024-01-16 PROCEDURE — 99223 1ST HOSP IP/OBS HIGH 75: CPT | Performed by: INTERNAL MEDICINE

## 2024-01-16 PROCEDURE — 87086 URINE CULTURE/COLONY COUNT: CPT

## 2024-01-16 PROCEDURE — 99285 EMERGENCY DEPT VISIT HI MDM: CPT

## 2024-01-16 PROCEDURE — 87040 BLOOD CULTURE FOR BACTERIA: CPT

## 2024-01-16 PROCEDURE — 94640 AIRWAY INHALATION TREATMENT: CPT

## 2024-01-16 PROCEDURE — 85014 HEMATOCRIT: CPT

## 2024-01-16 PROCEDURE — 96368 THER/DIAG CONCURRENT INF: CPT

## 2024-01-16 PROCEDURE — 05H933Z INSERTION OF INFUSION DEVICE INTO RIGHT BRACHIAL VEIN, PERCUTANEOUS APPROACH: ICD-10-PCS | Performed by: INTERNAL MEDICINE

## 2024-01-16 PROCEDURE — 85025 COMPLETE CBC W/AUTO DIFF WBC: CPT

## 2024-01-16 PROCEDURE — 2060000000 HC ICU INTERMEDIATE R&B

## 2024-01-16 PROCEDURE — 2580000003 HC RX 258: Performed by: STUDENT IN AN ORGANIZED HEALTH CARE EDUCATION/TRAINING PROGRAM

## 2024-01-16 PROCEDURE — 94669 MECHANICAL CHEST WALL OSCILL: CPT

## 2024-01-16 PROCEDURE — 87636 SARSCOV2 & INF A&B AMP PRB: CPT

## 2024-01-16 PROCEDURE — 85379 FIBRIN DEGRADATION QUANT: CPT

## 2024-01-16 PROCEDURE — 96372 THER/PROPH/DIAG INJ SC/IM: CPT

## 2024-01-16 PROCEDURE — 87070 CULTURE OTHR SPECIMN AEROBIC: CPT

## 2024-01-16 PROCEDURE — 93010 ELECTROCARDIOGRAM REPORT: CPT | Performed by: INTERNAL MEDICINE

## 2024-01-16 PROCEDURE — 2700000000 HC OXYGEN THERAPY PER DAY

## 2024-01-16 PROCEDURE — 84145 PROCALCITONIN (PCT): CPT

## 2024-01-16 PROCEDURE — 2500000003 HC RX 250 WO HCPCS: Performed by: STUDENT IN AN ORGANIZED HEALTH CARE EDUCATION/TRAINING PROGRAM

## 2024-01-16 PROCEDURE — 87186 SC STD MICRODIL/AGAR DIL: CPT

## 2024-01-16 PROCEDURE — 6360000002 HC RX W HCPCS

## 2024-01-16 PROCEDURE — 82803 BLOOD GASES ANY COMBINATION: CPT

## 2024-01-16 PROCEDURE — 83605 ASSAY OF LACTIC ACID: CPT

## 2024-01-16 PROCEDURE — 6360000002 HC RX W HCPCS: Performed by: STUDENT IN AN ORGANIZED HEALTH CARE EDUCATION/TRAINING PROGRAM

## 2024-01-16 PROCEDURE — 87205 SMEAR GRAM STAIN: CPT

## 2024-01-16 PROCEDURE — 84484 ASSAY OF TROPONIN QUANT: CPT

## 2024-01-16 PROCEDURE — 81001 URINALYSIS AUTO W/SCOPE: CPT

## 2024-01-16 PROCEDURE — 76937 US GUIDE VASCULAR ACCESS: CPT

## 2024-01-16 RX ORDER — IPRATROPIUM BROMIDE AND ALBUTEROL SULFATE 2.5; .5 MG/3ML; MG/3ML
1 SOLUTION RESPIRATORY (INHALATION)
Status: DISCONTINUED | OUTPATIENT
Start: 2024-01-16 | End: 2024-01-16

## 2024-01-16 RX ORDER — LEVETIRACETAM 500 MG/1
500 TABLET ORAL 2 TIMES DAILY
Status: DISCONTINUED | OUTPATIENT
Start: 2024-01-16 | End: 2024-01-20

## 2024-01-16 RX ORDER — PREDNISONE 20 MG/1
40 TABLET ORAL DAILY
Status: DISCONTINUED | OUTPATIENT
Start: 2024-01-16 | End: 2024-01-20

## 2024-01-16 RX ORDER — ALBUTEROL SULFATE 90 UG/1
2 AEROSOL, METERED RESPIRATORY (INHALATION) 4 TIMES DAILY PRN
Status: DISCONTINUED | OUTPATIENT
Start: 2024-01-16 | End: 2024-01-24 | Stop reason: HOSPADM

## 2024-01-16 RX ORDER — IPRATROPIUM BROMIDE AND ALBUTEROL SULFATE 2.5; .5 MG/3ML; MG/3ML
1 SOLUTION RESPIRATORY (INHALATION)
Status: DISCONTINUED | OUTPATIENT
Start: 2024-01-16 | End: 2024-01-17

## 2024-01-16 RX ORDER — SUCRALFATE 1 G/1
1 TABLET ORAL 4 TIMES DAILY
Status: DISCONTINUED | OUTPATIENT
Start: 2024-01-16 | End: 2024-01-24 | Stop reason: HOSPADM

## 2024-01-16 RX ORDER — ONDANSETRON 2 MG/ML
4 INJECTION INTRAMUSCULAR; INTRAVENOUS ONCE
Status: COMPLETED | OUTPATIENT
Start: 2024-01-16 | End: 2024-01-16

## 2024-01-16 RX ORDER — PANTOPRAZOLE SODIUM 40 MG/10ML
40 INJECTION, POWDER, LYOPHILIZED, FOR SOLUTION INTRAVENOUS 2 TIMES DAILY
Status: DISCONTINUED | OUTPATIENT
Start: 2024-01-16 | End: 2024-01-18

## 2024-01-16 RX ORDER — ATORVASTATIN CALCIUM 10 MG/1
10 TABLET, FILM COATED ORAL DAILY
Status: DISCONTINUED | OUTPATIENT
Start: 2024-01-16 | End: 2024-01-24 | Stop reason: HOSPADM

## 2024-01-16 RX ORDER — SENNA AND DOCUSATE SODIUM 50; 8.6 MG/1; MG/1
2 TABLET, FILM COATED ORAL 2 TIMES DAILY
Status: DISCONTINUED | OUTPATIENT
Start: 2024-01-16 | End: 2024-01-20

## 2024-01-16 RX ORDER — ACETAMINOPHEN 325 MG/1
650 TABLET ORAL EVERY 6 HOURS PRN
Status: DISCONTINUED | OUTPATIENT
Start: 2024-01-16 | End: 2024-01-24 | Stop reason: HOSPADM

## 2024-01-16 RX ORDER — ACETAMINOPHEN 650 MG/1
650 SUPPOSITORY RECTAL EVERY 6 HOURS PRN
Status: DISCONTINUED | OUTPATIENT
Start: 2024-01-16 | End: 2024-01-24 | Stop reason: HOSPADM

## 2024-01-16 RX ORDER — ONDANSETRON 2 MG/ML
4 INJECTION INTRAMUSCULAR; INTRAVENOUS EVERY 6 HOURS PRN
Status: DISCONTINUED | OUTPATIENT
Start: 2024-01-16 | End: 2024-01-24 | Stop reason: HOSPADM

## 2024-01-16 RX ORDER — PROMETHAZINE HYDROCHLORIDE 25 MG/ML
25 INJECTION, SOLUTION INTRAMUSCULAR; INTRAVENOUS ONCE
Status: COMPLETED | OUTPATIENT
Start: 2024-01-16 | End: 2024-01-16

## 2024-01-16 RX ORDER — LACTULOSE 10 G/15ML
20 SOLUTION ORAL 3 TIMES DAILY
Status: DISCONTINUED | OUTPATIENT
Start: 2024-01-16 | End: 2024-01-21

## 2024-01-16 RX ORDER — GUAIFENESIN 600 MG/1
600 TABLET, EXTENDED RELEASE ORAL 2 TIMES DAILY
Status: DISCONTINUED | OUTPATIENT
Start: 2024-01-16 | End: 2024-01-24 | Stop reason: HOSPADM

## 2024-01-16 RX ORDER — DILTIAZEM HYDROCHLORIDE 5 MG/ML
10 INJECTION INTRAVENOUS ONCE
Status: COMPLETED | OUTPATIENT
Start: 2024-01-16 | End: 2024-01-16

## 2024-01-16 RX ORDER — SODIUM CHLORIDE 0.9 % (FLUSH) 0.9 %
10 SYRINGE (ML) INJECTION PRN
Status: DISCONTINUED | OUTPATIENT
Start: 2024-01-16 | End: 2024-01-24 | Stop reason: HOSPADM

## 2024-01-16 RX ORDER — IPRATROPIUM BROMIDE AND ALBUTEROL SULFATE 2.5; .5 MG/3ML; MG/3ML
1 SOLUTION RESPIRATORY (INHALATION) EVERY 4 HOURS PRN
Status: DISCONTINUED | OUTPATIENT
Start: 2024-01-16 | End: 2024-01-24 | Stop reason: HOSPADM

## 2024-01-16 RX ORDER — SODIUM CHLORIDE 9 MG/ML
INJECTION, SOLUTION INTRAVENOUS PRN
Status: DISCONTINUED | OUTPATIENT
Start: 2024-01-16 | End: 2024-01-24 | Stop reason: HOSPADM

## 2024-01-16 RX ORDER — DIGOXIN 0.25 MG/ML
250 INJECTION INTRAMUSCULAR; INTRAVENOUS ONCE
Status: DISCONTINUED | OUTPATIENT
Start: 2024-01-16 | End: 2024-01-24 | Stop reason: HOSPADM

## 2024-01-16 RX ORDER — DIGOXIN 0.25 MG/ML
250 INJECTION INTRAMUSCULAR; INTRAVENOUS ONCE
Status: COMPLETED | OUTPATIENT
Start: 2024-01-16 | End: 2024-01-16

## 2024-01-16 RX ORDER — POLYETHYLENE GLYCOL 3350 17 G/17G
17 POWDER, FOR SOLUTION ORAL DAILY PRN
Status: DISCONTINUED | OUTPATIENT
Start: 2024-01-16 | End: 2024-01-24 | Stop reason: HOSPADM

## 2024-01-16 RX ORDER — 0.9 % SODIUM CHLORIDE 0.9 %
1000 INTRAVENOUS SOLUTION INTRAVENOUS ONCE
Status: COMPLETED | OUTPATIENT
Start: 2024-01-16 | End: 2024-01-16

## 2024-01-16 RX ORDER — METOPROLOL SUCCINATE 25 MG/1
25 TABLET, EXTENDED RELEASE ORAL DAILY
Status: DISCONTINUED | OUTPATIENT
Start: 2024-01-16 | End: 2024-01-17

## 2024-01-16 RX ORDER — SODIUM CHLORIDE 9 MG/ML
INJECTION, SOLUTION INTRAVENOUS CONTINUOUS
Status: DISCONTINUED | OUTPATIENT
Start: 2024-01-16 | End: 2024-01-18

## 2024-01-16 RX ORDER — PANTOPRAZOLE SODIUM 40 MG/10ML
40 INJECTION, POWDER, LYOPHILIZED, FOR SOLUTION INTRAVENOUS DAILY
Status: DISCONTINUED | OUTPATIENT
Start: 2024-01-16 | End: 2024-01-16

## 2024-01-16 RX ORDER — SODIUM CHLORIDE 0.9 % (FLUSH) 0.9 %
5-40 SYRINGE (ML) INJECTION EVERY 12 HOURS SCHEDULED
Status: DISCONTINUED | OUTPATIENT
Start: 2024-01-16 | End: 2024-01-24 | Stop reason: HOSPADM

## 2024-01-16 RX ORDER — ONDANSETRON 4 MG/1
4 TABLET, ORALLY DISINTEGRATING ORAL EVERY 8 HOURS PRN
Status: DISCONTINUED | OUTPATIENT
Start: 2024-01-16 | End: 2024-01-24 | Stop reason: HOSPADM

## 2024-01-16 RX ORDER — LEVOTHYROXINE SODIUM 88 UG/1
88 TABLET ORAL DAILY
Status: DISCONTINUED | OUTPATIENT
Start: 2024-01-16 | End: 2024-01-24 | Stop reason: HOSPADM

## 2024-01-16 RX ADMIN — ONDANSETRON 4 MG: 2 INJECTION INTRAMUSCULAR; INTRAVENOUS at 11:26

## 2024-01-16 RX ADMIN — SODIUM CHLORIDE 1000 ML: 9 INJECTION, SOLUTION INTRAVENOUS at 12:30

## 2024-01-16 RX ADMIN — PANTOPRAZOLE SODIUM 40 MG: 40 INJECTION, POWDER, FOR SOLUTION INTRAVENOUS at 21:44

## 2024-01-16 RX ADMIN — CEFEPIME 2000 MG: 2 INJECTION, POWDER, FOR SOLUTION INTRAVENOUS at 13:11

## 2024-01-16 RX ADMIN — DILTIAZEM HYDROCHLORIDE 10 MG/HR: 5 INJECTION, SOLUTION INTRAVENOUS at 12:28

## 2024-01-16 RX ADMIN — DOCUSATE SODIUM 50MG AND SENNOSIDES 8.6MG 2 TABLET: 8.6; 5 TABLET, FILM COATED ORAL at 21:31

## 2024-01-16 RX ADMIN — PROMETHAZINE HYDROCHLORIDE 25 MG: 25 INJECTION INTRAMUSCULAR; INTRAVENOUS at 13:55

## 2024-01-16 RX ADMIN — IPRATROPIUM BROMIDE AND ALBUTEROL SULFATE 1 DOSE: 2.5; .5 SOLUTION RESPIRATORY (INHALATION) at 23:57

## 2024-01-16 RX ADMIN — METOPROLOL SUCCINATE 25 MG: 25 TABLET, EXTENDED RELEASE ORAL at 16:46

## 2024-01-16 RX ADMIN — LEVOTHYROXINE SODIUM 88 MCG: 88 TABLET ORAL at 18:38

## 2024-01-16 RX ADMIN — GUAIFENESIN 600 MG: 600 TABLET, EXTENDED RELEASE ORAL at 21:31

## 2024-01-16 RX ADMIN — DIGOXIN 250 MCG: 0.25 INJECTION INTRAMUSCULAR; INTRAVENOUS at 16:29

## 2024-01-16 RX ADMIN — DILTIAZEM HYDROCHLORIDE 10 MG: 5 INJECTION INTRAVENOUS at 11:30

## 2024-01-16 RX ADMIN — SODIUM CHLORIDE, PRESERVATIVE FREE 10 ML: 5 INJECTION INTRAVENOUS at 23:01

## 2024-01-16 RX ADMIN — PREDNISONE 40 MG: 20 TABLET ORAL at 21:31

## 2024-01-16 RX ADMIN — LACTULOSE 20 G: 10 SOLUTION ORAL at 21:31

## 2024-01-16 RX ADMIN — IPRATROPIUM BROMIDE AND ALBUTEROL SULFATE 1 DOSE: 2.5; .5 SOLUTION RESPIRATORY (INHALATION) at 20:54

## 2024-01-16 RX ADMIN — ATORVASTATIN CALCIUM 10 MG: 10 TABLET, FILM COATED ORAL at 18:38

## 2024-01-16 RX ADMIN — SODIUM CHLORIDE: 9 INJECTION, SOLUTION INTRAVENOUS at 16:47

## 2024-01-16 RX ADMIN — VANCOMYCIN HYDROCHLORIDE 1250 MG: 10 INJECTION, POWDER, LYOPHILIZED, FOR SOLUTION INTRAVENOUS at 14:55

## 2024-01-16 RX ADMIN — SUCRALFATE 1 G: 1 TABLET ORAL at 21:31

## 2024-01-16 RX ADMIN — SERTRALINE HYDROCHLORIDE 50 MG: 50 TABLET ORAL at 21:31

## 2024-01-16 RX ADMIN — IPRATROPIUM BROMIDE AND ALBUTEROL SULFATE 1 DOSE: 2.5; .5 SOLUTION RESPIRATORY (INHALATION) at 16:11

## 2024-01-16 RX ADMIN — DILTIAZEM HYDROCHLORIDE 5 MG/HR: 5 INJECTION, SOLUTION INTRAVENOUS at 11:31

## 2024-01-16 ASSESSMENT — PAIN DESCRIPTION - LOCATION: LOCATION: HEAD

## 2024-01-16 ASSESSMENT — PAIN DESCRIPTION - DESCRIPTORS: DESCRIPTORS: ACHING

## 2024-01-16 ASSESSMENT — PAIN SCALES - GENERAL: PAINLEVEL_OUTOF10: 6

## 2024-01-16 ASSESSMENT — PAIN DESCRIPTION - ORIENTATION: ORIENTATION: RIGHT

## 2024-01-16 ASSESSMENT — PAIN - FUNCTIONAL ASSESSMENT: PAIN_FUNCTIONAL_ASSESSMENT: ACTIVITIES ARE NOT PREVENTED

## 2024-01-16 NOTE — DISCHARGE INSTRUCTIONS
It is very important that you take all of your medications, especially your Eliquis and metoprolol.     Take protonix to protect your stomach and prevent bleeding.    I wrote a script for Keppra in case you don't have any left at home        Heart Failure Resources:  Heart Failure Interactive Workbook:  Go to https://Twitsale.[employer]/publication/?z=667749 for a Free Heart Failure Interactive Workbook provided by The American Heart Association. This interactive workbook will provide information on Healthier Living with Heart Failure. Please copy and paste link into search bar. Use your mouse to scroll through the pages.    HF [place] ruben:   Heart Failure Free smart phone ruben available for iPhone and Android download. Use your phone to track sodium intake, fluid intake, symptoms, and weight.     Low Sodium Diet / Recipes:  Go to www.[employer].NaPopravku website for “renal” diet which is Low Sodium! [employer] is a dialysis company, but this website offers free seasonal cookbooks. Each quarter, they will release 25-30 new recipes with a breakdown of calories, sodium, and glucose. You can also go to www.[employer]/recipes website for free recipes.     Discharge Instruction Video:  Scan the QR code below with your camera and click the canva.com link to open the video and watch educational information on Heart Failure and Medications from one of our nurses.   https://www.Reebee/design/DAFZnsH_JRk/0OxzvrbWSHWumDPfnJ3rmt/edit    Home Exercise Program:   Identification of Green/Yellow/Red zones:  You should be able to identify when you feel good (green zone), if you have 1-2 symptoms of HF (yellow zone), or if you are in need of medical attention (red zone).  In your CHF education folder you were provided a “stop light tool” to outline this information.     We want to you to rate your exertion levels:    Our therapy team has discussed means of identification with you such as the \"Kinjal scale.\"  The Kinjal rating scale ranges

## 2024-01-16 NOTE — ED NOTES
Pt to and from CT with RN supervision, monitored on telemetry. HR sustaining on cardizem gtt. Pt not comfortable using pure wick, used bedpan to void without issue.

## 2024-01-16 NOTE — ED PROVIDER NOTES
[facility] ED     EMERGENCY DEPARTMENT ENCOUNTER            Pt Name: Nancy Franz   MRN: [id number]   Birthdate 1950   Date of evaluation: 1/16/2024   Provider: Dottie Cleveland MD   PCP: Ryan Ruiz MD   Note Started: 10:22 AM EST 1/16/24          CHIEF COMPLAINT     Chief Complaint   Patient presents with    Illness     N/v/d x1 week. SOB, cough. Afib.      HISTORY OF PRESENT ILLNESS:   History from : Patient   Limitations to history : None     Nancy Franz is a 73 y.o. female who presents complaining of multiple complaints.  Patient states that she has had a cough, shortness of breath, generalized malaise and fatigue, nausea and vomiting for the past week.  She denies any fevers.  States that her cough has been productive of yellow/green sputum.  She does have history of COPD and normally wears 3 L of oxygen at baseline.  She states that she recently had a surgery on her left hip because she broke it about 1 to 2 months ago at [facility].  Denies any leg pain or leg swelling that is new.  Also states that she has a history of atrial fibrillation but does not take any medications for it.  She is complaining of some generalized/substernal chest pressure as well.  She is complaining of abdominal pain.  No exacerbating or relieving factors.  Denies any other complaints or concerns.    Nursing Notes were all reviewed and agreed with, or any disagreements were addressed in the HPI.     REVIEW OF SYSTEMS :    Positives and Pertinent negatives as per HPI.      MEDICAL HISTORY   has a past medical history of Altered mental status, Anemia, Asthma, Cerebral artery occlusion with cerebral infarction (HCC), CHF (congestive heart failure) (HCC), COPD (chronic obstructive pulmonary disease) (HCC), Depression, Diabetes mellitus (HCC), DJD (degenerative joint disease), DENZEL (generalised anxiety disorder), GERD (gastroesophageal reflux disease), Hyperlipidemia, Hypertension, Irritable

## 2024-01-16 NOTE — CARE COORDINATION
Case Management Assessment  Initial Evaluation    Date/Time of Evaluation: 1/16/2024 1:59 PM  Assessment Completed by: MARIA ELENA Murphy    If patient is discharged prior to next notation, then this note serves as note for discharge by case management.    Patient Name: Nancy Franz                   YOB: 1950  Diagnosis: No admission diagnoses are documented for this encounter.                   Date / Time: 1/16/2024 10:19 AM    Patient Admission Status: Emergency   Readmission Risk (Low < 19, Mod (19-27), High > 27): Readmission Risk Score: 21.1    Current PCP: Ryan Ruiz MD  PCP verified by CM? Yes    Chart Reviewed: Yes      History Provided by: Patient  Patient Orientation: Alert and Oriented    Patient Cognition: Alert    Hospitalization in the last 30 days (Readmission):  No    If yes, Readmission Assessment in CM Navigator will be completed.    Advance Directives:      Code Status: Prior   Patient's Primary Decision Maker is: Named in Scanned ACP Document    Primary Decision Maker: do not,contact - Friend - [phone]    Secondary Decision Maker: do not,contact - Friend - [phone]    Supplemental (Other) Decision Maker: Zina Franz - Child - [phone]    Discharge Planning:    Patient lives with: Children Type of Home: Trailer/Mobile Home  Primary Care Giver: Self  Patient Support Systems include: Children   Current Financial resources: Medicare  Current community resources: None  Current services prior to admission: Durable Medical Equipment            Current DME: Walker            Type of Home Care services:  (P) None    ADLS  Prior functional level: Independent in ADLs/IADLs  Current functional level: Independent in ADLs/IADLs    PT AM-PAC:   /24  OT AM-PAC:   /24    Family can provide assistance at DC: Yes  Would you like Case Management to discuss the discharge plan with any other family members/significant others, and if so, who? Yes (daughter zina)  Plans to Return to

## 2024-01-17 ENCOUNTER — ANESTHESIA EVENT (OUTPATIENT)
Dept: ENDOSCOPY | Age: 74
End: 2024-01-17
Payer: MEDICARE

## 2024-01-17 ENCOUNTER — ANESTHESIA (OUTPATIENT)
Dept: ENDOSCOPY | Age: 74
End: 2024-01-17
Payer: MEDICARE

## 2024-01-17 LAB
ALBUMIN SERPL-MCNC: 3.4 G/DL (ref 3.4–5)
ALBUMIN/GLOB SERPL: 0.9 {RATIO} (ref 1.1–2.2)
ALP SERPL-CCNC: 288 U/L (ref 40–129)
ALT SERPL-CCNC: 10 U/L (ref 10–40)
ANION GAP SERPL CALCULATED.3IONS-SCNC: 13 MMOL/L (ref 3–16)
AST SERPL-CCNC: 21 U/L (ref 15–37)
BACTERIA UR CULT: NORMAL
BASOPHILS # BLD: 0 K/UL (ref 0–0.2)
BASOPHILS NFR BLD: 0 %
BILIRUB SERPL-MCNC: 0.5 MG/DL (ref 0–1)
BUN SERPL-MCNC: 45 MG/DL (ref 7–20)
CALCIUM SERPL-MCNC: 8.8 MG/DL (ref 8.3–10.6)
CHLORIDE SERPL-SCNC: 96 MMOL/L (ref 99–110)
CO2 SERPL-SCNC: 24 MMOL/L (ref 21–32)
CREAT SERPL-MCNC: 1.5 MG/DL (ref 0.6–1.2)
DEPRECATED RDW RBC AUTO: 18.1 % (ref 12.4–15.4)
EOSINOPHIL # BLD: 0 K/UL (ref 0–0.6)
EOSINOPHIL NFR BLD: 0 %
GFR SERPLBLD CREATININE-BSD FMLA CKD-EPI: 37 ML/MIN/{1.73_M2}
GLUCOSE BLD-MCNC: 194 MG/DL (ref 70–99)
GLUCOSE SERPL-MCNC: 224 MG/DL (ref 70–99)
HCT VFR BLD AUTO: 30.3 % (ref 36–48)
HCT VFR BLD AUTO: 32.4 % (ref 36–48)
HCT VFR BLD AUTO: 34.9 % (ref 36–48)
HGB BLD-MCNC: 10.1 G/DL (ref 12–16)
HGB BLD-MCNC: 11.1 G/DL (ref 12–16)
HGB BLD-MCNC: 11.6 G/DL (ref 12–16)
LYMPHOCYTES # BLD: 0.9 K/UL (ref 1–5.1)
LYMPHOCYTES NFR BLD: 7.9 %
MCH RBC QN AUTO: 33.3 PG (ref 26–34)
MCHC RBC AUTO-ENTMCNC: 34.2 G/DL (ref 31–36)
MCV RBC AUTO: 97.4 FL (ref 80–100)
MONOCYTES # BLD: 0.3 K/UL (ref 0–1.3)
MONOCYTES NFR BLD: 2.7 %
MRSA DNA SPEC QL NAA+PROBE: NORMAL
NEUTROPHILS # BLD: 9.6 K/UL (ref 1.7–7.7)
NEUTROPHILS NFR BLD: 89.4 %
ORGANISM: ABNORMAL
ORGANISM: ABNORMAL
PERFORMED ON: ABNORMAL
PLATELET # BLD AUTO: 351 K/UL (ref 135–450)
PMV BLD AUTO: 8.4 FL (ref 5–10.5)
POTASSIUM SERPL-SCNC: 3.7 MMOL/L (ref 3.5–5.1)
PROT SERPL-MCNC: 7.2 G/DL (ref 6.4–8.2)
RBC # BLD AUTO: 3.32 M/UL (ref 4–5.2)
REPORT: NORMAL
RESP PATH DNA+RNA PNL L RESP NAA+NON-PRB: ABNORMAL
RESP PATH DNA+RNA PNL L RESP NAA+NON-PRB: ABNORMAL
SODIUM SERPL-SCNC: 133 MMOL/L (ref 136–145)
VANCOMYCIN SERPL-MCNC: 14.2 UG/ML
WBC # BLD AUTO: 10.7 K/UL (ref 4–11)

## 2024-01-17 PROCEDURE — 80053 COMPREHEN METABOLIC PANEL: CPT

## 2024-01-17 PROCEDURE — 2500000003 HC RX 250 WO HCPCS

## 2024-01-17 PROCEDURE — 2060000000 HC ICU INTERMEDIATE R&B

## 2024-01-17 PROCEDURE — 85025 COMPLETE CBC W/AUTO DIFF WBC: CPT

## 2024-01-17 PROCEDURE — 7100000010 HC PHASE II RECOVERY - FIRST 15 MIN: Performed by: INTERNAL MEDICINE

## 2024-01-17 PROCEDURE — 88305 TISSUE EXAM BY PATHOLOGIST: CPT

## 2024-01-17 PROCEDURE — 6360000002 HC RX W HCPCS

## 2024-01-17 PROCEDURE — 3700000000 HC ANESTHESIA ATTENDED CARE: Performed by: INTERNAL MEDICINE

## 2024-01-17 PROCEDURE — 2709999900 HC NON-CHARGEABLE SUPPLY: Performed by: INTERNAL MEDICINE

## 2024-01-17 PROCEDURE — 80202 ASSAY OF VANCOMYCIN: CPT

## 2024-01-17 PROCEDURE — 85018 HEMOGLOBIN: CPT

## 2024-01-17 PROCEDURE — 3700000001 HC ADD 15 MINUTES (ANESTHESIA): Performed by: INTERNAL MEDICINE

## 2024-01-17 PROCEDURE — C9113 INJ PANTOPRAZOLE SODIUM, VIA: HCPCS

## 2024-01-17 PROCEDURE — 2580000003 HC RX 258

## 2024-01-17 PROCEDURE — 2580000003 HC RX 258: Performed by: INTERNAL MEDICINE

## 2024-01-17 PROCEDURE — 2700000000 HC OXYGEN THERAPY PER DAY

## 2024-01-17 PROCEDURE — 2500000003 HC RX 250 WO HCPCS: Performed by: NURSE ANESTHETIST, CERTIFIED REGISTERED

## 2024-01-17 PROCEDURE — 94640 AIRWAY INHALATION TREATMENT: CPT

## 2024-01-17 PROCEDURE — 6370000000 HC RX 637 (ALT 250 FOR IP): Performed by: INTERNAL MEDICINE

## 2024-01-17 PROCEDURE — 93306 TTE W/DOPPLER COMPLETE: CPT

## 2024-01-17 PROCEDURE — 88342 IMHCHEM/IMCYTCHM 1ST ANTB: CPT

## 2024-01-17 PROCEDURE — 0DB58ZX EXCISION OF ESOPHAGUS, VIA NATURAL OR ARTIFICIAL OPENING ENDOSCOPIC, DIAGNOSTIC: ICD-10-PCS | Performed by: INTERNAL MEDICINE

## 2024-01-17 PROCEDURE — 3609012400 HC EGD TRANSORAL BIOPSY SINGLE/MULTIPLE: Performed by: INTERNAL MEDICINE

## 2024-01-17 PROCEDURE — 85014 HEMATOCRIT: CPT

## 2024-01-17 PROCEDURE — 6360000002 HC RX W HCPCS: Performed by: NURSE ANESTHETIST, CERTIFIED REGISTERED

## 2024-01-17 PROCEDURE — 94761 N-INVAS EAR/PLS OXIMETRY MLT: CPT

## 2024-01-17 PROCEDURE — 94669 MECHANICAL CHEST WALL OSCILL: CPT

## 2024-01-17 PROCEDURE — 6370000000 HC RX 637 (ALT 250 FOR IP)

## 2024-01-17 PROCEDURE — 36415 COLL VENOUS BLD VENIPUNCTURE: CPT

## 2024-01-17 PROCEDURE — 6360000002 HC RX W HCPCS: Performed by: INTERNAL MEDICINE

## 2024-01-17 PROCEDURE — 99233 SBSQ HOSP IP/OBS HIGH 50: CPT | Performed by: INTERNAL MEDICINE

## 2024-01-17 PROCEDURE — 7100000011 HC PHASE II RECOVERY - ADDTL 15 MIN: Performed by: INTERNAL MEDICINE

## 2024-01-17 RX ORDER — TIZANIDINE 4 MG/1
4 TABLET ORAL 2 TIMES DAILY PRN
COMMUNITY

## 2024-01-17 RX ORDER — IPRATROPIUM BROMIDE AND ALBUTEROL SULFATE 2.5; .5 MG/3ML; MG/3ML
1 SOLUTION RESPIRATORY (INHALATION)
Status: DISCONTINUED | OUTPATIENT
Start: 2024-01-18 | End: 2024-01-20

## 2024-01-17 RX ORDER — FUROSEMIDE 20 MG/1
20 TABLET ORAL DAILY
COMMUNITY

## 2024-01-17 RX ORDER — MELOXICAM 7.5 MG/1
7.5 TABLET ORAL DAILY
Status: ON HOLD | COMMUNITY
End: 2024-01-21 | Stop reason: HOSPADM

## 2024-01-17 RX ORDER — LIDOCAINE HYDROCHLORIDE 20 MG/ML
INJECTION, SOLUTION INFILTRATION; PERINEURAL PRN
Status: DISCONTINUED | OUTPATIENT
Start: 2024-01-17 | End: 2024-01-17 | Stop reason: SDUPTHER

## 2024-01-17 RX ORDER — PROPOFOL 10 MG/ML
INJECTION, EMULSION INTRAVENOUS PRN
Status: DISCONTINUED | OUTPATIENT
Start: 2024-01-17 | End: 2024-01-17 | Stop reason: SDUPTHER

## 2024-01-17 RX ORDER — METOPROLOL SUCCINATE 25 MG/1
25 TABLET, EXTENDED RELEASE ORAL 2 TIMES DAILY
Status: DISCONTINUED | OUTPATIENT
Start: 2024-01-17 | End: 2024-01-22

## 2024-01-17 RX ADMIN — VANCOMYCIN HYDROCHLORIDE 1000 MG: 1 INJECTION, POWDER, LYOPHILIZED, FOR SOLUTION INTRAVENOUS at 09:21

## 2024-01-17 RX ADMIN — PANTOPRAZOLE SODIUM 40 MG: 40 INJECTION, POWDER, FOR SOLUTION INTRAVENOUS at 09:16

## 2024-01-17 RX ADMIN — PANTOPRAZOLE SODIUM 40 MG: 40 INJECTION, POWDER, FOR SOLUTION INTRAVENOUS at 22:02

## 2024-01-17 RX ADMIN — SERTRALINE HYDROCHLORIDE 50 MG: 50 TABLET ORAL at 14:13

## 2024-01-17 RX ADMIN — DILTIAZEM HYDROCHLORIDE 7.5 MG/HR: 5 INJECTION, SOLUTION INTRAVENOUS at 14:28

## 2024-01-17 RX ADMIN — LIDOCAINE HYDROCHLORIDE 60 MG: 20 INJECTION, SOLUTION INFILTRATION; PERINEURAL at 12:15

## 2024-01-17 RX ADMIN — GUAIFENESIN 600 MG: 600 TABLET, EXTENDED RELEASE ORAL at 14:13

## 2024-01-17 RX ADMIN — SODIUM CHLORIDE, PRESERVATIVE FREE 10 ML: 5 INJECTION INTRAVENOUS at 22:17

## 2024-01-17 RX ADMIN — METOPROLOL SUCCINATE 25 MG: 25 TABLET, EXTENDED RELEASE ORAL at 22:02

## 2024-01-17 RX ADMIN — LACTULOSE 20 G: 10 SOLUTION ORAL at 22:02

## 2024-01-17 RX ADMIN — SODIUM CHLORIDE, PRESERVATIVE FREE 10 ML: 5 INJECTION INTRAVENOUS at 10:16

## 2024-01-17 RX ADMIN — CEFEPIME 1000 MG: 1 INJECTION, POWDER, FOR SOLUTION INTRAMUSCULAR; INTRAVENOUS at 14:12

## 2024-01-17 RX ADMIN — ATORVASTATIN CALCIUM 10 MG: 10 TABLET, FILM COATED ORAL at 14:13

## 2024-01-17 RX ADMIN — METOPROLOL SUCCINATE 25 MG: 25 TABLET, EXTENDED RELEASE ORAL at 14:13

## 2024-01-17 RX ADMIN — DOCUSATE SODIUM 50MG AND SENNOSIDES 8.6MG 2 TABLET: 8.6; 5 TABLET, FILM COATED ORAL at 22:02

## 2024-01-17 RX ADMIN — ONDANSETRON 4 MG: 4 TABLET, ORALLY DISINTEGRATING ORAL at 19:03

## 2024-01-17 RX ADMIN — SODIUM CHLORIDE: 9 INJECTION, SOLUTION INTRAVENOUS at 17:21

## 2024-01-17 RX ADMIN — IPRATROPIUM BROMIDE AND ALBUTEROL SULFATE 1 DOSE: 2.5; .5 SOLUTION RESPIRATORY (INHALATION) at 08:22

## 2024-01-17 RX ADMIN — SUCRALFATE 1 G: 1 TABLET ORAL at 22:02

## 2024-01-17 RX ADMIN — GUAIFENESIN 600 MG: 600 TABLET, EXTENDED RELEASE ORAL at 22:02

## 2024-01-17 RX ADMIN — LACTULOSE 20 G: 10 SOLUTION ORAL at 14:19

## 2024-01-17 RX ADMIN — SUCRALFATE 1 G: 1 TABLET ORAL at 17:16

## 2024-01-17 RX ADMIN — CEFEPIME 1000 MG: 1 INJECTION, POWDER, FOR SOLUTION INTRAMUSCULAR; INTRAVENOUS at 01:25

## 2024-01-17 RX ADMIN — APIXABAN 5 MG: 5 TABLET, FILM COATED ORAL at 22:02

## 2024-01-17 RX ADMIN — SODIUM CHLORIDE: 9 INJECTION, SOLUTION INTRAVENOUS at 04:24

## 2024-01-17 RX ADMIN — SUCRALFATE 1 G: 1 TABLET ORAL at 14:13

## 2024-01-17 RX ADMIN — IPRATROPIUM BROMIDE AND ALBUTEROL SULFATE 1 DOSE: 2.5; .5 SOLUTION RESPIRATORY (INHALATION) at 15:21

## 2024-01-17 RX ADMIN — PREDNISONE 40 MG: 20 TABLET ORAL at 14:13

## 2024-01-17 RX ADMIN — IPRATROPIUM BROMIDE AND ALBUTEROL SULFATE 1 DOSE: 2.5; .5 SOLUTION RESPIRATORY (INHALATION) at 20:41

## 2024-01-17 RX ADMIN — PROPOFOL 200 MG: 10 INJECTION, EMULSION INTRAVENOUS at 12:15

## 2024-01-17 RX ADMIN — DOCUSATE SODIUM 50MG AND SENNOSIDES 8.6MG 2 TABLET: 8.6; 5 TABLET, FILM COATED ORAL at 14:13

## 2024-01-17 RX ADMIN — DILTIAZEM HYDROCHLORIDE 10 MG/HR: 5 INJECTION, SOLUTION INTRAVENOUS at 01:27

## 2024-01-17 ASSESSMENT — PAIN SCALES - GENERAL: PAINLEVEL_OUTOF10: 0

## 2024-01-17 ASSESSMENT — ENCOUNTER SYMPTOMS: SHORTNESS OF BREATH: 1

## 2024-01-17 ASSESSMENT — PAIN - FUNCTIONAL ASSESSMENT
PAIN_FUNCTIONAL_ASSESSMENT: NONE - DENIES PAIN
PAIN_FUNCTIONAL_ASSESSMENT: NONE - DENIES PAIN

## 2024-01-17 NOTE — FLOWSHEET NOTE
01/17/24 1326   Vital Signs   Temp 98.1 °F (36.7 °C)   Temp Source Oral   Pulse 76   Heart Rate Source Monitor   Respirations 17   BP 97/62   MAP (Calculated) 74   BP Location Left upper arm   BP Method Automatic   Patient Position Semi fowlers   Pain Assessment   Pain Assessment None - Denies Pain   Oxygen Therapy   SpO2 95 %   O2 Device Nasal cannula   O2 Flow Rate (L/min) 3 L/min     Patient returned from [facility], patient awake and alert lying in bed. Medications given see MAR. Patient has no further concerns at this time. Call light within reach.
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
## 2024-01-17 NOTE — FLOWSHEET NOTE
01/17/24 0900   Vital Signs   Temp 98.5 °F (36.9 °C)   Temp Source Oral   Pulse 73   Heart Rate Source Monitor   Respirations 19   /64   MAP (Calculated) 87   BP Location Left upper arm   BP Method Automatic   Patient Position Semi fowlers   Pain Assessment   Pain Assessment None - Denies Pain   Oxygen Therapy   SpO2 96 %   O2 Device Nasal cannula   O2 Flow Rate (L/min) 3 L/min     Shift assessment completed, VS obtained and WNL, patient lying in bed alert and awake.   Patient A&O, Midline in R arm infusing with no issues. Patient with no further concerns or complaints at this time. Call light within reach.

## 2024-01-17 NOTE — ANESTHESIA POSTPROCEDURE EVALUATION
Department of Anesthesiology  Postprocedure Note    Patient: Nancy Franz  MRN: [id number]  YOB: 1950  Date of evaluation: 1/17/2024    Procedure Summary       Date: 01/17/24 Room / Location: [address]    Anesthesia Start: 1211 Anesthesia Stop: 1236    Procedure: EGD BIOPSY Diagnosis:       Gastrointestinal hemorrhage, unspecified gastrointestinal hemorrhage type      (Gastrointestinal hemorrhage, unspecified gastrointestinal hemorrhage type [K92.2])    Surgeons: Juan Jose Teresa MD Responsible Provider: Jimbo George MD    Anesthesia Type: MAC ASA Status: 3            Anesthesia Type: No value filed.    Jose Eduardo Phase I: Jose Eduardo Score: 9    Jose Eduardo Phase II: Jose Eduardo Score: 10    Anesthesia Post Evaluation    Patient location during evaluation: PACU  Level of consciousness: awake  Airway patency: patent  Nausea & Vomiting: no nausea  Cardiovascular status: blood pressure returned to baseline  Respiratory status: acceptable  Hydration status: euvolemic  Pain management: adequate    No notable events documented.

## 2024-01-17 NOTE — BRIEF OP NOTE
Brief Postoperative Note      Patient: Nancy Franz  YOB: 1950  MRN: [id number]    Date of Procedure: 1/17/2024    Pre-Op Diagnosis Codes:     * Gastrointestinal hemorrhage, unspecified gastrointestinal hemorrhage type [K92.2]    Post-Op Diagnosis:  severe LA class D esophagitis, hiatal hernia, portal HTN gastropathy, no active bleeding       Procedure(s):  EGD BIOPSY    Surgeon(s):  Juan Jose Teresa MD    Assistant:  * No surgical staff found *    Anesthesia: Monitor Anesthesia Care    Estimated Blood Loss (mL): Minimal    Complications: None    Specimens:   ID Type Source Tests Collected by Time Destination   A :  Tissue Biopsy SURGICAL PATHOLOGY Juan Jose Teresa MD 1/17/2024 1220        Implants:  * No implants in log *      Drains: * No LDAs found *    Findings: severe LA class D esophagitis, hiatal hernia, portal HTN gastropathy, no active bleeding    Recommendation  Continue supportive care  PPI BID x 8wks  Carafate 1gm QID x 1m  Monitor Hgb  Observe for signs of bleeding  Antibiotics for HCAP      Electronically signed by Juan Jose Teresa MD on 1/17/2024 at 12:25 PM

## 2024-01-17 NOTE — OP NOTE
[address] [address]                                OPERATIVE REPORT    PATIENT NAME: ERAN CHIRINOS                      :        1950  MED REC NO:   [id number]                          ROOM:  ACCOUNT NO:   [id number]                           ADMIT DATE: 2024  PROVIDER:     Juan Jose Teresa MD    DATE OF PROCEDURE:  2024    PREPROCEDURE DIAGNOSES:  1.  GI bleed.  2.  Coffee-ground emesis.    PROCEDURE PERFORMED:  EGD with biopsy.    POSTPROCEDURE DIAGNOSES:  1.  Severe LA class D esophagitis.  2.  Hiatal hernia.  3.  Portal hypertensive gastropathy.  4.  No active bleeding.    PROCEDURE INDICATION:  A 73-year-old female with history of diabetes,  hypertension, hyperlipidemia, COPD, CHF, atrial fibrillation, chronic  kidney disease, VILLEGAS, cirrhosis decompensated by varices, COPD  exacerbation and hospital-acquired pneumonia.  She also complained of  coffee-ground emesis.  She was noted to have a decline in hemoglobin.   She has known history of severe esophagitis and ulceration per previous  EGD in 2023.  She is on chronic anticoagulation for atrial  fibrillation with RVR, which is being held due to suspected GI bleed.   She is being treated with broad-spectrum antibiotics for  hospital-acquired pneumonia.  She was initially started on diltiazem and  switched over to digoxin.  Her heart rate did convert into normal sinus.  She also is on baseline oxygen of 3 liters.  Her last colonoscopy was in  .  EGD is being performed to rule out active bleeding lesions.    LABORATORY DATA:  White blood cell count 10.7, hemoglobin 11.1,  hematocrit 32.4, platelets 351.  Sodium 133, potassium 3.7, chloride 96,  bicarb 24, BUN 45, creatinine 1.5, glucose 224.  Liver profile; alk phos  288, AST 21, ALT 10, total protein 7.2, albumin 3.4, total bilirubin  0.5.    CT of chest, abdomen, pelvis:  1.  Bronchitis or

## 2024-01-17 NOTE — CARE COORDINATION
Reviewed chart, met with pt at bedside. PT recs home with assistance PRN. Pt states she lives with 2 daughters and they are able to help her. Has oxygen at home through [employer]. Will continue to monitor.

## 2024-01-17 NOTE — ANESTHESIA PRE PROCEDURE
Department of Anesthesiology  Preprocedure Note       Name:  Nancy Franz   Age:  73 y.o.  :  1950                                          MRN:  [id number]         Date:  2024      Surgeon: Surgeon(s):  Juan Jose Teresa MD    Procedure: Procedure(s):  EGD W/ANES.    Medications prior to admission:   Prior to Admission medications    Medication Sig Start Date End Date Taking? Authorizing Provider   lactulose (CHRONULAC) 10 GM/15ML solution Take 30 mLs by mouth 3 times daily 23   Kay Adams MD   sucralfate (CARAFATE) 1 GM tablet Take 1 tablet by mouth 4 times daily 23   Kay Adams MD   clobetasol (TEMOVATE) 0.05 % external solution Apply topically as needed Apply small amount to scalp only two nights per week for flares for up to 4 weeks    Luke Purcell MD   pantoprazole (PROTONIX) 40 MG tablet Take 1 tablet by mouth in the morning and at bedtime    Luke Purcell MD   sennosides-docusate sodium (SENOKOT-S) 8.6-50 MG tablet Take 2 tablets by mouth in the morning and at bedtime    Luke Purcell MD   acetaminophen (TYLENOL) 325 MG tablet Take 2 tablets by mouth every 8 (eight) hours    Luke Purcell MD   ESTROGENS CONJUGATED VA Place 1 g vaginally three times a week    Luke Purcell MD   polyethylene glycol (MIRALAX) 17 g PACK packet Take 17 g by mouth 2 times daily    Luke Purcell MD   Dulaglutide (TRULICITY) 0.75 MG/0.5ML SOPN Inject 0.75 mg into the skin once a week    Luke Purcell MD   desonide (DESOWEN) 0.05 % cream Apply topically 2 times daily Apply topically 2 times daily.    Luke Purcell MD   ergocalciferol (ERGOCALCIFEROL) 1.25 MG (83771 UT) capsule Take 1 capsule by mouth once a week    Luke Purcell MD   albuterol sulfate HFA (VENTOLIN HFA) 108 (90 Base) MCG/ACT inhaler Inhale 2 puffs into the lungs 4 times daily as needed for Wheezing 23   Malina Burgos PA

## 2024-01-18 PROBLEM — R79.89 ELEVATED TROPONIN: Status: ACTIVE | Noted: 2024-01-18

## 2024-01-18 PROBLEM — I82.4Y2 ACUTE DEEP VEIN THROMBOSIS (DVT) OF PROXIMAL VEIN OF LEFT LOWER EXTREMITY (HCC): Status: ACTIVE | Noted: 2024-01-18

## 2024-01-18 LAB
ANION GAP SERPL CALCULATED.3IONS-SCNC: 10 MMOL/L (ref 3–16)
BASOPHILS # BLD: 0 K/UL (ref 0–0.2)
BASOPHILS NFR BLD: 0.1 %
BUN SERPL-MCNC: 33 MG/DL (ref 7–20)
CALCIUM SERPL-MCNC: 8.7 MG/DL (ref 8.3–10.6)
CHLORIDE SERPL-SCNC: 109 MMOL/L (ref 99–110)
CO2 SERPL-SCNC: 25 MMOL/L (ref 21–32)
CREAT SERPL-MCNC: 0.9 MG/DL (ref 0.6–1.2)
DEPRECATED RDW RBC AUTO: 17.5 % (ref 12.4–15.4)
EOSINOPHIL # BLD: 0 K/UL (ref 0–0.6)
EOSINOPHIL NFR BLD: 0 %
GFR SERPLBLD CREATININE-BSD FMLA CKD-EPI: >60 ML/MIN/{1.73_M2}
GLUCOSE SERPL-MCNC: 158 MG/DL (ref 70–99)
HCT VFR BLD AUTO: 29.1 % (ref 36–48)
HCT VFR BLD AUTO: 29.9 % (ref 36–48)
HCT VFR BLD AUTO: 31.3 % (ref 36–48)
HGB BLD-MCNC: 10.4 G/DL (ref 12–16)
HGB BLD-MCNC: 9.7 G/DL (ref 12–16)
HGB BLD-MCNC: 9.9 G/DL (ref 12–16)
LYMPHOCYTES # BLD: 1.6 K/UL (ref 1–5.1)
LYMPHOCYTES NFR BLD: 14.2 %
MCH RBC QN AUTO: 29.6 PG (ref 26–34)
MCHC RBC AUTO-ENTMCNC: 32.3 G/DL (ref 31–36)
MCV RBC AUTO: 91.6 FL (ref 80–100)
MONOCYTES # BLD: 1.3 K/UL (ref 0–1.3)
MONOCYTES NFR BLD: 11.3 %
NEUTROPHILS # BLD: 8.4 K/UL (ref 1.7–7.7)
NEUTROPHILS NFR BLD: 74.4 %
PLATELET # BLD AUTO: 300 K/UL (ref 135–450)
PMV BLD AUTO: 8 FL (ref 5–10.5)
POTASSIUM SERPL-SCNC: 4.1 MMOL/L (ref 3.5–5.1)
RBC # BLD AUTO: 3.27 M/UL (ref 4–5.2)
SODIUM SERPL-SCNC: 144 MMOL/L (ref 136–145)
VANCOMYCIN SERPL-MCNC: 14.8 UG/ML
WBC # BLD AUTO: 11.2 K/UL (ref 4–11)

## 2024-01-18 PROCEDURE — 2580000003 HC RX 258

## 2024-01-18 PROCEDURE — 94669 MECHANICAL CHEST WALL OSCILL: CPT

## 2024-01-18 PROCEDURE — 6370000000 HC RX 637 (ALT 250 FOR IP): Performed by: INTERNAL MEDICINE

## 2024-01-18 PROCEDURE — 6360000002 HC RX W HCPCS

## 2024-01-18 PROCEDURE — 94761 N-INVAS EAR/PLS OXIMETRY MLT: CPT

## 2024-01-18 PROCEDURE — 80048 BASIC METABOLIC PNL TOTAL CA: CPT

## 2024-01-18 PROCEDURE — 80202 ASSAY OF VANCOMYCIN: CPT

## 2024-01-18 PROCEDURE — 94010 BREATHING CAPACITY TEST: CPT

## 2024-01-18 PROCEDURE — 85025 COMPLETE CBC W/AUTO DIFF WBC: CPT

## 2024-01-18 PROCEDURE — 85018 HEMOGLOBIN: CPT

## 2024-01-18 PROCEDURE — 99232 SBSQ HOSP IP/OBS MODERATE 35: CPT | Performed by: INTERNAL MEDICINE

## 2024-01-18 PROCEDURE — 36415 COLL VENOUS BLD VENIPUNCTURE: CPT

## 2024-01-18 PROCEDURE — 6370000000 HC RX 637 (ALT 250 FOR IP)

## 2024-01-18 PROCEDURE — 94640 AIRWAY INHALATION TREATMENT: CPT

## 2024-01-18 PROCEDURE — 85014 HEMATOCRIT: CPT

## 2024-01-18 PROCEDURE — 2060000000 HC ICU INTERMEDIATE R&B

## 2024-01-18 PROCEDURE — 2700000000 HC OXYGEN THERAPY PER DAY

## 2024-01-18 PROCEDURE — 99233 SBSQ HOSP IP/OBS HIGH 50: CPT | Performed by: INTERNAL MEDICINE

## 2024-01-18 RX ORDER — PANTOPRAZOLE SODIUM 40 MG/1
40 TABLET, DELAYED RELEASE ORAL
Status: DISCONTINUED | OUTPATIENT
Start: 2024-01-18 | End: 2024-01-20

## 2024-01-18 RX ADMIN — DOCUSATE SODIUM 50MG AND SENNOSIDES 8.6MG 2 TABLET: 8.6; 5 TABLET, FILM COATED ORAL at 21:25

## 2024-01-18 RX ADMIN — SUCRALFATE 1 G: 1 TABLET ORAL at 14:31

## 2024-01-18 RX ADMIN — PREDNISONE 40 MG: 20 TABLET ORAL at 08:16

## 2024-01-18 RX ADMIN — SUCRALFATE 1 G: 1 TABLET ORAL at 08:16

## 2024-01-18 RX ADMIN — IPRATROPIUM BROMIDE AND ALBUTEROL SULFATE 1 DOSE: 2.5; .5 SOLUTION RESPIRATORY (INHALATION) at 19:32

## 2024-01-18 RX ADMIN — CEFEPIME 2000 MG: 2 INJECTION, POWDER, FOR SOLUTION INTRAVENOUS at 01:44

## 2024-01-18 RX ADMIN — IPRATROPIUM BROMIDE AND ALBUTEROL SULFATE 1 DOSE: 2.5; .5 SOLUTION RESPIRATORY (INHALATION) at 08:15

## 2024-01-18 RX ADMIN — SODIUM CHLORIDE, PRESERVATIVE FREE 10 ML: 5 INJECTION INTRAVENOUS at 08:16

## 2024-01-18 RX ADMIN — SERTRALINE HYDROCHLORIDE 50 MG: 50 TABLET ORAL at 08:16

## 2024-01-18 RX ADMIN — GUAIFENESIN 600 MG: 600 TABLET, EXTENDED RELEASE ORAL at 08:16

## 2024-01-18 RX ADMIN — SUCRALFATE 1 G: 1 TABLET ORAL at 17:04

## 2024-01-18 RX ADMIN — LACTULOSE 20 G: 10 SOLUTION ORAL at 21:25

## 2024-01-18 RX ADMIN — LEVOTHYROXINE SODIUM 88 MCG: 88 TABLET ORAL at 05:19

## 2024-01-18 RX ADMIN — GUAIFENESIN 600 MG: 600 TABLET, EXTENDED RELEASE ORAL at 21:25

## 2024-01-18 RX ADMIN — SUCRALFATE 1 G: 1 TABLET ORAL at 21:25

## 2024-01-18 RX ADMIN — SODIUM CHLORIDE: 9 INJECTION, SOLUTION INTRAVENOUS at 06:17

## 2024-01-18 RX ADMIN — ACETAMINOPHEN 650 MG: 325 TABLET ORAL at 05:27

## 2024-01-18 RX ADMIN — METOPROLOL SUCCINATE 25 MG: 25 TABLET, EXTENDED RELEASE ORAL at 08:16

## 2024-01-18 RX ADMIN — APIXABAN 5 MG: 5 TABLET, FILM COATED ORAL at 08:16

## 2024-01-18 RX ADMIN — IPRATROPIUM BROMIDE AND ALBUTEROL SULFATE 1 DOSE: 2.5; .5 SOLUTION RESPIRATORY (INHALATION) at 11:44

## 2024-01-18 RX ADMIN — METOPROLOL SUCCINATE 25 MG: 25 TABLET, EXTENDED RELEASE ORAL at 21:25

## 2024-01-18 RX ADMIN — LEVETIRACETAM 500 MG: 500 TABLET, FILM COATED ORAL at 21:25

## 2024-01-18 RX ADMIN — ATORVASTATIN CALCIUM 10 MG: 10 TABLET, FILM COATED ORAL at 08:16

## 2024-01-18 RX ADMIN — SODIUM CHLORIDE, PRESERVATIVE FREE 10 ML: 5 INJECTION INTRAVENOUS at 21:25

## 2024-01-18 RX ADMIN — PANTOPRAZOLE SODIUM 40 MG: 40 TABLET, DELAYED RELEASE ORAL at 14:31

## 2024-01-18 RX ADMIN — APIXABAN 5 MG: 5 TABLET, FILM COATED ORAL at 21:25

## 2024-01-18 RX ADMIN — CEFEPIME 2000 MG: 2 INJECTION, POWDER, FOR SOLUTION INTRAVENOUS at 14:30

## 2024-01-18 RX ADMIN — IPRATROPIUM BROMIDE AND ALBUTEROL SULFATE 1 DOSE: 2.5; .5 SOLUTION RESPIRATORY (INHALATION) at 15:35

## 2024-01-18 RX ADMIN — VANCOMYCIN HYDROCHLORIDE 1250 MG: 10 INJECTION, POWDER, LYOPHILIZED, FOR SOLUTION INTRAVENOUS at 10:54

## 2024-01-18 ASSESSMENT — PAIN DESCRIPTION - LOCATION: LOCATION: ABDOMEN

## 2024-01-18 ASSESSMENT — COPD QUESTIONNAIRES
QUESTION1_COUGHFREQUENCY: 4
QUESTION6_LEAVINGHOUSE: 3
QUESTION8_ENERGYLEVEL: 4
QUESTION7_SLEEPQUALITY: 5
QUESTION4_WALKINCLINE: 3
QUESTION2_CHESTPHLEGM: 3
CAT_TOTALSCORE: 27
QUESTION5_HOMEACTIVITIES: 3
QUESTION3_CHESTTIGHTNESS: 2

## 2024-01-18 ASSESSMENT — PAIN SCALES - GENERAL: PAINLEVEL_OUTOF10: 7

## 2024-01-18 ASSESSMENT — PAIN DESCRIPTION - ORIENTATION: ORIENTATION: MID

## 2024-01-18 ASSESSMENT — PULMONARY FUNCTION TESTS: PIF_VALUE: 120

## 2024-01-18 ASSESSMENT — PAIN DESCRIPTION - DESCRIPTORS: DESCRIPTORS: SHARP

## 2024-01-18 ASSESSMENT — PAIN - FUNCTIONAL ASSESSMENT: PAIN_FUNCTIONAL_ASSESSMENT: ACTIVITIES ARE NOT PREVENTED

## 2024-01-18 NOTE — FLOWSHEET NOTE
01/17/24 2033   Vital Signs   Temp 98.1 °F (36.7 °C)   Temp Source Oral   Pulse 76   Heart Rate Source Monitor   Respirations 18   BP (!) 148/62   MAP (Calculated) 91   BP Location Left upper arm   BP Method Automatic   Patient Position Semi fowlers   Pain Assessment   Pain Assessment None - Denies Pain   Oxygen Therapy   SpO2 96 %   O2 Device Nasal cannula   O2 Flow Rate (L/min) 3 L/min     Shift assessment completed- see flow sheet  Nightly meds given per order- see mar  Call light and bedside table within reach  Care continues

## 2024-01-18 NOTE — CARE COORDINATION
Reviewed chart, discussed pt in rounds, met with pt bedside. Pt now on room air, plan for DC tomorrow. Denies any CM needs at this time. Will continue to monitor.

## 2024-01-18 NOTE — FLOWSHEET NOTE
01/18/24 1112   Vital Signs   Temp 97.9 °F (36.6 °C)   Temp Source Oral   Pulse 72   Heart Rate Source Monitor   /60   MAP (Calculated) 83   BP Location Left upper arm   BP Method Automatic   Patient Position Semi fowlers   Pain Assessment   Pain Assessment None - Denies Pain   Opioid-Induced Sedation   POSS Score 1   Oxygen Therapy   SpO2 94 %   O2 Device None (Room air)       Shift assessment completed-see flow sheet. Patient in bed awake,alert and oriented x4.  Vitals obtained.Morning medications given per order  Patient denies any further needs at this time,call light within reach.

## 2024-01-19 ENCOUNTER — APPOINTMENT (OUTPATIENT)
Dept: CT IMAGING | Age: 74
DRG: 871 | End: 2024-01-19
Payer: MEDICARE

## 2024-01-19 LAB
AMMONIA PLAS-SCNC: 28 UMOL/L (ref 11–51)
ANION GAP SERPL CALCULATED.3IONS-SCNC: 8 MMOL/L (ref 3–16)
BACTERIA SPEC RESP CULT: ABNORMAL
BACTERIA SPEC RESP CULT: ABNORMAL
BASE EXCESS BLDV CALC-SCNC: -2.8 MMOL/L (ref -3–3)
BUN SERPL-MCNC: 28 MG/DL (ref 7–20)
CALCIUM SERPL-MCNC: 8.5 MG/DL (ref 8.3–10.6)
CHLORIDE SERPL-SCNC: 106 MMOL/L (ref 99–110)
CO2 BLDV-SCNC: 23 MMOL/L
CO2 SERPL-SCNC: 23 MMOL/L (ref 21–32)
COHGB MFR BLDV: 4 % (ref 0–1.5)
CREAT SERPL-MCNC: 0.8 MG/DL (ref 0.6–1.2)
GFR SERPLBLD CREATININE-BSD FMLA CKD-EPI: >60 ML/MIN/{1.73_M2}
GLUCOSE BLD-MCNC: 147 MG/DL (ref 70–99)
GLUCOSE SERPL-MCNC: 151 MG/DL (ref 70–99)
GRAM STN SPEC: ABNORMAL
HCO3 BLDV-SCNC: 21.6 MMOL/L (ref 23–29)
HCT VFR BLD AUTO: 28.9 % (ref 36–48)
HCT VFR BLD AUTO: 28.9 % (ref 36–48)
HCT VFR BLD AUTO: 31.1 % (ref 36–48)
HGB BLD-MCNC: 9.4 G/DL (ref 12–16)
HGB BLD-MCNC: 9.5 G/DL (ref 12–16)
HGB BLD-MCNC: 9.7 G/DL (ref 12–16)
LEVETIRACETAM SERPL-MCNC: 7.9 UG/ML (ref 6–46)
MAGNESIUM SERPL-MCNC: 1.4 MG/DL (ref 1.8–2.4)
MEDICATION DOSE-MCNC: NORMAL
METHGB MFR BLDV: 0.3 %
O2 CT VFR BLDV CALC: 12 VOL %
O2 THERAPY: ABNORMAL
ORGANISM: ABNORMAL
PCO2 BLDV: 36.3 MMHG (ref 40–50)
PERFORMED ON: ABNORMAL
PH BLDV: 7.39 [PH] (ref 7.35–7.45)
PO2 BLDV: 50.2 MMHG (ref 25–40)
POTASSIUM SERPL-SCNC: 3.4 MMOL/L (ref 3.5–5.1)
SAO2 % BLDV: 86 %
SODIUM SERPL-SCNC: 137 MMOL/L (ref 136–145)

## 2024-01-19 PROCEDURE — 6370000000 HC RX 637 (ALT 250 FOR IP)

## 2024-01-19 PROCEDURE — 6370000000 HC RX 637 (ALT 250 FOR IP): Performed by: INTERNAL MEDICINE

## 2024-01-19 PROCEDURE — 82140 ASSAY OF AMMONIA: CPT

## 2024-01-19 PROCEDURE — 2580000003 HC RX 258

## 2024-01-19 PROCEDURE — 80177 DRUG SCRN QUAN LEVETIRACETAM: CPT

## 2024-01-19 PROCEDURE — 85018 HEMOGLOBIN: CPT

## 2024-01-19 PROCEDURE — 99233 SBSQ HOSP IP/OBS HIGH 50: CPT | Performed by: INTERNAL MEDICINE

## 2024-01-19 PROCEDURE — 2060000000 HC ICU INTERMEDIATE R&B

## 2024-01-19 PROCEDURE — 83735 ASSAY OF MAGNESIUM: CPT

## 2024-01-19 PROCEDURE — 94761 N-INVAS EAR/PLS OXIMETRY MLT: CPT

## 2024-01-19 PROCEDURE — 36415 COLL VENOUS BLD VENIPUNCTURE: CPT

## 2024-01-19 PROCEDURE — 2580000003 HC RX 258: Performed by: INTERNAL MEDICINE

## 2024-01-19 PROCEDURE — 85014 HEMATOCRIT: CPT

## 2024-01-19 PROCEDURE — 94669 MECHANICAL CHEST WALL OSCILL: CPT

## 2024-01-19 PROCEDURE — 6360000002 HC RX W HCPCS: Performed by: INTERNAL MEDICINE

## 2024-01-19 PROCEDURE — 2700000000 HC OXYGEN THERAPY PER DAY

## 2024-01-19 PROCEDURE — 94640 AIRWAY INHALATION TREATMENT: CPT

## 2024-01-19 PROCEDURE — 6360000002 HC RX W HCPCS

## 2024-01-19 PROCEDURE — 80048 BASIC METABOLIC PNL TOTAL CA: CPT

## 2024-01-19 PROCEDURE — 70450 CT HEAD/BRAIN W/O DYE: CPT

## 2024-01-19 PROCEDURE — 82803 BLOOD GASES ANY COMBINATION: CPT

## 2024-01-19 RX ORDER — MAGNESIUM SULFATE IN WATER 40 MG/ML
2000 INJECTION, SOLUTION INTRAVENOUS ONCE
Status: COMPLETED | OUTPATIENT
Start: 2024-01-19 | End: 2024-01-19

## 2024-01-19 RX ORDER — FOLIC ACID 1 MG/1
1 TABLET ORAL DAILY
Status: DISCONTINUED | OUTPATIENT
Start: 2024-01-19 | End: 2024-01-24 | Stop reason: HOSPADM

## 2024-01-19 RX ORDER — POTASSIUM CHLORIDE 7.45 MG/ML
10 INJECTION INTRAVENOUS
Status: COMPLETED | OUTPATIENT
Start: 2024-01-19 | End: 2024-01-19

## 2024-01-19 RX ADMIN — METOPROLOL SUCCINATE 25 MG: 25 TABLET, EXTENDED RELEASE ORAL at 21:36

## 2024-01-19 RX ADMIN — IPRATROPIUM BROMIDE AND ALBUTEROL SULFATE 1 DOSE: 2.5; .5 SOLUTION RESPIRATORY (INHALATION) at 07:37

## 2024-01-19 RX ADMIN — POTASSIUM CHLORIDE 10 MEQ: 7.46 INJECTION, SOLUTION INTRAVENOUS at 11:08

## 2024-01-19 RX ADMIN — APIXABAN 5 MG: 5 TABLET, FILM COATED ORAL at 21:37

## 2024-01-19 RX ADMIN — PIPERACILLIN AND TAZOBACTAM 4500 MG: 4; .5 INJECTION, POWDER, FOR SOLUTION INTRAVENOUS; PARENTERAL at 15:20

## 2024-01-19 RX ADMIN — LACTULOSE 20 G: 10 SOLUTION ORAL at 15:21

## 2024-01-19 RX ADMIN — SUCRALFATE 1 G: 1 TABLET ORAL at 21:36

## 2024-01-19 RX ADMIN — IPRATROPIUM BROMIDE AND ALBUTEROL SULFATE 1 DOSE: 2.5; .5 SOLUTION RESPIRATORY (INHALATION) at 15:29

## 2024-01-19 RX ADMIN — PIPERACILLIN AND TAZOBACTAM 3375 MG: 3; .375 INJECTION, POWDER, FOR SOLUTION INTRAVENOUS at 21:35

## 2024-01-19 RX ADMIN — LEVOTHYROXINE SODIUM 88 MCG: 88 TABLET ORAL at 05:21

## 2024-01-19 RX ADMIN — RIFAXIMIN 550 MG: 550 TABLET ORAL at 21:36

## 2024-01-19 RX ADMIN — IPRATROPIUM BROMIDE AND ALBUTEROL SULFATE 1 DOSE: 2.5; .5 SOLUTION RESPIRATORY (INHALATION) at 20:47

## 2024-01-19 RX ADMIN — SODIUM CHLORIDE, PRESERVATIVE FREE 10 ML: 5 INJECTION INTRAVENOUS at 21:37

## 2024-01-19 RX ADMIN — POTASSIUM CHLORIDE 10 MEQ: 7.46 INJECTION, SOLUTION INTRAVENOUS at 09:14

## 2024-01-19 RX ADMIN — IPRATROPIUM BROMIDE AND ALBUTEROL SULFATE 1 DOSE: 2.5; .5 SOLUTION RESPIRATORY (INHALATION) at 11:45

## 2024-01-19 RX ADMIN — LEVETIRACETAM 500 MG: 500 TABLET, FILM COATED ORAL at 21:36

## 2024-01-19 RX ADMIN — LACTULOSE 20 G: 10 SOLUTION ORAL at 21:36

## 2024-01-19 RX ADMIN — ALTEPLASE 1 MG: 2.2 INJECTION, POWDER, LYOPHILIZED, FOR SOLUTION INTRAVENOUS at 15:27

## 2024-01-19 RX ADMIN — PANTOPRAZOLE SODIUM 40 MG: 40 TABLET, DELAYED RELEASE ORAL at 05:21

## 2024-01-19 RX ADMIN — MAGNESIUM SULFATE HEPTAHYDRATE 2000 MG: 40 INJECTION, SOLUTION INTRAVENOUS at 09:13

## 2024-01-19 RX ADMIN — SODIUM CHLORIDE: 9 INJECTION, SOLUTION INTRAVENOUS at 21:33

## 2024-01-19 RX ADMIN — SODIUM CHLORIDE: 9 INJECTION, SOLUTION INTRAVENOUS at 00:49

## 2024-01-19 RX ADMIN — CEFEPIME 2000 MG: 2 INJECTION, POWDER, FOR SOLUTION INTRAVENOUS at 00:50

## 2024-01-19 ASSESSMENT — PAIN SCALES - GENERAL
PAINLEVEL_OUTOF10: 0

## 2024-01-19 NOTE — FLOWSHEET NOTE
01/18/24 2115   Vital Signs   Temp 98.3 °F (36.8 °C)   Temp Source Oral   Pulse 75   Heart Rate Source Monitor   Respirations 16   /76   MAP (Calculated) 97   BP Location Left upper arm   BP Method Automatic   Patient Position Semi fowlers   Oxygen Therapy   SpO2 93 %   O2 Device Nasal cannula   O2 Flow Rate (L/min) 3 L/min     PM Assessment completed. Scheduled medications given per MAR, On 3L of oxygen, A&O X4, Vital Signs completed and Charted, Patient denies any further needs at this time. Call light within reach, Reminded patient to call RN if she needs anything.

## 2024-01-19 NOTE — FLOWSHEET NOTE
01/19/24 0800   Vital Signs   Temp 98 °F (36.7 °C)   Temp Source Oral   Pulse 77   Heart Rate Source Monitor   Respirations 17   BP (!) 151/82   MAP (Calculated) 105   BP Location Left upper arm   Patient Position Semi fowlers   Pain Assessment   Pain Assessment 0-10   Pain Level 0   Oxygen Therapy   SpO2 95 %   O2 Device None (Room air)   O2 Flow Rate (L/min) 0 L/min       Pt lethargic. Able to tell this RN her name and able to follow commands with delayed response. MD made aware and in to assess Pt. Mentation change from yesterday. Pt just states \"I'm tired:. Unable to tell this RN the year. Keeps stating \"umm\" and closing eyes. Orders for VBG and BMP. Completed at this time. Morning meds held due to swallowing risk. Charge RN made aware. Vitals stable. Blood sugar taken; 147.No s/s of respiratory distress. Stable on room air.    Malina Godoy RN

## 2024-01-19 NOTE — FLOWSHEET NOTE
01/19/24 1230   Vital Signs   Temp 97.7 °F (36.5 °C)   Temp Source Oral   Pulse 76   Heart Rate Source Monitor   Respirations 18   BP (!) 143/86   MAP (Calculated) 105   BP Location Left upper arm   Patient Position Sitting   Pain Assessment   Pain Assessment 0-10   Pain Level 0   Oxygen Therapy   SpO2 95 %   O2 Device None (Room air)   O2 Flow Rate (L/min) 0 L/min     Reassessment completed; tele sitter placed for safety. Pt keeps getting out of bed without calling nurse and unable to tell this RN what she needs. Concern for falls. Unsteady on feet.    Malina Godoy, RN

## 2024-01-20 ENCOUNTER — APPOINTMENT (OUTPATIENT)
Dept: CT IMAGING | Age: 74
DRG: 871 | End: 2024-01-20
Payer: MEDICARE

## 2024-01-20 LAB
ANION GAP SERPL CALCULATED.3IONS-SCNC: 9 MMOL/L (ref 3–16)
APTT BLD: 33.8 SEC (ref 22.7–35.9)
APTT BLD: 39.1 SEC (ref 22.7–35.9)
BACTERIA BLD CULT ORG #2: NORMAL
BACTERIA BLD CULT: NORMAL
BASE EXCESS BLDA CALC-SCNC: -0.1 MMOL/L (ref -3–3)
BASOPHILS # BLD: 0 K/UL (ref 0–0.2)
BASOPHILS NFR BLD: 0.3 %
BUN SERPL-MCNC: 23 MG/DL (ref 7–20)
CALCIUM SERPL-MCNC: 8.1 MG/DL (ref 8.3–10.6)
CHLORIDE SERPL-SCNC: 111 MMOL/L (ref 99–110)
CO2 BLDA-SCNC: 23.9 MMOL/L
CO2 SERPL-SCNC: 24 MMOL/L (ref 21–32)
COHGB MFR BLDA: 0.3 % (ref 0–1.5)
CREAT SERPL-MCNC: 0.8 MG/DL (ref 0.6–1.2)
DEPRECATED RDW RBC AUTO: 17.5 % (ref 12.4–15.4)
EOSINOPHIL # BLD: 0.1 K/UL (ref 0–0.6)
EOSINOPHIL NFR BLD: 1.4 %
GFR SERPLBLD CREATININE-BSD FMLA CKD-EPI: >60 ML/MIN/{1.73_M2}
GLUCOSE SERPL-MCNC: 141 MG/DL (ref 70–99)
HCO3 BLDA-SCNC: 22.9 MMOL/L (ref 21–29)
HCT VFR BLD AUTO: 28.8 % (ref 36–48)
HCT VFR BLD AUTO: 29.2 % (ref 36–48)
HCT VFR BLD AUTO: 29.7 % (ref 36–48)
HGB BLD-MCNC: 9.4 G/DL (ref 12–16)
HGB BLD-MCNC: 9.7 G/DL (ref 12–16)
HGB BLD-MCNC: 9.9 G/DL (ref 12–16)
HGB BLDA-MCNC: 10.3 G/DL (ref 12–16)
LYMPHOCYTES # BLD: 1.4 K/UL (ref 1–5.1)
LYMPHOCYTES NFR BLD: 21.6 %
MCH RBC QN AUTO: 29.9 PG (ref 26–34)
MCHC RBC AUTO-ENTMCNC: 32.2 G/DL (ref 31–36)
MCV RBC AUTO: 92.9 FL (ref 80–100)
METHGB MFR BLDA: 0.3 %
MONOCYTES # BLD: 0.6 K/UL (ref 0–1.3)
MONOCYTES NFR BLD: 8.6 %
NEUTROPHILS # BLD: 4.5 K/UL (ref 1.7–7.7)
NEUTROPHILS NFR BLD: 68.1 %
O2 THERAPY: ABNORMAL
PCO2 BLDA: 31.6 MMHG (ref 35–45)
PH BLDA: 7.48 [PH] (ref 7.35–7.45)
PLATELET # BLD AUTO: 234 K/UL (ref 135–450)
PMV BLD AUTO: 8.1 FL (ref 5–10.5)
PO2 BLDA: 86 MMHG (ref 75–108)
POTASSIUM SERPL-SCNC: 3.7 MMOL/L (ref 3.5–5.1)
RBC # BLD AUTO: 3.14 M/UL (ref 4–5.2)
SAO2 % BLDA: 97.2 %
SODIUM SERPL-SCNC: 144 MMOL/L (ref 136–145)
WBC # BLD AUTO: 6.6 K/UL (ref 4–11)

## 2024-01-20 PROCEDURE — 82803 BLOOD GASES ANY COMBINATION: CPT

## 2024-01-20 PROCEDURE — 6360000002 HC RX W HCPCS: Performed by: INTERNAL MEDICINE

## 2024-01-20 PROCEDURE — 2060000000 HC ICU INTERMEDIATE R&B

## 2024-01-20 PROCEDURE — 94669 MECHANICAL CHEST WALL OSCILL: CPT

## 2024-01-20 PROCEDURE — 94640 AIRWAY INHALATION TREATMENT: CPT

## 2024-01-20 PROCEDURE — 85018 HEMOGLOBIN: CPT

## 2024-01-20 PROCEDURE — 6370000000 HC RX 637 (ALT 250 FOR IP): Performed by: INTERNAL MEDICINE

## 2024-01-20 PROCEDURE — 85014 HEMATOCRIT: CPT

## 2024-01-20 PROCEDURE — 80048 BASIC METABOLIC PNL TOTAL CA: CPT

## 2024-01-20 PROCEDURE — C9113 INJ PANTOPRAZOLE SODIUM, VIA: HCPCS | Performed by: INTERNAL MEDICINE

## 2024-01-20 PROCEDURE — 99233 SBSQ HOSP IP/OBS HIGH 50: CPT | Performed by: INTERNAL MEDICINE

## 2024-01-20 PROCEDURE — 2580000003 HC RX 258: Performed by: INTERNAL MEDICINE

## 2024-01-20 PROCEDURE — 2580000003 HC RX 258

## 2024-01-20 PROCEDURE — 85025 COMPLETE CBC W/AUTO DIFF WBC: CPT

## 2024-01-20 PROCEDURE — 85730 THROMBOPLASTIN TIME PARTIAL: CPT

## 2024-01-20 PROCEDURE — 36600 WITHDRAWAL OF ARTERIAL BLOOD: CPT

## 2024-01-20 PROCEDURE — 70450 CT HEAD/BRAIN W/O DYE: CPT

## 2024-01-20 PROCEDURE — 6370000000 HC RX 637 (ALT 250 FOR IP)

## 2024-01-20 RX ORDER — HEPARIN SODIUM 1000 [USP'U]/ML
30 INJECTION, SOLUTION INTRAVENOUS; SUBCUTANEOUS PRN
Status: DISCONTINUED | OUTPATIENT
Start: 2024-01-20 | End: 2024-01-21 | Stop reason: CLARIF

## 2024-01-20 RX ORDER — PANTOPRAZOLE SODIUM 40 MG/10ML
40 INJECTION, POWDER, LYOPHILIZED, FOR SOLUTION INTRAVENOUS 2 TIMES DAILY
Status: DISCONTINUED | OUTPATIENT
Start: 2024-01-20 | End: 2024-01-21

## 2024-01-20 RX ORDER — HEPARIN SODIUM 1000 [USP'U]/ML
60 INJECTION, SOLUTION INTRAVENOUS; SUBCUTANEOUS ONCE
Status: COMPLETED | OUTPATIENT
Start: 2024-01-20 | End: 2024-01-20

## 2024-01-20 RX ORDER — LEVETIRACETAM 500 MG/5ML
500 INJECTION, SOLUTION, CONCENTRATE INTRAVENOUS EVERY 12 HOURS
Status: DISCONTINUED | OUTPATIENT
Start: 2024-01-20 | End: 2024-01-21

## 2024-01-20 RX ORDER — LEVOFLOXACIN 750 MG/1
750 TABLET, FILM COATED ORAL DAILY
Status: DISCONTINUED | OUTPATIENT
Start: 2024-01-20 | End: 2024-01-20

## 2024-01-20 RX ORDER — IPRATROPIUM BROMIDE AND ALBUTEROL SULFATE 2.5; .5 MG/3ML; MG/3ML
1 SOLUTION RESPIRATORY (INHALATION) 3 TIMES DAILY
Status: DISCONTINUED | OUTPATIENT
Start: 2024-01-20 | End: 2024-01-22

## 2024-01-20 RX ORDER — HEPARIN SODIUM 1000 [USP'U]/ML
60 INJECTION, SOLUTION INTRAVENOUS; SUBCUTANEOUS PRN
Status: DISCONTINUED | OUTPATIENT
Start: 2024-01-20 | End: 2024-01-21 | Stop reason: CLARIF

## 2024-01-20 RX ORDER — HEPARIN SODIUM 10000 [USP'U]/100ML
16 INJECTION, SOLUTION INTRAVENOUS CONTINUOUS
Status: DISCONTINUED | OUTPATIENT
Start: 2024-01-20 | End: 2024-01-21

## 2024-01-20 RX ADMIN — PANTOPRAZOLE SODIUM 40 MG: 40 TABLET, DELAYED RELEASE ORAL at 05:10

## 2024-01-20 RX ADMIN — SODIUM CHLORIDE: 9 INJECTION, SOLUTION INTRAVENOUS at 05:08

## 2024-01-20 RX ADMIN — LACTULOSE 20 G: 10 SOLUTION ORAL at 13:40

## 2024-01-20 RX ADMIN — PANTOPRAZOLE SODIUM 40 MG: 40 INJECTION, POWDER, FOR SOLUTION INTRAVENOUS at 20:21

## 2024-01-20 RX ADMIN — RIFAXIMIN 550 MG: 550 TABLET ORAL at 13:44

## 2024-01-20 RX ADMIN — GUAIFENESIN 600 MG: 600 TABLET, EXTENDED RELEASE ORAL at 20:20

## 2024-01-20 RX ADMIN — LEVETIRACETAM 500 MG: 100 INJECTION, SOLUTION INTRAVENOUS at 11:00

## 2024-01-20 RX ADMIN — LACTULOSE 20 G: 10 SOLUTION ORAL at 20:20

## 2024-01-20 RX ADMIN — LEVOTHYROXINE SODIUM 88 MCG: 88 TABLET ORAL at 05:10

## 2024-01-20 RX ADMIN — SUCRALFATE 1 G: 1 TABLET ORAL at 20:20

## 2024-01-20 RX ADMIN — LEVETIRACETAM 500 MG: 100 INJECTION, SOLUTION INTRAVENOUS at 20:20

## 2024-01-20 RX ADMIN — HEPARIN SODIUM 2600 UNITS: 1000 INJECTION INTRAVENOUS; SUBCUTANEOUS at 12:57

## 2024-01-20 RX ADMIN — PIPERACILLIN AND TAZOBACTAM 3375 MG: 3; .375 INJECTION, POWDER, FOR SOLUTION INTRAVENOUS at 13:40

## 2024-01-20 RX ADMIN — IPRATROPIUM BROMIDE AND ALBUTEROL SULFATE 1 DOSE: 2.5; .5 SOLUTION RESPIRATORY (INHALATION) at 07:44

## 2024-01-20 RX ADMIN — METHYLPREDNISOLONE SODIUM SUCCINATE 40 MG: 40 INJECTION INTRAMUSCULAR; INTRAVENOUS at 11:05

## 2024-01-20 RX ADMIN — HEPARIN SODIUM 2600 UNITS: 1000 INJECTION INTRAVENOUS; SUBCUTANEOUS at 20:10

## 2024-01-20 RX ADMIN — PIPERACILLIN AND TAZOBACTAM 3375 MG: 3; .375 INJECTION, POWDER, FOR SOLUTION INTRAVENOUS at 20:34

## 2024-01-20 RX ADMIN — HEPARIN SODIUM 12 UNITS/KG/HR: 10000 INJECTION, SOLUTION INTRAVENOUS at 12:58

## 2024-01-20 RX ADMIN — IPRATROPIUM BROMIDE AND ALBUTEROL SULFATE 1 DOSE: 2.5; .5 SOLUTION RESPIRATORY (INHALATION) at 14:32

## 2024-01-20 RX ADMIN — SUCRALFATE 1 G: 1 TABLET ORAL at 17:47

## 2024-01-20 RX ADMIN — PIPERACILLIN AND TAZOBACTAM 3375 MG: 3; .375 INJECTION, POWDER, FOR SOLUTION INTRAVENOUS at 05:09

## 2024-01-20 RX ADMIN — PANTOPRAZOLE SODIUM 40 MG: 40 INJECTION, POWDER, FOR SOLUTION INTRAVENOUS at 11:11

## 2024-01-20 RX ADMIN — METOPROLOL SUCCINATE 25 MG: 25 TABLET, EXTENDED RELEASE ORAL at 20:20

## 2024-01-20 RX ADMIN — RIFAXIMIN 550 MG: 550 TABLET ORAL at 20:20

## 2024-01-20 ASSESSMENT — PAIN SCALES - GENERAL
PAINLEVEL_OUTOF10: 0

## 2024-01-20 NOTE — FLOWSHEET NOTE
01/20/24 0712   Vital Signs   Temp 97.9 °F (36.6 °C)   Temp Source Oral   Pulse 83   Heart Rate Source Monitor   Respirations 16   /78   MAP (Calculated) 91   BP Location Left upper arm   BP Method Automatic   Patient Position Sitting   Pain Assessment   Pain Assessment 0-10   Pain Level 0   Oxygen Therapy   SpO2 96 %   O2 Device None (Room air)       Pt assessment complete; see flow sheets. Vitals completed. Poor mentation from yesterday. Still able to follow commands but delayed. Able to tell this RN her name after constant redirection. MD at bedside. MRI ordered. Vitals stable. PO meds held for safety.    Malina Godoy RN

## 2024-01-20 NOTE — FLOWSHEET NOTE
01/20/24 1230   Vital Signs   Temp 98 °F (36.7 °C)   Temp Source Oral   Pulse 83   Heart Rate Source Monitor   Respirations 17   /64   MAP (Calculated) 84   BP Location Left upper arm   Patient Position Semi fowlers   Pain Assessment   Pain Assessment 0-10   Pain Level 0   Oxygen Therapy   SpO2 96 %   O2 Device Nasal cannula   O2 Flow Rate (L/min) 3 L/min       Reassessment completed; no significant changes. Repeat head Ct neg.    Malina Godoy RN

## 2024-01-20 NOTE — FLOWSHEET NOTE
01/19/24 1533   Vital Signs   Temp 98 °F (36.7 °C)   Temp Source Oral   Pulse 80   Heart Rate Source Monitor   Respirations 16   /87   MAP (Calculated) 103   BP Location Left Arm   Patient Position Semi fowlers   Pain Assessment   Pain Assessment 0-10   Pain Level 0   Oxygen Therapy   SpO2 92 %   O2 Device None (Room air)   O2 Flow Rate (L/min) 0 L/min     Reassessment completed; no significant changes.    Malina Godoy RN

## 2024-01-20 NOTE — FLOWSHEET NOTE
01/20/24 1536   Vital Signs   Temp 98.6 °F (37 °C)   Temp Source Oral   Pulse 90   Heart Rate Source Monitor   Respirations 18   BP (!) 141/73   MAP (Calculated) 96   BP Location Left upper arm   Patient Position Semi fowlers   Pain Assessment   Pain Assessment 0-10   Pain Level 0   Oxygen Therapy   SpO2 98 %   O2 Device Nasal cannula   O2 Flow Rate (L/min) 3 L/min     Reassessment completed; no significant changes    Malina Godoy RN

## 2024-01-21 LAB
ALBUMIN SERPL-MCNC: 2.8 G/DL (ref 3.4–5)
ALBUMIN SERPL-MCNC: 3.2 G/DL (ref 3.4–5)
ALBUMIN/GLOB SERPL: 1 {RATIO} (ref 1.1–2.2)
ALP SERPL-CCNC: 240 U/L (ref 40–129)
ALP SERPL-CCNC: 266 U/L (ref 40–129)
ALT SERPL-CCNC: 14 U/L (ref 10–40)
ALT SERPL-CCNC: 15 U/L (ref 10–40)
ANION GAP SERPL CALCULATED.3IONS-SCNC: 8 MMOL/L (ref 3–16)
ANTI-XA UNFRAC HEPARIN: 0.61 IU/ML (ref 0.3–0.7)
APTT BLD: 45.9 SEC (ref 22.7–35.9)
AST SERPL-CCNC: 19 U/L (ref 15–37)
AST SERPL-CCNC: 23 U/L (ref 15–37)
BASOPHILS # BLD: 0.1 K/UL (ref 0–0.2)
BASOPHILS NFR BLD: 0.7 %
BILIRUB DIRECT SERPL-MCNC: <0.2 MG/DL (ref 0–0.3)
BILIRUB INDIRECT SERPL-MCNC: ABNORMAL MG/DL (ref 0–1)
BILIRUB SERPL-MCNC: 0.4 MG/DL (ref 0–1)
BILIRUB SERPL-MCNC: 0.4 MG/DL (ref 0–1)
BUN SERPL-MCNC: 17 MG/DL (ref 7–20)
CALCIUM SERPL-MCNC: 7.7 MG/DL (ref 8.3–10.6)
CHLORIDE SERPL-SCNC: 111 MMOL/L (ref 99–110)
CO2 SERPL-SCNC: 23 MMOL/L (ref 21–32)
CREAT SERPL-MCNC: 0.7 MG/DL (ref 0.6–1.2)
DEPRECATED RDW RBC AUTO: 17.2 % (ref 12.4–15.4)
DEPRECATED RDW RBC AUTO: 17.3 % (ref 12.4–15.4)
EKG ATRIAL RATE: 150 BPM
EKG DIAGNOSIS: NORMAL
EKG Q-T INTERVAL: 286 MS
EKG QRS DURATION: 74 MS
EKG QTC CALCULATION (BAZETT): 473 MS
EKG R AXIS: -34 DEGREES
EKG T AXIS: 162 DEGREES
EKG VENTRICULAR RATE: 165 BPM
EOSINOPHIL # BLD: 0.2 K/UL (ref 0–0.6)
EOSINOPHIL NFR BLD: 2.9 %
GFR SERPLBLD CREATININE-BSD FMLA CKD-EPI: >60 ML/MIN/{1.73_M2}
GLUCOSE SERPL-MCNC: 145 MG/DL (ref 70–99)
HCT VFR BLD AUTO: 27.6 % (ref 36–48)
HCT VFR BLD AUTO: 28.5 % (ref 36–48)
HCT VFR BLD AUTO: 29.3 % (ref 36–48)
HCT VFR BLD AUTO: 29.3 % (ref 36–48)
HGB BLD-MCNC: 9 G/DL (ref 12–16)
HGB BLD-MCNC: 9.2 G/DL (ref 12–16)
HGB BLD-MCNC: 9.5 G/DL (ref 12–16)
HGB BLD-MCNC: 9.6 G/DL (ref 12–16)
INR PPP: 1.42 (ref 0.84–1.16)
LYMPHOCYTES # BLD: 1.9 K/UL (ref 1–5.1)
LYMPHOCYTES NFR BLD: 24.5 %
MAGNESIUM SERPL-MCNC: 1.3 MG/DL (ref 1.8–2.4)
MCH RBC QN AUTO: 28.9 PG (ref 26–34)
MCH RBC QN AUTO: 29.4 PG (ref 26–34)
MCHC RBC AUTO-ENTMCNC: 32.6 G/DL (ref 31–36)
MCHC RBC AUTO-ENTMCNC: 32.7 G/DL (ref 31–36)
MCV RBC AUTO: 88.8 FL (ref 80–100)
MCV RBC AUTO: 89.9 FL (ref 80–100)
MONOCYTES # BLD: 0.5 K/UL (ref 0–1.3)
MONOCYTES NFR BLD: 6.6 %
NEUTROPHILS # BLD: 5 K/UL (ref 1.7–7.7)
NEUTROPHILS NFR BLD: 65.3 %
PLATELET # BLD AUTO: 233 K/UL (ref 135–450)
PLATELET # BLD AUTO: 260 K/UL (ref 135–450)
PMV BLD AUTO: 8.1 FL (ref 5–10.5)
PMV BLD AUTO: 8.2 FL (ref 5–10.5)
POTASSIUM SERPL-SCNC: 2.8 MMOL/L (ref 3.5–5.1)
PROT SERPL-MCNC: 5.5 G/DL (ref 6.4–8.2)
PROT SERPL-MCNC: 6.2 G/DL (ref 6.4–8.2)
PROTHROMBIN TIME: 17.4 SEC (ref 11.5–14.8)
RBC # BLD AUTO: 3.07 M/UL (ref 4–5.2)
RBC # BLD AUTO: 3.3 M/UL (ref 4–5.2)
SODIUM SERPL-SCNC: 142 MMOL/L (ref 136–145)
WBC # BLD AUTO: 7.7 K/UL (ref 4–11)
WBC # BLD AUTO: 7.9 K/UL (ref 4–11)

## 2024-01-21 PROCEDURE — 80053 COMPREHEN METABOLIC PANEL: CPT

## 2024-01-21 PROCEDURE — 6370000000 HC RX 637 (ALT 250 FOR IP)

## 2024-01-21 PROCEDURE — 97161 PT EVAL LOW COMPLEX 20 MIN: CPT

## 2024-01-21 PROCEDURE — 6370000000 HC RX 637 (ALT 250 FOR IP): Performed by: INTERNAL MEDICINE

## 2024-01-21 PROCEDURE — 2580000003 HC RX 258: Performed by: INTERNAL MEDICINE

## 2024-01-21 PROCEDURE — 85025 COMPLETE CBC W/AUTO DIFF WBC: CPT

## 2024-01-21 PROCEDURE — 94669 MECHANICAL CHEST WALL OSCILL: CPT

## 2024-01-21 PROCEDURE — 97530 THERAPEUTIC ACTIVITIES: CPT

## 2024-01-21 PROCEDURE — 97165 OT EVAL LOW COMPLEX 30 MIN: CPT

## 2024-01-21 PROCEDURE — 97535 SELF CARE MNGMENT TRAINING: CPT

## 2024-01-21 PROCEDURE — 6360000002 HC RX W HCPCS: Performed by: INTERNAL MEDICINE

## 2024-01-21 PROCEDURE — 2500000003 HC RX 250 WO HCPCS: Performed by: INTERNAL MEDICINE

## 2024-01-21 PROCEDURE — 83735 ASSAY OF MAGNESIUM: CPT

## 2024-01-21 PROCEDURE — 85018 HEMOGLOBIN: CPT

## 2024-01-21 PROCEDURE — 87506 IADNA-DNA/RNA PROBE TQ 6-11: CPT

## 2024-01-21 PROCEDURE — 94640 AIRWAY INHALATION TREATMENT: CPT

## 2024-01-21 PROCEDURE — 85610 PROTHROMBIN TIME: CPT

## 2024-01-21 PROCEDURE — 82105 ALPHA-FETOPROTEIN SERUM: CPT

## 2024-01-21 PROCEDURE — 94761 N-INVAS EAR/PLS OXIMETRY MLT: CPT

## 2024-01-21 PROCEDURE — 93010 ELECTROCARDIOGRAM REPORT: CPT | Performed by: INTERNAL MEDICINE

## 2024-01-21 PROCEDURE — 85730 THROMBOPLASTIN TIME PARTIAL: CPT

## 2024-01-21 PROCEDURE — 93005 ELECTROCARDIOGRAM TRACING: CPT | Performed by: INTERNAL MEDICINE

## 2024-01-21 PROCEDURE — 2060000000 HC ICU INTERMEDIATE R&B

## 2024-01-21 PROCEDURE — 85520 HEPARIN ASSAY: CPT

## 2024-01-21 PROCEDURE — 85027 COMPLETE CBC AUTOMATED: CPT

## 2024-01-21 PROCEDURE — 99232 SBSQ HOSP IP/OBS MODERATE 35: CPT | Performed by: INTERNAL MEDICINE

## 2024-01-21 PROCEDURE — 85014 HEMATOCRIT: CPT

## 2024-01-21 PROCEDURE — 2700000000 HC OXYGEN THERAPY PER DAY

## 2024-01-21 PROCEDURE — 99233 SBSQ HOSP IP/OBS HIGH 50: CPT | Performed by: INTERNAL MEDICINE

## 2024-01-21 PROCEDURE — 36415 COLL VENOUS BLD VENIPUNCTURE: CPT

## 2024-01-21 RX ORDER — POTASSIUM CHLORIDE 20 MEQ/1
20 TABLET, EXTENDED RELEASE ORAL ONCE
Status: COMPLETED | OUTPATIENT
Start: 2024-01-21 | End: 2024-01-21

## 2024-01-21 RX ORDER — LEVETIRACETAM 500 MG/1
500 TABLET ORAL 2 TIMES DAILY
Status: DISCONTINUED | OUTPATIENT
Start: 2024-01-21 | End: 2024-01-24 | Stop reason: HOSPADM

## 2024-01-21 RX ORDER — HEPARIN SODIUM 1000 [USP'U]/ML
4000 INJECTION, SOLUTION INTRAVENOUS; SUBCUTANEOUS ONCE
Status: COMPLETED | OUTPATIENT
Start: 2024-01-21 | End: 2024-01-21

## 2024-01-21 RX ORDER — DIGOXIN 0.25 MG/ML
125 INJECTION INTRAMUSCULAR; INTRAVENOUS ONCE
Status: COMPLETED | OUTPATIENT
Start: 2024-01-21 | End: 2024-01-21

## 2024-01-21 RX ORDER — HEPARIN SODIUM 1000 [USP'U]/ML
2000 INJECTION, SOLUTION INTRAVENOUS; SUBCUTANEOUS PRN
Status: DISCONTINUED | OUTPATIENT
Start: 2024-01-21 | End: 2024-01-21 | Stop reason: ALTCHOICE

## 2024-01-21 RX ORDER — DIGOXIN 0.25 MG/ML
250 INJECTION INTRAMUSCULAR; INTRAVENOUS ONCE
Status: COMPLETED | OUTPATIENT
Start: 2024-01-21 | End: 2024-01-21

## 2024-01-21 RX ORDER — FOLIC ACID 1 MG/1
1 TABLET ORAL DAILY
Qty: 30 TABLET | Refills: 0 | Status: SHIPPED | OUTPATIENT
Start: 2024-01-22 | End: 2024-01-23

## 2024-01-21 RX ORDER — HEPARIN SODIUM 10000 [USP'U]/100ML
12 INJECTION, SOLUTION INTRAVENOUS CONTINUOUS
Status: DISCONTINUED | OUTPATIENT
Start: 2024-01-21 | End: 2024-01-21

## 2024-01-21 RX ORDER — DILTIAZEM HYDROCHLORIDE 5 MG/ML
5 INJECTION INTRAVENOUS ONCE
Status: COMPLETED | OUTPATIENT
Start: 2024-01-21 | End: 2024-01-21

## 2024-01-21 RX ORDER — PANTOPRAZOLE SODIUM 40 MG/1
40 TABLET, DELAYED RELEASE ORAL 2 TIMES DAILY
Qty: 60 TABLET | Refills: 1 | Status: SHIPPED | OUTPATIENT
Start: 2024-01-21 | End: 2024-01-24

## 2024-01-21 RX ORDER — HEPARIN SODIUM 1000 [USP'U]/ML
4000 INJECTION, SOLUTION INTRAVENOUS; SUBCUTANEOUS PRN
Status: DISCONTINUED | OUTPATIENT
Start: 2024-01-21 | End: 2024-01-21 | Stop reason: ALTCHOICE

## 2024-01-21 RX ORDER — 0.9 % SODIUM CHLORIDE 0.9 %
500 INTRAVENOUS SOLUTION INTRAVENOUS ONCE
Status: COMPLETED | OUTPATIENT
Start: 2024-01-21 | End: 2024-01-21

## 2024-01-21 RX ORDER — POTASSIUM CHLORIDE 20 MEQ/1
40 TABLET, EXTENDED RELEASE ORAL ONCE
Status: COMPLETED | OUTPATIENT
Start: 2024-01-21 | End: 2024-01-21

## 2024-01-21 RX ORDER — LEVOFLOXACIN 750 MG/1
750 TABLET, FILM COATED ORAL DAILY
Qty: 4 TABLET | Refills: 0 | Status: SHIPPED | OUTPATIENT
Start: 2024-01-21 | End: 2024-01-24 | Stop reason: HOSPADM

## 2024-01-21 RX ADMIN — METOPROLOL SUCCINATE 25 MG: 25 TABLET, EXTENDED RELEASE ORAL at 20:23

## 2024-01-21 RX ADMIN — DIGOXIN 125 MCG: 0.25 INJECTION INTRAMUSCULAR; INTRAVENOUS at 17:41

## 2024-01-21 RX ADMIN — IPRATROPIUM BROMIDE AND ALBUTEROL SULFATE 1 DOSE: 2.5; .5 SOLUTION RESPIRATORY (INHALATION) at 15:36

## 2024-01-21 RX ADMIN — APIXABAN 5 MG: 5 TABLET, FILM COATED ORAL at 20:20

## 2024-01-21 RX ADMIN — METOPROLOL SUCCINATE 25 MG: 25 TABLET, EXTENDED RELEASE ORAL at 08:44

## 2024-01-21 RX ADMIN — PIPERACILLIN AND TAZOBACTAM 3375 MG: 3; .375 INJECTION, POWDER, FOR SOLUTION INTRAVENOUS at 20:28

## 2024-01-21 RX ADMIN — DILTIAZEM HYDROCHLORIDE 5 MG/HR: 5 INJECTION, SOLUTION INTRAVENOUS at 16:42

## 2024-01-21 RX ADMIN — APIXABAN 5 MG: 5 TABLET, FILM COATED ORAL at 08:43

## 2024-01-21 RX ADMIN — POTASSIUM CHLORIDE 40 MEQ: 1500 TABLET, EXTENDED RELEASE ORAL at 17:41

## 2024-01-21 RX ADMIN — PIPERACILLIN AND TAZOBACTAM 3375 MG: 3; .375 INJECTION, POWDER, FOR SOLUTION INTRAVENOUS at 06:19

## 2024-01-21 RX ADMIN — SUCRALFATE 1 G: 1 TABLET ORAL at 14:31

## 2024-01-21 RX ADMIN — ATORVASTATIN CALCIUM 10 MG: 10 TABLET, FILM COATED ORAL at 08:43

## 2024-01-21 RX ADMIN — PIPERACILLIN AND TAZOBACTAM 3375 MG: 3; .375 INJECTION, POWDER, FOR SOLUTION INTRAVENOUS at 14:31

## 2024-01-21 RX ADMIN — SERTRALINE HYDROCHLORIDE 50 MG: 50 TABLET ORAL at 08:43

## 2024-01-21 RX ADMIN — GUAIFENESIN 600 MG: 600 TABLET, EXTENDED RELEASE ORAL at 20:19

## 2024-01-21 RX ADMIN — SUCRALFATE 1 G: 1 TABLET ORAL at 20:20

## 2024-01-21 RX ADMIN — LEVETIRACETAM 500 MG: 500 TABLET, FILM COATED ORAL at 08:43

## 2024-01-21 RX ADMIN — GUAIFENESIN 600 MG: 600 TABLET, EXTENDED RELEASE ORAL at 08:43

## 2024-01-21 RX ADMIN — POTASSIUM CHLORIDE 20 MEQ: 1500 TABLET, EXTENDED RELEASE ORAL at 20:20

## 2024-01-21 RX ADMIN — RIFAXIMIN 550 MG: 550 TABLET ORAL at 08:43

## 2024-01-21 RX ADMIN — IPRATROPIUM BROMIDE AND ALBUTEROL SULFATE 1 DOSE: 2.5; .5 SOLUTION RESPIRATORY (INHALATION) at 07:43

## 2024-01-21 RX ADMIN — SUCRALFATE 1 G: 1 TABLET ORAL at 15:57

## 2024-01-21 RX ADMIN — DILTIAZEM HYDROCHLORIDE 5 MG: 5 INJECTION, SOLUTION INTRAVENOUS at 15:57

## 2024-01-21 RX ADMIN — LEVETIRACETAM 500 MG: 500 TABLET, FILM COATED ORAL at 20:20

## 2024-01-21 RX ADMIN — FOLIC ACID 1 MG: 1 TABLET ORAL at 08:43

## 2024-01-21 RX ADMIN — SUCRALFATE 1 G: 1 TABLET ORAL at 08:43

## 2024-01-21 RX ADMIN — LEVOTHYROXINE SODIUM 88 MCG: 88 TABLET ORAL at 06:19

## 2024-01-21 RX ADMIN — LACTULOSE 20 G: 10 SOLUTION ORAL at 08:43

## 2024-01-21 RX ADMIN — HEPARIN SODIUM 4000 UNITS: 1000 INJECTION INTRAVENOUS; SUBCUTANEOUS at 06:37

## 2024-01-21 RX ADMIN — SODIUM CHLORIDE 500 ML: 9 INJECTION, SOLUTION INTRAVENOUS at 13:59

## 2024-01-21 RX ADMIN — DIGOXIN 250 MCG: 0.25 INJECTION INTRAMUSCULAR; INTRAVENOUS at 15:58

## 2024-01-21 RX ADMIN — RIFAXIMIN 550 MG: 550 TABLET ORAL at 20:19

## 2024-01-21 RX ADMIN — IPRATROPIUM BROMIDE AND ALBUTEROL SULFATE 1 DOSE: 2.5; .5 SOLUTION RESPIRATORY (INHALATION) at 20:15

## 2024-01-21 NOTE — FLOWSHEET NOTE
01/20/24 2000   Vital Signs   Temp 98.3 °F (36.8 °C)   Temp Source Oral   Pulse 98   Heart Rate Source Monitor   Respirations 18   /63   MAP (Calculated) 87   BP Location Left upper arm   BP Method Automatic   Patient Position Semi fowlers   Oxygen Therapy   SpO2 100 %   O2 Device Nasal cannula   O2 Flow Rate (L/min) 3 L/min     PM Assessment completed. Scheduled medications given per MAR, On 3L of oxygen, A&O to self only but can hold a conversation with daughter an RN, Vital Signs completed and Charted, Patient and daughter denies any further needs at this time. Call light within reach, Reminded patient to call RN if she needs anything.

## 2024-01-22 LAB
ANION GAP SERPL CALCULATED.3IONS-SCNC: 8 MMOL/L (ref 3–16)
BUN SERPL-MCNC: 13 MG/DL (ref 7–20)
CALCIUM SERPL-MCNC: 7.4 MG/DL (ref 8.3–10.6)
CHLORIDE SERPL-SCNC: 109 MMOL/L (ref 99–110)
CO2 SERPL-SCNC: 22 MMOL/L (ref 21–32)
CREAT SERPL-MCNC: 0.7 MG/DL (ref 0.6–1.2)
GFR SERPLBLD CREATININE-BSD FMLA CKD-EPI: >60 ML/MIN/{1.73_M2}
GLUCOSE SERPL-MCNC: 217 MG/DL (ref 70–99)
HCT VFR BLD AUTO: 27.8 % (ref 36–48)
HCT VFR BLD AUTO: 27.8 % (ref 36–48)
HCT VFR BLD AUTO: 28.1 % (ref 36–48)
HCT VFR BLD AUTO: 28.3 % (ref 36–48)
HCT VFR BLD AUTO: 29.1 % (ref 36–48)
HGB BLD-MCNC: 9 G/DL (ref 12–16)
HGB BLD-MCNC: 9 G/DL (ref 12–16)
HGB BLD-MCNC: 9.1 G/DL (ref 12–16)
HGB BLD-MCNC: 9.1 G/DL (ref 12–16)
HGB BLD-MCNC: 9.4 G/DL (ref 12–16)
MAGNESIUM SERPL-MCNC: 1.3 MG/DL (ref 1.8–2.4)
POTASSIUM SERPL-SCNC: 3.4 MMOL/L (ref 3.5–5.1)
SODIUM SERPL-SCNC: 139 MMOL/L (ref 136–145)

## 2024-01-22 PROCEDURE — 94669 MECHANICAL CHEST WALL OSCILL: CPT

## 2024-01-22 PROCEDURE — 97530 THERAPEUTIC ACTIVITIES: CPT

## 2024-01-22 PROCEDURE — 6370000000 HC RX 637 (ALT 250 FOR IP): Performed by: INTERNAL MEDICINE

## 2024-01-22 PROCEDURE — 83735 ASSAY OF MAGNESIUM: CPT

## 2024-01-22 PROCEDURE — 6360000002 HC RX W HCPCS: Performed by: INTERNAL MEDICINE

## 2024-01-22 PROCEDURE — 97116 GAIT TRAINING THERAPY: CPT

## 2024-01-22 PROCEDURE — 99232 SBSQ HOSP IP/OBS MODERATE 35: CPT | Performed by: STUDENT IN AN ORGANIZED HEALTH CARE EDUCATION/TRAINING PROGRAM

## 2024-01-22 PROCEDURE — 2580000003 HC RX 258

## 2024-01-22 PROCEDURE — 6370000000 HC RX 637 (ALT 250 FOR IP)

## 2024-01-22 PROCEDURE — 85014 HEMATOCRIT: CPT

## 2024-01-22 PROCEDURE — 94640 AIRWAY INHALATION TREATMENT: CPT

## 2024-01-22 PROCEDURE — 6370000000 HC RX 637 (ALT 250 FOR IP): Performed by: STUDENT IN AN ORGANIZED HEALTH CARE EDUCATION/TRAINING PROGRAM

## 2024-01-22 PROCEDURE — 99233 SBSQ HOSP IP/OBS HIGH 50: CPT | Performed by: INTERNAL MEDICINE

## 2024-01-22 PROCEDURE — 99232 SBSQ HOSP IP/OBS MODERATE 35: CPT | Performed by: INTERNAL MEDICINE

## 2024-01-22 PROCEDURE — 2060000000 HC ICU INTERMEDIATE R&B

## 2024-01-22 PROCEDURE — 80048 BASIC METABOLIC PNL TOTAL CA: CPT

## 2024-01-22 PROCEDURE — 2500000003 HC RX 250 WO HCPCS: Performed by: NURSE PRACTITIONER

## 2024-01-22 PROCEDURE — 2580000003 HC RX 258: Performed by: INTERNAL MEDICINE

## 2024-01-22 PROCEDURE — 85018 HEMOGLOBIN: CPT

## 2024-01-22 PROCEDURE — 2580000003 HC RX 258: Performed by: NURSE PRACTITIONER

## 2024-01-22 RX ORDER — MAGNESIUM SULFATE IN WATER 40 MG/ML
2000 INJECTION, SOLUTION INTRAVENOUS PRN
Status: DISCONTINUED | OUTPATIENT
Start: 2024-01-22 | End: 2024-01-24 | Stop reason: HOSPADM

## 2024-01-22 RX ORDER — METOPROLOL SUCCINATE 50 MG/1
50 TABLET, EXTENDED RELEASE ORAL 2 TIMES DAILY
Status: DISCONTINUED | OUTPATIENT
Start: 2024-01-22 | End: 2024-01-23

## 2024-01-22 RX ORDER — IPRATROPIUM BROMIDE AND ALBUTEROL SULFATE 2.5; .5 MG/3ML; MG/3ML
1 SOLUTION RESPIRATORY (INHALATION)
Status: DISCONTINUED | OUTPATIENT
Start: 2024-01-22 | End: 2024-01-24 | Stop reason: HOSPADM

## 2024-01-22 RX ORDER — METOPROLOL SUCCINATE 25 MG/1
25 TABLET, EXTENDED RELEASE ORAL ONCE
Status: COMPLETED | OUTPATIENT
Start: 2024-01-22 | End: 2024-01-22

## 2024-01-22 RX ORDER — LEVOFLOXACIN 750 MG/1
750 TABLET, FILM COATED ORAL NIGHTLY
Status: DISCONTINUED | OUTPATIENT
Start: 2024-01-22 | End: 2024-01-24 | Stop reason: HOSPADM

## 2024-01-22 RX ADMIN — LEVOTHYROXINE SODIUM 88 MCG: 88 TABLET ORAL at 05:53

## 2024-01-22 RX ADMIN — PIPERACILLIN AND TAZOBACTAM 3375 MG: 3; .375 INJECTION, POWDER, FOR SOLUTION INTRAVENOUS at 05:56

## 2024-01-22 RX ADMIN — ATORVASTATIN CALCIUM 10 MG: 10 TABLET, FILM COATED ORAL at 09:22

## 2024-01-22 RX ADMIN — GUAIFENESIN 600 MG: 600 TABLET, EXTENDED RELEASE ORAL at 09:22

## 2024-01-22 RX ADMIN — RIFAXIMIN 550 MG: 550 TABLET ORAL at 21:24

## 2024-01-22 RX ADMIN — SUCRALFATE 1 G: 1 TABLET ORAL at 17:26

## 2024-01-22 RX ADMIN — GUAIFENESIN 600 MG: 600 TABLET, EXTENDED RELEASE ORAL at 21:24

## 2024-01-22 RX ADMIN — RIFAXIMIN 550 MG: 550 TABLET ORAL at 09:22

## 2024-01-22 RX ADMIN — FOLIC ACID 1 MG: 1 TABLET ORAL at 09:22

## 2024-01-22 RX ADMIN — APIXABAN 5 MG: 5 TABLET, FILM COATED ORAL at 21:24

## 2024-01-22 RX ADMIN — IPRATROPIUM BROMIDE AND ALBUTEROL SULFATE 1 DOSE: 2.5; .5 SOLUTION RESPIRATORY (INHALATION) at 07:53

## 2024-01-22 RX ADMIN — IPRATROPIUM BROMIDE AND ALBUTEROL SULFATE 1 DOSE: 2.5; .5 SOLUTION RESPIRATORY (INHALATION) at 19:29

## 2024-01-22 RX ADMIN — POTASSIUM BICARBONATE 20 MEQ: 782 TABLET, EFFERVESCENT ORAL at 12:14

## 2024-01-22 RX ADMIN — LEVOFLOXACIN 750 MG: 750 TABLET, FILM COATED ORAL at 21:24

## 2024-01-22 RX ADMIN — METOPROLOL SUCCINATE 25 MG: 25 TABLET, EXTENDED RELEASE ORAL at 09:18

## 2024-01-22 RX ADMIN — MAGNESIUM SULFATE HEPTAHYDRATE 2000 MG: 40 INJECTION, SOLUTION INTRAVENOUS at 12:22

## 2024-01-22 RX ADMIN — METOPROLOL SUCCINATE 25 MG: 25 TABLET, EXTENDED RELEASE ORAL at 12:13

## 2024-01-22 RX ADMIN — LEVETIRACETAM 500 MG: 500 TABLET, FILM COATED ORAL at 21:24

## 2024-01-22 RX ADMIN — SERTRALINE HYDROCHLORIDE 50 MG: 50 TABLET ORAL at 09:22

## 2024-01-22 RX ADMIN — SODIUM CHLORIDE, PRESERVATIVE FREE 10 ML: 5 INJECTION INTRAVENOUS at 09:22

## 2024-01-22 RX ADMIN — SUCRALFATE 1 G: 1 TABLET ORAL at 12:14

## 2024-01-22 RX ADMIN — DILTIAZEM HYDROCHLORIDE 7.5 MG/HR: 5 INJECTION, SOLUTION INTRAVENOUS at 02:16

## 2024-01-22 RX ADMIN — LEVETIRACETAM 500 MG: 500 TABLET, FILM COATED ORAL at 09:22

## 2024-01-22 RX ADMIN — SUCRALFATE 1 G: 1 TABLET ORAL at 09:18

## 2024-01-22 RX ADMIN — SUCRALFATE 1 G: 1 TABLET ORAL at 21:24

## 2024-01-22 RX ADMIN — APIXABAN 5 MG: 5 TABLET, FILM COATED ORAL at 09:22
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
## 2024-01-22 NOTE — CARE COORDINATION
Reviewed chart. Therapy rec's noted. Pt is refusing SNF. Pt states she has two daughters that are with her 24/7 and will help her. Pt is agreeable to [facility] but wants to speak to her dtrs on what company she wants to go with. Will follow

## 2024-01-22 NOTE — FLOWSHEET NOTE
01/21/24 1957   Vital Signs   Temp 98.3 °F (36.8 °C)   Temp Source Oral   Pulse (!) 110   Heart Rate Source Monitor   Respirations 18   /71   MAP (Calculated) 81   BP Location Left upper arm   BP Method Automatic   Patient Position Semi fowlers   Pain Assessment   Pain Assessment None - Denies Pain   Care Plan - Pain Goals   Verbalizes/displays adequate comfort level or baseline comfort level Encourage patient to monitor pain and request assistance   Opioid-Induced Sedation   POSS Score 1   Oxygen Therapy   SpO2 98 %   Pulse Oximetry Type Intermittent   Pulse Oximeter Device Mode Intermittent   Pulse Oximeter Device Location Finger;Right   O2 Device Nasal cannula   Skin Assessment Clean, dry, & intact   O2 Flow Rate (L/min) 3 L/min     Pt resting comfortably. IV infusing as ordered. [employer] present and call light within reach. Will continue to monitor.

## 2024-01-23 LAB
ANION GAP SERPL CALCULATED.3IONS-SCNC: 7 MMOL/L (ref 3–16)
BASOPHILS # BLD: 0.1 K/UL (ref 0–0.2)
BASOPHILS NFR BLD: 0.8 %
BUN SERPL-MCNC: 12 MG/DL (ref 7–20)
CALCIUM SERPL-MCNC: 7.8 MG/DL (ref 8.3–10.6)
CHLORIDE SERPL-SCNC: 108 MMOL/L (ref 99–110)
CO2 SERPL-SCNC: 24 MMOL/L (ref 21–32)
CREAT SERPL-MCNC: 0.6 MG/DL (ref 0.6–1.2)
DEPRECATED RDW RBC AUTO: 17.4 % (ref 12.4–15.4)
EOSINOPHIL # BLD: 0.4 K/UL (ref 0–0.6)
EOSINOPHIL NFR BLD: 4.4 %
GFR SERPLBLD CREATININE-BSD FMLA CKD-EPI: >60 ML/MIN/{1.73_M2}
GI PATHOGENS PNL STL NAA+PROBE: NORMAL
GLUCOSE SERPL-MCNC: 141 MG/DL (ref 70–99)
HCT VFR BLD AUTO: 27 % (ref 36–48)
HCT VFR BLD AUTO: 27.7 % (ref 36–48)
HCT VFR BLD AUTO: 28.2 % (ref 36–48)
HGB BLD-MCNC: 8.8 G/DL (ref 12–16)
HGB BLD-MCNC: 9 G/DL (ref 12–16)
HGB BLD-MCNC: 9.3 G/DL (ref 12–16)
LYMPHOCYTES # BLD: 1.9 K/UL (ref 1–5.1)
LYMPHOCYTES NFR BLD: 24.4 %
MAGNESIUM SERPL-MCNC: 1.4 MG/DL (ref 1.8–2.4)
MCH RBC QN AUTO: 28.9 PG (ref 26–34)
MCHC RBC AUTO-ENTMCNC: 32.3 G/DL (ref 31–36)
MCV RBC AUTO: 89.2 FL (ref 80–100)
MONOCYTES # BLD: 0.5 K/UL (ref 0–1.3)
MONOCYTES NFR BLD: 6.8 %
NEUTROPHILS # BLD: 5.1 K/UL (ref 1.7–7.7)
NEUTROPHILS NFR BLD: 63.6 %
PLATELET # BLD AUTO: 239 K/UL (ref 135–450)
PMV BLD AUTO: 8 FL (ref 5–10.5)
POTASSIUM SERPL-SCNC: 3.4 MMOL/L (ref 3.5–5.1)
RBC # BLD AUTO: 3.1 M/UL (ref 4–5.2)
SODIUM SERPL-SCNC: 139 MMOL/L (ref 136–145)
WBC # BLD AUTO: 8 K/UL (ref 4–11)

## 2024-01-23 PROCEDURE — 6370000000 HC RX 637 (ALT 250 FOR IP): Performed by: INTERNAL MEDICINE

## 2024-01-23 PROCEDURE — 2060000000 HC ICU INTERMEDIATE R&B

## 2024-01-23 PROCEDURE — 6370000000 HC RX 637 (ALT 250 FOR IP): Performed by: STUDENT IN AN ORGANIZED HEALTH CARE EDUCATION/TRAINING PROGRAM

## 2024-01-23 PROCEDURE — 94761 N-INVAS EAR/PLS OXIMETRY MLT: CPT

## 2024-01-23 PROCEDURE — 2700000000 HC OXYGEN THERAPY PER DAY

## 2024-01-23 PROCEDURE — 99232 SBSQ HOSP IP/OBS MODERATE 35: CPT | Performed by: INTERNAL MEDICINE

## 2024-01-23 PROCEDURE — 99233 SBSQ HOSP IP/OBS HIGH 50: CPT | Performed by: INTERNAL MEDICINE

## 2024-01-23 PROCEDURE — 85014 HEMATOCRIT: CPT

## 2024-01-23 PROCEDURE — 6370000000 HC RX 637 (ALT 250 FOR IP)

## 2024-01-23 PROCEDURE — 85025 COMPLETE CBC W/AUTO DIFF WBC: CPT

## 2024-01-23 PROCEDURE — 85018 HEMOGLOBIN: CPT

## 2024-01-23 PROCEDURE — 83735 ASSAY OF MAGNESIUM: CPT

## 2024-01-23 PROCEDURE — 2580000003 HC RX 258: Performed by: NURSE PRACTITIONER

## 2024-01-23 PROCEDURE — 94669 MECHANICAL CHEST WALL OSCILL: CPT

## 2024-01-23 PROCEDURE — 99232 SBSQ HOSP IP/OBS MODERATE 35: CPT | Performed by: STUDENT IN AN ORGANIZED HEALTH CARE EDUCATION/TRAINING PROGRAM

## 2024-01-23 PROCEDURE — 6360000002 HC RX W HCPCS: Performed by: INTERNAL MEDICINE

## 2024-01-23 PROCEDURE — 2580000003 HC RX 258

## 2024-01-23 PROCEDURE — 94640 AIRWAY INHALATION TREATMENT: CPT

## 2024-01-23 PROCEDURE — 2500000003 HC RX 250 WO HCPCS: Performed by: NURSE PRACTITIONER

## 2024-01-23 PROCEDURE — 80048 BASIC METABOLIC PNL TOTAL CA: CPT

## 2024-01-23 RX ORDER — PANTOPRAZOLE SODIUM 40 MG/1
40 TABLET, DELAYED RELEASE ORAL
Status: DISCONTINUED | OUTPATIENT
Start: 2024-01-23 | End: 2024-01-24 | Stop reason: HOSPADM

## 2024-01-23 RX ORDER — POTASSIUM CHLORIDE 7.45 MG/ML
10 INJECTION INTRAVENOUS PRN
Status: DISCONTINUED | OUTPATIENT
Start: 2024-01-23 | End: 2024-01-24 | Stop reason: HOSPADM

## 2024-01-23 RX ORDER — POTASSIUM CHLORIDE 20 MEQ/1
40 TABLET, EXTENDED RELEASE ORAL PRN
Status: DISCONTINUED | OUTPATIENT
Start: 2024-01-23 | End: 2024-01-24 | Stop reason: HOSPADM

## 2024-01-23 RX ORDER — METOPROLOL SUCCINATE 50 MG/1
100 TABLET, EXTENDED RELEASE ORAL 2 TIMES DAILY
Status: DISCONTINUED | OUTPATIENT
Start: 2024-01-23 | End: 2024-01-24 | Stop reason: HOSPADM

## 2024-01-23 RX ORDER — METOPROLOL SUCCINATE 50 MG/1
50 TABLET, EXTENDED RELEASE ORAL 2 TIMES DAILY
Qty: 120 TABLET | Refills: 0 | Status: CANCELLED | OUTPATIENT
Start: 2024-01-23 | End: 2024-03-23

## 2024-01-23 RX ADMIN — POTASSIUM CHLORIDE 40 MEQ: 1500 TABLET, EXTENDED RELEASE ORAL at 11:56

## 2024-01-23 RX ADMIN — METOPROLOL SUCCINATE 50 MG: 50 TABLET, EXTENDED RELEASE ORAL at 08:29

## 2024-01-23 RX ADMIN — SODIUM CHLORIDE, PRESERVATIVE FREE 10 ML: 5 INJECTION INTRAVENOUS at 21:07

## 2024-01-23 RX ADMIN — ATORVASTATIN CALCIUM 10 MG: 10 TABLET, FILM COATED ORAL at 08:29

## 2024-01-23 RX ADMIN — IPRATROPIUM BROMIDE AND ALBUTEROL SULFATE 1 DOSE: 2.5; .5 SOLUTION RESPIRATORY (INHALATION) at 07:54

## 2024-01-23 RX ADMIN — LEVOFLOXACIN 750 MG: 750 TABLET, FILM COATED ORAL at 21:07

## 2024-01-23 RX ADMIN — LEVOTHYROXINE SODIUM 88 MCG: 88 TABLET ORAL at 06:36

## 2024-01-23 RX ADMIN — RIFAXIMIN 550 MG: 550 TABLET ORAL at 21:07

## 2024-01-23 RX ADMIN — SUCRALFATE 1 G: 1 TABLET ORAL at 21:07

## 2024-01-23 RX ADMIN — PANTOPRAZOLE SODIUM 40 MG: 40 TABLET, DELAYED RELEASE ORAL at 16:24

## 2024-01-23 RX ADMIN — SODIUM CHLORIDE, PRESERVATIVE FREE 10 ML: 5 INJECTION INTRAVENOUS at 08:29

## 2024-01-23 RX ADMIN — APIXABAN 5 MG: 5 TABLET, FILM COATED ORAL at 21:07

## 2024-01-23 RX ADMIN — PANTOPRAZOLE SODIUM 40 MG: 40 TABLET, DELAYED RELEASE ORAL at 11:56

## 2024-01-23 RX ADMIN — SUCRALFATE 1 G: 1 TABLET ORAL at 16:24

## 2024-01-23 RX ADMIN — APIXABAN 5 MG: 5 TABLET, FILM COATED ORAL at 08:29

## 2024-01-23 RX ADMIN — SUCRALFATE 1 G: 1 TABLET ORAL at 11:56

## 2024-01-23 RX ADMIN — LEVETIRACETAM 500 MG: 500 TABLET, FILM COATED ORAL at 21:07

## 2024-01-23 RX ADMIN — RIFAXIMIN 550 MG: 550 TABLET ORAL at 08:29

## 2024-01-23 RX ADMIN — IPRATROPIUM BROMIDE AND ALBUTEROL SULFATE 1 DOSE: 2.5; .5 SOLUTION RESPIRATORY (INHALATION) at 20:13

## 2024-01-23 RX ADMIN — LEVETIRACETAM 500 MG: 500 TABLET, FILM COATED ORAL at 08:29

## 2024-01-23 RX ADMIN — GUAIFENESIN 600 MG: 600 TABLET, EXTENDED RELEASE ORAL at 08:29

## 2024-01-23 RX ADMIN — SERTRALINE HYDROCHLORIDE 50 MG: 50 TABLET ORAL at 08:29

## 2024-01-23 RX ADMIN — METOPROLOL SUCCINATE 100 MG: 50 TABLET, EXTENDED RELEASE ORAL at 21:07

## 2024-01-23 RX ADMIN — GUAIFENESIN 600 MG: 600 TABLET, EXTENDED RELEASE ORAL at 21:07

## 2024-01-23 RX ADMIN — FOLIC ACID 1 MG: 1 TABLET ORAL at 08:29

## 2024-01-23 RX ADMIN — DILTIAZEM HYDROCHLORIDE 5 MG/HR: 5 INJECTION, SOLUTION INTRAVENOUS at 09:17

## 2024-01-23 RX ADMIN — METOPROLOL TARTRATE 25 MG: 25 TABLET, FILM COATED ORAL at 14:25

## 2024-01-23 RX ADMIN — MAGNESIUM SULFATE HEPTAHYDRATE 2000 MG: 40 INJECTION, SOLUTION INTRAVENOUS at 08:28

## 2024-01-23 RX ADMIN — SUCRALFATE 1 G: 1 TABLET ORAL at 08:29

## 2024-01-23 NOTE — FLOWSHEET NOTE
01/22/24 1957   Vital Signs   Temp 97.7 °F (36.5 °C)   Temp Source Oral   Pulse 82   Heart Rate Source Monitor   Respirations 18   BP 99/64   MAP (Calculated) 76   BP Location Left upper arm   BP Method Automatic   Patient Position Semi fowlers   Oxygen Therapy   SpO2 99 %   O2 Device Nasal cannula   O2 Flow Rate (L/min) 3 L/min     PM Assessment completed. Scheduled medications given per MAR, On 3L of oxygen, A&O X4, Vital Signs completed and Charted, Patient denies any further needs at this time. Call light within reach, Reminded patient to call RN if she needs anything.

## 2024-01-23 NOTE — FLOWSHEET NOTE
01/23/24 0712   Vital Signs   Temp 97.6 °F (36.4 °C)   Temp Source Oral   Pulse 88   Heart Rate Source Monitor   Respirations 18   BP (!) 118/54   MAP (Calculated) 75   BP Location Left upper arm   BP Method Automatic   Patient Position Semi fowlers   Oxygen Therapy   SpO2 98 %   O2 Device Nasal cannula   O2 Flow Rate (L/min) 3 L/min     Patient resting quietly in bed. No s/s of distress noted. Mag 1.4 this AM, PRN replacement initiated.   Shift assessment complete, see flow sheet. Denies needs at this time. Call light in reach. Will monitor.

## 2024-01-23 NOTE — CARE COORDINATION
Reviewed chart, discussed in round, met with pt and family at bedside. PT recs SNF, pt refuses but open to HC if there are only female providers available. Daughters are with her 24/7. Ref to [facility]. Will continue to monitor.

## 2024-01-23 NOTE — CARE COORDINATION
[facility]  Recieived referral for HC from [facility]. Sent request to [facility] for coverage.      Electronically signed by Daiana Cornelius RN on 1/23/2024 at 3:22 PM

## 2024-01-24 VITALS
OXYGEN SATURATION: 97 % | BODY MASS INDEX: 39.03 KG/M2 | HEIGHT: 60 IN | DIASTOLIC BLOOD PRESSURE: 53 MMHG | SYSTOLIC BLOOD PRESSURE: 122 MMHG | WEIGHT: 198.8 LBS | HEART RATE: 66 BPM | RESPIRATION RATE: 18 BRPM | TEMPERATURE: 97.5 F

## 2024-01-24 LAB
ANION GAP SERPL CALCULATED.3IONS-SCNC: 5 MMOL/L (ref 3–16)
BASOPHILS # BLD: 0.1 K/UL (ref 0–0.2)
BASOPHILS NFR BLD: 0.7 %
BUN SERPL-MCNC: 10 MG/DL (ref 7–20)
CALCIUM SERPL-MCNC: 7.6 MG/DL (ref 8.3–10.6)
CHLORIDE SERPL-SCNC: 106 MMOL/L (ref 99–110)
CO2 SERPL-SCNC: 23 MMOL/L (ref 21–32)
CREAT SERPL-MCNC: <0.5 MG/DL (ref 0.6–1.2)
DEPRECATED RDW RBC AUTO: 17.2 % (ref 12.4–15.4)
EKG ATRIAL RATE: 70 BPM
EKG DIAGNOSIS: NORMAL
EKG P AXIS: 56 DEGREES
EKG P-R INTERVAL: 134 MS
EKG Q-T INTERVAL: 402 MS
EKG QRS DURATION: 66 MS
EKG QTC CALCULATION (BAZETT): 434 MS
EKG R AXIS: -26 DEGREES
EKG T AXIS: 117 DEGREES
EKG VENTRICULAR RATE: 70 BPM
EOSINOPHIL # BLD: 0.3 K/UL (ref 0–0.6)
EOSINOPHIL NFR BLD: 4.2 %
GFR SERPLBLD CREATININE-BSD FMLA CKD-EPI: >60 ML/MIN/{1.73_M2}
GLUCOSE SERPL-MCNC: 127 MG/DL (ref 70–99)
HCT VFR BLD AUTO: 26.7 % (ref 36–48)
HGB BLD-MCNC: 8.8 G/DL (ref 12–16)
LYMPHOCYTES # BLD: 2.1 K/UL (ref 1–5.1)
LYMPHOCYTES NFR BLD: 26.3 %
MAGNESIUM SERPL-MCNC: 1.5 MG/DL (ref 1.8–2.4)
MCH RBC QN AUTO: 28.7 PG (ref 26–34)
MCHC RBC AUTO-ENTMCNC: 32.9 G/DL (ref 31–36)
MCV RBC AUTO: 87.5 FL (ref 80–100)
MONOCYTES # BLD: 0.5 K/UL (ref 0–1.3)
MONOCYTES NFR BLD: 6.6 %
NEUTROPHILS # BLD: 4.9 K/UL (ref 1.7–7.7)
NEUTROPHILS NFR BLD: 62.2 %
PLATELET # BLD AUTO: 226 K/UL (ref 135–450)
PMV BLD AUTO: 8 FL (ref 5–10.5)
POTASSIUM SERPL-SCNC: 3.9 MMOL/L (ref 3.5–5.1)
RBC # BLD AUTO: 3.05 M/UL (ref 4–5.2)
SODIUM SERPL-SCNC: 134 MMOL/L (ref 136–145)
WBC # BLD AUTO: 7.8 K/UL (ref 4–11)

## 2024-01-24 PROCEDURE — 94669 MECHANICAL CHEST WALL OSCILL: CPT

## 2024-01-24 PROCEDURE — 6370000000 HC RX 637 (ALT 250 FOR IP): Performed by: INTERNAL MEDICINE

## 2024-01-24 PROCEDURE — 94761 N-INVAS EAR/PLS OXIMETRY MLT: CPT

## 2024-01-24 PROCEDURE — 93005 ELECTROCARDIOGRAM TRACING: CPT | Performed by: INTERNAL MEDICINE

## 2024-01-24 PROCEDURE — 6370000000 HC RX 637 (ALT 250 FOR IP)

## 2024-01-24 PROCEDURE — 6360000002 HC RX W HCPCS: Performed by: INTERNAL MEDICINE

## 2024-01-24 PROCEDURE — 94640 AIRWAY INHALATION TREATMENT: CPT

## 2024-01-24 PROCEDURE — 85025 COMPLETE CBC W/AUTO DIFF WBC: CPT

## 2024-01-24 PROCEDURE — 83735 ASSAY OF MAGNESIUM: CPT

## 2024-01-24 PROCEDURE — 2580000003 HC RX 258

## 2024-01-24 PROCEDURE — 80048 BASIC METABOLIC PNL TOTAL CA: CPT

## 2024-01-24 PROCEDURE — 6370000000 HC RX 637 (ALT 250 FOR IP): Performed by: STUDENT IN AN ORGANIZED HEALTH CARE EDUCATION/TRAINING PROGRAM

## 2024-01-24 PROCEDURE — 2700000000 HC OXYGEN THERAPY PER DAY

## 2024-01-24 RX ORDER — LEVETIRACETAM 500 MG/1
500 TABLET ORAL 2 TIMES DAILY
Qty: 120 TABLET | Refills: 0 | Status: SHIPPED | OUTPATIENT
Start: 2024-01-24 | End: 2024-03-24

## 2024-01-24 RX ORDER — METOPROLOL SUCCINATE 100 MG/1
100 TABLET, EXTENDED RELEASE ORAL 2 TIMES DAILY
Qty: 120 TABLET | Refills: 0 | Status: SHIPPED | OUTPATIENT
Start: 2024-01-24 | End: 2024-03-24

## 2024-01-24 RX ORDER — PANTOPRAZOLE SODIUM 40 MG/1
40 TABLET, DELAYED RELEASE ORAL 2 TIMES DAILY
Qty: 120 TABLET | Refills: 0 | Status: SHIPPED | OUTPATIENT
Start: 2024-01-24 | End: 2024-03-24

## 2024-01-24 RX ORDER — FOLIC ACID 1 MG/1
1 TABLET ORAL DAILY
Qty: 30 TABLET | Refills: 0 | Status: SHIPPED | OUTPATIENT
Start: 2024-01-24

## 2024-01-24 RX ADMIN — FOLIC ACID 1 MG: 1 TABLET ORAL at 08:21

## 2024-01-24 RX ADMIN — APIXABAN 5 MG: 5 TABLET, FILM COATED ORAL at 08:21

## 2024-01-24 RX ADMIN — ATORVASTATIN CALCIUM 10 MG: 10 TABLET, FILM COATED ORAL at 08:20

## 2024-01-24 RX ADMIN — SUCRALFATE 1 G: 1 TABLET ORAL at 08:21

## 2024-01-24 RX ADMIN — METOPROLOL SUCCINATE 100 MG: 50 TABLET, EXTENDED RELEASE ORAL at 08:21

## 2024-01-24 RX ADMIN — GUAIFENESIN 600 MG: 600 TABLET, EXTENDED RELEASE ORAL at 08:21

## 2024-01-24 RX ADMIN — SODIUM CHLORIDE, PRESERVATIVE FREE 10 ML: 5 INJECTION INTRAVENOUS at 08:21

## 2024-01-24 RX ADMIN — IPRATROPIUM BROMIDE AND ALBUTEROL SULFATE 1 DOSE: 2.5; .5 SOLUTION RESPIRATORY (INHALATION) at 07:23

## 2024-01-24 RX ADMIN — LEVETIRACETAM 500 MG: 500 TABLET, FILM COATED ORAL at 08:21

## 2024-01-24 RX ADMIN — SERTRALINE HYDROCHLORIDE 50 MG: 50 TABLET ORAL at 08:21

## 2024-01-24 RX ADMIN — LEVOTHYROXINE SODIUM 88 MCG: 88 TABLET ORAL at 05:39

## 2024-01-24 RX ADMIN — SUCRALFATE 1 G: 1 TABLET ORAL at 12:36

## 2024-01-24 RX ADMIN — PANTOPRAZOLE SODIUM 40 MG: 40 TABLET, DELAYED RELEASE ORAL at 05:39

## 2024-01-24 RX ADMIN — MAGNESIUM SULFATE HEPTAHYDRATE 2000 MG: 40 INJECTION, SOLUTION INTRAVENOUS at 08:20

## 2024-01-24 RX ADMIN — RIFAXIMIN 550 MG: 550 TABLET ORAL at 08:21

## 2024-01-24 NOTE — DISCHARGE INSTR - COC
Continuity of Care Form    Patient Name: Nancy Franz   :  1950  MRN:  [id number]    Admit date:  2024  Discharge date:  2024    Code Status Order: Full Code   Advance Directives:   Advance Care Flowsheet Documentation       Date/Time Healthcare Directive Type of Healthcare Directive Copy in Chart Healthcare Agent Appointed Healthcare Agent's Name Healthcare Agent's Phone Number    24 1020 Yes, patient has an advance directive for healthcare treatment -- -- -- -- --            Admitting Physician:  Kay Adams MD  PCP: Ryan Ruiz MD    Discharging Nurse: CHARLIE Sanford  Discharging Hospital Unit/Room#: /0312-01  Discharging Unit Phone Number: [phone]    Emergency Contact:   Extended Emergency Contact Information  Primary Emergency Contact: Margaret Franz  Address: [address] [place] [address] [place] [address] [facility]  Home Phone: [phone]  Mobile Phone: [phone]  Relation: Child   needed? No  Secondary Emergency Contact: Niesha Unger  Home Phone: [phone]  Relation: Friend    Past Surgical History:  Past Surgical History:   Procedure Laterality Date    CATARACT REMOVAL WITH IMPLANT Left 2014    CATARACT REMOVAL WITH IMPLANT Right 2015    phacoemulsification with initraocular lens implant     SECTION      x3    COLONOSCOPY  2012    HYSTERECTOMY (CERVIX STATUS UNKNOWN)      IR MIDLINE CATH  2024    IR MIDLINE CATH 2024 [facility] SPECIAL PROCEDURES    OTHER SURGICAL HISTORY  2023    EGD    TONSILLECTOMY      TUBAL LIGATION      UPPER GASTROINTESTINAL ENDOSCOPY  2014    gastric polyp    UPPER GASTROINTESTINAL ENDOSCOPY  2014    gastric polyp    UPPER GASTROINTESTINAL ENDOSCOPY  2018    gastritis    UPPER GASTROINTESTINAL ENDOSCOPY N/A 2023    [facility]    UPPER GASTROINTESTINAL ENDOSCOPY N/A 2024    EGD BIOPSY

## 2024-01-24 NOTE — FLOWSHEET NOTE
01/23/24 2100   Vital Signs   Temp 98.2 °F (36.8 °C)   Temp Source Oral   Pulse 94   Heart Rate Source Monitor   Respirations 18   /67   MAP (Calculated) 82   BP Location Left upper arm   BP Method Automatic   Patient Position Semi fowlers   Pain Assessment   Pain Assessment None - Denies Pain   Oxygen Therapy   SpO2 96 %   O2 Device Nasal cannula   O2 Flow Rate (L/min) 3 L/min     PM Assessment completed. Scheduled medications given per MAR, On 3L of oxygen, A&O X4, Vital Signs completed and Charted, Patient denies any further needs at this time. Call light within reach, Reminded patient to call RN if she needed anything.

## 2024-01-24 NOTE — CARE COORDINATION
DISCHARGE ORDER  Date/Time 2024 12:42 PM  Completed by: Loida Peguero, Case Management    Patient Name: Nancy Franz      : 1950  Admitting Diagnosis: Esophagitis [K20.90]  Atrial fibrillation with RVR (HCC) [I48.91]  Intractable nausea and vomiting [R11.2]  Sepsis (HCC) [A41.9]  Acute on chronic respiratory failure with hypoxia (HCC) [J96.21]  Pneumonia of both lungs due to infectious organism, unspecified part of lung [J18.9]  Sepsis with acute renal failure without septic shock, due to unspecified organism, unspecified acute renal failure type (HCC) [A41.9, R65.20, N17.9]      Admit order Date and Status:2024 Stable  (verify MD's last order for status of admission)      Noted discharge order.     If applicable PT/OT recommendation at Discharge: Discharge Recommendations: SNF  DME needs for discharge: Defer to facility    Confirmed discharge plan: Yes  with whom___Nancy____________  If pt confirmed DC plan does family need to be contacted by CM No    Discharge Plan: Reviewed chart, noted DC order, met with pt at bedside. Pt to be DC home today, daughter providing transportation. HC set up through [facility].       Date of Last IMM Given: 2024    Reviewed chart.  Role of discharge planner explained and patient verbalized understanding.     Discharge order is noted.      Has Home O2 in place on admit:  Yes  Informed of need to bring portable home O2 tank on day of discharge for nursing to connect prior to leaving:   Yes  Verbalized agreement/Understanding:   Yes    Pt is being d/c'd to home today. Pt's O2 sats are 97% on 3 L NC.      Discharge timeout done with PT, CM, . All discharge needs and concerns addressed.

## 2024-01-24 NOTE — CARE COORDINATION
Per note in Epic from [facility], they are not able to service pt. Called and spoke with Sury at [facility], ref made. Will continue to monitor.

## 2024-01-24 NOTE — CARE COORDINATION
[facility]  Unable to service for HC needs due to volume. CTN to assist CM in arranging HC agency.    CTN to follow up with CM today.    Telephone call to Yanna at [facility] . AYAH Crenshaw with [facility] is checking availability for SOC.     Spoke with Gricel NARVAEZ. Aware [facility] is checking on coverage. CM also referred to [facility].    [facility] is able to service for HC. Spoke with Gricel CM in follow up.    Electronically signed by Daiana Cornelius RN on 1/24/2024 at 8:25 AM

## 2024-01-24 NOTE — FLOWSHEET NOTE
01/24/24 0722   Vital Signs   Temp 98.4 °F (36.9 °C)   Temp Source Oral   Pulse 64   Heart Rate Source Monitor   Respirations 18   BP (!) 113/54   MAP (Calculated) 74   BP Location Left upper arm   BP Method Automatic   Patient Position Semi fowlers   Oxygen Therapy   SpO2 98 %   O2 Device Nasal cannula   O2 Flow Rate (L/min) 3 L/min     Patient resting quietly in bed. No s/s of distress noted. Mag 1.5, 2g IV replacement initiated at this time. Shift assessment complete, see flow sheet. Denies needs at this time. Call light in reach. Will monitor.

## 2024-01-24 NOTE — PROGRESS NOTES
PROGRESS NOTE  S:73 yrs Patient  admitted on 1/16/2024 with Esophagitis [K20.90]  Atrial fibrillation with RVR (HCC) [I48.91]  Intractable nausea and vomiting [R11.2]  Sepsis (HCC) [A41.9]  Acute on chronic respiratory failure with hypoxia (HCC) [J96.21]  Pneumonia of both lungs due to infectious organism, unspecified part of lung [J18.9]  Sepsis with acute renal failure without septic shock, due to unspecified organism, unspecified acute renal failure type (HCC) [A41.9, R65.20, N17.9] .  Today, she is alert and oriented. She reports having BM today. She denies abdominal pain. She remains on 3 L NC.  No further signs of bleeding noted.     Exam:   Vitals:    01/22/24 1145   BP: (!) 134/94   Pulse: (!) 104   Resp: 18   Temp: 98.1 °F (36.7 °C)   SpO2: 96%   Generalized: alert, no acute distress  HEENT: sclera clear, anicteric  Neck: supple, trachea midline  Heart: A.fib  Abdomen: soft, NT, ND  Extremities: no edema    Medications: Reviewed    Labs:  CBC:   Recent Labs     01/20/24  0944 01/20/24  1750 01/21/24  0253 01/21/24  0855 01/21/24  1600 01/22/24  0600 01/22/24  0915   WBC 6.6  --  7.9  --  7.7  --   --    HGB 9.4*   < > 9.6*   < > 9.0* 9.1* 9.4*   HCT 29.2*   < > 29.3*   < > 27.6* 28.3* 29.1*   MCV 92.9  --  88.8  --  89.9  --   --      --  260  --  233  --   --     < > = values in this interval not displayed.     BMP:   Recent Labs     01/20/24  0944 01/21/24  1600 01/22/24  0915    142 139   K 3.7 2.8* 3.4*   * 111* 109   CO2 24 23 22   BUN 23* 17 13   CREATININE 0.8 0.7 0.7     Assessment  72 yo female with pmx of COPD, CHF, CKD, DM, HTN, VILLEGAS cirrhosis decompensated by varices admitted for acute on chronic respiratory failure and coffee ground emesis. EGD showed severe esophagitis, but no active bleeding. Altered mental status secondary to encephalopathy (hepatic vs metabolic) improved    Plan  Continue supportive care   PPI BID x 8 wks  Continue 
                                     PROGRESS NOTE  S:73 yrs Patient  admitted on 1/16/2024 with Esophagitis [K20.90]  Atrial fibrillation with RVR ([facility]) [I48.91]  Intractable nausea and vomiting [R11.2]  Sepsis ([facility]) [A41.9]  Acute on chronic respiratory failure with hypoxia ([facility]) [J96.21]  Pneumonia of both lungs due to infectious organism, unspecified part of lung [J18.9]  Sepsis with acute renal failure without septic shock, due to unspecified organism, unspecified acute renal failure type (HCC) [A41.9, R65.20, N17.9] .  Today she appears lethargic and somnolent. She was not give lactulose yesterday or this AM because she was drowsy. No further bleeding. Tolerating diet.     Exam:   Vitals:    01/19/24 1230   BP: (!) 143/86   Pulse: 76   Resp: 18   Temp: 97.7 °F (36.5 °C)   SpO2: 95%     General: somnolent but arousable  HEENT: Neck supple with midline trachea  Neck: supple, symmetrical, trachea midline  Heart: irregularly irregular  Abdomen:  soft, NT, ND  Extremities:  trace edema      Medications: Reviewed    Labs:  CBC:   Recent Labs     01/17/24 0432 01/17/24  1347 01/18/24  0517 01/18/24  1653 01/19/24  0527 01/19/24  0829 01/19/24  1336   WBC 10.7  --  11.2*  --   --   --   --    HGB 11.1*   < > 9.7*   < > 9.5* 9.4* 9.7*   HCT 32.4*   < > 29.9*   < > 28.9* 28.9* 31.1*   MCV 97.4  --  91.6  --   --   --   --      --  300  --   --   --   --     < > = values in this interval not displayed.     BMP:   Recent Labs     01/17/24 0432 01/18/24  0517 01/19/24  0829   * 144 137   K 3.7 4.1 3.4*   CL 96* 109 106   CO2 24 25 23   BUN 45* 33* 28*   CREATININE 1.5* 0.9 0.8     LIVER PROFILE:   Recent Labs     01/17/24  0432   AST 21   ALT 10   PROT 7.2   BILITOT 0.5   ALKPHOS 288*       Impression:73 year old female with history of DM, HTN, HLD, COPD, CHF, A fib, CKD, VILLEGAS cirrhosis decompensated by varices admitted with COPD exacerbation and coffee ground emesis. EGD showed esophagitis and HH but no 
      CARDIOLOGY PROGRESS NOTE        Patient Name: Nancy Franz  Date of admission: 1/16/2024 10:19 AM  Admission Dx: Esophagitis [K20.90]  Atrial fibrillation with RVR (HCC) [I48.91]  Intractable nausea and vomiting [R11.2]  Sepsis (HCC) [A41.9]  Acute on chronic respiratory failure with hypoxia (HCC) [J96.21]  Pneumonia of both lungs due to infectious organism, unspecified part of lung [J18.9]  Sepsis with acute renal failure without septic shock, due to unspecified organism, unspecified acute renal failure type (HCC) [A41.9, R65.20, N17.9]  Requesting Physician: Kay Adams MD  Primary Care physician: Ryan Ruiz MD    Reason for Consultation/Chief Complaint: Atrial fibrillation     History of Present Illness:     Nancy Franz is a 73 y.o. patient with a prior medical history notable for atrial fibrillation, COPD, Diabetes mellitus, hypertension, and anxiety, who presented to the hospital with complaints of viral prodrome with abd/chest pain. Cardiology is consulted for atrial fibrillation.     She had an EGD by GI on 12/07/23 that showed no active bleeding but severe ulceration of lower esophagus.     ED course/Vital signs on presentation: tachycardic. Low-normal BP. 3L NC. CT chest/abd consistent with bronchiolitis/PNA, esophagitis, cirrhosis of the liver. EKG showed atrial fibrillation with RVR, ST&T wave abnormality lateral leads. Lactate 2.4, trop 88> 85 (consistent with early December), WBC 19.5, Scr 2.5 (from normal baseline). Started on anti-microbial therapy, cardizem gtt and admitted.     Converted overnight to sinus 1/17. On re-evaluation, noted EGD 1/17 w/ severe LA class D esophagitis, hiatal hernia, portal HTN gastropathy, no active bleeding .  Noted venous Dopplers positive for DVT bilaterally Metop inc to BID dosing, asked to wean dilt gtt. she was placed back on apixaban.    Today, noted kidney function has improved back to near baseline.  Remains on 3 L oxygen therapy. Dark 
   01/17/24 1427   Encounter Summary   Encounter Overview/Reason  Initial Encounter   Service Provided For: Patient   Referral/Consult From: Chacha   Last Encounter  01/17/24  ( made initial visit - offered pastoral support and prayer)       
   01/17/24 2000   RT Protocol   History Pulmonary Disease 2   Respiratory pattern 0   Breath sounds 2   Cough 0   Indications for Bronchodilator Therapy Decreased or absent breath sounds   Bronchodilator Assessment Score 4     RT Inhaler-Nebulizer Bronchodilator Protocol Note    There is a bronchodilator order in the chart from a provider indicating to follow the RT Bronchodilator Protocol and there is an “Initiate RT Inhaler-Nebulizer Bronchodilator Protocol” order as well (see protocol at bottom of note).    CXR Findings:  XR CHEST PORTABLE    Result Date: 1/16/2024  Right lower lung opacity which may reflect airspace disease.       The findings from the last RT Protocol Assessment were as follows:   History Pulmonary Disease: Chronic pulmonary disease  Respiratory Pattern: Regular pattern and RR 12-20 bpm  Breath Sounds: Slightly diminished and/or crackles  Cough: Strong, spontaneous, non-productive  Indication for Bronchodilator Therapy: Decreased or absent breath sounds  Bronchodilator Assessment Score: 4    Aerosolized bronchodilator medication orders have been revised according to the RT Inhaler-Nebulizer Bronchodilator Protocol below.    Respiratory Therapist to perform RT Therapy Protocol Assessment initially then follow the protocol.  Repeat RT Therapy Protocol Assessment PRN for score 0-3 or on second treatment, BID, and PRN for scores above 3.    No Indications - adjust the frequency to every 6 hours PRN wheezing or bronchospasm, if no treatments needed after 48 hours then discontinue using Per Protocol order mode.     If indication present, adjust the RT bronchodilator orders based on the Bronchodilator Assessment Score as indicated below.  Use Inhaler orders unless patient has one or more of the following: on home nebulizer, not able to hold breath for 10 seconds, is not alert and oriented, cannot activate and use MDI correctly, or respiratory rate 25 breaths per minute or more, then use the 
   01/18/24 0800   COPD Assessment (CAT Score)   Cough Assessment 4   Phlegm Assessment 3   Chest tightness 2   Walking on an incline 3   Home Activities 3   Confident Leaving The Home 3   Sleeping Soundly 5   Have Energy 4   Assessment Score 27   $RT COPD Assessment Yes       
   01/20/24 0948   Oxygen Therapy/Pulse Ox   Blood Gas  Performed? Yes   $ABG $Arterial Puncture   Bc's Test #1 Pos   Site #1 Left Radial   Site Prepped #1 Yes   Number of Attempts #1 1   Pressure Held #1 Yes   Complications #1 None   Post-procedure #1 Standard   Specimen Status #1 To lab   How Tolerated? Tolerated well       
1/17  Vanc random = 14.2 mcg/mL at 0043.  Give vancomycin 1000 mg x1.  Continue to check vanc levels daily.  Jairo Padron, PharmD  1/17/2024 6:00 AM  
4 Eyes Skin Assessment     NAME:  Nancy Franz  YOB: 1950  MEDICAL RECORD NUMBER:  [id number]    The patient is being assessed for  Admission    I agree that at least one RN has performed a thorough Head to Toe Skin Assessment on the patient. ALL assessment sites listed below have been assessed.      Areas assessed by both nurses:    Head, Face, Ears, Shoulders, Back, Chest, Arms, Elbows, Hands, Sacrum. Buttock, Coccyx, Ischium, Legs. Feet and Heels, and Under Medical Devices         Does the Patient have a Wound? No noted wound(s)       Abdiel Prevention initiated by RN: No  Wound Care Orders initiated by RN: No    Pressure Injury (Stage 3,4, Unstageable, DTI, NWPT, and Complex wounds) if present, place Wound referral order by RN under : No    New Ostomies, if present place, Ostomy referral order under : No     Nurse 1 eSignature: Electronically signed by Gladis Bryant RN on 1/16/24 at 5:22 PM EST    **SHARE this note so that the co-signing nurse can place an eSignature**    Nurse 2 eSignature: Electronically signed by Claribel Arciniega RN on 1/16/24 at 6:03 PM EST    
Admission assessment completed.      Call light in reach and standard safety measures in place. Pt resting in bed; no needs or complaints at this time.    
Admit: 2024    Name:  Nancy Franz  Room:  [facility]/0312-01  MRN:    [id number]  Daily Progress Note for 2024   HCAP,acute on chronic resp failure,aA. Fib RVR and VIOLETA   Interval History:     Now on 3 L of O2  Feels a little better     Scheduled Meds:   vancomycin  1,000 mg IntraVENous Once    [Held by provider] apixaban  5 mg Oral BID    levETIRAcetam  500 mg Oral BID    metoprolol succinate  25 mg Oral Daily    sodium chloride flush  5-40 mL IntraVENous 2 times per day    cefepime  1,000 mg IntraVENous Q12H    vancomycin (VANCOCIN) intermittent dosing (placeholder)   Other RX Placeholder    pantoprazole  40 mg IntraVENous BID    digoxin  250 mcg IntraVENous Once    sucralfate  1 g Oral 4x Daily    levothyroxine  88 mcg Oral Daily    atorvastatin  10 mg Oral Daily    sennosides-docusate sodium  2 tablet Oral BID    ipratropium 0.5 mg-albuterol 2.5 mg  1 Dose Inhalation Q4H WA RT    predniSONE  40 mg Oral Daily    guaiFENesin  600 mg Oral BID    sertraline  50 mg Oral Daily    lactulose  20 g Oral TID       Continuous Infusions:   dilTIAZem 10 mg/hr (24 0127)    sodium chloride      sodium chloride 75 mL/hr at 24 0424       PRN Meds:  perflutren lipid microspheres, sodium chloride flush, sodium chloride, ondansetron **OR** ondansetron, polyethylene glycol, acetaminophen **OR** acetaminophen, ipratropium 0.5 mg-albuterol 2.5 mg, albuterol sulfate HFA                  Objective:     Temp  Av.6 °F (36.4 °C)  Min: 97.1 °F (36.2 °C)  Max: 98.2 °F (36.8 °C)  Pulse  Av.6  Min: 76  Max: 182  BP  Min: 94/70  Max: 131/67  SpO2  Av.6 %  Min: 88 %  Max: 96 %  Patient Vitals for the past 4 hrs:   BP Temp Temp src Pulse Resp SpO2 Height Weight   24 0414 (!) 116/58 97.1 °F (36.2 °C) Oral 76 20 94 % 1.549 m (5' 1\") 88.8 kg (195 lb 12.8 oz)         Intake/Output Summary (Last 24 hours) at 2024 0720  Last data filed at 2024 8426  Gross per 24 hour   Intake 180 ml   Output 350 ml   Net 
Attempted to call report to patient's nurse to give report regarding patient's EGD but I did not receive and answer, the nurses station also could not be reached. Will attempt to call back to give report.   
Bedside report and transfer of car given to Lamberto GUERRA.Cecilia Sharp RN    
Bedside report and transfer of care given to CHARLIE Castano. Pt currently resting in bed with the call light within reach. Pt denies any other care needs at this time. Pt stable at this time.      Malina Godoy RN    
Bedside report and transfer of care given to CHARLIE Castano. Pt currently resting in bed with the call light within reach. Pt denies any other care needs at this time. Pt stable at this time.    Malina Godoy RN    
Bedside report given to CHARLIE Castano. Care transferred.     
[facility] Called. Patient is being monitored via Endo monitors    
Called and gave report to Ryan GUERRA, made aware that pt had some clear emesis after drinking water, heart monitor in place and pt on oxygen NC @ 3L   
Cardizem gtt titrated off at this time.   
Dilt drip turned off per nursing communication. HR 77 and NSR  
Dilt drip weaned to 2.5 per nursing communication. Will re check rhythm in 2 hours and turn off if still NSR.  
EOS report given to Omaira GUERRA, care transferred at this time  
Family updated on care plan.    Malina Godoy RN    
GI Progress Note      SUBJECTIVE:  Pt is vomiting at the time of my visit immediately after consuming Lactulose.  Denies abd pain, hematemesis, melena or hematochezia.    OBJECTIVE      Medications    Current Facility-Administered Medications: levETIRAcetam (KEPPRA) tablet 500 mg, 500 mg, Oral, BID  apixaban (ELIQUIS) tablet 5 mg, 5 mg, Oral, BID  ipratropium 0.5 mg-albuterol 2.5 mg (DUONEB) nebulizer solution 1 Dose, 1 Dose, Inhalation, TID  folic acid (FOLVITE) tablet 1 mg, 1 mg, Oral, Daily  rifAXIMin (XIFAXAN) tablet 550 mg, 550 mg, Oral, BID  [COMPLETED] piperacillin-tazobactam (ZOSYN) 4,500 mg in sodium chloride 0.9 % 100 mL IVPB (mini-bag), 4,500 mg, IntraVENous, Once **FOLLOWED BY** piperacillin-tazobactam (ZOSYN) 3,375 mg in sodium chloride 0.9 % 100 mL IVPB (mini-bag), 3,375 mg, IntraVENous, Q8H  metoprolol succinate (TOPROL XL) extended release tablet 25 mg, 25 mg, Oral, BID  perflutren lipid microspheres (DEFINITY) injection 1.5 mL, 1.5 mL, IntraVENous, ONCE PRN  sodium chloride flush 0.9 % injection 5-40 mL, 5-40 mL, IntraVENous, 2 times per day  sodium chloride flush 0.9 % injection 10 mL, 10 mL, IntraVENous, PRN  0.9 % sodium chloride infusion, , IntraVENous, PRN  ondansetron (ZOFRAN-ODT) disintegrating tablet 4 mg, 4 mg, Oral, Q8H PRN **OR** ondansetron (ZOFRAN) injection 4 mg, 4 mg, IntraVENous, Q6H PRN  polyethylene glycol (GLYCOLAX) packet 17 g, 17 g, Oral, Daily PRN  acetaminophen (TYLENOL) tablet 650 mg, 650 mg, Oral, Q6H PRN **OR** acetaminophen (TYLENOL) suppository 650 mg, 650 mg, Rectal, Q6H PRN  ipratropium 0.5 mg-albuterol 2.5 mg (DUONEB) nebulizer solution 1 Dose, 1 Dose, Inhalation, Q4H PRN  digoxin (LANOXIN) injection 250 mcg, 250 mcg, IntraVENous, Once  sucralfate (CARAFATE) tablet 1 g, 1 g, Oral, 4x Daily  albuterol sulfate HFA (PROVENTIL;VENTOLIN;PROAIR) 108 (90 Base) MCG/ACT inhaler 2 puff, 2 puff, Inhalation, 4x Daily PRN  levothyroxine (SYNTHROID) tablet 88 mcg, 88 mcg, Oral, 
Handoff report given to Analy GUERRA.  Patient is in stable condition and has no needs at this time. Call light is in reach and bed is in the lowest position.  Care is transferred at this time.    
Handoff report given to Benny GUERRA.  Patient is in stable condition and has no needs at this time. Call light is in reach and bed is in the lowest position.  Care is transferred at this time.    
Handoff report given to Omaira GUERRA.  Patient is in stable condition and has no needs at this time. Call light is in reach and bed is in the lowest position.  Care is transferred at this time.    
Head CT ordered by Dr. Dickens. Orders for ammonia, TSH, folic and keppra.      Mg 1.4, K 3.4; orders for 2 grams of magnesium and 20mEq of potassium by Dr. Angie Godoy, RN    
Low dose Heparin Protocol - recently taken Eliquis - aPTT monitoring  aPTT at 1900 check is subtherapeutic.  At 39.1 seconds.  Give a 2,600 unit IV bolus dose and increase the infusion rate to 16units/kg/hr or 690units/hr.  Recheck the aPTT at 0200 1/21/24.  Desired range is  seconds.  Christo Garcia RPH PharmD 1/20/2024 7:38 PM    
Low dose Heparin Protocol - recently taken Eliquis - aPTT monitoring should be used x 48 hours at least.  Unfortunately, the Anti-Xa was ordered instead = 0.61.  Continue the same heparin drip rate of 16 units/kg/h, and will order an aPTT with AM lab draws.            
MD made aware of blood stools.    Malina Godoy RN    
Nutrition Assessment     Type and Reason for Visit: Initial, Positive Nutrition Screen (MST 3)    Nutrition Recommendations/Plan:   Continue Clears and advance as Pt tolerates      Malnutrition Assessment:  Malnutrition Status: No risk     Nutrition Assessment:  Pt. nutritionally compromised AEB poor intakes PT d/t GI bleed.  At risk for further nutrition compromise r/t esophagitis w/o bleeding and altered nutrition related labs.  Will continue CL and advance per pt desire.     Nutrition Related Findings:   pt A & O x 4 but very sleepy; had END today dx with esophagitis; c/o poor intake x 1 week; Low Na; Low H/H Wound Type: None    Current Nutrition Therapies:    ADULT DIET; Clear Liquid    Anthropometric Measures:  Height: 154.9 cm (5' 0.98\")  Current Body Wt: 89 kg (196 lb 3.4 oz)   BMI: 37.1    Nutrition Diagnosis:   Inadequate protein-energy intake related to altered GI structure as evidenced by poor intake prior to admission    Nutrition Interventions:   Food and/or Nutrient Delivery: Continue Current Diet  Nutrition Education/Counseling: No recommendation at this time  Coordination of Nutrition Care: No recommendation at this time       Goals:     Goals: PO intake 50% or greater, by next RD assessment       Nutrition Monitoring and Evaluation:   Behavioral-Environmental Outcomes: None Identified  Food/Nutrient Intake Outcomes: Diet Advancement/Tolerance, Food and Nutrient Intake  Physical Signs/Symptoms Outcomes: Biochemical Data    Discharge Planning:    Too soon to determine     Petra Kraus RD, LD  Contact: [phone]    
Patient admitted from endo to pacu.  Bedside report received. Patient immediately hooked up to vitals machine.Cecilia Sharp RN    
Patient educated on discharge instructions as well as new medications use, dosage, administration and possible side effects.  Patient verified knowledge. IV removed without difficulty and dry dressing in place. Telemetry monitor removed and returned to CM[facility]. Patient waiting for transportation home.     
Patient left floor to Endo.   
Patient provided a COPD Educational Folder that includes the following materials:     [x]  [employer] Booklet: Managing your COPD  [x]  ALA: Getting the Most Out of Medication Delivery Devices  [x]  ALA: My COPD Action Plan  [x]  Better Breathers Club: [facility]   [x]  Smoking Cessation Classes  [x]  Outpatient Spiritual Care Services  [x]  Magnet: Signs of COPD    PATIENT/CAREGIVER TEACHING:   Level of patient/caregiver understanding able to:   [x] Verbalize understanding   [] Demonstrate understanding       [] Teach back        [] Needs reinforcement     []  Other:     COPD ASSESSMENT TOOL (CAT):   Cough Assessment: 4   Phlegm Assessment: 3   Chest tightness:  2   Walking on an incline: 3   Home Activities: 3   Confident Leaving the Home: 3   Sleeping Soundly: 5   Have Energy: 4   Assessment Score: 27    CAT score of 27. Patient already had COPD folder at bedside. Patient with tired and requested to sleep at this time. Will follow-up with resources prior to discharge.     Electronically signed by Jodie Whiteside RN on 1/18/2024 at 3:00 PM    
Patient went from A-fib to sinus rhythm, EKG ordered  
Patient's daughter here. Transport requested.   
Pre-Operative:  1.  Patient/Caregiver identifies - states name and date of birth.  2.  The patient is free from signs and symptoms of injury.  3.  The patient receives appropriate medication(s), safely administered during the Perioperative period.  4.  The patients's fluid, electrolyte, and acid-base balances are established preoperatively.  5.  The patient's pulmonary function is established preoperatively.  6.  The patient's cardiovascular status is established preoperatively.  7.  The patient / caregiver demonstrates knowledge of nutritional management related to the operative or other invasive procedure.  8.  The patient/caregiver demonstrates knowledge of medication management.  9.  The patient/caregiver demonstrates knowledge of pain management.  10.  The patient/caregiver participates in decisions affection his or her Perioperative plan of care.  11.  The patient's care is consistent with the individualized Perioperative plan of care.  12.  The patient's right to privacy is maintained.  13.  The patient is the recipient of competent and ethical care within legal standards of practice.  14.  The patient's value system, lifestyle, ethnicity, and culture are considered, respected, and incorporated in the Perioperative plan of care and understands special services available.  15.  The patient demonstrates and/or reports adequate pain control throughout the the Perioperative period.  16.  The patient's neurological status is established preoperatively.  17.  The patient/caregiver demonstrates knowledge of the expected responses to the endoscopy procedure.  18.  Patient/Caregiver has reduced anxiety.  Interventions- Familiarize with environment and equipment.  19. Patient/Caregiver verbalizes understanding of Phase II and/or Phase I process.  20.  Patient pain level is established preoperatively using age appropriate pain scale.  21.  The patient will move to fall risk upon sedation- during and through the recovery 
Primary RN reported pt has midline and medication incompatibility. Writer attempted 2 peripheral IV lines and both lines unsuccessful. Writer informed primary RN to discuss with IM.   
Pt got up to commode with PT, HR went up to 160, now staying 160-180 while Pt is in bed, complaining of chest pain. Stat EKG ordered, provider notified.  
Pt heart rate continues to fluctuate, but has remained in the 150's range for approx 30 minutes. Pt asymptomatic. Current order of 5mg/5mL of Cardizem is running. Provider notified.    New order received for Cardizem to be increased to 10mg/mL, but pharm needs order modified to allow for a tapering of increase. Pharm is contacting provider to have order adjusted.     Order changed to 7.5mg/mL.    @0438 titrated to 5mg/mL with heart rate @96, BP 95/53    Will continue to monitor.   
Pt is a/o in stable condition ready for transport back to room    
Pulmonary Progress Note    CC: COPD pneumonia    Subjective:   3 L O2  No fever  Cough with yellow sputum        Intake/Output Summary (Last 24 hours) at 1/17/2024 0754  Last data filed at 1/17/2024 0414  Gross per 24 hour   Intake 180 ml   Output 350 ml   Net -170 ml       Exam:   BP (!) 116/58   Pulse 76   Temp 97.1 °F (36.2 °C) (Oral)   Resp 20   Ht 1.549 m (5' 1\")   Wt 88.8 kg (195 lb 12.8 oz)   SpO2 94%   BMI 37.00 kg/m²  on 3 L  Gen: No distress.   Eyes: PERRL. No sclera icterus. No conjunctival injection.   ENT: No discharge. Pharynx clear.   Neck: Trachea midline. No obvious mass.    Resp: Mild accessory muscle use. No crackles. Bilateral wheezes. No rhonchi. No dullness on percussion.  CV: Regular rate. irregular rhythm. No murmur or rub. No edema.   GI: Non-tender. Non-distended. No hernia.   Skin: Warm and dry. No nodule on exposed extremities.   Lymph: No cervical LAD. No supraclavicular LAD.   M/S: No cyanosis. No joint deformity. No clubbing.   Neuro: Awake. Alert. Moves all four extremities.   Psych: Oriented x 3. No anxiety.     Scheduled Meds:   vancomycin  1,000 mg IntraVENous Once    [Held by provider] apixaban  5 mg Oral BID    levETIRAcetam  500 mg Oral BID    metoprolol succinate  25 mg Oral Daily    sodium chloride flush  5-40 mL IntraVENous 2 times per day    cefepime  1,000 mg IntraVENous Q12H    vancomycin (VANCOCIN) intermittent dosing (placeholder)   Other RX Placeholder    pantoprazole  40 mg IntraVENous BID    digoxin  250 mcg IntraVENous Once    sucralfate  1 g Oral 4x Daily    levothyroxine  88 mcg Oral Daily    atorvastatin  10 mg Oral Daily    sennosides-docusate sodium  2 tablet Oral BID    ipratropium 0.5 mg-albuterol 2.5 mg  1 Dose Inhalation Q4H WA RT    predniSONE  40 mg Oral Daily    guaiFENesin  600 mg Oral BID    sertraline  50 mg Oral Daily    lactulose  20 g Oral TID     Continuous Infusions:   dilTIAZem 10 mg/hr (01/17/24 0127)    sodium chloride      sodium 
Pulmonary Progress Note    CC: COPD pneumonia    Subjective:   Fully oriented  No Cough   Heparin drip with stable H&H  Still 3 L O2        Intake/Output Summary (Last 24 hours) at 1/22/2024 1136  Last data filed at 1/22/2024 0915  Gross per 24 hour   Intake 1088.44 ml   Output 300 ml   Net 788.44 ml         Exam:   BP 99/61   Pulse 95   Temp 97.9 °F (36.6 °C) (Oral)   Resp 18   Ht 1.524 m (5')   Wt 89 kg (196 lb 1.6 oz)   SpO2 95%   BMI 38.30 kg/m²  on 3 L  Gen: No distress.   Eyes: PERRL. No sclera icterus. No conjunctival injection.   ENT: No discharge. Pharynx clear.   Neck: Trachea midline. No obvious mass.    Resp: No accessory muscle use. No crackles.  Few  wheezes. No rhonchi. No dullness on percussion.  CV: Regular rate. irregular rhythm. No murmur or rub. No edema.   GI: Non-tender. Non-distended. No hernia.   Skin: Warm and dry. No nodule on exposed extremities.   Lymph: No cervical LAD. No supraclavicular LAD.   M/S: No cyanosis. No joint deformity. No clubbing.   Neuro: AAOx3.  Follows commands.  Psych: No agitation.  No anxiety.     Scheduled Meds:   ipratropium 0.5 mg-albuterol 2.5 mg  1 Dose Inhalation BID RT    metoprolol succinate  50 mg Oral BID    metoprolol succinate  25 mg Oral Once    levETIRAcetam  500 mg Oral BID    apixaban  5 mg Oral BID    folic acid  1 mg Oral Daily    rifAXIMin  550 mg Oral BID    piperacillin-tazobactam  3,375 mg IntraVENous Q8H    sodium chloride flush  5-40 mL IntraVENous 2 times per day    digoxin  250 mcg IntraVENous Once    sucralfate  1 g Oral 4x Daily    levothyroxine  88 mcg Oral Daily    atorvastatin  10 mg Oral Daily    guaiFENesin  600 mg Oral BID    sertraline  50 mg Oral Daily     Continuous Infusions:   dilTIAZem 5 mg/hr (01/22/24 4610)    sodium chloride Stopped (01/20/24 6524)     PRN Meds:  perflutren lipid microspheres, sodium chloride flush, sodium chloride, ondansetron **OR** ondansetron, polyethylene glycol, acetaminophen **OR** 
Pulmonary Progress Note    CC: COPD pneumonia    Subjective:   Fully oriented  No Cough   On elquis now   Still 3 L O2,base line        Intake/Output Summary (Last 24 hours) at 1/23/2024 1121  Last data filed at 1/23/2024 0835  Gross per 24 hour   Intake 1062.65 ml   Output --   Net 1062.65 ml         Exam:   /64   Pulse 87   Temp 97.7 °F (36.5 °C) (Oral)   Resp 18   Ht 1.524 m (5')   Wt 87.9 kg (193 lb 12.8 oz)   SpO2 98%   BMI 37.85 kg/m²  on 3 L  Gen: No distress.   Eyes: PERRL. No sclera icterus. No conjunctival injection.   ENT: No discharge. Pharynx clear.   Neck: Trachea midline. No obvious mass.    Resp: No accessory muscle use. No crackles.  Few  wheezes. No rhonchi. No dullness on percussion.  CV: Regular rate. irregular rhythm. No murmur or rub. No edema.   GI: Non-tender. Non-distended. No hernia.   Skin: Warm and dry. No nodule on exposed extremities.   Lymph: No cervical LAD. No supraclavicular LAD.   M/S: No cyanosis. No joint deformity. No clubbing.   Neuro: AAOx3.  Follows commands.  Psych: No agitation.  No anxiety.     Scheduled Meds:   pantoprazole  40 mg Oral BID AC    ipratropium 0.5 mg-albuterol 2.5 mg  1 Dose Inhalation BID RT    metoprolol succinate  50 mg Oral BID    levoFLOXacin  750 mg Oral Nightly    levETIRAcetam  500 mg Oral BID    apixaban  5 mg Oral BID    folic acid  1 mg Oral Daily    rifAXIMin  550 mg Oral BID    sodium chloride flush  5-40 mL IntraVENous 2 times per day    digoxin  250 mcg IntraVENous Once    sucralfate  1 g Oral 4x Daily    levothyroxine  88 mcg Oral Daily    atorvastatin  10 mg Oral Daily    guaiFENesin  600 mg Oral BID    sertraline  50 mg Oral Daily     Continuous Infusions:   dilTIAZem 5 mg/hr (01/23/24 0917)    sodium chloride Stopped (01/20/24 9049)     PRN Meds:  magnesium sulfate, perflutren lipid microspheres, sodium chloride flush, sodium chloride, ondansetron **OR** ondansetron, polyethylene glycol, acetaminophen **OR** acetaminophen, 
Pulmonary Progress Note    CC: COPD pneumonia    Subjective:   Fully oriented this a.m.  Heparin drip with stable H&H  3 L O2        Intake/Output Summary (Last 24 hours) at 1/21/2024 0807  Last data filed at 1/20/2024 2150  Gross per 24 hour   Intake 141.78 ml   Output --   Net 141.78 ml       Exam:   /76   Pulse 84   Temp 97.7 °F (36.5 °C) (Oral)   Resp (!) 8   Ht 1.524 m (5')   Wt 84.3 kg (185 lb 12.8 oz)   SpO2 100%   BMI 36.29 kg/m²  on 3 L  Gen: No distress.   Eyes: PERRL. No sclera icterus. No conjunctival injection.   ENT: No discharge. Pharynx clear.   Neck: Trachea midline. No obvious mass.    Resp: No accessory muscle use. No crackles.  Few  wheezes. No rhonchi. No dullness on percussion.  CV: Regular rate. irregular rhythm. No murmur or rub. No edema.   GI: Non-tender. Non-distended. No hernia.   Skin: Warm and dry. No nodule on exposed extremities.   Lymph: No cervical LAD. No supraclavicular LAD.   M/S: No cyanosis. No joint deformity. No clubbing.   Neuro: AAOx3.  Follows commands.  Psych: No agitation.  No anxiety.     Scheduled Meds:   levETIRAcetam  500 mg Oral BID    apixaban  5 mg Oral BID    ipratropium 0.5 mg-albuterol 2.5 mg  1 Dose Inhalation TID    folic acid  1 mg Oral Daily    rifAXIMin  550 mg Oral BID    piperacillin-tazobactam  3,375 mg IntraVENous Q8H    metoprolol succinate  25 mg Oral BID    sodium chloride flush  5-40 mL IntraVENous 2 times per day    digoxin  250 mcg IntraVENous Once    sucralfate  1 g Oral 4x Daily    levothyroxine  88 mcg Oral Daily    atorvastatin  10 mg Oral Daily    guaiFENesin  600 mg Oral BID    sertraline  50 mg Oral Daily    lactulose  20 g Oral TID     Continuous Infusions:   sodium chloride Stopped (01/20/24 7364)     PRN Meds:  perflutren lipid microspheres, sodium chloride flush, sodium chloride, ondansetron **OR** ondansetron, polyethylene glycol, acetaminophen **OR** acetaminophen, ipratropium 0.5 mg-albuterol 2.5 mg, albuterol sulfate 
Pulmonary Progress Note    CC: COPD pneumonia    Subjective:   Lethargic this a.m. with unremarkable ABG  Stable on O2        Intake/Output Summary (Last 24 hours) at 1/19/2024 0756  Last data filed at 1/19/2024 0140  Gross per 24 hour   Intake 4647.01 ml   Output --   Net 4647.01 ml       Exam:   BP (!) 156/62   Pulse 70   Temp 98 °F (36.7 °C) (Oral)   Resp 18   Ht 1.524 m (5')   Wt 93.8 kg (206 lb 12.8 oz)   SpO2 95%   BMI 40.39 kg/m²  on 3 L  Gen: No distress.   Eyes: PERRL. No sclera icterus. No conjunctival injection.   ENT: No discharge. Pharynx clear.   Neck: Trachea midline. No obvious mass.    Resp: No accessory muscle use. No crackles.  Few  wheezes. No rhonchi. No dullness on percussion.  CV: Regular rate. irregular rhythm. No murmur or rub. No edema.   GI: Non-tender. Non-distended. No hernia.   Skin: Warm and dry. No nodule on exposed extremities.   Lymph: No cervical LAD. No supraclavicular LAD.   M/S: No cyanosis. No joint deformity. No clubbing.   Neuro: Lethargic.  Moves all four extremities.   Psych: Disoriented. No anxiety.     Scheduled Meds:   pantoprazole  40 mg Oral BID AC    metoprolol succinate  25 mg Oral BID    cefepime  2,000 mg IntraVENous Q12H    ipratropium 0.5 mg-albuterol 2.5 mg  1 Dose Inhalation 4x Daily RT    apixaban  5 mg Oral BID    levETIRAcetam  500 mg Oral BID    sodium chloride flush  5-40 mL IntraVENous 2 times per day    digoxin  250 mcg IntraVENous Once    sucralfate  1 g Oral 4x Daily    levothyroxine  88 mcg Oral Daily    atorvastatin  10 mg Oral Daily    sennosides-docusate sodium  2 tablet Oral BID    predniSONE  40 mg Oral Daily    guaiFENesin  600 mg Oral BID    sertraline  50 mg Oral Daily    lactulose  20 g Oral TID     Continuous Infusions:   sodium chloride Stopped (01/19/24 0050)     PRN Meds:  perflutren lipid microspheres, sodium chloride flush, sodium chloride, ondansetron **OR** ondansetron, polyethylene glycol, acetaminophen **OR** acetaminophen, 
Pulmonary Progress Note    CC: COPD pneumonia    Subjective:   Remains lethargic  Follows commands  3 L O2        Intake/Output Summary (Last 24 hours) at 1/20/2024 0826  Last data filed at 1/20/2024 0639  Gross per 24 hour   Intake 430.97 ml   Output 201 ml   Net 229.97 ml       Exam:   /78   Pulse 83   Temp 97.9 °F (36.6 °C) (Oral)   Resp 16   Ht 1.524 m (5')   Wt 43.3 kg (95 lb 8 oz)   SpO2 97%   BMI 18.65 kg/m²  on 3 L  Gen: No distress.   Eyes: PERRL. No sclera icterus. No conjunctival injection.   ENT: No discharge. Pharynx clear.   Neck: Trachea midline. No obvious mass.    Resp: No accessory muscle use. No crackles.  Few  wheezes. No rhonchi. No dullness on percussion.  CV: Regular rate. irregular rhythm. No murmur or rub. No edema.   GI: Non-tender. Non-distended. No hernia.   Skin: Warm and dry. No nodule on exposed extremities.   Lymph: No cervical LAD. No supraclavicular LAD.   M/S: No cyanosis. No joint deformity. No clubbing.   Neuro: Lethargic.  Moves all four extremities.  No clear focal deficit.  Responsive to painful stimuli.  Psych: Disoriented. No anxiety.     Scheduled Meds:   ipratropium 0.5 mg-albuterol 2.5 mg  1 Dose Inhalation TID    folic acid  1 mg Oral Daily    rifAXIMin  550 mg Oral BID    piperacillin-tazobactam  3,375 mg IntraVENous Q8H    pantoprazole  40 mg Oral BID AC    metoprolol succinate  25 mg Oral BID    apixaban  5 mg Oral BID    levETIRAcetam  500 mg Oral BID    sodium chloride flush  5-40 mL IntraVENous 2 times per day    digoxin  250 mcg IntraVENous Once    sucralfate  1 g Oral 4x Daily    levothyroxine  88 mcg Oral Daily    atorvastatin  10 mg Oral Daily    sennosides-docusate sodium  2 tablet Oral BID    predniSONE  40 mg Oral Daily    guaiFENesin  600 mg Oral BID    sertraline  50 mg Oral Daily    lactulose  20 g Oral TID     Continuous Infusions:   sodium chloride Stopped (01/20/24 5537)     PRN Meds:  perflutren lipid microspheres, sodium chloride flush, 
RT Inhaler-Nebulizer Bronchodilator Protocol Note    There is a bronchodilator order in the chart from a provider indicating to follow the RT Bronchodilator Protocol and there is an “Initiate RT Inhaler-Nebulizer Bronchodilator Protocol” order as well (see protocol at bottom of note).    CXR Findings:  No results found.    The findings from the last RT Protocol Assessment were as follows:   History Pulmonary Disease: (P) Chronic pulmonary disease  Respiratory Pattern: (P) Regular pattern and RR 12-20 bpm  Breath Sounds: (P) Slightly diminished and/or crackles  Cough: (P) Strong, spontaneous, non-productive  Indication for Bronchodilator Therapy: (P) Decreased or absent breath sounds  Bronchodilator Assessment Score: (P) 4    Aerosolized bronchodilator medication orders have been revised according to the RT Inhaler-Nebulizer Bronchodilator Protocol below.    Respiratory Therapist to perform RT Therapy Protocol Assessment initially then follow the protocol.  Repeat RT Therapy Protocol Assessment PRN for score 0-3 or on second treatment, BID, and PRN for scores above 3.    No Indications - adjust the frequency to every 6 hours PRN wheezing or bronchospasm, if no treatments needed after 48 hours then discontinue using Per Protocol order mode.     If indication present, adjust the RT bronchodilator orders based on the Bronchodilator Assessment Score as indicated below.  Use Inhaler orders unless patient has one or more of the following: on home nebulizer, not able to hold breath for 10 seconds, is not alert and oriented, cannot activate and use MDI correctly, or respiratory rate 25 breaths per minute or more, then use the equivalent nebulizer order(s) with same Frequency and PRN reasons based on the score.  If a patient is on this medication at home then do not decrease Frequency below that used at home.    0-3 - enter or revise RT bronchodilator order(s) to equivalent RT Bronchodilator order with Frequency of every 4 
RT Inhaler-Nebulizer Bronchodilator Protocol Note    There is a bronchodilator order in the chart from a provider indicating to follow the RT Bronchodilator Protocol and there is an “Initiate RT Inhaler-Nebulizer Bronchodilator Protocol” order as well (see protocol at bottom of note).    CXR Findings:  No results found.    The findings from the last RT Protocol Assessment were as follows:   History Pulmonary Disease: Chronic pulmonary disease  Respiratory Pattern: Regular pattern and RR 12-20 bpm  Breath Sounds: Intermittent or unilateral wheezes  Cough: Strong, spontaneous, non-productive  Indication for Bronchodilator Therapy: Decreased or absent breath sounds, Wheezing associated with pulm disorder  Bronchodilator Assessment Score: 6    Aerosolized bronchodilator medication orders have been revised according to the RT Inhaler-Nebulizer Bronchodilator Protocol below.    Respiratory Therapist to perform RT Therapy Protocol Assessment initially then follow the protocol.  Repeat RT Therapy Protocol Assessment PRN for score 0-3 or on second treatment, BID, and PRN for scores above 3.    No Indications - adjust the frequency to every 6 hours PRN wheezing or bronchospasm, if no treatments needed after 48 hours then discontinue using Per Protocol order mode.     If indication present, adjust the RT bronchodilator orders based on the Bronchodilator Assessment Score as indicated below.  Use Inhaler orders unless patient has one or more of the following: on home nebulizer, not able to hold breath for 10 seconds, is not alert and oriented, cannot activate and use MDI correctly, or respiratory rate 25 breaths per minute or more, then use the equivalent nebulizer order(s) with same Frequency and PRN reasons based on the score.  If a patient is on this medication at home then do not decrease Frequency below that used at home.    0-3 - enter or revise RT bronchodilator order(s) to equivalent RT Bronchodilator order with 
RT Inhaler-Nebulizer Bronchodilator Protocol Note    There is a bronchodilator order in the chart from a provider indicating to follow the RT Bronchodilator Protocol and there is an “Initiate RT Inhaler-Nebulizer Bronchodilator Protocol” order as well (see protocol at bottom of note).    CXR Findings:  No results found.    The findings from the last RT Protocol Assessment were as follows:   History Pulmonary Disease: Chronic pulmonary disease  Respiratory Pattern: Regular pattern and RR 12-20 bpm  Breath Sounds: Slightly diminished and/or crackles  Cough: Strong, spontaneous, non-productive  Indication for Bronchodilator Therapy: Decreased or absent breath sounds  Bronchodilator Assessment Score: 4    Aerosolized bronchodilator medication orders have been revised according to the RT Inhaler-Nebulizer Bronchodilator Protocol below.    Respiratory Therapist to perform RT Therapy Protocol Assessment initially then follow the protocol.  Repeat RT Therapy Protocol Assessment PRN for score 0-3 or on second treatment, BID, and PRN for scores above 3.    No Indications - adjust the frequency to every 6 hours PRN wheezing or bronchospasm, if no treatments needed after 48 hours then discontinue using Per Protocol order mode.     If indication present, adjust the RT bronchodilator orders based on the Bronchodilator Assessment Score as indicated below.  Use Inhaler orders unless patient has one or more of the following: on home nebulizer, not able to hold breath for 10 seconds, is not alert and oriented, cannot activate and use MDI correctly, or respiratory rate 25 breaths per minute or more, then use the equivalent nebulizer order(s) with same Frequency and PRN reasons based on the score.  If a patient is on this medication at home then do not decrease Frequency below that used at home.    0-3 - enter or revise RT bronchodilator order(s) to equivalent RT Bronchodilator order with Frequency of every 4 hours PRN for wheezing or 
RT Inhaler-Nebulizer Bronchodilator Protocol Note    There is a bronchodilator order in the chart from a provider indicating to follow the RT Bronchodilator Protocol and there is an “Initiate RT Inhaler-Nebulizer Bronchodilator Protocol” order as well (see protocol at bottom of note).    CXR Findings:  XR CHEST PORTABLE    Result Date: 1/16/2024  Right lower lung opacity which may reflect airspace disease.       The findings from the last RT Protocol Assessment were as follows:   History Pulmonary Disease: Chronic pulmonary disease  Respiratory Pattern: Dyspnea on exertion or RR 21-25 bpm  Breath Sounds: Inspiratory and expiratory or bilateral wheezing and/or rhonchi  Cough: Strong, spontaneous, non-productive  Indication for Bronchodilator Therapy: Wheezing associated with pulm disorder  Bronchodilator Assessment Score: 10    Aerosolized bronchodilator medication orders have been revised according to the RT Inhaler-Nebulizer Bronchodilator Protocol below.    Respiratory Therapist to perform RT Therapy Protocol Assessment initially then follow the protocol.  Repeat RT Therapy Protocol Assessment PRN for score 0-3 or on second treatment, BID, and PRN for scores above 3.    No Indications - adjust the frequency to every 6 hours PRN wheezing or bronchospasm, if no treatments needed after 48 hours then discontinue using Per Protocol order mode.     If indication present, adjust the RT bronchodilator orders based on the Bronchodilator Assessment Score as indicated below.  Use Inhaler orders unless patient has one or more of the following: on home nebulizer, not able to hold breath for 10 seconds, is not alert and oriented, cannot activate and use MDI correctly, or respiratory rate 25 breaths per minute or more, then use the equivalent nebulizer order(s) with same Frequency and PRN reasons based on the score.  If a patient is on this medication at home then do not decrease Frequency below that used at home.    0-3 - enter 
Report given to Benny GUERRA. Pt. Stable. Care transferred at this time.   
Shift assessment completed. Vital signs stable. Call light in reach and standard safety measures in place. Pt resting in bed; no needs or complaints at this time.    
Shift assessment completed. Vital signs stable. Call light in reach and standard safety measures in place. Pt resting in bed; no needs or complaints at this time.    
Transport here to take pt back to the floor. Pt is a/o in stable condition. CMU called and verified that pt can be seen on the monitor.   
Vancomycin Day # 3/7  Current dose = Pulse dosing per daily vancomycin levels.  BUN/SRCR 33/0.9      Est CrCl = 57 ml/min  WBC   11.2            Tmax 98.1  Vancomycin random level this am = 14.8 mcg/ml  Will give vancomycin 1250 mg x1 dose today and recheck random level tomorrow am.  Pharmacy will continue to obtain daily random vancomycin levels and redose as appropriate.    
Vitals:    01/21/24 1515   BP: 104/64   Pulse: (!) 168   Resp: 18   Temp: 98.1 °F (36.7 °C)   SpO2: 96%     EKG completed; rhythm changed from NS to afib RVR. Cardio consult added.  
Wrong weight was entered into the chart when drip was first started (95 lbs versus 95 kg), resulting in a big discrepancy on dosing.  Could take 2 more rounds still to get drip up to therapeutic using old weight.  Therefore, decision was made to start drip at protocol start (12 units/kg/h), but enter the correct weight in the pump (84.3 kg vs 43.3 kg that the drip was initially started at).    Bolus doses also corrected to reflect the patient's actual weight.    Give a 4000 unit bolus, and correct rate to 12 units/kg/h based on patient's actual weight of 84.3 kg.  Obtain a new aPTT 1/21 @ 1330.   
  Continue Xifaxan BID  Trend H&H  Monitor for signs of bleeding  Low Na diet with 2 L fluid restriction  Encourage nutrition  PT/OT  Will need to f/u with GI upon discharge     BLUE Ambrosio - CNP  10:51 AM 1/23/2024                        
  Peripheral Pulses: +2 palpable, equal bilaterally   GI:  No masses. No organomegaly. Normal bowel sounds. No hernia. Non tender   Skin: Warm and dry. No nodule on exposed extremities. No rash on exposed extremities.   M/S: No cyanosis. No joint deformity. No clubbing.   Neuro: alert,oriented     Lab Data:  CBC:   Recent Labs     01/20/24  0944 01/20/24  1750 01/21/24  0253 01/21/24  0855 01/21/24  1357 01/21/24  1600 01/22/24  0600   WBC 6.6  --  7.9  --   --  7.7  --    RBC 3.14*  --  3.30*  --   --  3.07*  --    HGB 9.4*   < > 9.6*   < > 9.5* 9.0* 9.1*   HCT 29.2*   < > 29.3*   < > 29.3* 27.6* 28.3*   MCV 92.9  --  88.8  --   --  89.9  --    RDW 17.5*  --  17.3*  --   --  17.2*  --      --  260  --   --  233  --     < > = values in this interval not displayed.       BMP:   Recent Labs     01/20/24  0944 01/21/24  1600    142   K 3.7 2.8*   * 111*   CO2 24 23   BUN 23* 17   CREATININE 0.8 0.7       BNP: No results for input(s): \"BNP\" in the last 72 hours.  PT/INR:   Recent Labs     01/21/24  0253   PROTIME 17.4*   INR 1.42*       APTT:  Recent Labs     01/20/24  0944 01/20/24  1900 01/21/24  0253   APTT 33.8 39.1* 45.9*       CARDIAC ENZYMES: No results for input(s): \"CKMB\", \"CKMBINDEX\", \"TROPONINI\" in the last 72 hours.    Invalid input(s): \"CKTOTAL;3\"  FASTING LIPID PANEL:  Lab Results   Component Value Date/Time    CHOL 198 01/10/2023 09:49 PM    HDL 56 01/10/2023 09:49 PM    HDL 81 10/06/2010 02:16 PM    TRIG 98 01/10/2023 09:49 PM     LIVER PROFILE:   Recent Labs     01/21/24  0253 01/21/24  1600   AST 19 23   ALT 15 14   BILIDIR <0.2  --    BILITOT 0.4 0.4   ALKPHOS 266* 240*           CT HEAD WO CONTRAST   Final Result   No acute intracranial abnormality.         CT HEAD WO CONTRAST   Final Result   No acute intracranial abnormality.         IR MIDLINE CATH   Final Result      VL Extremity Venous Bilateral   Final Result      CT CHEST ABDOMEN PELVIS WO CONTRAST Additional Contrast? None 
(CARAFATE) tablet 1 g, 1 g, Oral, 4x Daily  albuterol sulfate HFA (PROVENTIL;VENTOLIN;PROAIR) 108 (90 Base) MCG/ACT inhaler 2 puff, 2 puff, Inhalation, 4x Daily PRN  levothyroxine (SYNTHROID) tablet 88 mcg, 88 mcg, Oral, Daily  atorvastatin (LIPITOR) tablet 10 mg, 10 mg, Oral, Daily  sennosides-docusate sodium (SENOKOT-S) 8.6-50 MG tablet 2 tablet, 2 tablet, Oral, BID  guaiFENesin (MUCINEX) extended release tablet 600 mg, 600 mg, Oral, BID  sertraline (ZOLOFT) tablet 50 mg, 50 mg, Oral, Daily  lactulose (CHRONULAC) 10 GM/15ML solution 20 g, 20 g, Oral, TID  Physical    VITALS:  /78   Pulse 83   Temp 97.9 °F (36.6 °C) (Oral)   Resp 16   Ht 1.524 m (5')   Wt 43.3 kg (95 lb 8 oz)   SpO2 96%   BMI 18.65 kg/m²   GEN: somnolent, arousable, limited verbal response to questions  ABD: soft, NT, ND, NABS  Data    CBC with Differential:    Lab Results   Component Value Date/Time    WBC 6.6 01/20/2024 09:44 AM    RBC 3.14 01/20/2024 09:44 AM    HGB 9.4 01/20/2024 09:44 AM    HCT 29.2 01/20/2024 09:44 AM     01/20/2024 09:44 AM    MCV 92.9 01/20/2024 09:44 AM    MCH 29.9 01/20/2024 09:44 AM    MCHC 32.2 01/20/2024 09:44 AM    RDW 17.5 01/20/2024 09:44 AM    SEGSPCT 77.0 03/06/2011 06:12 AM    BANDSPCT 1 11/10/2019 04:31 AM    METASPCT 2 11/10/2019 04:31 AM    LYMPHOPCT 21.6 01/20/2024 09:44 AM    PROMYELOPCT 1 11/10/2019 04:31 AM    MONOPCT 8.6 01/20/2024 09:44 AM    MYELOPCT 2 11/10/2019 04:31 AM    EOSPCT 0.0 03/06/2011 06:12 AM    BASOPCT 0.3 01/20/2024 09:44 AM    MONOSABS 0.6 01/20/2024 09:44 AM    LYMPHSABS 1.4 01/20/2024 09:44 AM    EOSABS 0.1 01/20/2024 09:44 AM    BASOSABS 0.0 01/20/2024 09:44 AM    DIFFTYPE Auto 03/06/2011 06:12 AM     CMP:    Lab Results   Component Value Date/Time     01/20/2024 09:44 AM    K 3.7 01/20/2024 09:44 AM     01/20/2024 09:44 AM    CO2 24 01/20/2024 09:44 AM    BUN 23 01/20/2024 09:44 AM    CREATININE 0.8 01/20/2024 09:44 AM    GFRAA 38 09/22/2022 04:12 PM 
97.5 °F (36.4 °C)   TempSrc: Oral Oral  Oral   SpO2: 97% 98% 97% 97%   Weight:       Height:           Physical Examination:   General - improved and well hydrated  Mental status - alert, oriented to person, place, and time  Eyes - sclera anicteric  Neck - supple, no significant adenopathy  Heart - normal rate and regular rhythm  Abdomen - soft, ND, NT  Extremities - no pedal edema         Assessment  72 yo female with pmx of COPD, CHF, CKD, DM, HTN, VILLEGAS cirrhosis decompensated by varices admitted for acute on chronic respiratory failure and coffee ground emesis. EGD showed severe esophagitis, with no active bleeding. Altered mental status secondary to encephalopathy (hepatic vs metabolic) improved.     Plan  Continue supportive care  PPI BID x 8 wks & Carafate QID   Continue Xifaxan BID  Trend H&H  Monitor for signs of bleeding  Low Na diet with 2 L fluid restriction  Encourage nutrition  PT/OT  Will need to f/u with GI upon discharge  Will sign off, pls call with questions     Diane Frankel, APRN - CNP  12:52 PM 1/24/2024                      73 year old female with history of DM, HTN, HLD, COPD, CHF, A fib, CKD, VILLEGAS cirrhosis decompensated by varices admitted with COPD exacerbation and coffee ground emesis. EGD showed esophagitis and HH but no active bleeding. Altered mental status secondary to encephalopathy (hepatic vs metabolic) improved.     Continue supportive care. Continue PPI BID and carafate. Continue xifaxan. Monitor Hgb. PT/OT. Antibiotics for pneumonia. Ok for anticoagulation from GI standpoint    Juan Jose Teresa MD          (O) [phone]  (O) [phone]   
Meds:  perflutren lipid microspheres, sodium chloride flush, sodium chloride, ondansetron **OR** ondansetron, polyethylene glycol, acetaminophen **OR** acetaminophen, ipratropium 0.5 mg-albuterol 2.5 mg, albuterol sulfate HFA    Labs:  CBC:   Recent Labs     01/16/24  1115 01/16/24  2337 01/17/24  0432 01/17/24  1347 01/17/24  1830 01/18/24  0020 01/18/24  0517   WBC 19.5*  --  10.7  --   --   --  11.2*   HGB 13.0   < > 11.1*   < > 10.1* 10.4* 9.7*   HCT 38.6   < > 32.4*   < > 30.3* 31.3* 29.9*   MCV 91.5  --  97.4  --   --   --  91.6     --  351  --   --   --  300    < > = values in this interval not displayed.     BMP:   Recent Labs     01/16/24  1111 01/17/24  0432 01/18/24  0517   * 133* 144   K 4.2 3.7 4.1   CL 91* 96* 109   CO2 24 24 25   BUN 57* 45* 33*   CREATININE 2.4* 1.5* 0.9     LIVER PROFILE:   Recent Labs     01/16/24  1111 01/17/24  0432   AST 23 21   ALT 11 10   BILITOT 0.5 0.5   ALKPHOS 329* 288*     PT/INR: No results for input(s): \"PROTIME\", \"INR\" in the last 72 hours.  APTT: No results for input(s): \"APTT\" in the last 72 hours.  UA:  Recent Labs     01/16/24  1740   COLORU Yellow   PHUR 6.0   WBCUA 10-20*   RBCUA 0-2   MUCUS Rare*   BACTERIA 1+*   CLARITYU Clear   SPECGRAV 1.025   LEUKOCYTESUR Negative   UROBILINOGEN 0.2   BILIRUBINUR Negative   BLOODU TRACE-INTACT*   GLUCOSEU >=1000*     No results for input(s): \"PHART\", \"WCY1YYK\", \"PO2ART\" in the last 72 hours.        Microbiology:  1/16 COVID-19 not detected  1/16 BC  1/16 pneumonia panel Pseudomonas aeruginosa Serratia marcescens         Imaging:  [facility] 1/16/2024 imaging was reviewed by me and showed   Dependent reticulonodular infiltrates in the bilateral upper and lower lobes.  These are nonspecific, but bronchiolitis is favored.  There is also central airway thickening suggesting bronchitis or reactive airways disease. Mild patchy more confluent opacities involve the bilateral lower and right upper lobes and could represent 
bleeding.    Recommendation:  Continue supportive care  PPI BID x 8wks  Monitor Hgb  Observe for signs of bleeding  Continue antibiotics for HCAP  BB for A fib with RVR  Continue lactulose  Trending ammonia levels is not necessary as it do not correlate with severity of encephalopathy  Outpatient follow up upon dischareg      Juan Jose Teresa MD, MD  12:34 PM 1/18/2024                       
increased work of breathing using Per Protocol order mode.        4-6 - enter or revise RT Bronchodilator order(s) to two equivalent RT bronchodilator orders with one order with BID Frequency and one order with Frequency of every 4 hours PRN wheezing or increased work of breathing using Per Protocol order mode.        7-10 - enter or revise RT Bronchodilator order(s) to two equivalent RT bronchodilator orders with one order with TID Frequency and one order with Frequency of every 4 hours PRN wheezing or increased work of breathing using Per Protocol order mode.       11-13 - enter or revise RT Bronchodilator order(s) to one equivalent RT bronchodilator order with QID Frequency and an Albuterol order with Frequency of every 4 hours PRN wheezing or increased work of breathing using Per Protocol order mode.      Greater than 13 - enter or revise RT Bronchodilator order(s) to one equivalent RT bronchodilator order with every 4 hours Frequency and an Albuterol order with Frequency of every 2 hours PRN wheezing or increased work of breathing using Per Protocol order mode.     RT to enter RT Home Evaluation for COPD & MDI Assessment order using Per Protocol order mode.    Electronically signed by Desiree Moran RCP on 1/19/2024 at 7:41 AM  
          Cleaning, Cooking, Laundry  and grocery shopping  Pt. independent for functional transfers and utilized RW for mobility in home and RW out in community  History of falls:                                      Yes, fall resulting in LUE and LLE fractures October 2023  Home Health Services:                       [facility]/food delivery services    Objective:  Does this pt have an acute or acute on chronic diagnosis of CHF? No    Upper Extremity ROM:    Appears WFL's    Upper Extremity Strength:    Formal strength testing deferred due to unstable vital signs, RN addressing and patient resting    Upper Extremity Sensation:    NT    Upper Extremity Proprioception:  WFL    Coordination:  WFL    Tone:  WFL    Balance:  Sitting:    Supervision  Standing:    Min A with RW and gait belt    Bed Mobility:   Supine to Sit:    Not Tested  Sit to Supine:   Min A   Rolling:   Not Tested  Scooting in sitting: Min A   Scooting in supine:  Total A with [place] sheet    Transfers:    Sit to stand:    Min A  and With cues for hand placement  Stand to sit:    Min A  and With cues for hand placement  Bed to chair:     Not Tested  Bed/ chair to standard toilet:  Not Tested  Bed/chair to BSC:   Min A  and With RW and gait belt  Functional Mobility:   Not Tested    ADLs:  Dressing:      UE:   Not Tested  LE:    Mod A don pullup style incontinence product      Bathing:    UE:  Not Tested  LE:  Not Tested    Eating:   Independent beverage management    Toileting:  Min A pericare after using BSC, MOD A don pullup style incontinence product    Grooming/hygiene: Not Tested    Activity Tolerance:   Pt completed therapy session with see vitals chart below for details     BP (mmHg) HR (bpm) SpO2 (%) on 3L Comments   Supine at rest    Patient urgently needing BSC - no baseline vitals taken   Seated on BSC  167     EOB after toileting  164     End of session  184 97      Positioning Needs:   Pt in bed, alarm set, call light provided and all needs 
    Stair Training deferred, pt unsafe/ not appropriate to complete stairs at this time    Therapeutic Exercises Initiated  deferred secondary to unstable heart rate  Supine:  N/A    Seated:  N/A    Standing:  N/A    Activity Tolerance   During therapy session noted pt with see vitals chart below for details      BP (mmHg) HR (bpm) SpO2 (%) on 3L Comments   Supine at rest  112/55    98    Seated on [facility]         Chair after ambulation   170s      End of session   100-120         Positioning Needs   Pt reclined in chair, alarm set, positioned in proper neutral alignment and pressure relief provided.   Call light provided and all needs within reach  RN aware of pt position/status  Telesitter present in room    Other Activities  None.    Patient/Family Education   Pt educated on role of inpatient PT, POC, importance of continued activity, DC recommendations, safety awareness, transfer techniques, and calling for assist with mobility.      Assessment  Pt seen today for physical therapy Treatment. Pt demonstrated decreased Activity tolerance, Balance, Safety, and Strength as well as decreased independence with Ambulation, Bed Mobility , and Transfers.     Pt continues to be limited by HR, at rest 100-120s and up to 170s with short distance ambulation up to 8 ft in room. Pt is grossly CGA-min A for mobility with RW and would continue to benefit from progressions in mobility and therex as tolerated.     Skilled physical therapy is necessary to address the above impairments in order for the patient to progress toward full PLOF.    Recommending SNF upon discharge as patient functioning well below baseline, demonstrates good rehab potential and unable to return home due to home environment not conducive to patient recovery and unstable vitals.    Goals :   To be met in 3 visits:  1). Independent with LE Ex x 10 reps  2). Sit to/from stand: CGA - MET 1/22  3). Bed to chair: CGA  4). CHF goal: N/A    To be met in 6 
mitral regurgitation.    Labs and imaging reviewed     Assessment & Plan:     Patient Active Problem List    Diagnosis Date Noted    Acute on chronic congestive heart failure ([facility]) 01/16/2023    Atrial fibrillation with RVR ([facility]) 01/16/2023    Atrial flutter ([facility]) 01/15/2023    Metabolic encephalopathy 01/14/2023    Acute diverticulitis 01/11/2023    Seizure ([facility]) 01/10/2023    Systolic murmur 09/08/2022    Acute hypoxemic respiratory failure ([facility]) 09/06/2022    Chest pain 08/23/2022    Frequent falls 08/23/2022    Seizure-like activity ([facility]) 08/23/2022    Sepsis ([facility]) 08/21/2022    Delusions ([facility])     Leg edema     Shortness of breath 06/27/2022    Elevated troponin 01/18/2024    Acute deep vein thrombosis (DVT) of proximal vein of left lower extremity ([facility]) 01/18/2024    Hypoxia 01/16/2024    Moderate persistent asthma with exacerbation 01/16/2024    Multifocal pneumonia 01/16/2024    Esophagitis 01/16/2024    Right upper quadrant abdominal pain 12/04/2023    Atrial fibrillation with rapid ventricular response ([facility]) 12/04/2023    Pneumonia due to infectious agent 08/21/2023    Secondary hypercoagulable state ([facility]) 08/21/2023    Generalized abdominal pain     Diverticulitis of colon     Acute hyponatremia     COPD exacerbation ([facility])     Diverticulitis 11/29/2021    Acute focal neurological deficit 03/20/2021    Pneumonia of both lungs due to infectious organism     PAF (paroxysmal atrial fibrillation) ([facility])     PSVT (paroxysmal supraventricular tachycardia)     Abnormal CXR 11/05/2019    NSTEMI (non-ST elevated myocardial infarction) ([facility])     Hypovolemic shock ([facility]) 11/02/2019    GI bleed 03/31/2018    Elevated lactic acid level 03/31/2018    Hyperkalemia 03/31/2018    Leukocytosis 03/31/2018    Thrombocytosis 03/31/2018    High anion gap metabolic acidosis 03/31/2018    History of depression     Misuse of prescription only drugs     Toxic encephalopathy     Acute on chronic respiratory failure with hypoxia ([facility]) 
of inpatient OT, plan of care, importance of continued activity, Functional transfer/mobility safety, Pacing activity, and Calling for assist with mobility.    Assessment:  Pt seen for Occupational therapy treatment in acute care setting.  Pt demonstrated decreased . Pt functioning below baseline and will likely benefit from skilled occupational therapy services to maximize safety and independence.    Pt agreeable to therapy session, agreeable to ambulating in room as tolerated.  Pt noted to have increased SOB with mobility (bed mobility as well as ambulation).  HR increased during ambulation.  Pt tolerated ambulating to window and back to recliner, then required rest break.     Recommending SNF upon discharge as patient functioning well below baseline, demonstrates good rehab potential and unable to return home due to burden of care beyond caregiver ability.    Goal(s) : 1/22/24: all goals ongoing  To be met in 3 Visits:  Bed to toilet/BSC:       CGA  Pt will complete 3/3 CHF goals     N/A    To be met in 5 Visits:  Supine to/from Sit in preparation for ADL task:   Supervision  Toileting        SBA  Grooming       Supervision  Upper Body Dressing:      SBA  Lower Body Dressing:      SBA  Pt to demonstrate UE therapeutic exs x 15 reps with minimal cues    Rehabilitation Potential: Good  Strengths for achieving goals include: Pt motivated, PLOF, Family Support, and Pt cooperative   Barriers to achieving goals include:  Complexity of condition    Plan:  To be seen 3-5 x/wk while in acute care setting for therapeutic exercises, bed mobility, transfers, family/patient education, ADL/IADL retraining, and energy conservation training.    Electronically signed by Anjana Hicks OT on 1/22/2024 at 10:31 AM      If patient discharges from this facility prior to next visit, this note will serve as the Discharge Summary     
drugs     Toxic encephalopathy     Acute on chronic respiratory failure with hypoxia ([facility]) 03/04/2018    OD (overdose of drug) 03/04/2018    Drug ingestion     Acute respiratory failure with hypoxemia ([facility])     Closed fracture of left ankle     Aspiration pneumonitis ([facility])     Ankle fracture, bimalleolar, closed, left, initial encounter 01/09/2018    Trimalleolar fracture of left ankle, closed, initial encounter 01/08/2018    Anxiety and depression 12/28/2017    Elevated LFTs 12/28/2017    Acute hyperglycemia 12/28/2017    Well controlled type 2 diabetes mellitus ([facility]) 12/28/2017    Hypovitaminosis D 12/28/2017    Right hand weakness & numbness 12/28/2017    Acute-on-chronic low back pain with radicular symptoms 12/28/2017    Benign essential HTN 05/18/2017    Acute on chronic diastolic CHF (congestive heart failure) ([facility]) 04/13/2017    Possible/questionable hx of seizures 01/01/2017    Septic shock ([facility]) 12/10/2016    Acute kidney injury ([facility]) 12/10/2016    Acute cystitis without hematuria 11/26/2016    Encephalopathy 11/04/2016    Intractable nausea and vomiting 10/06/2016    Hypothyroidism 09/16/2016    Obesity 02/09/2016    SIRS (systemic inflammatory response syndrome) ([facility]) 02/08/2016    Acute respiratory failure with hypoxia ([facility]) 02/08/2016    Diverticulosis 01/26/2015    Chronic neck, back, & bilateral LE pain 11/03/2014    GERD (gastroesophageal reflux disease) 03/29/2010    Environmental allergies 03/29/2010     #Sepsis, POA  #Hospital acquired pneumonia   #Acute on chronic hypoxic respiratory failure   #Asthma without AE   -on 3 L baseline, requiring 5 L on presentation --> weaned down to baseline   -criteria HR, WBC, LA, procalc, +source   -IV vancomycin and cefepime D#3   -blood and sputum cultures pending   -IVF   -inhalers   -pulmonology consulted   Pna panel- serratia  Abx changed to zosyn     #Atrial fibrillation with RVR/hx of atrial flutter  #Hx of atrial flutter   #Secondary hypercoagulable 
rolling walker (RW)  5).  Tolerate B LE exercises 3 sets of 10-15 reps      Rehabilitation Potential: Good  Strengths for achieving goals include:   Pt cooperative   Barriers to achieving goals include:    Pain and Weakness    Plan    To be seen 3-5 x / week  while in acute care setting for therapeutic exercises, bed mobility, transfers, progressive gait training, balance training, and family/patient education.    Signature: Matt Warner, PT     If patient discharges from this facility prior to next visit, this note will serve as the Discharge Summary.  
    Lab Results   Component Value Date    HDL 56 01/10/2023    HDL 72 (H) 03/21/2021     (H) 12/28/2017     Lab Results   Component Value Date    LDLCALC 122 (H) 01/10/2023    LDLCALC 81 03/21/2021    LDLCALC 184 (H) 12/28/2017     Lab Results   Component Value Date    LABVLDL 20 01/10/2023    LABVLDL 19 03/21/2021    LABVLDL 21 12/28/2017     No results found for: \"CHOLHDLRATIO\"     Cardiac Data:     ECG 1/21  Atrial fibrillation with rapid ventricular response, rate 160s  Left axis deviation  Marked ST abnormality, possible inferior subendocardial injury  Abnormal ECG  When compared with ECG of 16-JAN-2024 10:43,  ST now depressed in Inferior leads     Telemetry personally reviewed: MAT, Afib/Aflutter, now Afib with rates 110s     Echo:  1/17/24  Summary   Normal left ventricular size with mild left ventricular hypertrophy.   Left ventricular systolic function is hyperdynamic with ejection fraction   estimated at >65%.   No regional wall motion abnormalities.   Elevated left ventricular filling pressure.   The right ventricle is normal in size and function.   The left atrium is moderately dilated.   Aortic valve leaflets appear thickened/calcified but overall poorly   visualized.   The aortic valve area is calculated at 2.1 cm2 with a maximum velocity of   3.1 m/s, a maximum pressure gradient of 40.7 mmHg and a mean pressure   gradient of 19 mmHg. Findings are consistent with likely mild aortic   stenosis. Over-estimation of valvular stenosis is possible as LVOT gradients   are elevated in setting of hyperdynamic function.   Trivial aortic regurgitation.   Mild-to-moderate mitral stenosis. Gradient may be over-estimated at elevated   HR.   Mild mitral regurgitation.     9/6/22  Conclusions      Summary   LV systolic function is hyperdynamic with EF estimated at >65%.   No regional wall motion abnormalities.   There is mild concentric left ventricular hypertrophy.   Grade II diastolic dysfunction with 
AS  -cardiology consulted   Now on metoprolol     #Elevated troponin  -88-->85   -likely 2/2 to a fib RVR and renal function   -monitor telemetry   -cardiology consulted      #Abdominal pain/Esophagitis   #Reported coffee ground emesis   -CT abdomen shows cirrhotic appearance of liver and esophagitis  -IV PPI   -NPO after midnight   -gentle IVF   -Hgb stable at 13.0  -hemoccult pending  -Trend H & H [id number]   -last EGD with severe esophagitis and ulcerations   -GI consulted   EGD- severe esophagitis,portal hypertensive gastropathy  PPI bid- 8 weeks   Carafate      #VIOLETA   -Cr baseline ~0.6, 2.3 on presentation   -likely hypovolemic from vomiting and poor PO intake  -hold lisinopril    -IVF   Resolving      #Elevated d dimer  -concern with noncompliance with eliquis and had recent hip surgery   -d dimer elevated 3.01  -cannot get CTA 2/2 to kidney function  -venous dopplers -acute DVT   Placed on eliquis      #Chronic diastolic CHF  -does not appear acutely decompensated  -holding home lasix for now   -daily weights, intake and output       #VILLEGAS cirrhosis   -without evidence of decompensation  -elev alk phos  -repeat LFTs tomorrow   -ammonia pending  -continue lactulose   -GI consult      #T2DM  -SSI  -monitor BG      #Seizure disorder   -on keppra   -seizure precautions     #HTN- BP on lower end   -continue toprol XL       #Hypothyroidism   -continue synthroid      #Anxiety   #Depression   -continue zoloft      Note sepsis, HCAP, afib RVR makes patient higher risk for morbidity and mortality requiring testing and treatment.         DVT Prophylaxis: eliquis   Diet: gen diet   Code Status: full     ENRIQUETA IRAHETA MD          
troponin  -88-->85   -likely 2/2 to a fib RVR and renal function   -monitor telemetry   -cardiology consulted      #Abdominal pain/Esophagitis   #Reported coffee ground emesis   -CT abdomen shows cirrhotic appearance of liver and esophagitis  -IV PPI   -NPO after midnight   -gentle IVF   -Hgb stable at 13.0  -hemoccult pending  -Trend H & H [id number]   -last EGD with severe esophagitis and ulcerations   -GI consulted   EGD- severe esophagitis,portal hypertensive gastropathy  PPI bid- 8 weeks --IV PPI  Carafate   H/H stable.monitor H/h for one more day as she is on eliquis for acute DVT and A. fib     #VIOLETA   -Cr baseline ~0.6, 2.3 on presentation   -likely hypovolemic from vomiting and poor PO intake  -hold lisinopril    -IVF   Resolved    Altered mental status  Labs unremarkable.  VBG normal head CT negative  Avoid opiates and benzos  ?Cefepime causing it - hold cefepime  MRI brain ordered.  Repeat CT today as MRI will not be done today.negative  Cancel Neuro consult     #Elevated d dimer  -concern with noncompliance with eliquis and had recent hip surgery   -d dimer elevated 3.01  -cannot get CTA 2/2 to kidney function  -venous dopplers -acute DVT   Placed on eliquis --> heparin drip as unable t otake po--> eliquis      #Chronic diastolic CHF  -does not appear acutely decompensated  -holding home lasix for now   -daily weights, intake and output       #VILLEGAS cirrhosis   -without evidence of decompensation  -elev alk phos  -repeat LFTs   -ammonia normal.  -continue lactulose   -GI consult      #T2DM  -SSI  -monitor BG      #Seizure disorder   -on keppra --IV keppra   -seizure precautions     #HTN- BP on lower end   -continue toprol XL       #Hypothyroidism   -continue synthroid      #Anxiety   #Depression   -continue zoloft      Note sepsis, HCAP, afib RVR makes patient higher risk for morbidity and mortality requiring testing and treatment.         DVT Prophylaxis: eliquis   Diet: gen diet   Code Status: full 
  DVT Prophylaxis: eliquis   Diet: gen diet   Code Status: full     ENRIQUETA IRAHETA MD          
injury (HCC)    Acute on chronic diastolic CHF (congestive heart failure) (HCC)    Benign essential HTN    Anxiety and depression    Elevated LFTs    Acute hyperglycemia    Well controlled type 2 diabetes mellitus (HCC)    Hypovitaminosis D    Possible/questionable hx of seizures    Right hand weakness & numbness    Acute-on-chronic low back pain with radicular symptoms    Trimalleolar fracture of left ankle, closed, initial encounter    Ankle fracture, bimalleolar, closed, left, initial encounter    Acute respiratory failure with hypoxemia (HCC)    Closed fracture of left ankle    Aspiration pneumonitis (HCC)    Acute on chronic respiratory failure with hypoxia (HCC)    OD (overdose of drug)    Drug ingestion    Toxic encephalopathy    History of depression    Misuse of prescription only drugs    GI bleed    Elevated lactic acid level    Hyperkalemia    Leukocytosis    Thrombocytosis    High anion gap metabolic acidosis    Hypovolemic shock (HCC)    NSTEMI (non-ST elevated myocardial infarction) ([facility])    Abnormal CXR    PSVT (paroxysmal supraventricular tachycardia)    PAF (paroxysmal atrial fibrillation) ([facility])    Pneumonia of both lungs due to infectious organism    Acute focal neurological deficit    Diverticulitis    Diverticulitis of colon    Acute hyponatremia    COPD exacerbation (HCC)    Generalized abdominal pain    Shortness of breath    Delusions (HCC)    Leg edema    Sepsis (HCC)    Chest pain    Frequent falls    Seizure-like activity (HCC)    Acute hypoxemic respiratory failure (HCC)    Systolic murmur    Seizure (HCC)    Acute diverticulitis    Metabolic encephalopathy    Atrial flutter (HCC)    Acute on chronic congestive heart failure (HCC)    Atrial fibrillation with RVR (HCC)    Pneumonia due to infectious agent    Secondary hypercoagulable state (HCC)    Right upper quadrant abdominal pain    Atrial fibrillation with rapid ventricular response (HCC)    Hypoxia    Moderate persistent asthma with
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
## 2024-01-24 NOTE — DISCHARGE SUMMARY
[facility]:Limited visualization due to body habitus.   - Results were reported to:Gladis GUERRA @ 5:03 pm. Velocities are measured in cm/s ; Diameters are measured in mm Right Lower Extremities DVT Study Measurements Right 2D Measurements     IR MIDLINE CATH    Result Date: 1/16/2024  Radiology exam is complete. No Radiologist dictation. Please follow up with ordering provider.     CT CHEST ABDOMEN PELVIS WO CONTRAST Additional Contrast? None    Result Date: 1/16/2024  EXAMINATION: CT OF THE CHEST, ABDOMEN, AND PELVIS WITHOUT CONTRAST 1/16/2024 11:10 am TECHNIQUE: CT of the chest, abdomen and pelvis was performed without the administration of intravenous contrast. Multiplanar reformatted images are provided for review. Automated exposure control, iterative reconstruction, and/or weight based adjustment of the mA/kV was utilized to reduce the radiation dose to as low as reasonably achievable. COMPARISON: CT of the chest, abdomen and pelvis 12/04/2023 and radiograph 01/16/2024. HISTORY: ORDERING SYSTEM PROVIDED HISTORY: sob, abd pain RUQ/RLQ TECHNOLOGIST PROVIDED HISTORY: Reason for exam:->sob, abd pain RUQ/RLQ Additional Contrast?->None Decision Support Exception - unselect if not a suspected or confirmed emergency medical condition->Emergency Medical Condition (MA) Reason for Exam: sob, abd pain RUQ/RLQ FINDINGS: Chest: Mediastinum: The esophagus again appears thickened.  There is a small hiatal hernia.  A precarinal lymph node measures 1.3 cm in shortest dimension and is larger than before.  Lymph nodes elsewhere are nonenlarged.  The heart is not enlarged.  There is no pericardial effusion. Lungs/pleura: There are reticulonodular infiltrates dependently in the bilateral upper and lower lobes.  There are more confluent opacities dependently in the lower lobes and right upper lobe.  There is central airway thickening.  There is no pleural effusion. Soft Tissues/Bones: No acute fracture or destructive bone lesion is

## 2024-01-25 LAB — AFP-TM SERPL-MCNC: 3.4 UG/L

## 2024-01-26 NOTE — CARE COORDINATION
Received VM from [facility] who states that they are unable to service pt due to insurance. Called [facility] who will review to see if they are still able to accept her. Will continue to monitor.

## 2024-02-17 PROBLEM — R79.89 ELEVATED TROPONIN: Status: RESOLVED | Noted: 2024-01-18 | Resolved: 2024-02-17

## 2024-02-22 RX ORDER — FOLIC ACID 1 MG/1
1000 TABLET ORAL DAILY
Qty: 30 TABLET | Refills: 0 | OUTPATIENT
Start: 2024-02-22

## 2024-03-11 ENCOUNTER — OFFICE VISIT (OUTPATIENT)
Age: 74
End: 2024-03-11

## 2024-03-11 VITALS
SYSTOLIC BLOOD PRESSURE: 151 MMHG | TEMPERATURE: 97.9 F | HEART RATE: 75 BPM | OXYGEN SATURATION: 97 % | DIASTOLIC BLOOD PRESSURE: 69 MMHG

## 2024-03-11 DIAGNOSIS — L40.9 SCALP PSORIASIS: ICD-10-CM

## 2024-03-11 DIAGNOSIS — B83.9 WORMS IN STOOL: Primary | ICD-10-CM

## 2024-03-11 DIAGNOSIS — B89 PARASITE INFECTION: ICD-10-CM

## 2024-03-11 DIAGNOSIS — L08.9 SKIN INFECTION: ICD-10-CM

## 2024-03-11 PROBLEM — I27.0 IDIOPATHIC PULMONARY HYPERTENSIVE ARTERIAL DISEASE (HCC): Status: ACTIVE | Noted: 2017-05-26

## 2024-03-11 PROBLEM — F33.0 MILD EPISODE OF RECURRENT MAJOR DEPRESSIVE DISORDER (HCC): Status: ACTIVE | Noted: 2017-05-26

## 2024-03-11 PROBLEM — R09.02 HYPOXEMIA: Status: ACTIVE | Noted: 2024-01-16

## 2024-03-11 PROBLEM — D50.9 IRON DEFICIENCY ANEMIA: Status: ACTIVE | Noted: 2017-05-26

## 2024-03-11 PROBLEM — K74.60 HEPATIC CIRRHOSIS (HCC): Status: ACTIVE | Noted: 2023-10-16

## 2024-03-11 PROBLEM — S52.022A CLOSED FRACTURE OF OLECRANON PROCESS OF LEFT ULNA: Status: ACTIVE | Noted: 2023-10-15

## 2024-03-11 PROBLEM — R13.10 DYSPHAGIA: Status: RESOLVED | Noted: 2017-02-09 | Resolved: 2024-03-11

## 2024-03-11 PROBLEM — K76.0 HEPATIC STEATOSIS: Status: ACTIVE | Noted: 2018-11-09

## 2024-03-11 PROBLEM — I25.10 CORONARY ATHEROSCLEROSIS: Chronic | Status: ACTIVE | Noted: 2021-07-09

## 2024-03-11 RX ORDER — ALBENDAZOLE 200 MG/1
TABLET, FILM COATED ORAL
Qty: 2 TABLET | Refills: 0 | Status: SHIPPED | OUTPATIENT
Start: 2024-03-11

## 2024-03-11 RX ORDER — BACITRACIN ZINC AND POLYMYXIN B SULFATE 500; 10000 [USP'U]/G; [USP'U]/G
OINTMENT OPHTHALMIC 2 TIMES DAILY
Qty: 3.5 G | Refills: 0 | Status: SHIPPED | OUTPATIENT
Start: 2024-03-11 | End: 2024-03-21

## 2024-03-11 RX ORDER — HYDROXYZINE HYDROCHLORIDE 25 MG/1
25 TABLET, FILM COATED ORAL EVERY 8 HOURS PRN
Qty: 30 TABLET | Refills: 0 | Status: SHIPPED | OUTPATIENT
Start: 2024-03-11 | End: 2024-03-21

## 2024-03-11 NOTE — PATIENT INSTRUCTIONS
Take the meds as prescribed and see your PCP for further evaluation  Per scotch tape that was brought in the patient showed  Follow up with your pcp in 7 days if symptoms persist or if symptoms worsen.  New Prescriptions    ALBENDAZOLE (ALBENZA) 200 MG TABLET    Take 1 tab po once today then take 1 tab po one time in 2 weeks.    KETOCONAZOLE, TOPICAL, 1 % SHAM    Apply 1 each topically once for 1 dose

## 2024-03-11 NOTE — PROGRESS NOTES
Nancy Franz (:  1950) is a 73 y.o. female,New patient, here for evaluation of the following chief complaint(s):  Other (Parasite, had this before , has lesions on back , )      ASSESSMENT/PLAN:    ICD-10-CM    1. Worms in stool  B83.9 albendazole (ALBENZA) 200 MG tablet      2. Parasite infection  B89       3. 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    SUBJECTIVE/OBJECTIVE:    History provided by:  Patient   used: No      HPI:   73 y.o. female presents with symptoms of pin worms on scotch tape that patient brought to the office, with anus that is itchy that is worse at night ongoing since.  Vitals:    24 1340   BP: (!) 151/69   Pulse: 75   Temp: 97.9 °F (36.6 °C)   TempSrc: Temporal   SpO2: 97%       Review of Systems    Physical Exam  Vitals and nursing note reviewed.   Constitutional:       Appearance: Normal appearance.   HENT:      Head: Normocephalic.      Nose: Nose normal.      Mouth/Throat:      Lips: Pink.      Mouth: Mucous membranes are moist.   Cardiovascular:      Rate and Rhythm: Normal rate and regular rhythm.      Heart sounds: Normal heart sounds.   Pulmonary:      Effort: Pulmonary effort is normal.      Breath sounds: Normal breath sounds.   Skin:     General: Skin is warm and dry.      Comments: Scalp lesion 3mm on top of scalp round appearence of a scab     Neurological:      Mental Status: She is alert and oriented to person, place, and time.   Psychiatric:         Mood and Affect: Mood normal.         Behavior: Behavior normal.           An electronic signature was used to authenticate this note.    --Reed To, APRN - CNP

## 2024-03-20 RX ORDER — LEVETIRACETAM 500 MG/1
500 TABLET ORAL 2 TIMES DAILY
Qty: 120 TABLET | Refills: 0 | OUTPATIENT
Start: 2024-03-20

## 2024-03-20 RX ORDER — METOPROLOL SUCCINATE 100 MG/1
100 TABLET, EXTENDED RELEASE ORAL 2 TIMES DAILY
Qty: 120 TABLET | Refills: 0 | OUTPATIENT
Start: 2024-03-20 | End: 2024-05-19

## 2024-03-20 RX ORDER — FOLIC ACID 1 MG/1
1000 TABLET ORAL DAILY
Qty: 30 TABLET | Refills: 0 | OUTPATIENT
Start: 2024-03-20

## 2024-03-20 RX ORDER — PANTOPRAZOLE SODIUM 40 MG/1
40 TABLET, DELAYED RELEASE ORAL 2 TIMES DAILY
Qty: 120 TABLET | Refills: 0 | OUTPATIENT
Start: 2024-03-20 | End: 2024-05-19

## 2024-03-21 RX ORDER — FOLIC ACID 1 MG/1
1000 TABLET ORAL DAILY
Qty: 30 TABLET | Refills: 0 | OUTPATIENT
Start: 2024-03-21

## 2024-03-24 ENCOUNTER — OFFICE VISIT (OUTPATIENT)
Age: 74
End: 2024-03-24

## 2024-03-24 VITALS
HEART RATE: 60 BPM | DIASTOLIC BLOOD PRESSURE: 56 MMHG | TEMPERATURE: 98.8 F | SYSTOLIC BLOOD PRESSURE: 115 MMHG | OXYGEN SATURATION: 91 %

## 2024-03-24 DIAGNOSIS — R06.2 WHEEZING: Primary | ICD-10-CM

## 2024-03-24 DIAGNOSIS — R05.1 ACUTE COUGH: ICD-10-CM

## 2024-03-24 DIAGNOSIS — J40 BRONCHITIS: ICD-10-CM

## 2024-03-24 LAB
Lab: NORMAL
PERFORMING INSTRUMENT: NORMAL
QC PASS/FAIL: NORMAL
SARS-COV-2, POC: NORMAL

## 2024-03-24 RX ORDER — METHYLPREDNISOLONE 4 MG/1
TABLET ORAL
Qty: 1 KIT | Refills: 0 | Status: SHIPPED | OUTPATIENT
Start: 2024-03-24 | End: 2024-03-30

## 2024-03-24 RX ORDER — BROMPHENIRAMINE MALEATE, PSEUDOEPHEDRINE HYDROCHLORIDE, AND DEXTROMETHORPHAN HYDROBROMIDE 2; 30; 10 MG/5ML; MG/5ML; MG/5ML
5 SYRUP ORAL 4 TIMES DAILY PRN
Qty: 120 ML | Refills: 0 | Status: SHIPPED | OUTPATIENT
Start: 2024-03-24

## 2024-03-24 RX ORDER — AMOXICILLIN AND CLAVULANATE POTASSIUM 875; 125 MG/1; MG/1
1 TABLET, FILM COATED ORAL 2 TIMES DAILY
Qty: 20 TABLET | Refills: 0 | Status: SHIPPED | OUTPATIENT
Start: 2024-03-24 | End: 2024-04-03

## 2024-03-28 ENCOUNTER — OFFICE VISIT (OUTPATIENT)
Age: 74
End: 2024-03-28

## 2024-03-28 VITALS
TEMPERATURE: 98 F | RESPIRATION RATE: 16 BRPM | DIASTOLIC BLOOD PRESSURE: 67 MMHG | SYSTOLIC BLOOD PRESSURE: 123 MMHG | BODY MASS INDEX: 38.87 KG/M2 | HEART RATE: 79 BPM | OXYGEN SATURATION: 96 % | WEIGHT: 198 LBS | HEIGHT: 60 IN

## 2024-03-28 DIAGNOSIS — B89 PARASITE INFECTION: ICD-10-CM

## 2024-03-28 DIAGNOSIS — L08.9 SKIN INFECTION: Primary | ICD-10-CM

## 2024-03-28 RX ORDER — IVERMECTIN 3 MG/1
200 TABLET ORAL ONCE
Qty: 6 TABLET | Refills: 0 | Status: SHIPPED | OUTPATIENT
Start: 2024-03-28 | End: 2024-03-28

## 2024-03-28 ASSESSMENT — ENCOUNTER SYMPTOMS
NAUSEA: 0
VOMITING: 0
ABDOMINAL PAIN: 0
DIARRHEA: 0

## 2024-03-28 NOTE — PROGRESS NOTES
Nancy Franz (:  1950) is a 73 y.o. female,Established patient, here for evaluation of the following chief complaint(s):  Rash (Lesions on scalp, chest, back, back of arms, groin. States she sometimes sees worms in her stool. Symptoms x 3 weeks, no improvement with last treatment given on 3/11. States worms are coming out of skin at night. )      ASSESSMENT/PLAN:    ICD-10-CM    1. Skin infection  L08.9 GI Bacterial Pathogens By PCR     OVA & PARASITE ID/COUNT #1      2. Parasite infection  B89 ivermectin 3 MG tablet          Lab order given for stool sample study. Take this to any lab and bring stool sample to them.     After you have given stool sample take medication. Discussed importance of doing stool sample first before taking medication or could skew results.     We will call with stool sample results.    You may need to follow up with GI doctor for further workup.     SUBJECTIVE/OBJECTIVE:    History provided by:  Patient   used: No      HPI:   73 y.o. female presents with symptoms of Rash to scalp, chest, back, back of arms, groin. States she sometimes sees worms in her stool. Symptoms x 3 weeks, Last treatment (albendazole)given on 3/11, she states no improvement, if anything she thinks she may have more lesions now. States worms are coming out of skin at night. Last night states two came out of right breast fold.   States she is on the schedule to have her house fumigated and she is getting a new bed after this is done. Has seen worms and also small translucent winged bugs in house.   Denies fever, body aches, itching. Has not been to PCP for symptoms. Is currently on abx and steroid for bronchitis.     Vitals:    24 1731   BP: 123/67   Pulse: 79   Resp: 16   Temp: 98 °F (36.7 °C)   TempSrc: Oral   SpO2: 96%   Weight: 89.8 kg (198 lb)   Height: 1.524 m (5')       Review of Systems   Constitutional:  Negative for fatigue and fever.   Gastrointestinal:  Negative for

## 2024-03-28 NOTE — PATIENT INSTRUCTIONS
Lab order given for stool sample study. Take this to any lab and bring stool sample to them.     After you have given stool sample take medication.    We will call with stool sample results.    You may need to follow up with GI doctor for further workup.

## 2024-03-29 ENCOUNTER — HOSPITAL ENCOUNTER (OUTPATIENT)
Age: 74
Discharge: HOME | End: 2024-03-29
Payer: MEDICARE

## 2024-03-29 DIAGNOSIS — L08.9 SKIN INFECTION: ICD-10-CM

## 2024-03-29 PROCEDURE — 87209 SMEAR COMPLEX STAIN: CPT

## 2024-03-29 PROCEDURE — 87506 IADNA-DNA/RNA PROBE TQ 6-11: CPT

## 2024-03-29 PROCEDURE — 87177 OVA AND PARASITES SMEARS: CPT

## 2024-04-02 LAB — INTERPRETATION: NEGATIVE

## 2024-05-08 ENCOUNTER — OFFICE VISIT (OUTPATIENT)
Age: 74
End: 2024-05-08

## 2024-05-08 VITALS
HEIGHT: 62 IN | OXYGEN SATURATION: 96 % | SYSTOLIC BLOOD PRESSURE: 148 MMHG | DIASTOLIC BLOOD PRESSURE: 64 MMHG | BODY MASS INDEX: 34.96 KG/M2 | TEMPERATURE: 98 F | WEIGHT: 190 LBS | HEART RATE: 82 BPM

## 2024-05-08 DIAGNOSIS — R30.0 DYSURIA: Primary | ICD-10-CM

## 2024-05-08 DIAGNOSIS — R21 RASH: ICD-10-CM

## 2024-05-08 LAB
APPEARANCE FLUID: CLEAR
BILIRUBIN, POC: NEGATIVE
BLOOD URINE, POC: NEGATIVE
CLARITY, POC: CLEAR
COLOR, POC: YELLOW
GLUCOSE URINE, POC: NEGATIVE
KETONES, POC: NEGATIVE
LEUKOCYTE EST, POC: ABNORMAL
NITRITE, POC: NEGATIVE
PH, POC: 7
PROTEIN, POC: ABNORMAL
SPECIFIC GRAVITY, POC: 1.02
UROBILINOGEN, POC: 0.2

## 2024-05-08 RX ORDER — PETROLATUM 42 G/100G
OINTMENT TOPICAL
Qty: 100 G | Refills: 0 | Status: SHIPPED | OUTPATIENT
Start: 2024-05-08

## 2024-05-08 RX ORDER — NITROFURANTOIN 25; 75 MG/1; MG/1
100 CAPSULE ORAL 2 TIMES DAILY
Qty: 10 CAPSULE | Refills: 0 | Status: SHIPPED | OUTPATIENT
Start: 2024-05-08 | End: 2024-05-13

## 2024-05-08 NOTE — PROGRESS NOTES
Nancy Franz (:  1950) is a 73 y.o. female,Established patient, here for evaluation of the following chief complaint(s):  Rash, Urinary Tract Infection, and Other (Patient says she has worms coming out of her anus)      ASSESSMENT/PLAN:    ICD-10-CM    1. Dysuria  R30.0 POCT Urinalysis no Micro     Culture, Urine     nitrofurantoin, macrocrystal-monohydrate, (MACROBID) 100 MG capsule      2. Rash  R21         Issue concerning rectal worms/rash addressed at time of last visit    Want to focus on treating presumed UTI today  As far as her other concerns , Patient advised to schedule follow up with her PCP   If itchy scalp/body rash..no scabies suspected/noted...told she needs to discuss with PCP  before considering medication to treat \"worms\" -to make sure no adverse interaction with current medications     Call and schedule appointment with  dr Ruiz (PCP)  For further evaluations  and concerns  Try using selsun blue shampoo/body wash twice a week     Return any time if area looking/feeling worse, or has increase pain, increase redness, swelling, or drainage noted....if spreading or any changes as to it's appearance is noted      SUBJECTIVE/OBJECTIVE:  Patient presents with:  Rash  (previous complaints)  Urinary Tract Infection  (urgency/frequency)  Other: Patient says she has worms coming out of her anus   of various lengths) recurrent complaint  (last visit reviewed)         History provided by:  Patient   used: No    Rash  Pertinent negatives include no diarrhea, fever, sore throat or vomiting.   Urinary Tract Infection        Vitals:    24 1136   BP: (!) 148/64   Site: Left Upper Arm   Position: Sitting   Cuff Size: Large Adult   Pulse: 82   Temp: 98 °F (36.7 °C)   TempSrc: Oral   SpO2: 96%   Weight: 86.2 kg (190 lb)   Height: 1.575 m (5' 2\")       Review of Systems   Constitutional:  Negative for fever.   HENT:  Negative for sore throat.    Gastrointestinal:  Negative for

## 2024-05-08 NOTE — PATIENT INSTRUCTIONS
Call and schedule appointment with  dr Ruiz (PCP)  For further evaluations  and concerns  Try using selsun blue shampoo/body wash twice a week     Return any time if area looking/feeling worse, or has increase pain, increase redness, swelling, or drainage noted....if spreading or any changes as to it's appearance is noted

## 2024-05-09 ASSESSMENT — ENCOUNTER SYMPTOMS
SORE THROAT: 0
DIARRHEA: 0
VOMITING: 0
ABDOMINAL PAIN: 0

## 2024-05-10 LAB
BACTERIA UR CULT: ABNORMAL
BACTERIA UR CULT: ABNORMAL
ORGANISM: ABNORMAL

## 2024-05-12 DIAGNOSIS — N39.0 UTI DUE TO KLEBSIELLA SPECIES: Primary | ICD-10-CM

## 2024-05-12 DIAGNOSIS — B96.89 UTI DUE TO KLEBSIELLA SPECIES: Primary | ICD-10-CM

## 2024-05-12 RX ORDER — CIPROFLOXACIN 500 MG/1
500 TABLET, FILM COATED ORAL 2 TIMES DAILY
Qty: 14 TABLET | Refills: 0 | Status: SHIPPED | OUTPATIENT
Start: 2024-05-12 | End: 2024-05-19

## 2024-06-11 ENCOUNTER — APPOINTMENT (OUTPATIENT)
Dept: CT IMAGING | Age: 74
DRG: 871 | End: 2024-06-11
Payer: MEDICARE

## 2024-06-11 ENCOUNTER — HOSPITAL ENCOUNTER (INPATIENT)
Age: 74
LOS: 5 days | Discharge: HOME | DRG: 871 | End: 2024-06-16
Attending: STUDENT IN AN ORGANIZED HEALTH CARE EDUCATION/TRAINING PROGRAM | Admitting: INTERNAL MEDICINE
Payer: MEDICARE

## 2024-06-11 ENCOUNTER — APPOINTMENT (OUTPATIENT)
Dept: GENERAL RADIOLOGY | Age: 74
DRG: 871 | End: 2024-06-11
Payer: MEDICARE

## 2024-06-11 DIAGNOSIS — I95.9 HYPOTENSION, UNSPECIFIED HYPOTENSION TYPE: ICD-10-CM

## 2024-06-11 DIAGNOSIS — I21.4 NSTEMI (NON-ST ELEVATED MYOCARDIAL INFARCTION) (HCC): ICD-10-CM

## 2024-06-11 DIAGNOSIS — N19 UREMIA: ICD-10-CM

## 2024-06-11 DIAGNOSIS — I48.0 PAROXYSMAL ATRIAL FIBRILLATION (HCC): ICD-10-CM

## 2024-06-11 DIAGNOSIS — N17.9 AKI (ACUTE KIDNEY INJURY) (HCC): ICD-10-CM

## 2024-06-11 DIAGNOSIS — R41.82 ALTERED MENTAL STATUS, UNSPECIFIED ALTERED MENTAL STATUS TYPE: Primary | ICD-10-CM

## 2024-06-11 PROBLEM — I48.91 ATRIAL FIBRILLATION WITH RAPID VENTRICULAR RESPONSE (HCC): Status: RESOLVED | Noted: 2023-12-04 | Resolved: 2024-06-11

## 2024-06-11 PROBLEM — I48.91 ATRIAL FIBRILLATION WITH RVR (HCC): Status: RESOLVED | Noted: 2023-01-16 | Resolved: 2024-06-11

## 2024-06-11 LAB
ALBUMIN SERPL-MCNC: 3.2 G/DL (ref 3.4–5)
ALBUMIN/GLOB SERPL: 1 {RATIO} (ref 1.1–2.2)
ALP SERPL-CCNC: 165 U/L (ref 40–129)
ALT SERPL-CCNC: 10 U/L (ref 10–40)
AMMONIA PLAS-SCNC: 50 UMOL/L (ref 11–51)
ANION GAP SERPL CALCULATED.3IONS-SCNC: 15 MMOL/L (ref 3–16)
AST SERPL-CCNC: 22 U/L (ref 15–37)
BASE EXCESS BLDV CALC-SCNC: -0.4 MMOL/L (ref -3–3)
BASOPHILS # BLD: 0.2 K/UL (ref 0–0.2)
BASOPHILS NFR BLD: 2.1 %
BILIRUB SERPL-MCNC: 0.4 MG/DL (ref 0–1)
BILIRUB UR QL STRIP.AUTO: NEGATIVE
BUN SERPL-MCNC: 41 MG/DL (ref 7–20)
CALCIUM SERPL-MCNC: 10.1 MG/DL (ref 8.3–10.6)
CHLORIDE SERPL-SCNC: 96 MMOL/L (ref 99–110)
CLARITY UR: CLEAR
CO2 BLDV-SCNC: 23 MMOL/L
CO2 SERPL-SCNC: 21 MMOL/L (ref 21–32)
COHGB MFR BLDV: 3.1 % (ref 0–1.5)
COLOR UR: YELLOW
CREAT SERPL-MCNC: 3.2 MG/DL (ref 0.6–1.2)
DEPRECATED RDW RBC AUTO: 19.2 % (ref 12.4–15.4)
EKG ATRIAL RATE: 65 BPM
EKG DIAGNOSIS: NORMAL
EKG P AXIS: 30 DEGREES
EKG P-R INTERVAL: 134 MS
EKG Q-T INTERVAL: 434 MS
EKG QRS DURATION: 66 MS
EKG QTC CALCULATION (BAZETT): 451 MS
EKG R AXIS: -10 DEGREES
EKG T AXIS: 84 DEGREES
EKG VENTRICULAR RATE: 65 BPM
EOSINOPHIL # BLD: 0.4 K/UL (ref 0–0.6)
EOSINOPHIL NFR BLD: 6.1 %
GFR SERPLBLD CREATININE-BSD FMLA CKD-EPI: 15 ML/MIN/{1.73_M2}
GLUCOSE SERPL-MCNC: 197 MG/DL (ref 70–99)
GLUCOSE UR STRIP.AUTO-MCNC: >=1000 MG/DL
HCO3 BLDV-SCNC: 22.4 MMOL/L (ref 23–29)
HCT VFR BLD AUTO: 25.1 % (ref 36–48)
HGB BLD-MCNC: 8.1 G/DL (ref 12–16)
HGB UR QL STRIP.AUTO: NEGATIVE
KETONES UR STRIP.AUTO-MCNC: NEGATIVE MG/DL
LACTATE BLDV-SCNC: 1 MMOL/L (ref 0.4–2)
LACTATE BLDV-SCNC: 3.2 MMOL/L (ref 0.4–1.9)
LACTATE BLDV-SCNC: 3.4 MMOL/L (ref 0.4–1.9)
LEUKOCYTE ESTERASE UR QL STRIP.AUTO: NEGATIVE
LYMPHOCYTES # BLD: 1.7 K/UL (ref 1–5.1)
LYMPHOCYTES NFR BLD: 24 %
MCH RBC QN AUTO: 25.7 PG (ref 26–34)
MCHC RBC AUTO-ENTMCNC: 32.4 G/DL (ref 31–36)
MCV RBC AUTO: 79.5 FL (ref 80–100)
METHGB MFR BLDV: 0 %
MONOCYTES # BLD: 0.7 K/UL (ref 0–1.3)
MONOCYTES NFR BLD: 9 %
NEUTROPHILS # BLD: 4.3 K/UL (ref 1.7–7.7)
NEUTROPHILS NFR BLD: 58.8 %
NITRITE UR QL STRIP.AUTO: NEGATIVE
NT-PROBNP SERPL-MCNC: 649 PG/ML (ref 0–124)
O2 THERAPY: ABNORMAL
PCO2 BLDV: 29.6 MMHG (ref 40–50)
PH BLDV: 7.5 [PH] (ref 7.35–7.45)
PH UR STRIP.AUTO: 8 [PH] (ref 5–8)
PLATELET # BLD AUTO: 210 K/UL (ref 135–450)
PMV BLD AUTO: 9 FL (ref 5–10.5)
PO2 BLDV: 95.2 MMHG (ref 25–40)
POTASSIUM SERPL-SCNC: 4.5 MMOL/L (ref 3.5–5.1)
PROCALCITONIN SERPL IA-MCNC: 0.37 NG/ML (ref 0–0.15)
PROT SERPL-MCNC: 6.4 G/DL (ref 6.4–8.2)
PROT UR STRIP.AUTO-MCNC: 30 MG/DL
RBC # BLD AUTO: 3.16 M/UL (ref 4–5.2)
RBC #/AREA URNS HPF: ABNORMAL /HPF (ref 0–4)
RENAL EPI CELLS #/AREA UR COMP ASSIST: ABNORMAL /HPF (ref 0–1)
SALICYLATES SERPL-MCNC: 1.9 MG/DL (ref 15–30)
SAO2 % BLDV: 98 %
SODIUM SERPL-SCNC: 132 MMOL/L (ref 136–145)
SP GR UR STRIP.AUTO: 1.01 (ref 1–1.03)
TROPONIN, HIGH SENSITIVITY: 111 NG/L (ref 0–14)
TROPONIN, HIGH SENSITIVITY: 155 NG/L (ref 0–14)
TROPONIN, HIGH SENSITIVITY: 166 NG/L (ref 0–14)
TSH SERPL DL<=0.005 MIU/L-ACNC: 2.7 UIU/ML (ref 0.27–4.2)
UA COMPLETE W REFLEX CULTURE PNL UR: ABNORMAL
UA DIPSTICK W REFLEX MICRO PNL UR: YES
URN SPEC COLLECT METH UR: ABNORMAL
UROBILINOGEN UR STRIP-ACNC: 0.2 E.U./DL
WBC # BLD AUTO: 7.3 K/UL (ref 4–11)
WBC #/AREA URNS HPF: ABNORMAL /HPF (ref 0–5)

## 2024-06-11 PROCEDURE — 82140 ASSAY OF AMMONIA: CPT

## 2024-06-11 PROCEDURE — 83880 ASSAY OF NATRIURETIC PEPTIDE: CPT

## 2024-06-11 PROCEDURE — 6370000000 HC RX 637 (ALT 250 FOR IP): Performed by: INTERNAL MEDICINE

## 2024-06-11 PROCEDURE — 94640 AIRWAY INHALATION TREATMENT: CPT

## 2024-06-11 PROCEDURE — 71045 X-RAY EXAM CHEST 1 VIEW: CPT

## 2024-06-11 PROCEDURE — 80179 DRUG ASSAY SALICYLATE: CPT

## 2024-06-11 PROCEDURE — 36556 INSERT NON-TUNNEL CV CATH: CPT

## 2024-06-11 PROCEDURE — 99223 1ST HOSP IP/OBS HIGH 75: CPT | Performed by: INTERNAL MEDICINE

## 2024-06-11 PROCEDURE — 2580000003 HC RX 258

## 2024-06-11 PROCEDURE — 2000000000 HC ICU R&B

## 2024-06-11 PROCEDURE — 96366 THER/PROPH/DIAG IV INF ADDON: CPT

## 2024-06-11 PROCEDURE — 93005 ELECTROCARDIOGRAM TRACING: CPT | Performed by: STUDENT IN AN ORGANIZED HEALTH CARE EDUCATION/TRAINING PROGRAM

## 2024-06-11 PROCEDURE — 36415 COLL VENOUS BLD VENIPUNCTURE: CPT

## 2024-06-11 PROCEDURE — 84145 PROCALCITONIN (PCT): CPT

## 2024-06-11 PROCEDURE — 93010 ELECTROCARDIOGRAM REPORT: CPT | Performed by: INTERNAL MEDICINE

## 2024-06-11 PROCEDURE — 2500000003 HC RX 250 WO HCPCS: Performed by: STUDENT IN AN ORGANIZED HEALTH CARE EDUCATION/TRAINING PROGRAM

## 2024-06-11 PROCEDURE — 51702 INSERT TEMP BLADDER CATH: CPT

## 2024-06-11 PROCEDURE — 3E033XZ INTRODUCTION OF VASOPRESSOR INTO PERIPHERAL VEIN, PERCUTANEOUS APPROACH: ICD-10-PCS | Performed by: INTERNAL MEDICINE

## 2024-06-11 PROCEDURE — 80053 COMPREHEN METABOLIC PANEL: CPT

## 2024-06-11 PROCEDURE — 6370000000 HC RX 637 (ALT 250 FOR IP)

## 2024-06-11 PROCEDURE — 96367 TX/PROPH/DG ADDL SEQ IV INF: CPT

## 2024-06-11 PROCEDURE — 6360000002 HC RX W HCPCS: Performed by: STUDENT IN AN ORGANIZED HEALTH CARE EDUCATION/TRAINING PROGRAM

## 2024-06-11 PROCEDURE — 6360000002 HC RX W HCPCS

## 2024-06-11 PROCEDURE — 96365 THER/PROPH/DIAG IV INF INIT: CPT

## 2024-06-11 PROCEDURE — 84443 ASSAY THYROID STIM HORMONE: CPT

## 2024-06-11 PROCEDURE — 82803 BLOOD GASES ANY COMBINATION: CPT

## 2024-06-11 PROCEDURE — 84484 ASSAY OF TROPONIN QUANT: CPT

## 2024-06-11 PROCEDURE — 2580000003 HC RX 258: Performed by: STUDENT IN AN ORGANIZED HEALTH CARE EDUCATION/TRAINING PROGRAM

## 2024-06-11 PROCEDURE — 85025 COMPLETE CBC W/AUTO DIFF WBC: CPT

## 2024-06-11 PROCEDURE — 99285 EMERGENCY DEPT VISIT HI MDM: CPT

## 2024-06-11 PROCEDURE — 81001 URINALYSIS AUTO W/SCOPE: CPT

## 2024-06-11 PROCEDURE — 94761 N-INVAS EAR/PLS OXIMETRY MLT: CPT

## 2024-06-11 PROCEDURE — 87040 BLOOD CULTURE FOR BACTERIA: CPT

## 2024-06-11 PROCEDURE — 02HV33Z INSERTION OF INFUSION DEVICE INTO SUPERIOR VENA CAVA, PERCUTANEOUS APPROACH: ICD-10-PCS | Performed by: INTERNAL MEDICINE

## 2024-06-11 PROCEDURE — 70450 CT HEAD/BRAIN W/O DYE: CPT

## 2024-06-11 PROCEDURE — 2700000000 HC OXYGEN THERAPY PER DAY

## 2024-06-11 PROCEDURE — 83605 ASSAY OF LACTIC ACID: CPT

## 2024-06-11 RX ORDER — IPRATROPIUM BROMIDE AND ALBUTEROL SULFATE 2.5; .5 MG/3ML; MG/3ML
1 SOLUTION RESPIRATORY (INHALATION)
Status: DISCONTINUED | OUTPATIENT
Start: 2024-06-11 | End: 2024-06-11

## 2024-06-11 RX ORDER — SODIUM CHLORIDE 9 MG/ML
INJECTION, SOLUTION INTRAVENOUS CONTINUOUS
Status: DISCONTINUED | OUTPATIENT
Start: 2024-06-11 | End: 2024-06-12

## 2024-06-11 RX ORDER — SODIUM CHLORIDE 0.9 % (FLUSH) 0.9 %
10 SYRINGE (ML) INJECTION PRN
Status: DISCONTINUED | OUTPATIENT
Start: 2024-06-11 | End: 2024-06-16 | Stop reason: HOSPADM

## 2024-06-11 RX ORDER — ONDANSETRON 2 MG/ML
4 INJECTION INTRAMUSCULAR; INTRAVENOUS EVERY 6 HOURS PRN
Status: DISCONTINUED | OUTPATIENT
Start: 2024-06-11 | End: 2024-06-16 | Stop reason: HOSPADM

## 2024-06-11 RX ORDER — ACETAMINOPHEN 650 MG/1
650 SUPPOSITORY RECTAL EVERY 6 HOURS PRN
Status: DISCONTINUED | OUTPATIENT
Start: 2024-06-11 | End: 2024-06-16 | Stop reason: HOSPADM

## 2024-06-11 RX ORDER — ACETAMINOPHEN 325 MG/1
650 TABLET ORAL EVERY 6 HOURS PRN
Status: DISCONTINUED | OUTPATIENT
Start: 2024-06-11 | End: 2024-06-16 | Stop reason: HOSPADM

## 2024-06-11 RX ORDER — IPRATROPIUM BROMIDE AND ALBUTEROL SULFATE 2.5; .5 MG/3ML; MG/3ML
1 SOLUTION RESPIRATORY (INHALATION)
Status: DISCONTINUED | OUTPATIENT
Start: 2024-06-11 | End: 2024-06-16 | Stop reason: HOSPADM

## 2024-06-11 RX ORDER — SODIUM CHLORIDE 0.9 % (FLUSH) 0.9 %
5-40 SYRINGE (ML) INJECTION EVERY 12 HOURS SCHEDULED
Status: DISCONTINUED | OUTPATIENT
Start: 2024-06-11 | End: 2024-06-16 | Stop reason: HOSPADM

## 2024-06-11 RX ORDER — ONDANSETRON 4 MG/1
4 TABLET, ORALLY DISINTEGRATING ORAL EVERY 8 HOURS PRN
Status: DISCONTINUED | OUTPATIENT
Start: 2024-06-11 | End: 2024-06-16 | Stop reason: HOSPADM

## 2024-06-11 RX ORDER — POLYETHYLENE GLYCOL 3350 17 G/17G
17 POWDER, FOR SOLUTION ORAL DAILY PRN
Status: DISCONTINUED | OUTPATIENT
Start: 2024-06-11 | End: 2024-06-16 | Stop reason: HOSPADM

## 2024-06-11 RX ORDER — SODIUM CHLORIDE, SODIUM LACTATE, POTASSIUM CHLORIDE, AND CALCIUM CHLORIDE .6; .31; .03; .02 G/100ML; G/100ML; G/100ML; G/100ML
30 INJECTION, SOLUTION INTRAVENOUS ONCE
Status: COMPLETED | OUTPATIENT
Start: 2024-06-11 | End: 2024-06-11

## 2024-06-11 RX ORDER — SODIUM CHLORIDE 9 MG/ML
INJECTION, SOLUTION INTRAVENOUS PRN
Status: DISCONTINUED | OUTPATIENT
Start: 2024-06-11 | End: 2024-06-16 | Stop reason: HOSPADM

## 2024-06-11 RX ORDER — IPRATROPIUM BROMIDE AND ALBUTEROL SULFATE 2.5; .5 MG/3ML; MG/3ML
1 SOLUTION RESPIRATORY (INHALATION) EVERY 4 HOURS PRN
Status: DISCONTINUED | OUTPATIENT
Start: 2024-06-11 | End: 2024-06-16 | Stop reason: HOSPADM

## 2024-06-11 RX ADMIN — SODIUM CHLORIDE: 9 INJECTION, SOLUTION INTRAVENOUS at 11:30

## 2024-06-11 RX ADMIN — CEFEPIME 2000 MG: 2 INJECTION, POWDER, FOR SOLUTION INTRAVENOUS at 04:45

## 2024-06-11 RX ADMIN — IPRATROPIUM BROMIDE AND ALBUTEROL SULFATE 1 DOSE: 2.5; .5 SOLUTION RESPIRATORY (INHALATION) at 20:30

## 2024-06-11 RX ADMIN — SODIUM CHLORIDE, POTASSIUM CHLORIDE, SODIUM LACTATE AND CALCIUM CHLORIDE 2586 ML: 600; 310; 30; 20 INJECTION, SOLUTION INTRAVENOUS at 04:40

## 2024-06-11 RX ADMIN — NOREPINEPHRINE BITARTRATE 5 MCG/MIN: 1 INJECTION, SOLUTION, CONCENTRATE INTRAVENOUS at 05:10

## 2024-06-11 RX ADMIN — ACETAMINOPHEN 650 MG: 325 TABLET ORAL at 11:22

## 2024-06-11 RX ADMIN — SODIUM CHLORIDE, PRESERVATIVE FREE 10 ML: 5 INJECTION INTRAVENOUS at 11:22

## 2024-06-11 RX ADMIN — CEFEPIME 1000 MG: 1 INJECTION, POWDER, FOR SOLUTION INTRAMUSCULAR; INTRAVENOUS at 17:18

## 2024-06-11 RX ADMIN — VANCOMYCIN HYDROCHLORIDE 2000 MG: 10 INJECTION, POWDER, LYOPHILIZED, FOR SOLUTION INTRAVENOUS at 05:18

## 2024-06-11 RX ADMIN — SODIUM CHLORIDE, PRESERVATIVE FREE 10 ML: 5 INJECTION INTRAVENOUS at 19:53

## 2024-06-11 ASSESSMENT — PAIN SCALES - GENERAL: PAINLEVEL_OUTOF10: 5

## 2024-06-11 ASSESSMENT — PAIN - FUNCTIONAL ASSESSMENT: PAIN_FUNCTIONAL_ASSESSMENT: NONE - DENIES PAIN

## 2024-06-11 NOTE — DISCHARGE INSTRUCTIONS
Heart Failure Resources:  Heart Failure Interactive Workbook:  Go to https://[employer]/publication/?g=088157 for a Free Heart Failure Interactive Workbook provided by The American Heart Association. This interactive workbook will provide information on Healthier Living with Heart Failure. Please copy and paste link into search bar. Use your mouse to scroll through the pages.    HF [place] ruben:   Heart Failure Free smart phone ruben available for iPhone and Android download. Use your phone to track sodium intake, fluid intake, symptoms, and weight.     Low Sodium Diet / Recipes:  Go to www.[employer]X website for “renal” diet which is Low Sodium! [employer] is a dialysis company, but this website offers free seasonal cookbooks. Each quarter, they will release 25-30 new recipes with a breakdown of calories, sodium, and glucose. You can also go to [employer]/recipes website for free recipes.     Discharge Instruction Video:  Scan the QR code below with your camera and click the canva.com link to open the video and watch educational information on Heart Failure and Medications from one of our nurses.   https://[email or web address]/design/DAFZnsH_JRk/9DcdrszTIXNnsCNnaJ8bzo/edit    Home Exercise Program:   Identification of Green/Yellow/Red zones:  You should be able to identify when you feel good (green zone), if you have 1-2 symptoms of HF (yellow zone), or if you are in need of medical attention (red zone).  In your CHF education folder you were provided a “stop light tool” to outline this information.     We want to you to rate your exertion levels:    Our therapy team has discussed means of identification with you such as the \"Kinjal scale.\"  The Kinjal rating scale ranges from 6 to 20, where 6 means \"no exertion at all\" and 20 means \"maximal exertion.\" The goal is to use this to gauge how much effort it is taking for you to do your normal daily tasks.   You should be able to recognize when too much exertion is

## 2024-06-11 NOTE — CONSULTS
[facility]                                 Pulmonary/Critical care  Consult /Progress Note :                                                                  CC :Hypotension   Patient is being seen at the request of JOHANA Calabrese  for a consultation for shock     HISTORY OF PRESENT ILLNESS:     Nancy Franz is a 73 y.o. female who presents with dizziness and altered mental status versus syncope.   Patient poor historian     Per EMS they were called to the home for possible unresponsiveness.  They found the patient obtunded with snoring respirations.      Narcan was given by EMS  with no clear improvement and subsequently placed a nasal trumpet and assisted with bag mask ventilations.  T    She had Normal BS and  was hemodynamically stable required no other interventions and route.    In ED  patient awakens to painful stimulus can state her name but cannot contribute history and shortly resume somnolence.  She does not follow commands.      She is known  hypothyroidism she has paroxysmal atrial fibrillation on elquis    She also appears to be on lactulose and rifaximin perhaps with some liver disease complicated by hepatic encephalopathy.      Patient also appears to be on Keppra with history of seizures and is on Lasix with history of heart failure.     She is on Levophed 15    She was given  30 ml/kg bolus this am completed by ED     Past Medical History:   Diagnosis Date    Altered mental status     Anemia     Asthma     Cerebral artery occlusion with cerebral infarction (HCC)     CHF (congestive heart failure) (HCC)     COPD (chronic obstructive pulmonary disease) (HCC)     Depression     Diabetes mellitus (HCC)     DJD (degenerative joint disease)     DENZEL (generalised anxiety disorder)     GERD (gastroesophageal reflux disease)     Hyperlipidemia     Hypertension     Irritable bowel disease     Neuropathy     Bilateral LE
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
## 2024-06-11 NOTE — ED PROVIDER NOTES
[facility] ED     EMERGENCY DEPARTMENT ENCOUNTER         Pt Name: Nancy Franz   MRN: [id number]   Birthdate 1950   Date of evaluation: 6/11/2024   Provider: Earnest Asher MD   PCP: Ryan Ruiz MD   Note Started: 4:27 AM EDT 6/11/24       Chief Complaint     Loss of Consciousness      History of Present Illness     Nancy Franz is a 73 y.o. female who presents with altered mental status versus syncope.  It is difficult to clarify the history and the patient herself cannot provide history due to altered mental status.  Per EMS they were called to the home for possible unresponsiveness.  They found the patient obtunded with snoring respirations.  The provide Narcan with no clear improvement and subsequently placed a nasal trumpet and assisted with bag mask ventilations.  They report the patient adequate blood sugar and was hemodynamically stable required no other interventions and route.  On arrival here the patient awakens to painful stimulus can state her name but cannot contribute history and shortly resume somnolence.  She does not follow commands.    On record review it appears she was admitted to the hospital in January of this year with sepsis with renal failure perhaps due to pneumonia.  It appears the patient has hypothyroidism she has paroxysmal atrial fibrillation on apixaban.  She also appears to be on lactulose and rifaximin perhaps with some liver disease complicated by hepatic encephalopathy.  Patient also appears to be on Keppra with history of seizures and is on Lasix with history of heart failure.      I have reviewed the nursing notes and agree unless otherwise noted.    Review of Systems     Positives and pertinent negatives as per HPI.    Past Medical, Surgical, Family, and Social History     She has a past medical history of Altered mental status, Anemia, Asthma, Cerebral artery occlusion with cerebral infarction (HCC), CHF (congestive heart failure) (HCC), COPD

## 2024-06-12 LAB
ANION GAP SERPL CALCULATED.3IONS-SCNC: 11 MMOL/L (ref 3–16)
BASOPHILS # BLD: 0.1 K/UL (ref 0–0.2)
BASOPHILS NFR BLD: 1.2 %
BUN SERPL-MCNC: 30 MG/DL (ref 7–20)
CALCIUM SERPL-MCNC: 9.3 MG/DL (ref 8.3–10.6)
CHLORIDE SERPL-SCNC: 104 MMOL/L (ref 99–110)
CO2 SERPL-SCNC: 20 MMOL/L (ref 21–32)
CREAT SERPL-MCNC: 2.1 MG/DL (ref 0.6–1.2)
DEPRECATED RDW RBC AUTO: 19.9 % (ref 12.4–15.4)
EOSINOPHIL # BLD: 0.5 K/UL (ref 0–0.6)
EOSINOPHIL NFR BLD: 8.4 %
GFR SERPLBLD CREATININE-BSD FMLA CKD-EPI: 24 ML/MIN/{1.73_M2}
GLUCOSE SERPL-MCNC: 116 MG/DL (ref 70–99)
HCT VFR BLD AUTO: 25.6 % (ref 36–48)
HGB BLD-MCNC: 8.1 G/DL (ref 12–16)
LYMPHOCYTES # BLD: 1.2 K/UL (ref 1–5.1)
LYMPHOCYTES NFR BLD: 22.4 %
MCH RBC QN AUTO: 25.4 PG (ref 26–34)
MCHC RBC AUTO-ENTMCNC: 31.7 G/DL (ref 31–36)
MCV RBC AUTO: 80.1 FL (ref 80–100)
MONOCYTES # BLD: 0.5 K/UL (ref 0–1.3)
MONOCYTES NFR BLD: 8.9 %
NEUTROPHILS # BLD: 3.2 K/UL (ref 1.7–7.7)
NEUTROPHILS NFR BLD: 59.1 %
PLATELET # BLD AUTO: 175 K/UL (ref 135–450)
PMV BLD AUTO: 8.3 FL (ref 5–10.5)
POTASSIUM SERPL-SCNC: 3.8 MMOL/L (ref 3.5–5.1)
RBC # BLD AUTO: 3.2 M/UL (ref 4–5.2)
SODIUM SERPL-SCNC: 135 MMOL/L (ref 136–145)
VANCOMYCIN TROUGH SERPL-MCNC: 21.4 UG/ML (ref 10–20)
WBC # BLD AUTO: 5.5 K/UL (ref 4–11)

## 2024-06-12 PROCEDURE — 94761 N-INVAS EAR/PLS OXIMETRY MLT: CPT

## 2024-06-12 PROCEDURE — 80048 BASIC METABOLIC PNL TOTAL CA: CPT

## 2024-06-12 PROCEDURE — 94640 AIRWAY INHALATION TREATMENT: CPT

## 2024-06-12 PROCEDURE — 99291 CRITICAL CARE FIRST HOUR: CPT | Performed by: INTERNAL MEDICINE

## 2024-06-12 PROCEDURE — 2000000000 HC ICU R&B

## 2024-06-12 PROCEDURE — 2700000000 HC OXYGEN THERAPY PER DAY

## 2024-06-12 PROCEDURE — 99233 SBSQ HOSP IP/OBS HIGH 50: CPT | Performed by: INTERNAL MEDICINE

## 2024-06-12 PROCEDURE — 2500000003 HC RX 250 WO HCPCS: Performed by: INTERNAL MEDICINE

## 2024-06-12 PROCEDURE — 2580000003 HC RX 258

## 2024-06-12 PROCEDURE — 6360000002 HC RX W HCPCS

## 2024-06-12 PROCEDURE — 6370000000 HC RX 637 (ALT 250 FOR IP): Performed by: INTERNAL MEDICINE

## 2024-06-12 PROCEDURE — 85025 COMPLETE CBC W/AUTO DIFF WBC: CPT

## 2024-06-12 PROCEDURE — 80202 ASSAY OF VANCOMYCIN: CPT

## 2024-06-12 PROCEDURE — 2580000003 HC RX 258: Performed by: INTERNAL MEDICINE

## 2024-06-12 RX ORDER — FUROSEMIDE 20 MG/1
20 TABLET ORAL DAILY
Status: DISCONTINUED | OUTPATIENT
Start: 2024-06-12 | End: 2024-06-16 | Stop reason: HOSPADM

## 2024-06-12 RX ORDER — LEVOFLOXACIN 750 MG/1
750 TABLET, FILM COATED ORAL
Status: DISCONTINUED | OUTPATIENT
Start: 2024-06-12 | End: 2024-06-13

## 2024-06-12 RX ORDER — PANTOPRAZOLE SODIUM 40 MG/1
40 TABLET, DELAYED RELEASE ORAL
Status: DISCONTINUED | OUTPATIENT
Start: 2024-06-12 | End: 2024-06-16 | Stop reason: HOSPADM

## 2024-06-12 RX ORDER — LACTULOSE 10 G/15ML
20 SOLUTION ORAL 3 TIMES DAILY
Status: DISCONTINUED | OUTPATIENT
Start: 2024-06-12 | End: 2024-06-16 | Stop reason: HOSPADM

## 2024-06-12 RX ORDER — QUETIAPINE FUMARATE 50 MG/1
25 TABLET, FILM COATED ORAL 2 TIMES DAILY
COMMUNITY

## 2024-06-12 RX ORDER — LEVETIRACETAM 500 MG/1
500 TABLET ORAL 2 TIMES DAILY
Status: DISCONTINUED | OUTPATIENT
Start: 2024-06-12 | End: 2024-06-16 | Stop reason: HOSPADM

## 2024-06-12 RX ORDER — SERTRALINE HYDROCHLORIDE 100 MG/1
100 TABLET, FILM COATED ORAL DAILY
Status: DISCONTINUED | OUTPATIENT
Start: 2024-06-12 | End: 2024-06-16 | Stop reason: HOSPADM

## 2024-06-12 RX ORDER — SUCRALFATE 1 G/1
1 TABLET ORAL EVERY 6 HOURS SCHEDULED
Status: DISCONTINUED | OUTPATIENT
Start: 2024-06-12 | End: 2024-06-16 | Stop reason: HOSPADM

## 2024-06-12 RX ORDER — QUETIAPINE FUMARATE 100 MG/1
100 TABLET, FILM COATED ORAL NIGHTLY
COMMUNITY

## 2024-06-12 RX ORDER — QUETIAPINE FUMARATE 100 MG/1
100 TABLET, FILM COATED ORAL NIGHTLY
Status: DISCONTINUED | OUTPATIENT
Start: 2024-06-12 | End: 2024-06-13

## 2024-06-12 RX ORDER — LEVOFLOXACIN 500 MG/1
500 TABLET, FILM COATED ORAL DAILY
Status: DISCONTINUED | OUTPATIENT
Start: 2024-06-12 | End: 2024-06-12 | Stop reason: DRUGHIGH

## 2024-06-12 RX ORDER — LISINOPRIL 5 MG/1
5 TABLET ORAL DAILY
Status: DISCONTINUED | OUTPATIENT
Start: 2024-06-12 | End: 2024-06-16 | Stop reason: HOSPADM

## 2024-06-12 RX ORDER — QUETIAPINE FUMARATE 25 MG/1
25 TABLET, FILM COATED ORAL 2 TIMES DAILY
Status: DISCONTINUED | OUTPATIENT
Start: 2024-06-12 | End: 2024-06-13

## 2024-06-12 RX ORDER — METOPROLOL SUCCINATE 50 MG/1
100 TABLET, EXTENDED RELEASE ORAL 2 TIMES DAILY
Status: DISCONTINUED | OUTPATIENT
Start: 2024-06-12 | End: 2024-06-16 | Stop reason: HOSPADM

## 2024-06-12 RX ORDER — 0.9 % SODIUM CHLORIDE 0.9 %
500 INTRAVENOUS SOLUTION INTRAVENOUS ONCE
Status: COMPLETED | OUTPATIENT
Start: 2024-06-12 | End: 2024-06-12

## 2024-06-12 RX ORDER — TIZANIDINE 4 MG/1
4 TABLET ORAL 2 TIMES DAILY
Status: DISCONTINUED | OUTPATIENT
Start: 2024-06-12 | End: 2024-06-13

## 2024-06-12 RX ADMIN — MUPIROCIN: 20 OINTMENT TOPICAL at 20:14

## 2024-06-12 RX ADMIN — QUETIAPINE FUMARATE 25 MG: 25 TABLET ORAL at 09:39

## 2024-06-12 RX ADMIN — SODIUM CHLORIDE, PRESERVATIVE FREE 10 ML: 5 INJECTION INTRAVENOUS at 20:14

## 2024-06-12 RX ADMIN — SODIUM CHLORIDE 500 ML: 9 INJECTION, SOLUTION INTRAVENOUS at 14:48

## 2024-06-12 RX ADMIN — CEFEPIME 1000 MG: 1 INJECTION, POWDER, FOR SOLUTION INTRAMUSCULAR; INTRAVENOUS at 05:30

## 2024-06-12 RX ADMIN — MUPIROCIN: 20 OINTMENT TOPICAL at 09:39

## 2024-06-12 RX ADMIN — PANTOPRAZOLE SODIUM 40 MG: 40 TABLET, DELAYED RELEASE ORAL at 09:39

## 2024-06-12 RX ADMIN — IPRATROPIUM BROMIDE AND ALBUTEROL SULFATE 1 DOSE: 2.5; .5 SOLUTION RESPIRATORY (INHALATION) at 08:04

## 2024-06-12 RX ADMIN — TIZANIDINE 4 MG: 4 TABLET ORAL at 09:36

## 2024-06-12 RX ADMIN — LISINOPRIL 5 MG: 5 TABLET ORAL at 09:36

## 2024-06-12 RX ADMIN — SODIUM CHLORIDE, PRESERVATIVE FREE 10 ML: 5 INJECTION INTRAVENOUS at 09:39

## 2024-06-12 RX ADMIN — SUCRALFATE 1 G: 1 TABLET ORAL at 12:19

## 2024-06-12 RX ADMIN — LACTULOSE 20 G: 10 SOLUTION ORAL at 20:14

## 2024-06-12 RX ADMIN — FUROSEMIDE 20 MG: 20 TABLET ORAL at 09:36

## 2024-06-12 RX ADMIN — LACTULOSE 20 G: 10 SOLUTION ORAL at 14:19

## 2024-06-12 RX ADMIN — SODIUM CHLORIDE 5 MCG/MIN: 9 INJECTION, SOLUTION INTRAVENOUS at 14:51

## 2024-06-12 RX ADMIN — LEVETIRACETAM 500 MG: 500 TABLET, FILM COATED ORAL at 09:36

## 2024-06-12 RX ADMIN — SERTRALINE 100 MG: 100 TABLET, FILM COATED ORAL at 09:36

## 2024-06-12 RX ADMIN — SUCRALFATE 1 G: 1 TABLET ORAL at 09:39

## 2024-06-12 RX ADMIN — LEVETIRACETAM 500 MG: 500 TABLET, FILM COATED ORAL at 20:14

## 2024-06-12 RX ADMIN — IPRATROPIUM BROMIDE AND ALBUTEROL SULFATE 1 DOSE: 2.5; .5 SOLUTION RESPIRATORY (INHALATION) at 20:20

## 2024-06-12 RX ADMIN — METOPROLOL SUCCINATE 100 MG: 50 TABLET, EXTENDED RELEASE ORAL at 09:36

## 2024-06-12 RX ADMIN — LEVOFLOXACIN 750 MG: 750 TABLET, FILM COATED ORAL at 09:36

## 2024-06-12 RX ADMIN — LACTULOSE 20 G: 10 SOLUTION ORAL at 09:39

## 2024-06-13 LAB
ANION GAP SERPL CALCULATED.3IONS-SCNC: 11 MMOL/L (ref 3–16)
BASOPHILS # BLD: 0 K/UL (ref 0–0.2)
BASOPHILS NFR BLD: 1.3 %
BUN SERPL-MCNC: 27 MG/DL (ref 7–20)
CALCIUM SERPL-MCNC: 8.8 MG/DL (ref 8.3–10.6)
CHLORIDE SERPL-SCNC: 103 MMOL/L (ref 99–110)
CO2 SERPL-SCNC: 20 MMOL/L (ref 21–32)
CREAT SERPL-MCNC: 1.5 MG/DL (ref 0.6–1.2)
DEPRECATED RDW RBC AUTO: 19.9 % (ref 12.4–15.4)
EOSINOPHIL # BLD: 0.4 K/UL (ref 0–0.6)
EOSINOPHIL NFR BLD: 10.9 %
GFR SERPLBLD CREATININE-BSD FMLA CKD-EPI: 37 ML/MIN/{1.73_M2}
GLUCOSE SERPL-MCNC: 122 MG/DL (ref 70–99)
HCT VFR BLD AUTO: 23.6 % (ref 36–48)
HGB BLD-MCNC: 7.4 G/DL (ref 12–16)
LYMPHOCYTES # BLD: 1 K/UL (ref 1–5.1)
LYMPHOCYTES NFR BLD: 26.9 %
MCH RBC QN AUTO: 25.7 PG (ref 26–34)
MCHC RBC AUTO-ENTMCNC: 31.5 G/DL (ref 31–36)
MCV RBC AUTO: 81.5 FL (ref 80–100)
MONOCYTES # BLD: 0.3 K/UL (ref 0–1.3)
MONOCYTES NFR BLD: 8.4 %
NEUTROPHILS # BLD: 1.9 K/UL (ref 1.7–7.7)
NEUTROPHILS NFR BLD: 52.5 %
PLATELET # BLD AUTO: 130 K/UL (ref 135–450)
PMV BLD AUTO: 7.8 FL (ref 5–10.5)
POTASSIUM SERPL-SCNC: 3.7 MMOL/L (ref 3.5–5.1)
RBC # BLD AUTO: 2.9 M/UL (ref 4–5.2)
SODIUM SERPL-SCNC: 134 MMOL/L (ref 136–145)
WBC # BLD AUTO: 3.7 K/UL (ref 4–11)

## 2024-06-13 PROCEDURE — 2700000000 HC OXYGEN THERAPY PER DAY

## 2024-06-13 PROCEDURE — 6370000000 HC RX 637 (ALT 250 FOR IP)

## 2024-06-13 PROCEDURE — 85025 COMPLETE CBC W/AUTO DIFF WBC: CPT

## 2024-06-13 PROCEDURE — 1200000000 HC SEMI PRIVATE

## 2024-06-13 PROCEDURE — 6370000000 HC RX 637 (ALT 250 FOR IP): Performed by: INTERNAL MEDICINE

## 2024-06-13 PROCEDURE — 94640 AIRWAY INHALATION TREATMENT: CPT

## 2024-06-13 PROCEDURE — 99233 SBSQ HOSP IP/OBS HIGH 50: CPT | Performed by: INTERNAL MEDICINE

## 2024-06-13 PROCEDURE — 94761 N-INVAS EAR/PLS OXIMETRY MLT: CPT

## 2024-06-13 PROCEDURE — 2580000003 HC RX 258

## 2024-06-13 PROCEDURE — 80048 BASIC METABOLIC PNL TOTAL CA: CPT

## 2024-06-13 RX ORDER — SERTRALINE HYDROCHLORIDE 100 MG/1
100 TABLET, FILM COATED ORAL ONCE
Status: COMPLETED | OUTPATIENT
Start: 2024-06-13 | End: 2024-06-13

## 2024-06-13 RX ORDER — CEFDINIR 300 MG/1
300 CAPSULE ORAL EVERY 12 HOURS SCHEDULED
Status: DISCONTINUED | OUTPATIENT
Start: 2024-06-13 | End: 2024-06-16 | Stop reason: HOSPADM

## 2024-06-13 RX ORDER — SERTRALINE HYDROCHLORIDE 100 MG/1
100 TABLET, FILM COATED ORAL DAILY
Status: CANCELLED | OUTPATIENT
Start: 2024-06-14

## 2024-06-13 RX ORDER — TIZANIDINE 4 MG/1
4 TABLET ORAL NIGHTLY PRN
Status: DISCONTINUED | OUTPATIENT
Start: 2024-06-13 | End: 2024-06-16 | Stop reason: HOSPADM

## 2024-06-13 RX ADMIN — PANTOPRAZOLE SODIUM 40 MG: 40 TABLET, DELAYED RELEASE ORAL at 05:52

## 2024-06-13 RX ADMIN — LACTULOSE 20 G: 10 SOLUTION ORAL at 09:10

## 2024-06-13 RX ADMIN — TIZANIDINE 4 MG: 4 TABLET ORAL at 21:44

## 2024-06-13 RX ADMIN — SUCRALFATE 1 G: 1 TABLET ORAL at 16:17

## 2024-06-13 RX ADMIN — SUCRALFATE 1 G: 1 TABLET ORAL at 00:47

## 2024-06-13 RX ADMIN — LEVETIRACETAM 500 MG: 500 TABLET, FILM COATED ORAL at 09:11

## 2024-06-13 RX ADMIN — PANTOPRAZOLE SODIUM 40 MG: 40 TABLET, DELAYED RELEASE ORAL at 16:17

## 2024-06-13 RX ADMIN — SUCRALFATE 1 G: 1 TABLET ORAL at 05:52

## 2024-06-13 RX ADMIN — SUCRALFATE 1 G: 1 TABLET ORAL at 11:55

## 2024-06-13 RX ADMIN — FUROSEMIDE 20 MG: 20 TABLET ORAL at 09:11

## 2024-06-13 RX ADMIN — SODIUM CHLORIDE, PRESERVATIVE FREE 5 ML: 5 INJECTION INTRAVENOUS at 09:11

## 2024-06-13 RX ADMIN — MUPIROCIN: 20 OINTMENT TOPICAL at 09:11

## 2024-06-13 RX ADMIN — IPRATROPIUM BROMIDE AND ALBUTEROL SULFATE 1 DOSE: 2.5; .5 SOLUTION RESPIRATORY (INHALATION) at 08:12

## 2024-06-13 RX ADMIN — SODIUM CHLORIDE, PRESERVATIVE FREE 10 ML: 5 INJECTION INTRAVENOUS at 21:47

## 2024-06-13 RX ADMIN — LEVETIRACETAM 500 MG: 500 TABLET, FILM COATED ORAL at 21:44

## 2024-06-13 RX ADMIN — SERTRALINE 100 MG: 100 TABLET, FILM COATED ORAL at 11:55

## 2024-06-13 RX ADMIN — CEFDINIR 300 MG: 300 CAPSULE ORAL at 11:55

## 2024-06-13 RX ADMIN — ACETAMINOPHEN 650 MG: 325 TABLET ORAL at 05:59

## 2024-06-13 RX ADMIN — MUPIROCIN: 20 OINTMENT TOPICAL at 21:46

## 2024-06-13 RX ADMIN — CEFDINIR 300 MG: 300 CAPSULE ORAL at 21:44

## 2024-06-13 RX ADMIN — LACTULOSE 20 G: 10 SOLUTION ORAL at 16:16

## 2024-06-13 ASSESSMENT — PAIN - FUNCTIONAL ASSESSMENT: PAIN_FUNCTIONAL_ASSESSMENT: ACTIVITIES ARE NOT PREVENTED

## 2024-06-13 ASSESSMENT — PAIN DESCRIPTION - LOCATION
LOCATION: BACK;HIP
LOCATION: HEAD

## 2024-06-13 ASSESSMENT — PAIN DESCRIPTION - ORIENTATION: ORIENTATION: LEFT;MID

## 2024-06-13 ASSESSMENT — PAIN SCALES - GENERAL
PAINLEVEL_OUTOF10: 7
PAINLEVEL_OUTOF10: 7
PAINLEVEL_OUTOF10: 0

## 2024-06-13 ASSESSMENT — PAIN DESCRIPTION - DESCRIPTORS
DESCRIPTORS: ACHING
DESCRIPTORS: ACHING

## 2024-06-14 LAB
ANION GAP SERPL CALCULATED.3IONS-SCNC: 10 MMOL/L (ref 3–16)
ANION GAP SERPL CALCULATED.3IONS-SCNC: 11 MMOL/L (ref 3–16)
BASOPHILS # BLD: 0 K/UL (ref 0–0.2)
BASOPHILS NFR BLD: 1.4 %
BUN SERPL-MCNC: 19 MG/DL (ref 7–20)
BUN SERPL-MCNC: 22 MG/DL (ref 7–20)
CALCIUM SERPL-MCNC: 8.8 MG/DL (ref 8.3–10.6)
CALCIUM SERPL-MCNC: 9.4 MG/DL (ref 8.3–10.6)
CHLORIDE SERPL-SCNC: 105 MMOL/L (ref 99–110)
CHLORIDE SERPL-SCNC: 105 MMOL/L (ref 99–110)
CO2 SERPL-SCNC: 20 MMOL/L (ref 21–32)
CO2 SERPL-SCNC: 21 MMOL/L (ref 21–32)
CREAT SERPL-MCNC: 1 MG/DL (ref 0.6–1.2)
CREAT SERPL-MCNC: 1.2 MG/DL (ref 0.6–1.2)
DEPRECATED RDW RBC AUTO: 19.8 % (ref 12.4–15.4)
EOSINOPHIL # BLD: 0.4 K/UL (ref 0–0.6)
EOSINOPHIL NFR BLD: 10.6 %
GFR SERPLBLD CREATININE-BSD FMLA CKD-EPI: 48 ML/MIN/{1.73_M2}
GFR SERPLBLD CREATININE-BSD FMLA CKD-EPI: 59 ML/MIN/{1.73_M2}
GLUCOSE SERPL-MCNC: 126 MG/DL (ref 70–99)
GLUCOSE SERPL-MCNC: 144 MG/DL (ref 70–99)
HCT VFR BLD AUTO: 22.9 % (ref 36–48)
HGB BLD-MCNC: 7.5 G/DL (ref 12–16)
LYMPHOCYTES # BLD: 1.1 K/UL (ref 1–5.1)
LYMPHOCYTES NFR BLD: 30.1 %
MAGNESIUM SERPL-MCNC: 1.7 MG/DL (ref 1.8–2.4)
MCH RBC QN AUTO: 26 PG (ref 26–34)
MCHC RBC AUTO-ENTMCNC: 32.7 G/DL (ref 31–36)
MCV RBC AUTO: 79.5 FL (ref 80–100)
MONOCYTES # BLD: 0.3 K/UL (ref 0–1.3)
MONOCYTES NFR BLD: 9.1 %
NEUTROPHILS # BLD: 1.7 K/UL (ref 1.7–7.7)
NEUTROPHILS NFR BLD: 48.8 %
PLATELET # BLD AUTO: 139 K/UL (ref 135–450)
PMV BLD AUTO: 8.9 FL (ref 5–10.5)
POTASSIUM SERPL-SCNC: 3.5 MMOL/L (ref 3.5–5.1)
POTASSIUM SERPL-SCNC: 3.9 MMOL/L (ref 3.5–5.1)
RBC # BLD AUTO: 2.88 M/UL (ref 4–5.2)
SODIUM SERPL-SCNC: 135 MMOL/L (ref 136–145)
SODIUM SERPL-SCNC: 137 MMOL/L (ref 136–145)
WBC # BLD AUTO: 3.6 K/UL (ref 4–11)

## 2024-06-14 PROCEDURE — 1200000000 HC SEMI PRIVATE

## 2024-06-14 PROCEDURE — 6370000000 HC RX 637 (ALT 250 FOR IP): Performed by: INTERNAL MEDICINE

## 2024-06-14 PROCEDURE — 2580000003 HC RX 258

## 2024-06-14 PROCEDURE — 85025 COMPLETE CBC W/AUTO DIFF WBC: CPT

## 2024-06-14 PROCEDURE — 80048 BASIC METABOLIC PNL TOTAL CA: CPT

## 2024-06-14 PROCEDURE — 2580000003 HC RX 258: Performed by: INTERNAL MEDICINE

## 2024-06-14 PROCEDURE — 2500000003 HC RX 250 WO HCPCS: Performed by: INTERNAL MEDICINE

## 2024-06-14 PROCEDURE — 99233 SBSQ HOSP IP/OBS HIGH 50: CPT | Performed by: INTERNAL MEDICINE

## 2024-06-14 PROCEDURE — 83735 ASSAY OF MAGNESIUM: CPT

## 2024-06-14 PROCEDURE — 94761 N-INVAS EAR/PLS OXIMETRY MLT: CPT

## 2024-06-14 PROCEDURE — 36415 COLL VENOUS BLD VENIPUNCTURE: CPT

## 2024-06-14 PROCEDURE — 99232 SBSQ HOSP IP/OBS MODERATE 35: CPT | Performed by: INTERNAL MEDICINE

## 2024-06-14 PROCEDURE — 94640 AIRWAY INHALATION TREATMENT: CPT

## 2024-06-14 RX ORDER — METOPROLOL TARTRATE 1 MG/ML
5 INJECTION, SOLUTION INTRAVENOUS EVERY 6 HOURS PRN
Status: DISCONTINUED | OUTPATIENT
Start: 2024-06-14 | End: 2024-06-16 | Stop reason: HOSPADM

## 2024-06-14 RX ORDER — COSYNTROPIN 0.25 MG/ML
250 INJECTION, POWDER, FOR SOLUTION INTRAMUSCULAR; INTRAVENOUS ONCE
Status: COMPLETED | OUTPATIENT
Start: 2024-06-15 | End: 2024-06-16

## 2024-06-14 RX ORDER — SODIUM CHLORIDE 9 MG/ML
INJECTION, SOLUTION INTRAVENOUS ONCE
Status: COMPLETED | OUTPATIENT
Start: 2024-06-14 | End: 2024-06-14

## 2024-06-14 RX ADMIN — IPRATROPIUM BROMIDE AND ALBUTEROL SULFATE 1 DOSE: 2.5; .5 SOLUTION RESPIRATORY (INHALATION) at 19:31

## 2024-06-14 RX ADMIN — TIZANIDINE 4 MG: 4 TABLET ORAL at 21:17

## 2024-06-14 RX ADMIN — SODIUM CHLORIDE: 9 INJECTION, SOLUTION INTRAVENOUS at 02:26

## 2024-06-14 RX ADMIN — IPRATROPIUM BROMIDE AND ALBUTEROL SULFATE 1 DOSE: 2.5; .5 SOLUTION RESPIRATORY (INHALATION) at 07:22

## 2024-06-14 RX ADMIN — SODIUM CHLORIDE, PRESERVATIVE FREE 10 ML: 5 INJECTION INTRAVENOUS at 21:06

## 2024-06-14 RX ADMIN — SUCRALFATE 1 G: 1 TABLET ORAL at 05:55

## 2024-06-14 RX ADMIN — MUPIROCIN: 20 OINTMENT TOPICAL at 21:20

## 2024-06-14 RX ADMIN — SUCRALFATE 1 G: 1 TABLET ORAL at 11:56

## 2024-06-14 RX ADMIN — METOPROLOL TARTRATE 25 MG: 25 TABLET, FILM COATED ORAL at 18:18

## 2024-06-14 RX ADMIN — APIXABAN 5 MG: 5 TABLET, FILM COATED ORAL at 21:05

## 2024-06-14 RX ADMIN — SUCRALFATE 1 G: 1 TABLET ORAL at 00:01

## 2024-06-14 RX ADMIN — LEVETIRACETAM 500 MG: 500 TABLET, FILM COATED ORAL at 21:05

## 2024-06-14 RX ADMIN — LACTULOSE 20 G: 10 SOLUTION ORAL at 21:05

## 2024-06-14 RX ADMIN — SODIUM CHLORIDE, PRESERVATIVE FREE 10 ML: 5 INJECTION INTRAVENOUS at 09:25

## 2024-06-14 RX ADMIN — SUCRALFATE 1 G: 1 TABLET ORAL at 16:44

## 2024-06-14 RX ADMIN — LEVETIRACETAM 500 MG: 500 TABLET, FILM COATED ORAL at 09:24

## 2024-06-14 RX ADMIN — PANTOPRAZOLE SODIUM 40 MG: 40 TABLET, DELAYED RELEASE ORAL at 05:55

## 2024-06-14 RX ADMIN — PANTOPRAZOLE SODIUM 40 MG: 40 TABLET, DELAYED RELEASE ORAL at 16:44

## 2024-06-14 RX ADMIN — APIXABAN 5 MG: 5 TABLET, FILM COATED ORAL at 09:24

## 2024-06-14 RX ADMIN — CEFDINIR 300 MG: 300 CAPSULE ORAL at 09:24

## 2024-06-14 RX ADMIN — SERTRALINE 100 MG: 100 TABLET, FILM COATED ORAL at 09:24

## 2024-06-14 RX ADMIN — CEFDINIR 300 MG: 300 CAPSULE ORAL at 21:05

## 2024-06-14 RX ADMIN — MUPIROCIN: 20 OINTMENT TOPICAL at 09:25

## 2024-06-14 RX ADMIN — METOPROLOL TARTRATE 5 MG: 5 INJECTION INTRAVENOUS at 18:44

## 2024-06-14 ASSESSMENT — PAIN SCALES - WONG BAKER: WONGBAKER_NUMERICALRESPONSE: NO HURT

## 2024-06-14 NOTE — CARE COORDINATION
Case Management Assessment  Initial Evaluation    Date/Time of Evaluation: 6/14/2024 2:04 PM  Assessment Completed by: Delilah Bee RN    If patient is discharged prior to next notation, then this note serves as note for discharge by case management.    Patient Name: Nancy Franz                   YOB: 1950  Diagnosis: Uremia [N19]  Hypotension [I95.9]  NSTEMI (non-ST elevated myocardial infarction) ([facility]) [I21.4]  VIOLETA (acute kidney injury) ([facility]) [N17.9]  Hypotension, unspecified hypotension type [I95.9]  Altered mental status, unspecified altered mental status type [R41.82]                   Date / Time: 6/11/2024  4:14 AM    Patient Admission Status: Inpatient   Readmission Risk (Low < 19, Mod (19-27), High > 27): Readmission Risk Score: 21.6    Current PCP: Ryan Ruiz MD  PCP verified by CM? Yes (States has no PCP but has seen Dr. YADI Ruiz in past 3 mo. Is currenlt finding new PCP.)    Chart Reviewed: Yes      History Provided by: Patient  Patient Orientation: Alert and Oriented    Patient Cognition: Alert    Hospitalization in the last 30 days (Readmission):  No    If yes, Readmission Assessment in CM Navigator will be completed.    Advance Directives:      Code Status: Full Code   Patient's Primary Decision Maker is: Named in Scanned ACP Document    Supplemental (Other) Decision Maker: Margaret Franz - Child - [phone]    Discharge Planning:    Patient lives with: Children Type of Home: House  Primary Care Giver: Family  Patient Support Systems include: Children, Family Members   Current Financial resources: Other (Comment) ([facility] Medicare and [facility])  Current community resources: None  Current services prior to admission: Durable Medical Equipment            Current DME: Walker, Oxygen Therapy (Comment), Wheelchair, Cane (States has home O2 but unsure of provider.)            Type of Home Care services:  None    ADLS  Prior functional level: Assistance with the following:,

## 2024-06-15 LAB
ANION GAP SERPL CALCULATED.3IONS-SCNC: 11 MMOL/L (ref 3–16)
BACTERIA BLD CULT ORG #2: NORMAL
BACTERIA BLD CULT: NORMAL
BUN SERPL-MCNC: 17 MG/DL (ref 7–20)
CALCIUM SERPL-MCNC: 9 MG/DL (ref 8.3–10.6)
CHLORIDE SERPL-SCNC: 106 MMOL/L (ref 99–110)
CO2 SERPL-SCNC: 18 MMOL/L (ref 21–32)
CORTIS 1H P 250 UG ACTH RIV SERPL-MCNC: 40.7 UG/DL
CORTIS 30M P 250 UG ACTH RIV SERPL-MCNC: 26.6 UG/DL
CORTIS SERPL-MCNC: 5.6 UG/DL
CREAT SERPL-MCNC: 0.9 MG/DL (ref 0.6–1.2)
DEPRECATED RDW RBC AUTO: 20 % (ref 12.4–15.4)
EKG ATRIAL RATE: 300 BPM
EKG DIAGNOSIS: NORMAL
EKG Q-T INTERVAL: 344 MS
EKG QRS DURATION: 74 MS
EKG QTC CALCULATION (BAZETT): 498 MS
EKG R AXIS: -42 DEGREES
EKG T AXIS: 93 DEGREES
EKG VENTRICULAR RATE: 126 BPM
GFR SERPLBLD CREATININE-BSD FMLA CKD-EPI: 68 ML/MIN/{1.73_M2}
GLUCOSE SERPL-MCNC: 146 MG/DL (ref 70–99)
HCT VFR BLD AUTO: 25.1 % (ref 36–48)
HGB BLD-MCNC: 8.2 G/DL (ref 12–16)
MCH RBC QN AUTO: 25.9 PG (ref 26–34)
MCHC RBC AUTO-ENTMCNC: 32.7 G/DL (ref 31–36)
MCV RBC AUTO: 79.1 FL (ref 80–100)
PLATELET # BLD AUTO: 160 K/UL (ref 135–450)
PMV BLD AUTO: 7.9 FL (ref 5–10.5)
POTASSIUM SERPL-SCNC: 4.2 MMOL/L (ref 3.5–5.1)
RBC # BLD AUTO: 3.17 M/UL (ref 4–5.2)
SODIUM SERPL-SCNC: 135 MMOL/L (ref 136–145)
WBC # BLD AUTO: 4.4 K/UL (ref 4–11)

## 2024-06-15 PROCEDURE — 6360000002 HC RX W HCPCS: Performed by: INTERNAL MEDICINE

## 2024-06-15 PROCEDURE — 6370000000 HC RX 637 (ALT 250 FOR IP): Performed by: INTERNAL MEDICINE

## 2024-06-15 PROCEDURE — 80048 BASIC METABOLIC PNL TOTAL CA: CPT

## 2024-06-15 PROCEDURE — 93010 ELECTROCARDIOGRAM REPORT: CPT | Performed by: INTERNAL MEDICINE

## 2024-06-15 PROCEDURE — 99232 SBSQ HOSP IP/OBS MODERATE 35: CPT | Performed by: INTERNAL MEDICINE

## 2024-06-15 PROCEDURE — 94640 AIRWAY INHALATION TREATMENT: CPT

## 2024-06-15 PROCEDURE — 94760 N-INVAS EAR/PLS OXIMETRY 1: CPT

## 2024-06-15 PROCEDURE — 2500000003 HC RX 250 WO HCPCS: Performed by: INTERNAL MEDICINE

## 2024-06-15 PROCEDURE — 80400 ACTH STIMULATION PANEL: CPT

## 2024-06-15 PROCEDURE — 2580000003 HC RX 258

## 2024-06-15 PROCEDURE — 1200000000 HC SEMI PRIVATE

## 2024-06-15 PROCEDURE — 36415 COLL VENOUS BLD VENIPUNCTURE: CPT

## 2024-06-15 PROCEDURE — 85027 COMPLETE CBC AUTOMATED: CPT

## 2024-06-15 PROCEDURE — 93005 ELECTROCARDIOGRAM TRACING: CPT | Performed by: INTERNAL MEDICINE

## 2024-06-15 PROCEDURE — 99223 1ST HOSP IP/OBS HIGH 75: CPT | Performed by: INTERNAL MEDICINE

## 2024-06-15 RX ORDER — POTASSIUM CHLORIDE 20 MEQ/1
40 TABLET, EXTENDED RELEASE ORAL ONCE
Status: COMPLETED | OUTPATIENT
Start: 2024-06-15 | End: 2024-06-15

## 2024-06-15 RX ORDER — MAGNESIUM SULFATE 1 G/100ML
1000 INJECTION INTRAVENOUS ONCE
Status: COMPLETED | OUTPATIENT
Start: 2024-06-15 | End: 2024-06-16

## 2024-06-15 RX ORDER — METOPROLOL TARTRATE 50 MG/1
50 TABLET, FILM COATED ORAL 3 TIMES DAILY
Status: DISCONTINUED | OUTPATIENT
Start: 2024-06-15 | End: 2024-06-16 | Stop reason: HOSPADM

## 2024-06-15 RX ADMIN — APIXABAN 5 MG: 5 TABLET, FILM COATED ORAL at 20:58

## 2024-06-15 RX ADMIN — CEFDINIR 300 MG: 300 CAPSULE ORAL at 20:58

## 2024-06-15 RX ADMIN — TIZANIDINE 4 MG: 4 TABLET ORAL at 21:08

## 2024-06-15 RX ADMIN — SUCRALFATE 1 G: 1 TABLET ORAL at 06:30

## 2024-06-15 RX ADMIN — METOPROLOL TARTRATE 50 MG: 50 TABLET, FILM COATED ORAL at 20:58

## 2024-06-15 RX ADMIN — IPRATROPIUM BROMIDE AND ALBUTEROL SULFATE 1 DOSE: 2.5; .5 SOLUTION RESPIRATORY (INHALATION) at 20:02

## 2024-06-15 RX ADMIN — SODIUM CHLORIDE, PRESERVATIVE FREE 10 ML: 5 INJECTION INTRAVENOUS at 21:02

## 2024-06-15 RX ADMIN — MUPIROCIN: 20 OINTMENT TOPICAL at 08:58

## 2024-06-15 RX ADMIN — LACTULOSE 20 G: 10 SOLUTION ORAL at 14:44

## 2024-06-15 RX ADMIN — APIXABAN 5 MG: 5 TABLET, FILM COATED ORAL at 08:57

## 2024-06-15 RX ADMIN — LEVETIRACETAM 500 MG: 500 TABLET, FILM COATED ORAL at 20:58

## 2024-06-15 RX ADMIN — MUPIROCIN: 20 OINTMENT TOPICAL at 21:09

## 2024-06-15 RX ADMIN — COSYNTROPIN 250 MCG: 0.25 INJECTION, POWDER, LYOPHILIZED, FOR SOLUTION INTRAMUSCULAR; INTRAVENOUS at 08:57

## 2024-06-15 RX ADMIN — LACTULOSE 20 G: 10 SOLUTION ORAL at 08:57

## 2024-06-15 RX ADMIN — SODIUM CHLORIDE, PRESERVATIVE FREE 10 ML: 5 INJECTION INTRAVENOUS at 09:00

## 2024-06-15 RX ADMIN — PANTOPRAZOLE SODIUM 40 MG: 40 TABLET, DELAYED RELEASE ORAL at 17:49

## 2024-06-15 RX ADMIN — SUCRALFATE 1 G: 1 TABLET ORAL at 17:49

## 2024-06-15 RX ADMIN — POTASSIUM CHLORIDE 40 MEQ: 1500 TABLET, EXTENDED RELEASE ORAL at 10:44

## 2024-06-15 RX ADMIN — SUCRALFATE 1 G: 1 TABLET ORAL at 23:44

## 2024-06-15 RX ADMIN — LEVETIRACETAM 500 MG: 500 TABLET, FILM COATED ORAL at 08:57

## 2024-06-15 RX ADMIN — CEFDINIR 300 MG: 300 CAPSULE ORAL at 08:57

## 2024-06-15 RX ADMIN — SERTRALINE 100 MG: 100 TABLET, FILM COATED ORAL at 08:57

## 2024-06-15 RX ADMIN — METOPROLOL TARTRATE 50 MG: 50 TABLET, FILM COATED ORAL at 14:45

## 2024-06-15 RX ADMIN — SUCRALFATE 1 G: 1 TABLET ORAL at 13:18

## 2024-06-15 RX ADMIN — METOPROLOL TARTRATE 5 MG: 5 INJECTION INTRAVENOUS at 10:44

## 2024-06-15 RX ADMIN — SUCRALFATE 1 G: 1 TABLET ORAL at 01:00

## 2024-06-15 RX ADMIN — PANTOPRAZOLE SODIUM 40 MG: 40 TABLET, DELAYED RELEASE ORAL at 06:30

## 2024-06-15 RX ADMIN — IPRATROPIUM BROMIDE AND ALBUTEROL SULFATE 1 DOSE: 2.5; .5 SOLUTION RESPIRATORY (INHALATION) at 07:22

## 2024-06-15 RX ADMIN — METOPROLOL TARTRATE 25 MG: 25 TABLET, FILM COATED ORAL at 08:57

## 2024-06-15 RX ADMIN — MAGNESIUM SULFATE HEPTAHYDRATE 1000 MG: 1 INJECTION, SOLUTION INTRAVENOUS at 10:57

## 2024-06-15 ASSESSMENT — PAIN DESCRIPTION - PAIN TYPE
TYPE: CHRONIC PAIN
TYPE: CHRONIC PAIN

## 2024-06-15 ASSESSMENT — PAIN SCALES - GENERAL
PAINLEVEL_OUTOF10: 7
PAINLEVEL_OUTOF10: 5

## 2024-06-15 ASSESSMENT — PAIN DESCRIPTION - ORIENTATION
ORIENTATION: LOWER
ORIENTATION: LOWER

## 2024-06-15 ASSESSMENT — PAIN DESCRIPTION - DESCRIPTORS: DESCRIPTORS: ACHING

## 2024-06-15 ASSESSMENT — PAIN DESCRIPTION - LOCATION
LOCATION: BACK
LOCATION: BACK

## 2024-06-15 NOTE — CONSULTS
[facility]SULTATION  [phone]        Reason for Consultation/Chief Complaint: \"I have been having palpitations .\"  Consulted by Dr. Sepulveda for AF with RVR, meds decreased for hypotension  Last consulted by Dr. Stevens 1/24    History of Present Illness:    Nancy Franz is a 73 y.o. patient who presented to [facility] 6/11/24 with hypotension, syncope, and altered mental status. She has PMH PAF on eliquis, hx LLE DVT 1/24 on eliquis, COPD, asthma, DM, HTN, VILLEGAS cirrhosis, and anxiety. Prior testing: Kimberly nuc 11/11/19 normal perfusion and LV function; ECHO 1/17/24 EF > 65%; mild LVH; no WMA; elevated filling pressure; LAE; mild AS possible; mild-mod MS may be overestimated; mild MR; EGD 12/23 severe ulceration lower esophagus; no active bleeding; CT 12/23 with splenomegaly/portal HTN   Presented with above complaints. Started on IV abx and lovephed pressor gtt treated for sepsis, possible PNA, metabolic encephalopathy. Admit EKG NSR 65; NST change (aberrant conduction 1/24 resolved). CT head negative; CXR coarse interstitium both lungs with bronchial thickening; IS and peribronchial opacities lower lungs suggest infectious or inflammatory bronchitis; no pleural effusion or PTX. This AM went into rapid afib 160's and symptomatic with palpitations. Reports having palps q 2 weeks few months ago but now more frequent and almost daily prior to admit. Labs: Chris 166, 155, 111 (85-88 in 1/24);  (11K in 12/23); BUN/Cr 19/1.0; K+ 3.5; Mg 1.70; H/H 7.5/22.9; TSH 1/16/24 1.43. Patient with no complaints of chest pain, SOB, palpitations, dizziness, edema, or orthopnea/PND. I have been asked to provide consultation regarding further management and testing.      Past Medical History:   has a past medical history of Altered mental status, Anemia, Asthma, Cerebral artery occlusion with cerebral infarction (HCC), CHF (congestive heart failure) (HCC), COPD (chronic obstructive pulmonary disease) (HCC),

## 2024-06-16 VITALS
BODY MASS INDEX: 33.93 KG/M2 | HEART RATE: 58 BPM | HEIGHT: 62 IN | TEMPERATURE: 98.2 F | OXYGEN SATURATION: 96 % | SYSTOLIC BLOOD PRESSURE: 110 MMHG | RESPIRATION RATE: 16 BRPM | WEIGHT: 184.4 LBS | DIASTOLIC BLOOD PRESSURE: 46 MMHG

## 2024-06-16 PROBLEM — I48.0 PAROXYSMAL ATRIAL FIBRILLATION (HCC): Status: ACTIVE | Noted: 2024-06-16

## 2024-06-16 LAB
ANION GAP SERPL CALCULATED.3IONS-SCNC: 11 MMOL/L (ref 3–16)
BUN SERPL-MCNC: 19 MG/DL (ref 7–20)
CALCIUM SERPL-MCNC: 8.6 MG/DL (ref 8.3–10.6)
CHLORIDE SERPL-SCNC: 107 MMOL/L (ref 99–110)
CO2 SERPL-SCNC: 20 MMOL/L (ref 21–32)
CREAT SERPL-MCNC: 0.9 MG/DL (ref 0.6–1.2)
DEPRECATED RDW RBC AUTO: 20.1 % (ref 12.4–15.4)
GFR SERPLBLD CREATININE-BSD FMLA CKD-EPI: 68 ML/MIN/{1.73_M2}
GLUCOSE SERPL-MCNC: 104 MG/DL (ref 70–99)
HCT VFR BLD AUTO: 24.9 % (ref 36–48)
HGB BLD-MCNC: 8.1 G/DL (ref 12–16)
MCH RBC QN AUTO: 25.8 PG (ref 26–34)
MCHC RBC AUTO-ENTMCNC: 32.4 G/DL (ref 31–36)
MCV RBC AUTO: 79.7 FL (ref 80–100)
PLATELET # BLD AUTO: 156 K/UL (ref 135–450)
PMV BLD AUTO: 8.3 FL (ref 5–10.5)
POTASSIUM SERPL-SCNC: 3.7 MMOL/L (ref 3.5–5.1)
RBC # BLD AUTO: 3.13 M/UL (ref 4–5.2)
SODIUM SERPL-SCNC: 138 MMOL/L (ref 136–145)
WBC # BLD AUTO: 4.6 K/UL (ref 4–11)

## 2024-06-16 PROCEDURE — 2580000003 HC RX 258

## 2024-06-16 PROCEDURE — 80048 BASIC METABOLIC PNL TOTAL CA: CPT

## 2024-06-16 PROCEDURE — 6370000000 HC RX 637 (ALT 250 FOR IP): Performed by: INTERNAL MEDICINE

## 2024-06-16 PROCEDURE — 99239 HOSP IP/OBS DSCHRG MGMT >30: CPT | Performed by: NURSE PRACTITIONER

## 2024-06-16 PROCEDURE — 94640 AIRWAY INHALATION TREATMENT: CPT

## 2024-06-16 PROCEDURE — 36415 COLL VENOUS BLD VENIPUNCTURE: CPT

## 2024-06-16 PROCEDURE — 99232 SBSQ HOSP IP/OBS MODERATE 35: CPT | Performed by: INTERNAL MEDICINE

## 2024-06-16 PROCEDURE — 99232 SBSQ HOSP IP/OBS MODERATE 35: CPT | Performed by: STUDENT IN AN ORGANIZED HEALTH CARE EDUCATION/TRAINING PROGRAM

## 2024-06-16 PROCEDURE — 85027 COMPLETE CBC AUTOMATED: CPT

## 2024-06-16 RX ORDER — SERTRALINE HYDROCHLORIDE 100 MG/1
100 TABLET, FILM COATED ORAL DAILY
Qty: 30 TABLET | Refills: 3 | Status: SHIPPED | OUTPATIENT
Start: 2024-06-17

## 2024-06-16 RX ORDER — PANTOPRAZOLE SODIUM 40 MG/1
40 TABLET, DELAYED RELEASE ORAL
Qty: 30 TABLET | Refills: 3 | Status: SHIPPED | OUTPATIENT
Start: 2024-06-16

## 2024-06-16 RX ORDER — METOPROLOL TARTRATE 50 MG/1
50 TABLET, FILM COATED ORAL 3 TIMES DAILY
Qty: 60 TABLET | Refills: 3 | Status: SHIPPED | OUTPATIENT
Start: 2024-06-16 | End: 2024-06-16

## 2024-06-16 RX ORDER — CEFDINIR 300 MG/1
300 CAPSULE ORAL EVERY 12 HOURS SCHEDULED
Qty: 7 CAPSULE | Refills: 0 | Status: SHIPPED | OUTPATIENT
Start: 2024-06-16 | End: 2024-06-20

## 2024-06-16 RX ADMIN — PANTOPRAZOLE SODIUM 40 MG: 40 TABLET, DELAYED RELEASE ORAL at 16:20

## 2024-06-16 RX ADMIN — SUCRALFATE 1 G: 1 TABLET ORAL at 13:47

## 2024-06-16 RX ADMIN — APIXABAN 5 MG: 5 TABLET, FILM COATED ORAL at 10:15

## 2024-06-16 RX ADMIN — PANTOPRAZOLE SODIUM 40 MG: 40 TABLET, DELAYED RELEASE ORAL at 06:17

## 2024-06-16 RX ADMIN — MUPIROCIN: 20 OINTMENT TOPICAL at 10:17

## 2024-06-16 RX ADMIN — CEFDINIR 300 MG: 300 CAPSULE ORAL at 10:15

## 2024-06-16 RX ADMIN — IPRATROPIUM BROMIDE AND ALBUTEROL SULFATE 1 DOSE: 2.5; .5 SOLUTION RESPIRATORY (INHALATION) at 07:31

## 2024-06-16 RX ADMIN — LACTULOSE 20 G: 10 SOLUTION ORAL at 10:16

## 2024-06-16 RX ADMIN — SODIUM CHLORIDE, PRESERVATIVE FREE 10 ML: 5 INJECTION INTRAVENOUS at 10:16

## 2024-06-16 RX ADMIN — LEVETIRACETAM 500 MG: 500 TABLET, FILM COATED ORAL at 10:15

## 2024-06-16 RX ADMIN — SERTRALINE 100 MG: 100 TABLET, FILM COATED ORAL at 10:15

## 2024-06-16 RX ADMIN — SUCRALFATE 1 G: 1 TABLET ORAL at 06:17

## 2024-06-16 NOTE — PROGRESS NOTES
[facility]                                 Pulmonary/Critical care  Consult /Progress Note :                                                                  CC :Hypotension   Patient is being seen at the request of JOHANA Calabrese  for a consultation for shock     Subjective   Doing grear  No SOB  On RA  States feels great   Stated that she has oxygen at home  No events of  Want go home  PHYSICAL EXAM:  Vitals:    06/16/24 1012   BP: (!) 110/46   Pulse: 59   Resp: 16   Temp: 98.2 °F (36.8 °C)   SpO2: 96%     Gen: No distress.   Eyes: PERRL. No sclera icterus. No conjunctival injection.   ENT: No discharge. Pharynx clear.   Neck: Trachea midline. No obvious mass.    Resp: Rhonchi bilateral  CV: Regular rate. Regular rhythm. No murmur or rub. No edema. Peripheral pulses are 2+.  Capillary refill is less than 3 seconds.  GI: diarrhea and vomiting   Skin: Warm and dry. No nodule on exposed extremities.   Lymph: No cervical LAD. No supraclavicular LAD.   M/S: No cyanosis. No joint deformity. No clubbing.   Neuro: Awake. Alert. Moves all four extremities.   Psych: Oriented x 3. No anxiety.     LABS:  CBC:   Recent Labs     06/14/24  0525 06/15/24  1612 06/16/24  0559   WBC 3.6* 4.4 4.6   HGB 7.5* 8.2* 8.1*   HCT 22.9* 25.1* 24.9*   MCV 79.5* 79.1* 79.7*    160 156       BMP:   Recent Labs     06/14/24  1823 06/15/24  1612 06/16/24  0558    135* 138   K 3.5 4.2 3.7    106 107   CO2 21 18* 20*   BUN 19 17 19   CREATININE 1.0 0.9 0.9         Microbiology:  Pending     Imaging:  Chest imaging was reviewed by me and showed     ASSESSMENT:    Shock septic vs hypovolemia    Acute encephalopathy/metabolic encephalopathy-improved with stopping cefepime/steroids  Moderate persistent asthma, we will follow as outpatient  A-fib on Eliquis  VIOLETA on CKD stage III  Chronic diastolic CHF  VILLEGAS cirrhosis   COPD       PLAN:  On RA 
                                                  [facility]                                 Pulmonary/Critical care  Consult /Progress Note :                                                                  CC :Hypotension   Patient is being seen at the request of JOHANA Calabrese  for a consultation for shock     Subjective   Doing much better   No SOB  On RA  States feels great       PHYSICAL EXAM:  Vitals:    06/14/24 0725   BP:    Pulse:    Resp:    Temp:    SpO2: 97%     Gen: No distress.   Eyes: PERRL. No sclera icterus. No conjunctival injection.   ENT: No discharge. Pharynx clear.   Neck: Trachea midline. No obvious mass.    Resp: Rhonchi bilateral  CV: Regular rate. Regular rhythm. No murmur or rub. No edema. Peripheral pulses are 2+.  Capillary refill is less than 3 seconds.  GI: diarrhea and vomiting   Skin: Warm and dry. No nodule on exposed extremities.   Lymph: No cervical LAD. No supraclavicular LAD.   M/S: No cyanosis. No joint deformity. No clubbing.   Neuro: Awake. Alert. Moves all four extremities.   Psych: Oriented x 3. No anxiety.     LABS:  CBC:   Recent Labs     06/12/24  0500 06/13/24  0424 06/14/24  0525   WBC 5.5 3.7* 3.6*   HGB 8.1* 7.4* 7.5*   HCT 25.6* 23.6* 22.9*   MCV 80.1 81.5 79.5*    130* 139       BMP:   Recent Labs     06/12/24  0500 06/13/24  0424 06/14/24  0525   * 134* 135*   K 3.8 3.7 3.9    103 105   CO2 20* 20* 20*   BUN 30* 27* 22*   CREATININE 2.1* 1.5* 1.2         Microbiology:  Pending     Imaging:  Chest imaging was reviewed by me and showed     ASSESSMENT:    Shock septic vs hypovolemia    Acute encephalopathy/metabolic encephalopathy-improved with stopping cefepime/steroids  Moderate persistent asthma, we will follow as outpatient  A-fib on Eliquis  VIOLETA on CKD stage III  Chronic diastolic CHF  VILLEGAS cirrhosis   COPD       PLAN:  On RA  BP staple   Zoloft  [place] at night  Up to chair   IV abx,Levaquin change PO X 
                                                  [facility]                                 Pulmonary/Critical care  Consult /Progress Note :                                                                  CC :Hypotension   Patient is being seen at the request of JOHANA Calabrese  for a consultation for shock     Subjective   Doing much better   No SOB  On RA  States feels great   Stated that she has oxygen at home  No events of    PHYSICAL EXAM:  Vitals:    06/15/24 1225   BP: 117/79   Pulse: 74   Resp: 16   Temp:    SpO2:      Gen: No distress.   Eyes: PERRL. No sclera icterus. No conjunctival injection.   ENT: No discharge. Pharynx clear.   Neck: Trachea midline. No obvious mass.    Resp: Rhonchi bilateral  CV: Regular rate. Regular rhythm. No murmur or rub. No edema. Peripheral pulses are 2+.  Capillary refill is less than 3 seconds.  GI: diarrhea and vomiting   Skin: Warm and dry. No nodule on exposed extremities.   Lymph: No cervical LAD. No supraclavicular LAD.   M/S: No cyanosis. No joint deformity. No clubbing.   Neuro: Awake. Alert. Moves all four extremities.   Psych: Oriented x 3. No anxiety.     LABS:  CBC:   Recent Labs     06/13/24  0424 06/14/24  0525   WBC 3.7* 3.6*   HGB 7.4* 7.5*   HCT 23.6* 22.9*   MCV 81.5 79.5*   * 139       BMP:   Recent Labs     06/13/24  0424 06/14/24  0525 06/14/24  1823   * 135* 137   K 3.7 3.9 3.5    105 105   CO2 20* 20* 21   BUN 27* 22* 19   CREATININE 1.5* 1.2 1.0         Microbiology:  Pending     Imaging:  Chest imaging was reviewed by me and showed     ASSESSMENT:    Shock septic vs hypovolemia    Acute encephalopathy/metabolic encephalopathy-improved with stopping cefepime/steroids  Moderate persistent asthma, we will follow as outpatient  A-fib on Eliquis  VIOLETA on CKD stage III  Chronic diastolic CHF  VILLEGAS cirrhosis   COPD       PLAN:  On RA however she has oxygen at  BP staple   Zoloft  [place] at night  Up 
                                                  [facility]                                 Pulmonary/Critical care  Consult /Progress Note :                                                                  CC :Hypotension   Patient is being seen at the request of JOHANA Calabrese  for a consultation for shock     Subjective   Doing much better  OFF levophed  Seems related to Lopressor   No SOB  On 2L  On RA       PHYSICAL EXAM:  Vitals:    06/13/24 0911   BP: 131/60   Pulse:    Resp:    Temp:    SpO2:      Gen: No distress.   Eyes: PERRL. No sclera icterus. No conjunctival injection.   ENT: No discharge. Pharynx clear.   Neck: Trachea midline. No obvious mass.    Resp: Rhonchi bilateral  CV: Regular rate. Regular rhythm. No murmur or rub. No edema. Peripheral pulses are 2+.  Capillary refill is less than 3 seconds.  GI: diarrhea and vomiting   Skin: Warm and dry. No nodule on exposed extremities.   Lymph: No cervical LAD. No supraclavicular LAD.   M/S: No cyanosis. No joint deformity. No clubbing.   Neuro: Awake. Alert. Moves all four extremities.   Psych: Oriented x 3. No anxiety.     LABS:  CBC:   Recent Labs     06/11/24  0422 06/12/24  0500 06/13/24 0424   WBC 7.3 5.5 3.7*   HGB 8.1* 8.1* 7.4*   HCT 25.1* 25.6* 23.6*   MCV 79.5* 80.1 81.5    175 130*       BMP:   Recent Labs     06/11/24  0422 06/12/24  0500 06/13/24  0424   * 135* 134*   K 4.5 3.8 3.7   CL 96* 104 103   CO2 21 20* 20*   BUN 41* 30* 27*   CREATININE 3.2* 2.1* 1.5*       LIVER PROFILE:   Recent Labs     06/11/24 0422   AST 22   ALT 10   BILITOT 0.4   ALKPHOS 165*       PT/INR: No results for input(s): \"PROTIME\", \"INR\" in the last 72 hours.  APTT: No results for input(s): \"APTT\" in the last 72 hours.  UA:  Recent Labs     06/11/24 0441   COLORU Yellow   PHUR 8.0   WBCUA 0-2   RBCUA 0-2   CLARITYU Clear   LEUKOCYTESUR Negative   UROBILINOGEN 0.2   BILIRUBINUR Negative   BLOODU Negative   GLUCOSEU 
                                                  [facility]                                 Pulmonary/Critical care  Consult /Progress Note :                                                                  CC :Hypotension   Patient is being seen at the request of JOHANA Calabrese  for a consultation for shock     Subjective   Doing much better  Of levoph  NPO in the higher side  No p  No SOB  On 2L    PHYSICAL EXAM:  Vitals:    06/12/24 0800   BP: (!) 180/70   Pulse: 94   Resp: 20   Temp:    SpO2: 100%     Gen: No distress.   Eyes: PERRL. No sclera icterus. No conjunctival injection.   ENT: No discharge. Pharynx clear.   Neck: Trachea midline. No obvious mass.    Resp: Rhonchi bilateral  CV: Regular rate. Regular rhythm. No murmur or rub. No edema. Peripheral pulses are 2+.  Capillary refill is less than 3 seconds.  GI: diarrhea and vomiting   Skin: Warm and dry. No nodule on exposed extremities.   Lymph: No cervical LAD. No supraclavicular LAD.   M/S: No cyanosis. No joint deformity. No clubbing.   Neuro: Awake. Alert. Moves all four extremities.   Psych: Oriented x 3. No anxiety.     LABS:  CBC:   Recent Labs     06/11/24  0422 06/12/24  0500   WBC 7.3 5.5   HGB 8.1* 8.1*   HCT 25.1* 25.6*   MCV 79.5* 80.1    175       BMP:   Recent Labs     06/11/24  0422 06/12/24  0500   * 135*   K 4.5 3.8   CL 96* 104   CO2 21 20*   BUN 41* 30*   CREATININE 3.2* 2.1*       LIVER PROFILE:   Recent Labs     06/11/24  0422   AST 22   ALT 10   BILITOT 0.4   ALKPHOS 165*       PT/INR: No results for input(s): \"PROTIME\", \"INR\" in the last 72 hours.  APTT: No results for input(s): \"APTT\" in the last 72 hours.  UA:  Recent Labs     06/11/24  0441   COLORU Yellow   PHUR 8.0   WBCUA 0-2   RBCUA 0-2   CLARITYU Clear   LEUKOCYTESUR Negative   UROBILINOGEN 0.2   BILIRUBINUR Negative   BLOODU Negative   GLUCOSEU >=1000*         Microbiology:  Pending     Imaging:  Chest imaging was reviewed 
   06/13/24 1041   Encounter Summary   Encounter Overview/Reason Initial Encounter   Service Provided For Patient   Referral/Consult From Rounding   Support System Children   Last Encounter  06/13/24  ( visited; provided pastoral support and prayer)   Complexity of Encounter Low   Begin Time 1005   End Time  1025   Total Time Calculated 20 min   Assessment/Intervention/Outcome   Assessment Compromised coping   Intervention Active listening;Sustaining Presence/Ministry of presence;Prayer (assurance of)/[place]       
   06/13/24 2000   RT Protocol   History Pulmonary Disease 2   Respiratory pattern 0   Breath sounds 2   Cough 0   Indications for Bronchodilator Therapy Decreased or absent breath sounds   Bronchodilator Assessment Score 4     RT Inhaler-Nebulizer Bronchodilator Protocol Note    There is a bronchodilator order in the chart from a provider indicating to follow the RT Bronchodilator Protocol and there is an “Initiate RT Inhaler-Nebulizer Bronchodilator Protocol” order as well (see protocol at bottom of note).    CXR Findings:  No results found.    The findings from the last RT Protocol Assessment were as follows:   History Pulmonary Disease: Chronic pulmonary disease  Respiratory Pattern: Regular pattern and RR 12-20 bpm  Breath Sounds: Slightly diminished and/or crackles  Cough: Strong, spontaneous, non-productive  Indication for Bronchodilator Therapy: Decreased or absent breath sounds  Bronchodilator Assessment Score: 4    Aerosolized bronchodilator medication orders have been revised according to the RT Inhaler-Nebulizer Bronchodilator Protocol below.    Respiratory Therapist to perform RT Therapy Protocol Assessment initially then follow the protocol.  Repeat RT Therapy Protocol Assessment PRN for score 0-3 or on second treatment, BID, and PRN for scores above 3.    No Indications - adjust the frequency to every 6 hours PRN wheezing or bronchospasm, if no treatments needed after 48 hours then discontinue using Per Protocol order mode.     If indication present, adjust the RT bronchodilator orders based on the Bronchodilator Assessment Score as indicated below.  Use Inhaler orders unless patient has one or more of the following: on home nebulizer, not able to hold breath for 10 seconds, is not alert and oriented, cannot activate and use MDI correctly, or respiratory rate 25 breaths per minute or more, then use the equivalent nebulizer order(s) with same Frequency and PRN reasons based on the score.  If a patient 
  [facility] [place] Daily Progress Note      Admit Date:  6/11/2024    Chief Complaint:  Palpitations    Subjective:  Ms. Franz feels better today. HR, BP stable. She is really wanting to leave. Says she feels great. Has no CP or dyspnea. Denies palpitations, edema. Feels well.     Objective:   BP (!) 110/46   Pulse 59   Temp 98.2 °F (36.8 °C) (Axillary)   Resp 16   Ht 1.575 m (5' 2\")   Wt 83.6 kg (184 lb 6.4 oz)   SpO2 96%   BMI 33.73 kg/m²     Intake/Output Summary (Last 24 hours) at 6/16/2024 1115  Last data filed at 6/16/2024 0924  Gross per 24 hour   Intake 840 ml   Output --   Net 840 ml       Physical Exam:  General:  Awake, alert, NAD  Skin:  Warm and dry  Neck:  JVD not visualized  Chest:  CTAB, Comfortable   Cardiovascular:  Irregular S1S2, no S3, 2/6 systolic murmur  Abdomen:  Soft, ND, NT, No HSM  Extremities:  No edema    Medications:    metoprolol tartrate  50 mg Oral TID    apixaban  5 mg Oral BID    cefdinir  300 mg Oral 2 times per day    [Held by provider] furosemide  20 mg Oral Daily    sertraline  100 mg Oral Daily    lactulose  20 g Oral TID    sucralfate  1 g Oral 4 times per day    [Held by provider] lisinopril  5 mg Oral Daily    [Held by provider] metoprolol succinate  100 mg Oral BID    pantoprazole  40 mg Oral BID AC    levETIRAcetam  500 mg Oral BID    mupirocin   Each Nostril BID    sodium chloride flush  5-40 mL IntraVENous 2 times per day    ipratropium 0.5 mg-albuterol 2.5 mg  1 Dose Inhalation BID RT      sodium chloride       metoprolol, tiZANidine, sodium chloride flush, sodium chloride, ondansetron **OR** ondansetron, polyethylene glycol, acetaminophen **OR** acetaminophen, ipratropium 0.5 mg-albuterol 2.5 mg    TELEMETRY (Personally reviewed by me): Sinus     Lab Data:  CBC:   Recent Labs     06/14/24  0525 06/15/24  1612 06/16/24  0559   WBC 3.6* 4.4 4.6   HGB 7.5* 8.2* 8.1*   HCT 22.9* 25.1* 24.9*   MCV 79.5* 79.1* 79.7*    160 156     BMP:   Recent Labs     
  Internal Medicine ICU Progress Note      Events of Last 24 hours:     Patient is can alert this morning.  Heart Levophed.  Sitting up in chair.  Feeling great.  Subsequently this afternoon patient became more lethargic.  Her blood pressure dropped down again and she was started on Levophed.  Initial plan was to transfer the patient to the ICU but given drop in blood pressure and lethargy it was decided to keep her in the ICU.      Invasive Lines: CVC left internal jugular placed on 2024        MV: N/A    No results for input(s): \"PHART\", \"BPR2ZUQ\", \"PO2ART\" in the last 72 hours.    MV Settings:     / / /     IV:   norepinephrine 5 mcg/min (24 1451)    sodium chloride         Vitals:  Temp  Av.8 °F (36.6 °C)  Min: 97.2 °F (36.2 °C)  Max: 98.2 °F (36.8 °C)  Pulse  Av.5  Min: 54  Max: 126  BP  Min: 64/40  Max: 180/113  SpO2  Av.5 %  Min: 95 %  Max: 100 %  Patient Vitals for the past 4 hrs:   BP Pulse Resp SpO2   24 1511 -- 54 22 100 %   24 1500 (!) 69/31 56 18 99 %   24 1445 (!) 64/40 56 21 99 %   24 1430 (!) 65/35 58 21 99 %   24 1400 (!) 79/41 59 18 98 %   24 1300 (!) 113/42 61 24 95 %   24 1200 119/64 74 14 100 %       CVP:        Intake/Output Summary (Last 24 hours) at 2024 1522  Last data filed at 2024 0858  Gross per 24 hour   Intake 2226.27 ml   Output 1100 ml   Net 1126.27 ml       EXAM:  General: Awake and alert sitting up in the chair  Eyes: PERRL. No sclera icterus. No conjunctiva injected.  ENT: No discharge. Pharynx clear.  Neck: Trachea midline. Normal thyroid.  Resp: No accessory muscle use. No crackles. No wheezing. No rhonchi. No dullness on percussion.   CV: Bradycardia. Regular rhythm.  Early diastolic murmur mitral area and ejection systolic murmur aortic area. No edema. No JVD. Palpable pedal pulses.   GI: Non-tender. Non-distended. No masses. No organmegaly. Normal bowel sounds. No hernia.  Skin: Warm and dry. No 
  Internal Medicine ICU Progress Note      Events of Last 24 hours:     Patient is can alert this morning.  Heart Levophed.  Sitting up in chair.  Feeling great.  Subsequently this afternoon patient became more lethargic.  Her blood pressure dropped down again and she was started on Levophed.  Initial plan was to transfer the patient to the ICU but given drop in blood pressure and lethargy it was decided to keep her in the ICU.    -mentation is back to normal.  Off Levophed.  Blood pressure stable.  Eager to go home.      Invasive Lines: CVC left internal jugular placed on 2024        MV: N/A    No results for input(s): \"PHART\", \"FQR3PTH\", \"PO2ART\" in the last 72 hours.    MV Settings:     / / /     IV:   norepinephrine Stopped (24 0258)    sodium chloride         Vitals:  Temp  Av.9 °F (36.6 °C)  Min: 97.5 °F (36.4 °C)  Max: 98.4 °F (36.9 °C)  Pulse  Av  Min: 52  Max: 88  BP  Min: 64/40  Max: 172/109  SpO2  Av.2 %  Min: 95 %  Max: 100 %  Patient Vitals for the past 4 hrs:   BP Temp Temp src Pulse Resp SpO2   24 0911 131/60 -- -- -- -- --   24 0814 -- -- -- -- 16 --   24 0800 126/75 -- -- 61 17 100 %   24 0700 (!) 144/62 97.9 °F (36.6 °C) Temporal 66 18 100 %         CVP:        Intake/Output Summary (Last 24 hours) at 2024 1058  Last data filed at 2024 0321  Gross per 24 hour   Intake 1426.81 ml   Output --   Net 1426.81 ml         EXAM:  General: Awake and alert sitting up in the chair  Eyes: PERRL. No sclera icterus. No conjunctiva injected.  ENT: No discharge. Pharynx clear.  Neck: Trachea midline. Normal thyroid.  Resp: No accessory muscle use. No crackles. No wheezing. No rhonchi. No dullness on percussion.   CV: Bradycardia. Regular rhythm.  Early diastolic murmur mitral area and ejection systolic murmur aortic area. No edema. No JVD. Palpable pedal pulses.   GI: Non-tender. Non-distended. No masses. No organmegaly. Normal bowel sounds. No 
  Pharmacy Vancomycin Consult     Vancomycin Day: 2 of 7  Current Dosing: Pulse  Current indication: sepsis    Recent Labs     06/11/24  0422 06/12/24  0500   BUN 41* 30*   CREATININE 3.2* 2.1*   WBC 7.3 5.5       Estimated Creatinine Clearance: 25 mL/min (A) (based on SCr of 2.1 mg/dL (H)).    Trough: 21.4    Assessment/Plan:  Will not give a dose today. Level ordered with AM labs tomorrow.     Angeli Tang PharmD, [facility], 6/12/2024 7:08 AM      
4 Eyes Skin Assessment     NAME:  Nancy Franz  YOB: 1950  MEDICAL RECORD NUMBER:  [id number]    The patient is being assessed for  Shift Handoff    I agree that at least one RN has performed a thorough Head to Toe Skin Assessment on the patient. ALL assessment sites listed below have been assessed.      Areas assessed by both nurses:    Head, Face, Ears, Shoulders, Back, Chest, Arms, Elbows, Hands, Sacrum. Buttock, Coccyx, Ischium, Legs. Feet and Heels, and Under Medical Devices         Does the Patient have a Wound? No noted wound(s)       Abdiel Prevention initiated by RN: Yes  Wound Care Orders initiated by RN: No    Pressure Injury (Stage 3,4, Unstageable, DTI, NWPT, and Complex wounds) if present, place Wound referral order by RN under : No    New Ostomies, if present place, Ostomy referral order under : No     Nurse 1 eSignature: Electronically signed by Yokasta Skelton RN on 6/12/24 at 6:16 AM EDT    **SHARE this note so that the co-signing nurse can place an eSignature**    Nurse 2 eSignature: Electronically signed by Sameer Baron RN on 6/12/24 at 6:17 AM EDT   
4 Eyes Skin Assessment     NAME:  Nancy Franz  YOB: 1950  MEDICAL RECORD NUMBER:  [id number]    The patient is being assessed for  Admission    I agree that at least one RN has performed a thorough Head to Toe Skin Assessment on the patient. ALL assessment sites listed below have been assessed.      Areas assessed by both nurses:    Head, Face, Ears, Shoulders, Back, Chest, Arms, Elbows, Hands, Sacrum. Buttock, Coccyx, Ischium, Legs. Feet and Heels, and Under Medical Devices     Redness/rash to lower back      Does the Patient have a Wound? No noted wound(s)       Abdiel Prevention initiated by RN: Yes  Wound Care Orders initiated by RN: No    Pressure Injury (Stage 3,4, Unstageable, DTI, NWPT, and Complex wounds) if present, place Wound referral order by RN under : No    New Ostomies, if present place, Ostomy referral order under : No     Nurse 1 eSignature: Electronically signed by Oscar Gtz RN on 6/11/24 at 10:40 AM EDT    **SHARE this note so that the co-signing nurse can place an eSignature**    Nurse 2 eSignature: Electronically signed by Karina Saab RN on 6/11/24 at 10:41 AM EDT    
AM assessment complete, see flowsheet. Medications ordered my MD, awaiting pharmacy verification.    PT A&O x4, eating breakfast. Denies pain or any other needs. Awaiting MD rounds.     
Admitted to ICU 15. Pt A&O x4. On levo gtt, MAPs in mid 70's; will titrate down as possible. Pt denies pain. 4eyes skin to be completed. No other needs noted. Will continue to monitor.   
After medication given, patient went back into normal sinus rhythm and heart rate went down to normal range.    Vitals:    06/15/24 1443   BP: (!) 117/55   Pulse: 63   Resp: 16   Temp: 98.6 °F (37 °C)   SpO2: 99%      
Cardiology consult called to Dr. Mitchell on 6/15/24. Spoke to Andria Brown  
Handoff given to CHARLIE López  
Handoff report given to 2 cathy nurse Irish RN. Pt stable, care transferred at this time. Maribel Sagastume RN    
Patient being discharged to home in stable condition. Medication to be picked up at patients pharmacy. Waiting for taxi.  
Patient's blood pressure Is currently 103/53. Patient in bed with eyes closed. No s/s of pain and discomfort noted and or reported. All safety precautions in place.  
Patients b/p taken x3 last blood pressure 72/37. MD Nichols notified via page. New order for 500 ml bolus. Patient has no s/s of pain and or discomfort noted and or reported. All safety precautions in place  
Pt ambulating in her room and sitting in her bed during shift assessment this morning. She is alert and oriented and accepting of care. Vitals stable this morning. Pt tolerating food and fluids well. Pt remains on room air. No complaints of pain or discomfort from the pt. Heart rate stable, tele to be removed. No new concerns at this time.     1415: Have been notified by monitor room pt HR has been elevated at times during shift to SVT 180s or 150s. MD Adams aware via secure message and tele monitoring will remain in place.     1800: Pt noted with another episode of SVT into the 180s. MD Sepulveda notified per she is on call. New orders placed and provided to pt.     Bedside Mobility Assessment Tool (BMAT):     Assessment Level 1- Sit and Shake    1. From a semi-reclined position, ask patient to sit up and rotate to a seated position at the side of the bed. Can use the bedrail.    2. Ask patient to reach out and grab your hand and shake making sure patient reaches across his/her midline.   Pass- Patient is able to come to a seated position, maintain core strength. Maintains seated balance while reaching across midline. Move on to Assessment Level 2.     Assessment Level 2- Stretch and Point   1. With patient in seated position at the side of the bed, have patient place both feet on the floor (or stool) with knees no higher than hips.    2. Ask patient to stretch one leg and straighten the knee, then bend the ankle/flex and point the toes. If appropriate, repeat with the other leg.   Pass- Patient is able to demonstrate appropriate quad strength on intended weight bearing limb(s). Move onto Assessment Level 3.     Assessment Level 3- Stand   1. Ask patient to elevate off the bed or chair (seated to standing) using an assistive device (cane, bedrail).    2. Patient should be able to raise buttocks off be and hold for a count of five. May repeat once.   Pass- Patient maintains standing stability for at least 5 
Pt becoming more awake. States she rarely takes her Seroquel and when she does she only takes it at night. Also she only takes her tizanidine PRN.    Meds held by MD, will discuss with Pharmacy during rounds tomorrow about home medication list.   
Pt very lethargic. Awakens to verbal stimuli and follows all commands but immediately falls back asleep. BP 80/40's. Dr Armstrong paged. Orders to restart Levophed gtt and give 500 ml bolus.   
Pt's heart rate on evenings was 66/ minute when given the lopressor dose. During the night pt's heart rate in the high 40's to 50's. /60's. No c/o dizziness or pain. Leslie Hurd RN & clinical, ANASTACIA Hayden RN were made aware. Monitored vital signs closely all night. Presently heart rate per monitor 53/ minute. No c/o presently.  
RT Inhaler-Nebulizer Bronchodilator Protocol Note    There is a bronchodilator order in the chart from a provider indicating to follow the RT Bronchodilator Protocol and there is an “Initiate RT Inhaler-Nebulizer Bronchodilator Protocol” order as well (see protocol at bottom of note).    CXR Findings:  No results found.    The findings from the last RT Protocol Assessment were as follows:   History Pulmonary Disease: (P) Chronic pulmonary disease  Respiratory Pattern: (P) Regular pattern and RR 12-20 bpm  Breath Sounds: (P) Slightly diminished and/or crackles  Cough: (P) Strong, spontaneous, non-productive  Indication for Bronchodilator Therapy: (P) Decreased or absent breath sounds  Bronchodilator Assessment Score: (P) 4    Aerosolized bronchodilator medication orders have been revised according to the RT Inhaler-Nebulizer Bronchodilator Protocol below.    Respiratory Therapist to perform RT Therapy Protocol Assessment initially then follow the protocol.  Repeat RT Therapy Protocol Assessment PRN for score 0-3 or on second treatment, BID, and PRN for scores above 3.    No Indications - adjust the frequency to every 6 hours PRN wheezing or bronchospasm, if no treatments needed after 48 hours then discontinue using Per Protocol order mode.     If indication present, adjust the RT bronchodilator orders based on the Bronchodilator Assessment Score as indicated below.  Use Inhaler orders unless patient has one or more of the following: on home nebulizer, not able to hold breath for 10 seconds, is not alert and oriented, cannot activate and use MDI correctly, or respiratory rate 25 breaths per minute or more, then use the equivalent nebulizer order(s) with same Frequency and PRN reasons based on the score.  If a patient is on this medication at home then do not decrease Frequency below that used at home.    0-3 - enter or revise RT bronchodilator order(s) to equivalent RT Bronchodilator order with Frequency of every 4 
RT Inhaler-Nebulizer Bronchodilator Protocol Note    There is a bronchodilator order in the chart from a provider indicating to follow the RT Bronchodilator Protocol and there is an “Initiate RT Inhaler-Nebulizer Bronchodilator Protocol” order as well (see protocol at bottom of note).    CXR Findings:  No results found.    The findings from the last RT Protocol Assessment were as follows:   History Pulmonary Disease: (P) Chronic pulmonary disease  Respiratory Pattern: (P) Regular pattern and RR 12-20 bpm  Breath Sounds: (P) Slightly diminished and/or crackles  Cough: (P) Strong, spontaneous, non-productive  Indication for Bronchodilator Therapy: Decreased or absent breath sounds  Bronchodilator Assessment Score: (P) 4    Aerosolized bronchodilator medication orders have been revised according to the RT Inhaler-Nebulizer Bronchodilator Protocol below.    Respiratory Therapist to perform RT Therapy Protocol Assessment initially then follow the protocol.  Repeat RT Therapy Protocol Assessment PRN for score 0-3 or on second treatment, BID, and PRN for scores above 3.    No Indications - adjust the frequency to every 6 hours PRN wheezing or bronchospasm, if no treatments needed after 48 hours then discontinue using Per Protocol order mode.     If indication present, adjust the RT bronchodilator orders based on the Bronchodilator Assessment Score as indicated below.  Use Inhaler orders unless patient has one or more of the following: on home nebulizer, not able to hold breath for 10 seconds, is not alert and oriented, cannot activate and use MDI correctly, or respiratory rate 25 breaths per minute or more, then use the equivalent nebulizer order(s) with same Frequency and PRN reasons based on the score.  If a patient is on this medication at home then do not decrease Frequency below that used at home.    0-3 - enter or revise RT bronchodilator order(s) to equivalent RT Bronchodilator order with Frequency of every 4 hours 
RT Inhaler-Nebulizer Bronchodilator Protocol Note    There is a bronchodilator order in the chart from a provider indicating to follow the RT Bronchodilator Protocol and there is an “Initiate RT Inhaler-Nebulizer Bronchodilator Protocol” order as well (see protocol at bottom of note).    CXR Findings:  No results found.    The findings from the last RT Protocol Assessment were as follows:   History Pulmonary Disease: Chronic pulmonary disease  Respiratory Pattern: Regular pattern and RR 12-20 bpm  Breath Sounds: Slightly diminished and/or crackles  Cough: Strong, spontaneous, non-productive  Indication for Bronchodilator Therapy: Decreased or absent breath sounds  Bronchodilator Assessment Score: 4    Aerosolized bronchodilator medication orders have been revised according to the RT Inhaler-Nebulizer Bronchodilator Protocol below.    Respiratory Therapist to perform RT Therapy Protocol Assessment initially then follow the protocol.  Repeat RT Therapy Protocol Assessment PRN for score 0-3 or on second treatment, BID, and PRN for scores above 3.    No Indications - adjust the frequency to every 6 hours PRN wheezing or bronchospasm, if no treatments needed after 48 hours then discontinue using Per Protocol order mode.     If indication present, adjust the RT bronchodilator orders based on the Bronchodilator Assessment Score as indicated below.  Use Inhaler orders unless patient has one or more of the following: on home nebulizer, not able to hold breath for 10 seconds, is not alert and oriented, cannot activate and use MDI correctly, or respiratory rate 25 breaths per minute or more, then use the equivalent nebulizer order(s) with same Frequency and PRN reasons based on the score.  If a patient is on this medication at home then do not decrease Frequency below that used at home.    0-3 - enter or revise RT bronchodilator order(s) to equivalent RT Bronchodilator order with Frequency of every 4 hours PRN for wheezing or 
RT Inhaler-Nebulizer Bronchodilator Protocol Note    There is a bronchodilator order in the chart from a provider indicating to follow the RT Bronchodilator Protocol and there is an “Initiate RT Inhaler-Nebulizer Bronchodilator Protocol” order as well (see protocol at bottom of note).    CXR Findings:  XR CHEST PORTABLE    Result Date: 6/11/2024  1. Left IJ central venous catheter in good position. No pneumothorax. 2. Cardiomegaly with stable mild interstitial changes.     XR CHEST PORTABLE    Result Date: 6/11/2024  1.  Coarse interstitium is again noted throughout both lungs with some bronchial thickening.  Interstitial and peribronchial opacities in the lower lungs.  Features suggest infectious or inflammatory bronchitis and bronchiolitis.  Also correlate for any risks of chronic aspiration. 2.  No sign of pleural effusion or pneumothorax.  Calcified pleural plaques along the upper lobes and apices are present.       The findings from the last RT Protocol Assessment were as follows:   History Pulmonary Disease: (P) Chronic pulmonary disease  Respiratory Pattern: (P) Regular pattern and RR 12-20 bpm  Breath Sounds: (P) Slightly diminished and/or crackles  Cough: (P) Strong, spontaneous, non-productive  Indication for Bronchodilator Therapy: (P) Decreased or absent breath sounds  Bronchodilator Assessment Score: (P) 4    Aerosolized bronchodilator medication orders have been revised according to the RT Inhaler-Nebulizer Bronchodilator Protocol below.    Respiratory Therapist to perform RT Therapy Protocol Assessment initially then follow the protocol.  Repeat RT Therapy Protocol Assessment PRN for score 0-3 or on second treatment, BID, and PRN for scores above 3.    No Indications - adjust the frequency to every 6 hours PRN wheezing or bronchospasm, if no treatments needed after 48 hours then discontinue using Per Protocol order mode.     If indication present, adjust the RT bronchodilator orders based on the 
RT Inhaler-Nebulizer Bronchodilator Protocol Note    There is a bronchodilator order in the chart from a provider indicating to follow the RT Bronchodilator Protocol and there is an “Initiate RT Inhaler-Nebulizer Bronchodilator Protocol” order as well (see protocol at bottom of note).    CXR Findings:  XR CHEST PORTABLE    Result Date: 6/11/2024  1. Left IJ central venous catheter in good position. No pneumothorax. 2. Cardiomegaly with stable mild interstitial changes.     XR CHEST PORTABLE    Result Date: 6/11/2024  1.  Coarse interstitium is again noted throughout both lungs with some bronchial thickening.  Interstitial and peribronchial opacities in the lower lungs.  Features suggest infectious or inflammatory bronchitis and bronchiolitis.  Also correlate for any risks of chronic aspiration. 2.  No sign of pleural effusion or pneumothorax.  Calcified pleural plaques along the upper lobes and apices are present.       The findings from the last RT Protocol Assessment were as follows:   History Pulmonary Disease: Chronic pulmonary disease  Respiratory Pattern: Regular pattern and RR 12-20 bpm  Breath Sounds: Slightly diminished and/or crackles  Cough: Strong, spontaneous, non-productive  Indication for Bronchodilator Therapy: Decreased or absent breath sounds  Bronchodilator Assessment Score: 4    Aerosolized bronchodilator medication orders have been revised according to the RT Inhaler-Nebulizer Bronchodilator Protocol below.    Respiratory Therapist to perform RT Therapy Protocol Assessment initially then follow the protocol.  Repeat RT Therapy Protocol Assessment PRN for score 0-3 or on second treatment, BID, and PRN for scores above 3.    No Indications - adjust the frequency to every 6 hours PRN wheezing or bronchospasm, if no treatments needed after 48 hours then discontinue using Per Protocol order mode.     If indication present, adjust the RT bronchodilator orders based on the Bronchodilator Assessment 
Reassessment completed, see flow sheet. BL lungs diminished.  Pt continues to be A/O x4. Levophed has been off since 0300, BP remains stable and currently 134/47 MAP 74    All ICU Lines and monitoring remain in place. Bed locked in lowest position. Call light within reach.   
Received a call from monitor room that patient heart rate was in the 160s. Patient was feeling dizzy, weak, and lightheaded. Notified MD. Instructed to get STAT EKG. Gave 5mg metoprolol injection, K+ replacement, and Magnesium replacement per MD.  
Shift assessment complete. Patient in normal sinus rhythm on room air. No complaints of dizziness today.  
Shift assessment completed, see flow sheet.   Pt is awake A/O sitting in bed.  Pt denies any pain or SOB.  States she is feeling better.        Respirations are easy, even, and unlabored.   Bilateral lung sounds diminished on 2L.     VSS  NSR on the monitor  .      CVC/PIV, WNL  All lines and monitoring devices in place. Bed in lowest position with wheels locked. No needs expressed at this time. Will continue to monitor.    
Shift assessment, completed, see flow sheet. Pt is alert and oriented x 4. Following commands.     Titrating levophed down per MAR order.     2L NC.  SR/SB 56, /48, SpO2 100%. Respirations are easy, even, and unlabored. Bilateral lung sounds diminished. L IJ, WNL with LEVOPHED infusing at a rate of 11 mcg/min.       Call light within reach. Bed in lowest position. Bed alarm on.     
[facility]   Pharmacy Pharmacokinetic Monitoring Service - Vancomycin     Nancy Franz is a 73 y.o. female starting on vancomycin therapy for Sepsis. Pharmacy consulted by JOHANA Villa for monitoring and adjustment.    Target Concentration: Dosing based on anticipated concentration <15 mg/L due to renal impairment/insufficiency    Additional Antimicrobials: cefepime    Pertinent Laboratory Values:   Wt Readings from Last 1 Encounters:   06/11/24 86.2 kg (190 lb)     Temp Readings from Last 1 Encounters:   06/11/24 (!) 96.7 °F (35.9 °C) (Axillary)     Estimated Creatinine Clearance: 16 mL/min (A) (based on SCr of 3.2 mg/dL (H)).  Recent Labs     06/11/24  0422   CREATININE 3.2*   BUN 41*   WBC 7.3     Procalcitonin: 0.37    Pertinent Cultures:  Culture Date Source Results   6/11     MRSA Nasal Swab: not ordered. Order placed by pharmacy.    Plan:  Concentration-guided dosing due to renal impairment/insufficiency  Start vancomycin 2000mg times one, then will pulse dose    Renal labs as indicated   Vancomycin concentration ordered for 6/11 @ 0600   Pharmacy will continue to monitor patient and adjust therapy as indicated    Thank you for the consult,  Salma Harding RPH  6/11/2024 12:44 PM   
Spoke with Doctor Stephen. Patient is to keep taking the metoprolol and monitor patient.  
Teresa per  to remove CVC.. pt has working PIV to transfer to 2W.  CVC removed  
joint deformity. No clubbing.   Neuro: Awake. Follows commands. Positive pupils/gag/corneals. Normal pain response.  Psych: Oriented to person, place, time. No anxiety or agitation.     Medications:  Scheduled Meds:   potassium chloride  40 mEq Oral Once    magnesium sulfate  1,000 mg IntraVENous Once    apixaban  5 mg Oral BID    metoprolol tartrate  25 mg Oral BID    cefdinir  300 mg Oral 2 times per day    [Held by provider] furosemide  20 mg Oral Daily    sertraline  100 mg Oral Daily    lactulose  20 g Oral TID    sucralfate  1 g Oral 4 times per day    [Held by provider] lisinopril  5 mg Oral Daily    [Held by provider] metoprolol succinate  100 mg Oral BID    pantoprazole  40 mg Oral BID AC    levETIRAcetam  500 mg Oral BID    mupirocin   Each Nostril BID    sodium chloride flush  5-40 mL IntraVENous 2 times per day    ipratropium 0.5 mg-albuterol 2.5 mg  1 Dose Inhalation BID RT       PRN Meds:  metoprolol, tiZANidine, sodium chloride flush, sodium chloride, ondansetron **OR** ondansetron, polyethylene glycol, acetaminophen **OR** acetaminophen, ipratropium 0.5 mg-albuterol 2.5 mg    Results:  CBC:   Recent Labs     06/13/24  0424 06/14/24  0525   WBC 3.7* 3.6*   HGB 7.4* 7.5*   HCT 23.6* 22.9*   MCV 81.5 79.5*   * 139       BMP:   Recent Labs     06/13/24  0424 06/14/24  0525 06/14/24  1823   * 135* 137   K 3.7 3.9 3.5    105 105   CO2 20* 20* 21   BUN 27* 22* 19   CREATININE 1.5* 1.2 1.0       LIVER PROFILE:   No results for input(s): \"AST\", \"ALT\", \"LIPASE\", \"AMYLASE\", \"BILIDIR\", \"BILITOT\", \"ALKPHOS\" in the last 72 hours.    Invalid input(s): \"ALB\"    PT/INR: No results for input(s): \"PROTIME\", \"INR\" in the last 72 hours.  APTT: No results for input(s): \"APTT\" in the last 72 hours.  UA:  No results for input(s): \"NITRITE\", \"COLORU\", \"PHUR\", \"LABCAST\", \"WBCUA\", \"RBCUA\", \"MUCUS\", \"TRICHOMONAS\", \"YEAST\", \"BACTERIA\", \"CLARITYU\", \"SPECGRAV\", \"LEUKOCYTESUR\", \"UROBILINOGEN\", \"BILIRUBINUR\", 
                   ORDERED BY: NANI SOLANO  SOURCE: Blood No site given                COLLECTED:  06/11/24 04:25  ANTIBIOTICS AT [facility].:                      RECEIVED :  06/11/24 15:39  If child <=2 yrs old please draw pediatric bottle.~Blood Culture #2    Blood Culture 1 [[id number]] Collected: 06/11/24 0422    Order Status: Completed Specimen: Blood Updated: 06/12/24 0515     Blood Culture, Routine No Growth to date.  Any change in status will be called.    Narrative:      ORDER#: [id number]                          ORDERED BY: NANI SOLANO  SOURCE: Blood left forearm                 COLLECTED:  06/11/24 04:22  ANTIBIOTICS AT [facility].:                      RECEIVED :  06/11/24 15:39  If child <=2 yrs old please draw pediatric bottle.~Blood Culture 1           Results in Past 30 Days  Result Component Current Result Ref Range Previous Result Ref Range   Troponin, High Sensitivity 111 (H) (6/11/2024) 0 - 14 ng/L 155 (H) (6/11/2024) 0 - 14 ng/L        Results in Past 30 Days  Result Component Current Result Ref Range Previous Result Ref Range   Pro- (H) (6/11/2024) 0 - 124 pg/mL Not in Time Range         No results for input(s): \"PROCAL\" in the last 72 hours.       Films:    XR CHEST PORTABLE   Final Result   1. Left IJ central venous catheter in good position. No pneumothorax.   2. Cardiomegaly with stable mild interstitial changes.         XR CHEST PORTABLE   Final Result   1.  Coarse interstitium is again noted throughout both lungs with some   bronchial thickening.  Interstitial and peribronchial opacities in the lower   lungs.  Features suggest infectious or inflammatory bronchitis and   bronchiolitis.  Also correlate for any risks of chronic aspiration.      2.  No sign of pleural effusion or pneumothorax.  Calcified pleural plaques   along the upper lobes and apices are present.         CT Head W/O Contrast   Final Result   No evidence of acute intracranial abnormality.          
TABS tablet Take 1 tablet by mouth 2 times daily  Patient not taking: Reported on 6/11/2024 1/17/23   Drew Mercado MD   levothyroxine (SYNTHROID) 88 MCG tablet Take 1 tablet by mouth Daily  Patient not taking: Reported on 6/11/2024    ProviderLuke MD   lisinopril (PRINIVIL;ZESTRIL) 5 MG tablet Take 1 tablet by mouth daily Take one tab by mouth daily    ProviderLuke MD   sertraline (ZOLOFT) 50 MG tablet Take 1 tablet by mouth in the morning and at bedtime RX take one tab by mouth once a day with food    ProviderLuke MD   mirtazapine (REMERON) 7.5 MG tablet Take 1 tablet by mouth as needed (sleep) For sleep    ProviderLuke MD   simvastatin (ZOCOR) 20 MG tablet Take 1 tablet by mouth nightly    Loida Mace DO      Diet Reviewed: Yes   ADULT DIET; Regular    Goal of Care Reviewed: Yes   Patient and/or Family's stated Goal of Care this Admission: Reduce shortness of breath, increase activity tolerance, better understand heart failure and disease management, be more comfortable, and reduce lower extremity edema prior to discharge.     Electronically signed by Yokasta Skelton RN on 6/12/2024 at 6:23 AM

## 2024-06-16 NOTE — DISCHARGE INSTR - COC
encephalopathy G92.9    History of depression Z86.59    Misuse of prescription only drugs F19.90    GI bleed K92.2    Elevated lactic acid level R79.89    Hyperkalemia E87.5    Leukocytosis D72.829    Thrombocytosis D75.839    High anion gap metabolic acidosis E87.29    Hypovolemic shock ([facility]) R57.1    NSTEMI (non-ST elevated myocardial infarction) ([facility]) I21.4    Elevated troponin R79.89    Abnormal CXR R93.89    PSVT (paroxysmal supraventricular tachycardia) ([facility]) I47.10    PAF (paroxysmal atrial fibrillation) ([facility]) I48.0    Pneumonia of both lungs due to infectious organism J18.9    Acute focal neurological deficit R29.818    Diverticulitis K57.92    Diverticulitis of colon K57.32    Acute hyponatremia E87.1    COPD exacerbation ([facility]) J44.1    Generalized abdominal pain R10.84    Shortness of breath R06.02    Delusions ([facility]) F22    Leg edema R60.0    Sepsis ([facility]) A41.9    Chest pain R07.9    Frequent falls R29.6    Seizure-like activity ([facility]) R56.9    Acute hypoxemic respiratory failure ([facility]) J96.01    Systolic murmur R01.1    Seizure ([facility]) R56.9    Acute diverticulitis K57.92    Metabolic encephalopathy G93.41    Atrial flutter ([facility]) I48.92    Acute on chronic congestive heart failure ([facility]) I50.9    Atrial fibrillation with RVR ([facility]) I48.91    Pneumonia due to infectious agent J18.9    Secondary hypercoagulable state ([facility]) D68.69    Right upper quadrant abdominal pain R10.11    Atrial fibrillation with rapid ventricular response ([facility]) I48.91    Hypoxia R09.02    Moderate persistent asthma with exacerbation J45.41    Multifocal pneumonia J18.9    Esophagitis K20.90    Acute deep vein thrombosis (DVT) of proximal vein of left lower extremity ([facility]) I82.4Y2    Closed fracture of olecranon process of left ulna S52.022A    Coronary atherosclerosis I25.10    Disorder of bone and articular cartilage M89.9, M94.9    Hemorrhoids K64.9    Hepatic steatosis K76.0    HLD (hyperlipidemia) E78.5    Hypoxemia R09.02

## 2024-06-16 NOTE — DISCHARGE SUMMARY
Workup in the ER showed that she was hypotensive.  Concern for sepsis versus hypovolemic shock.  She is admitted to the ICU.  Intensivist consultation is obtained.  Central access was obtained and she was started on Levophed.  Blood pressure is normal. Off Levophed. Resumed home BP medication. In the afternoon BP dropped again. Levophed resumed.  On 6/13/2024 she is off Levophed.  Hypotension likely related to metoprolol.  Hold blood pressure today.  Blood pressure dropped again overnight to the 70s.  Received IV normal saline bolus.  Cosyntropin stimulation test ordered - normal.     # Possible pneumonia-started on broad-spectrum antibiotics to cover for MRSA and gram-negative organism.  Presently on Vanco and cefepime.--> omnicef      #Acute metabolic encephalopathy.  Resolved this am but recurred after Seroquel was resumed. This is a home med.  Hold Seroquel.  Mentation back to normal.     # VIOLETA.  Likely prerenal azotemia.  Has been having emesis at home and not eating well.  Started on IV fluids.  Monitor renal function and strict I and O.   Resolved      # Atrial fibrillation with RVR  Continue home dose Eliquis 5 mg twice daily  Lopressor on reduced dose due to hypotension, resumed at lower dose  -monitor electrolytes - add Mg and potassium today  - continue tele, if further events transfer to PCU  - cardiology consulted  - TSH checked - 2.7   - will try to increase metoprolol if she is tolerating     # COPD. Not actively wheezing.  Treat with nebulizers.     # History of liver cirrhosis.  Continue lactulose     # Chronically elevated troponin.  Likely related to hypertension.  No ACS.     # Hypertension.    Previously - Resumed BP med but BP dropped per my colleague     Moderate mitral stenosis  Mild aortic stenosis  Stable     Acute on Chronic anemia  Neutropenia since admission  We will continue to monitor  Drop in H&H likely secondary to hemodilution     Seizures  Continue Keppra     Eliquis for DVT

## 2024-06-16 NOTE — DISCHARGE INSTR - DIET
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
## 2024-06-16 NOTE — CARE COORDINATION
DISCHARGE ORDER  Date/Time 2024 3:47 PM  Completed by: Teresa Boeck, RN, Case Management    Patient Name: Nancy Franz      : 1950  Admitting Diagnosis: Uremia [N19]  Hypotension [I95.9]  NSTEMI (non-ST elevated myocardial infarction) ([facility]) [I21.4]  VIOLETA (acute kidney injury) ([facility]) [N17.9]  Hypotension, unspecified hypotension type [I95.9]  Altered mental status, unspecified altered mental status type [R41.82]      Admit order Date and Status: 24  (verify MD's last order for status of admission)      Noted discharge order.   If applicable PT/OT recommendation at Discharge: ***  DME recommendation by PT/OT:***  Confirmed discharge plan  (***): {YES/NO:}  with whom_______________  If pt confirmed DC plan does family need to be contacted by CM {YES/NO:} if yes who______  Discharge Plan: ***    Date of Last IMM Given: ***    Reviewed chart.  Role of discharge planner explained and patient verbalized understanding. Discharge order is noted.    Has Home O2 in place on admit:  {YES / NO:}  Informed of need to bring portable home O2 tank on day of discharge for nursing to connect prior to leaving:   {Responses; yes/no/not indicated:55637}  Verbalized agreement/Understanding:   {Responses; yes/no/not indicated:53138}  Pt is being d/c'd to *** today. Pt's O2 sats are ***% on ***.    Discharge timeout done with ***. All discharge needs and concerns addressed.

## 2024-06-17 ENCOUNTER — TELEPHONE (OUTPATIENT)
Dept: CARDIOLOGY CLINIC | Age: 74
End: 2024-06-17

## 2024-06-17 NOTE — TELEPHONE ENCOUNTER
Unable to leave a message.  No VM set up.   Patient needs apt for 30 day monitor to be placed.  I have this pulled in back    2.    Also, I made EP appointment with [facility] on 9/10 at 10:15 AM.  Please confirm that this is ok with patient. This was the first available EP apt.

## 2024-06-18 NOTE — TELEPHONE ENCOUNTER
Unable to leave message on patient phone.  ANKUSH not set up on her phone or Margaret's.  Celia's is not working.  Will try again later.

## 2024-06-24 NOTE — TELEPHONE ENCOUNTER
Tried to call patient again.  No answer. No reply from patient  I cancelled apt I had made w [facility] as patient is unaware of this apt.

## 2024-06-25 NOTE — TELEPHONE ENCOUNTER
Tried contacting patient, unable to leave message.     Will send a letter for patient to contact office.     Closing this message out as this is the fourth attempt.

## 2024-07-15 ENCOUNTER — TELEPHONE (OUTPATIENT)
Dept: CARDIOLOGY CLINIC | Age: 74
End: 2024-07-15

## 2024-07-15 ENCOUNTER — ANCILLARY PROCEDURE (OUTPATIENT)
Dept: CARDIOLOGY CLINIC | Age: 74
End: 2024-07-15
Payer: MEDICARE

## 2024-07-15 DIAGNOSIS — I48.0 PAROXYSMAL ATRIAL FIBRILLATION (HCC): ICD-10-CM

## 2024-07-15 PROCEDURE — 93270 REMOTE 30 DAY ECG REV/REPORT: CPT | Performed by: INTERNAL MEDICINE

## 2024-07-15 NOTE — TELEPHONE ENCOUNTER
Monitor placed by [employer]  Monitor company [employer]  Length of monitor 30 days  Monitor ordered by Karina Tolentino  Serial number [id number]   Kit ID [id number]  Activation successful prior to pt leaving office? Yes

## 2024-07-16 RX ORDER — METOPROLOL TARTRATE 25 MG/1
TABLET, FILM COATED ORAL
Qty: 60 TABLET | Refills: 1 | OUTPATIENT
Start: 2024-07-16

## 2024-07-19 ENCOUNTER — TELEPHONE (OUTPATIENT)
Dept: INTERNAL MEDICINE CLINIC | Age: 74
End: 2024-07-19

## 2024-07-19 NOTE — TELEPHONE ENCOUNTER
----- Message from BLUE Jackson CNP sent at 7/19/2024  3:58 PM EDT -----    She had an episode on the heart monitor where Heart rate was 197-202 for over 5 minutes.   See cardiologist.

## 2024-07-20 ENCOUNTER — OFFICE VISIT (OUTPATIENT)
Age: 74
End: 2024-07-20

## 2024-07-20 VITALS
SYSTOLIC BLOOD PRESSURE: 119 MMHG | BODY MASS INDEX: 33.86 KG/M2 | OXYGEN SATURATION: 98 % | HEART RATE: 80 BPM | WEIGHT: 184 LBS | HEIGHT: 62 IN | TEMPERATURE: 98.4 F | DIASTOLIC BLOOD PRESSURE: 72 MMHG

## 2024-07-20 DIAGNOSIS — L20.84 INTRINSIC ECZEMA: ICD-10-CM

## 2024-07-20 DIAGNOSIS — B86 SCABIES: Primary | ICD-10-CM

## 2024-07-20 DIAGNOSIS — L29.9 ITCH OF SKIN: ICD-10-CM

## 2024-07-20 DIAGNOSIS — F42.4 SKIN PICKING HABIT: ICD-10-CM

## 2024-07-20 PROBLEM — F29 PSYCHOTIC DISORDER (HCC): Status: ACTIVE | Noted: 2024-07-20

## 2024-07-20 PROBLEM — F41.9 ANXIETY: Status: ACTIVE | Noted: 2018-05-14

## 2024-07-20 PROBLEM — N18.4 CKD (CHRONIC KIDNEY DISEASE) STAGE 4, GFR 15-29 ML/MIN (HCC): Status: ACTIVE | Noted: 2018-08-22

## 2024-07-20 PROBLEM — E83.42 HYPOMAGNESEMIA: Status: ACTIVE | Noted: 2024-02-05

## 2024-07-20 RX ORDER — METHYLPREDNISOLONE 4 MG/1
TABLET ORAL
Qty: 1 KIT | Refills: 0 | Status: SHIPPED | OUTPATIENT
Start: 2024-07-20 | End: 2024-07-26

## 2024-07-20 RX ORDER — CAMPHOR 0.45 %
GEL (GRAM) TOPICAL
Qty: 28 G | Refills: 1 | Status: SHIPPED | OUTPATIENT
Start: 2024-07-20

## 2024-07-20 RX ORDER — HYDROXYZINE HYDROCHLORIDE 25 MG/1
12.5 TABLET, FILM COATED ORAL EVERY 8 HOURS PRN
Qty: 30 TABLET | Refills: 0 | Status: SHIPPED | OUTPATIENT
Start: 2024-07-20 | End: 2024-07-30

## 2024-07-20 RX ORDER — PERMETHRIN 50 MG/G
CREAM TOPICAL
Qty: 1 EACH | Refills: 1 | Status: SHIPPED | OUTPATIENT
Start: 2024-07-20

## 2024-07-20 NOTE — PATIENT INSTRUCTIONS
Take meds as prescribed and follow up with your primary care provider for follow up on meds.   Apply the permethrin.   Follow up with your pcp in 7 days if symptoms persist or if symptoms worsen.  New Prescriptions    DIPHENHYDRAMINE-ZINC ACETATE (BENADRYL ITCH STOPPING) 1-0.1 % CREAM    Apply topically 3 times daily as needed.    HYDROXYZINE HCL (ATARAX) 25 MG TABLET    Take 0.5 tablets by mouth every 8 hours as needed for Itching    METHYLPREDNISOLONE (MEDROL DOSEPACK) 4 MG TABLET    Take by mouth.    MUPIROCIN (BACTROBAN) 2 % OINTMENT    Apply topically 3 times daily.    PERMETHRIN (ELIMITE) 5 % CREAM    Apply topically as directed place on whole body and wash off in 8 -12 hours and may use again in 2 weeks if still present.

## 2024-07-20 NOTE — PROGRESS NOTES
Nancy Franz (:  1950) is a 74 y.o. female,Established patient, here for evaluation of the following chief complaint(s):  Other (Parasite? On arms legs body and in hair. . Itchy and painful)      ASSESSMENT/PLAN:    ICD-10-CM    1. Scabies  B86 permethrin (ELIMITE) 5 % cream     mupirocin (BACTROBAN) 2 % ointment     methylPREDNISolone (MEDROL DOSEPACK) 4 MG tablet      2. Itch of skin  L29.9 permethrin (ELIMITE) 5 % cream     diphenhydrAMINE-zinc acetate (BENADRYL ITCH STOPPING) 1-0.1 % cream     mupirocin (BACTROBAN) 2 % ointment     methylPREDNISolone (MEDROL DOSEPACK) 4 MG tablet     hydrOXYzine HCl (ATARAX) 25 MG tablet      3. Skin picking habit  F42.4 permethrin (ELIMITE) 5 % cream     diphenhydrAMINE-zinc acetate (BENADRYL ITCH STOPPING) 1-0.1 % cream     mupirocin (BACTROBAN) 2 % ointment     methylPREDNISolone (MEDROL DOSEPACK) 4 MG tablet     hydrOXYzine HCl (ATARAX) 25 MG tablet      4. Intrinsic eczema  L20.84         Take meds as prescribed and follow up with your primary care provider for follow up on meds.   Apply the permethrin.   Follow up with your pcp in 7 days if symptoms persist or if symptoms worsen.    SUBJECTIVE/OBJECTIVE:  Patient states that she has spiders crawling in her skin and has bugs crawling out of her skin at night.       History provided by:  Patient   used: No      HPI:   74 y.o. female presents with symptoms of hair, groin, stomach, legs, arms that is itchy and pain states that she has worms in her anus that are real long and thin, she states that she has six cats and has fleas ongoing since 2024. Denies chest pain, shortness of breath. Has taken nothing for symptoms.    Vitals:    24 1547   BP: 119/72   Pulse: 80   Temp: 98.4 °F (36.9 °C)   TempSrc: Temporal   SpO2: 98%   Weight: 83.5 kg (184 lb)   Height: 1.575 m (5' 2\")       Review of Systems    Physical Exam  Skin:     General: Skin is warm.      Capillary Refill:

## 2024-07-22 ENCOUNTER — TELEPHONE (OUTPATIENT)
Dept: CARDIOLOGY CLINIC | Age: 74
End: 2024-07-22

## 2024-07-22 NOTE — TELEPHONE ENCOUNTER
We received an \"event\" strip from [employer] -scanned into this encounter.        This patient is not an [facility] patient. She has an upcoming appt with [facility] 10/8/2024. The patient has missed/no showed several appointments.

## 2024-07-25 ENCOUNTER — OFFICE VISIT (OUTPATIENT)
Age: 74
End: 2024-07-25

## 2024-07-25 VITALS
TEMPERATURE: 98.1 F | HEIGHT: 62 IN | BODY MASS INDEX: 33.86 KG/M2 | HEART RATE: 69 BPM | OXYGEN SATURATION: 98 % | WEIGHT: 184 LBS | DIASTOLIC BLOOD PRESSURE: 67 MMHG | SYSTOLIC BLOOD PRESSURE: 123 MMHG

## 2024-07-25 DIAGNOSIS — T14.8XXA INFECTED OPEN WOUND: Primary | ICD-10-CM

## 2024-07-25 DIAGNOSIS — L30.9 DERMATITIS: ICD-10-CM

## 2024-07-25 DIAGNOSIS — R30.0 DYSURIA: ICD-10-CM

## 2024-07-25 DIAGNOSIS — L98.491 SKIN ULCER, LIMITED TO BREAKDOWN OF SKIN (HCC): ICD-10-CM

## 2024-07-25 DIAGNOSIS — L08.9 INFECTED OPEN WOUND: Primary | ICD-10-CM

## 2024-07-25 DIAGNOSIS — R03.0 ELEVATED BLOOD PRESSURE READING: ICD-10-CM

## 2024-07-25 DIAGNOSIS — R19.5 ABNORMAL FECES: ICD-10-CM

## 2024-07-25 LAB
BILIRUBIN, POC: NEGATIVE
BLOOD URINE, POC: NEGATIVE
CLARITY, POC: CLEAR
COLOR, POC: YELLOW
GLUCOSE URINE, POC: NEGATIVE
KETONES, POC: NEGATIVE
LEUKOCYTE EST, POC: NEGATIVE
NITRITE, POC: NEGATIVE
PH, POC: 7
PROTEIN, POC: NEGATIVE
SPECIFIC GRAVITY, POC: 1.02
UROBILINOGEN, POC: NORMAL

## 2024-07-25 RX ORDER — SULFAMETHOXAZOLE AND TRIMETHOPRIM 800; 160 MG/1; MG/1
1 TABLET ORAL 2 TIMES DAILY
Qty: 20 TABLET | Refills: 0 | Status: SHIPPED | OUTPATIENT
Start: 2024-07-25 | End: 2024-08-04

## 2024-07-25 NOTE — PATIENT INSTRUCTIONS
Urine was negative. No evidence of UTI.     Follow-up with gastroenterology for additional stool/GI evaluation as we are limited in the testing available through urgent care.     Take Bactrim for the skin ulcers/infection    Ketoconazole shampoo for scalp dermatitis.     Additionally you may take over-the-counter antihistamine such as Claritin or Benadryl (Benadryl causes drowsiness, Claritin is non-drowsy).   If taking Benadryl: Take 25-50mg every 6 hours as directed.   If taking Claritin: You may double the dose for 5 days.

## 2024-07-25 NOTE — PROGRESS NOTES
Nancy Franz (:  1950) is a 74 y.o. female,  here for evaluation of the following chief complaint(s): OTHER (Concern for parasites)    Nancy Franz is a Established patient.   I have reviewed the patient's medications; see Medication Reconciliation.    ASSESSMENT/PLAN:  Diagnosis:     ICD-10-CM    1. Infected open wound  T14.8XXA     L08.9       2. Skin ulcer, limited to breakdown of skin ([facility])  L98.491       3. Dermatitis  L30.9 KETOCONAZOLE, TOPICAL, 1 % SHAM      4. Abnormal feces  R19.5 OVA & PARASITE ID/COUNT #1            Patient was seen at Urgent Care today for concern for parasites (worms) in her rectum and hair. This is her second urgent care visit for same complaint. She also has worsening ulcerative wounds to the lower abdomen.    Parasites:   She does have a \"sample\" of the worms in a container. I do examine this. There is scotch tape with some hair in the container. No obvious pin worms or other parasites seen by me.     Based on history and physical examination, differential diagnosis includes but is not limited to: scabies, pin worms, fleas/mites, delusions or parasitosis, other stool ova/parasites. The pruritus of and flanking of the scalp could a seborrheic dermatitis.     Stool studies may be able to be further assessed for ova and parasites in the stool. Will refer to GI for further evaluation and management.   Will try ketoconazole shampoo for the scalp rash and pruritus.     Wounds:   Appear ulcerative and with mild infection/cellulitis. Has failed with just Bactroban ointment. I will prescribed Bactrim for MRSA coverage.  Pt has allergy of bactrim listed but states she is not allergic.     Patient was discharged home with follow-up and return precautions.     Pt had elevated blood pressure greater than 120/80. Therefore, patient was referred to primary care.     Dysuria:   At discharge pt also added she had some dysuria and wanted a UA to evaluate for any UTI. This was obtained and

## 2024-08-05 DIAGNOSIS — F42.4 SKIN PICKING HABIT: ICD-10-CM

## 2024-08-05 DIAGNOSIS — B86 SCABIES: ICD-10-CM

## 2024-08-05 DIAGNOSIS — L29.9 ITCH OF SKIN: ICD-10-CM

## 2024-08-05 RX ORDER — PANTOPRAZOLE SODIUM 40 MG/1
TABLET, DELAYED RELEASE ORAL
Qty: 30 TABLET | Refills: 3 | OUTPATIENT
Start: 2024-08-05

## 2024-08-05 RX ORDER — PERMETHRIN 50 MG/G
CREAM TOPICAL
Qty: 60 G | OUTPATIENT
Start: 2024-08-05

## 2024-08-12 PROCEDURE — 93272 ECG/REVIEW INTERPRET ONLY: CPT | Performed by: INTERNAL MEDICINE

## 2024-08-13 ENCOUNTER — OFFICE VISIT (OUTPATIENT)
Age: 74
End: 2024-08-13

## 2024-08-13 VITALS
SYSTOLIC BLOOD PRESSURE: 124 MMHG | HEART RATE: 80 BPM | DIASTOLIC BLOOD PRESSURE: 68 MMHG | TEMPERATURE: 98.6 F | OXYGEN SATURATION: 97 %

## 2024-08-13 DIAGNOSIS — H92.01 OTALGIA OF RIGHT EAR: Primary | ICD-10-CM

## 2024-08-13 DIAGNOSIS — R21 RASH: ICD-10-CM

## 2024-08-13 RX ORDER — PERMETHRIN 50 MG/G
CREAM TOPICAL
Qty: 60 G | Refills: 0 | Status: SHIPPED | OUTPATIENT
Start: 2024-08-13 | End: 2024-08-13

## 2024-08-13 RX ORDER — PERMETHRIN 50 MG/G
CREAM TOPICAL
Qty: 60 G | Refills: 0 | Status: SHIPPED | OUTPATIENT
Start: 2024-08-13

## 2024-08-13 NOTE — PROGRESS NOTES
Lower extremities: no calf tenderness  Neurological:  no focal deficit  Skin: areas of excoriations.  no drainage, no surrounding erythema or tenderness  Psychiatric:   behavior appropriate--. been insisting on seeing bugs come out of skin/worms in stools  For several months        An electronic signature was used to authenticate this note.    --Sameer Zaragoza MD

## 2024-08-13 NOTE — PATIENT INSTRUCTIONS
Keep hydrated, tylenol   (if no contraindications) as needed if pain or fever.. Take medications as prescribed.. follow up in 7- days if not better  Return sooner or go see PCP if symptoms worse/feeling worse or has new symptoms or concerns  Go to ER  if fever/chills, increase shortness of breath, increase congestion or wheezing despite medications, if associated with chest pain, nausea/vomiting, dizzy/ light-headed sensation., if falling  Schedule appointment with PCP

## 2024-08-14 ASSESSMENT — ENCOUNTER SYMPTOMS
NAUSEA: 0
SORE THROAT: 0
SHORTNESS OF BREATH: 1
WHEEZING: 0
VOMITING: 0
DIARRHEA: 0

## 2024-08-22 ENCOUNTER — APPOINTMENT (OUTPATIENT)
Dept: CT IMAGING | Age: 74
DRG: 291 | End: 2024-08-22
Payer: MEDICARE

## 2024-08-22 ENCOUNTER — APPOINTMENT (OUTPATIENT)
Dept: GENERAL RADIOLOGY | Age: 74
DRG: 291 | End: 2024-08-22
Payer: MEDICARE

## 2024-08-22 ENCOUNTER — HOSPITAL ENCOUNTER (INPATIENT)
Age: 74
LOS: 5 days | Discharge: HOME | DRG: 291 | End: 2024-08-27
Attending: STUDENT IN AN ORGANIZED HEALTH CARE EDUCATION/TRAINING PROGRAM | Admitting: INTERNAL MEDICINE
Payer: MEDICARE

## 2024-08-22 DIAGNOSIS — R06.03 RESPIRATORY DISTRESS: Primary | ICD-10-CM

## 2024-08-22 DIAGNOSIS — I50.9 ACUTE ON CHRONIC CONGESTIVE HEART FAILURE, UNSPECIFIED HEART FAILURE TYPE (HCC): ICD-10-CM

## 2024-08-22 DIAGNOSIS — R60.0 BILATERAL LOWER EXTREMITY EDEMA: ICD-10-CM

## 2024-08-22 DIAGNOSIS — J44.1 COPD EXACERBATION (HCC): ICD-10-CM

## 2024-08-22 DIAGNOSIS — R01.1 HEART MURMUR: ICD-10-CM

## 2024-08-22 DIAGNOSIS — R06.02 SHORTNESS OF BREATH: ICD-10-CM

## 2024-08-22 PROBLEM — I50.31 ACUTE DIASTOLIC CHF (CONGESTIVE HEART FAILURE) (HCC): Status: ACTIVE | Noted: 2024-08-22

## 2024-08-22 PROBLEM — Z86.718 HISTORY OF DVT (DEEP VEIN THROMBOSIS): Status: ACTIVE | Noted: 2024-08-22

## 2024-08-22 LAB
ALBUMIN SERPL-MCNC: 3.9 G/DL (ref 3.4–5)
ALBUMIN/GLOB SERPL: 1.1 {RATIO} (ref 1.1–2.2)
ALP SERPL-CCNC: 237 U/L (ref 40–129)
ALT SERPL-CCNC: 11 U/L (ref 10–40)
ANION GAP SERPL CALCULATED.3IONS-SCNC: 12 MMOL/L (ref 3–16)
AST SERPL-CCNC: 24 U/L (ref 15–37)
BASE EXCESS BLDV CALC-SCNC: -2.9 MMOL/L (ref -3–3)
BASOPHILS # BLD: 0.1 K/UL (ref 0–0.2)
BASOPHILS NFR BLD: 2.1 %
BILIRUB SERPL-MCNC: 0.7 MG/DL (ref 0–1)
BILIRUB UR QL STRIP.AUTO: NEGATIVE
BUN SERPL-MCNC: 13 MG/DL (ref 7–20)
CALCIUM SERPL-MCNC: 9.2 MG/DL (ref 8.3–10.6)
CHLORIDE SERPL-SCNC: 109 MMOL/L (ref 99–110)
CLARITY UR: CLEAR
CO2 BLDV-SCNC: 22 MMOL/L
CO2 SERPL-SCNC: 21 MMOL/L (ref 21–32)
COHGB MFR BLDV: 2.9 % (ref 0–1.5)
COLOR UR: YELLOW
CREAT SERPL-MCNC: 0.9 MG/DL (ref 0.6–1.2)
DEPRECATED RDW RBC AUTO: 18.7 % (ref 12.4–15.4)
EKG ATRIAL RATE: 82 BPM
EKG DIAGNOSIS: NORMAL
EKG P AXIS: 67 DEGREES
EKG P-R INTERVAL: 130 MS
EKG Q-T INTERVAL: 394 MS
EKG QRS DURATION: 64 MS
EKG QTC CALCULATION (BAZETT): 460 MS
EKG R AXIS: -11 DEGREES
EKG T AXIS: 56 DEGREES
EKG VENTRICULAR RATE: 82 BPM
EOSINOPHIL # BLD: 0.4 K/UL (ref 0–0.6)
EOSINOPHIL NFR BLD: 9.5 %
FERRITIN SERPL IA-MCNC: 34.5 NG/ML (ref 15–150)
FLUAV RNA RESP QL NAA+PROBE: NOT DETECTED
FLUBV RNA RESP QL NAA+PROBE: NOT DETECTED
FOLATE SERPL-MCNC: 13.1 NG/ML (ref 4.78–24.2)
GFR SERPLBLD CREATININE-BSD FMLA CKD-EPI: 67 ML/MIN/{1.73_M2}
GLUCOSE SERPL-MCNC: 159 MG/DL (ref 70–99)
GLUCOSE UR STRIP.AUTO-MCNC: NEGATIVE MG/DL
HCO3 BLDV-SCNC: 21.1 MMOL/L (ref 23–29)
HCT VFR BLD AUTO: 25.7 % (ref 36–48)
HGB BLD-MCNC: 7.8 G/DL (ref 12–16)
HGB UR QL STRIP.AUTO: NEGATIVE
IRON SATN MFR SERPL: 9 % (ref 15–50)
IRON SERPL-MCNC: 37 UG/DL (ref 37–145)
KETONES UR STRIP.AUTO-MCNC: NEGATIVE MG/DL
LEUKOCYTE ESTERASE UR QL STRIP.AUTO: NEGATIVE
LYMPHOCYTES # BLD: 0.8 K/UL (ref 1–5.1)
LYMPHOCYTES NFR BLD: 17.1 %
MCH RBC QN AUTO: 24.7 PG (ref 26–34)
MCHC RBC AUTO-ENTMCNC: 30.5 G/DL (ref 31–36)
MCV RBC AUTO: 80.9 FL (ref 80–100)
METHGB MFR BLDV: 0.3 %
MONOCYTES # BLD: 0.5 K/UL (ref 0–1.3)
MONOCYTES NFR BLD: 11.5 %
NEUTROPHILS # BLD: 2.7 K/UL (ref 1.7–7.7)
NEUTROPHILS NFR BLD: 59.8 %
NITRITE UR QL STRIP.AUTO: NEGATIVE
NT-PROBNP SERPL-MCNC: 2974 PG/ML (ref 0–124)
O2 CT VFR BLDV CALC: 12 VOL %
O2 THERAPY: ABNORMAL
PCO2 BLDV: 33.3 MMHG (ref 40–50)
PH BLDV: 7.42 [PH] (ref 7.35–7.45)
PH UR STRIP.AUTO: 6 [PH] (ref 5–8)
PLATELET # BLD AUTO: 285 K/UL (ref 135–450)
PMV BLD AUTO: 8.2 FL (ref 5–10.5)
PO2 BLDV: 99.4 MMHG (ref 25–40)
POTASSIUM SERPL-SCNC: 4.6 MMOL/L (ref 3.5–5.1)
PROCALCITONIN SERPL IA-MCNC: 0.18 NG/ML (ref 0–0.15)
PROT SERPL-MCNC: 7.6 G/DL (ref 6.4–8.2)
PROT UR STRIP.AUTO-MCNC: NEGATIVE MG/DL
RBC # BLD AUTO: 3.17 M/UL (ref 4–5.2)
SAO2 % BLDV: 98 %
SARS-COV-2 RNA RESP QL NAA+PROBE: NOT DETECTED
SODIUM SERPL-SCNC: 142 MMOL/L (ref 136–145)
SP GR UR STRIP.AUTO: 1.01 (ref 1–1.03)
TIBC SERPL-MCNC: 425 UG/DL (ref 260–445)
TROPONIN, HIGH SENSITIVITY: 84 NG/L (ref 0–14)
TROPONIN, HIGH SENSITIVITY: 88 NG/L (ref 0–14)
TSH SERPL DL<=0.005 MIU/L-ACNC: 3.25 UIU/ML (ref 0.27–4.2)
UA COMPLETE W REFLEX CULTURE PNL UR: NORMAL
UA DIPSTICK W REFLEX MICRO PNL UR: NORMAL
URN SPEC COLLECT METH UR: NORMAL
UROBILINOGEN UR STRIP-ACNC: 0.2 E.U./DL
VIT B12 SERPL-MCNC: 739 PG/ML (ref 211–911)
WBC # BLD AUTO: 4.4 K/UL (ref 4–11)

## 2024-08-22 PROCEDURE — 2700000000 HC OXYGEN THERAPY PER DAY

## 2024-08-22 PROCEDURE — 71260 CT THORAX DX C+: CPT

## 2024-08-22 PROCEDURE — 96374 THER/PROPH/DIAG INJ IV PUSH: CPT

## 2024-08-22 PROCEDURE — 6370000000 HC RX 637 (ALT 250 FOR IP): Performed by: INTERNAL MEDICINE

## 2024-08-22 PROCEDURE — 6360000002 HC RX W HCPCS: Performed by: STUDENT IN AN ORGANIZED HEALTH CARE EDUCATION/TRAINING PROGRAM

## 2024-08-22 PROCEDURE — 82746 ASSAY OF FOLIC ACID SERUM: CPT

## 2024-08-22 PROCEDURE — 94640 AIRWAY INHALATION TREATMENT: CPT

## 2024-08-22 PROCEDURE — 2060000000 HC ICU INTERMEDIATE R&B

## 2024-08-22 PROCEDURE — 84443 ASSAY THYROID STIM HORMONE: CPT

## 2024-08-22 PROCEDURE — 80053 COMPREHEN METABOLIC PANEL: CPT

## 2024-08-22 PROCEDURE — 82607 VITAMIN B-12: CPT

## 2024-08-22 PROCEDURE — 96375 TX/PRO/DX INJ NEW DRUG ADDON: CPT

## 2024-08-22 PROCEDURE — 84484 ASSAY OF TROPONIN QUANT: CPT

## 2024-08-22 PROCEDURE — 93010 ELECTROCARDIOGRAM REPORT: CPT | Performed by: INTERNAL MEDICINE

## 2024-08-22 PROCEDURE — 85025 COMPLETE CBC W/AUTO DIFF WBC: CPT

## 2024-08-22 PROCEDURE — 83880 ASSAY OF NATRIURETIC PEPTIDE: CPT

## 2024-08-22 PROCEDURE — 6360000002 HC RX W HCPCS: Performed by: NURSE PRACTITIONER

## 2024-08-22 PROCEDURE — 6370000000 HC RX 637 (ALT 250 FOR IP): Performed by: NURSE PRACTITIONER

## 2024-08-22 PROCEDURE — 6370000000 HC RX 637 (ALT 250 FOR IP): Performed by: STUDENT IN AN ORGANIZED HEALTH CARE EDUCATION/TRAINING PROGRAM

## 2024-08-22 PROCEDURE — 74018 RADEX ABDOMEN 1 VIEW: CPT

## 2024-08-22 PROCEDURE — 83550 IRON BINDING TEST: CPT

## 2024-08-22 PROCEDURE — 82803 BLOOD GASES ANY COMBINATION: CPT

## 2024-08-22 PROCEDURE — 84145 PROCALCITONIN (PCT): CPT

## 2024-08-22 PROCEDURE — 2580000003 HC RX 258: Performed by: NURSE PRACTITIONER

## 2024-08-22 PROCEDURE — 87636 SARSCOV2 & INF A&B AMP PRB: CPT

## 2024-08-22 PROCEDURE — 83540 ASSAY OF IRON: CPT

## 2024-08-22 PROCEDURE — 2580000003 HC RX 258: Performed by: STUDENT IN AN ORGANIZED HEALTH CARE EDUCATION/TRAINING PROGRAM

## 2024-08-22 PROCEDURE — 6360000002 HC RX W HCPCS: Performed by: INTERNAL MEDICINE

## 2024-08-22 PROCEDURE — 99285 EMERGENCY DEPT VISIT HI MDM: CPT

## 2024-08-22 PROCEDURE — 36415 COLL VENOUS BLD VENIPUNCTURE: CPT

## 2024-08-22 PROCEDURE — 99223 1ST HOSP IP/OBS HIGH 75: CPT | Performed by: NURSE PRACTITIONER

## 2024-08-22 PROCEDURE — 81003 URINALYSIS AUTO W/O SCOPE: CPT

## 2024-08-22 PROCEDURE — 71045 X-RAY EXAM CHEST 1 VIEW: CPT

## 2024-08-22 PROCEDURE — 99223 1ST HOSP IP/OBS HIGH 75: CPT | Performed by: INTERNAL MEDICINE

## 2024-08-22 PROCEDURE — 94761 N-INVAS EAR/PLS OXIMETRY MLT: CPT

## 2024-08-22 PROCEDURE — 6360000004 HC RX CONTRAST MEDICATION: Performed by: NURSE PRACTITIONER

## 2024-08-22 PROCEDURE — 93005 ELECTROCARDIOGRAM TRACING: CPT | Performed by: STUDENT IN AN ORGANIZED HEALTH CARE EDUCATION/TRAINING PROGRAM

## 2024-08-22 PROCEDURE — 82728 ASSAY OF FERRITIN: CPT

## 2024-08-22 RX ORDER — ACETAMINOPHEN 650 MG/1
650 SUPPOSITORY RECTAL EVERY 6 HOURS PRN
Status: DISCONTINUED | OUTPATIENT
Start: 2024-08-22 | End: 2024-08-27 | Stop reason: HOSPADM

## 2024-08-22 RX ORDER — SERTRALINE HYDROCHLORIDE 100 MG/1
100 TABLET, FILM COATED ORAL DAILY
Status: DISCONTINUED | OUTPATIENT
Start: 2024-08-22 | End: 2024-08-27 | Stop reason: HOSPADM

## 2024-08-22 RX ORDER — LEVALBUTEROL 1.25 MG/.5ML
1.25 SOLUTION, CONCENTRATE RESPIRATORY (INHALATION) EVERY 8 HOURS PRN
Status: DISCONTINUED | OUTPATIENT
Start: 2024-08-22 | End: 2024-08-27 | Stop reason: HOSPADM

## 2024-08-22 RX ORDER — ACETAMINOPHEN 325 MG/1
650 TABLET ORAL EVERY 6 HOURS PRN
Status: DISCONTINUED | OUTPATIENT
Start: 2024-08-22 | End: 2024-08-27 | Stop reason: HOSPADM

## 2024-08-22 RX ORDER — FUROSEMIDE 10 MG/ML
20 INJECTION INTRAMUSCULAR; INTRAVENOUS 2 TIMES DAILY
Status: DISCONTINUED | OUTPATIENT
Start: 2024-08-22 | End: 2024-08-26

## 2024-08-22 RX ORDER — GUAIFENESIN 600 MG/1
600 TABLET, EXTENDED RELEASE ORAL 2 TIMES DAILY
Status: DISCONTINUED | OUTPATIENT
Start: 2024-08-22 | End: 2024-08-27 | Stop reason: HOSPADM

## 2024-08-22 RX ORDER — FOLIC ACID 1 MG/1
1 TABLET ORAL DAILY
Status: DISCONTINUED | OUTPATIENT
Start: 2024-08-22 | End: 2024-08-27 | Stop reason: HOSPADM

## 2024-08-22 RX ORDER — SODIUM CHLORIDE 0.9 % (FLUSH) 0.9 %
5-40 SYRINGE (ML) INJECTION PRN
Status: DISCONTINUED | OUTPATIENT
Start: 2024-08-22 | End: 2024-08-27 | Stop reason: HOSPADM

## 2024-08-22 RX ORDER — ONDANSETRON 2 MG/ML
4 INJECTION INTRAMUSCULAR; INTRAVENOUS EVERY 6 HOURS PRN
Status: DISCONTINUED | OUTPATIENT
Start: 2024-08-22 | End: 2024-08-22 | Stop reason: SDUPTHER

## 2024-08-22 RX ORDER — IPRATROPIUM BROMIDE AND ALBUTEROL SULFATE 2.5; .5 MG/3ML; MG/3ML
1 SOLUTION RESPIRATORY (INHALATION)
Status: DISCONTINUED | OUTPATIENT
Start: 2024-08-23 | End: 2024-08-27 | Stop reason: HOSPADM

## 2024-08-22 RX ORDER — BUDESONIDE AND FORMOTEROL FUMARATE DIHYDRATE 160; 4.5 UG/1; UG/1
2 AEROSOL RESPIRATORY (INHALATION)
Status: DISCONTINUED | OUTPATIENT
Start: 2024-08-22 | End: 2024-08-27 | Stop reason: HOSPADM

## 2024-08-22 RX ORDER — QUETIAPINE FUMARATE 25 MG/1
25 TABLET, FILM COATED ORAL 2 TIMES DAILY
Status: DISCONTINUED | OUTPATIENT
Start: 2024-08-22 | End: 2024-08-27 | Stop reason: HOSPADM

## 2024-08-22 RX ORDER — IPRATROPIUM BROMIDE AND ALBUTEROL SULFATE 2.5; .5 MG/3ML; MG/3ML
1 SOLUTION RESPIRATORY (INHALATION)
Status: DISCONTINUED | OUTPATIENT
Start: 2024-08-22 | End: 2024-08-22 | Stop reason: SDUPTHER

## 2024-08-22 RX ORDER — ENOXAPARIN SODIUM 100 MG/ML
40 INJECTION SUBCUTANEOUS DAILY
Status: DISCONTINUED | OUTPATIENT
Start: 2024-08-22 | End: 2024-08-26

## 2024-08-22 RX ORDER — POLYETHYLENE GLYCOL 3350 17 G/17G
17 POWDER, FOR SOLUTION ORAL DAILY PRN
Status: DISCONTINUED | OUTPATIENT
Start: 2024-08-22 | End: 2024-08-27 | Stop reason: HOSPADM

## 2024-08-22 RX ORDER — METOPROLOL TARTRATE 25 MG/1
25 TABLET, FILM COATED ORAL 2 TIMES DAILY
Status: DISCONTINUED | OUTPATIENT
Start: 2024-08-22 | End: 2024-08-26

## 2024-08-22 RX ORDER — IPRATROPIUM BROMIDE AND ALBUTEROL SULFATE 2.5; .5 MG/3ML; MG/3ML
1 SOLUTION RESPIRATORY (INHALATION)
Status: DISCONTINUED | OUTPATIENT
Start: 2024-08-22 | End: 2024-08-22

## 2024-08-22 RX ORDER — MIRTAZAPINE 15 MG/1
7.5 TABLET, FILM COATED ORAL PRN
Status: DISCONTINUED | OUTPATIENT
Start: 2024-08-22 | End: 2024-08-23

## 2024-08-22 RX ORDER — LACTULOSE 10 G/15ML
20 SOLUTION ORAL 3 TIMES DAILY
Status: DISCONTINUED | OUTPATIENT
Start: 2024-08-22 | End: 2024-08-27 | Stop reason: HOSPADM

## 2024-08-22 RX ORDER — IOPAMIDOL 755 MG/ML
75 INJECTION, SOLUTION INTRAVASCULAR
Status: COMPLETED | OUTPATIENT
Start: 2024-08-22 | End: 2024-08-22

## 2024-08-22 RX ORDER — ONDANSETRON 4 MG/1
4 TABLET, ORALLY DISINTEGRATING ORAL EVERY 8 HOURS PRN
Status: DISCONTINUED | OUTPATIENT
Start: 2024-08-22 | End: 2024-08-27 | Stop reason: HOSPADM

## 2024-08-22 RX ORDER — PANTOPRAZOLE SODIUM 40 MG/1
40 TABLET, DELAYED RELEASE ORAL
Status: DISCONTINUED | OUTPATIENT
Start: 2024-08-22 | End: 2024-08-27 | Stop reason: HOSPADM

## 2024-08-22 RX ORDER — BENZONATATE 100 MG/1
100 CAPSULE ORAL ONCE
Status: COMPLETED | OUTPATIENT
Start: 2024-08-22 | End: 2024-08-22

## 2024-08-22 RX ORDER — QUETIAPINE FUMARATE 100 MG/1
100 TABLET, FILM COATED ORAL NIGHTLY
Status: DISCONTINUED | OUTPATIENT
Start: 2024-08-22 | End: 2024-08-26

## 2024-08-22 RX ORDER — SUCRALFATE 1 G/1
1 TABLET ORAL 4 TIMES DAILY
Status: DISCONTINUED | OUTPATIENT
Start: 2024-08-22 | End: 2024-08-27 | Stop reason: HOSPADM

## 2024-08-22 RX ORDER — FUROSEMIDE 10 MG/ML
20 INJECTION INTRAMUSCULAR; INTRAVENOUS ONCE
Status: COMPLETED | OUTPATIENT
Start: 2024-08-22 | End: 2024-08-22

## 2024-08-22 RX ORDER — SODIUM CHLORIDE 9 MG/ML
INJECTION, SOLUTION INTRAVENOUS PRN
Status: DISCONTINUED | OUTPATIENT
Start: 2024-08-22 | End: 2024-08-27 | Stop reason: HOSPADM

## 2024-08-22 RX ORDER — PREDNISONE 20 MG/1
40 TABLET ORAL DAILY
Status: DISCONTINUED | OUTPATIENT
Start: 2024-08-23 | End: 2024-08-24

## 2024-08-22 RX ORDER — LABETALOL HYDROCHLORIDE 5 MG/ML
10 INJECTION, SOLUTION INTRAVENOUS EVERY 6 HOURS PRN
Status: DISCONTINUED | OUTPATIENT
Start: 2024-08-22 | End: 2024-08-26

## 2024-08-22 RX ORDER — SODIUM CHLORIDE 0.9 % (FLUSH) 0.9 %
5-40 SYRINGE (ML) INJECTION EVERY 12 HOURS SCHEDULED
Status: DISCONTINUED | OUTPATIENT
Start: 2024-08-22 | End: 2024-08-27 | Stop reason: HOSPADM

## 2024-08-22 RX ORDER — PREDNISONE 20 MG/1
40 TABLET ORAL DAILY
Status: DISCONTINUED | OUTPATIENT
Start: 2024-08-25 | End: 2024-08-22

## 2024-08-22 RX ORDER — LEVETIRACETAM 500 MG/1
500 TABLET ORAL 2 TIMES DAILY
Status: DISCONTINUED | OUTPATIENT
Start: 2024-08-22 | End: 2024-08-27 | Stop reason: HOSPADM

## 2024-08-22 RX ORDER — ATORVASTATIN CALCIUM 10 MG/1
10 TABLET, FILM COATED ORAL DAILY
Status: DISCONTINUED | OUTPATIENT
Start: 2024-08-22 | End: 2024-08-27 | Stop reason: HOSPADM

## 2024-08-22 RX ORDER — ONDANSETRON 2 MG/ML
4 INJECTION INTRAMUSCULAR; INTRAVENOUS EVERY 6 HOURS PRN
Status: DISCONTINUED | OUTPATIENT
Start: 2024-08-22 | End: 2024-08-27 | Stop reason: HOSPADM

## 2024-08-22 RX ADMIN — GUAIFENESIN 600 MG: 600 TABLET, EXTENDED RELEASE ORAL at 08:43

## 2024-08-22 RX ADMIN — METOPROLOL TARTRATE 25 MG: 25 TABLET, FILM COATED ORAL at 20:19

## 2024-08-22 RX ADMIN — SODIUM CHLORIDE, PRESERVATIVE FREE 10 ML: 5 INJECTION INTRAVENOUS at 20:19

## 2024-08-22 RX ADMIN — IPRATROPIUM BROMIDE AND ALBUTEROL SULFATE 1 DOSE: 2.5; .5 SOLUTION RESPIRATORY (INHALATION) at 20:01

## 2024-08-22 RX ADMIN — GUAIFENESIN 600 MG: 600 TABLET, EXTENDED RELEASE ORAL at 20:18

## 2024-08-22 RX ADMIN — WATER 125 MG: 1 INJECTION INTRAMUSCULAR; INTRAVENOUS; SUBCUTANEOUS at 08:43

## 2024-08-22 RX ADMIN — LABETALOL HYDROCHLORIDE 10 MG: 5 INJECTION, SOLUTION INTRAVENOUS at 12:50

## 2024-08-22 RX ADMIN — FUROSEMIDE 20 MG: 10 INJECTION, SOLUTION INTRAMUSCULAR; INTRAVENOUS at 19:04

## 2024-08-22 RX ADMIN — AZITHROMYCIN DIHYDRATE 500 MG: 500 INJECTION, POWDER, LYOPHILIZED, FOR SOLUTION INTRAVENOUS at 14:47

## 2024-08-22 RX ADMIN — SUCRALFATE 1 G: 1 TABLET ORAL at 19:04

## 2024-08-22 RX ADMIN — BENZONATATE 100 MG: 100 CAPSULE ORAL at 08:43

## 2024-08-22 RX ADMIN — BUDESONIDE AND FORMOTEROL FUMARATE DIHYDRATE 2 PUFF: 160; 4.5 AEROSOL RESPIRATORY (INHALATION) at 20:01

## 2024-08-22 RX ADMIN — QUETIAPINE FUMARATE 25 MG: 25 TABLET ORAL at 20:19

## 2024-08-22 RX ADMIN — ATORVASTATIN CALCIUM 10 MG: 10 TABLET, FILM COATED ORAL at 14:47

## 2024-08-22 RX ADMIN — IPRATROPIUM BROMIDE AND ALBUTEROL SULFATE 1 DOSE: 2.5; .5 SOLUTION RESPIRATORY (INHALATION) at 08:51

## 2024-08-22 RX ADMIN — FOLIC ACID 1 MG: 1 TABLET ORAL at 14:47

## 2024-08-22 RX ADMIN — LEVETIRACETAM 500 MG: 500 TABLET, FILM COATED ORAL at 14:47

## 2024-08-22 RX ADMIN — QUETIAPINE FUMARATE 100 MG: 100 TABLET ORAL at 20:17

## 2024-08-22 RX ADMIN — LEVETIRACETAM 500 MG: 500 TABLET, FILM COATED ORAL at 20:20

## 2024-08-22 RX ADMIN — SERTRALINE 100 MG: 100 TABLET, FILM COATED ORAL at 14:47

## 2024-08-22 RX ADMIN — ONDANSETRON 4 MG: 2 INJECTION INTRAMUSCULAR; INTRAVENOUS at 08:43

## 2024-08-22 RX ADMIN — IOPAMIDOL 75 ML: 755 INJECTION, SOLUTION INTRAVENOUS at 18:34

## 2024-08-22 RX ADMIN — IPRATROPIUM BROMIDE AND ALBUTEROL SULFATE 1 DOSE: 2.5; .5 SOLUTION RESPIRATORY (INHALATION) at 12:52

## 2024-08-22 RX ADMIN — FUROSEMIDE 20 MG: 10 INJECTION, SOLUTION INTRAMUSCULAR; INTRAVENOUS at 10:07

## 2024-08-22 ASSESSMENT — PAIN SCALES - GENERAL
PAINLEVEL_OUTOF10: 5
PAINLEVEL_OUTOF10: 7
PAINLEVEL_OUTOF10: 7

## 2024-08-22 ASSESSMENT — PAIN DESCRIPTION - LOCATION: LOCATION: GENERALIZED

## 2024-08-22 ASSESSMENT — PAIN DESCRIPTION - DESCRIPTORS: DESCRIPTORS: ACHING

## 2024-08-22 ASSESSMENT — PAIN DESCRIPTION - PAIN TYPE: TYPE: CHRONIC PAIN;ACUTE PAIN

## 2024-08-22 ASSESSMENT — PAIN DESCRIPTION - FREQUENCY: FREQUENCY: CONTINUOUS

## 2024-08-22 NOTE — PROGRESS NOTES
Two attempts to obtained peripheral access of 20g for CT. One attempt via US and vein infiltrated NS flush. Writer called clinical who is in a meeting and requested ICU staff to assist. Writer will update primary RN.

## 2024-08-22 NOTE — PROGRESS NOTES
RT Inhaler-Nebulizer Bronchodilator Protocol Note    There is a bronchodilator order in the chart from a provider indicating to follow the RT Bronchodilator Protocol and there is an “Initiate RT Inhaler-Nebulizer Bronchodilator Protocol” order as well (see protocol at bottom of note).    CXR Findings:  XR CHEST PORTABLE    Result Date: 8/22/2024  Prominent bilateral interstitial lung markings are present without focal consolidation.       The findings from the last RT Protocol Assessment were as follows:   History Pulmonary Disease: Chronic pulmonary disease  Respiratory Pattern: Dyspnea on exertion or RR 21-25 bpm  Breath Sounds: Slightly diminished and/or crackles  Cough: Strong, spontaneous, non-productive  Indication for Bronchodilator Therapy:    Bronchodilator Assessment Score: 6    Aerosolized bronchodilator medication orders have been revised according to the RT Inhaler-Nebulizer Bronchodilator Protocol below.    Respiratory Therapist to perform RT Therapy Protocol Assessment initially then follow the protocol.  Repeat RT Therapy Protocol Assessment PRN for score 0-3 or on second treatment, BID, and PRN for scores above 3.    No Indications - adjust the frequency to every 6 hours PRN wheezing or bronchospasm, if no treatments needed after 48 hours then discontinue using Per Protocol order mode.     If indication present, adjust the RT bronchodilator orders based on the Bronchodilator Assessment Score as indicated below.  Use Inhaler orders unless patient has one or more of the following: on home nebulizer, not able to hold breath for 10 seconds, is not alert and oriented, cannot activate and use MDI correctly, or respiratory rate 25 breaths per minute or more, then use the equivalent nebulizer order(s) with same Frequency and PRN reasons based on the score.  If a patient is on this medication at home then do not decrease Frequency below that used at home.    0-3 - enter or revise RT bronchodilator order(s)
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
## 2024-08-22 NOTE — PROGRESS NOTES
Patient coughing but no sputum.     Explained to patient need for sputum, specimen cups given.   Stool sample also needed.

## 2024-08-22 NOTE — PROGRESS NOTES
Patient stating she has mites at home which is where her bites are from and last week she was being treated for worms in her stool.
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
## 2024-08-22 NOTE — ED NOTES
Pt was about to go up to floor and RN noticed pt was hypertensive. This RN reached out to MD for something PRN for high BP. Awaiting med order.

## 2024-08-22 NOTE — H&P
Hospital Medicine History & Physical      PCP: Samia Rutherford, APRN - CNP    Date of Admission: 8/22/2024    Date of Service: Pt seen/examined on 08/22/24      Chief Complaint:    Chief Complaint   Patient presents with    Shortness of Breath     Pt very sob.  Has been getting worse for 3 days.  States she has been sick and throwing up.  Pt has audible wheezes and is extremely labored.       History Of Present Illness:      The patient is a 74 y.o. female with PMH of hypertension, ablation hyperlipidemia, diabetes mellitus type 2, COPD non-smoker, CHF, obesity, depression, anxiety, seizures,neuropathy and IBS.   who presents to [facility] with shortness of breath.  Pt stated that she has not felt well for the last 3 days.  She noticed some BLE edema and weight gain ruben 5 days ago and started taking her PRN lasix. She is unsure how much weight she gained as she doesn't weight herself at home. She has been experiencing palpitations and noticing more tachycardia at home. When she has palpitations she notices some associated chest tightness.  She did have a cardiac monitor, but has not followed up for this.  She has a history of atrial fibrillation and stated she is not taking eliquis at home.  Her shortness of breath progressed over the last 3 days and she endorses nonproductive cough. She stated she  her shortness of breath with exertion has been limiting her activity at home.Pt also stated she has been vomiting for ruben 3 days and stated that it appears to be dark and coffee ground appearance at times. She denies any other signs of bleeding.  She stated she doesn't take her lactulose as prescribed because it gives her diarrhea. She also stated she has parasites in her stool and was treated with Ivermectin recently.  She also has been treated for scabies and has multiple old areas on her abdomen and on her head. History obtained from the patient and review of EMR.     Past Medical History:        Diagnosis  (HCC)  Resolved Problems:    * No resolved hospital problems. *        ASSESSMENT/PLAN:    COPD  AE  Chronic hypoxic respiratory failure   - solumedrol for now, transition to prednisone once improved  - inhaled bronchodilators   - Azithromycin D# 1  - check sputum cultures  - home oxygen 3 liters---currently on 4  - continuous pulse ox and wean as tolerated         Acute diastolic CHF  - BNP 2,974  - weight 205 now---184 lbsat discharge in June  - lasix 20 mg BID IV  - was on PRN lasix at home has taken last 5 days  - cardiology consulted  - daily weights, low sodium diet,        Chronically elevated troponin   - EKG no acute changes  - troponin 88-84   - pt only having chest pain with palpitations   - cardiology consulted        Reported Coffee ground emesis  Hx of esophagitis   Acute on Chronic Anemia  - EGD in 1/24 shoed severe esophagitis and ulcerations  - Consulted GI, Dr. Teresa  - clear liquids  - NPO  - monitor H.H- hemoglobin 7.8 on admission  - check KUB    PAF  - pt not taking eliquis at home  - resume metoprolol  - Secondary hypercoagulable state in a patient with atrial fibrillation  - [id number] Score for Atrial Fibrillation Stroke Risk 6  - monitor on telemetry        Moderate mitral stenosis  Mild aortic stenosis  - check limited echo      Hx of DVT  BLE edema  - has not been on eliquis, unsure when the last time she took it  - will obtain CTPA  - venous US ordered       HTN  - elevated on admission  - recent history of hypotension  - resumed metoprolol   - was given PRN labetalol in ED    Seizures  - resume Keppra    Cirrhosis- VILLEGAS  Portal hypertensive gastropathy   - resume lactulose- pt has not been taking  - monitor for diarrhea     Hypothyroidism  - has not been on levothyroxine   - check TSH    Reported parasites in stool  - check stool  - pt was treated with Ivermectin in the past      Hx of reported Scabies  - recently treated    Anxiety  Depression  - resumed Seroquel and

## 2024-08-22 NOTE — PROGRESS NOTES
Bedside report and transfer of care given to CHARLIE magallon. Pt currently resting in bed with the call light within reach. Pt denies any other care needs at this time. Pt stable at this time.

## 2024-08-22 NOTE — PROGRESS NOTES
Patient admitted to room 301 from ER 3. Patient oriented to room, call light, bed rails, phone, lights and bathroom. Patient instructed about the schedule of the day including: vital sign frequency, lab draws, possible tests, frequency of MD and staff rounds, daily weights, I &O's and prescribed diet. General. Telemetry box in place, patient aware of placement and reason. Bed locked, in lowest position, side rails up 2/4, call light within reach.        Recliner Assessment  Patient is able to demonstrate the ability to move from a reclining position to an upright position within the recliner.       4 Eyes Skin Assessment     NAME:  Nancy Franz  YOB: 1950  MEDICAL RECORD NUMBER:  [id number]    The patient is being assessed for  Admission    I agree that at least one RN has performed a thorough Head to Toe Skin Assessment on the patient. ALL assessment sites listed below have been assessed.      Areas assessed by both nurses:    Head, Face, Ears, Shoulders, Back, Chest, Arms, Elbows, Hands, Sacrum. Buttock, Coccyx, Ischium, and Legs. Feet and Heels    A few bites to legs, abdomen, chest and on scalp.  Patient states from some sort of mite.         Does the Patient have a Wound? No noted wound(s)       Abdiel Prevention initiated by RN: No  Wound Care Orders initiated by RN: No    Pressure Injury (Stage 3,4, Unstageable, DTI, NWPT, and Complex wounds) if present, place Wound referral order by RN under : No    New Ostomies, if present place, Ostomy referral order under : No     Nurse 1 eSignature: Electronically signed by Carlotta Armas RN on 8/22/24 at 3:11 PM EDT    **SHARE this note so that the co-signing nurse can place an eSignature**    Nurse 2 eSignature: Electronically signed by Myra Michael RN on 8/22/24 at 3:17 PM EDT

## 2024-08-22 NOTE — ACP (ADVANCE CARE PLANNING)
Advance Care Planning     General Advance Care Planning (ACP) Conversation    Date of Conversation: 8/22/2024  Conducted with: Patient with Decision Making Capacity  Other persons present: None    Healthcare Decision Maker:   Supplemental (Other) Decision Maker: Margaret Franz - Child - [phone]       Content/Action Overview:  Has ACP document(s) on file - reflects the patient's care preferences  Reviewed DNR/DNI and patient elects Full Code (Attempt Resuscitation)        Length of Voluntary ACP Conversation in minutes:  <16 minutes (Non-Billable)    Marlena Osuna RN

## 2024-08-22 NOTE — CARE COORDINATION
discharge plan with any other family members/significant others, and if so, who? No  Plans to Return to Present Housing: Yes  Other Identified Issues/Barriers to RETURNING to current housing: na    Potential Assistance needed at discharge: N/A            Potential DME:    Patient expects to discharge to: Trailer/mobile home  Plan for transportation at discharge:      Financial    Payor: [facility] MEDICARE / Plan: Qnary DUAL COMPLETE / Product Type: *No Product type* /     Does insurance require precert for SNF: Yes    Potential assistance Purchasing Medications: No  Meds-to-Beds request:        [facility] [place] [place] [address] - P [phone] - F [phone]  [address]  [address]  [facility] [address]  Phone: [phone] Fax: [phone]      Notes:    Factors facilitating achievement of predicted outcomes: Family support, Caregiver support, Cooperative, and Pleasant    Barriers to discharge: respiratory distress    Additional Case Management Notes: Reviewed chart. Met with pt at bedside. Role of cm explained. IPTA. Lives at home with daughters. Home oxygen at 3L but unsure of company. Will check with her daughter. Active with [facility]. Has DME. Watch for [facility] needs. Will follow.       The Plan for Transition of Care is related to the following treatment goals of Respiratory distress [R06.03]  COPD exacerbation (HCC) [J44.1]  Acute diastolic CHF (congestive heart failure) (HCC) [I50.31]  Acute on chronic congestive heart failure, unspecified heart failure type (HCC) [I50.9]    IF APPLICABLE: The Patient and/or patient representative Nancy and her family were provided with a choice of provider and agrees with the discharge plan. Freedom of choice list with basic dialogue that supports the patient's individualized plan of care/goals and shares the quality data associated with the providers was provided to:     Patient Representative Name:       The Patient and/or Patient Representative  Agree with the Discharge Plan?      Marlena Osuna RN  Case Management Department  Ph: [phone] Fax: [phone]

## 2024-08-22 NOTE — PLAN OF CARE
Admit to PCU    COPD AE  Chest pain, troponin chronically elevated no EKG changes  Possible CHF exacerbation, given IV lasix in ED  Pulmonary consulted  Consider cardiology consult pending assessment      Home medications not verified for admission.  Nursing communication placed    BLUE Manning - CNP

## 2024-08-22 NOTE — DISCHARGE INSTRUCTIONS
Resume all prescribed meds    F/w cardiology in 2 weeks  Resume home inhalers   F/w GI in 2 weeks              Heart Failure Resources:  Heart Failure Interactive Workbook:  Go to https://Uncovet.[employer]/publication/?h=819843 for a Free Heart Failure Interactive Workbook provided by The American Heart Association. This interactive workbook will provide information on Healthier Living with Heart Failure. Please copy and paste link into search bar. Use your mouse to scroll through the pages.    HF [place] ruben:   Heart Failure Free smart phone ruben available for iPhone and Android download. Use your phone to track sodium intake, fluid intake, symptoms, and weight.     Low Sodium Diet / Recipes:  Go to www.[employer].Oncoscope website for “renal” diet which is Low Sodium! [employer] is a dialysis company, but this website offers free seasonal cookbooks. Each quarter, they will release 25-30 new recipes with a breakdown of calories, sodium, and glucose. You can also go to www.SessionM/recipes website for free recipes.     Discharge Instruction Video:  Scan the QR code below with your camera and click the canva.com link to open the video and watch educational information on Heart Failure and Medications from one of our nurses.   [email or web address]/[email or web address]/edit    Home Exercise Program:   Identification of Green/Yellow/Red zones:  You should be able to identify when you feel good (green zone), if you have 1-2 symptoms of HF (yellow zone), or if you are in need of medical attention (red zone).  In your CHF education folder you were provided a “stop light tool” to outline this information.     We want to you to rate your exertion levels:    Our therapy team has discussed means of identification with you such as the \"Kinjal scale.\"  The Kinjal rating scale ranges from 6 to 20, where 6 means \"no exertion at all\" and 20 means \"maximal exertion.\" The goal is to use this to gauge how much effort it
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
## 2024-08-23 ENCOUNTER — APPOINTMENT (OUTPATIENT)
Age: 74
DRG: 291 | End: 2024-08-23
Payer: MEDICARE

## 2024-08-23 LAB
ABO + RH BLD: NORMAL
AMMONIA PLAS-SCNC: 53 UMOL/L (ref 11–51)
ANION GAP SERPL CALCULATED.3IONS-SCNC: 10 MMOL/L (ref 3–16)
ANION GAP SERPL CALCULATED.3IONS-SCNC: 11 MMOL/L (ref 3–16)
ANTIBODY SCREEN: NORMAL
BASE EXCESS BLDA CALC-SCNC: -1 MMOL/L (ref -3–3)
BASOPHILS # BLD: 0 K/UL (ref 0–0.2)
BASOPHILS NFR BLD: 0.5 %
BUN SERPL-MCNC: 20 MG/DL (ref 7–20)
BUN SERPL-MCNC: 22 MG/DL (ref 7–20)
CALCIUM SERPL-MCNC: 9 MG/DL (ref 8.3–10.6)
CALCIUM SERPL-MCNC: 9.2 MG/DL (ref 8.3–10.6)
CHLORIDE SERPL-SCNC: 103 MMOL/L (ref 99–110)
CHLORIDE SERPL-SCNC: 106 MMOL/L (ref 99–110)
CO2 BLDA-SCNC: 24.7 MMOL/L
CO2 SERPL-SCNC: 22 MMOL/L (ref 21–32)
CO2 SERPL-SCNC: 24 MMOL/L (ref 21–32)
COHGB MFR BLDA: 0.2 % (ref 0–1.5)
CREAT SERPL-MCNC: 0.9 MG/DL (ref 0.6–1.2)
CREAT SERPL-MCNC: 1 MG/DL (ref 0.6–1.2)
DEPRECATED RDW RBC AUTO: 18.4 % (ref 12.4–15.4)
ECHO AV AREA PEAK VELOCITY: 1.6 CM2
ECHO AV AREA VTI: 1.5 CM2
ECHO AV AREA/BSA PEAK VELOCITY: 0.8 CM2/M2
ECHO AV AREA/BSA VTI: 0.8 CM2/M2
ECHO AV MEAN GRADIENT: 22 MMHG
ECHO AV MEAN VELOCITY: 2.2 M/S
ECHO AV PEAK GRADIENT: 36 MMHG
ECHO AV PEAK VELOCITY: 3 M/S
ECHO AV VELOCITY RATIO: 0.5
ECHO AV VTI: 74.5 CM
ECHO BSA: 1.98 M2
ECHO BSA: 1.98 M2
ECHO EST RA PRESSURE: 8 MMHG
ECHO LV EF PHYSICIAN: 55 %
ECHO LVOT AREA: 3.1 CM2
ECHO LVOT AV VTI INDEX: 0.49
ECHO LVOT DIAM: 2 CM
ECHO LVOT MEAN GRADIENT: 6 MMHG
ECHO LVOT PEAK GRADIENT: 9 MMHG
ECHO LVOT PEAK VELOCITY: 1.5 M/S
ECHO LVOT STROKE VOLUME INDEX: 60 ML/M2
ECHO LVOT SV: 114 ML
ECHO LVOT VTI: 36.3 CM
ECHO MV AREA VTI: 1.7 CM2
ECHO MV LVOT VTI INDEX: 1.8
ECHO MV MAX VELOCITY: 2.3 M/S
ECHO MV MEAN GRADIENT: 10 MMHG
ECHO MV MEAN VELOCITY: 1.5 M/S
ECHO MV PEAK GRADIENT: 22 MMHG
ECHO MV VTI: 65.3 CM
ECHO RIGHT VENTRICULAR SYSTOLIC PRESSURE (RVSP): 34 MMHG
ECHO TV REGURGITANT MAX VELOCITY: 2.55 M/S
ECHO TV REGURGITANT PEAK GRADIENT: 26 MMHG
EKG ATRIAL RATE: 122 BPM
EKG DIAGNOSIS: NORMAL
EKG Q-T INTERVAL: 332 MS
EKG QRS DURATION: 70 MS
EKG QTC CALCULATION (BAZETT): 476 MS
EKG R AXIS: -25 DEGREES
EKG T AXIS: 95 DEGREES
EKG VENTRICULAR RATE: 124 BPM
EOSINOPHIL # BLD: 0 K/UL (ref 0–0.6)
EOSINOPHIL NFR BLD: 0.7 %
GFR SERPLBLD CREATININE-BSD FMLA CKD-EPI: 59 ML/MIN/{1.73_M2}
GFR SERPLBLD CREATININE-BSD FMLA CKD-EPI: 67 ML/MIN/{1.73_M2}
GLUCOSE SERPL-MCNC: 115 MG/DL (ref 70–99)
GLUCOSE SERPL-MCNC: 234 MG/DL (ref 70–99)
HCO3 BLDA-SCNC: 23.6 MMOL/L (ref 21–29)
HCT VFR BLD AUTO: 20.2 % (ref 36–48)
HCT VFR BLD AUTO: 31.8 % (ref 36–48)
HGB BLD-MCNC: 10 G/DL (ref 12–16)
HGB BLD-MCNC: 6.5 G/DL (ref 12–16)
HGB BLDA-MCNC: 7.3 G/DL (ref 12–16)
LYMPHOCYTES # BLD: 0.9 K/UL (ref 1–5.1)
LYMPHOCYTES NFR BLD: 28.3 %
MAGNESIUM SERPL-MCNC: 1.6 MG/DL (ref 1.8–2.4)
MCH RBC QN AUTO: 25 PG (ref 26–34)
MCHC RBC AUTO-ENTMCNC: 32.2 G/DL (ref 31–36)
MCV RBC AUTO: 77.6 FL (ref 80–100)
METHGB MFR BLDA: 0.3 %
MONOCYTES # BLD: 0.4 K/UL (ref 0–1.3)
MONOCYTES NFR BLD: 11.9 %
NEUTROPHILS # BLD: 1.9 K/UL (ref 1.7–7.7)
NEUTROPHILS NFR BLD: 58.6 %
O2 THERAPY: ABNORMAL
PCO2 BLDA: 38.1 MMHG (ref 35–45)
PH BLDA: 7.41 [PH] (ref 7.35–7.45)
PLATELET # BLD AUTO: 205 K/UL (ref 135–450)
PMV BLD AUTO: 8.5 FL (ref 5–10.5)
PO2 BLDA: 111.8 MMHG (ref 75–108)
POTASSIUM SERPL-SCNC: 4.2 MMOL/L (ref 3.5–5.1)
POTASSIUM SERPL-SCNC: 4.2 MMOL/L (ref 3.5–5.1)
RBC # BLD AUTO: 2.61 M/UL (ref 4–5.2)
SAO2 % BLDA: 98.1 %
SODIUM SERPL-SCNC: 137 MMOL/L (ref 136–145)
SODIUM SERPL-SCNC: 139 MMOL/L (ref 136–145)
WBC # BLD AUTO: 3.3 K/UL (ref 4–11)

## 2024-08-23 PROCEDURE — 85025 COMPLETE CBC W/AUTO DIFF WBC: CPT

## 2024-08-23 PROCEDURE — 36430 TRANSFUSION BLD/BLD COMPNT: CPT

## 2024-08-23 PROCEDURE — 93321 DOPPLER ECHO F-UP/LMTD STD: CPT | Performed by: INTERNAL MEDICINE

## 2024-08-23 PROCEDURE — 36415 COLL VENOUS BLD VENIPUNCTURE: CPT

## 2024-08-23 PROCEDURE — 6370000000 HC RX 637 (ALT 250 FOR IP): Performed by: NURSE PRACTITIONER

## 2024-08-23 PROCEDURE — 93970 EXTREMITY STUDY: CPT | Performed by: SURGERY

## 2024-08-23 PROCEDURE — 85018 HEMOGLOBIN: CPT

## 2024-08-23 PROCEDURE — 2500000003 HC RX 250 WO HCPCS: Performed by: INTERNAL MEDICINE

## 2024-08-23 PROCEDURE — 82803 BLOOD GASES ANY COMBINATION: CPT

## 2024-08-23 PROCEDURE — 93970 EXTREMITY STUDY: CPT

## 2024-08-23 PROCEDURE — 6360000002 HC RX W HCPCS: Performed by: NURSE PRACTITIONER

## 2024-08-23 PROCEDURE — 6370000000 HC RX 637 (ALT 250 FOR IP): Performed by: INTERNAL MEDICINE

## 2024-08-23 PROCEDURE — 99232 SBSQ HOSP IP/OBS MODERATE 35: CPT | Performed by: INTERNAL MEDICINE

## 2024-08-23 PROCEDURE — 83735 ASSAY OF MAGNESIUM: CPT

## 2024-08-23 PROCEDURE — 2060000000 HC ICU INTERMEDIATE R&B

## 2024-08-23 PROCEDURE — 6360000002 HC RX W HCPCS: Performed by: INTERNAL MEDICINE

## 2024-08-23 PROCEDURE — 93308 TTE F-UP OR LMTD: CPT

## 2024-08-23 PROCEDURE — P9016 RBC LEUKOCYTES REDUCED: HCPCS

## 2024-08-23 PROCEDURE — 2580000003 HC RX 258: Performed by: NURSE PRACTITIONER

## 2024-08-23 PROCEDURE — 94640 AIRWAY INHALATION TREATMENT: CPT

## 2024-08-23 PROCEDURE — 86901 BLOOD TYPING SEROLOGIC RH(D): CPT

## 2024-08-23 PROCEDURE — 93005 ELECTROCARDIOGRAM TRACING: CPT | Performed by: INTERNAL MEDICINE

## 2024-08-23 PROCEDURE — 94761 N-INVAS EAR/PLS OXIMETRY MLT: CPT

## 2024-08-23 PROCEDURE — 93325 DOPPLER ECHO COLOR FLOW MAPG: CPT

## 2024-08-23 PROCEDURE — 85014 HEMATOCRIT: CPT

## 2024-08-23 PROCEDURE — 82140 ASSAY OF AMMONIA: CPT

## 2024-08-23 PROCEDURE — 93308 TTE F-UP OR LMTD: CPT | Performed by: INTERNAL MEDICINE

## 2024-08-23 PROCEDURE — 36600 WITHDRAWAL OF ARTERIAL BLOOD: CPT

## 2024-08-23 PROCEDURE — 86900 BLOOD TYPING SEROLOGIC ABO: CPT

## 2024-08-23 PROCEDURE — 99223 1ST HOSP IP/OBS HIGH 75: CPT | Performed by: INTERNAL MEDICINE

## 2024-08-23 PROCEDURE — 2580000003 HC RX 258: Performed by: INTERNAL MEDICINE

## 2024-08-23 PROCEDURE — 93010 ELECTROCARDIOGRAM REPORT: CPT | Performed by: STUDENT IN AN ORGANIZED HEALTH CARE EDUCATION/TRAINING PROGRAM

## 2024-08-23 PROCEDURE — 86850 RBC ANTIBODY SCREEN: CPT

## 2024-08-23 PROCEDURE — 30233N1 TRANSFUSION OF NONAUTOLOGOUS RED BLOOD CELLS INTO PERIPHERAL VEIN, PERCUTANEOUS APPROACH: ICD-10-PCS | Performed by: INTERNAL MEDICINE

## 2024-08-23 PROCEDURE — 99233 SBSQ HOSP IP/OBS HIGH 50: CPT | Performed by: INTERNAL MEDICINE

## 2024-08-23 PROCEDURE — 80048 BASIC METABOLIC PNL TOTAL CA: CPT

## 2024-08-23 PROCEDURE — 93325 DOPPLER ECHO COLOR FLOW MAPG: CPT | Performed by: INTERNAL MEDICINE

## 2024-08-23 PROCEDURE — 2700000000 HC OXYGEN THERAPY PER DAY

## 2024-08-23 PROCEDURE — 86922 COMPATIBILITY TEST ANTIGLOB: CPT

## 2024-08-23 RX ORDER — DIGOXIN 0.25 MG/ML
250 INJECTION INTRAMUSCULAR; INTRAVENOUS ONCE
Status: COMPLETED | OUTPATIENT
Start: 2024-08-23 | End: 2024-08-23

## 2024-08-23 RX ORDER — DILTIAZEM HYDROCHLORIDE 5 MG/ML
5 INJECTION INTRAVENOUS ONCE
Status: COMPLETED | OUTPATIENT
Start: 2024-08-23 | End: 2024-08-23

## 2024-08-23 RX ORDER — MIRTAZAPINE 15 MG/1
7.5 TABLET, FILM COATED ORAL NIGHTLY PRN
Status: DISCONTINUED | OUTPATIENT
Start: 2024-08-23 | End: 2024-08-27 | Stop reason: HOSPADM

## 2024-08-23 RX ORDER — SODIUM CHLORIDE 9 MG/ML
INJECTION, SOLUTION INTRAVENOUS PRN
Status: DISCONTINUED | OUTPATIENT
Start: 2024-08-23 | End: 2024-08-27 | Stop reason: HOSPADM

## 2024-08-23 RX ADMIN — IPRATROPIUM BROMIDE AND ALBUTEROL SULFATE 1 DOSE: 2.5; .5 SOLUTION RESPIRATORY (INHALATION) at 07:15

## 2024-08-23 RX ADMIN — SUCRALFATE 1 G: 1 TABLET ORAL at 09:37

## 2024-08-23 RX ADMIN — SODIUM CHLORIDE, PRESERVATIVE FREE 10 ML: 5 INJECTION INTRAVENOUS at 09:37

## 2024-08-23 RX ADMIN — ATORVASTATIN CALCIUM 10 MG: 10 TABLET, FILM COATED ORAL at 09:37

## 2024-08-23 RX ADMIN — GUAIFENESIN 600 MG: 600 TABLET, EXTENDED RELEASE ORAL at 20:26

## 2024-08-23 RX ADMIN — METOPROLOL TARTRATE 25 MG: 25 TABLET, FILM COATED ORAL at 09:37

## 2024-08-23 RX ADMIN — DIGOXIN 250 MCG: 250 INJECTION, SOLUTION INTRAMUSCULAR; INTRAVENOUS; PARENTERAL at 17:27

## 2024-08-23 RX ADMIN — SUCRALFATE 1 G: 1 TABLET ORAL at 16:58

## 2024-08-23 RX ADMIN — PREDNISONE 40 MG: 20 TABLET ORAL at 09:36

## 2024-08-23 RX ADMIN — FUROSEMIDE 20 MG: 10 INJECTION, SOLUTION INTRAMUSCULAR; INTRAVENOUS at 16:56

## 2024-08-23 RX ADMIN — LEVETIRACETAM 500 MG: 500 TABLET, FILM COATED ORAL at 20:27

## 2024-08-23 RX ADMIN — LEVETIRACETAM 500 MG: 500 TABLET, FILM COATED ORAL at 09:36

## 2024-08-23 RX ADMIN — FUROSEMIDE 20 MG: 10 INJECTION, SOLUTION INTRAMUSCULAR; INTRAVENOUS at 09:37

## 2024-08-23 RX ADMIN — QUETIAPINE FUMARATE 25 MG: 25 TABLET ORAL at 20:26

## 2024-08-23 RX ADMIN — SERTRALINE 100 MG: 100 TABLET, FILM COATED ORAL at 09:37

## 2024-08-23 RX ADMIN — GUAIFENESIN 600 MG: 600 TABLET, EXTENDED RELEASE ORAL at 09:37

## 2024-08-23 RX ADMIN — BUDESONIDE AND FORMOTEROL FUMARATE DIHYDRATE 2 PUFF: 160; 4.5 AEROSOL RESPIRATORY (INHALATION) at 19:28

## 2024-08-23 RX ADMIN — DILTIAZEM HYDROCHLORIDE 5 MG: 5 INJECTION, SOLUTION INTRAVENOUS at 17:27

## 2024-08-23 RX ADMIN — LACTULOSE 20 G: 10 SOLUTION ORAL at 20:26

## 2024-08-23 RX ADMIN — LACTULOSE 20 G: 10 SOLUTION ORAL at 16:28

## 2024-08-23 RX ADMIN — METOPROLOL TARTRATE 25 MG: 25 TABLET, FILM COATED ORAL at 20:27

## 2024-08-23 RX ADMIN — SUCRALFATE 1 G: 1 TABLET ORAL at 20:26

## 2024-08-23 RX ADMIN — FOLIC ACID 1 MG: 1 TABLET ORAL at 09:37

## 2024-08-23 RX ADMIN — QUETIAPINE FUMARATE 25 MG: 25 TABLET ORAL at 09:37

## 2024-08-23 RX ADMIN — AZITHROMYCIN DIHYDRATE 500 MG: 500 INJECTION, POWDER, LYOPHILIZED, FOR SOLUTION INTRAVENOUS at 16:56

## 2024-08-23 RX ADMIN — BUDESONIDE AND FORMOTEROL FUMARATE DIHYDRATE 2 PUFF: 160; 4.5 AEROSOL RESPIRATORY (INHALATION) at 07:17

## 2024-08-23 RX ADMIN — PANTOPRAZOLE SODIUM 40 MG: 40 TABLET, DELAYED RELEASE ORAL at 06:08

## 2024-08-23 RX ADMIN — IPRATROPIUM BROMIDE AND ALBUTEROL SULFATE 1 DOSE: 2.5; .5 SOLUTION RESPIRATORY (INHALATION) at 19:28

## 2024-08-23 RX ADMIN — QUETIAPINE FUMARATE 100 MG: 100 TABLET ORAL at 20:27

## 2024-08-23 RX ADMIN — DILTIAZEM HYDROCHLORIDE 2.5 MG/HR: 5 INJECTION, SOLUTION INTRAVENOUS at 17:39

## 2024-08-23 RX ADMIN — PANTOPRAZOLE SODIUM 40 MG: 40 TABLET, DELAYED RELEASE ORAL at 16:28

## 2024-08-23 RX ADMIN — TIOTROPIUM BROMIDE INHALATION SPRAY 2 PUFF: 3.12 SPRAY, METERED RESPIRATORY (INHALATION) at 07:20

## 2024-08-23 ASSESSMENT — PAIN SCALES - GENERAL
PAINLEVEL_OUTOF10: 5
PAINLEVEL_OUTOF10: 5

## 2024-08-23 NOTE — PROGRESS NOTES
Prior to receiving transfusion of blood products, patient educated on the following:    Blood product transfusion process  Benefits of receiving blood product transfusion  Risks associated with receiving the transfusion  Signs and symptoms of complications associated with blood product transfusion  Vague, uneasy feeling  Onset of pain (especially at the IV site, back, or chest)  Chills  Flushing  Fever  Nausea  Dizziness  Rash  Itching  Dark or red urine  Instructed patient to notify RN immediately if any sign or symptom occurs

## 2024-08-23 NOTE — FLOWSHEET NOTE
08/22/24 2317   Vital Signs   Temp 98.6 °F (37 °C)   Temp Source Oral   Pulse 78   Heart Rate Source Monitor   Respirations 16   /77   MAP (Calculated) 85   BP Location Left upper arm   BP Method Automatic   Patient Position Supine   Oxygen Therapy   SpO2 99 %   O2 Device Nasal cannula   O2 Flow Rate (L/min) 4 L/min     Pt reassessment complete.  No changes noted.  Denies needs.  Call light within reach.

## 2024-08-23 NOTE — PROGRESS NOTES
Sumanth serve to Dr. Sepulveda  Critical lab - Hemoglobin 6.5  Hematocrit 20.2    New orders for blood transfusion

## 2024-08-23 NOTE — PROGRESS NOTES
Pulmonary Progress Note  CC: SOB    Subjective:  still has some SOB      EXAM: BP (!) 115/59   Pulse (!) 138   Temp 98.4 °F (36.9 °C) (Oral)   Resp 18   Ht 1.575 m (5' 2\")   Wt 89.4 kg (197 lb)   SpO2 98%   BMI 36.03 kg/m²  on 4L  Constitutional:  No acute distress.   Eyes: PERRL. Conjunctivae anicteric.   ENT: Normal nose. Normal tongue.    Neck:  Trachea is midline.   Respiratory: No accessory muscle usage.  decreased breath sounds. No wheezes. No rales. No Rhonchi.  Cardiovascular: Normal S1S2. No digit clubbing. No digit cyanosis. No LE edema.   Psychiatric: No anxiety or Agitation. Alert and Oriented to person, place and time.    Scheduled Meds:   lactulose enema   Rectal Once    guaiFENesin  600 mg Oral BID    sodium chloride flush  5-40 mL IntraVENous 2 times per day    [Held by provider] enoxaparin  40 mg SubCUTAneous Daily    azithromycin  500 mg IntraVENous Q24H    metoprolol tartrate  25 mg Oral BID    budesonide-formoterol  2 puff Inhalation BID RT    And    tiotropium  2 puff Inhalation Daily RT    folic acid  1 mg Oral Daily    lactulose  20 g Oral TID    levETIRAcetam  500 mg Oral BID    pantoprazole  40 mg Oral BID AC    QUEtiapine  100 mg Oral Nightly    QUEtiapine  25 mg Oral BID    sertraline  100 mg Oral Daily    atorvastatin  10 mg Oral Daily    sucralfate  1 g Oral 4x Daily    ipratropium 0.5 mg-albuterol 2.5 mg  1 Dose Inhalation BID RT    furosemide  20 mg IntraVENous BID    predniSONE  40 mg Oral Daily     Continuous Infusions:   sodium chloride      dilTIAZem 2.5 mg/hr (08/23/24 1739)    sodium chloride       PRN Meds:  sodium chloride, mirtazapine, levalbuterol, sodium chloride flush, sodium chloride, ondansetron **OR** ondansetron, polyethylene glycol, acetaminophen **OR** acetaminophen, [Held by provider] labetalol, [Held by provider] tiZANidine    Labs:  CBC:   Recent Labs     08/22/24  0816 08/23/24  0508   WBC 4.4 3.3*   HGB 7.8* 6.5*   HCT 25.7* 20.2*   MCV 80.9 77.6*   PLT  285 205     BMP:   Recent Labs     08/22/24  0816 08/23/24  0508 08/23/24  1717    139 137   K 4.6 4.2 4.2    106 103   CO2 21 22 24   BUN 13 20 22*   CREATININE 0.9 0.9 1.0       Procal 0.18     Chest imaging was reviewed by me and showed chest x-ray with chronic bilateral interstitial markings     ASSESSMENT:  COPD AE  Chronic hypoxic respiratory failure  PAF  LE edema  VILLEGAS cirrhosis      PLAN:  Supplemental oxygen to maintain SaO2 >92%; wean as tolerated  Intensive inhaled bronchodilator therapy.  Agree with lasix  Prednisone taper   Azithromycin  Sputum GS&C.  Acapella QID to mobilize respiratory secretions
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
## 2024-08-23 NOTE — PROGRESS NOTES
IV infiltrated. Removed from left arm.   Call to pharmacy, no counter act medication needed.     Ice applied to arm.

## 2024-08-23 NOTE — PROGRESS NOTES
08/23/24 1900   RT Protocol   History Pulmonary Disease 2   Respiratory pattern 2   Breath sounds 2   Cough 0   Indications for Bronchodilator Therapy Decreased or absent breath sounds   Bronchodilator Assessment Score 6     RT Inhaler-Nebulizer Bronchodilator Protocol Note    There is a bronchodilator order in the chart from a provider indicating to follow the RT Bronchodilator Protocol and there is an “Initiate RT Inhaler-Nebulizer Bronchodilator Protocol” order as well (see protocol at bottom of note).    CXR Findings:  XR CHEST PORTABLE    Result Date: 8/22/2024  Prominent bilateral interstitial lung markings are present without focal consolidation.       The findings from the last RT Protocol Assessment were as follows:   History Pulmonary Disease: Chronic pulmonary disease  Respiratory Pattern: Dyspnea on exertion or RR 21-25 bpm  Breath Sounds: Slightly diminished and/or crackles  Cough: Strong, spontaneous, non-productive  Indication for Bronchodilator Therapy: Decreased or absent breath sounds  Bronchodilator Assessment Score: 6    Aerosolized bronchodilator medication orders have been revised according to the RT Inhaler-Nebulizer Bronchodilator Protocol below.    Respiratory Therapist to perform RT Therapy Protocol Assessment initially then follow the protocol.  Repeat RT Therapy Protocol Assessment PRN for score 0-3 or on second treatment, BID, and PRN for scores above 3.    No Indications - adjust the frequency to every 6 hours PRN wheezing or bronchospasm, if no treatments needed after 48 hours then discontinue using Per Protocol order mode.     If indication present, adjust the RT bronchodilator orders based on the Bronchodilator Assessment Score as indicated below.  Use Inhaler orders unless patient has one or more of the following: on home nebulizer, not able to hold breath for 10 seconds, is not alert and oriented, cannot activate and use MDI correctly, or respiratory rate 25 breaths per minute  or more, then use the equivalent nebulizer order(s) with same Frequency and PRN reasons based on the score.  If a patient is on this medication at home then do not decrease Frequency below that used at home.    0-3 - enter or revise RT bronchodilator order(s) to equivalent RT Bronchodilator order with Frequency of every 4 hours PRN for wheezing or increased work of breathing using Per Protocol order mode.        4-6 - enter or revise RT Bronchodilator order(s) to two equivalent RT bronchodilator orders with one order with BID Frequency and one order with Frequency of every 4 hours PRN wheezing or increased work of breathing using Per Protocol order mode.        7-10 - enter or revise RT Bronchodilator order(s) to two equivalent RT bronchodilator orders with one order with TID Frequency and one order with Frequency of every 4 hours PRN wheezing or increased work of breathing using Per Protocol order mode.       11-13 - enter or revise RT Bronchodilator order(s) to one equivalent RT bronchodilator order with QID Frequency and an Albuterol order with Frequency of every 4 hours PRN wheezing or increased work of breathing using Per Protocol order mode.      Greater than 13 - enter or revise RT Bronchodilator order(s) to one equivalent RT bronchodilator order with every 4 hours Frequency and an Albuterol order with Frequency of every 2 hours PRN wheezing or increased work of breathing using Per Protocol order mode.         Electronically signed by Jairo Fleming RCP on 8/23/2024 at 7:32 PM

## 2024-08-23 NOTE — PROGRESS NOTES

## 2024-08-23 NOTE — FLOWSHEET NOTE
08/23/24 1312   Vitals   /67   Temp 97.7 °F (36.5 °C)   Pulse (!) 130   Respirations 18   SpO2 100 %   Transfuse RBC   Patient Observed Initial 15 Min  Yes   Charges - Once Per Day (Any Number of Units)   $Blood Administration Yes     VS before blood administration

## 2024-08-23 NOTE — PROGRESS NOTES
Order for diltiazem 5mg injection once. Given at 1727.     Digoxin 250mcg given injection once at 1730.    Diltiazem drip started at 2.5

## 2024-08-23 NOTE — PROGRESS NOTES
[facility] called to notify patient flipped into Afib RVR.   HR in 120's.    Currently not symptomatic. Denies any pain.     Dr. Sepulveda notified.

## 2024-08-23 NOTE — PROGRESS NOTES
Bedside report and transfer of care given to CHARLIE Gray. Pt currently resting in bed with the call light within reach. Pt denies any other care needs at this time. Pt stable at this time.

## 2024-08-23 NOTE — FLOWSHEET NOTE
08/23/24 1343   Vital Signs   Temp 98.6 °F (37 °C)   Temp Source Oral   Pulse (!) 124   Respirations 18   /70   MAP (Calculated) 83   Oxygen Therapy   SpO2 100 %     15 Min vitals obtained stated as above

## 2024-08-23 NOTE — PROGRESS NOTES
[facility]    Daily Progress Note for 2024 10:24 AM /0301-01  Nancy Franz : 1950 Age: 74 y.o. Sex: female  Length of Stay:  1    Interval History:      CC: F/U Shortness of Breath (Pt very sob.  Has been getting worse for 3 days.  States she has been sick and throwing up.  Pt has audible wheezes and is extremely labored.)    Subjective:       Pt very briefly opens eyes to voice but will not stay awake. Says she thinks she is on oxygen at home when I ask her but isn't really answering other questions. ABG and ammonia levels ordered. Per MAR she has refused last 4 doses of lactulose.     Objective:     Vitals:    24 0331 24 0715 24 0727 24 0915   BP: 103/64  103/64 (!) 126/56   Pulse: 75 76  83   Resp: 16 20  18   Temp: 98 °F (36.7 °C)   97.8 °F (36.6 °C)   TempSrc: Oral   Oral   SpO2: 98% 99%     Weight: 89.4 kg (197 lb)  89.4 kg (197 lb)    Height:   1.575 m (5' 2\")           Intake/Output Summary (Last 24 hours) at 2024 1024  Last data filed at 2024 0937  Gross per 24 hour   Intake 585 ml   Output 1050 ml   Net -465 ml     Body mass index is 36.03 kg/m².    Physical Exam:  General: No distress  HEENT:  Head: normocephalic,atraumatic, anicteric sclera, clear conjunctiva  Neck: Normal size, Jugular venous pulsations: normal  Respiratory:unlabored breathing, clear to auscultation with no crackles, wheezes rhonchi  Heart: Regular rate and rhythm, S1, S2-normal, No murmurs  Abdomen: soft, nondistended, nontender, normoactive bowel sounds,  Neurological/Psych: Unable to assess at this time. Very drowsy, opens eyes but immediately falls asleep again.   Skin: No obvious rashes    Extremities:  no edema, Pedal pulses 2+ bilaterally    Scheduled Medications:  lactulose enema, , Once  guaiFENesin, 600 mg, BID  sodium chloride flush, 5-40 mL, 2 times per day  [Held by provider] enoxaparin, 40 mg, Daily  azithromycin, 500 mg, Q24H  metoprolol  fibrillation  - [id number] Score for Atrial Fibrillation Stroke Risk 6  - monitor on telemetry       Moderate mitral stenosis  Mild aortic stenosis  - check limited echo     Hx of DVT  BLE edema  - has not been on eliquis, unsure when the last time she took it  - will obtain CTPA - neg for PE  - venous US ordered - chronic DVT on left profunda femoris. Age indeterminate right peroneal,  and chronic superficial thrombophlebitis right small saphenous     HTN  - elevated on admission  - recent history of hypotension  - resumed metoprolol   - was given PRN labetalol in ED     Seizures  - resume Keppra     Cirrhosis- VILLEGAS  Portal hypertensive gastropathy   - resume lactulose- pt has not been taking  - monitor for diarrhea   - Pt refusing lactulose, ordered lactulose enema if she remains drowsy and refusing the lactulose, discussed with nursing     Hypothyroidism  - has not been on levothyroxine   - check TSH - normal      Reported parasites in stool  - check stool  - pt was treated with Ivermectin in the past     Hx of reported Scabies  - recently treated     Anxiety  Depression  - resumed Seroquel and Zoloft     Obesity  - Body mass index is 37.49 kg/m².  - Recommended weight loss      Note above makes patient higher risk for morbidity and mortality requiring testing and treatment.       DVT Prophylaxis: Scds    DVT Prophylaxis: Eliquis currently on hold    Management discussed with RN,     Electronically signed by: Leslie Sepulveda DO, 8/23/2024 10:24 AM

## 2024-08-23 NOTE — PROGRESS NOTES
Patient resting in bed. Remains stable. Denies any discomfort. Blood continuing to transfuse. Tolerating well.   Family at bedside.

## 2024-08-23 NOTE — PROGRESS NOTES
Called by RN for rapid heart rate  She is in afib RVR rate 130-160   systolic  She is known to have afib RVR  Anemia with Hb drop this am getting a unit of RBC  Will order diltiazem drip and dose of dig which can be repeated in 4 hrs if heart rate does not come down.  She is high risk for anticoagulation for GI bleed.  Discussed with RN and family
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
## 2024-08-23 NOTE — PROGRESS NOTES
Page has been called to Dr. Brownlee on 8/23/24. Spoke with Loida. 4:34 PM    Jennie Castle  8/23/2024

## 2024-08-23 NOTE — FLOWSHEET NOTE
08/22/24 1904   Vital Signs   Temp 98.1 °F (36.7 °C)   Temp Source Oral   Pulse 81   Heart Rate Source Monitor   Respirations 18   BP (!) 179/85   MAP (Calculated) 116   BP Location Left Arm   BP Method Automatic   Patient Position Semi fowlers     Pt assessment complete.  Pt lying in bed quietly.  RT in to give breathing treatment.  Pt A&O x 4.  Lung sounds diminished. Pt on 02 4liters via nasal canula.  Pt NSR per monitor with HR of 81.  Nightly medications given.  Pt assisted to BSC and back to bed.  No other needs at this time. Call light within reach.

## 2024-08-23 NOTE — PROGRESS NOTES
PS sent to IM regarding patient HR sustaining 145-160s. Atrial fibrillation with no PRN medication for HR control available. Patient asymptomatic of CP.   Primary RN updated.      Cardiology paged 1634.   1640: Received call from Lorrie cardiology nurse for verbal orders for STAT EKG per Dr. Brownlee.   1641: Called radiology for STAT EKG.   1647: Radiology at bedside to complete EKG.  1654: EKG completed. Cardio nurse paged for  to review.   1701: Cardiology at bedside.   1702: Cardiology will placed orders. See orders for more details.     Primary RN at bedside.

## 2024-08-23 NOTE — PROGRESS NOTES
RT Inhaler-Nebulizer Bronchodilator Protocol Note    There is a bronchodilator order in the chart from a provider indicating to follow the RT Bronchodilator Protocol and there is an “Initiate RT Inhaler-Nebulizer Bronchodilator Protocol” order as well (see protocol at bottom of note).    CXR Findings:  XR CHEST PORTABLE    Result Date: 8/22/2024  Prominent bilateral interstitial lung markings are present without focal consolidation.       The findings from the last RT Protocol Assessment were as follows:   History Pulmonary Disease: (P) Chronic pulmonary disease  Respiratory Pattern: (P) Dyspnea on exertion or RR 21-25 bpm  Breath Sounds: (P) Slightly diminished and/or crackles  Cough: (P) Strong, spontaneous, non-productive  Indication for Bronchodilator Therapy: (P) Decreased or absent breath sounds  Bronchodilator Assessment Score: (P) 6    Aerosolized bronchodilator medication orders have been revised according to the RT Inhaler-Nebulizer Bronchodilator Protocol below.    Respiratory Therapist to perform RT Therapy Protocol Assessment initially then follow the protocol.  Repeat RT Therapy Protocol Assessment PRN for score 0-3 or on second treatment, BID, and PRN for scores above 3.    No Indications - adjust the frequency to every 6 hours PRN wheezing or bronchospasm, if no treatments needed after 48 hours then discontinue using Per Protocol order mode.     If indication present, adjust the RT bronchodilator orders based on the Bronchodilator Assessment Score as indicated below.  Use Inhaler orders unless patient has one or more of the following: on home nebulizer, not able to hold breath for 10 seconds, is not alert and oriented, cannot activate and use MDI correctly, or respiratory rate 25 breaths per minute or more, then use the equivalent nebulizer order(s) with same Frequency and PRN reasons based on the score.  If a patient is on this medication at home then do not decrease Frequency below that used at  home.    0-3 - enter or revise RT bronchodilator order(s) to equivalent RT Bronchodilator order with Frequency of every 4 hours PRN for wheezing or increased work of breathing using Per Protocol order mode.        4-6 - enter or revise RT Bronchodilator order(s) to two equivalent RT bronchodilator orders with one order with BID Frequency and one order with Frequency of every 4 hours PRN wheezing or increased work of breathing using Per Protocol order mode.        7-10 - enter or revise RT Bronchodilator order(s) to two equivalent RT bronchodilator orders with one order with TID Frequency and one order with Frequency of every 4 hours PRN wheezing or increased work of breathing using Per Protocol order mode.       11-13 - enter or revise RT Bronchodilator order(s) to one equivalent RT bronchodilator order with QID Frequency and an Albuterol order with Frequency of every 4 hours PRN wheezing or increased work of breathing using Per Protocol order mode.      Greater than 13 - enter or revise RT Bronchodilator order(s) to one equivalent RT bronchodilator order with every 4 hours Frequency and an Albuterol order with Frequency of every 2 hours PRN wheezing or increased work of breathing using Per Protocol order mode.     RT to enter RT Home Evaluation for COPD & MDI Assessment order using Per Protocol order mode.    Electronically signed by Daiana Rodriguez RCP on 8/23/2024 at 7:22 AM

## 2024-08-23 NOTE — FLOWSHEET NOTE
08/23/24 1634   Vital Signs   Temp 97.8 °F (36.6 °C)   Temp Source Oral   Pulse (!) 145   Respirations 20   /69   MAP (Calculated) 83   BP Location Left upper arm   BP Method Automatic   Patient Position Semi fowlers   Pain Assessment   Pain Assessment None - Denies Pain   Oxygen Therapy   SpO2 98 %   O2 Device Nasal cannula   O2 Flow Rate (L/min) 4 L/min       VS as shown. Pt HR went to 160s and patient became light headed. Patient repositioned back in the bed and mentation improved and patient remains oriented. Prior notes: cardio consulted and IM updated.

## 2024-08-24 LAB
ANION GAP SERPL CALCULATED.3IONS-SCNC: 9 MMOL/L (ref 3–16)
BASOPHILS # BLD: 0 K/UL (ref 0–0.2)
BASOPHILS NFR BLD: 1.1 %
BUN SERPL-MCNC: 20 MG/DL (ref 7–20)
CALCIUM SERPL-MCNC: 8.7 MG/DL (ref 8.3–10.6)
CHLORIDE SERPL-SCNC: 104 MMOL/L (ref 99–110)
CO2 SERPL-SCNC: 26 MMOL/L (ref 21–32)
CREAT SERPL-MCNC: 0.8 MG/DL (ref 0.6–1.2)
DEPRECATED RDW RBC AUTO: 19.5 % (ref 12.4–15.4)
EKG ATRIAL RATE: 119 BPM
EKG DIAGNOSIS: NORMAL
EKG Q-T INTERVAL: 352 MS
EKG QRS DURATION: 70 MS
EKG QTC CALCULATION (BAZETT): 485 MS
EKG R AXIS: -24 DEGREES
EKG T AXIS: 70 DEGREES
EKG VENTRICULAR RATE: 114 BPM
EOSINOPHIL # BLD: 0.1 K/UL (ref 0–0.6)
EOSINOPHIL NFR BLD: 1.9 %
GFR SERPLBLD CREATININE-BSD FMLA CKD-EPI: 78 ML/MIN/{1.73_M2}
GLUCOSE SERPL-MCNC: 115 MG/DL (ref 70–99)
HCT VFR BLD AUTO: 26.9 % (ref 36–48)
HCT VFR BLD AUTO: 31.3 % (ref 36–48)
HGB BLD-MCNC: 8.7 G/DL (ref 12–16)
HGB BLD-MCNC: 9.8 G/DL (ref 12–16)
LYMPHOCYTES # BLD: 1.3 K/UL (ref 1–5.1)
LYMPHOCYTES NFR BLD: 30.4 %
MAGNESIUM SERPL-MCNC: 1.5 MG/DL (ref 1.8–2.4)
MCH RBC QN AUTO: 24.6 PG (ref 26–34)
MCHC RBC AUTO-ENTMCNC: 32.3 G/DL (ref 31–36)
MCV RBC AUTO: 76.2 FL (ref 80–100)
MONOCYTES # BLD: 0.5 K/UL (ref 0–1.3)
MONOCYTES NFR BLD: 12.1 %
NEUTROPHILS # BLD: 2.3 K/UL (ref 1.7–7.7)
NEUTROPHILS NFR BLD: 54.5 %
PLATELET # BLD AUTO: 208 K/UL (ref 135–450)
PMV BLD AUTO: 7.9 FL (ref 5–10.5)
POTASSIUM SERPL-SCNC: 3.6 MMOL/L (ref 3.5–5.1)
RBC # BLD AUTO: 3.54 M/UL (ref 4–5.2)
SODIUM SERPL-SCNC: 139 MMOL/L (ref 136–145)
WBC # BLD AUTO: 4.2 K/UL (ref 4–11)

## 2024-08-24 PROCEDURE — 2500000003 HC RX 250 WO HCPCS: Performed by: INTERNAL MEDICINE

## 2024-08-24 PROCEDURE — 6370000000 HC RX 637 (ALT 250 FOR IP): Performed by: NURSE PRACTITIONER

## 2024-08-24 PROCEDURE — 94640 AIRWAY INHALATION TREATMENT: CPT

## 2024-08-24 PROCEDURE — 2580000003 HC RX 258: Performed by: INTERNAL MEDICINE

## 2024-08-24 PROCEDURE — 6370000000 HC RX 637 (ALT 250 FOR IP): Performed by: INTERNAL MEDICINE

## 2024-08-24 PROCEDURE — 83735 ASSAY OF MAGNESIUM: CPT

## 2024-08-24 PROCEDURE — 85018 HEMOGLOBIN: CPT

## 2024-08-24 PROCEDURE — 85014 HEMATOCRIT: CPT

## 2024-08-24 PROCEDURE — 80048 BASIC METABOLIC PNL TOTAL CA: CPT

## 2024-08-24 PROCEDURE — 93010 ELECTROCARDIOGRAM REPORT: CPT | Performed by: INTERNAL MEDICINE

## 2024-08-24 PROCEDURE — 99233 SBSQ HOSP IP/OBS HIGH 50: CPT | Performed by: INTERNAL MEDICINE

## 2024-08-24 PROCEDURE — 2060000000 HC ICU INTERMEDIATE R&B

## 2024-08-24 PROCEDURE — 94761 N-INVAS EAR/PLS OXIMETRY MLT: CPT

## 2024-08-24 PROCEDURE — 85025 COMPLETE CBC W/AUTO DIFF WBC: CPT

## 2024-08-24 PROCEDURE — 36415 COLL VENOUS BLD VENIPUNCTURE: CPT

## 2024-08-24 PROCEDURE — 2580000003 HC RX 258: Performed by: NURSE PRACTITIONER

## 2024-08-24 PROCEDURE — 6360000002 HC RX W HCPCS: Performed by: INTERNAL MEDICINE

## 2024-08-24 PROCEDURE — 36410 VNPNXR 3YR/> PHY/QHP DX/THER: CPT

## 2024-08-24 PROCEDURE — 05HC33Z INSERTION OF INFUSION DEVICE INTO LEFT BASILIC VEIN, PERCUTANEOUS APPROACH: ICD-10-PCS | Performed by: INTERNAL MEDICINE

## 2024-08-24 PROCEDURE — 6360000002 HC RX W HCPCS: Performed by: NURSE PRACTITIONER

## 2024-08-24 PROCEDURE — 2700000000 HC OXYGEN THERAPY PER DAY

## 2024-08-24 RX ORDER — AMIODARONE HYDROCHLORIDE 200 MG/1
200 TABLET ORAL 2 TIMES DAILY
Status: DISCONTINUED | OUTPATIENT
Start: 2024-08-24 | End: 2024-08-27 | Stop reason: HOSPADM

## 2024-08-24 RX ORDER — PREDNISONE 20 MG/1
20 TABLET ORAL DAILY
Status: DISCONTINUED | OUTPATIENT
Start: 2024-08-25 | End: 2024-08-24

## 2024-08-24 RX ORDER — MAGNESIUM SULFATE IN WATER 40 MG/ML
2000 INJECTION, SOLUTION INTRAVENOUS PRN
Status: DISCONTINUED | OUTPATIENT
Start: 2024-08-24 | End: 2024-08-27 | Stop reason: HOSPADM

## 2024-08-24 RX ADMIN — SUCRALFATE 1 G: 1 TABLET ORAL at 16:40

## 2024-08-24 RX ADMIN — PANTOPRAZOLE SODIUM 40 MG: 40 TABLET, DELAYED RELEASE ORAL at 16:40

## 2024-08-24 RX ADMIN — LACTULOSE 20 G: 10 SOLUTION ORAL at 08:19

## 2024-08-24 RX ADMIN — FUROSEMIDE 20 MG: 10 INJECTION, SOLUTION INTRAMUSCULAR; INTRAVENOUS at 08:19

## 2024-08-24 RX ADMIN — BUDESONIDE AND FORMOTEROL FUMARATE DIHYDRATE 2 PUFF: 160; 4.5 AEROSOL RESPIRATORY (INHALATION) at 07:17

## 2024-08-24 RX ADMIN — SUCRALFATE 1 G: 1 TABLET ORAL at 19:59

## 2024-08-24 RX ADMIN — LEVETIRACETAM 500 MG: 500 TABLET, FILM COATED ORAL at 19:57

## 2024-08-24 RX ADMIN — LACTULOSE SOLUTION USP, 10 G/15 ML: 10 SOLUTION ORAL; RECTAL at 13:30

## 2024-08-24 RX ADMIN — DILTIAZEM HYDROCHLORIDE 2.5 MG/HR: 5 INJECTION, SOLUTION INTRAVENOUS at 22:11

## 2024-08-24 RX ADMIN — AZITHROMYCIN DIHYDRATE 500 MG: 500 INJECTION, POWDER, LYOPHILIZED, FOR SOLUTION INTRAVENOUS at 13:28

## 2024-08-24 RX ADMIN — SUCRALFATE 1 G: 1 TABLET ORAL at 08:19

## 2024-08-24 RX ADMIN — METOPROLOL TARTRATE 25 MG: 25 TABLET, FILM COATED ORAL at 19:58

## 2024-08-24 RX ADMIN — PREDNISONE 40 MG: 20 TABLET ORAL at 08:19

## 2024-08-24 RX ADMIN — GUAIFENESIN 600 MG: 600 TABLET, EXTENDED RELEASE ORAL at 08:19

## 2024-08-24 RX ADMIN — LACTULOSE 20 G: 10 SOLUTION ORAL at 19:57

## 2024-08-24 RX ADMIN — AMIODARONE HYDROCHLORIDE 200 MG: 200 TABLET ORAL at 19:57

## 2024-08-24 RX ADMIN — MAGNESIUM SULFATE HEPTAHYDRATE 2000 MG: 40 INJECTION, SOLUTION INTRAVENOUS at 08:19

## 2024-08-24 RX ADMIN — FUROSEMIDE 20 MG: 10 INJECTION, SOLUTION INTRAMUSCULAR; INTRAVENOUS at 19:57

## 2024-08-24 RX ADMIN — IPRATROPIUM BROMIDE AND ALBUTEROL SULFATE 1 DOSE: 2.5; .5 SOLUTION RESPIRATORY (INHALATION) at 07:17

## 2024-08-24 RX ADMIN — METOPROLOL TARTRATE 25 MG: 25 TABLET, FILM COATED ORAL at 08:20

## 2024-08-24 RX ADMIN — QUETIAPINE FUMARATE 100 MG: 100 TABLET ORAL at 19:58

## 2024-08-24 RX ADMIN — FOLIC ACID 1 MG: 1 TABLET ORAL at 08:20

## 2024-08-24 RX ADMIN — LEVETIRACETAM 500 MG: 500 TABLET, FILM COATED ORAL at 08:20

## 2024-08-24 RX ADMIN — SERTRALINE 100 MG: 100 TABLET, FILM COATED ORAL at 08:20

## 2024-08-24 RX ADMIN — TIOTROPIUM BROMIDE INHALATION SPRAY 2 PUFF: 3.12 SPRAY, METERED RESPIRATORY (INHALATION) at 07:17

## 2024-08-24 RX ADMIN — AMIODARONE HYDROCHLORIDE 200 MG: 200 TABLET ORAL at 09:13

## 2024-08-24 RX ADMIN — IPRATROPIUM BROMIDE AND ALBUTEROL SULFATE 1 DOSE: 2.5; .5 SOLUTION RESPIRATORY (INHALATION) at 19:31

## 2024-08-24 RX ADMIN — GUAIFENESIN 600 MG: 600 TABLET, EXTENDED RELEASE ORAL at 19:57

## 2024-08-24 RX ADMIN — SUCRALFATE 1 G: 1 TABLET ORAL at 13:25

## 2024-08-24 RX ADMIN — QUETIAPINE FUMARATE 25 MG: 25 TABLET ORAL at 19:58

## 2024-08-24 RX ADMIN — SODIUM CHLORIDE, PRESERVATIVE FREE 10 ML: 5 INJECTION INTRAVENOUS at 19:59

## 2024-08-24 RX ADMIN — ATORVASTATIN CALCIUM 10 MG: 10 TABLET, FILM COATED ORAL at 08:20

## 2024-08-24 RX ADMIN — BUDESONIDE AND FORMOTEROL FUMARATE DIHYDRATE 2 PUFF: 160; 4.5 AEROSOL RESPIRATORY (INHALATION) at 19:31

## 2024-08-24 RX ADMIN — QUETIAPINE FUMARATE 25 MG: 25 TABLET ORAL at 08:20

## 2024-08-24 NOTE — ACP (ADVANCE CARE PLANNING)
Advance Care Planning     Advance Care Planning Inpatient Note  Spiritual Care Department    Today's Date: 8/24/2024  [facility]    Received request from IDT Member.     Goals of ACP Conversation:  Chart review, verifying decision makers based on HCPOA documents.  Request was received as an EPIC consult for Advanced Directives.    Health Care Decision Makers:      Primary Decision Maker: Juany Agrawal - Friend - [phone]    Secondary Decision Maker: Feliciano Agrawal - Friend - [phone]    Supplemental (Other) Decision Maker: Margaret Franz - Child - [phone]  Summary:  Verified Documents, updated Decision Makers.    Advance Care Planning Documents (Patient Wishes):  Healthcare Power of /Advance Directive Appointment of Health Care Agent       Electronically signed by Chaplain Jayjay on 8/24/2024 at 1:38 PM          Thank you for consulting [facility]    If you would like a 's presence for emotional, spiritual, grief or comfort care,   please dial \"[phone]\" and ask for the  on-call to be paged.    For help with Advanced Care Planning, Power of  for Healthcare or Living Will forms, you may also call us directly:    [phone]) Kris  [phone]) Kalyn  [phone]) Outpatient    [facility]

## 2024-08-24 NOTE — FLOWSHEET NOTE
08/23/24 1922   Vital Signs   Temp 98.7 °F (37.1 °C)   Temp Source Oral   Pulse (!) 110   Heart Rate Source Monitor   Respirations 18   BP (!) 143/88   MAP (Calculated) 106   BP Location Right upper arm   BP Method Automatic   Patient Position Semi fowlers   Oxygen Therapy   SpO2 93 %   O2 Device Nasal cannula   O2 Flow Rate (L/min) 4 L/min     Pt assessment complete.  Pt lying in bed quietly.  Lung sounds diminished.  Pt on 02 4 liters via nasal canula.  Pt remains in Afib with HR of 110.  Cardizem gtt infusing at 5ml/hr.  Nightly medications given.  Pt placed on bed pan, bed linens changed.  Fresh cup of tea provided.  No other needs at this time. Pt to be NPO after midnight for possible EGD in am.  No other needs at this time. Call light within reach.

## 2024-08-24 NOTE — PROGRESS NOTES
08/24/24 0700   RT Protocol   History Pulmonary Disease 2   Respiratory pattern 2   Breath sounds 4   Cough 0   Bronchodilator Assessment Score 8

## 2024-08-24 NOTE — PROGRESS NOTES
PROGRESS NOTE  S:74 yrs Patient  admitted on 8/22/2024 with Respiratory distress [R06.03]  COPD exacerbation (HCC) [J44.1]  Acute diastolic CHF (congestive heart failure) (HCC) [I50.31]  Acute on chronic congestive heart failure, unspecified heart failure type (HCC) [I50.9] .  Today she feels well. Did not have BM since admission. Tolerating diet. Hgb improved after 1uPRBC      Exam:   Vitals:    08/24/24 1124   BP: 117/68   Pulse: 84   Resp: 18   Temp: 97.9 °F (36.6 °C)   SpO2: 96%     General: alert, appears stated age, and cooperative  Head: Normocephalic, without obvious abnormality  Eyes:  no icterus  Neck: supple, symmetrical, trachea midline  Heart: irregularly irregular rhythm  Abdomen:  soft, NT, ND  Extremities: edema 2+    Medications: Reviewed    Labs:  CBC:   Recent Labs     08/22/24  0816 08/23/24  0508 08/23/24  1811 08/24/24  0503   WBC 4.4 3.3*  --  4.2   HGB 7.8* 6.5* 10.0* 8.7*   HCT 25.7* 20.2* 31.8* 26.9*   MCV 80.9 77.6*  --  76.2*    205  --  208     BMP:   Recent Labs     08/23/24  0508 08/23/24  1717 08/24/24  0503    137 139   K 4.2 4.2 3.6    103 104   CO2 22 24 26   BUN 20 22* 20   CREATININE 0.9 1.0 0.8     LIVER PROFILE:   Recent Labs     08/22/24  0816   AST 24   ALT 11   BILITOT 0.7   ALKPHOS 237*     Impression:74 year old female with history of DM, HTN, HLD, COPD, CHF, asthma, CVA, anxiety, depression, DVT, hypothyroidism, seizure do, 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 Coffee ground emesis likely due to reflux esophagitis but cannot exclude PUD and portal HTN gastropathy     Recommendation:  Continue supportive care  PPI BID and carafate QID  Hold anticoagulation  Lactulose enema x 1  Continue xifaxan and lactulose  Diltiazem drip per cardiology for A fib with RVR  Monitor Hgb  Observe for signs of further bleeding  Will plan on EGD once medically

## 2024-08-24 NOTE — PROGRESS NOTES
New order for midline placement.   Lis, Clinical  RN aware of need to call DIT team for placement.

## 2024-08-24 NOTE — PROGRESS NOTES
Pulmonary Progress Note  CC: SOB    Subjective:  c/o fatigue      EXAM: /66   Pulse 79   Temp 98.1 °F (36.7 °C) (Oral)   Resp 18   Ht 1.575 m (5' 2\")   Wt 89.9 kg (198 lb 3.2 oz)   SpO2 94%   BMI 36.25 kg/m²  on 4L  Constitutional:  No acute distress.   Eyes: PERRL. Conjunctivae anicteric.   ENT: Normal nose. Normal tongue.    Neck:  Trachea is midline.   Respiratory: No accessory muscle usage.  decreased breath sounds. No wheezes. No rales. No Rhonchi.  Cardiovascular: Normal S1S2. No digit clubbing. No digit cyanosis. No LE edema.   Psychiatric: No anxiety or Agitation. Alert and Oriented to person, place and time.    Scheduled Meds:   amiodarone  200 mg Oral BID    guaiFENesin  600 mg Oral BID    sodium chloride flush  5-40 mL IntraVENous 2 times per day    [Held by provider] enoxaparin  40 mg SubCUTAneous Daily    metoprolol tartrate  25 mg Oral BID    budesonide-formoterol  2 puff Inhalation BID RT    And    tiotropium  2 puff Inhalation Daily RT    folic acid  1 mg Oral Daily    lactulose  20 g Oral TID    levETIRAcetam  500 mg Oral BID    pantoprazole  40 mg Oral BID AC    QUEtiapine  100 mg Oral Nightly    QUEtiapine  25 mg Oral BID    sertraline  100 mg Oral Daily    atorvastatin  10 mg Oral Daily    sucralfate  1 g Oral 4x Daily    ipratropium 0.5 mg-albuterol 2.5 mg  1 Dose Inhalation BID RT    furosemide  20 mg IntraVENous BID    predniSONE  40 mg Oral Daily     Continuous Infusions:   sodium chloride      dilTIAZem 2.5 mg/hr (08/23/24 1739)    sodium chloride       PRN Meds:  magnesium sulfate, sodium chloride, mirtazapine, levalbuterol, sodium chloride flush, sodium chloride, ondansetron **OR** ondansetron, polyethylene glycol, acetaminophen **OR** acetaminophen, [Held by provider] labetalol, [Held by provider] tiZANidine    Labs:  CBC:   Recent Labs     08/22/24  0816 08/23/24  0508 08/23/24  1811 08/24/24  0503   WBC 4.4 3.3*  --  4.2   HGB 7.8* 6.5* 10.0* 8.7*   HCT 25.7* 20.2* 31.8*

## 2024-08-24 NOTE — FLOWSHEET NOTE
08/24/24 0006   Vital Signs   Temp 98.5 °F (36.9 °C)   Temp Source Oral   Pulse 94   Heart Rate Source Monitor   Respirations 16   BP (!) 93/54   MAP (Calculated) 67   BP Location Right upper arm   BP Method Automatic   Patient Position Lying right side   Oxygen Therapy   SpO2 96 %   O2 Device Nasal cannula   O2 Flow Rate (L/min) 4 L/min     Pt reassessment complete.  No changes noted.  Pt NPO at this time for possible EGD.  Pt is lying in bed with their eyes closed. Respirations are easy and even. Call light within reach bed in lowest position with the wheels locked. Will continue to monitor. Valery Lozoya RN

## 2024-08-24 NOTE — PROGRESS NOTES
08/24/24 1333   Encounter Summary   Encounter Overview/Reason Advance Care Planning   Last Encounter    (8/24: chart review.  Per ACP note on 8/22/24, staff member charts that patient's wishes are consistent with scanned documents.  Updated chart to reflect docs.  HCPOA.  Please reach out if patient wishes to change preferences.)   Advance Care Planning   Type   (Chart review, see ACP note.)     Thank you for consulting Spiritual Health    If you would like a 's presence for emotional, spiritual, grief or comfort care,   please dial \"[phone]\" and ask for the  on-call to be paged.    For help with Advanced Care Planning, Power of  for Healthcare or Living Will forms, you may also call us directly:    [phone] Kris  [phone]) Kalyn  [phone]) [facility]

## 2024-08-24 NOTE — PROGRESS NOTES
[place] [facility]    Daily Progress Note for 2024 7:43 AM /0301-01  Nancy Franz : 1950 Age: 74 y.o. Sex: female  Length of Stay:  2    Interval History:      CC: F/U Shortness of Breath (Pt very sob.  Has been getting worse for 3 days.  States she has been sick and throwing up.  Pt has audible wheezes and is extremely labored.)    Subjective:       Patient says HR high at times and then she feels like she can't walk and breathe at the same time. She has some cough which is dry. No chest pain currently.     Objective:     Vitals:    24 0006 24 0344 24 0719 24 0741   BP: (!) 93/54 103/60  124/65   Pulse: 94 92  96   Resp: 16 16  18   Temp: 98.5 °F (36.9 °C) 98.3 °F (36.8 °C)  98 °F (36.7 °C)   TempSrc: Oral Oral  Oral   SpO2: 96% 97% 95% 97%   Weight:  89.9 kg (198 lb 3.2 oz)     Height:              Intake/Output Summary (Last 24 hours) at 2024 0743  Last data filed at 2024 0741  Gross per 24 hour   Intake 1097 ml   Output 3200 ml   Net -2103 ml     Body mass index is 36.25 kg/m².    Physical Exam:  General: No distress  HEENT:  Head: normocephalic,atraumatic, anicteric sclera, clear conjunctiva  Neck: Normal size, Jugular venous pulsations: normal  Respiratory:unlabored breathing, clear to auscultation with no crackles, wheezes rhonchi  Heart: Regular rate and rhythm, S1, S2-normal, No murmurs  Abdomen: soft, nondistended, nontender, normoactive bowel sounds,  Neurological/Psych: Unable to assess at this time. Very drowsy, opens eyes but immediately falls asleep again.   Skin: No obvious rashes    Extremities:  no edema, Pedal pulses 2+ bilaterally    Scheduled Medications:  lactulose enema, , Once  guaiFENesin, 600 mg, BID  sodium chloride flush, 5-40 mL, 2 times per day  [Held by provider] enoxaparin, 40 mg, Daily  azithromycin, 500 mg, Q24H  metoprolol tartrate, 25 mg, BID  budesonide-formoterol, 2 puff, BID RT   And  tiotropium, 2  fibrillation  - [id number] Score for Atrial Fibrillation Stroke Risk 6  - monitor on telemetry       Moderate mitral stenosis  Mild aortic stenosis  - check limited echo     Hx of DVT  BLE edema  - has not been on eliquis, unsure when the last time she took it  - will obtain CTPA - neg for PE  - venous US ordered - chronic DVT on left profunda femoris. Age indeterminate right peroneal,  and chronic superficial thrombophlebitis right small saphenous     HTN  - elevated on admission  - recent history of hypotension  - resumed metoprolol   - was given PRN labetalol in ED     Seizures  - resume Keppra     Cirrhosis- VILLEGAS  Portal hypertensive gastropathy   - resume lactulose- pt has not been taking  - monitor for diarrhea   - Pt refusing lactulose, ordered lactulose enema if she remains drowsy and refusing the lactulose, discussed with nursing     Hypothyroidism  - has not been on levothyroxine   - check TSH - normal      Reported parasites in stool  - check stool  - pt was treated with Ivermectin in the past     Hx of reported Scabies  - recently treated     Anxiety  Depression  - resumed Seroquel and Zoloft     Obesity  - Body mass index is 37.49 kg/m².  - Recommended weight loss      Note above makes patient higher risk for morbidity and mortality requiring testing and treatment.       DVT Prophylaxis: Scds    DVT Prophylaxis: Eliquis currently on hold    Management discussed with RN,     Electronically signed by: Leslie Sepulveda DO, 8/24/2024 7:43 AM

## 2024-08-24 NOTE — PROGRESS NOTES
PIV to right arm infiltrated.   PIV placement attempted with use of US without success.   Dr. Sepulveda made aware of need for midline placement.

## 2024-08-24 NOTE — PROGRESS NOTES
Cardiology Progress Note      Assessment:     1. Atrial fibrillation: patient has paroxysmal atrial fibrillation.    Associated symptoms: Palpitations and Shortness of breath     History of cardioversion: No prior history of cardioversion  History of AF ablation: No history of atrial fibrillation ablation in the past    Current use of anti-arrhythmic drugs: Not currently on anti-arrhythmic drugs      Overall LV function: Normal left ventricular systolic function  Size of left atrium: Moderately dilated LA size  Significant cardiac valvular disease: Other mild to moderate MS with mild MR, mild AS    Patient has a [id number] score of 7 [Age over 65 (1 point), Diabetes Mellitus (1 point), Female gender (1 point), Heart failure (1 point), Hypertension (1 point), and History of stroke/TIA (2 points)]  Longterm anticoagulation is: not recommended at present given anemia and suspected bleeding issues.   Current anticoagulation: None (previously on apixaban)  Bleeding issues reported: yes, GI bleeding  Renal function: Normal renal function  Thyroid function: Normal    Patient with paroxysmal atrial fibrillation. She presented in sinus rhythm and acute HFpEF decompensation. Now developed atrial fibrillation with rapid ventricular response. Was placed on diltiazem with improvement in her heart rate control. No bradycardia issues noted.     Care complicated by marked anemia and suspected GI bleeding issues (being evaluated). Oral anticoagulation on hold.     She is likely to benefit from a rhythm control strategy especially with ongoing HF and suspected bleeding issues. Was in sinus rhythm on admission. Will start amiodarone 200 mg BID and monitor. Keep diltiazem going for now.     2. Acute on chronic HFpEF exacerbation: presenting symptoms consistent with such. BNP elevated on admission with evidence of volume overload. Has been on IV diuretics with improvement in symptoms. Still evidence of volume overload. AF may worsen  typical appearing PSVT for 74 sec     Patient symptoms: none reported    ECG Interpretation:  (Date: 8/23/2024)  Rhythm: Atrial fibrillation  Rate: 114 BPM  PAC's / PVC's present: None  Conduction abnormalities:  none        Echocardiogram: (2024)      Left Ventricle: Normal left ventricular systolic function with a visually estimated EF of 60 - 65%. Normal wall motion.    Aortic Valve: Mild regurgitation. Mild to moderate stenosis of the aortic valve. AV mean gradient is 22 mmHg. AV peak gradient is 36 mmHg. AV peak velocity is 3.0 m/s. AV area by continuity VTI is 1.5 cm2.    Mitral Valve: Moderate annular calcification. Mildly thickened, at the anterior leaflet. Mild regurgitation. Moderate stenosis noted. MV mean gradient is 10 mmHg. MV area by continuity equation is 1.7 cm2.    Tricuspid Valve: Normal RVSP. The estimated RVSP is 34 mmHg.    IVC/SVC: IVC diameter is greater than 21 mm and decreases greater than 50% during inspiration; therefore the estimated right atrial pressure is intermediate (~8 mmHg). IVC is dilated.    Image quality is adequate.    Limited exam.    Telemetry:    atrial fibrillation with rapid rates (last night), now better controlled.     Lab Review:      CBC:   Recent Labs     08/22/24  0816 08/23/24  0508 08/23/24  1811 08/24/24  0503   WBC 4.4 3.3*  --  4.2   HGB 7.8* 6.5* 10.0* 8.7*   HCT 25.7* 20.2* 31.8* 26.9*   MCV 80.9 77.6*  --  76.2*    205  --  208       BMP:   Recent Labs     08/23/24  0508 08/23/24  1717 08/24/24  0503    137 139   K 4.2 4.2 3.6    103 104   CO2 22 24 26   BUN 20 22* 20   CREATININE 0.9 1.0 0.8       LIVER PROFILE:   Recent Labs     08/22/24  0816   AST 24   ALT 11   BILITOT 0.7   ALKPHOS 237*       PT/INR: No results for input(s): \"PROTIME\", \"INR\" in the last 72 hours.    APTT: No results for input(s): \"APTT\" in the last 72 hours.    BNP:  No results for input(s): \"BNP\" in the last 72 hours.        Signed by: Dora Irwin MD

## 2024-08-24 NOTE — PROGRESS NOTES
RT Inhaler-Nebulizer Bronchodilator Protocol Note    There is a bronchodilator order in the chart from a provider indicating to follow the RT Bronchodilator Protocol and there is an “Initiate RT Inhaler-Nebulizer Bronchodilator Protocol” order as well (see protocol at bottom of note).    CXR Findings:  No results found.    The findings from the last RT Protocol Assessment were as follows:   History Pulmonary Disease: (P) Chronic pulmonary disease  Respiratory Pattern: (P) Regular pattern and RR 12-20 bpm  Breath Sounds: (P) Intermittent or unilateral wheezes  Cough: (P) Strong, spontaneous, non-productive  Indication for Bronchodilator Therapy: (P) Decreased or absent breath sounds  Bronchodilator Assessment Score: (P) 6    Aerosolized bronchodilator medication orders have been revised according to the RT Inhaler-Nebulizer Bronchodilator Protocol below.    Respiratory Therapist to perform RT Therapy Protocol Assessment initially then follow the protocol.  Repeat RT Therapy Protocol Assessment PRN for score 0-3 or on second treatment, BID, and PRN for scores above 3.    No Indications - adjust the frequency to every 6 hours PRN wheezing or bronchospasm, if no treatments needed after 48 hours then discontinue using Per Protocol order mode.     If indication present, adjust the RT bronchodilator orders based on the Bronchodilator Assessment Score as indicated below.  Use Inhaler orders unless patient has one or more of the following: on home nebulizer, not able to hold breath for 10 seconds, is not alert and oriented, cannot activate and use MDI correctly, or respiratory rate 25 breaths per minute or more, then use the equivalent nebulizer order(s) with same Frequency and PRN reasons based on the score.  If a patient is on this medication at home then do not decrease Frequency below that used at home.    0-3 - enter or revise RT bronchodilator order(s) to equivalent RT Bronchodilator order with Frequency of every 4

## 2024-08-24 NOTE — FLOWSHEET NOTE
08/24/24 0741   Vital Signs   Temp 98 °F (36.7 °C)   Temp Source Oral   Pulse 96   Heart Rate Source Monitor   Respirations 18   /65   MAP (Calculated) 85   BP Location Left upper arm   BP Method Automatic   Patient Position Semi fowlers   Oxygen Therapy   SpO2 97 %   O2 Device Nasal cannula   O2 Flow Rate (L/min) 3 L/min     Patient resting quietly in bed. No s/s of distress noted.  Cardizem gtt infusing at 5mg/hr. Titrated to 2.5mg/hr at this time.   Shift assessment complete, see flow sheet. Denies needs at this time. Call light in reach. Will monitor.

## 2024-08-25 LAB
ANION GAP SERPL CALCULATED.3IONS-SCNC: 13 MMOL/L (ref 3–16)
BASOPHILS # BLD: 0 K/UL (ref 0–0.2)
BASOPHILS NFR BLD: 0.8 %
BUN SERPL-MCNC: 20 MG/DL (ref 7–20)
CALCIUM SERPL-MCNC: 9.1 MG/DL (ref 8.3–10.6)
CHLORIDE SERPL-SCNC: 101 MMOL/L (ref 99–110)
CO2 SERPL-SCNC: 24 MMOL/L (ref 21–32)
CREAT SERPL-MCNC: 0.8 MG/DL (ref 0.6–1.2)
DEPRECATED RDW RBC AUTO: 19.4 % (ref 12.4–15.4)
EOSINOPHIL # BLD: 0.1 K/UL (ref 0–0.6)
EOSINOPHIL NFR BLD: 1.7 %
GFR SERPLBLD CREATININE-BSD FMLA CKD-EPI: 78 ML/MIN/{1.73_M2}
GLUCOSE SERPL-MCNC: 121 MG/DL (ref 70–99)
HCT VFR BLD AUTO: 28.5 % (ref 36–48)
HCT VFR BLD AUTO: 33.4 % (ref 36–48)
HGB BLD-MCNC: 10.4 G/DL (ref 12–16)
HGB BLD-MCNC: 8.9 G/DL (ref 12–16)
LYMPHOCYTES # BLD: 1.5 K/UL (ref 1–5.1)
LYMPHOCYTES NFR BLD: 33.2 %
MCH RBC QN AUTO: 24.1 PG (ref 26–34)
MCHC RBC AUTO-ENTMCNC: 31.4 G/DL (ref 31–36)
MCV RBC AUTO: 76.7 FL (ref 80–100)
MONOCYTES # BLD: 0.6 K/UL (ref 0–1.3)
MONOCYTES NFR BLD: 13.9 %
NEUTROPHILS # BLD: 2.3 K/UL (ref 1.7–7.7)
NEUTROPHILS NFR BLD: 50.4 %
PLATELET # BLD AUTO: 229 K/UL (ref 135–450)
PMV BLD AUTO: 7.9 FL (ref 5–10.5)
POTASSIUM SERPL-SCNC: 3.7 MMOL/L (ref 3.5–5.1)
RBC # BLD AUTO: 3.71 M/UL (ref 4–5.2)
SODIUM SERPL-SCNC: 138 MMOL/L (ref 136–145)
WBC # BLD AUTO: 4.5 K/UL (ref 4–11)

## 2024-08-25 PROCEDURE — 85018 HEMOGLOBIN: CPT

## 2024-08-25 PROCEDURE — 2700000000 HC OXYGEN THERAPY PER DAY

## 2024-08-25 PROCEDURE — 2580000003 HC RX 258: Performed by: NURSE PRACTITIONER

## 2024-08-25 PROCEDURE — 36415 COLL VENOUS BLD VENIPUNCTURE: CPT

## 2024-08-25 PROCEDURE — 6370000000 HC RX 637 (ALT 250 FOR IP): Performed by: INTERNAL MEDICINE

## 2024-08-25 PROCEDURE — 2060000000 HC ICU INTERMEDIATE R&B

## 2024-08-25 PROCEDURE — 94761 N-INVAS EAR/PLS OXIMETRY MLT: CPT

## 2024-08-25 PROCEDURE — 6370000000 HC RX 637 (ALT 250 FOR IP): Performed by: NURSE PRACTITIONER

## 2024-08-25 PROCEDURE — 2500000003 HC RX 250 WO HCPCS: Performed by: INTERNAL MEDICINE

## 2024-08-25 PROCEDURE — 85014 HEMATOCRIT: CPT

## 2024-08-25 PROCEDURE — 6360000002 HC RX W HCPCS: Performed by: NURSE PRACTITIONER

## 2024-08-25 PROCEDURE — 99233 SBSQ HOSP IP/OBS HIGH 50: CPT | Performed by: INTERNAL MEDICINE

## 2024-08-25 PROCEDURE — 80048 BASIC METABOLIC PNL TOTAL CA: CPT

## 2024-08-25 PROCEDURE — 99232 SBSQ HOSP IP/OBS MODERATE 35: CPT | Performed by: INTERNAL MEDICINE

## 2024-08-25 PROCEDURE — 94640 AIRWAY INHALATION TREATMENT: CPT

## 2024-08-25 PROCEDURE — 2580000003 HC RX 258: Performed by: INTERNAL MEDICINE

## 2024-08-25 PROCEDURE — 85025 COMPLETE CBC W/AUTO DIFF WBC: CPT

## 2024-08-25 RX ADMIN — SUCRALFATE 1 G: 1 TABLET ORAL at 13:40

## 2024-08-25 RX ADMIN — AMIODARONE HYDROCHLORIDE 200 MG: 200 TABLET ORAL at 08:39

## 2024-08-25 RX ADMIN — BUDESONIDE AND FORMOTEROL FUMARATE DIHYDRATE 2 PUFF: 160; 4.5 AEROSOL RESPIRATORY (INHALATION) at 19:59

## 2024-08-25 RX ADMIN — PREDNISONE 30 MG: 10 TABLET ORAL at 08:38

## 2024-08-25 RX ADMIN — SUCRALFATE 1 G: 1 TABLET ORAL at 08:39

## 2024-08-25 RX ADMIN — QUETIAPINE FUMARATE 100 MG: 100 TABLET ORAL at 19:59

## 2024-08-25 RX ADMIN — GUAIFENESIN 600 MG: 600 TABLET, EXTENDED RELEASE ORAL at 08:38

## 2024-08-25 RX ADMIN — AMIODARONE HYDROCHLORIDE 200 MG: 200 TABLET ORAL at 19:59

## 2024-08-25 RX ADMIN — LACTULOSE 20 G: 10 SOLUTION ORAL at 19:59

## 2024-08-25 RX ADMIN — GUAIFENESIN 600 MG: 600 TABLET, EXTENDED RELEASE ORAL at 19:59

## 2024-08-25 RX ADMIN — PANTOPRAZOLE SODIUM 40 MG: 40 TABLET, DELAYED RELEASE ORAL at 06:04

## 2024-08-25 RX ADMIN — SODIUM CHLORIDE, PRESERVATIVE FREE 10 ML: 5 INJECTION INTRAVENOUS at 20:00

## 2024-08-25 RX ADMIN — LACTULOSE 20 G: 10 SOLUTION ORAL at 13:40

## 2024-08-25 RX ADMIN — SODIUM CHLORIDE, PRESERVATIVE FREE 10 ML: 5 INJECTION INTRAVENOUS at 08:39

## 2024-08-25 RX ADMIN — SUCRALFATE 1 G: 1 TABLET ORAL at 19:59

## 2024-08-25 RX ADMIN — QUETIAPINE FUMARATE 25 MG: 25 TABLET ORAL at 20:04

## 2024-08-25 RX ADMIN — FUROSEMIDE 20 MG: 10 INJECTION, SOLUTION INTRAMUSCULAR; INTRAVENOUS at 08:39

## 2024-08-25 RX ADMIN — FOLIC ACID 1 MG: 1 TABLET ORAL at 08:39

## 2024-08-25 RX ADMIN — DILTIAZEM HYDROCHLORIDE 5 MG/HR: 5 INJECTION, SOLUTION INTRAVENOUS at 23:51

## 2024-08-25 RX ADMIN — SUCRALFATE 1 G: 1 TABLET ORAL at 16:41

## 2024-08-25 RX ADMIN — METOPROLOL TARTRATE 25 MG: 25 TABLET, FILM COATED ORAL at 19:59

## 2024-08-25 RX ADMIN — METOPROLOL TARTRATE 25 MG: 25 TABLET, FILM COATED ORAL at 08:39

## 2024-08-25 RX ADMIN — IPRATROPIUM BROMIDE AND ALBUTEROL SULFATE 1 DOSE: 2.5; .5 SOLUTION RESPIRATORY (INHALATION) at 07:34

## 2024-08-25 RX ADMIN — ATORVASTATIN CALCIUM 10 MG: 10 TABLET, FILM COATED ORAL at 08:39

## 2024-08-25 RX ADMIN — PANTOPRAZOLE SODIUM 40 MG: 40 TABLET, DELAYED RELEASE ORAL at 16:41

## 2024-08-25 RX ADMIN — FUROSEMIDE 20 MG: 10 INJECTION, SOLUTION INTRAMUSCULAR; INTRAVENOUS at 16:41

## 2024-08-25 RX ADMIN — QUETIAPINE FUMARATE 25 MG: 25 TABLET ORAL at 08:39

## 2024-08-25 RX ADMIN — LEVETIRACETAM 500 MG: 500 TABLET, FILM COATED ORAL at 08:39

## 2024-08-25 RX ADMIN — LEVETIRACETAM 500 MG: 500 TABLET, FILM COATED ORAL at 20:00

## 2024-08-25 RX ADMIN — TIOTROPIUM BROMIDE INHALATION SPRAY 2 PUFF: 3.12 SPRAY, METERED RESPIRATORY (INHALATION) at 07:34

## 2024-08-25 RX ADMIN — LACTULOSE 20 G: 10 SOLUTION ORAL at 08:38

## 2024-08-25 RX ADMIN — BUDESONIDE AND FORMOTEROL FUMARATE DIHYDRATE 2 PUFF: 160; 4.5 AEROSOL RESPIRATORY (INHALATION) at 07:34

## 2024-08-25 RX ADMIN — SERTRALINE 100 MG: 100 TABLET, FILM COATED ORAL at 08:39

## 2024-08-25 RX ADMIN — IPRATROPIUM BROMIDE AND ALBUTEROL SULFATE 1 DOSE: 2.5; .5 SOLUTION RESPIRATORY (INHALATION) at 19:59

## 2024-08-25 NOTE — PROGRESS NOTES
Arrived to place Midline with bedside RN Valery. Pre-procedure and timeout done with RN, discussed limitations of placement and allergies. Consent confirmed. Vital signs stable. Labs, allergies, medications, and code status reviewed. No contraindications noted.    Procedure explained to pt, including the risk and benefits of the procedure. All questions answered. Pt verbalizes understanding of the procedure and states no more questions.     Pt's basilic, brachial, cephalic are all easily collapsible with no indication for a clot. Vein selected is large enough for catheter. Pt tolerated sterile procedure well, with no difficulty accessing basilic vein, when accessed - blood was free flowing and non-pulsatile. Guidewire, introducer, and catheter went in smoothly.     Please replace all existing IV tubing with new IV tubing prior to using the Midline for current IV infusions.  Please remove any PIVs from Midline arm.  All of the above may be sources of infection or an increase chance of a clot.      Post procedure - reorganized pt table, placed pt in lowest position, with call light and educated on line care. Instructed pt/RN not to use arm for at least 30min to avoid bleeding. Reported off to bedside RN.        [phone]

## 2024-08-25 NOTE — PROGRESS NOTES
PROGRESS NOTE  S:74 yrs Patient  admitted on 8/22/2024 with Respiratory distress [R06.03]  COPD exacerbation ([facility]) [J44.1]  Acute diastolic CHF (congestive heart failure) ([facility]) [I50.31]  Acute on chronic congestive heart failure, unspecified heart failure type ([facility]) [I50.9] .  Today she feels better. No further melena. Having normal BMs. Remains in A fib with RVR    Exam:   Vitals:    08/25/24 1121   BP: (!) 105/55   Pulse: 82   Resp: 18   Temp: 97.4 °F (36.3 °C)   SpO2: 100%     General: alert, appears stated age, and cooperative  Head: Normocephalic, without obvious abnormality  Eyes:  no icterus  Neck: supple, symmetrical, trachea midline  Heart: irregularly irregular rhythm  Abdomen:  soft, NT, ND  Extremities: edema 2+    Medications: Reviewed    Labs:  CBC:   Recent Labs     08/23/24  0508 08/23/24  1811 08/24/24  0503 08/24/24  1752 08/25/24  0448   WBC 3.3*  --  4.2  --  4.5   HGB 6.5*   < > 8.7* 9.8* 8.9*   HCT 20.2*   < > 26.9* 31.3* 28.5*   MCV 77.6*  --  76.2*  --  76.7*     --  208  --  229    < > = values in this interval not displayed.     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 Coffee ground emesis likely due to reflux esophagitis but cannot exclude PUD and portal HTN gastropathy      Recommendation:  Continue supportive care  PPI BID and carafate QID  Hold anticoagulation  Continue xifaxan and lactulose  Diltiazem drip per cardiology for A fib with RVR  Monitor Hgb  Observe for signs of further bleeding  EGD tomorrow  Encourage nutrition with supplements      Juan Jose Hilario        Your Child's Health  7-8 Year-Old Visit      Torrey Sg  Perla 15, 2020    There were no vitals taken for this visit.  Weight:       YOUR CHILD'S 7 and 8 YEAR-OLD VISITS      School / Development / Behavior   Children should be well-adjusted in their school setting this at age. Success in school depends on several skills--communication skills, cooperation, attention, cognitive ability. Problems in one area may affect a child’s overall school experience. It is very important to stay in touch with teachers in order to identify and address any problems as soon as they are recognized. To help your child learn well, be sure they are well rested and have a healthy breakfast every morning.    Children need to be in school if they are going to learn and advance. Missing school frequently will lead to a lot of problems for a child; this needs to be addressed if it is happening. If your child receives any extra services through an IEP, make sure the IEP is reviewed regularly and updated if needed.     With increasing age comes increasing opportunities for activities outside of school (sports, arts, scouting).  Being part of a peer group becomes more important to children as they are growing older. They may encounter friends with different values and beliefs. Discuss differences openly with your children to help them start to understand the diversity of our society. Always listen without interrupting. Your children may still need reminding of the rules and expectations which you have for them, but they are developing more of a conscience and awareness of right and wrong which will affect how they view rules and social expectations. Discuss what consequences are for not following family rules--make sure they are reasonable and be consistent in enforcing them.     Children should have some definite responsibilities at this age. Simple household tasks like making their bed, setting the table, helping with meals or simple  MD Brii, MD  12:25 PM 8/25/2024                        household chores should be an expectation. Tell your child that you notice and appreciate what they are doing so that they become more confident and ready to take on more responsibility as time goes by. Children are motivated to do well when their parents provide a lot of encouragement. Make sure to find time every day for just talking with your children (no TV, no phone, no music!). Be a good role model for them by always acting responsibly, keep promises, and being on time.     Ask your child if they feel safe at school. Bullying is common--it is difficult to know exactly how common it is, but most children probably experience some bullying during their school years. Bullying hurts everyone--the child who is bullied, children who witness it, and the person doing the bullying (those children are likely to develop long term behavior and self-esteem issues). Children should know to report to you and/or teachers if they are being teased or bullied or if they witness another child being bullied. Bullying in schools (or anywhere) should not be tolerated. Talk to teachers, administrators or guidance counselors at the school to help with this issue. Good online resources regarding bullying are StopBullying.gov and the American Academy of Pediatrics \"HealthyChildren.org\" website (search for \"Bullying\"). These websites include guidelines that may be useful to both parents and children.      Health and Safety in (and out of!) the Home  Smoking: Continue to protect your child from cigarette smoke; secondhand smoke increases their risk of heart and lung disease. Vapors from e-cigarettes may also be harmful, so do not use those in your home or around children. If you smoke and are ready to consider quitting, talk to your doctor. Nicotine replacement products can be very helpful in breaking this tough addiction. 1-800-QUIT-NOW is a national help line that can help you find resources; other resources can be found at  cdc.gov.    Safety on the Internet: Just as you would not allow your child to go anywhere they want outside, they should not be allowed to “wander” the internet on their own. Keep the computer where you can observe your child’s use. Make sure you know how to check the internet history and do it regularly. If possible, set up a safety filter which will prevent your child from accessing inappropriate sites.      Dental Health: Your child should be brushing at least twice daily for 2 minutes at a time with a pea-sized amount of regular (fluoridated) toothpaste. Make flossing a regular part of their dental routine at this age. Most children need a parent’s help to make sure all of their back teeth are brushed well until they are ~8 years old. Hopefully they have seen a dentist by this age; look for a one now if you do not already have one. Let our office know if you use water from a private well; testing for fluoride content is recommended to determine if fluoride supplements are needed. Limiting candy, other sweets, juice and sticky/chewy foods remains important for their dental (and overall!) health.    Healthy Eating: Eat meals together as a family and talk during meals. (Leave the television off and do not allow phones or other electronics at the table.) For in between meals, keep nutritious choices around for snack times (fresh fruits and vegetables, string cheese, whole-grain crackers, yogurt, hard-boiled eggs, nuts).School-age children need 3 servings of good sources of calcium daily; this can include lowfat (or skim) milk, yogurt, low fat cheese or foods which have been fortified with calcium.  They should also get 600 IU of vitamin D daily which (along with appropriate calcium intake) ensures good bone health. Most people cannot meet their vitamin D needs with their usual diet, so a multivitamin with iron supplement is a good way to get it. (A pure vitamin D supplement with 400 or 600 IU is also okay.)  Protect  your child from the problems of overweight and obesity by teaching them that healthy choices are important, as are continuing to avoid unhealthy choices like greasy fast food, bagged snacks, sodas, sweetened drinks, juice, candy and sweets. Don’t keep these types of food in your home because your child will find them! If you give your child juice, give them no more than 6 to 8 ounces of 100% fruit juice daily. (Remember that eating fruit is a lot healthier than drinking it!) Also, don't allow snacking in front of the TV set--that is an unhealthy habit that is easier to prevent than change!    Healthy Activity: Set a goal of 60 minutes of physical activity every day--it can be all at once or broken up into shorter segments. Try to choose family activities as much as possible.  Do not overschedule your children. They may be tempted by lots of activities when their friends are participating in them, but they still need plenty of time for schoolwork, family time and some simple unstructured downtime.  Time sitting watching television, playing on the computer or using any form of electronic media is NOT physical activity. Set a time limit for media use each day (and enforce it!).  For suggestions on developing healthy media habits, go to the American Academy of Pediatrics \"HealthyChildren.org\" website and search for \"media use plan\".     Healthy Sleep: Children this age need 10 to 11 hours of sleep each night. Have a regular bedtime routine that does not involve electronic media (including TV) because screen time before bedtime is known to cause sleep problems. Develop a quiet routine that involves reading together or reading in bed for a short time before sleep.    Children this age should not have access to their electronic devices in their bedroom at night. All electronic media use should be supervised.     Safety on the Road: Once your child weighs more than 40 pounds, they can start riding in a high-backed booster  seat. They should ride properly secured in a booster seat in the back seat until they are 4 feet 9 inches tall. High-backed booster seats should be used if there are low seat backs or no head rests in your car; backless boosters can be used if your car has high seat backs and head rests.     Children (and their parents!) should wear properly fitted helmets when biking. Watch your children biking to determine how good their judgement is and set limits about where and when they can be biking based on what you see.     Safety in the Water: This is a good age for swimming lessons if your child is not yet a good swimmer. Even if they are comfortable swimming, they should always be supervised when swimming. They should always wear [employer] approved life jackets when on any sort of boat or watercraft. If you have access to a swimming pool where you live (in an apartment complex, neighborhood or your own yard), be sure it is fenced with a locked, self-closing, self-latching gate which prevents unsupervised access by children. Remember to use sunscreen with an SPF of 15 or higher when outside and reapply after time in the water.  Your child should avoid prolonged time in the sun between 11 AM and 3 PM and wear hats, sunglasses and sun protection clothing.    Personal Safety: A parent’s safety is just as important as a child’s safety. Violence is common in many people’s lives. If you do not feel safe in your home or if a partner has ever hit, kicked, shoved or physically hurt you or your child, it is important for you to get help. Talk to your doctors or a . In [place], resources include [facility] (396-938-8075) and the [facility] (24 hour hotline is 050- 908-2059); the National Domestic Violence Hotline is 1-361-747-IBND (9591).    Review with your child that certain body parts (the parts usually covered by a bathing suit) and behaviors are private. For safety purposes,  make sure your child knows that they should never keep secrets from parents, and they should always report to you if any adult or older child shows any interest in their private parts (or if an older person discussed or shared their own private parts with a child). Tell them they should talk to you if any adult is doing or saying anything that makes them uncomfortable, especially if an adult is asking them to keep a secret.    Remind your child about he the importance of never opening the door to anyone they don’t know. Make sure your child always knows how to reach you and what to do in the case of a fire or other emergency. Teach your child about using “911”.     Guns and Firearms: Firearms in homes can pose a safety risk; if you need to keep a gun, be sure it is stored safely: locked, unloaded, with ammunition stored separately. Even the best-behaved children are curious--so make sure firearms are stored safely in your home and anywhere they visit. They are too young to be taught to safely handle a weapon. Teach them that if they ever see a gun, they should not touch it, they should leave the immediate area and they should tell an adult.    MEDICATION FOR FEVER OR PAIN:   Acetaminophen liquid (e.g., Tylenol or Tempra) may be given every four hours as needed for pain or fever.  Acetaminophen liquid is less concentrated than the infant dropper bottle type.  Be sure to check which product CONCENTRATION you are using.    CHILDREN’S Tylenol/Acetaminophen  (160 MG/5 mL)    Child’s Weight:  Dose:  36 - 47 pounds:    240 mg (7.5 mL (1 1/2 Teaspoons))  48 - 59 pounds:    320 mg (10.0 mL (2 Teaspoons))  60 - 71 pounds:    400 mg (12.5 mL (2 1/2 Teaspoons))  Greater than 72 pounds:   480 mg (15.0 mL (3 Teaspoons))    CHILDREN’S Tylenol/Acetaminophen MELTAWAYS ( 80 MG tablets)    Child’s Weight:  Dose:  36 - 47 pounds:    240 mg (3 meltaway tablets)  48 - 59 pounds:    320 mg (4 meltaway tablets)  60 - 71 pounds:    400 mg  (5 meltaway tablets)  Greater than 72 pounds:   480 mg (6 meltaway tablets)     (Jr) Tylenol/Acetaminophen MELTAWAYS (160 MG tablets)    Child’s Weight:  Dose:  36 - 47 pounds:    240 mg (1 1/2 meltaway tablets)  48 - 59 pounds:    320 mg (2 meltaway tablets)  60 - 71 pounds:    400 mg (2 1/2 meltaway tablets)  Greater than 72 pounds:   480 mg (3 meltaway tablets)    CHILDREN'S Ibuprofen liquid (e.g., Advil or Motrin) may be given every six hours as needed for pain or fever.    Child’s Weight:  Dose:  36 - 47 pounds:    150 mg (1 1/2 Teaspoons)  48 - 59 pounds:    200 mg (2 Teaspoons)  60 - 71 pounds:    250 mg (2 1/2 Teaspoons)  Greater than 72 pounds:   300 mg (3 Teaspoons)    NEXT VISIT:  IN 1 YEAR      Thank you for entrusting your care to [facility].    Also, check out “Children’s Health” on the [facility] Blog for updates on timely topics regarding children’s health!

## 2024-08-25 NOTE — PROGRESS NOTES
[facility] Progress Note    Daily Progress Note for 2024 7:44 AM /0301-01  Nancy Franz : 1950 Age: 74 y.o. Sex: female  Length of Stay:  3    Interval History:      CC: F/U Shortness of Breath (Pt very sob.  Has been getting worse for 3 days.  States she has been sick and throwing up.  Pt has audible wheezes and is extremely labored.)    Subjective:       Patient says HR high at times and then she feels like she can't walk and breathe at the same time. She has some cough which is dry. No chest pain currently.     Objective:     Vitals:    24 2329 24 0246 24 0733 24 0737   BP: 123/67 128/70 (!) 119/59    Pulse: 89 97 80    Resp: 18 18 18    Temp: 97.6 °F (36.4 °C) 97.4 °F (36.3 °C) 98.1 °F (36.7 °C)    TempSrc: Oral Oral Oral    SpO2: 98% 98% 96% 93%   Weight:  86.1 kg (189 lb 14.4 oz)     Height:              Intake/Output Summary (Last 24 hours) at 2024 0744  Last data filed at 2024 0609  Gross per 24 hour   Intake 3051.19 ml   Output 2152 ml   Net 899.19 ml     Body mass index is 34.73 kg/m².    Physical Exam:  General: No distress  HEENT:  Head: normocephalic,atraumatic, anicteric sclera, clear conjunctiva  Neck: Normal size, Jugular venous pulsations: normal  Respiratory:unlabored breathing, clear to auscultation with no crackles, wheezes rhonchi  Heart: Regular rate and rhythm, S1, S2-normal, No murmurs  Abdomen: soft, nondistended, nontender, normoactive bowel sounds,  Neurological/Psych: Unable to assess at this time. Very drowsy, opens eyes but immediately falls asleep again.   Skin: No obvious rashes    Extremities:  no edema, Pedal pulses 2+ bilaterally    Scheduled Medications:  amiodarone, 200 mg, BID  predniSONE, 30 mg, Daily  guaiFENesin, 600 mg, BID  sodium chloride flush, 5-40 mL, 2 times per day  [Held by provider] enoxaparin, 40 mg, Daily  metoprolol tartrate, 25 mg, BID  budesonide-formoterol, 2 puff, BID RT   And  tiotropium,  2 puff, Daily RT  folic acid, 1 mg, Daily  lactulose, 20 g, TID  levETIRAcetam, 500 mg, BID  pantoprazole, 40 mg, BID AC  QUEtiapine, 100 mg, Nightly  QUEtiapine, 25 mg, BID  sertraline, 100 mg, Daily  atorvastatin, 10 mg, Daily  sucralfate, 1 g, 4x Daily  ipratropium 0.5 mg-albuterol 2.5 mg, 1 Dose, BID RT  furosemide, 20 mg, BID        PRN Medications:  magnesium sulfate, 2,000 mg, PRN  sodium chloride, , PRN  mirtazapine, 7.5 mg, Nightly PRN  levalbuterol, 1.25 mg, Q8H PRN  sodium chloride flush, 5-40 mL, PRN  sodium chloride, , PRN  ondansetron, 4 mg, Q8H PRN   Or  ondansetron, 4 mg, Q6H PRN  polyethylene glycol, 17 g, Daily PRN  acetaminophen, 650 mg, Q6H PRN   Or  acetaminophen, 650 mg, Q6H PRN  [Held by provider] labetalol, 10 mg, Q6H PRN  [Held by provider] tiZANidine, 4 mg, BID PRN          Data Review:      Laboratory Data Reviewed:    CBC:  Lab Results   Component Value Date    WBC 4.5 08/25/2024    HGB 8.9 (L) 08/25/2024    HCT 28.5 (L) 08/25/2024    MCV 76.7 (L) 08/25/2024     08/25/2024         Basic Metabolic Panel  Lab Results   Component Value Date/Time     08/25/2024 04:48 AM     08/25/2024 04:48 AM    CO2 24 08/25/2024 04:48 AM    GLUCOSE 121 08/25/2024 04:48 AM    BUN 20 08/25/2024 04:48 AM    CREATININE 0.8 08/25/2024 04:48 AM       HEPATIC PANEL   Lab Results   Component Value Date/Time    AST 24 08/22/2024 08:16 AM    ALT 11 08/22/2024 08:16 AM       Lab Results   Component Value Date    CKTOTAL 63 01/10/2023    TROPONINI <0.01 01/15/2023       Lab Results   Component Value Date/Time    INR 1.42 01/21/2024 02:53 AM    INR 1.26 11/21/2023 07:06 PM    INR 1.37 01/13/2023 05:00 PM       Test Review:        Radiology reviewed:     Vascular duplex lower extremity venous bilateral   Final Result      XR ABDOMEN (KUB) (SINGLE AP VIEW)   Final Result   Status post cholecystectomy with a non-specific gas pattern      Status post vertebral plasties at L2 and L4 which is more apparent

## 2024-08-25 NOTE — FLOWSHEET NOTE
08/25/24 0733   Vital Signs   Temp 98.1 °F (36.7 °C)   Temp Source Oral   Pulse 80   Heart Rate Source Monitor   Respirations 18   BP (!) 119/59   MAP (Calculated) 79   BP Location Right upper arm   BP Method Automatic   Patient Position Supine   Oxygen Therapy   SpO2 96 %   O2 Device Nasal cannula   O2 Flow Rate (L/min) 3 L/min     Patient resting quietly in bed. No s/s of distress noted. Shift assessment complete, see flow sheet. Denies needs at this time. Call light in reach. Will monitor.

## 2024-08-25 NOTE — FLOWSHEET NOTE
08/24/24 2329   Vital Signs   Temp 97.6 °F (36.4 °C)   Temp Source Oral   Pulse 89   Heart Rate Source Monitor   Respirations 18   /67   MAP (Calculated) 86   BP Location Right upper arm   BP Method Automatic   Patient Position Semi fowlers   Oxygen Therapy   SpO2 98 %   O2 Device Nasal cannula   O2 Flow Rate (L/min) 3 L/min     Pt reassessment complete.  No changes noted. Pt is lying in bed with their eyes closed. Respirations are easy and even. Call light within reach bed in lowest position with the wheels locked. Will continue to monitor. Valery Lozoya RN

## 2024-08-25 NOTE — PROGRESS NOTES
[facility] Progress Note    Daily Progress Note for 2024 3:15 PM /0301-01  Nancy Franz : 1950 Age: 74 y.o. Sex: female  Length of Stay:  3    Interval History:      CC: F/U Shortness of Breath (Pt very sob.  Has been getting worse for 3 days.  States she has been sick and throwing up.  Pt has audible wheezes and is extremely labored.)    Subjective:     Says she feels better as long as she doesn't try to get up and do anything. If she does she gets dizzy and short of breath. But she does feel better than she did on admission. She says she also has a lot of stress at home, terrible situation with her daughter. This is very stressful for her.     Objective:     Vitals:    24 0246 24 0733 24 0737 24 1121   BP: 128/70 (!) 119/59  (!) 105/55   Pulse: 97 80  82   Resp: 18 18  18   Temp: 97.4 °F (36.3 °C) 98.1 °F (36.7 °C)  97.4 °F (36.3 °C)   TempSrc: Oral Oral  Axillary   SpO2: 98% 96% 93% 100%   Weight: 86.1 kg (189 lb 14.4 oz)      Height:              Intake/Output Summary (Last 24 hours) at 2024 1515  Last data filed at 2024 1249  Gross per 24 hour   Intake 3371.19 ml   Output 1150 ml   Net 2221.19 ml     Body mass index is 34.73 kg/m².    Physical Exam:  General: No distress  HEENT:  Head: normocephalic,atraumatic, anicteric sclera, clear conjunctiva  Neck: Normal size, Jugular venous pulsations: normal  Respiratory:unlabored breathing, clear to auscultation with no crackles, wheezes rhonchi  Heart: Regular rate and rhythm, S1, S2-normal, No murmurs  Abdomen: soft, nondistended, nontender, normoactive bowel sounds,  Neurological/Psych: Unable to assess at this time. Very drowsy, opens eyes but immediately falls asleep again.   Skin: No obvious rashes    Extremities:  no edema, Pedal pulses 2+ bilaterally    Scheduled Medications:  amiodarone, 200 mg, BID  predniSONE, 30 mg, Daily  guaiFENesin, 600 mg, BID  sodium chloride flush, 5-40 mL, 2 times    Final Result   Status post cholecystectomy with a non-specific gas pattern      Status post vertebral plasties at L2 and L4 which is more apparent      IV contrast in both upper collecting systems and bladder.      Status post ORIF left hip.         CT CHEST PULMONARY EMBOLISM W CONTRAST   Final Result   1. Motion artifact degrades evaluation of the segmental branches. No evidence   for central pulmonary embolism.   2. Findings compatible with interstitial edema with small pleural effusions.   3. Patchy infiltrate in the right apex may be related to alveolar edema   versus infection.   4. Mild edematous appearance of the distal esophagus and small hiatal hernia   suggesting esophagitis.   5. Cirrhotic liver morphology.         XR CHEST PORTABLE   Final Result   Prominent bilateral interstitial lung markings are present without focal   consolidation.         IR MIDLINE CATH    (Results Pending)       Lab Results   Component Value Date    LABA1C 6.4 08/21/2023      Body mass index is 34.73 kg/m².       Impression/Plan:      COPD  AE  Chronic hypoxic respiratory failure   - solumedrol for now, transition to prednisone once improved  - inhaled bronchodilators   - Azithromycin D# 2  - check sputum cultures  - home oxygen 3 liters---currently on 3 when she is wearing it  - she is taking it off part of the time  - continuous pulse ox and wean as tolerated        Acute diastolic CHF  - BNP 2,974  - weight 205 now---184 lbsat discharge in June  - lasix 20 mg BID IV  - was on PRN lasix at home has taken last 5 days prior to admission  - cardiology consulted  - daily weights, low sodium diet  - continue on IV lasix for now    AF with RVR  - On diltiazem drip  - check and replete electrolytes, monitor hgb     Chronically elevated troponin   - EKG no acute changes  - troponin 88-84   - pt only having chest pain with palpitations   - cardiology consulted     Reported Coffee ground emesis  Hx of esophagitis   Acute on Chronic

## 2024-08-25 NOTE — FLOWSHEET NOTE
08/24/24 1858   Vital Signs   Temp 97.4 °F (36.3 °C)   Temp Source Oral   Pulse 75   Heart Rate Source Monitor   Respirations 18   BP (!) 148/68   MAP (Calculated) 95   BP Location Left upper arm   BP Method Automatic   Patient Position Semi fowlers     Pt assessment complete.  Pt lying in bed quietly.  No s/s of distress noted.  Lung sounds diminished.  Pt remains in Afib with controlled HR of 75.  Nightly medications given.  DIT team here for placement of midline.  Denies needs.  Call light within reach. Bed exit alarm on.

## 2024-08-25 NOTE — PROGRESS NOTES
Pulmonary Progress Note  CC: SOB    Subjective:    On Dilt gtt    EXAM: BP (!) 105/55   Pulse 82   Temp 97.4 °F (36.3 °C) (Axillary)   Resp 18   Ht 1.575 m (5' 2\")   Wt 86.1 kg (189 lb 14.4 oz)   SpO2 100%   BMI 34.73 kg/m²  on 3L  Constitutional:  No acute distress.   Eyes: PERRL. Conjunctivae anicteric.   ENT: Normal nose. Normal tongue.    Neck:  Trachea is midline.   Respiratory: No accessory muscle usage.  decreased breath sounds. No wheezes. No rales. No Rhonchi.  Cardiovascular: Normal S1S2. No digit clubbing. No digit cyanosis. No LE edema.   Psychiatric: No anxiety or Agitation. Alert and Oriented to person, place and time.    Scheduled Meds:   amiodarone  200 mg Oral BID    predniSONE  30 mg Oral Daily    guaiFENesin  600 mg Oral BID    sodium chloride flush  5-40 mL IntraVENous 2 times per day    [Held by provider] enoxaparin  40 mg SubCUTAneous Daily    metoprolol tartrate  25 mg Oral BID    budesonide-formoterol  2 puff Inhalation BID RT    And    tiotropium  2 puff Inhalation Daily RT    folic acid  1 mg Oral Daily    lactulose  20 g Oral TID    levETIRAcetam  500 mg Oral BID    pantoprazole  40 mg Oral BID AC    QUEtiapine  100 mg Oral Nightly    QUEtiapine  25 mg Oral BID    sertraline  100 mg Oral Daily    atorvastatin  10 mg Oral Daily    sucralfate  1 g Oral 4x Daily    ipratropium 0.5 mg-albuterol 2.5 mg  1 Dose Inhalation BID RT    furosemide  20 mg IntraVENous BID     Continuous Infusions:   sodium chloride      dilTIAZem 2.5 mg/hr (08/24/24 2211)    sodium chloride       PRN Meds:  magnesium sulfate, sodium chloride, mirtazapine, levalbuterol, sodium chloride flush, sodium chloride, ondansetron **OR** ondansetron, polyethylene glycol, acetaminophen **OR** acetaminophen, [Held by provider] labetalol, [Held by provider] tiZANidine    Labs:  CBC:   Recent Labs     08/23/24  0508 08/23/24  1811 08/24/24  0503 08/24/24  1752 08/25/24  0448   WBC 3.3*  --  4.2  --  4.5   HGB 6.5*   < > 8.7*

## 2024-08-25 NOTE — PROGRESS NOTES
08/25/24 0700   RT Protocol   History Pulmonary Disease 2   Respiratory pattern 0   Breath sounds 2   Cough 0   Indications for Bronchodilator Therapy Decreased or absent breath sounds   Bronchodilator Assessment Score 4

## 2024-08-25 NOTE — PROGRESS NOTES
Cardiology Progress Note      Assessment:     1. Atrial fibrillation: patient has paroxysmal atrial fibrillation.    Associated symptoms: Palpitations and Shortness of breath     History of cardioversion: No prior history of cardioversion  History of AF ablation: No history of atrial fibrillation ablation in the past    Current use of anti-arrhythmic drugs: amiodarone      Overall LV function: Normal left ventricular systolic function  Size of left atrium: Moderately dilated LA size  Significant cardiac valvular disease: Other mild to moderate MS with mild MR, mild AS    Patient has a [id number] score of 7 [Age over 65 (1 point), Diabetes Mellitus (1 point), Female gender (1 point), Heart failure (1 point), Hypertension (1 point), and History of stroke/TIA (2 points)]  Longterm anticoagulation is: not recommended at present given anemia and suspected bleeding issues.   Current anticoagulation: None (previously on apixaban)  Bleeding issues reported: yes, GI bleeding  Renal function: Normal renal function  Thyroid function: Normal    Patient with paroxysmal atrial fibrillation. She presented in sinus rhythm and acute HFpEF decompensation. Subsequently developed atrial fibrillation with rapid ventricular response. Was placed on diltiazem with improvement in her heart rate control. No bradycardia issues noted.     Care complicated by marked anemia and suspected GI bleeding issues (being evaluated). Oral anticoagulation on hold.     She is likely to benefit from a rhythm control strategy especially with ongoing HF and suspected bleeding issues. Was in sinus rhythm on admission. Started yesterday on amiodarone 200 mg BID; still in atrial fibrillation (rates controlled). We will continue to monitor. Keep diltiazem going for now. Ongoing bleeding issues greatly limit efforts to restore/maintain sinus rhythm.     2. Acute on chronic HFpEF exacerbation: presenting symptoms consistent with such. BNP elevated on admission     Misuse of prescription only drugs    GI bleed    Elevated lactic acid level    Hyperkalemia    Leukocytosis    Thrombocytosis    High anion gap metabolic acidosis    Hypovolemic shock (HCC)    NSTEMI (non-ST elevated myocardial infarction) (HCC)    Abnormal CXR    PSVT (paroxysmal supraventricular tachycardia) (HCC)    PAF (paroxysmal atrial fibrillation) (HCC)    Pneumonia of both lungs due to infectious organism    Acute focal neurological deficit    Diverticulitis    Diverticulitis of colon    Acute hyponatremia    COPD exacerbation (HCC)    Generalized abdominal pain    Shortness of breath    Delusions (HCC)    Bilateral lower extremity edema    Sepsis (HCC)    Chest pain    Frequent falls    Seizure-like activity (HCC)    Acute hypoxemic respiratory failure (HCC)    Heart murmur    Seizure (HCC)    Acute diverticulitis    Metabolic encephalopathy    Atrial flutter (HCC)    Acute on chronic congestive heart failure (HCC)    Atrial fibrillation with RVR (HCC)    Pneumonia due to infectious agent    Secondary hypercoagulable state (HCC)    Right upper quadrant abdominal pain    Atrial fibrillation with rapid ventricular response (HCC)    Hypoxia    Moderate persistent asthma with exacerbation    Multifocal pneumonia    Esophagitis    Acute deep vein thrombosis (DVT) of proximal vein of left lower extremity (HCC)    Closed fracture of olecranon process of left ulna    Coronary atherosclerosis    Disorder of bone and articular cartilage    Hemorrhoids    Hepatic steatosis    HLD (hyperlipidemia)    Hypoxemia    Idiopathic pulmonary hypertensive arterial disease (HCC)    Iron deficiency anemia    Hepatic cirrhosis (HCC)    Mild episode of recurrent major depressive disorder (HCC)    Hypotension    VIOLETA (acute kidney injury) (HCC)    Paroxysmal atrial fibrillation (HCC)    Anxiety    CKD (chronic kidney disease) stage 4, GFR 15-29 ml/min (HCC)    Hypomagnesemia    Peptic ulcer    Psychotic disorder (HCC)    Senile

## 2024-08-26 ENCOUNTER — ANESTHESIA (OUTPATIENT)
Dept: ENDOSCOPY | Age: 74
DRG: 291 | End: 2024-08-26
Payer: MEDICARE

## 2024-08-26 ENCOUNTER — ANESTHESIA EVENT (OUTPATIENT)
Dept: ENDOSCOPY | Age: 74
DRG: 291 | End: 2024-08-26
Payer: MEDICARE

## 2024-08-26 ENCOUNTER — APPOINTMENT (OUTPATIENT)
Dept: INTERVENTIONAL RADIOLOGY/VASCULAR | Age: 74
DRG: 291 | End: 2024-08-26
Payer: MEDICARE

## 2024-08-26 PROBLEM — R79.89 ELEVATED TROPONIN: Status: ACTIVE | Noted: 2024-08-26

## 2024-08-26 LAB
BLOOD BANK DISPENSE STATUS: NORMAL
BLOOD BANK DISPENSE STATUS: NORMAL
BLOOD BANK PRODUCT CODE: NORMAL
BLOOD BANK PRODUCT CODE: NORMAL
BPU ID: NORMAL
BPU ID: NORMAL
DESCRIPTION BLOOD BANK: NORMAL
DESCRIPTION BLOOD BANK: NORMAL
HCT VFR BLD AUTO: 28.1 % (ref 36–48)
HGB BLD-MCNC: 9 G/DL (ref 12–16)

## 2024-08-26 PROCEDURE — 99233 SBSQ HOSP IP/OBS HIGH 50: CPT | Performed by: INTERNAL MEDICINE

## 2024-08-26 PROCEDURE — 85014 HEMATOCRIT: CPT

## 2024-08-26 PROCEDURE — 2580000003 HC RX 258: Performed by: NURSE ANESTHETIST, CERTIFIED REGISTERED

## 2024-08-26 PROCEDURE — 94640 AIRWAY INHALATION TREATMENT: CPT

## 2024-08-26 PROCEDURE — 7100000010 HC PHASE II RECOVERY - FIRST 15 MIN: Performed by: INTERNAL MEDICINE

## 2024-08-26 PROCEDURE — 93005 ELECTROCARDIOGRAM TRACING: CPT | Performed by: INTERNAL MEDICINE

## 2024-08-26 PROCEDURE — 2500000003 HC RX 250 WO HCPCS: Performed by: NURSE ANESTHETIST, CERTIFIED REGISTERED

## 2024-08-26 PROCEDURE — 85018 HEMOGLOBIN: CPT

## 2024-08-26 PROCEDURE — 94761 N-INVAS EAR/PLS OXIMETRY MLT: CPT

## 2024-08-26 PROCEDURE — 76937 US GUIDE VASCULAR ACCESS: CPT

## 2024-08-26 PROCEDURE — 3609017100 HC EGD: Performed by: INTERNAL MEDICINE

## 2024-08-26 PROCEDURE — 6360000002 HC RX W HCPCS: Performed by: NURSE ANESTHETIST, CERTIFIED REGISTERED

## 2024-08-26 PROCEDURE — 2709999900 HC NON-CHARGEABLE SUPPLY: Performed by: INTERNAL MEDICINE

## 2024-08-26 PROCEDURE — 6370000000 HC RX 637 (ALT 250 FOR IP): Performed by: INTERNAL MEDICINE

## 2024-08-26 PROCEDURE — 2580000003 HC RX 258: Performed by: NURSE PRACTITIONER

## 2024-08-26 PROCEDURE — 36410 VNPNXR 3YR/> PHY/QHP DX/THER: CPT

## 2024-08-26 PROCEDURE — 2700000000 HC OXYGEN THERAPY PER DAY

## 2024-08-26 PROCEDURE — 0DJ08ZZ INSPECTION OF UPPER INTESTINAL TRACT, VIA NATURAL OR ARTIFICIAL OPENING ENDOSCOPIC: ICD-10-PCS | Performed by: INTERNAL MEDICINE

## 2024-08-26 PROCEDURE — 6370000000 HC RX 637 (ALT 250 FOR IP): Performed by: NURSE PRACTITIONER

## 2024-08-26 PROCEDURE — 36415 COLL VENOUS BLD VENIPUNCTURE: CPT

## 2024-08-26 PROCEDURE — 3700000000 HC ANESTHESIA ATTENDED CARE: Performed by: INTERNAL MEDICINE

## 2024-08-26 PROCEDURE — 7100000011 HC PHASE II RECOVERY - ADDTL 15 MIN: Performed by: INTERNAL MEDICINE

## 2024-08-26 PROCEDURE — 2060000000 HC ICU INTERMEDIATE R&B

## 2024-08-26 RX ORDER — SODIUM CHLORIDE 0.9 % (FLUSH) 0.9 %
5-40 SYRINGE (ML) INJECTION EVERY 12 HOURS SCHEDULED
Status: DISCONTINUED | OUTPATIENT
Start: 2024-08-26 | End: 2024-08-26 | Stop reason: HOSPADM

## 2024-08-26 RX ORDER — LABETALOL HYDROCHLORIDE 5 MG/ML
5 INJECTION, SOLUTION INTRAVENOUS EVERY 10 MIN PRN
Status: DISCONTINUED | OUTPATIENT
Start: 2024-08-26 | End: 2024-08-26 | Stop reason: HOSPADM

## 2024-08-26 RX ORDER — OXYCODONE HYDROCHLORIDE 5 MG/1
5 TABLET ORAL PRN
Status: DISCONTINUED | OUTPATIENT
Start: 2024-08-26 | End: 2024-08-26 | Stop reason: HOSPADM

## 2024-08-26 RX ORDER — OXYCODONE HYDROCHLORIDE 5 MG/1
10 TABLET ORAL PRN
Status: DISCONTINUED | OUTPATIENT
Start: 2024-08-26 | End: 2024-08-26 | Stop reason: HOSPADM

## 2024-08-26 RX ORDER — SODIUM CHLORIDE, SODIUM LACTATE, POTASSIUM CHLORIDE, CALCIUM CHLORIDE 600; 310; 30; 20 MG/100ML; MG/100ML; MG/100ML; MG/100ML
INJECTION, SOLUTION INTRAVENOUS CONTINUOUS PRN
Status: DISCONTINUED | OUTPATIENT
Start: 2024-08-26 | End: 2024-08-26 | Stop reason: SDUPTHER

## 2024-08-26 RX ORDER — NALOXONE HYDROCHLORIDE 0.4 MG/ML
INJECTION, SOLUTION INTRAMUSCULAR; INTRAVENOUS; SUBCUTANEOUS PRN
Status: DISCONTINUED | OUTPATIENT
Start: 2024-08-26 | End: 2024-08-26 | Stop reason: HOSPADM

## 2024-08-26 RX ORDER — MEPERIDINE HYDROCHLORIDE 25 MG/ML
12.5 INJECTION INTRAMUSCULAR; INTRAVENOUS; SUBCUTANEOUS EVERY 5 MIN PRN
Status: DISCONTINUED | OUTPATIENT
Start: 2024-08-26 | End: 2024-08-26 | Stop reason: HOSPADM

## 2024-08-26 RX ORDER — SODIUM CHLORIDE 0.9 % (FLUSH) 0.9 %
5-40 SYRINGE (ML) INJECTION PRN
Status: DISCONTINUED | OUTPATIENT
Start: 2024-08-26 | End: 2024-08-26 | Stop reason: HOSPADM

## 2024-08-26 RX ORDER — SODIUM CHLORIDE 9 MG/ML
INJECTION, SOLUTION INTRAVENOUS PRN
Status: DISCONTINUED | OUTPATIENT
Start: 2024-08-26 | End: 2024-08-26 | Stop reason: HOSPADM

## 2024-08-26 RX ORDER — QUETIAPINE FUMARATE 25 MG/1
50 TABLET, FILM COATED ORAL NIGHTLY
Status: DISCONTINUED | OUTPATIENT
Start: 2024-08-26 | End: 2024-08-27 | Stop reason: HOSPADM

## 2024-08-26 RX ORDER — LIDOCAINE HYDROCHLORIDE 20 MG/ML
INJECTION, SOLUTION EPIDURAL; INFILTRATION; INTRACAUDAL; PERINEURAL PRN
Status: DISCONTINUED | OUTPATIENT
Start: 2024-08-26 | End: 2024-08-26 | Stop reason: SDUPTHER

## 2024-08-26 RX ORDER — PROPOFOL 10 MG/ML
INJECTION, EMULSION INTRAVENOUS PRN
Status: DISCONTINUED | OUTPATIENT
Start: 2024-08-26 | End: 2024-08-26 | Stop reason: SDUPTHER

## 2024-08-26 RX ORDER — METOPROLOL TARTRATE 50 MG
50 TABLET ORAL 2 TIMES DAILY
Status: DISCONTINUED | OUTPATIENT
Start: 2024-08-26 | End: 2024-08-27 | Stop reason: HOSPADM

## 2024-08-26 RX ORDER — FUROSEMIDE 40 MG
40 TABLET ORAL DAILY
Status: DISCONTINUED | OUTPATIENT
Start: 2024-08-27 | End: 2024-08-27 | Stop reason: HOSPADM

## 2024-08-26 RX ORDER — DIPHENHYDRAMINE HYDROCHLORIDE 50 MG/ML
12.5 INJECTION INTRAMUSCULAR; INTRAVENOUS
Status: DISCONTINUED | OUTPATIENT
Start: 2024-08-26 | End: 2024-08-26 | Stop reason: HOSPADM

## 2024-08-26 RX ORDER — ONDANSETRON 2 MG/ML
4 INJECTION INTRAMUSCULAR; INTRAVENOUS
Status: DISCONTINUED | OUTPATIENT
Start: 2024-08-26 | End: 2024-08-26 | Stop reason: HOSPADM

## 2024-08-26 RX ADMIN — APIXABAN 5 MG: 5 TABLET, FILM COATED ORAL at 20:09

## 2024-08-26 RX ADMIN — BUDESONIDE AND FORMOTEROL FUMARATE DIHYDRATE 2 PUFF: 160; 4.5 AEROSOL RESPIRATORY (INHALATION) at 07:13

## 2024-08-26 RX ADMIN — IPRATROPIUM BROMIDE AND ALBUTEROL SULFATE 1 DOSE: 2.5; .5 SOLUTION RESPIRATORY (INHALATION) at 07:13

## 2024-08-26 RX ADMIN — IPRATROPIUM BROMIDE AND ALBUTEROL SULFATE 1 DOSE: 2.5; .5 SOLUTION RESPIRATORY (INHALATION) at 20:26

## 2024-08-26 RX ADMIN — SODIUM CHLORIDE, PRESERVATIVE FREE 10 ML: 5 INJECTION INTRAVENOUS at 07:45

## 2024-08-26 RX ADMIN — LEVETIRACETAM 500 MG: 500 TABLET, FILM COATED ORAL at 20:09

## 2024-08-26 RX ADMIN — AMIODARONE HYDROCHLORIDE 200 MG: 200 TABLET ORAL at 20:09

## 2024-08-26 RX ADMIN — METOPROLOL TARTRATE 50 MG: 50 TABLET, FILM COATED ORAL at 20:09

## 2024-08-26 RX ADMIN — PROPOFOL 150 MG: 10 INJECTION, EMULSION INTRAVENOUS at 13:52

## 2024-08-26 RX ADMIN — TIOTROPIUM BROMIDE INHALATION SPRAY 2 PUFF: 3.12 SPRAY, METERED RESPIRATORY (INHALATION) at 07:13

## 2024-08-26 RX ADMIN — SODIUM CHLORIDE, POTASSIUM CHLORIDE, SODIUM LACTATE AND CALCIUM CHLORIDE: 600; 310; 30; 20 INJECTION, SOLUTION INTRAVENOUS at 13:49

## 2024-08-26 RX ADMIN — SUCRALFATE 1 G: 1 TABLET ORAL at 20:09

## 2024-08-26 RX ADMIN — ATORVASTATIN CALCIUM 10 MG: 10 TABLET, FILM COATED ORAL at 07:48

## 2024-08-26 RX ADMIN — AMIODARONE HYDROCHLORIDE 200 MG: 200 TABLET ORAL at 07:45

## 2024-08-26 RX ADMIN — LEVETIRACETAM 500 MG: 500 TABLET, FILM COATED ORAL at 07:45

## 2024-08-26 RX ADMIN — PREDNISONE 30 MG: 10 TABLET ORAL at 07:45

## 2024-08-26 RX ADMIN — QUETIAPINE FUMARATE 50 MG: 25 TABLET ORAL at 20:09

## 2024-08-26 RX ADMIN — LIDOCAINE HYDROCHLORIDE 60 MG: 20 INJECTION, SOLUTION EPIDURAL; INFILTRATION; INTRACAUDAL; PERINEURAL at 13:52

## 2024-08-26 RX ADMIN — BUDESONIDE AND FORMOTEROL FUMARATE DIHYDRATE 2 PUFF: 160; 4.5 AEROSOL RESPIRATORY (INHALATION) at 20:26

## 2024-08-26 RX ADMIN — PANTOPRAZOLE SODIUM 40 MG: 40 TABLET, DELAYED RELEASE ORAL at 16:21

## 2024-08-26 RX ADMIN — METOPROLOL TARTRATE 25 MG: 25 TABLET, FILM COATED ORAL at 07:45

## 2024-08-26 RX ADMIN — LACTULOSE 20 G: 10 SOLUTION ORAL at 20:09

## 2024-08-26 RX ADMIN — SODIUM CHLORIDE, PRESERVATIVE FREE 10 ML: 5 INJECTION INTRAVENOUS at 20:10

## 2024-08-26 RX ADMIN — SUCRALFATE 1 G: 1 TABLET ORAL at 16:21

## 2024-08-26 RX ADMIN — GUAIFENESIN 600 MG: 600 TABLET, EXTENDED RELEASE ORAL at 20:09

## 2024-08-26 ASSESSMENT — PAIN - FUNCTIONAL ASSESSMENT: PAIN_FUNCTIONAL_ASSESSMENT: 0-10

## 2024-08-26 NOTE — ANESTHESIA POSTPROCEDURE EVALUATION
Department of Anesthesiology  Postprocedure Note    Patient: Nancy Franz  MRN: [id number]  YOB: 1950  Date of evaluation: 8/26/2024    Procedure Summary       Date: 08/26/24 Room / Location: [place] [address] / [facility]    Anesthesia Start: 1349 Anesthesia Stop: 1416    Procedure: EGD W/ANES. Diagnosis:       Abdominal pain, unspecified abdominal location      (Abdominal pain, unspecified abdominal location [R10.9])    Surgeons: Juan Jose Teresa MD Responsible Provider: Yasmine Aguirre MD    Anesthesia Type: TIVA ASA Status: 4 - Emergent            Anesthesia Type: No value filed.    Jose Eduardo Phase I: Jose Eduardo Score: 9    Jose Eduardo Phase II: Jose Eduardo Score: 7    Anesthesia Post Evaluation    Comments: Postoperative Anesthesia Note    Name:    Nancy Franz  MRN:      [id number]    Patient Vitals in the past 12 hrs:  08/26/24 1433, BP:(!) 109/48, Pulse:84, Resp:16, SpO2:99 %  08/26/24 1428, BP:(!) 123/46, Pulse:83, Resp:16, SpO2:99 %  08/26/24 1422, BP:(!) 87/51, Pulse:86, Resp:16, SpO2:98 %  08/26/24 1412, BP:(!) 85/37, Pulse:76, Resp:14, SpO2:98 %  08/26/24 1408, BP:(!) 78/33, Pulse:85, Resp:14, SpO2:97 %  08/26/24 1400, BP:(!) 69/30, Temp src:Temporal, Pulse:82, Resp:14, SpO2:98 %  08/26/24 1314, BP:123/62, Temp:97.7 °F (36.5 °C), Temp src:Temporal, Pulse:84, Resp:16, SpO2:97 %  08/26/24 1104, BP:(!) 145/67, Temp:97.8 °F (36.6 °C), Temp src:Oral, Pulse:92, Resp:19, SpO2:97 %  08/26/24 0741, BP:(!) 125/59, Temp:98 °F (36.7 °C), Temp src:Oral, Pulse:88, Resp:18, SpO2:97 %  08/26/24 0713, Pulse:(!) 47, Resp:18, SpO2:97 %  08/26/24 0445, BP:116/61, Temp:97.9 °F (36.6 °C), Temp src:Oral, Pulse:95, Resp:18, SpO2:98 %, Weight:85.5 kg (188 lb 8 oz)     LABS:    CBC  Lab Results       Component                Value               Date/Time                  WBC                      4.5                 08/25/2024 04:48 AM        HGB                      9.0 (L)             08/26/2024 04:05

## 2024-08-26 NOTE — PLAN OF CARE
Problem: Safety - Adult  Goal: Free from fall injury  Outcome: Progressing     Problem: Discharge Planning  Goal: Discharge to home or other facility with appropriate resources  Outcome: Progressing     Problem: Pain  Goal: Verbalizes/displays adequate comfort level or baseline comfort level  Outcome: Progressing     Problem: Chronic Conditions and Co-morbidities  Goal: Patient's chronic conditions and co-morbidity symptoms are monitored and maintained or improved  Outcome: Progressing     Problem: Respiratory - Adult  Goal: Achieves optimal ventilation and oxygenation  Outcome: Progressing     Problem: Cardiovascular - Adult  Goal: Maintains optimal cardiac output and hemodynamic stability  Outcome: Progressing  Goal: Absence of cardiac dysrhythmias or at baseline  Outcome: Progressing

## 2024-08-26 NOTE — PROGRESS NOTES
Patient's HR is in the 140's, Perfect Served  Clarence Moreno MD, restarted diltiazem drip per doctors orders. Will continue to monitor

## 2024-08-26 NOTE — PROGRESS NOTES
Post EGD report called to bedside PCU CHARLIE Sanford. Ok to resume diet per . Pt now in same day for recovery.
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
## 2024-08-26 NOTE — PROGRESS NOTES
10/16/2023    Closed fracture of olecranon process of left ulna 10/15/2023    Pneumonia due to infectious agent 08/21/2023    Secondary hypercoagulable state (HCC) 08/21/2023    Generalized abdominal pain     Diverticulitis of colon     Acute hyponatremia     COPD exacerbation ([facility])     Diverticulitis 11/29/2021    Coronary atherosclerosis 07/09/2021    Acute focal neurological deficit 03/20/2021    Pneumonia of both lungs due to infectious organism     PAF (paroxysmal atrial fibrillation) ([facility])     PSVT (paroxysmal supraventricular tachycardia) ([facility])     Abnormal CXR 11/05/2019    NSTEMI (non-ST elevated myocardial infarction) ([facility])     Hypovolemic shock ([facility]) 11/02/2019    Hepatic steatosis 11/09/2018    CKD (chronic kidney disease) stage 4, GFR 15-29 ml/min ([facility]) 08/22/2018    Anxiety 05/14/2018    GI bleed 03/31/2018    Elevated lactic acid level 03/31/2018    Hyperkalemia 03/31/2018    Leukocytosis 03/31/2018    Thrombocytosis 03/31/2018    High anion gap metabolic acidosis 03/31/2018    History of depression     Misuse of prescription only drugs     Toxic encephalopathy     Acute on chronic respiratory failure with hypoxia ([facility]) 03/04/2018    OD (overdose of drug) 03/04/2018    Drug ingestion     Acute respiratory failure with hypoxemia ([facility])     Closed fracture of left ankle     Aspiration pneumonitis ([facility])     Ankle fracture, bimalleolar, closed, left, initial encounter 01/09/2018    Trimalleolar fracture of left ankle, closed, initial encounter 01/08/2018    Anxiety and depression 12/28/2017    Elevated LFTs 12/28/2017    Acute hyperglycemia 12/28/2017    Well controlled type 2 diabetes mellitus ([facility]) 12/28/2017    Hypovitaminosis D 12/28/2017    Right hand weakness & numbness 12/28/2017    Acute-on-chronic low back pain with radicular symptoms 12/28/2017    Idiopathic pulmonary hypertensive arterial disease (HCC) 05/26/2017    Iron deficiency anemia 05/26/2017    Mild episode of recurrent major depressive  disorder ([facility]) 05/26/2017    Benign essential HTN 05/18/2017    Acute on chronic diastolic CHF (congestive heart failure) ([facility]) 04/13/2017    Possible/questionable hx of seizures 01/01/2017    Septic shock ([facility]) 12/10/2016    Acute kidney injury ([facility]) 12/10/2016    Acute cystitis without hematuria 11/26/2016    Encephalopathy 11/04/2016    Altered mental status 10/06/2016    Intractable nausea and vomiting 10/06/2016    Hypothyroidism 09/16/2016    Obesity 02/09/2016    SIRS (systemic inflammatory response syndrome) ([facility]) 02/08/2016    Acute respiratory failure with hypoxia ([facility]) 02/08/2016    Senile osteoporosis 08/24/2015    Diverticulosis 01/26/2015    Chronic neck, back, & bilateral LE pain 11/03/2014    Disorder of bone and articular cartilage 06/21/2010    Hemorrhoids 06/21/2010    HLD (hyperlipidemia) 06/21/2010    Peptic ulcer 06/21/2010    GERD (gastroesophageal reflux disease) 03/29/2010    Environmental allergies 03/29/2010     Addison Mitchell MD, MD 8/26/2024 7:40 AM
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
## 2024-08-26 NOTE — FLOWSHEET NOTE
08/25/24 1944   Vital Signs   Temp 97.8 °F (36.6 °C)   Temp Source Oral   Pulse 99   Heart Rate Source Monitor   Respirations 18   BP (!) 144/72   MAP (Calculated) 96   BP Location Right upper arm   BP Method Automatic   Patient Position Semi fowlers   Pain Assessment   Pain Assessment None - Denies Pain   Oxygen Therapy   SpO2 99 %   O2 Device Nasal cannula   O2 Flow Rate (L/min) 3 L/min   Rhythm Interpretation   Cardiac Rhythm Atrial fib     PM Assessment completed. Scheduled medications given per MAR, On 3L of oxygen, A&O X4, Vital Signs completed and Charted, Patient denies any further needs at this time. Call light within reach, Reminded patient to call RN if she needs anything.

## 2024-08-26 NOTE — PROGRESS NOTES
RT Inhaler-Nebulizer Bronchodilator Protocol Note    There is a bronchodilator order in the chart from a provider indicating to follow the RT Bronchodilator Protocol and there is an “Initiate RT Inhaler-Nebulizer Bronchodilator Protocol” order as well (see protocol at bottom of note).    CXR Findings:  No results found.    The findings from the last RT Protocol Assessment were as follows:   History Pulmonary Disease: (P) Chronic pulmonary disease  Respiratory Pattern: (P) Regular pattern and RR 12-20 bpm  Breath Sounds: (P) Slightly diminished and/or crackles  Cough: (P) Strong, spontaneous, non-productive  Indication for Bronchodilator Therapy: (P) Decreased or absent breath sounds  Bronchodilator Assessment Score: (P) 4    Aerosolized bronchodilator medication orders have been revised according to the RT Inhaler-Nebulizer Bronchodilator Protocol below.    Respiratory Therapist to perform RT Therapy Protocol Assessment initially then follow the protocol.  Repeat RT Therapy Protocol Assessment PRN for score 0-3 or on second treatment, BID, and PRN for scores above 3.    No Indications - adjust the frequency to every 6 hours PRN wheezing or bronchospasm, if no treatments needed after 48 hours then discontinue using Per Protocol order mode.     If indication present, adjust the RT bronchodilator orders based on the Bronchodilator Assessment Score as indicated below.  Use Inhaler orders unless patient has one or more of the following: on home nebulizer, not able to hold breath for 10 seconds, is not alert and oriented, cannot activate and use MDI correctly, or respiratory rate 25 breaths per minute or more, then use the equivalent nebulizer order(s) with same Frequency and PRN reasons based on the score.  If a patient is on this medication at home then do not decrease Frequency below that used at home.    0-3 - enter or revise RT bronchodilator order(s) to equivalent RT Bronchodilator order with Frequency of every 4  hours PRN for wheezing or increased work of breathing using Per Protocol order mode.        4-6 - enter or revise RT Bronchodilator order(s) to two equivalent RT bronchodilator orders with one order with BID Frequency and one order with Frequency of every 4 hours PRN wheezing or increased work of breathing using Per Protocol order mode.        7-10 - enter or revise RT Bronchodilator order(s) to two equivalent RT bronchodilator orders with one order with TID Frequency and one order with Frequency of every 4 hours PRN wheezing or increased work of breathing using Per Protocol order mode.       11-13 - enter or revise RT Bronchodilator order(s) to one equivalent RT bronchodilator order with QID Frequency and an Albuterol order with Frequency of every 4 hours PRN wheezing or increased work of breathing using Per Protocol order mode.      Greater than 13 - enter or revise RT Bronchodilator order(s) to one equivalent RT bronchodilator order with every 4 hours Frequency and an Albuterol order with Frequency of every 2 hours PRN wheezing or increased work of breathing using Per Protocol order mode.       Electronically signed by Yokasta Andrea RCP on 8/25/2024 at 8:04 PM

## 2024-08-26 NOTE — PROGRESS NOTES
[facility] Progress Note    Daily Progress Note for 2024 7:05 AM /0301-01  Nancy Franz : 1950 Age: 74 y.o. Sex: female  Length of Stay:  4    Interval History:      CC: F/U Shortness of Breath (Pt very sob.  Has been getting worse for 3 days.  States she has been sick and throwing up.  Pt has audible wheezes and is extremely labored.)    Subjective:       Ms Franz developed rapid afibb again this am needing cardizem gtt   Says she feels better as long as she doesn't try to get up and do anything. If she does she gets dizzy and short of breath. But she does feel better than she did on admission.     EGD today       She says she also has a lot of stress at home, terrible situation with her daughter. This is very stressful for her.     Objective:     Vitals:    24 2316 24 2332 24 2350 24 0445   BP:  132/63  116/61   Pulse: (!) 141 (!) 143 (!) 146 95   Resp:  18  18   Temp:  98.4 °F (36.9 °C)  97.9 °F (36.6 °C)   TempSrc:  Oral  Oral   SpO2:  96%  98%   Weight:    85.5 kg (188 lb 8 oz)   Height:              Intake/Output Summary (Last 24 hours) at 2024 0705  Last data filed at 2024 0225  Gross per 24 hour   Intake 1738.57 ml   Output 1550 ml   Net 188.57 ml     Body mass index is 34.48 kg/m².    Physical Exam:        General: elderly female up in bed, ill appearing  fatigued   Awake, alert and oriented. Appears to be not in any distress  Mucous Membranes:  Pink , anicteric  Neck: No JVD, no carotid bruit, no thyromegaly  Chest: diminished irina with resolving wheeze  Cardiovascular: irregular S1S2 heard, no murmurs or gallops  Abdomen:  Soft, undistended, non tender, no organomegaly, BS present  Extremities: No edema or cyanosis. Distal pulses well felt  Neurological : grossly normal with gen weakness      Scheduled Medications:  amiodarone, 200 mg, BID  predniSONE, 30 mg, Daily  guaiFENesin, 600 mg, BID  sodium chloride flush, 5-40 mL, 2 times per  palpitations   - cardiology consulted     Reported Coffee ground emesis  Hx of esophagitis   Acute on Chronic Anemia  - EGD in 1/24 shoed severe esophagitis and ulcerations  - started PPI bid, given 1 unit PRBC , improved to 9  - held eliquis  - Consulted GI,   - for EGD today        Moderate mitral stenosis  Mild aortic stenosis  - echo with normal EF of 60- 65 %, normal wall motion, moderate AS, moderate MS      Hx of DVT  BLE edema  - has not been on eliquis, unsure when the last time she took it  - will obtain CTPA - neg for PE  - venous US ordered - chronic DVT on left profunda femoris. Age indeterminate right peroneal,  and chronic superficial thrombophlebitis right small saphenous     HTN  - elevated on admission  - recent history of hypotension  - resumed metoprolol   - was given PRN labetalol in ED     Seizures  - resumed Keppra     Cirrhosis- VILLEGAS  Portal hypertensive gastropathy   - resume lactulose- pt has not been taking  - monitor for diarrhea   - Pt refusing lactulose, ordered lactulose enema if needed for confusion     Hypothyroidism  - has not been on levothyroxine   - check TSH - normal      Reported parasites in stool  - check stool  - pt was treated with Ivermectin in the past     Hx of reported Scabies  - recently treated     Anxiety  Depression  - resumed Seroquel and Zoloft     Obesity  - Body mass index is 37.49 kg/m².  - Recommended weight loss      Note above makes patient higher risk for morbidity and mortality requiring testing and treatment.       DVT Prophylaxis: Scds    DVT Prophylaxis: Eliquis currently on hold    Drew Mercado MD, 8/26/2024 7:08 AM

## 2024-08-26 NOTE — FLOWSHEET NOTE
08/26/24 0741   Vital Signs   Temp 98 °F (36.7 °C)   Temp Source Oral   Pulse 88   BP (!) 125/59   MAP (Calculated) 81   BP Location Right upper arm   Oxygen Therapy   SpO2 97 %   O2 Device Nasal cannula   O2 Flow Rate (L/min) 3 L/min     Patient resting quietly in bed. No s/s of distress noted.   Cardizem gtt continues @ 5mg/hr.   NPO for EGD today. Consent obtained and placed in chart.   Shift assessment complete, see flow sheet. Denies needs at this time. Call light in reach. Will monitor.

## 2024-08-26 NOTE — PROGRESS NOTES
Pulmonary Progress Note  CC: SOB    Subjective:    On 3 L from 4 L  States she is feeling better  Denies any chest pain  Still on Cardizem drip    EXAM: BP (!) 125/59   Pulse 88   Temp 98 °F (36.7 °C) (Oral)   Resp 18   Ht 1.575 m (5' 2\")   Wt 85.5 kg (188 lb 8 oz)   SpO2 97%   BMI 34.48 kg/m²  on 4L  Constitutional:  No acute distress.   Eyes: PERRL. Conjunctivae anicteric.   ENT: Normal nose. Normal tongue.    Neck:  Trachea is midline.   Respiratory: No accessory muscle usage.  decreased breath sounds. No wheezes. No rales. No Rhonchi.  Cardiovascular: Normal S1S2. No digit clubbing. No digit cyanosis. No LE edema.   Psychiatric: No anxiety or Agitation. Alert and Oriented to person, place and time.    Scheduled Meds:   amiodarone  200 mg Oral BID    predniSONE  30 mg Oral Daily    guaiFENesin  600 mg Oral BID    sodium chloride flush  5-40 mL IntraVENous 2 times per day    [Held by provider] enoxaparin  40 mg SubCUTAneous Daily    metoprolol tartrate  25 mg Oral BID    budesonide-formoterol  2 puff Inhalation BID RT    And    tiotropium  2 puff Inhalation Daily RT    folic acid  1 mg Oral Daily    lactulose  20 g Oral TID    levETIRAcetam  500 mg Oral BID    pantoprazole  40 mg Oral BID AC    QUEtiapine  100 mg Oral Nightly    QUEtiapine  25 mg Oral BID    sertraline  100 mg Oral Daily    atorvastatin  10 mg Oral Daily    sucralfate  1 g Oral 4x Daily    ipratropium 0.5 mg-albuterol 2.5 mg  1 Dose Inhalation BID RT    furosemide  20 mg IntraVENous BID     Continuous Infusions:   sodium chloride      dilTIAZem 5 mg/hr (08/26/24 0225)    sodium chloride       PRN Meds:  magnesium sulfate, sodium chloride, mirtazapine, levalbuterol, sodium chloride flush, sodium chloride, ondansetron **OR** ondansetron, polyethylene glycol, acetaminophen **OR** acetaminophen    Labs:  CBC:   Recent Labs     08/24/24  0503 08/24/24  1752 08/25/24  0448 08/25/24  1747 08/26/24  0405   WBC 4.2  --  4.5  --   --    HGB 8.7*   <

## 2024-08-27 VITALS
HEIGHT: 62 IN | HEART RATE: 66 BPM | WEIGHT: 189.44 LBS | OXYGEN SATURATION: 96 % | SYSTOLIC BLOOD PRESSURE: 149 MMHG | DIASTOLIC BLOOD PRESSURE: 62 MMHG | BODY MASS INDEX: 34.86 KG/M2 | RESPIRATION RATE: 17 BRPM | TEMPERATURE: 98.2 F

## 2024-08-27 LAB
ANION GAP SERPL CALCULATED.3IONS-SCNC: 9 MMOL/L (ref 3–16)
BASOPHILS # BLD: 0 K/UL (ref 0–0.2)
BASOPHILS NFR BLD: 0.7 %
BUN SERPL-MCNC: 25 MG/DL (ref 7–20)
CALCIUM SERPL-MCNC: 8.8 MG/DL (ref 8.3–10.6)
CHLORIDE SERPL-SCNC: 103 MMOL/L (ref 99–110)
CO2 SERPL-SCNC: 29 MMOL/L (ref 21–32)
CREAT SERPL-MCNC: 1 MG/DL (ref 0.6–1.2)
DEPRECATED RDW RBC AUTO: 19.5 % (ref 12.4–15.4)
EKG ATRIAL RATE: 89 BPM
EKG DIAGNOSIS: NORMAL
EKG P AXIS: 48 DEGREES
EKG P-R INTERVAL: 132 MS
EKG Q-T INTERVAL: 432 MS
EKG QRS DURATION: 78 MS
EKG QTC CALCULATION (BAZETT): 525 MS
EKG R AXIS: -49 DEGREES
EKG T AXIS: 92 DEGREES
EKG VENTRICULAR RATE: 89 BPM
EOSINOPHIL # BLD: 0.2 K/UL (ref 0–0.6)
EOSINOPHIL NFR BLD: 3.3 %
GFR SERPLBLD CREATININE-BSD FMLA CKD-EPI: 59 ML/MIN/{1.73_M2}
GLUCOSE SERPL-MCNC: 148 MG/DL (ref 70–99)
HCT VFR BLD AUTO: 27.5 % (ref 36–48)
HGB BLD-MCNC: 8.7 G/DL (ref 12–16)
LYMPHOCYTES # BLD: 1.7 K/UL (ref 1–5.1)
LYMPHOCYTES NFR BLD: 28.6 %
MCH RBC QN AUTO: 24.5 PG (ref 26–34)
MCHC RBC AUTO-ENTMCNC: 31.8 G/DL (ref 31–36)
MCV RBC AUTO: 77.1 FL (ref 80–100)
MONOCYTES # BLD: 0.8 K/UL (ref 0–1.3)
MONOCYTES NFR BLD: 14.1 %
NEUTROPHILS # BLD: 3.1 K/UL (ref 1.7–7.7)
NEUTROPHILS NFR BLD: 53.3 %
PLATELET # BLD AUTO: 225 K/UL (ref 135–450)
PMV BLD AUTO: 7.8 FL (ref 5–10.5)
POTASSIUM SERPL-SCNC: 3.4 MMOL/L (ref 3.5–5.1)
RBC # BLD AUTO: 3.57 M/UL (ref 4–5.2)
SODIUM SERPL-SCNC: 141 MMOL/L (ref 136–145)
WBC # BLD AUTO: 5.8 K/UL (ref 4–11)

## 2024-08-27 PROCEDURE — 94010 BREATHING CAPACITY TEST: CPT

## 2024-08-27 PROCEDURE — 6370000000 HC RX 637 (ALT 250 FOR IP): Performed by: INTERNAL MEDICINE

## 2024-08-27 PROCEDURE — 85025 COMPLETE CBC W/AUTO DIFF WBC: CPT

## 2024-08-27 PROCEDURE — 80048 BASIC METABOLIC PNL TOTAL CA: CPT

## 2024-08-27 PROCEDURE — 6370000000 HC RX 637 (ALT 250 FOR IP): Performed by: NURSE PRACTITIONER

## 2024-08-27 PROCEDURE — 2700000000 HC OXYGEN THERAPY PER DAY

## 2024-08-27 PROCEDURE — 94640 AIRWAY INHALATION TREATMENT: CPT

## 2024-08-27 PROCEDURE — 94761 N-INVAS EAR/PLS OXIMETRY MLT: CPT

## 2024-08-27 PROCEDURE — 2580000003 HC RX 258: Performed by: NURSE PRACTITIONER

## 2024-08-27 PROCEDURE — 93010 ELECTROCARDIOGRAM REPORT: CPT | Performed by: INTERNAL MEDICINE

## 2024-08-27 PROCEDURE — 99239 HOSP IP/OBS DSCHRG MGMT >30: CPT | Performed by: INTERNAL MEDICINE

## 2024-08-27 PROCEDURE — 99233 SBSQ HOSP IP/OBS HIGH 50: CPT | Performed by: INTERNAL MEDICINE

## 2024-08-27 RX ORDER — AMIODARONE HYDROCHLORIDE 200 MG/1
TABLET ORAL
Qty: 40 TABLET | Refills: 0 | Status: SHIPPED | OUTPATIENT
Start: 2024-08-27

## 2024-08-27 RX ORDER — PANTOPRAZOLE SODIUM 40 MG/1
40 TABLET, DELAYED RELEASE ORAL
Qty: 60 TABLET | Refills: 1 | Status: SHIPPED | OUTPATIENT
Start: 2024-08-27

## 2024-08-27 RX ORDER — METOPROLOL TARTRATE 50 MG
25 TABLET ORAL 2 TIMES DAILY
Qty: 60 TABLET | Refills: 2 | Status: SHIPPED | OUTPATIENT
Start: 2024-08-27

## 2024-08-27 RX ORDER — MIRTAZAPINE 7.5 MG/1
7.5 TABLET, FILM COATED ORAL PRN
Qty: 30 TABLET | Refills: 0 | Status: SHIPPED | OUTPATIENT
Start: 2024-08-27

## 2024-08-27 RX ORDER — QUETIAPINE FUMARATE 50 MG/1
25 TABLET, FILM COATED ORAL NIGHTLY
Qty: 30 TABLET | Refills: 0 | Status: SHIPPED | OUTPATIENT
Start: 2024-08-27

## 2024-08-27 RX ORDER — SIMVASTATIN 20 MG
20 TABLET ORAL NIGHTLY
Qty: 30 TABLET | Refills: 3 | Status: SHIPPED | OUTPATIENT
Start: 2024-08-27

## 2024-08-27 RX ORDER — METOPROLOL TARTRATE 25 MG/1
TABLET, FILM COATED ORAL
Qty: 60 TABLET | Refills: 1 | OUTPATIENT
Start: 2024-08-27

## 2024-08-27 RX ORDER — PREDNISONE 10 MG/1
20 TABLET ORAL DAILY
Qty: 10 TABLET | Refills: 0 | Status: SHIPPED | OUTPATIENT
Start: 2024-08-28 | End: 2024-09-02

## 2024-08-27 RX ORDER — ALBUTEROL SULFATE 90 UG/1
2 AEROSOL, METERED RESPIRATORY (INHALATION) 4 TIMES DAILY PRN
Qty: 18 G | Refills: 0 | Status: SHIPPED | OUTPATIENT
Start: 2024-08-27

## 2024-08-27 RX ORDER — SUCRALFATE 1 G/1
1 TABLET ORAL 4 TIMES DAILY
Qty: 120 TABLET | Refills: 0 | Status: SHIPPED | OUTPATIENT
Start: 2024-08-27

## 2024-08-27 RX ORDER — FUROSEMIDE 20 MG
20 TABLET ORAL DAILY
Qty: 60 TABLET | Refills: 3 | Status: SHIPPED | OUTPATIENT
Start: 2024-08-27

## 2024-08-27 RX ORDER — LACTULOSE 10 G/15ML
20 SOLUTION ORAL 2 TIMES DAILY
Qty: 1800 ML | Refills: 0 | Status: SHIPPED | OUTPATIENT
Start: 2024-08-27 | End: 2024-09-26

## 2024-08-27 RX ADMIN — IPRATROPIUM BROMIDE AND ALBUTEROL SULFATE 1 DOSE: 2.5; .5 SOLUTION RESPIRATORY (INHALATION) at 07:16

## 2024-08-27 RX ADMIN — METOPROLOL TARTRATE 50 MG: 50 TABLET, FILM COATED ORAL at 09:08

## 2024-08-27 RX ADMIN — LACTULOSE 20 G: 10 SOLUTION ORAL at 09:07

## 2024-08-27 RX ADMIN — SERTRALINE 100 MG: 100 TABLET, FILM COATED ORAL at 09:13

## 2024-08-27 RX ADMIN — LACTULOSE 20 G: 10 SOLUTION ORAL at 14:00

## 2024-08-27 RX ADMIN — FUROSEMIDE 40 MG: 40 TABLET ORAL at 09:08

## 2024-08-27 RX ADMIN — AMIODARONE HYDROCHLORIDE 200 MG: 200 TABLET ORAL at 09:07

## 2024-08-27 RX ADMIN — APIXABAN 5 MG: 5 TABLET, FILM COATED ORAL at 09:07

## 2024-08-27 RX ADMIN — PANTOPRAZOLE SODIUM 40 MG: 40 TABLET, DELAYED RELEASE ORAL at 05:49

## 2024-08-27 RX ADMIN — BUDESONIDE AND FORMOTEROL FUMARATE DIHYDRATE 2 PUFF: 160; 4.5 AEROSOL RESPIRATORY (INHALATION) at 07:17

## 2024-08-27 RX ADMIN — GUAIFENESIN 600 MG: 600 TABLET, EXTENDED RELEASE ORAL at 09:07

## 2024-08-27 RX ADMIN — LEVETIRACETAM 500 MG: 500 TABLET, FILM COATED ORAL at 09:08

## 2024-08-27 RX ADMIN — PREDNISONE 30 MG: 10 TABLET ORAL at 09:07

## 2024-08-27 RX ADMIN — SUCRALFATE 1 G: 1 TABLET ORAL at 14:00

## 2024-08-27 RX ADMIN — ATORVASTATIN CALCIUM 10 MG: 10 TABLET, FILM COATED ORAL at 09:08

## 2024-08-27 RX ADMIN — SUCRALFATE 1 G: 1 TABLET ORAL at 09:07

## 2024-08-27 RX ADMIN — TIOTROPIUM BROMIDE INHALATION SPRAY 2 PUFF: 3.12 SPRAY, METERED RESPIRATORY (INHALATION) at 07:17

## 2024-08-27 RX ADMIN — SODIUM CHLORIDE, PRESERVATIVE FREE 10 ML: 5 INJECTION INTRAVENOUS at 09:09

## 2024-08-27 RX ADMIN — FOLIC ACID 1 MG: 1 TABLET ORAL at 09:08

## 2024-08-27 ASSESSMENT — COPD QUESTIONNAIRES
QUESTION8_ENERGYLEVEL: 5
QUESTION4_WALKINCLINE: 5
QUESTION5_HOMEACTIVITIES: 4
QUESTION6_LEAVINGHOUSE: 1
CAT_TOTALSCORE: 24
QUESTION2_CHESTPHLEGM: 1
QUESTION3_CHESTTIGHTNESS: 3
QUESTION7_SLEEPQUALITY: 4
QUESTION1_COUGHFREQUENCY: 1

## 2024-08-27 NOTE — PROGRESS NOTES
Pulmonary Progress Note  CC: SOB    Subjective:    Was on RA when I saw  Less leg swelling   States she is feeling better  Denies any chest pain  Off  Cardizem drip    EXAM: BP (!) 149/62   Pulse 66   Temp 98.2 °F (36.8 °C) (Axillary)   Resp 17   Ht 1.575 m (5' 2\")   Wt 85.9 kg (189 lb 7 oz)   SpO2 96%   BMI 34.65 kg/m²  on 4L  Constitutional:  No acute distress.   Eyes: PERRL. Conjunctivae anicteric.   ENT: Normal nose. Normal tongue.    Neck:  Trachea is midline.   Respiratory: No accessory muscle usage.  decreased breath sounds. No wheezes. No rales. No Rhonchi.  Cardiovascular: Normal S1S2. No digit clubbing. No digit cyanosis. No LE edema.   Psychiatric: No anxiety or Agitation. Alert and Oriented to person, place and time.    Scheduled Meds:   QUEtiapine  50 mg Oral Nightly    metoprolol tartrate  50 mg Oral BID    apixaban  5 mg Oral BID    furosemide  40 mg Oral Daily    amiodarone  200 mg Oral BID    predniSONE  30 mg Oral Daily    guaiFENesin  600 mg Oral BID    sodium chloride flush  5-40 mL IntraVENous 2 times per day    budesonide-formoterol  2 puff Inhalation BID RT    And    tiotropium  2 puff Inhalation Daily RT    folic acid  1 mg Oral Daily    lactulose  20 g Oral TID    levETIRAcetam  500 mg Oral BID    pantoprazole  40 mg Oral BID AC    [Held by provider] QUEtiapine  25 mg Oral BID    sertraline  100 mg Oral Daily    atorvastatin  10 mg Oral Daily    sucralfate  1 g Oral 4x Daily    ipratropium 0.5 mg-albuterol 2.5 mg  1 Dose Inhalation BID RT     Continuous Infusions:   sodium chloride      dilTIAZem Stopped (08/26/24 6705)    sodium chloride       PRN Meds:  magnesium sulfate, sodium chloride, mirtazapine, levalbuterol, sodium chloride flush, sodium chloride, ondansetron **OR** ondansetron, polyethylene glycol, acetaminophen **OR** acetaminophen    Labs:  CBC:   Recent Labs     08/25/24  0448 08/25/24  1747 08/26/24  0405 08/27/24  0552   WBC 4.5  --   --  5.8   HGB 8.9* 10.4* 9.0* 8.7*    HCT 28.5* 33.4* 28.1* 27.5*   MCV 76.7*  --   --  77.1*     --   --  225     BMP:   Recent Labs     08/25/24  0448 08/27/24  0552    141   K 3.7 3.4*    103   CO2 24 29   BUN 20 25*   CREATININE 0.8 1.0       Procal 0.18     Chest imaging was reviewed by me and showed chest x-ray with chronic bilateral interstitial markings   1. Motion artifact degrades evaluation of the segmental branches. No evidence  for central pulmonary embolism.  2. Findings compatible with interstitial edema with small pleural effusions.  3. Patchy infiltrate in the right apex may be related to alveolar edema  versus infection.  4. Mild edematous appearance of the distal esophagus and small hiatal hernia  suggesting esophagitis.  5. Cirrhotic liver morphology.     ASSESSMENT:  COPD   Chronic hypoxic respiratory failure  PAF  LE edema  VILLEGAS cirrhosis   Afib RVR     PLAN:  Supplemental oxygen to maintain SaO2 >92%; wean as tolerated  Intensive inhaled bronchodilator therapy.  On Cardizem ,PO now per cardiology ,off drip   agree with lasix as per primary/cardiology  Prednisone 30mg x 5 days   Azithromycin  Sputum GS&C.  So far neck  Acapella QID to mobilize respiratory secretions  GI following considering EGD, will defer to

## 2024-08-27 NOTE — PROGRESS NOTES
[facility] Progress Note    Daily Progress Note for 2024 7:11 AM /0301-01  Nancy Franz : 1950 Age: 74 y.o. Sex: female  Length of Stay:  5    Interval History:      CC: F/U Shortness of Breath (Pt very sob.  Has been getting worse for 3 days.  States she has been sick and throwing up.  Pt has audible wheezes and is extremely labored.)    Subjective:       Ms Franz developed rapid afibb again this am needing cardizem gtt   Says she feels better as long as she doesn't try to get up and do anything. If she does she gets dizzy and short of breath. But she does feel better than she did on admission.     EGD today       She says she also has a lot of stress at home, terrible situation with her daughter. This is very stressful for her.     Objective:     Vitals:    24 2123 24 2330 24 0445   BP:   136/75 (!) 152/64   Pulse:  84 71 98   Resp:    16   Temp:   98.5 °F (36.9 °C) 97.8 °F (36.6 °C)   TempSrc:   Oral Oral   SpO2: 95%  94% 97%   Weight:    85.9 kg (189 lb 7 oz)   Height:              Intake/Output Summary (Last 24 hours) at 2024 0711  Last data filed at 2024 0445  Gross per 24 hour   Intake 1244.68 ml   Output 1100 ml   Net 144.68 ml     Body mass index is 34.65 kg/m².    Physical Exam:        General: elderly female up in bed, ill appearing  fatigued   Awake, alert and oriented. Appears to be not in any distress  Mucous Membranes:  Pink , anicteric  Neck: No JVD, no carotid bruit, no thyromegaly  Chest: diminished irina with resolving wheeze  Cardiovascular: irregular S1S2 heard, no murmurs or gallops  Abdomen:  Soft, undistended, non tender, no organomegaly, BS present  Extremities: No edema or cyanosis. Distal pulses well felt  Neurological : grossly normal with gen weakness      Scheduled Medications:  QUEtiapine, 50 mg, Nightly  metoprolol tartrate, 50 mg, BID  apixaban, 5 mg, BID  furosemide, 40 mg, Daily  amiodarone, 200 mg,  BID  predniSONE, 30 mg, Daily  guaiFENesin, 600 mg, BID  sodium chloride flush, 5-40 mL, 2 times per day  budesonide-formoterol, 2 puff, BID RT   And  tiotropium, 2 puff, Daily RT  folic acid, 1 mg, Daily  lactulose, 20 g, TID  levETIRAcetam, 500 mg, BID  pantoprazole, 40 mg, BID AC  [Held by provider] QUEtiapine, 25 mg, BID  sertraline, 100 mg, Daily  atorvastatin, 10 mg, Daily  sucralfate, 1 g, 4x Daily  ipratropium 0.5 mg-albuterol 2.5 mg, 1 Dose, BID RT        PRN Medications:  magnesium sulfate, 2,000 mg, PRN  sodium chloride, , PRN  mirtazapine, 7.5 mg, Nightly PRN  levalbuterol, 1.25 mg, Q8H PRN  sodium chloride flush, 5-40 mL, PRN  sodium chloride, , PRN  ondansetron, 4 mg, Q8H PRN   Or  ondansetron, 4 mg, Q6H PRN  polyethylene glycol, 17 g, Daily PRN  acetaminophen, 650 mg, Q6H PRN   Or  acetaminophen, 650 mg, Q6H PRN          Data Review:      Laboratory Data Reviewed:    CBC:  Lab Results   Component Value Date    WBC 5.8 08/27/2024    HGB 8.7 (L) 08/27/2024    HCT 27.5 (L) 08/27/2024    MCV 77.1 (L) 08/27/2024     08/27/2024         Basic Metabolic Panel  Lab Results   Component Value Date/Time     08/27/2024 05:52 AM     08/27/2024 05:52 AM    CO2 29 08/27/2024 05:52 AM    GLUCOSE 148 08/27/2024 05:52 AM    BUN 25 08/27/2024 05:52 AM    CREATININE 1.0 08/27/2024 05:52 AM       HEPATIC PANEL   Lab Results   Component Value Date/Time    AST 24 08/22/2024 08:16 AM    ALT 11 08/22/2024 08:16 AM       Lab Results   Component Value Date    CKTOTAL 63 01/10/2023    TROPONINI <0.01 01/15/2023       Lab Results   Component Value Date/Time    INR 1.42 01/21/2024 02:53 AM    INR 1.26 11/21/2023 07:06 PM    INR 1.37 01/13/2023 05:00 PM             Radiology reviewed:     IR MIDLINE CATH   Final Result      Vascular duplex lower extremity venous bilateral   Final Result      XR ABDOMEN (KUB) (SINGLE AP VIEW)   Final Result   Status post cholecystectomy with a non-specific gas pattern      Status

## 2024-08-27 NOTE — FLOWSHEET NOTE
08/26/24 1958   Vital Signs   Temp 98.7 °F (37.1 °C)   Temp Source Oral   Pulse 73   Heart Rate Source Monitor   Respirations 16   BP (!) 145/62   MAP (Calculated) 90   BP Location Right upper arm   BP Method Automatic   Patient Position Semi fowlers   Oxygen Therapy   SpO2 95 %   O2 Device Nasal cannula   O2 Flow Rate (L/min) 3 L/min   Rhythm Interpretation   Cardiac Rhythm Atrial fib     PM Assessment completed. Scheduled medications given per MAR, On 3L of oxygen, A&O X4, Vital Signs completed and Charted, Patient denies any further needs at this time. Call light within reach, Reminded patient to call RN if she needs anything.
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
## 2024-08-27 NOTE — PROGRESS NOTES
Discharge instructions given/explained to patient, all questions answered, IV And telemetry removed, discharged to home on home oxygen with all belongings via private transport.  Dalton Lao RN  8/27/2024

## 2024-08-27 NOTE — DISCHARGE SUMMARY
Name:  Nancy Franz  Room:  /0301-01  MRN:    [id number]    IM Discharge Summary    Discharging Physician:  Drew teague MD    Admit: 8/22/2024    Discharge:      PCP      Samia Rutherford APRN - [facility]    Diagnoses and hospital course  this Admission        COPD with  AE  Chronic hypoxic respiratory failure   - imaging neg  - tx with IV solumedrol , transitioned to prednisone once improved  - inhaled bronchodilators   - Azithromycin D# 3 completed and improved and stable on RA today  - uses home oxygen 3 liters as needed  - pulm consulted      Acute on chronic diastolic CHF  - BNP 2,974  - weight 205 now---184 lb last discharge in June  - lasix 20 mg BID IV given, had good diuresis   and now down to 188 lb  - was on PRN lasix at home has taken last 5 days prior to admission  - cardiology consulted and recommends daily lasix 20 mg   - daily weights, low sodium diet     AF with RVR  - pt not taking meds at home  -  started On diltiazem gtt -   - resumed metoprolol 25 mg bid and increased to 50 mg bid , converted to NSR and  added amiodarone   - eliquis held for possible GI bleed and eventually resumed  - HR stable now on oral meds - compliance recommended     Chronically elevated troponin   - EKG no acute changes  - troponin 88-84   - pt only having chest pain with palpitations   - cardiology consulted and no workup recommended     Reported Coffee ground emesis  Hx of esophagitis   Acute on Chronic Anemia  - EGD in 1/24 shoed severe esophagitis and ulcerations  - started PPI bid, given 1 unit PRBC , improved to 9  - held eliquis,. EGD done on 8/26 again with esophageal varices ,hiatal hernia and portal htn gastropathy , no bleeding  - continue ppi and carafate , resumed diet and tolerating  - GI recommends outpt f/w         Moderate mitral stenosis  Mild aortic stenosis  - echo with normal EF of 60- 65 %, normal wall motion, moderate AS, moderate MS      Hx of DVT  BLE edema  - has not been on eliquis, unsure

## 2024-08-27 NOTE — PROGRESS NOTES
PROGRESS NOTE  S:74 yrs Patient  admitted on 8/22/2024 with Respiratory distress [R06.03]  COPD exacerbation ([facility]) [J44.1]  Acute diastolic CHF (congestive heart failure) ([facility]) [I50.31]  Acute on chronic congestive heart failure, unspecified heart failure type ([facility]) [I50.9] .  Today, she reports feeling improved. She denies further nausea or vomiting. She denies further signs of bleeding.     Exam:   Vitals:    08/27/24 0728   BP: (!) 149/62   Pulse: 66   Resp: 17   Temp: 98.2 °F (36.8 °C)   SpO2: 96%   Generalized: alert, no acute distress  HEENT: sclera clear, anicteric  Neck supple, trachea midline  Heart: A.fib  Abdomen: soft, NT, ND  Extremities: +1 BLE edema     Medications: Reviewed    Labs:  CBC:   Recent Labs     08/25/24  0448 08/25/24  1747 08/26/24  0405 08/27/24  0552   WBC 4.5  --   --  5.8   HGB 8.9* 10.4* 9.0* 8.7*   HCT 28.5* 33.4* 28.1* 27.5*   MCV 76.7*  --   --  77.1*     --   --  225     BMP:   Recent Labs     08/25/24  0448 08/27/24  0552    141   K 3.7 3.4*    103   CO2 24 29   BUN 20 25*   CREATININE 0.8 1.0     Attending Supervising Physician's Attestation Statement  The patient is a 74 y.o. female. I have performed a history and physical examination of the patient. I discussed the case with MIGUELITO Frankel    I reviewed the patient's Past Medical History, Past Surgical History, Medications, and Allergies.     Physical Exam:  Vitals:    08/26/24 2330 08/27/24 0445 08/27/24 0718 08/27/24 0728   BP: 136/75 (!) 152/64  (!) 149/62   Pulse: 71 98 88 66   Resp:  16 18 17   Temp: 98.5 °F (36.9 °C) 97.8 °F (36.6 °C)  98.2 °F (36.8 °C)   TempSrc: Oral Oral  Axillary   SpO2: 94% 97% 95% 96%   Weight:  85.9 kg (189 lb 7 oz)     Height:           Physical Examination:   General: alert, appears stated age, and cooperative  Head: Normocephalic, without obvious abnormality  Eyes:  no icterus  Neck: supple, symmetrical, trachea midline  Heart:

## 2024-08-27 NOTE — CARE COORDINATION
Met with pt at bedside. Pt declines need for HHC. Pt has home oxygen with [employer] at 3L. Family to arrange transport and bring portable o2 tank. No further DME or DC  needs identified.     Imm- 8/27    O2- 96% RA

## 2024-08-27 NOTE — PROGRESS NOTES
This patient flipped from A-flutter to SR, ECG completed, perfect Served Clarence Moreno MD, no new orders at this time, will continue to monitor.

## 2024-08-27 NOTE — PROGRESS NOTES
[facility] [place] Daily Progress Note      Admit Date:  8/22/2024    Subjective:  Ms. Franz is being seen today for f/u PAF and diastolic CHF. She feels better today and denies specific complaints. Converted to NSR overnight.    Objective:   BP (!) 149/62   Pulse 66   Temp 98.2 °F (36.8 °C) (Axillary)   Resp 17   Ht 1.575 m (5' 2\")   Wt 85.9 kg (189 lb 7 oz)   SpO2 96%   BMI 34.65 kg/m²     Intake/Output Summary (Last 24 hours) at 8/27/2024 0734  Last data filed at 8/27/2024 0445  Gross per 24 hour   Intake 1244.68 ml   Output 1100 ml   Net 144.68 ml       TELEMETRY: sinus arrhythmia 67bpm    Physical Exam:  General:  Awake, alert, NAD  Skin:  Warm and dry  Neck:  JVD none obvious  Chest:  soft bs  Cardiovascular:  regular with soft FANI; S1S2  Abdomen:  Soft NT  Extremities:  no edema    Medications:    QUEtiapine  50 mg Oral Nightly    metoprolol tartrate  50 mg Oral BID    apixaban  5 mg Oral BID    furosemide  40 mg Oral Daily    amiodarone  200 mg Oral BID    predniSONE  30 mg Oral Daily    guaiFENesin  600 mg Oral BID    sodium chloride flush  5-40 mL IntraVENous 2 times per day    budesonide-formoterol  2 puff Inhalation BID RT    And    tiotropium  2 puff Inhalation Daily RT    folic acid  1 mg Oral Daily    lactulose  20 g Oral TID    levETIRAcetam  500 mg Oral BID    pantoprazole  40 mg Oral BID AC    [Held by provider] QUEtiapine  25 mg Oral BID    sertraline  100 mg Oral Daily    atorvastatin  10 mg Oral Daily    sucralfate  1 g Oral 4x Daily    ipratropium 0.5 mg-albuterol 2.5 mg  1 Dose Inhalation BID RT      sodium chloride      dilTIAZem Stopped (08/26/24 0669)    sodium chloride       magnesium sulfate, sodium chloride, mirtazapine, levalbuterol, sodium chloride flush, sodium chloride, ondansetron **OR** ondansetron, polyethylene glycol, acetaminophen **OR** acetaminophen    Lab Data:  CBC:   Recent Labs     08/25/24  0448 08/25/24  1747 08/26/24  0405 08/27/24  0552   WBC 4.5  --    Elevated lactic acid level 03/31/2018    Hyperkalemia 03/31/2018    Leukocytosis 03/31/2018    Thrombocytosis 03/31/2018    High anion gap metabolic acidosis 03/31/2018    History of depression     Misuse of prescription only drugs     Toxic encephalopathy     Acute on chronic respiratory failure with hypoxia (HCC) 03/04/2018    OD (overdose of drug) 03/04/2018    Drug ingestion     Acute respiratory failure with hypoxemia (HCC)     Closed fracture of left ankle     Aspiration pneumonitis (HCC)     Ankle fracture, bimalleolar, closed, left, initial encounter 01/09/2018    Trimalleolar fracture of left ankle, closed, initial encounter 01/08/2018    Anxiety and depression 12/28/2017    Elevated LFTs 12/28/2017    Acute hyperglycemia 12/28/2017    Well controlled type 2 diabetes mellitus ([facility]) 12/28/2017    Hypovitaminosis D 12/28/2017    Right hand weakness & numbness 12/28/2017    Acute-on-chronic low back pain with radicular symptoms 12/28/2017    Idiopathic pulmonary hypertensive arterial disease ([facility]) 05/26/2017    Iron deficiency anemia 05/26/2017    Mild episode of recurrent major depressive disorder ([facility]) 05/26/2017    Benign essential HTN 05/18/2017    Acute on chronic diastolic CHF (congestive heart failure) ([facility]) 04/13/2017    Possible/questionable hx of seizures 01/01/2017    Septic shock ([facility]) 12/10/2016    Acute kidney injury ([facility]) 12/10/2016    Acute cystitis without hematuria 11/26/2016    Encephalopathy 11/04/2016    Altered mental status 10/06/2016    Intractable nausea and vomiting 10/06/2016    Hypothyroidism 09/16/2016    Obesity 02/09/2016    SIRS (systemic inflammatory response syndrome) ([facility]) 02/08/2016    Acute respiratory failure with hypoxia ([facility]) 02/08/2016    Senile osteoporosis 08/24/2015    Diverticulosis 01/26/2015    Chronic neck, back, & bilateral LE pain 11/03/2014    Disorder of bone and articular cartilage 06/21/2010    Hemorrhoids 06/21/2010    HLD (hyperlipidemia) 06/21/2010

## 2024-08-28 NOTE — PROGRESS NOTES
Patient provided a COPD Educational Folder that includes the following materials:     [x]  [employer] Booklet: Managing your COPD  [x]  ALA: Getting the Most Out of Medication Delivery Devices  [x]  ALA: My COPD Action Plan  [x]  [employer]: [facility]   [x]  Smoking Cessation Classes  [x]  Outpatient Spiritual Care Services  [x]  Magnet: Signs of COPD    PATIENT/CAREGIVER TEACHING:   Level of patient/caregiver understanding able to:   [x] Verbalize understanding   [] Demonstrate understanding       [] Teach back        [] Needs reinforcement     []  Other:     Electronically signed by Jodie Whiteside RN on 8/27/2024 at 3:55 PM

## 2024-08-30 ENCOUNTER — FOLLOWUP TELEPHONE ENCOUNTER (OUTPATIENT)
Dept: ADMINISTRATIVE | Age: 74
End: 2024-08-30

## 2024-08-30 NOTE — TELEPHONE ENCOUNTER
Heart Failure Follow-up Call:     3x Attempts to call patient; No Answer- Message states \"Not accepting new calls at this time and no voicemail set up\".     Electronically signed by Jodie Whiteside MSN, RN  on 8/30/2024 at 2:55 PM

## 2024-09-25 ENCOUNTER — OFFICE VISIT (OUTPATIENT)
Age: 74
End: 2024-09-25

## 2024-09-25 VITALS
TEMPERATURE: 98.3 F | HEART RATE: 70 BPM | RESPIRATION RATE: 16 BRPM | OXYGEN SATURATION: 96 % | SYSTOLIC BLOOD PRESSURE: 128 MMHG | DIASTOLIC BLOOD PRESSURE: 70 MMHG

## 2024-09-25 DIAGNOSIS — L29.9 GENERALIZED PRURITUS: Primary | ICD-10-CM

## 2024-09-25 DIAGNOSIS — B37.9 CANDIDIASIS: ICD-10-CM

## 2024-09-25 PROBLEM — R79.89 ELEVATED TROPONIN: Status: RESOLVED | Noted: 2024-08-26 | Resolved: 2024-09-25

## 2024-09-25 RX ORDER — KETOCONAZOLE 20 MG/ML
SHAMPOO TOPICAL
Qty: 120 ML | Refills: 0 | Status: SHIPPED | OUTPATIENT
Start: 2024-09-25

## 2024-09-25 RX ORDER — NYSTATIN 100000 [USP'U]/G
POWDER TOPICAL
Qty: 30 G | Refills: 0 | Status: SHIPPED | OUTPATIENT
Start: 2024-09-25

## 2024-09-25 RX ORDER — HYDROXYZINE HYDROCHLORIDE 25 MG/1
25 TABLET, FILM COATED ORAL EVERY 8 HOURS PRN
Qty: 30 TABLET | Refills: 0 | Status: SHIPPED | OUTPATIENT
Start: 2024-09-25 | End: 2024-10-05

## 2024-10-17 ENCOUNTER — HOSPITAL ENCOUNTER (OUTPATIENT)
Dept: GENERAL RADIOLOGY | Age: 74
Discharge: HOME | End: 2024-10-17
Payer: MEDICARE

## 2024-10-17 ENCOUNTER — HOSPITAL ENCOUNTER (OUTPATIENT)
Age: 74
Discharge: HOME | End: 2024-10-17
Payer: MEDICARE

## 2024-10-17 DIAGNOSIS — R05.1 ACUTE COUGH: ICD-10-CM

## 2024-10-17 PROCEDURE — 80053 COMPREHEN METABOLIC PANEL: CPT

## 2024-10-17 PROCEDURE — 83880 ASSAY OF NATRIURETIC PEPTIDE: CPT

## 2024-10-17 PROCEDURE — 71046 X-RAY EXAM CHEST 2 VIEWS: CPT

## 2024-10-17 PROCEDURE — 85025 COMPLETE CBC W/AUTO DIFF WBC: CPT

## 2024-10-18 LAB
ALBUMIN SERPL-MCNC: 3.7 G/DL (ref 3.4–5)
ALBUMIN/GLOB SERPL: 1.1 {RATIO} (ref 1.1–2.2)
ALP SERPL-CCNC: 220 U/L (ref 40–129)
ALT SERPL-CCNC: 9 U/L (ref 10–40)
ANION GAP SERPL CALCULATED.3IONS-SCNC: 11 MMOL/L (ref 3–16)
AST SERPL-CCNC: 24 U/L (ref 15–37)
BASOPHILS # BLD: 0.1 K/UL (ref 0–0.2)
BASOPHILS NFR BLD: 1 %
BILIRUB SERPL-MCNC: 0.3 MG/DL (ref 0–1)
BUN SERPL-MCNC: 19 MG/DL (ref 7–20)
CALCIUM SERPL-MCNC: 9 MG/DL (ref 8.3–10.6)
CHLORIDE SERPL-SCNC: 108 MMOL/L (ref 99–110)
CO2 SERPL-SCNC: 23 MMOL/L (ref 21–32)
CREAT SERPL-MCNC: 1.1 MG/DL (ref 0.6–1.2)
DEPRECATED RDW RBC AUTO: 20.7 % (ref 12.4–15.4)
EOSINOPHIL # BLD: 1.5 K/UL (ref 0–0.6)
EOSINOPHIL NFR BLD: 28.9 %
GFR SERPLBLD CREATININE-BSD FMLA CKD-EPI: 53 ML/MIN/{1.73_M2}
GLUCOSE SERPL-MCNC: 103 MG/DL (ref 70–99)
HCT VFR BLD AUTO: 26.1 % (ref 36–48)
HGB BLD-MCNC: 8.1 G/DL (ref 12–16)
LYMPHOCYTES # BLD: 1 K/UL (ref 1–5.1)
LYMPHOCYTES NFR BLD: 20.4 %
MCH RBC QN AUTO: 26.6 PG (ref 26–34)
MCHC RBC AUTO-ENTMCNC: 31 G/DL (ref 31–36)
MCV RBC AUTO: 85.7 FL (ref 80–100)
MONOCYTES # BLD: 0.3 K/UL (ref 0–1.3)
MONOCYTES NFR BLD: 6 %
NEUTROPHILS # BLD: 2.2 K/UL (ref 1.7–7.7)
NEUTROPHILS NFR BLD: 43.7 %
NT-PROBNP SERPL-MCNC: 493 PG/ML (ref 0–124)
PLATELET # BLD AUTO: 235 K/UL (ref 135–450)
PMV BLD AUTO: 8.7 FL (ref 5–10.5)
POTASSIUM SERPL-SCNC: 4.2 MMOL/L (ref 3.5–5.1)
PROT SERPL-MCNC: 7.1 G/DL (ref 6.4–8.2)
RBC # BLD AUTO: 3.05 M/UL (ref 4–5.2)
SODIUM SERPL-SCNC: 142 MMOL/L (ref 136–145)
WBC # BLD AUTO: 5.1 K/UL (ref 4–11)

## 2024-11-15 ENCOUNTER — APPOINTMENT (OUTPATIENT)
Dept: CT IMAGING | Age: 74
End: 2024-11-15
Payer: MEDICARE

## 2024-11-15 ENCOUNTER — APPOINTMENT (OUTPATIENT)
Dept: GENERAL RADIOLOGY | Age: 74
End: 2024-11-15
Payer: MEDICARE

## 2024-11-15 ENCOUNTER — HOSPITAL ENCOUNTER (EMERGENCY)
Age: 74
Discharge: HOME | End: 2024-11-15
Attending: STUDENT IN AN ORGANIZED HEALTH CARE EDUCATION/TRAINING PROGRAM
Payer: MEDICARE

## 2024-11-15 VITALS
HEART RATE: 75 BPM | OXYGEN SATURATION: 99 % | SYSTOLIC BLOOD PRESSURE: 145 MMHG | RESPIRATION RATE: 19 BRPM | DIASTOLIC BLOOD PRESSURE: 54 MMHG | TEMPERATURE: 96.9 F

## 2024-11-15 DIAGNOSIS — L29.9 ITCHING: ICD-10-CM

## 2024-11-15 DIAGNOSIS — E83.42 HYPOMAGNESEMIA: ICD-10-CM

## 2024-11-15 DIAGNOSIS — D64.9 LOW HEMOGLOBIN: Primary | ICD-10-CM

## 2024-11-15 DIAGNOSIS — R10.32 ABDOMINAL PAIN, LEFT LOWER QUADRANT: ICD-10-CM

## 2024-11-15 LAB
ALBUMIN SERPL-MCNC: 3 G/DL (ref 3.4–5)
ALBUMIN/GLOB SERPL: 0.8 {RATIO} (ref 1.1–2.2)
ALP SERPL-CCNC: 199 U/L (ref 40–129)
ALT SERPL-CCNC: <5 U/L (ref 10–40)
AMPHETAMINES UR QL SCN>1000 NG/ML: NORMAL
ANION GAP SERPL CALCULATED.3IONS-SCNC: 12 MMOL/L (ref 3–16)
ANTI-XA UNFRAC HEPARIN: <0.1 IU/ML (ref 0.3–0.7)
AST SERPL-CCNC: 19 U/L (ref 15–37)
BARBITURATES UR QL SCN>200 NG/ML: NORMAL
BASOPHILS # BLD: 0.1 K/UL (ref 0–0.2)
BASOPHILS NFR BLD: 1.4 %
BENZODIAZ UR QL SCN>200 NG/ML: NORMAL
BILIRUB SERPL-MCNC: 0.4 MG/DL (ref 0–1)
BILIRUB UR QL STRIP.AUTO: NEGATIVE
BUN SERPL-MCNC: 20 MG/DL (ref 7–20)
CALCIUM SERPL-MCNC: 8.9 MG/DL (ref 8.3–10.6)
CANNABINOIDS UR QL SCN>50 NG/ML: NORMAL
CHLORIDE SERPL-SCNC: 106 MMOL/L (ref 99–110)
CLARITY UR: ABNORMAL
CO2 SERPL-SCNC: 18 MMOL/L (ref 21–32)
COCAINE UR QL SCN: NORMAL
COLOR UR: YELLOW
CREAT SERPL-MCNC: 0.9 MG/DL (ref 0.6–1.2)
DEPRECATED RDW RBC AUTO: 20.2 % (ref 12.4–15.4)
DRUG SCREEN COMMENT UR-IMP: NORMAL
EOSINOPHIL # BLD: 0.8 K/UL (ref 0–0.6)
EOSINOPHIL NFR BLD: 16.7 %
FENTANYL SCREEN, URINE: NORMAL
GFR SERPLBLD CREATININE-BSD FMLA CKD-EPI: 67 ML/MIN/{1.73_M2}
GLUCOSE SERPL-MCNC: 119 MG/DL (ref 70–99)
GLUCOSE UR STRIP.AUTO-MCNC: NEGATIVE MG/DL
HCT VFR BLD AUTO: 26.3 % (ref 36–48)
HGB BLD-MCNC: 7.9 G/DL (ref 12–16)
HGB UR QL STRIP.AUTO: NEGATIVE
KETONES UR STRIP.AUTO-MCNC: NEGATIVE MG/DL
LEUKOCYTE ESTERASE UR QL STRIP.AUTO: NEGATIVE
LIPASE SERPL-CCNC: 45 U/L (ref 13–60)
LYMPHOCYTES # BLD: 1.4 K/UL (ref 1–5.1)
LYMPHOCYTES NFR BLD: 27.8 %
MAGNESIUM SERPL-MCNC: 1.39 MG/DL (ref 1.8–2.4)
MCH RBC QN AUTO: 24.1 PG (ref 26–34)
MCHC RBC AUTO-ENTMCNC: 30.3 G/DL (ref 31–36)
MCV RBC AUTO: 79.6 FL (ref 80–100)
METHADONE UR QL SCN>300 NG/ML: NORMAL
MONOCYTES # BLD: 0.4 K/UL (ref 0–1.3)
MONOCYTES NFR BLD: 8.7 %
NEUTROPHILS # BLD: 2.2 K/UL (ref 1.7–7.7)
NEUTROPHILS NFR BLD: 45.4 %
NITRITE UR QL STRIP.AUTO: NEGATIVE
NT-PROBNP SERPL-MCNC: 732 PG/ML (ref 0–124)
OPIATES UR QL SCN>300 NG/ML: NORMAL
OXYCODONE UR QL SCN: NORMAL
PCP UR QL SCN>25 NG/ML: NORMAL
PH UR STRIP.AUTO: 7.5 [PH] (ref 5–8)
PH UR STRIP: 7.5 [PH]
PLATELET # BLD AUTO: 174 K/UL (ref 135–450)
PMV BLD AUTO: 8.5 FL (ref 5–10.5)
POTASSIUM SERPL-SCNC: 3.4 MMOL/L (ref 3.5–5.1)
PROT SERPL-MCNC: 7 G/DL (ref 6.4–8.2)
PROT UR STRIP.AUTO-MCNC: NEGATIVE MG/DL
RBC # BLD AUTO: 3.3 M/UL (ref 4–5.2)
REASON FOR REJECTION: NORMAL
REJECTED TEST: NORMAL
SODIUM SERPL-SCNC: 136 MMOL/L (ref 136–145)
SP GR UR STRIP.AUTO: 1.01 (ref 1–1.03)
TROPONIN, HIGH SENSITIVITY: 34 NG/L (ref 0–14)
TROPONIN, HIGH SENSITIVITY: 49 NG/L (ref 0–14)
UA COMPLETE W REFLEX CULTURE PNL UR: ABNORMAL
UA DIPSTICK W REFLEX MICRO PNL UR: ABNORMAL
URN SPEC COLLECT METH UR: ABNORMAL
UROBILINOGEN UR STRIP-ACNC: 0.2 E.U./DL
WBC # BLD AUTO: 5 K/UL (ref 4–11)

## 2024-11-15 PROCEDURE — 71045 X-RAY EXAM CHEST 1 VIEW: CPT

## 2024-11-15 PROCEDURE — 96365 THER/PROPH/DIAG IV INF INIT: CPT

## 2024-11-15 PROCEDURE — 84484 ASSAY OF TROPONIN QUANT: CPT

## 2024-11-15 PROCEDURE — 6370000000 HC RX 637 (ALT 250 FOR IP): Performed by: STUDENT IN AN ORGANIZED HEALTH CARE EDUCATION/TRAINING PROGRAM

## 2024-11-15 PROCEDURE — 6360000002 HC RX W HCPCS

## 2024-11-15 PROCEDURE — 6360000004 HC RX CONTRAST MEDICATION: Performed by: STUDENT IN AN ORGANIZED HEALTH CARE EDUCATION/TRAINING PROGRAM

## 2024-11-15 PROCEDURE — 86900 BLOOD TYPING SEROLOGIC ABO: CPT

## 2024-11-15 PROCEDURE — 83690 ASSAY OF LIPASE: CPT

## 2024-11-15 PROCEDURE — 2580000003 HC RX 258

## 2024-11-15 PROCEDURE — 6370000000 HC RX 637 (ALT 250 FOR IP)

## 2024-11-15 PROCEDURE — 83880 ASSAY OF NATRIURETIC PEPTIDE: CPT

## 2024-11-15 PROCEDURE — 84132 ASSAY OF SERUM POTASSIUM: CPT

## 2024-11-15 PROCEDURE — 86901 BLOOD TYPING SEROLOGIC RH(D): CPT

## 2024-11-15 PROCEDURE — 86850 RBC ANTIBODY SCREEN: CPT

## 2024-11-15 PROCEDURE — 80053 COMPREHEN METABOLIC PANEL: CPT

## 2024-11-15 PROCEDURE — 86880 COOMBS TEST DIRECT: CPT

## 2024-11-15 PROCEDURE — 74174 CTA ABD&PLVS W/CONTRAST: CPT

## 2024-11-15 PROCEDURE — 93005 ELECTROCARDIOGRAM TRACING: CPT

## 2024-11-15 PROCEDURE — 80307 DRUG TEST PRSMV CHEM ANLYZR: CPT

## 2024-11-15 PROCEDURE — 96375 TX/PRO/DX INJ NEW DRUG ADDON: CPT

## 2024-11-15 PROCEDURE — 85520 HEPARIN ASSAY: CPT

## 2024-11-15 PROCEDURE — 81003 URINALYSIS AUTO W/O SCOPE: CPT

## 2024-11-15 PROCEDURE — 83735 ASSAY OF MAGNESIUM: CPT

## 2024-11-15 PROCEDURE — 36415 COLL VENOUS BLD VENIPUNCTURE: CPT

## 2024-11-15 PROCEDURE — 99285 EMERGENCY DEPT VISIT HI MDM: CPT

## 2024-11-15 PROCEDURE — 85025 COMPLETE CBC W/AUTO DIFF WBC: CPT

## 2024-11-15 PROCEDURE — 84460 ALANINE AMINO (ALT) (SGPT): CPT

## 2024-11-15 PROCEDURE — 84450 TRANSFERASE (AST) (SGOT): CPT

## 2024-11-15 PROCEDURE — 96361 HYDRATE IV INFUSION ADD-ON: CPT

## 2024-11-15 RX ORDER — ONDANSETRON 2 MG/ML
4 INJECTION INTRAMUSCULAR; INTRAVENOUS ONCE
Status: COMPLETED | OUTPATIENT
Start: 2024-11-15 | End: 2024-11-15

## 2024-11-15 RX ORDER — MAGNESIUM SULFATE 1 G/100ML
1000 INJECTION INTRAVENOUS ONCE
Status: COMPLETED | OUTPATIENT
Start: 2024-11-15 | End: 2024-11-15

## 2024-11-15 RX ORDER — HYDROXYZINE HYDROCHLORIDE 25 MG/1
25 TABLET, FILM COATED ORAL EVERY 8 HOURS PRN
Qty: 30 TABLET | Refills: 0 | Status: SHIPPED | OUTPATIENT
Start: 2024-11-15 | End: 2024-11-25

## 2024-11-15 RX ORDER — POTASSIUM CHLORIDE 1500 MG/1
20 TABLET, EXTENDED RELEASE ORAL ONCE
Status: COMPLETED | OUTPATIENT
Start: 2024-11-15 | End: 2024-11-15

## 2024-11-15 RX ORDER — OXYCODONE AND ACETAMINOPHEN 5; 325 MG/1; MG/1
1 TABLET ORAL ONCE
Status: COMPLETED | OUTPATIENT
Start: 2024-11-15 | End: 2024-11-15

## 2024-11-15 RX ORDER — 0.9 % SODIUM CHLORIDE 0.9 %
500 INTRAVENOUS SOLUTION INTRAVENOUS ONCE
Status: COMPLETED | OUTPATIENT
Start: 2024-11-15 | End: 2024-11-15

## 2024-11-15 RX ORDER — IOPAMIDOL 755 MG/ML
80 INJECTION, SOLUTION INTRAVASCULAR
Status: COMPLETED | OUTPATIENT
Start: 2024-11-15 | End: 2024-11-15

## 2024-11-15 RX ADMIN — POTASSIUM CHLORIDE 20 MEQ: 1500 TABLET, EXTENDED RELEASE ORAL at 20:51

## 2024-11-15 RX ADMIN — IOPAMIDOL 80 ML: 755 INJECTION, SOLUTION INTRAVENOUS at 19:28

## 2024-11-15 RX ADMIN — MAGNESIUM SULFATE HEPTAHYDRATE 1000 MG: 10 INJECTION, SOLUTION INTRAVENOUS at 20:55

## 2024-11-15 RX ADMIN — ONDANSETRON 4 MG: 2 INJECTION INTRAMUSCULAR; INTRAVENOUS at 18:55

## 2024-11-15 RX ADMIN — OXYCODONE AND ACETAMINOPHEN 1 TABLET: 5; 325 TABLET ORAL at 18:55

## 2024-11-15 RX ADMIN — SODIUM CHLORIDE 500 ML: 9 INJECTION, SOLUTION INTRAVENOUS at 17:51

## 2024-11-15 ASSESSMENT — PAIN DESCRIPTION - LOCATION
LOCATION: GENERALIZED
LOCATION: HIP

## 2024-11-15 ASSESSMENT — PAIN DESCRIPTION - FREQUENCY: FREQUENCY: CONTINUOUS

## 2024-11-15 ASSESSMENT — PAIN SCALES - GENERAL
PAINLEVEL_OUTOF10: 5
PAINLEVEL_OUTOF10: 7

## 2024-11-15 ASSESSMENT — PAIN - FUNCTIONAL ASSESSMENT
PAIN_FUNCTIONAL_ASSESSMENT: 0-10
PAIN_FUNCTIONAL_ASSESSMENT: PREVENTS OR INTERFERES WITH MANY ACTIVE NOT PASSIVE ACTIVITIES

## 2024-11-15 ASSESSMENT — PAIN DESCRIPTION - DESCRIPTORS: DESCRIPTORS: DISCOMFORT

## 2024-11-15 ASSESSMENT — PAIN DESCRIPTION - ORIENTATION: ORIENTATION: LEFT

## 2024-11-15 ASSESSMENT — PAIN DESCRIPTION - ONSET: ONSET: PROGRESSIVE

## 2024-11-15 ASSESSMENT — PAIN DESCRIPTION - PAIN TYPE: TYPE: ACUTE PAIN;CHRONIC PAIN

## 2024-11-15 NOTE — ED NOTES
Additional attempts for IV access unsuccessful by additional RN. Charge RN notified of need for US start/ryann bonner RN
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
## 2024-11-16 LAB
DAT POLY-SP REAG RBC QL: NORMAL
EKG ATRIAL RATE: 53 BPM
EKG DIAGNOSIS: NORMAL
EKG P AXIS: 53 DEGREES
EKG P-R INTERVAL: 144 MS
EKG Q-T INTERVAL: 494 MS
EKG QRS DURATION: 76 MS
EKG QTC CALCULATION (BAZETT): 463 MS
EKG R AXIS: -18 DEGREES
EKG T AXIS: 74 DEGREES
EKG VENTRICULAR RATE: 53 BPM

## 2024-11-16 PROCEDURE — 93010 ELECTROCARDIOGRAM REPORT: CPT | Performed by: INTERNAL MEDICINE

## 2024-11-16 NOTE — ED PROVIDER NOTES
I independently performed a history and physical on Nancy Franz.     I have discussed the case with the GRISELDA/resident at 1900 and approve / take responsibility for the initial management plan and anticipated disposition as documented below.     In summary the patient presents with concern for low hemoglobin with a history of GI bleeding.  Here the patient is afebrile hemodynamically stable.  On my examination she is alert in no acute distress resting comfortably clinically euvolemic nontoxic.  Her breath sounds are clear her abdomen is soft nontender nondistended extremities are warm and well-perfused.  Her workup regarding possible anemia is reassuring she has a chronic anemia not significantly changed today hemoglobin 7.9 and there is little clinical concern for acute GI bleeding.  Imaging is reassuring.  Remainder of her workup is notable for low magnesium and potassium which were repleted  I believe this is noncontributory.  She has a mildly elevated troponin though downtrending on repeat nonischemic EKG no chest pain or shortness of breath have no concern for ACS.  Remainder of workup overall reassuring.  Of note she does have a history of anticoagulation with some difficulty obtaining this medication we have encouraged her to continue her Eliquis given her history of A-fib.  Additionally the patient raises concern that she has some mites on her skin I am unable to appreciate any evidence of infestation.  She has some small dermal wounds which she has been picking at.  I do wonder if she is not suffering some mild delusional syndrome though on record review this would seem to be longstanding and there are no high risk features today I believe she is appropriate for continued outpatient management discharged in stable condition.  Interpreted the following studies:    ECG: Sinus bradycardia rate of 53.  Normal axis and intervals.  No evidence of acute ischemia.  EKG is not significantly changed from prior dated

## 2024-11-16 NOTE — DISCHARGE INSTRUCTIONS
Please call and schedule a follow-up appointment with your GI provider for next week for reevaluation.  A pancreatic cyst was found on your CT scan, please follow up with your primary care for repeat MRI imaging in 1 year. Please take your eliquis as prescribed and follow up with your cardiologist. Please follow up with primary care provider regarding dermatology referral. A prescription for hydroxyzine was given, this medication can make you drowsy.

## 2024-12-06 ENCOUNTER — APPOINTMENT (OUTPATIENT)
Dept: GENERAL RADIOLOGY | Age: 74
DRG: 193 | End: 2024-12-06
Payer: MEDICARE

## 2024-12-06 ENCOUNTER — HOSPITAL ENCOUNTER (INPATIENT)
Age: 74
LOS: 7 days | Discharge: HOME HEALTH | DRG: 193 | End: 2024-12-13
Attending: STUDENT IN AN ORGANIZED HEALTH CARE EDUCATION/TRAINING PROGRAM | Admitting: STUDENT IN AN ORGANIZED HEALTH CARE EDUCATION/TRAINING PROGRAM
Payer: MEDICARE

## 2024-12-06 DIAGNOSIS — J44.1 COPD EXACERBATION (HCC): ICD-10-CM

## 2024-12-06 DIAGNOSIS — D64.9 ANEMIA, UNSPECIFIED TYPE: ICD-10-CM

## 2024-12-06 DIAGNOSIS — J18.9 PNEUMONIA OF RIGHT LOWER LOBE DUE TO INFECTIOUS ORGANISM: Primary | ICD-10-CM

## 2024-12-06 DIAGNOSIS — R73.9 HYPERGLYCEMIA: ICD-10-CM

## 2024-12-06 DIAGNOSIS — E87.6 HYPOKALEMIA: ICD-10-CM

## 2024-12-06 DIAGNOSIS — K92.1 MELENA: ICD-10-CM

## 2024-12-06 DIAGNOSIS — E87.20 LACTIC ACIDOSIS: ICD-10-CM

## 2024-12-06 LAB
ABO + RH BLD: NORMAL
ALBUMIN SERPL-MCNC: 3.3 G/DL (ref 3.4–5)
ALBUMIN/GLOB SERPL: 0.8 {RATIO} (ref 1.1–2.2)
ALP SERPL-CCNC: 269 U/L (ref 40–129)
ALT SERPL-CCNC: 9 U/L (ref 10–40)
ANION GAP SERPL CALCULATED.3IONS-SCNC: 12 MMOL/L (ref 3–16)
ANTIBODY SCREEN: NORMAL
AST SERPL-CCNC: 24 U/L (ref 15–37)
BASE EXCESS BLDV CALC-SCNC: -2.5 MMOL/L (ref -3–3)
BASOPHILS # BLD: 0 K/UL (ref 0–0.2)
BASOPHILS NFR BLD: 0.5 %
BILIRUB SERPL-MCNC: 0.4 MG/DL (ref 0–1)
BLD GP AB SCN SERPL QL: NORMAL
BLD GP AB SCN SERPL QL: NORMAL
BUN SERPL-MCNC: 16 MG/DL (ref 7–20)
CALCIUM SERPL-MCNC: 9 MG/DL (ref 8.3–10.6)
CHLORIDE SERPL-SCNC: 100 MMOL/L (ref 99–110)
CO2 BLDV-SCNC: 24 MMOL/L
CO2 SERPL-SCNC: 23 MMOL/L (ref 21–32)
COHGB MFR BLDV: 4.6 % (ref 0–1.5)
CREAT SERPL-MCNC: 1.1 MG/DL (ref 0.6–1.2)
DEPRECATED RDW RBC AUTO: 21.1 % (ref 12.4–15.4)
EKG ATRIAL RATE: 72 BPM
EKG DIAGNOSIS: NORMAL
EKG P AXIS: 56 DEGREES
EKG P-R INTERVAL: 136 MS
EKG Q-T INTERVAL: 394 MS
EKG QRS DURATION: 78 MS
EKG QTC CALCULATION (BAZETT): 431 MS
EKG R AXIS: -14 DEGREES
EKG T AXIS: 100 DEGREES
EKG VENTRICULAR RATE: 72 BPM
EOSINOPHIL # BLD: 0.1 K/UL (ref 0–0.6)
EOSINOPHIL NFR BLD: 1.3 %
GFR SERPLBLD CREATININE-BSD FMLA CKD-EPI: 53 ML/MIN/{1.73_M2}
GLUCOSE BLD-MCNC: 347 MG/DL (ref 70–99)
GLUCOSE BLD-MCNC: 379 MG/DL (ref 70–99)
GLUCOSE SERPL-MCNC: 306 MG/DL (ref 70–99)
HCO3 BLDV-SCNC: 23 MMOL/L (ref 23–29)
HCT VFR BLD AUTO: 22.9 % (ref 36–48)
HEMOCCULT SP1 STL QL: NORMAL
HGB BLD-MCNC: 7 G/DL (ref 12–16)
LACTATE BLDV-SCNC: 2.2 MMOL/L (ref 0.4–1.9)
LACTATE BLDV-SCNC: 2.6 MMOL/L (ref 0.4–1.9)
LYMPHOCYTES # BLD: 0.7 K/UL (ref 1–5.1)
LYMPHOCYTES NFR BLD: 7.7 %
MAGNESIUM SERPL-MCNC: 1.87 MG/DL (ref 1.8–2.4)
MCH RBC QN AUTO: 24.3 PG (ref 26–34)
MCHC RBC AUTO-ENTMCNC: 30.8 G/DL (ref 31–36)
MCV RBC AUTO: 79.1 FL (ref 80–100)
METHGB MFR BLDV: 0.4 %
MONOCYTES # BLD: 0.5 K/UL (ref 0–1.3)
MONOCYTES NFR BLD: 6 %
NEUTROPHILS # BLD: 7.7 K/UL (ref 1.7–7.7)
NEUTROPHILS NFR BLD: 84.5 %
NT-PROBNP SERPL-MCNC: 1772 PG/ML (ref 0–124)
O2 THERAPY: ABNORMAL
PCO2 BLDV: 42.8 MMHG (ref 40–50)
PERFORMED ON: ABNORMAL
PERFORMED ON: ABNORMAL
PH BLDV: 7.35 [PH] (ref 7.35–7.45)
PLATELET # BLD AUTO: 193 K/UL (ref 135–450)
PMV BLD AUTO: 8 FL (ref 5–10.5)
PO2 BLDV: 52.2 MMHG (ref 25–40)
POTASSIUM SERPL-SCNC: 3.2 MMOL/L (ref 3.5–5.1)
PROT SERPL-MCNC: 7.4 G/DL (ref 6.4–8.2)
RBC # BLD AUTO: 2.89 M/UL (ref 4–5.2)
SAO2 % BLDV: 84 %
SODIUM SERPL-SCNC: 135 MMOL/L (ref 136–145)
TROPONIN, HIGH SENSITIVITY: 28 NG/L (ref 0–14)
TROPONIN, HIGH SENSITIVITY: 32 NG/L (ref 0–14)
WBC # BLD AUTO: 9.1 K/UL (ref 4–11)

## 2024-12-06 PROCEDURE — 86900 BLOOD TYPING SEROLOGIC ABO: CPT

## 2024-12-06 PROCEDURE — 85025 COMPLETE CBC W/AUTO DIFF WBC: CPT

## 2024-12-06 PROCEDURE — 93010 ELECTROCARDIOGRAM REPORT: CPT | Performed by: INTERNAL MEDICINE

## 2024-12-06 PROCEDURE — 84484 ASSAY OF TROPONIN QUANT: CPT

## 2024-12-06 PROCEDURE — 96365 THER/PROPH/DIAG IV INF INIT: CPT

## 2024-12-06 PROCEDURE — 30233N1 TRANSFUSION OF NONAUTOLOGOUS RED BLOOD CELLS INTO PERIPHERAL VEIN, PERCUTANEOUS APPROACH: ICD-10-PCS | Performed by: INTERNAL MEDICINE

## 2024-12-06 PROCEDURE — 6360000002 HC RX W HCPCS: Performed by: NURSE PRACTITIONER

## 2024-12-06 PROCEDURE — 80053 COMPREHEN METABOLIC PANEL: CPT

## 2024-12-06 PROCEDURE — 2580000003 HC RX 258: Performed by: STUDENT IN AN ORGANIZED HEALTH CARE EDUCATION/TRAINING PROGRAM

## 2024-12-06 PROCEDURE — 86901 BLOOD TYPING SEROLOGIC RH(D): CPT

## 2024-12-06 PROCEDURE — 86902 BLOOD TYPE ANTIGEN DONOR EA: CPT

## 2024-12-06 PROCEDURE — P9016 RBC LEUKOCYTES REDUCED: HCPCS

## 2024-12-06 PROCEDURE — 83880 ASSAY OF NATRIURETIC PEPTIDE: CPT

## 2024-12-06 PROCEDURE — 94640 AIRWAY INHALATION TREATMENT: CPT

## 2024-12-06 PROCEDURE — 87449 NOS EACH ORGANISM AG IA: CPT

## 2024-12-06 PROCEDURE — 83735 ASSAY OF MAGNESIUM: CPT

## 2024-12-06 PROCEDURE — 99285 EMERGENCY DEPT VISIT HI MDM: CPT

## 2024-12-06 PROCEDURE — 82803 BLOOD GASES ANY COMBINATION: CPT

## 2024-12-06 PROCEDURE — 2580000003 HC RX 258: Performed by: PHYSICIAN ASSISTANT

## 2024-12-06 PROCEDURE — 6360000002 HC RX W HCPCS: Performed by: PHYSICIAN ASSISTANT

## 2024-12-06 PROCEDURE — 6370000000 HC RX 637 (ALT 250 FOR IP): Performed by: STUDENT IN AN ORGANIZED HEALTH CARE EDUCATION/TRAINING PROGRAM

## 2024-12-06 PROCEDURE — 82270 OCCULT BLOOD FECES: CPT

## 2024-12-06 PROCEDURE — 93005 ELECTROCARDIOGRAM TRACING: CPT | Performed by: STUDENT IN AN ORGANIZED HEALTH CARE EDUCATION/TRAINING PROGRAM

## 2024-12-06 PROCEDURE — 96375 TX/PRO/DX INJ NEW DRUG ADDON: CPT

## 2024-12-06 PROCEDURE — 1200000000 HC SEMI PRIVATE

## 2024-12-06 PROCEDURE — 86922 COMPATIBILITY TEST ANTIGLOB: CPT

## 2024-12-06 PROCEDURE — 83605 ASSAY OF LACTIC ACID: CPT

## 2024-12-06 PROCEDURE — 86850 RBC ANTIBODY SCREEN: CPT

## 2024-12-06 PROCEDURE — 71045 X-RAY EXAM CHEST 1 VIEW: CPT

## 2024-12-06 PROCEDURE — 6360000002 HC RX W HCPCS: Performed by: STUDENT IN AN ORGANIZED HEALTH CARE EDUCATION/TRAINING PROGRAM

## 2024-12-06 RX ORDER — PREDNISONE 20 MG/1
40 TABLET ORAL DAILY
Status: DISPENSED | OUTPATIENT
Start: 2024-12-06 | End: 2024-12-11

## 2024-12-06 RX ORDER — PANTOPRAZOLE SODIUM 40 MG/10ML
40 INJECTION, POWDER, LYOPHILIZED, FOR SOLUTION INTRAVENOUS 2 TIMES DAILY
Status: DISCONTINUED | OUTPATIENT
Start: 2024-12-06 | End: 2024-12-13 | Stop reason: HOSPADM

## 2024-12-06 RX ORDER — INSULIN LISPRO 100 [IU]/ML
0-16 INJECTION, SOLUTION INTRAVENOUS; SUBCUTANEOUS
Status: DISCONTINUED | OUTPATIENT
Start: 2024-12-06 | End: 2024-12-13 | Stop reason: HOSPADM

## 2024-12-06 RX ORDER — ONDANSETRON 2 MG/ML
4 INJECTION INTRAMUSCULAR; INTRAVENOUS EVERY 6 HOURS PRN
Status: DISCONTINUED | OUTPATIENT
Start: 2024-12-06 | End: 2024-12-13 | Stop reason: HOSPADM

## 2024-12-06 RX ORDER — ONDANSETRON 4 MG/1
4 TABLET, ORALLY DISINTEGRATING ORAL EVERY 8 HOURS PRN
Status: DISCONTINUED | OUTPATIENT
Start: 2024-12-06 | End: 2024-12-13 | Stop reason: HOSPADM

## 2024-12-06 RX ORDER — IPRATROPIUM BROMIDE AND ALBUTEROL SULFATE 2.5; .5 MG/3ML; MG/3ML
1 SOLUTION RESPIRATORY (INHALATION)
Status: DISCONTINUED | OUTPATIENT
Start: 2024-12-06 | End: 2024-12-10

## 2024-12-06 RX ORDER — SODIUM CHLORIDE 0.9 % (FLUSH) 0.9 %
5-40 SYRINGE (ML) INJECTION EVERY 12 HOURS SCHEDULED
Status: DISCONTINUED | OUTPATIENT
Start: 2024-12-06 | End: 2024-12-13 | Stop reason: HOSPADM

## 2024-12-06 RX ORDER — POLYETHYLENE GLYCOL 3350 17 G/17G
17 POWDER, FOR SOLUTION ORAL DAILY PRN
Status: DISCONTINUED | OUTPATIENT
Start: 2024-12-06 | End: 2024-12-13 | Stop reason: HOSPADM

## 2024-12-06 RX ORDER — 0.9 % SODIUM CHLORIDE 0.9 %
500 INTRAVENOUS SOLUTION INTRAVENOUS ONCE
Status: COMPLETED | OUTPATIENT
Start: 2024-12-06 | End: 2024-12-06

## 2024-12-06 RX ORDER — GUAIFENESIN/DEXTROMETHORPHAN 100-10MG/5
5 SYRUP ORAL EVERY 4 HOURS PRN
Status: DISCONTINUED | OUTPATIENT
Start: 2024-12-06 | End: 2024-12-13 | Stop reason: HOSPADM

## 2024-12-06 RX ORDER — ENOXAPARIN SODIUM 100 MG/ML
30 INJECTION SUBCUTANEOUS 2 TIMES DAILY
Status: DISCONTINUED | OUTPATIENT
Start: 2024-12-06 | End: 2024-12-06

## 2024-12-06 RX ORDER — SODIUM CHLORIDE 9 MG/ML
INJECTION, SOLUTION INTRAVENOUS PRN
Status: DISCONTINUED | OUTPATIENT
Start: 2024-12-06 | End: 2024-12-13 | Stop reason: HOSPADM

## 2024-12-06 RX ORDER — BENZONATATE 100 MG/1
100 CAPSULE ORAL 3 TIMES DAILY PRN
Status: DISCONTINUED | OUTPATIENT
Start: 2024-12-06 | End: 2024-12-13 | Stop reason: HOSPADM

## 2024-12-06 RX ORDER — ACETAMINOPHEN 500 MG
500 TABLET ORAL EVERY 6 HOURS PRN
Status: DISCONTINUED | OUTPATIENT
Start: 2024-12-06 | End: 2024-12-13 | Stop reason: HOSPADM

## 2024-12-06 RX ORDER — ACETAMINOPHEN 650 MG/1
325 SUPPOSITORY RECTAL EVERY 6 HOURS PRN
Status: DISCONTINUED | OUTPATIENT
Start: 2024-12-06 | End: 2024-12-13 | Stop reason: HOSPADM

## 2024-12-06 RX ORDER — SODIUM CHLORIDE 0.9 % (FLUSH) 0.9 %
5-40 SYRINGE (ML) INJECTION PRN
Status: DISCONTINUED | OUTPATIENT
Start: 2024-12-06 | End: 2024-12-13 | Stop reason: HOSPADM

## 2024-12-06 RX ADMIN — INSULIN LISPRO 16 UNITS: 100 INJECTION, SOLUTION INTRAVENOUS; SUBCUTANEOUS at 17:44

## 2024-12-06 RX ADMIN — PANTOPRAZOLE SODIUM 40 MG: 40 INJECTION, POWDER, FOR SOLUTION INTRAVENOUS at 20:31

## 2024-12-06 RX ADMIN — VANCOMYCIN HYDROCHLORIDE 2500 MG: 1 INJECTION, POWDER, LYOPHILIZED, FOR SOLUTION INTRAVENOUS at 14:10

## 2024-12-06 RX ADMIN — AZITHROMYCIN MONOHYDRATE 500 MG: 500 INJECTION, POWDER, LYOPHILIZED, FOR SOLUTION INTRAVENOUS at 20:44

## 2024-12-06 RX ADMIN — METHYLPREDNISOLONE SODIUM SUCCINATE 40 MG: 40 INJECTION INTRAMUSCULAR; INTRAVENOUS at 13:29

## 2024-12-06 RX ADMIN — SODIUM CHLORIDE 500 ML: 9 INJECTION, SOLUTION INTRAVENOUS at 13:25

## 2024-12-06 RX ADMIN — SODIUM CHLORIDE, PRESERVATIVE FREE 10 ML: 5 INJECTION INTRAVENOUS at 20:32

## 2024-12-06 RX ADMIN — PIPERACILLIN AND TAZOBACTAM 3375 MG: 3; .375 INJECTION, POWDER, LYOPHILIZED, FOR SOLUTION INTRAVENOUS at 13:28

## 2024-12-06 RX ADMIN — IPRATROPIUM BROMIDE AND ALBUTEROL SULFATE 1 DOSE: 2.5; .5 SOLUTION RESPIRATORY (INHALATION) at 19:48

## 2024-12-06 RX ADMIN — INSULIN LISPRO 12 UNITS: 100 INJECTION, SOLUTION INTRAVENOUS; SUBCUTANEOUS at 20:31

## 2024-12-06 RX ADMIN — WATER 1000 MG: 1 INJECTION INTRAMUSCULAR; INTRAVENOUS; SUBCUTANEOUS at 20:31

## 2024-12-06 RX ADMIN — GUAIFENESIN AND DEXTROMETHORPHAN 5 ML: 100; 10 SYRUP ORAL at 23:17

## 2024-12-06 ASSESSMENT — PAIN DESCRIPTION - LOCATION
LOCATION: CHEST
LOCATION: CHEST;BACK

## 2024-12-06 ASSESSMENT — PAIN - FUNCTIONAL ASSESSMENT: PAIN_FUNCTIONAL_ASSESSMENT: 0-10

## 2024-12-06 ASSESSMENT — PAIN SCALES - GENERAL
PAINLEVEL_OUTOF10: 7
PAINLEVEL_OUTOF10: 6

## 2024-12-06 ASSESSMENT — PAIN DESCRIPTION - DESCRIPTORS: DESCRIPTORS: DISCOMFORT
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
## 2024-12-06 NOTE — CONSULTS
CONSULTATION  Nancy Franz is a 74 y.o. female asked to see us in consultation by  Samia Rutherford, APRN - CNP & William Orr DO for evaluation of anemia.    Ms. Franz is a 74-year-old female past medical history of a-fib on eliquis, DM, HTN, HLD, COPD, CHF, asthma, CVA, anxiety, depression, DVT, hypothyroidism, seizure disorder and VILLEGAS cirrhosis decompensated by esophageal varices who presents to the ER with shortness of breath, cough and congestion.  She is on O2 at 4 L chronically and is found to have pneumonia.    She has chronic anemia with Hgb currently of 7 compared to 7.9 point in November.  When she was last seen in August 2024 her Hgb was between 9-10.  We have been consulted to evaluate.    She does report melena, last of which occurred 3 days ago.  She states she is typically constipated and does take laxatives.  She denies hematemesis but tells me she \"wakes up with blood in my mouth sometimes\".  She reports the use of ibuprofen about twice per week for headaches.  She does take pantoprazole.  She is not on iron pills.  She states she had an iron transfusion scheduled recently but missed that appointment.  She reports \"slugs\" in her stool. Denies diarrhea.    Her last endoscopy occurred 8/2024 in evaluation of coffee-ground emesis with findings of grade 2 esophageal varices, moderate portal hypertensive gastropathy and a hiatal hernia.    At her last office visit in November she complained of rectal bleeding and parasites in her stool.  Dr. Teresa recommended a colonoscopy which she has not occurred yet.    Her stool was heme negative.  BUN normal.        Not in a hospital admission.    Medication:   vancomycin  25 mg/kg IntraVENous Once    sodium chloride flush  5-40 mL IntraVENous 2 times per day    ipratropium 0.5 mg-albuterol 2.5 mg  1 Dose Inhalation Q4H WA RT    cefTRIAXone (ROCEPHIN) IV   12/06/2024    CO2 23 12/06/2024       IMAGES  XR CHEST PORTABLE    Result Date: 12/6/2024  Right lower lobe pneumonia.           Assessment:  74-year-old female past medical history of a-fib on eliquis, DM, HTN, HLD, COPD, CHF, asthma, CVA, anxiety, depression, DVT, hypothyroidism, seizure disorder and VILLEGAS cirrhosis decompensated by esophageal varices who presents to the ER with shortness of breath, cough and congestion. Admitted with pneumonia and COPD exacerbation.    XIANG.  With reports of melena 3 days ago. No hematemesis.  Suspect secondary to portal hypertensive gastropathy in the setting of eliquis.  Her last EGD was also revealing for grade 2 varices which are a possible source of blood loss, but clinical picture is less suggestive of this.    2. Pneumonia.  On baseline O2 of 4 L.  She has been started on antibiotics.    Plan:  1. She is already eating at the time of my visit. She may need EGD pending clinical course and if overt signs of GIB reoccur.  Recommend NPO midnight.  Will place on IV PPI bid.  She is already on Rocephin which will be sufficient for GI prophylaxis.  Will start Venofer infusions.  She is at higher risk for GI bleeding. Consider holding eliquis.    As for the slugs in her stool, we will monitor clinically and obtain stool studies if indicated. She is being scheduled for a colonoscopy outpatient with Dr. Teresa.      Thank you for permitting us to participate in the care of your patient. Please do not hesitate to call with questions or concerns.     1.  The patient indicates understanding of these issues and agrees with the plan.  2.  I reviewed the patient's medical information and medical history.   3.  I have reviewed the past medical, family, and social history sections including the medications and allergies listed in the above medical record.        Electronically signed by: BLUE Cuadra 12/6/2024 3:06 PM           (Office) [phone]  (Fax) [phone]  Available

## 2024-12-06 NOTE — ED NOTES
Nancy Franz is a 74 y.o. female admitted for  Principal Problem:    Pneumonia due to infectious organism  Resolved Problems:    * No resolved hospital problems. *  .   Patient Home via EMS transportation with   Chief Complaint   Patient presents with    Shortness of Breath     Reports not feeling well since Monday, hx COPD and CHF. EMS reports patient was at 92% while on baseline 4L nasal cannula. EMS gave duoneb and albuterol PTA. Pt report coughing up yellow/red sputum.   .  Patient is alert and Person, Place, Time, and Situation  Patient's baseline mobility: Baseline Mobility: Independent   Code Status: Full Code   Cardiac Rhythm: Cardiac Rhythm: Sinus rhythm  O2 Flow Rate (L/min): 4 L/min  Is patient on baseline Oxygen: yes, how many Liters 3  Abnormal Assessment Findings: sores on abdomen     Isolation:       NIH Score:    C-SSRS: Risk of Suicide: No Risk  Bedside swallow:        Active LDA's:   Peripheral IV 12/06/24 Left Forearm (Active)   Site Assessment Clean, dry & intact 12/06/24 1213   Line Status Blood return noted 12/06/24 1213   Line Care Connections checked and tightened 12/06/24 1213   Phlebitis Assessment No symptoms 12/06/24 1213   Infiltration Assessment 0 12/06/24 1213   Dressing Status New dressing applied 12/06/24 1213   Dressing Type Transparent 12/06/24 1213   Dressing Intervention New 12/06/24 1213                Vitals: MEWS Score: 1    Vitals:    12/06/24 1227 12/06/24 1336 12/06/24 1337 12/06/24 1512   BP: (!) 123/53 (!) 119/48 (!) 123/54 100/67   Pulse: 66 65 67 71   Resp: 21 24 15 21   Temp:       TempSrc:       SpO2: 98% 100% 100% 100%   Weight:       Height:           Last documented pain score (0-10 scale) Pain Level: 6  Pain medication administered     Pertinent or High Risk Medications/Drips: No.    Pending Blood Product Administration:     Abnormal labs:   Abnormal Labs Reviewed   CBC WITH AUTO DIFFERENTIAL - Abnormal; Notable for the following components:       Result Value     RBC 2.89 (*)     Hemoglobin 7.0 (*)     Hematocrit 22.9 (*)     MCV 79.1 (*)     MCH 24.3 (*)     MCHC 30.8 (*)     RDW 21.1 (*)     Lymphocytes Absolute 0.7 (*)     All other components within normal limits   COMPREHENSIVE METABOLIC PANEL W/ REFLEX TO MG FOR LOW K - Abnormal; Notable for the following components:    Sodium 135 (*)     Potassium reflex Magnesium 3.2 (*)     Glucose 306 (*)     Est, Glom Filt Rate 53 (*)     Albumin 3.3 (*)     Albumin/Globulin Ratio 0.8 (*)     Alkaline Phosphatase 269 (*)     ALT 9 (*)     All other components within normal limits   LACTATE, SEPSIS - Abnormal; Notable for the following components:    Lactic Acid, Sepsis 2.6 (*)     All other components within normal limits   LACTATE, SEPSIS - Abnormal; Notable for the following components:    Lactic Acid, Sepsis 2.2 (*)     All other components within normal limits   BLOOD GAS, VENOUS - Abnormal; Notable for the following components:    pH, Marty 7.348 (*)     PO2, Marty 52.2 (*)     Carboxyhemoglobin 4.6 (*)     All other components within normal limits   TROPONIN - Abnormal; Notable for the following components:    Troponin, High Sensitivity 32 (*)     All other components within normal limits   TROPONIN - Abnormal; Notable for the following components:    Troponin, High Sensitivity 28 (*)     All other components within normal limits   BRAIN NATRIURETIC PEPTIDE - Abnormal; Notable for the following components:    NT Pro-BNP 1,772 (*)     All other components within normal limits     Critical values: yes  Intervention for critical value(s):     Abnormal Imaging: yes, CT scan            You may also review the ED PT Care Timeline found under the Summary Tab, ED Encounter Summary, Timeline Reports, ED Patient Care Timeline.     Recommendation    Pending orders/Uncompleted orders to hand off:      Additional Comments:   If any further questions, please call [place] RN at [phone]

## 2024-12-06 NOTE — CARE COORDINATION
Case Management Assessment  Initial Evaluation    Date/Time of Evaluation: 12/6/2024 1:37 PM  Assessment Completed by: CASA Rincon    If patient is discharged prior to next notation, then this note serves as note for discharge by case management.    Patient Name: Nancy Franz                   YOB: 1950  Diagnosis: No admission diagnoses are documented for this encounter.                   Date / Time: 12/6/2024 11:53 AM    Patient Admission Status: Emergency   Readmission Risk (Low < 19, Mod (19-27), High > 27): Readmission Risk Score: 27.3    Current PCP: Samia Rutherford APRN - CNP  PCP verified by CM? Yes    Chart Reviewed: Yes      History Provided by: Patient  Patient Orientation: Alert and Oriented    Patient Cognition: Alert    Hospitalization in the last 30 days (Readmission):  No    If yes, Readmission Assessment in CM Navigator will be completed.    Advance Directives:      Code Status: Prior   Patient's Primary Decision Maker is: Named in Scanned ACP Document    Primary Decision Maker: Juany Agrawal - [phone]    Secondary Decision Maker: Feliciano Agrawal - [phone]    Supplemental (Other) Decision Maker: Margaret Franz - Child - [phone]    Discharge Planning:    Patient lives with: Children Type of Home: Trailer/Mobile Home  Primary Care Giver: Self  Patient Support Systems include: Children, Friends/Neighbors   Current Financial resources: None  Current community resources: ECF/Home Care  Current services prior to admission: Durable Medical Equipment            Current DME: Walker, Wheelchair, Cane, Other (Comment) (lift chair, wants scooter) [employer] for oxygen, has MOW            Type of Home Care services:  None    ADLS  Prior functional level: Assistance with the following:, Cooking, Housework, Shopping  Current functional level: Assistance with the following:, Cooking, Housework, Shopping    PT AM-PAC:   /24  OT AM-PAC:   /24    Family can provide  provider and agrees with the discharge plan. Freedom of choice list with basic dialogue that supports the patient's individualized plan of care/goals and shares the quality data associated with the providers was provided to:     Patient Representative Name:       The Patient and/or Patient Representative Agree with the Discharge Plan?      CASA Rincon, CHRISTI-S   Case Management Department  Ph: [phone] Fax: [phone]

## 2024-12-07 LAB
ALBUMIN SERPL-MCNC: 3.1 G/DL (ref 3.4–5)
ALBUMIN/GLOB SERPL: 0.8 {RATIO} (ref 1.1–2.2)
ALP SERPL-CCNC: 236 U/L (ref 40–129)
ALT SERPL-CCNC: 8 U/L (ref 10–40)
ANION GAP SERPL CALCULATED.3IONS-SCNC: 12 MMOL/L (ref 3–16)
AST SERPL-CCNC: 17 U/L (ref 15–37)
BASOPHILS # BLD: 0 K/UL (ref 0–0.2)
BASOPHILS NFR BLD: 0 %
BILIRUB SERPL-MCNC: 0.5 MG/DL (ref 0–1)
BUN SERPL-MCNC: 17 MG/DL (ref 7–20)
CALCIUM SERPL-MCNC: 8.8 MG/DL (ref 8.3–10.6)
CHLORIDE SERPL-SCNC: 106 MMOL/L (ref 99–110)
CO2 SERPL-SCNC: 21 MMOL/L (ref 21–32)
CREAT SERPL-MCNC: 0.9 MG/DL (ref 0.6–1.2)
DEPRECATED RDW RBC AUTO: 20.7 % (ref 12.4–15.4)
EOSINOPHIL # BLD: 0 K/UL (ref 0–0.6)
EOSINOPHIL NFR BLD: 0 %
GFR SERPLBLD CREATININE-BSD FMLA CKD-EPI: 67 ML/MIN/{1.73_M2}
GLUCOSE BLD-MCNC: 130 MG/DL (ref 70–99)
GLUCOSE BLD-MCNC: 145 MG/DL (ref 70–99)
GLUCOSE BLD-MCNC: 268 MG/DL (ref 70–99)
GLUCOSE SERPL-MCNC: 158 MG/DL (ref 70–99)
HCT VFR BLD AUTO: 22.3 % (ref 36–48)
HGB BLD-MCNC: 7 G/DL (ref 12–16)
LACTATE BLDV-SCNC: 1.1 MMOL/L (ref 0.4–2)
LEGIONELLA AG UR QL: NORMAL
LYMPHOCYTES # BLD: 0.8 K/UL (ref 1–5.1)
LYMPHOCYTES NFR BLD: 8.8 %
MCH RBC QN AUTO: 24.4 PG (ref 26–34)
MCHC RBC AUTO-ENTMCNC: 31.4 G/DL (ref 31–36)
MCV RBC AUTO: 77.8 FL (ref 80–100)
MONOCYTES # BLD: 0.4 K/UL (ref 0–1.3)
MONOCYTES NFR BLD: 4 %
NEUTROPHILS # BLD: 8.2 K/UL (ref 1.7–7.7)
NEUTROPHILS NFR BLD: 87.2 %
PERFORMED ON: ABNORMAL
PLATELET # BLD AUTO: 225 K/UL (ref 135–450)
PMV BLD AUTO: 8.5 FL (ref 5–10.5)
POTASSIUM SERPL-SCNC: 4.3 MMOL/L (ref 3.5–5.1)
PROCALCITONIN SERPL IA-MCNC: 0.53 NG/ML (ref 0–0.15)
PROT SERPL-MCNC: 7 G/DL (ref 6.4–8.2)
RBC # BLD AUTO: 2.87 M/UL (ref 4–5.2)
S PNEUM AG UR QL: NORMAL
SODIUM SERPL-SCNC: 139 MMOL/L (ref 136–145)
WBC # BLD AUTO: 9.4 K/UL (ref 4–11)

## 2024-12-07 PROCEDURE — 97530 THERAPEUTIC ACTIVITIES: CPT

## 2024-12-07 PROCEDURE — 97110 THERAPEUTIC EXERCISES: CPT

## 2024-12-07 PROCEDURE — 97165 OT EVAL LOW COMPLEX 30 MIN: CPT

## 2024-12-07 PROCEDURE — 6370000000 HC RX 637 (ALT 250 FOR IP): Performed by: STUDENT IN AN ORGANIZED HEALTH CARE EDUCATION/TRAINING PROGRAM

## 2024-12-07 PROCEDURE — 85025 COMPLETE CBC W/AUTO DIFF WBC: CPT

## 2024-12-07 PROCEDURE — 97161 PT EVAL LOW COMPLEX 20 MIN: CPT

## 2024-12-07 PROCEDURE — 6360000002 HC RX W HCPCS: Performed by: NURSE PRACTITIONER

## 2024-12-07 PROCEDURE — 94640 AIRWAY INHALATION TREATMENT: CPT

## 2024-12-07 PROCEDURE — 36415 COLL VENOUS BLD VENIPUNCTURE: CPT

## 2024-12-07 PROCEDURE — 1200000000 HC SEMI PRIVATE

## 2024-12-07 PROCEDURE — 6360000002 HC RX W HCPCS: Performed by: STUDENT IN AN ORGANIZED HEALTH CARE EDUCATION/TRAINING PROGRAM

## 2024-12-07 PROCEDURE — 2580000003 HC RX 258: Performed by: STUDENT IN AN ORGANIZED HEALTH CARE EDUCATION/TRAINING PROGRAM

## 2024-12-07 PROCEDURE — 83605 ASSAY OF LACTIC ACID: CPT

## 2024-12-07 PROCEDURE — 94761 N-INVAS EAR/PLS OXIMETRY MLT: CPT

## 2024-12-07 PROCEDURE — 2700000000 HC OXYGEN THERAPY PER DAY

## 2024-12-07 PROCEDURE — 80053 COMPREHEN METABOLIC PANEL: CPT

## 2024-12-07 PROCEDURE — 84145 PROCALCITONIN (PCT): CPT

## 2024-12-07 RX ORDER — DEXTROSE MONOHYDRATE 100 MG/ML
INJECTION, SOLUTION INTRAVENOUS CONTINUOUS PRN
Status: DISCONTINUED | OUTPATIENT
Start: 2024-12-07 | End: 2024-12-13 | Stop reason: HOSPADM

## 2024-12-07 RX ORDER — QUETIAPINE FUMARATE 25 MG/1
25 TABLET, FILM COATED ORAL NIGHTLY
Status: DISCONTINUED | OUTPATIENT
Start: 2024-12-07 | End: 2024-12-13 | Stop reason: HOSPADM

## 2024-12-07 RX ORDER — SUCRALFATE 1 G/1
1 TABLET ORAL 4 TIMES DAILY
Status: DISCONTINUED | OUTPATIENT
Start: 2024-12-07 | End: 2024-12-13 | Stop reason: HOSPADM

## 2024-12-07 RX ORDER — MIRTAZAPINE 15 MG/1
7.5 TABLET, FILM COATED ORAL PRN
Status: DISCONTINUED | OUTPATIENT
Start: 2024-12-07 | End: 2024-12-13 | Stop reason: HOSPADM

## 2024-12-07 RX ORDER — AMIODARONE HYDROCHLORIDE 200 MG/1
200 TABLET ORAL DAILY
Status: DISCONTINUED | OUTPATIENT
Start: 2024-12-07 | End: 2024-12-13 | Stop reason: HOSPADM

## 2024-12-07 RX ORDER — FUROSEMIDE 20 MG/1
20 TABLET ORAL DAILY
Status: DISCONTINUED | OUTPATIENT
Start: 2024-12-07 | End: 2024-12-13 | Stop reason: HOSPADM

## 2024-12-07 RX ORDER — METOPROLOL TARTRATE 25 MG/1
25 TABLET, FILM COATED ORAL 2 TIMES DAILY
Status: DISCONTINUED | OUTPATIENT
Start: 2024-12-07 | End: 2024-12-13 | Stop reason: HOSPADM

## 2024-12-07 RX ORDER — ATORVASTATIN CALCIUM 10 MG/1
10 TABLET, FILM COATED ORAL DAILY
Status: DISCONTINUED | OUTPATIENT
Start: 2024-12-07 | End: 2024-12-13 | Stop reason: HOSPADM

## 2024-12-07 RX ORDER — LEVETIRACETAM 500 MG/1
500 TABLET ORAL 2 TIMES DAILY
Status: DISCONTINUED | OUTPATIENT
Start: 2024-12-07 | End: 2024-12-07

## 2024-12-07 RX ORDER — GLUCAGON 1 MG/ML
1 KIT INJECTION PRN
Status: DISCONTINUED | OUTPATIENT
Start: 2024-12-07 | End: 2024-12-13 | Stop reason: HOSPADM

## 2024-12-07 RX ORDER — LEVOTHYROXINE SODIUM 88 UG/1
88 TABLET ORAL DAILY
Status: DISCONTINUED | OUTPATIENT
Start: 2024-12-08 | End: 2024-12-13 | Stop reason: HOSPADM

## 2024-12-07 RX ADMIN — PANTOPRAZOLE SODIUM 40 MG: 40 INJECTION, POWDER, FOR SOLUTION INTRAVENOUS at 09:59

## 2024-12-07 RX ADMIN — AMIODARONE HYDROCHLORIDE 200 MG: 200 TABLET ORAL at 20:44

## 2024-12-07 RX ADMIN — QUETIAPINE FUMARATE 25 MG: 25 TABLET ORAL at 20:44

## 2024-12-07 RX ADMIN — IRON SUCROSE 200 MG: 20 INJECTION, SOLUTION INTRAVENOUS at 11:05

## 2024-12-07 RX ADMIN — PANTOPRAZOLE SODIUM 40 MG: 40 INJECTION, POWDER, FOR SOLUTION INTRAVENOUS at 20:45

## 2024-12-07 RX ADMIN — IPRATROPIUM BROMIDE AND ALBUTEROL SULFATE 1 DOSE: 2.5; .5 SOLUTION RESPIRATORY (INHALATION) at 11:43

## 2024-12-07 RX ADMIN — AZITHROMYCIN MONOHYDRATE 500 MG: 500 INJECTION, POWDER, LYOPHILIZED, FOR SOLUTION INTRAVENOUS at 21:01

## 2024-12-07 RX ADMIN — SODIUM CHLORIDE, PRESERVATIVE FREE 10 ML: 5 INJECTION INTRAVENOUS at 20:46

## 2024-12-07 RX ADMIN — INSULIN LISPRO 8 UNITS: 100 INJECTION, SOLUTION INTRAVENOUS; SUBCUTANEOUS at 21:01

## 2024-12-07 RX ADMIN — METOPROLOL TARTRATE 25 MG: 25 TABLET, FILM COATED ORAL at 20:44

## 2024-12-07 RX ADMIN — WATER 1000 MG: 1 INJECTION INTRAMUSCULAR; INTRAVENOUS; SUBCUTANEOUS at 20:45

## 2024-12-07 RX ADMIN — PREDNISONE 40 MG: 20 TABLET ORAL at 09:59

## 2024-12-07 RX ADMIN — ACETAMINOPHEN 500 MG: 500 TABLET ORAL at 20:53

## 2024-12-07 RX ADMIN — SERTRALINE HYDROCHLORIDE 100 MG: 50 TABLET ORAL at 20:53

## 2024-12-07 RX ADMIN — FUROSEMIDE 20 MG: 20 TABLET ORAL at 20:53

## 2024-12-07 RX ADMIN — ATORVASTATIN CALCIUM 10 MG: 10 TABLET, FILM COATED ORAL at 20:44

## 2024-12-07 RX ADMIN — APIXABAN 5 MG: 5 TABLET, FILM COATED ORAL at 20:44

## 2024-12-07 RX ADMIN — SUCRALFATE 1 G: 1 TABLET ORAL at 20:44

## 2024-12-07 RX ADMIN — IPRATROPIUM BROMIDE AND ALBUTEROL SULFATE 1 DOSE: 2.5; .5 SOLUTION RESPIRATORY (INHALATION) at 15:22

## 2024-12-07 RX ADMIN — IPRATROPIUM BROMIDE AND ALBUTEROL SULFATE 1 DOSE: 2.5; .5 SOLUTION RESPIRATORY (INHALATION) at 19:29

## 2024-12-07 RX ADMIN — SODIUM CHLORIDE, PRESERVATIVE FREE 5 ML: 5 INJECTION INTRAVENOUS at 10:03

## 2024-12-07 ASSESSMENT — PAIN DESCRIPTION - ORIENTATION: ORIENTATION: RIGHT;LEFT

## 2024-12-07 ASSESSMENT — PAIN DESCRIPTION - LOCATION: LOCATION: BACK;HEAD

## 2024-12-07 ASSESSMENT — PAIN DESCRIPTION - DESCRIPTORS: DESCRIPTORS: ACHING;DISCOMFORT

## 2024-12-07 ASSESSMENT — PAIN - FUNCTIONAL ASSESSMENT: PAIN_FUNCTIONAL_ASSESSMENT: ACTIVITIES ARE NOT PREVENTED

## 2024-12-07 ASSESSMENT — PAIN SCALES - GENERAL
PAINLEVEL_OUTOF10: 5
PAINLEVEL_OUTOF10: 2

## 2024-12-07 ASSESSMENT — PAIN DESCRIPTION - PAIN TYPE: TYPE: ACUTE PAIN

## 2024-12-07 NOTE — PLAN OF CARE
Problem: Chronic Conditions and Co-morbidities  Goal: Patient's chronic conditions and co-morbidity symptoms are monitored and maintained or improved  12/7/2024 1111 by Jodie León RN  Outcome: Progressing  12/7/2024 0012 by Rainer Nava RN  Outcome: Progressing     Problem: Discharge Planning  Goal: Discharge to home or other facility with appropriate resources  12/7/2024 1111 by Jodie León RN  Outcome: Progressing  12/7/2024 0012 by Rainer Nava RN  Outcome: Progressing     Problem: Pain  Goal: Verbalizes/displays adequate comfort level or baseline comfort level  12/7/2024 1111 by Jodie León RN  Outcome: Progressing  12/7/2024 0012 by Rainer Nava RN  Outcome: Progressing     Problem: Safety - Adult  Goal: Free from fall injury  12/7/2024 1111 by Jodie León RN  Outcome: Progressing  12/7/2024 0012 by Rainer Nava RN  Outcome: Progressing

## 2024-12-07 NOTE — PROGRESS NOTES
PROGRESS NOTE    HPI: Nancy Franz is a(n)74 y.o. female admitted for work-up and treatment for Hypokalemia [E87.6]  Lactic acidosis [E87.20]  Hyperglycemia [R73.9]  COPD exacerbation (HCC) [J44.1]  Pneumonia of right lower lobe due to infectious organism [J18.9]  Anemia, unspecified type [D64.9]  Pneumonia due to infectious organism [J18.9].     We are following for anemia.    Subjective:     She is constipated. She has had several small brown Bms.  Remains on 4 L O2.  NO acute events overnight.      Objective:     I/O last 3 completed shifts:  In: 240 [P.O.:240]  Out: 400 [Urine:400]      BP (!) 146/74   Pulse 84   Temp 98.1 °F (36.7 °C) (Oral)   Resp 20   Ht 1.575 m (5' 2\")   Wt 101.4 kg (223 lb 8 oz)   SpO2 100%   BMI 40.88 kg/m²     Physical Exam:  HEENT: anicteric sclera, oropharyngeal membranes pink and moist.  Lungs: + cough, labored, 4 L O2  Abdomen: soft, NT. No ascites.  No hepatomegaly or splenomegaly  Neuro: alert and oriented x 3, no asterixis      Results:   Lab Results   Component Value Date    ALT 8 (L) 12/07/2024    AST 17 12/07/2024    ALKPHOS 236 (H) 12/07/2024    BILIDIR <0.2 01/21/2024    INR 1.42 (H) 01/21/2024    AMYLASE 50 03/13/2014    LIPASE 45.0 11/15/2024     Lab Results   Component Value Date    WBC 9.4 12/07/2024    HGB 7.0 (L) 12/07/2024    HCT 22.3 (L) 12/07/2024    MCV 77.8 (L) 12/07/2024     12/07/2024     BUN/Cr/glu/ALT/AST/amyl/lip:  17/0.9/--/8/17/--/-- (12/07 0602)  XR CHEST PORTABLE    Result Date: 12/6/2024  Right lower lobe pneumonia.         Impression:  74-year-old female past medical history of a-fib on eliquis, DM, HTN, HLD, COPD, CHF, asthma, CVA, anxiety, depression, DVT, hypothyroidism, seizure disorder and VILLEGAS cirrhosis decompensated by esophageal varices (last EGD 8/2024) who presents to the ER with shortness of breath, cough and congestion. Admitted with pneumonia and COPD exacerbation.      XINAG.  With reports of melena 3 days ago. No hematemesis.  Having brown stools here.  Suspect secondary to portal hypertensive gastropathy in the setting of eliquis.  Clinical picture does not suggest variceal bleed.     2. Pneumonia.  On baseline O2 of 4 L.  She has been started on antibiotics.    3. Constipation.    Plan:  Continue PPI bid.  Venofer replacement.  Start lactulose for constipation.  Continue to monitor for overt GIB.  Continue antibiotics.  6. Will consider EGD if she has overt bleeding here, and pending respiratory status.  If her Hgb remains stable tomorrow and she continues to have no overt bleeding, can consider restarting her AC which is currently being held.    Please do not hesitate to call with questions or concerns.      Electronically signed by: BLUE Cuadra 12/7/2024 1:49 PM     (Office) [phone]  (Fax) [phone]  Available via perfect serve
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
## 2024-12-07 NOTE — H&P
Hospital Medicine History & Physical      Date of Admission: 12/6/2024    Date of Service:  Pt seen/examined on 12/06/24     [x]Admitted to Inpatient with expected LOS greater than two midnights due to medical therapy.  []Placed in Observation status.    Chief Admission Complaint:  SOB    Presenting Admission History:      74 y.o. female who presented to [facility] with SOB.  PMHx significant for CHF, Hx of CVA, COPD, DM, HLD, HTN, Thyroid disease.      Patient is poor historian. Remaining history provided  by ED provider. Patient presented to with a couple days of cough, congestion, & SOB. Baseline COPD with O2 at home. Hx of XIANG, was supposed to get iron infusion but did not go. Denies fevers, chills, sweats. Denies cp and palp. Denies N/V/D. Denies changes in bowel or bladder habits.     Assessment/Plan:      Current Principal Problem:  Pneumonia due to infectious organism    Acute Respiratory Failure - w/ hypoxia, POArrival.  Presence of clinical respiratory distress w/ tachypnea/dyspnea/SOB.  On supplemental O2 as ordered and wean as tolerated.     PNA - likely Gram Positive Community Acquired Pneumonia, possibly due to Strep Pneumonia.  Started on empiric ceftriaxone & azithromycin on 12/7/24.       AECOPD  -Etiology likely due to pneumonia  -Signs and symptoms of AECOPD  -Prednisone 40 mg for 5 days  -DuoNebs ordered  -Respiratory support ordered  -Wean off O2    Anemia - etiology clinically unable to determine, w/out evidence of active bleeding and/or hemolysis. Asymptomatic w/out indication for transfusion. Followed serial Hgb, personally reviewed and documented in this note.    -GI consulted     HypoKalemia - etiology clinically unable to determine.  Followed serial Potassium, personally reviewed and documented in this note. Replaced PRN.      CHF - chronic combined diastolic failure w/ preserved EF 60-65% by Echo dated 8/23/24.  Likely due to hypertensive and/or ischemic heart disease.  Patient is  Thyroid disease     hypothyroid    Wears glasses        Past Surgical History:        Procedure Laterality Date    CATARACT REMOVAL WITH IMPLANT Left 2014    CATARACT REMOVAL WITH IMPLANT Right 2015    phacoemulsification with initraocular lens implant     SECTION      x3    COLONOSCOPY  2012    HYSTERECTOMY (CERVIX STATUS UNKNOWN)      IR MIDLINE CATH  2024    IR MIDLINE CATH 2024 [facility]    IR MIDLINE CATH  2024    IR MIDLINE CATH 2024 [facility]    OTHER SURGICAL HISTORY  2023    EGD    TONSILLECTOMY      TUBAL LIGATION      UPPER GASTROINTESTINAL ENDOSCOPY  2014    gastric polyp    UPPER GASTROINTESTINAL ENDOSCOPY  2014    gastric polyp    UPPER GASTROINTESTINAL ENDOSCOPY  2018    gastritis    UPPER GASTROINTESTINAL ENDOSCOPY N/A 2023    EGD performed by Juan Jose Teresa MD at [facility] ENDOSCOPY    UPPER GASTROINTESTINAL ENDOSCOPY N/A 2024    EGD BIOPSY performed by Juan Jose Teresa MD at [facility] ENDOSCOPY    UPPER GASTROINTESTINAL ENDOSCOPY N/A 2024    EGD W/ANES. performed by Juan Jose Teresa MD at [facility] ENDOSCOPY       Medications Prior to Admission:   Prior to Admission medications    Medication Sig Start Date End Date Taking? Authorizing Provider   nystatin (MYCOSTATIN) 555768 UNIT/GM powder Apply 3 times daily. 24   Constantino Peña PA-C   ketoconazole (NIZORAL) 2 % shampoo Apply topically daily as needed. 24   Constantino Peña PA-C   furosemide (LASIX) 20 MG tablet Take 1 tablet by mouth daily 24   Drew Mercado MD   metoprolol tartrate (LOPRESSOR) 50 MG tablet Take 0.5 tablets by mouth 2 times daily Hold for Heart rate less than 60 24   Drew Mercado MD   simvastatin (ZOCOR) 20 MG tablet Take 1 tablet by mouth nightly 24   Drew Mercado MD   mirtazapine (REMERON) 7.5 MG tablet Take 1 tablet by mouth as        Labs: Personally reviewed and interpreted for clinical significance.   Recent Labs     12/06/24  1212   WBC 9.1   HGB 7.0*   HCT 22.9*        Recent Labs     12/06/24  1212   *   K 3.2*      CO2 23   BUN 16   CREATININE 1.1   CALCIUM 9.0   MG 1.87     Recent Labs     12/06/24  1212 12/06/24  1327   PROBNP 1,772*  --    TROPHS 32* 28*     No results for input(s): \"LABA1C\" in the last 72 hours.  Recent Labs     12/06/24  1212   AST 24   ALT 9*   BILITOT 0.4   ALKPHOS 269*     No results for input(s): \"INR\", \"LACTA\", \"TSH\" in the last 72 hours.     William Orr, DO

## 2024-12-07 NOTE — PROGRESS NOTES
[facility] Medicine Progress Note  V 10.25      Date of Admission: 12/6/2024    Hospital Day: 2      Chief Admission Complaint:  SOB    Subjective: Patient is in hospital bed awake and alert.  No new issues or complaints today.  Patient states he feels \"okay \".  Has low back and hip pain but this is chronic in nature.  Shortness of breath with ambulation.  Currently on 4 L supplemental O2 via nasal cannula.    Presenting Admission History:       74 y.o. female who presented to [facility] with SOB.  PMHx significant for CHF, Hx of CVA, COPD, DM, HLD, HTN, Thyroid disease.       Patient is poor historian. Remaining history provided  by ED provider. Patient presented to with a couple days of cough, congestion, & SOB. Baseline COPD with O2 at home. Hx of XIANG, was supposed to get iron infusion but did not go. Denies fevers, chills, sweats. Denies cp and palp. Denies N/V/D. Denies changes in bowel or bladder habits.     Assessment/Plan:      Current Principal Problem:  Pneumonia due to infectious organism    Acute Respiratory Failure - w/ hypoxia, POArrival.  Presence of clinical respiratory distress w/ tachypnea/dyspnea/SOB.  On supplemental O2 as ordered and wean as tolerated.      PNA - likely Gram Positive Community Acquired Pneumonia, possibly due to Strep Pneumonia.  Started on empiric ceftriaxone & azithromycin on 12/7/24.        AECOPD  -Etiology likely due to pneumonia  -Signs and symptoms of AECOPD  -Prednisone 40 mg for 5 days  -DuoNebs ordered  -Respiratory support ordered  -Wean off O2     Anemia - etiology clinically unable to determine, w/out evidence of active bleeding and/or hemolysis. Asymptomatic w/out indication for transfusion. Followed serial Hgb, personally reviewed and documented in this note.    -GI consulted, note reviewed on 12/7/2024 with recommendation to continue PPI twice daily, Venofer replacement, start lactulose for constipation, continue to monitor for overt GI bleed, continue  analgesia    [] Aggressive IV diuresis    [] Hypertonic Saline    [] Critical electrolyte abnormalities requiring IV replacement    [x] Insulin - Scheduled/SSI or Insulin gtt    [] Anticoagulation (Heparin gtt or Coumadin - other anticoagulants in special circumstances)    [] Cardiac Medications (IV Amiodarone/Diltiazem, Tikosyn, etc)    [] Hemodialysis    [] Other -    [] Change in code status    [] Decision to escalate care    [] Major surgery/procedure with associated risk factors    --------------------------------------------------  C. Data (any 2)    [x] Data Review (any 3)    [x] Consultant notes from yesterday/today    [x] All available current labs reviewed interpreted for clinical significance    [x] Appropriate follow-up labs were ordered  [] Collateral history obtained     [x] Independent Interpretation of tests (any 1)    [x] Telemetry (Rhythm Strip) personally reviewed and interpreted        [] Imaging personally reviewed and interpreted     [] Discussion (any 1)  [] Multi-Disciplinary Rounds with Case Management  [] Discussed management of the case with           Labs:  Personally reviewed on 12/7/2024 and interpreted for clinical significance as documented above.     Recent Labs     12/06/24  1212 12/07/24  0602   WBC 9.1 9.4   HGB 7.0* 7.0*   HCT 22.9* 22.3*    225     Recent Labs     12/06/24  1212 12/07/24  0602   * 139   K 3.2* 4.3    106   CO2 23 21   BUN 16 17   CREATININE 1.1 0.9   CALCIUM 9.0 8.8   MG 1.87  --      Recent Labs     12/06/24  1212 12/06/24  1327   PROBNP 1,772*  --    TROPHS 32* 28*     No results for input(s): \"LABA1C\" in the last 72 hours.  Recent Labs     12/06/24  1212 12/07/24  0602   AST 24 17   ALT 9* 8*   BILITOT 0.4 0.5   ALKPHOS 269* 236*     Recent Labs     12/07/24  0602   LACTA 1.1       Urine Cultures:   Lab Results   Component Value Date/Time    LABURIN  05/08/2024 12:27 PM     <10,000 CFU/ml mixed skin/urogenital jud. No further workup

## 2024-12-08 LAB
ALBUMIN SERPL-MCNC: 3.1 G/DL (ref 3.4–5)
ALBUMIN/GLOB SERPL: 0.8 {RATIO} (ref 1.1–2.2)
ALP SERPL-CCNC: 259 U/L (ref 40–129)
ALT SERPL-CCNC: 15 U/L (ref 10–40)
ANION GAP SERPL CALCULATED.3IONS-SCNC: 10 MMOL/L (ref 3–16)
AST SERPL-CCNC: 39 U/L (ref 15–37)
BASOPHILS # BLD: 0 K/UL (ref 0–0.2)
BASOPHILS NFR BLD: 0.2 %
BILIRUB SERPL-MCNC: 0.4 MG/DL (ref 0–1)
BUN SERPL-MCNC: 23 MG/DL (ref 7–20)
CALCIUM SERPL-MCNC: 8.8 MG/DL (ref 8.3–10.6)
CHLORIDE SERPL-SCNC: 108 MMOL/L (ref 99–110)
CO2 SERPL-SCNC: 22 MMOL/L (ref 21–32)
CREAT SERPL-MCNC: 0.9 MG/DL (ref 0.6–1.2)
DEPRECATED RDW RBC AUTO: 21.3 % (ref 12.4–15.4)
EOSINOPHIL # BLD: 0.1 K/UL (ref 0–0.6)
EOSINOPHIL NFR BLD: 0.6 %
GFR SERPLBLD CREATININE-BSD FMLA CKD-EPI: 67 ML/MIN/{1.73_M2}
GLUCOSE BLD-MCNC: 116 MG/DL (ref 70–99)
GLUCOSE BLD-MCNC: 191 MG/DL (ref 70–99)
GLUCOSE BLD-MCNC: 223 MG/DL (ref 70–99)
GLUCOSE BLD-MCNC: 225 MG/DL (ref 70–99)
GLUCOSE SERPL-MCNC: 125 MG/DL (ref 70–99)
HCT VFR BLD AUTO: 21.8 % (ref 36–48)
HCT VFR BLD AUTO: 22.1 % (ref 36–48)
HGB BLD-MCNC: 6.8 G/DL (ref 12–16)
HGB BLD-MCNC: 7 G/DL (ref 12–16)
LYMPHOCYTES # BLD: 2.1 K/UL (ref 1–5.1)
LYMPHOCYTES NFR BLD: 22.7 %
MCH RBC QN AUTO: 24.4 PG (ref 26–34)
MCHC RBC AUTO-ENTMCNC: 31.3 G/DL (ref 31–36)
MCV RBC AUTO: 78 FL (ref 80–100)
MONOCYTES # BLD: 0.6 K/UL (ref 0–1.3)
MONOCYTES NFR BLD: 6.2 %
NEUTROPHILS # BLD: 6.5 K/UL (ref 1.7–7.7)
NEUTROPHILS NFR BLD: 70.3 %
PERFORMED ON: ABNORMAL
PLATELET # BLD AUTO: 277 K/UL (ref 135–450)
PMV BLD AUTO: 8.2 FL (ref 5–10.5)
POTASSIUM SERPL-SCNC: 4.1 MMOL/L (ref 3.5–5.1)
PROT SERPL-MCNC: 6.8 G/DL (ref 6.4–8.2)
RBC # BLD AUTO: 2.79 M/UL (ref 4–5.2)
SODIUM SERPL-SCNC: 140 MMOL/L (ref 136–145)
WBC # BLD AUTO: 9.2 K/UL (ref 4–11)

## 2024-12-08 PROCEDURE — 94761 N-INVAS EAR/PLS OXIMETRY MLT: CPT

## 2024-12-08 PROCEDURE — 1200000000 HC SEMI PRIVATE

## 2024-12-08 PROCEDURE — 80053 COMPREHEN METABOLIC PANEL: CPT

## 2024-12-08 PROCEDURE — 2580000003 HC RX 258: Performed by: STUDENT IN AN ORGANIZED HEALTH CARE EDUCATION/TRAINING PROGRAM

## 2024-12-08 PROCEDURE — 6370000000 HC RX 637 (ALT 250 FOR IP): Performed by: STUDENT IN AN ORGANIZED HEALTH CARE EDUCATION/TRAINING PROGRAM

## 2024-12-08 PROCEDURE — 2700000000 HC OXYGEN THERAPY PER DAY

## 2024-12-08 PROCEDURE — 05HB33Z INSERTION OF INFUSION DEVICE INTO RIGHT BASILIC VEIN, PERCUTANEOUS APPROACH: ICD-10-PCS | Performed by: INTERNAL MEDICINE

## 2024-12-08 PROCEDURE — C1751 CATH, INF, PER/CENT/MIDLINE: HCPCS

## 2024-12-08 PROCEDURE — 85018 HEMOGLOBIN: CPT

## 2024-12-08 PROCEDURE — 36430 TRANSFUSION BLD/BLD COMPNT: CPT

## 2024-12-08 PROCEDURE — 85014 HEMATOCRIT: CPT

## 2024-12-08 PROCEDURE — 94640 AIRWAY INHALATION TREATMENT: CPT

## 2024-12-08 PROCEDURE — 36415 COLL VENOUS BLD VENIPUNCTURE: CPT

## 2024-12-08 PROCEDURE — 6360000002 HC RX W HCPCS: Performed by: STUDENT IN AN ORGANIZED HEALTH CARE EDUCATION/TRAINING PROGRAM

## 2024-12-08 PROCEDURE — 85025 COMPLETE CBC W/AUTO DIFF WBC: CPT

## 2024-12-08 PROCEDURE — 6360000002 HC RX W HCPCS: Performed by: NURSE PRACTITIONER

## 2024-12-08 PROCEDURE — 36410 VNPNXR 3YR/> PHY/QHP DX/THER: CPT

## 2024-12-08 RX ORDER — SODIUM CHLORIDE 9 MG/ML
INJECTION, SOLUTION INTRAVENOUS PRN
Status: COMPLETED | OUTPATIENT
Start: 2024-12-08 | End: 2024-12-08

## 2024-12-08 RX ORDER — LIDOCAINE HYDROCHLORIDE 10 MG/ML
50 INJECTION, SOLUTION INFILTRATION; PERINEURAL ONCE
Status: DISCONTINUED | OUTPATIENT
Start: 2024-12-08 | End: 2024-12-13 | Stop reason: HOSPADM

## 2024-12-08 RX ADMIN — PANTOPRAZOLE SODIUM 40 MG: 40 INJECTION, POWDER, FOR SOLUTION INTRAVENOUS at 09:27

## 2024-12-08 RX ADMIN — LEVOTHYROXINE SODIUM 88 MCG: 88 TABLET ORAL at 05:05

## 2024-12-08 RX ADMIN — INSULIN LISPRO 4 UNITS: 100 INJECTION, SOLUTION INTRAVENOUS; SUBCUTANEOUS at 20:46

## 2024-12-08 RX ADMIN — TIZANIDINE 4 MG: 4 TABLET ORAL at 13:37

## 2024-12-08 RX ADMIN — IPRATROPIUM BROMIDE AND ALBUTEROL SULFATE 1 DOSE: 2.5; .5 SOLUTION RESPIRATORY (INHALATION) at 11:47

## 2024-12-08 RX ADMIN — METOPROLOL TARTRATE 25 MG: 25 TABLET, FILM COATED ORAL at 20:34

## 2024-12-08 RX ADMIN — INSULIN LISPRO 4 UNITS: 100 INJECTION, SOLUTION INTRAVENOUS; SUBCUTANEOUS at 17:26

## 2024-12-08 RX ADMIN — AMIODARONE HYDROCHLORIDE 200 MG: 200 TABLET ORAL at 09:27

## 2024-12-08 RX ADMIN — IPRATROPIUM BROMIDE AND ALBUTEROL SULFATE 1 DOSE: 2.5; .5 SOLUTION RESPIRATORY (INHALATION) at 19:04

## 2024-12-08 RX ADMIN — WATER 1000 MG: 1 INJECTION INTRAMUSCULAR; INTRAVENOUS; SUBCUTANEOUS at 20:46

## 2024-12-08 RX ADMIN — IRON SUCROSE 200 MG: 20 INJECTION, SOLUTION INTRAVENOUS at 09:31

## 2024-12-08 RX ADMIN — ATORVASTATIN CALCIUM 10 MG: 10 TABLET, FILM COATED ORAL at 09:27

## 2024-12-08 RX ADMIN — IPRATROPIUM BROMIDE AND ALBUTEROL SULFATE 1 DOSE: 2.5; .5 SOLUTION RESPIRATORY (INHALATION) at 15:43

## 2024-12-08 RX ADMIN — AZITHROMYCIN MONOHYDRATE 500 MG: 500 INJECTION, POWDER, LYOPHILIZED, FOR SOLUTION INTRAVENOUS at 20:54

## 2024-12-08 RX ADMIN — SODIUM CHLORIDE: 9 INJECTION, SOLUTION INTRAVENOUS at 17:37

## 2024-12-08 RX ADMIN — SERTRALINE HYDROCHLORIDE 100 MG: 50 TABLET ORAL at 09:27

## 2024-12-08 RX ADMIN — IPRATROPIUM BROMIDE AND ALBUTEROL SULFATE 1 DOSE: 2.5; .5 SOLUTION RESPIRATORY (INHALATION) at 08:18

## 2024-12-08 RX ADMIN — SUCRALFATE 1 G: 1 TABLET ORAL at 09:27

## 2024-12-08 RX ADMIN — PREDNISONE 40 MG: 20 TABLET ORAL at 09:27

## 2024-12-08 RX ADMIN — TIZANIDINE 4 MG: 4 TABLET ORAL at 20:33

## 2024-12-08 RX ADMIN — ACETAMINOPHEN 500 MG: 500 TABLET ORAL at 20:34

## 2024-12-08 RX ADMIN — METOPROLOL TARTRATE 25 MG: 25 TABLET, FILM COATED ORAL at 09:27

## 2024-12-08 RX ADMIN — QUETIAPINE FUMARATE 25 MG: 25 TABLET ORAL at 20:34

## 2024-12-08 RX ADMIN — SODIUM CHLORIDE, PRESERVATIVE FREE 10 ML: 5 INJECTION INTRAVENOUS at 20:39

## 2024-12-08 RX ADMIN — SUCRALFATE 1 G: 1 TABLET ORAL at 13:37

## 2024-12-08 RX ADMIN — SUCRALFATE 1 G: 1 TABLET ORAL at 20:34

## 2024-12-08 RX ADMIN — PANTOPRAZOLE SODIUM 40 MG: 40 INJECTION, POWDER, FOR SOLUTION INTRAVENOUS at 20:39

## 2024-12-08 RX ADMIN — SUCRALFATE 1 G: 1 TABLET ORAL at 17:26

## 2024-12-08 RX ADMIN — FUROSEMIDE 20 MG: 20 TABLET ORAL at 09:27

## 2024-12-08 RX ADMIN — ACETAMINOPHEN 500 MG: 500 TABLET ORAL at 13:37

## 2024-12-08 RX ADMIN — SODIUM CHLORIDE, PRESERVATIVE FREE 5 ML: 5 INJECTION INTRAVENOUS at 09:28

## 2024-12-08 RX ADMIN — INSULIN LISPRO 4 UNITS: 100 INJECTION, SOLUTION INTRAVENOUS; SUBCUTANEOUS at 13:38

## 2024-12-08 RX ADMIN — SODIUM CHLORIDE: 9 INJECTION, SOLUTION INTRAVENOUS at 17:38

## 2024-12-08 ASSESSMENT — PAIN SCALES - GENERAL
PAINLEVEL_OUTOF10: 8
PAINLEVEL_OUTOF10: 6
PAINLEVEL_OUTOF10: 7
PAINLEVEL_OUTOF10: 4
PAINLEVEL_OUTOF10: 4

## 2024-12-08 ASSESSMENT — PAIN - FUNCTIONAL ASSESSMENT: PAIN_FUNCTIONAL_ASSESSMENT: ACTIVITIES ARE NOT PREVENTED

## 2024-12-08 ASSESSMENT — PAIN DESCRIPTION - LOCATION
LOCATION: BACK
LOCATION: BACK

## 2024-12-08 ASSESSMENT — PAIN DESCRIPTION - DESCRIPTORS
DESCRIPTORS: ACHING;DISCOMFORT
DESCRIPTORS: ACHING;DISCOMFORT;THROBBING

## 2024-12-08 ASSESSMENT — PAIN DESCRIPTION - ORIENTATION
ORIENTATION: LOWER
ORIENTATION: LOWER

## 2024-12-08 NOTE — PLAN OF CARE
Problem: Chronic Conditions and Co-morbidities  Goal: Patient's chronic conditions and co-morbidity symptoms are monitored and maintained or improved  12/7/2024 2343 by Brianda Payne, RN  Outcome: Progressing  Flowsheets (Taken 12/7/2024 2030)  Care Plan - Patient's Chronic Conditions and Co-Morbidity Symptoms are Monitored and Maintained or Improved: Monitor and assess patient's chronic conditions and comorbid symptoms for stability, deterioration, or improvement  12/7/2024 1111 by Jodie León, RN  Outcome: Progressing

## 2024-12-08 NOTE — PROGRESS NOTES
PROGRESS NOTE    HPI: Nancy Frazn is a(n)74 y.o. female admitted for work-up and treatment for Hypokalemia [E87.6]  Lactic acidosis [E87.20]  Hyperglycemia [R73.9]  COPD exacerbation (HCC) [J44.1]  Pneumonia of right lower lobe due to infectious organism [J18.9]  Anemia, unspecified type [D64.9]  Pneumonia due to infectious organism [J18.9].     We are following for anemia.    Subjective:     She complains of back pain today.  She has had a brown BM.  No GI complaints.      Objective:     I/O last 3 completed shifts:  In: 1190 [P.O.:1190]  Out: 800 [Urine:800]      BP (!) 162/84   Pulse 81   Temp 97.7 °F (36.5 °C) (Oral)   Resp 20   Ht 1.575 m (5' 2\")   Wt 96.6 kg (213 lb)   SpO2 91%   BMI 38.96 kg/m²     Physical Exam:  HEENT: anicteric sclera, oropharyngeal membranes pink and moist.  Lungs: + cough, dyspneic, 4 L O2  Abdomen: soft, NT. No ascites.  No hepatomegaly or splenomegaly  Neuro: alert and oriented x 3, no asterixis      Results:   Lab Results   Component Value Date    ALT 15 12/08/2024    AST 39 (H) 12/08/2024    ALKPHOS 259 (H) 12/08/2024    BILIDIR <0.2 01/21/2024    INR 1.42 (H) 01/21/2024    AMYLASE 50 03/13/2014    LIPASE 45.0 11/15/2024     Lab Results   Component Value Date    WBC 9.2 12/08/2024    HGB 6.8 (LL) 12/08/2024    HCT 21.8 (L) 12/08/2024    MCV 78.0 (L) 12/08/2024     12/08/2024     BUN/Cr/glu/ALT/AST/amyl/lip:  23/0.9/--/15/39/--/-- (12/08 0628)  XR CHEST PORTABLE    Result Date: 12/6/2024  Right lower lobe pneumonia.         Impression:  74-year-old female past medical history of a-fib on eliquis, DM, HTN, HLD, COPD, CHF, asthma, CVA, anxiety, depression, DVT, hypothyroidism, seizure disorder and VILLEGAS cirrhosis decompensated by esophageal varices (last EGD 8/2024) who presents to the ER with shortness of breath, cough and congestion. Admitted with pneumonia and COPD exacerbation.     XIANG.  With reports of melena 3  days PTA. No hematemesis.  Having brown stools here.  Suspect secondary to portal hypertensive gastropathy in the setting of eliquis.  Clinical picture does not suggest variceal bleed.  Hgb 6.8 today (from 7)- being transfused blood.     2. Pneumonia.  On baseline O2 of 4 L.  She has been started on antibiotics.    3. Constipation.    Plan:  Continue PPI bid.  Venofer replacement.  lactulose for constipation.  Continue to monitor for overt GIB.  Continue antibiotics.  6. Will consider EGD if she has overt bleeding here, and pending respiratory status.  Her AC is currently being held.    Please do not hesitate to call with questions or concerns.      Electronically signed by: BLUE Cuadra 12/8/2024 1:39 PM     (Office) [phone]  (Fax) [phone]  Available via perfect serve

## 2024-12-08 NOTE — PROGRESS NOTES
Blood transfusion started at this time, electronic blood consent in notes on chart entered by William Orr DO. See transfusion flowsheet for details.

## 2024-12-08 NOTE — CONSENT
Informed Consent for Blood Component Transfusion Note    I have discussed with the patient the rationale for blood component transfusion; its benefits in treating or preventing fatigue, organ damage, or death; and its risk which includes mild transfusion reactions, rare risk of blood borne infection, or more serious but rare reactions. I have discussed the alternatives to transfusion, including the risk and consequences of not receiving transfusion. The patient had an opportunity to ask questions and had agreed to proceed with transfusion of blood components.    Electronically signed by William Orr DO on 12/8/24 at 11:42 AM EST

## 2024-12-08 NOTE — PLAN OF CARE
Problem: Chronic Conditions and Co-morbidities  Goal: Patient's chronic conditions and co-morbidity symptoms are monitored and maintained or improved  12/8/2024 1148 by Jodie León RN  Outcome: Progressing  12/7/2024 2343 by Brianda Payne RN  Outcome: Progressing  Flowsheets (Taken 12/7/2024 2030)  Care Plan - Patient's Chronic Conditions and Co-Morbidity Symptoms are Monitored and Maintained or Improved: Monitor and assess patient's chronic conditions and comorbid symptoms for stability, deterioration, or improvement     Problem: Discharge Planning  Goal: Discharge to home or other facility with appropriate resources  12/8/2024 1148 by Jodie León RN  Outcome: Progressing  12/7/2024 2343 by Brianda Payne RN  Outcome: Progressing  Flowsheets (Taken 12/7/2024 2030)  Discharge to home or other facility with appropriate resources: Identify barriers to discharge with patient and caregiver     Problem: Pain  Goal: Verbalizes/displays adequate comfort level or baseline comfort level  12/8/2024 1148 by Jodie León RN  Outcome: Progressing  12/7/2024 2343 by Brianda Payne RN  Outcome: Progressing     Problem: Safety - Adult  Goal: Free from fall injury  12/8/2024 1148 by Jodie León RN  Outcome: Progressing  12/7/2024 2343 by Brianda Payne RN  Outcome: Progressing

## 2024-12-08 NOTE — PROGRESS NOTES
Arrived to place Midline with bedside RN Jodie. Pre-procedure and timeout done with RN, discussed limitations of placement and allergies. Cleaned with chloraprep, catheter trimmed and  guidewire inserted and advanced smoothly blood was free flowing and non-pulsatile from introducer.  Please use new IV tubing when connecting to the newly placed line.  Post procedure - reorganized pt table, placed pt in lowest position, with call light and educated on line care. Reported off to bedside RN.      Please call if you have any questions about the PICC or ML. The  will direct you to the PICC RN that is on call.      [phone]

## 2024-12-08 NOTE — PROGRESS NOTES
Hospital Medicine Progress Note  V 10.25      Date of Admission: 12/6/2024    Hospital Day: 3      Chief Admission Complaint:  SOB    Subjective: Patient is in hospital bed awake and alert.  No new issues or complaints today.  Patient states she feels \"a little better \".  Endorses low back pain.  No shortness of breath.  Currently on 2 L supplemental O2 via nasal cannula.    Presenting Admission History:       74 y.o. female who presented to [facility] with SOB.  PMHx significant for CHF, Hx of CVA, COPD, DM, HLD, HTN, Thyroid disease.       Patient is poor historian. Remaining history provided  by ED provider. Patient presented to with a couple days of cough, congestion, & SOB. Baseline COPD with O2 at home. Hx of XIANG, was supposed to get iron infusion but did not go. Denies fevers, chills, sweats. Denies cp and palp. Denies N/V/D. Denies changes in bowel or bladder habits.     Assessment/Plan:      Current Principal Problem:  Pneumonia due to infectious organism    Acute Respiratory Failure - w/ hypoxia, POArrival.  Presence of clinical respiratory distress w/ tachypnea/dyspnea/SOB.  On supplemental O2 as ordered and wean as tolerated.      PNA - likely Gram Positive Community Acquired Pneumonia, possibly due to Strep Pneumonia.  Started on empiric ceftriaxone & azithromycin on 12/7/24.        AECOPD  -Etiology likely due to pneumonia  -Signs and symptoms of AECOPD  -Prednisone 40 mg for 5 days  -DuoNebs ordered  -Respiratory support ordered  -Wean off O2     Anemia - etiology clinically unable to determine, w/out evidence of active bleeding and/or hemolysis. Asymptomatic w/out indication for transfusion. Followed serial Hgb, personally reviewed and documented in this note.    -GI consulted, note reviewed on 12/8/2024 with recommendation to continue PPI twice daily, Venofer replacement, start lactulose for constipation, continue to monitor for overt GI bleed, continue antibiotics, will consider EGD patient  12/06/24  1212 12/07/24  0602 12/08/24  0628   * 139 140   K 3.2* 4.3 4.1    106 108   CO2 23 21 22   BUN 16 17 23*   CREATININE 1.1 0.9 0.9   CALCIUM 9.0 8.8 8.8   MG 1.87  --   --      Recent Labs     12/06/24  1212 12/06/24  1327   PROBNP 1,772*  --    TROPHS 32* 28*     No results for input(s): \"LABA1C\" in the last 72 hours.  Recent Labs     12/06/24  1212 12/07/24  0602 12/08/24  0628   AST 24 17 39*   ALT 9* 8* 15   BILITOT 0.4 0.5 0.4   ALKPHOS 269* 236* 259*     Recent Labs     12/07/24  0602   LACTA 1.1       Urine Cultures:   Lab Results   Component Value Date/Time    LABURIN  05/08/2024 12:27 PM     <10,000 CFU/ml mixed skin/urogenital jud. No further workup    LABURIN 50,000 CFU/ml 05/08/2024 12:27 PM     Blood Cultures:   Lab Results   Component Value Date/Time    BC No Growth after 4 days of incubation. 06/11/2024 04:22 AM     Lab Results   Component Value Date/Time    BLOODCULT2 No Growth after 4 days of incubation. 06/11/2024 04:25 AM     Organism:   Lab Results   Component Value Date/Time    ORG Klebsiella pneumoniae 05/08/2024 12:27 PM         William Orr DO

## 2024-12-09 LAB
ALBUMIN SERPL-MCNC: 3 G/DL (ref 3.4–5)
ALBUMIN/GLOB SERPL: 0.8 {RATIO} (ref 1.1–2.2)
ALP SERPL-CCNC: 286 U/L (ref 40–129)
ALT SERPL-CCNC: 22 U/L (ref 10–40)
ANION GAP SERPL CALCULATED.3IONS-SCNC: 10 MMOL/L (ref 3–16)
ANISOCYTOSIS BLD QL SMEAR: ABNORMAL
AST SERPL-CCNC: 39 U/L (ref 15–37)
BASOPHILS # BLD: 0 K/UL (ref 0–0.2)
BASOPHILS NFR BLD: 0 %
BILIRUB SERPL-MCNC: 0.4 MG/DL (ref 0–1)
BUN SERPL-MCNC: 23 MG/DL (ref 7–20)
CALCIUM SERPL-MCNC: 8.7 MG/DL (ref 8.3–10.6)
CHLORIDE SERPL-SCNC: 107 MMOL/L (ref 99–110)
CO2 SERPL-SCNC: 23 MMOL/L (ref 21–32)
CREAT SERPL-MCNC: 1 MG/DL (ref 0.6–1.2)
DEPRECATED RDW RBC AUTO: 20.4 % (ref 12.4–15.4)
EOSINOPHIL # BLD: 0.1 K/UL (ref 0–0.6)
EOSINOPHIL NFR BLD: 1 %
GFR SERPLBLD CREATININE-BSD FMLA CKD-EPI: 59 ML/MIN/{1.73_M2}
GLUCOSE BLD-MCNC: 130 MG/DL (ref 70–99)
GLUCOSE BLD-MCNC: 137 MG/DL (ref 70–99)
GLUCOSE BLD-MCNC: 254 MG/DL (ref 70–99)
GLUCOSE BLD-MCNC: 277 MG/DL (ref 70–99)
GLUCOSE SERPL-MCNC: 129 MG/DL (ref 70–99)
HCT VFR BLD AUTO: 23.9 % (ref 36–48)
HGB BLD-MCNC: 7.5 G/DL (ref 12–16)
HYPOCHROMIA BLD QL SMEAR: ABNORMAL
LYMPHOCYTES # BLD: 2 K/UL (ref 1–5.1)
LYMPHOCYTES NFR BLD: 22 %
MACROCYTES BLD QL SMEAR: ABNORMAL
MCH RBC QN AUTO: 24.5 PG (ref 26–34)
MCHC RBC AUTO-ENTMCNC: 31.4 G/DL (ref 31–36)
MCV RBC AUTO: 77.8 FL (ref 80–100)
MICROCYTES BLD QL SMEAR: ABNORMAL
MONOCYTES # BLD: 0.5 K/UL (ref 0–1.3)
MONOCYTES NFR BLD: 5 %
MYELOCYTES NFR BLD MANUAL: 1 %
NEUTROPHILS # BLD: 6.7 K/UL (ref 1.7–7.7)
NEUTROPHILS NFR BLD: 64 %
NEUTS BAND NFR BLD MANUAL: 7 % (ref 0–7)
NRBC BLD-RTO: 1 /100 WBC
OVALOCYTES BLD QL SMEAR: ABNORMAL
PERFORMED ON: ABNORMAL
PLATELET # BLD AUTO: 270 K/UL (ref 135–450)
PLATELET BLD QL SMEAR: ADEQUATE
PMV BLD AUTO: 8.3 FL (ref 5–10.5)
POIKILOCYTOSIS BLD QL SMEAR: ABNORMAL
POTASSIUM SERPL-SCNC: 3.9 MMOL/L (ref 3.5–5.1)
PROT SERPL-MCNC: 6.7 G/DL (ref 6.4–8.2)
RBC # BLD AUTO: 3.07 M/UL (ref 4–5.2)
SLIDE REVIEW: ABNORMAL
SODIUM SERPL-SCNC: 140 MMOL/L (ref 136–145)
WBC # BLD AUTO: 9.3 K/UL (ref 4–11)

## 2024-12-09 PROCEDURE — 6360000002 HC RX W HCPCS: Performed by: STUDENT IN AN ORGANIZED HEALTH CARE EDUCATION/TRAINING PROGRAM

## 2024-12-09 PROCEDURE — 80053 COMPREHEN METABOLIC PANEL: CPT

## 2024-12-09 PROCEDURE — 6360000002 HC RX W HCPCS: Performed by: NURSE PRACTITIONER

## 2024-12-09 PROCEDURE — 1200000000 HC SEMI PRIVATE

## 2024-12-09 PROCEDURE — 6370000000 HC RX 637 (ALT 250 FOR IP): Performed by: STUDENT IN AN ORGANIZED HEALTH CARE EDUCATION/TRAINING PROGRAM

## 2024-12-09 PROCEDURE — 94760 N-INVAS EAR/PLS OXIMETRY 1: CPT

## 2024-12-09 PROCEDURE — 2580000003 HC RX 258: Performed by: STUDENT IN AN ORGANIZED HEALTH CARE EDUCATION/TRAINING PROGRAM

## 2024-12-09 PROCEDURE — 2580000003 HC RX 258: Performed by: NURSE PRACTITIONER

## 2024-12-09 PROCEDURE — 94640 AIRWAY INHALATION TREATMENT: CPT

## 2024-12-09 PROCEDURE — 94761 N-INVAS EAR/PLS OXIMETRY MLT: CPT

## 2024-12-09 PROCEDURE — 85025 COMPLETE CBC W/AUTO DIFF WBC: CPT

## 2024-12-09 PROCEDURE — 6370000000 HC RX 637 (ALT 250 FOR IP): Performed by: INTERNAL MEDICINE

## 2024-12-09 PROCEDURE — 36415 COLL VENOUS BLD VENIPUNCTURE: CPT

## 2024-12-09 RX ORDER — MINERAL OIL/HYDROPHIL PETROLAT
OINTMENT (GRAM) TOPICAL 2 TIMES DAILY PRN
Status: DISCONTINUED | OUTPATIENT
Start: 2024-12-09 | End: 2024-12-13 | Stop reason: HOSPADM

## 2024-12-09 RX ORDER — AZITHROMYCIN 250 MG/1
500 TABLET, FILM COATED ORAL EVERY 24 HOURS
Status: COMPLETED | OUTPATIENT
Start: 2024-12-09 | End: 2024-12-10

## 2024-12-09 RX ADMIN — QUETIAPINE FUMARATE 25 MG: 25 TABLET ORAL at 21:08

## 2024-12-09 RX ADMIN — LEVOTHYROXINE SODIUM 88 MCG: 88 TABLET ORAL at 05:15

## 2024-12-09 RX ADMIN — SUCRALFATE 1 G: 1 TABLET ORAL at 21:08

## 2024-12-09 RX ADMIN — TIZANIDINE 4 MG: 4 TABLET ORAL at 13:49

## 2024-12-09 RX ADMIN — IPRATROPIUM BROMIDE AND ALBUTEROL SULFATE 1 DOSE: 2.5; .5 SOLUTION RESPIRATORY (INHALATION) at 07:22

## 2024-12-09 RX ADMIN — METOPROLOL TARTRATE 25 MG: 25 TABLET, FILM COATED ORAL at 21:08

## 2024-12-09 RX ADMIN — AMIODARONE HYDROCHLORIDE 200 MG: 200 TABLET ORAL at 09:56

## 2024-12-09 RX ADMIN — ATORVASTATIN CALCIUM 10 MG: 10 TABLET, FILM COATED ORAL at 09:56

## 2024-12-09 RX ADMIN — ACETAMINOPHEN 500 MG: 500 TABLET ORAL at 05:22

## 2024-12-09 RX ADMIN — IPRATROPIUM BROMIDE AND ALBUTEROL SULFATE 1 DOSE: 2.5; .5 SOLUTION RESPIRATORY (INHALATION) at 19:17

## 2024-12-09 RX ADMIN — PREDNISONE 40 MG: 20 TABLET ORAL at 09:56

## 2024-12-09 RX ADMIN — IPRATROPIUM BROMIDE AND ALBUTEROL SULFATE 1 DOSE: 2.5; .5 SOLUTION RESPIRATORY (INHALATION) at 11:38

## 2024-12-09 RX ADMIN — IPRATROPIUM BROMIDE AND ALBUTEROL SULFATE 1 DOSE: 2.5; .5 SOLUTION RESPIRATORY (INHALATION) at 15:08

## 2024-12-09 RX ADMIN — INSULIN LISPRO 8 UNITS: 100 INJECTION, SOLUTION INTRAVENOUS; SUBCUTANEOUS at 21:10

## 2024-12-09 RX ADMIN — FUROSEMIDE 20 MG: 20 TABLET ORAL at 09:56

## 2024-12-09 RX ADMIN — ACETAMINOPHEN 500 MG: 500 TABLET ORAL at 13:48

## 2024-12-09 RX ADMIN — SODIUM CHLORIDE, PRESERVATIVE FREE 10 ML: 5 INJECTION INTRAVENOUS at 09:56

## 2024-12-09 RX ADMIN — TIZANIDINE 4 MG: 4 TABLET ORAL at 05:21

## 2024-12-09 RX ADMIN — SODIUM CHLORIDE, PRESERVATIVE FREE 10 ML: 5 INJECTION INTRAVENOUS at 21:05

## 2024-12-09 RX ADMIN — SUCRALFATE 1 G: 1 TABLET ORAL at 13:26

## 2024-12-09 RX ADMIN — METOPROLOL TARTRATE 25 MG: 25 TABLET, FILM COATED ORAL at 09:56

## 2024-12-09 RX ADMIN — ACETAMINOPHEN 500 MG: 500 TABLET ORAL at 21:08

## 2024-12-09 RX ADMIN — SUCRALFATE 1 G: 1 TABLET ORAL at 09:56

## 2024-12-09 RX ADMIN — SUCRALFATE 1 G: 1 TABLET ORAL at 18:43

## 2024-12-09 RX ADMIN — INSULIN LISPRO 8 UNITS: 100 INJECTION, SOLUTION INTRAVENOUS; SUBCUTANEOUS at 18:44

## 2024-12-09 RX ADMIN — SERTRALINE HYDROCHLORIDE 100 MG: 50 TABLET ORAL at 09:56

## 2024-12-09 RX ADMIN — IRON SUCROSE 200 MG: 20 INJECTION, SOLUTION INTRAVENOUS at 10:06

## 2024-12-09 RX ADMIN — WATER 1000 MG: 1 INJECTION INTRAMUSCULAR; INTRAVENOUS; SUBCUTANEOUS at 21:04

## 2024-12-09 RX ADMIN — MIRTAZAPINE 7.5 MG: 15 TABLET, FILM COATED ORAL at 21:08

## 2024-12-09 RX ADMIN — PANTOPRAZOLE SODIUM 40 MG: 40 INJECTION, POWDER, FOR SOLUTION INTRAVENOUS at 21:12

## 2024-12-09 RX ADMIN — PANTOPRAZOLE SODIUM 40 MG: 40 INJECTION, POWDER, FOR SOLUTION INTRAVENOUS at 09:55

## 2024-12-09 RX ADMIN — AZITHROMYCIN 500 MG: 250 TABLET, FILM COATED ORAL at 23:07

## 2024-12-09 ASSESSMENT — PAIN DESCRIPTION - LOCATION
LOCATION: BACK
LOCATION: BACK

## 2024-12-09 ASSESSMENT — PAIN DESCRIPTION - ORIENTATION
ORIENTATION: LOWER;MID;LEFT;RIGHT
ORIENTATION: LOWER

## 2024-12-09 ASSESSMENT — PAIN SCALES - GENERAL
PAINLEVEL_OUTOF10: 0
PAINLEVEL_OUTOF10: 0
PAINLEVEL_OUTOF10: 7
PAINLEVEL_OUTOF10: 3
PAINLEVEL_OUTOF10: 7
PAINLEVEL_OUTOF10: 0
PAINLEVEL_OUTOF10: 3

## 2024-12-09 ASSESSMENT — PAIN DESCRIPTION - DESCRIPTORS: DESCRIPTORS: ACHING

## 2024-12-09 NOTE — CARE COORDINATION
Chart reviewed day 3. Care provided by GI and IM. Pt is from home with her daughter. Pt is IPTA in most ADLs. She is active with [employer] for home O2. #L is her baseline. She is also active with [facility] for lifeline and [facility]. Pt is interested in [facility]. Referral sent to [facility]. Will continue to follow course for needs. Raissa Roberson RN

## 2024-12-09 NOTE — PLAN OF CARE
Problem: Chronic Conditions and Co-morbidities  Goal: Patient's chronic conditions and co-morbidity symptoms are monitored and maintained or improved  12/8/2024 2351 by Brianda Payne, RN  Outcome: Progressing  Flowsheets (Taken 12/8/2024 2030)  Care Plan - Patient's Chronic Conditions and Co-Morbidity Symptoms are Monitored and Maintained or Improved: Monitor and assess patient's chronic conditions and comorbid symptoms for stability, deterioration, or improvement  12/8/2024 1148 by Jodie León, RN  Outcome: Progressing

## 2024-12-09 NOTE — PROGRESS NOTES
PROGRESS NOTE    HPI: Nancy Franz is a(n)74 y.o. female admitted for work-up and treatment for Hypokalemia [E87.6]  Lactic acidosis [E87.20]  Hyperglycemia [R73.9]  COPD exacerbation (HCC) [J44.1]  Pneumonia of right lower lobe due to infectious organism [J18.9]  Anemia, unspecified type [D64.9]  Pneumonia due to infectious organism [J18.9].     We are following for anemia.    Subjective:     She states breathing is better.  Back pain is improved some.  Having brown Bms.      Objective:     I/O last 3 completed shifts:  In: 1722 [P.O.:1440; Blood:282]  Out: 400 [Urine:400]      BP (!) 145/65 Comment: RN notified  Pulse 67   Temp 98 °F (36.7 °C) (Oral)   Resp 20   Ht 1.575 m (5' 2\")   Wt 96.6 kg (213 lb)   SpO2 93%   BMI 38.96 kg/m²     Physical Exam:  HEENT: anicteric sclera, oropharyngeal membranes pink and moist.  Lungs: + cough, dyspneic, 4 L O2  Abdomen: soft, NT. No ascites.  No hepatomegaly or splenomegaly  Neuro: alert and oriented x 3, no asterixis      Results:   Lab Results   Component Value Date    ALT 22 12/09/2024    AST 39 (H) 12/09/2024    ALKPHOS 286 (H) 12/09/2024    BILIDIR <0.2 01/21/2024    INR 1.42 (H) 01/21/2024    AMYLASE 50 03/13/2014    LIPASE 45.0 11/15/2024     Lab Results   Component Value Date    WBC 9.3 12/09/2024    HGB 7.5 (L) 12/09/2024    HCT 23.9 (L) 12/09/2024    MCV 77.8 (L) 12/09/2024     12/09/2024     BUN/Cr/glu/ALT/AST/amyl/lip:  23/1.0/--/22/39/--/-- (12/09 0623)  XR CHEST PORTABLE    Result Date: 12/6/2024  Right lower lobe pneumonia.         Impression:  74-year-old female past medical history of a-fib on eliquis, DM, HTN, HLD, COPD, CHF, asthma, CVA, anxiety, depression, DVT, hypothyroidism, seizure disorder and VILLEGAS cirrhosis decompensated by esophageal varices (last EGD 8/2024) who presents to the ER with shortness of breath, cough and congestion. Admitted with pneumonia and COPD exacerbation.      XIANG.  With reports of melena 3 days PTA. No hematemesis.  Having brown stools here.  Suspect secondary to portal hypertensive gastropathy in the setting of eliquis.  Clinical picture does not suggest variceal bleed.  Hgb 6.8 today (from 7)- being transfused blood.     2. Pneumonia.  On baseline O2 of 4 L.  She has been started on antibiotics.    3. Constipation.    Plan:  Continue PPI bid.  Venofer replacement.  lactulose for constipation.  Continue to monitor for overt GIB.  Continue antibiotics.  6.  She needs outpatient colonoscopy with Dr. Teresa as previously planned.  May restart AC from GI standpoint if Hgb stable tomorrow.    Please do not hesitate to call with questions or concerns.      Electronically signed by: BLUE Cuadra 12/9/2024 12:55 PM     (Office) [phone]  (Fax) [phone]  Available via perfect serve

## 2024-12-10 LAB
BLOOD BANK DISPENSE STATUS: NORMAL
BLOOD BANK DISPENSE STATUS: NORMAL
BLOOD BANK PRODUCT CODE: NORMAL
BLOOD BANK PRODUCT CODE: NORMAL
BPU ID: NORMAL
BPU ID: NORMAL
DEPRECATED RDW RBC AUTO: 20.4 % (ref 12.4–15.4)
DESCRIPTION BLOOD BANK: NORMAL
DESCRIPTION BLOOD BANK: NORMAL
GLUCOSE BLD-MCNC: 122 MG/DL (ref 70–99)
GLUCOSE BLD-MCNC: 164 MG/DL (ref 70–99)
GLUCOSE BLD-MCNC: 177 MG/DL (ref 70–99)
GLUCOSE BLD-MCNC: 203 MG/DL (ref 70–99)
HCT VFR BLD AUTO: 22.4 % (ref 36–48)
HCT VFR BLD AUTO: 28.4 % (ref 36–48)
HGB BLD-MCNC: 7 G/DL (ref 12–16)
HGB BLD-MCNC: 8.9 G/DL (ref 12–16)
MCH RBC QN AUTO: 24.4 PG (ref 26–34)
MCHC RBC AUTO-ENTMCNC: 31.2 G/DL (ref 31–36)
MCV RBC AUTO: 78.3 FL (ref 80–100)
PERFORMED ON: ABNORMAL
PLATELET # BLD AUTO: 258 K/UL (ref 135–450)
PMV BLD AUTO: 8.2 FL (ref 5–10.5)
RBC # BLD AUTO: 2.86 M/UL (ref 4–5.2)
WBC # BLD AUTO: 8.5 K/UL (ref 4–11)

## 2024-12-10 PROCEDURE — 85018 HEMOGLOBIN: CPT

## 2024-12-10 PROCEDURE — 6360000002 HC RX W HCPCS: Performed by: NURSE PRACTITIONER

## 2024-12-10 PROCEDURE — 6370000000 HC RX 637 (ALT 250 FOR IP): Performed by: INTERNAL MEDICINE

## 2024-12-10 PROCEDURE — 6370000000 HC RX 637 (ALT 250 FOR IP): Performed by: STUDENT IN AN ORGANIZED HEALTH CARE EDUCATION/TRAINING PROGRAM

## 2024-12-10 PROCEDURE — 85014 HEMATOCRIT: CPT

## 2024-12-10 PROCEDURE — 1200000000 HC SEMI PRIVATE

## 2024-12-10 PROCEDURE — 2500000003 HC RX 250 WO HCPCS: Performed by: INTERNAL MEDICINE

## 2024-12-10 PROCEDURE — 85027 COMPLETE CBC AUTOMATED: CPT

## 2024-12-10 PROCEDURE — 2580000003 HC RX 258: Performed by: STUDENT IN AN ORGANIZED HEALTH CARE EDUCATION/TRAINING PROGRAM

## 2024-12-10 PROCEDURE — 6360000002 HC RX W HCPCS: Performed by: STUDENT IN AN ORGANIZED HEALTH CARE EDUCATION/TRAINING PROGRAM

## 2024-12-10 PROCEDURE — 2580000003 HC RX 258: Performed by: NURSE PRACTITIONER

## 2024-12-10 PROCEDURE — 36415 COLL VENOUS BLD VENIPUNCTURE: CPT

## 2024-12-10 PROCEDURE — 94640 AIRWAY INHALATION TREATMENT: CPT

## 2024-12-10 RX ORDER — LABETALOL HYDROCHLORIDE 5 MG/ML
10 INJECTION, SOLUTION INTRAVENOUS EVERY 4 HOURS PRN
Status: DISCONTINUED | OUTPATIENT
Start: 2024-12-10 | End: 2024-12-13 | Stop reason: HOSPADM

## 2024-12-10 RX ORDER — IPRATROPIUM BROMIDE AND ALBUTEROL SULFATE 2.5; .5 MG/3ML; MG/3ML
1 SOLUTION RESPIRATORY (INHALATION) EVERY 4 HOURS PRN
Status: DISCONTINUED | OUTPATIENT
Start: 2024-12-10 | End: 2024-12-13 | Stop reason: HOSPADM

## 2024-12-10 RX ADMIN — IRON SUCROSE 200 MG: 20 INJECTION, SOLUTION INTRAVENOUS at 11:01

## 2024-12-10 RX ADMIN — LEVOTHYROXINE SODIUM 88 MCG: 88 TABLET ORAL at 06:15

## 2024-12-10 RX ADMIN — WATER 1000 MG: 1 INJECTION INTRAMUSCULAR; INTRAVENOUS; SUBCUTANEOUS at 20:42

## 2024-12-10 RX ADMIN — AMIODARONE HYDROCHLORIDE 200 MG: 200 TABLET ORAL at 10:52

## 2024-12-10 RX ADMIN — MICONAZOLE NITRATE: 2 POWDER TOPICAL at 10:52

## 2024-12-10 RX ADMIN — METOPROLOL TARTRATE 25 MG: 25 TABLET, FILM COATED ORAL at 20:46

## 2024-12-10 RX ADMIN — SODIUM CHLORIDE, PRESERVATIVE FREE 10 ML: 5 INJECTION INTRAVENOUS at 10:52

## 2024-12-10 RX ADMIN — TIZANIDINE 4 MG: 4 TABLET ORAL at 21:08

## 2024-12-10 RX ADMIN — Medication: at 10:52

## 2024-12-10 RX ADMIN — SUCRALFATE 1 G: 1 TABLET ORAL at 10:52

## 2024-12-10 RX ADMIN — PANTOPRAZOLE SODIUM 40 MG: 40 INJECTION, POWDER, FOR SOLUTION INTRAVENOUS at 10:52

## 2024-12-10 RX ADMIN — MICONAZOLE NITRATE: 2 POWDER TOPICAL at 20:49

## 2024-12-10 RX ADMIN — ACETAMINOPHEN 500 MG: 500 TABLET ORAL at 21:08

## 2024-12-10 RX ADMIN — SODIUM CHLORIDE, PRESERVATIVE FREE 10 ML: 5 INJECTION INTRAVENOUS at 20:50

## 2024-12-10 RX ADMIN — SUCRALFATE 1 G: 1 TABLET ORAL at 18:08

## 2024-12-10 RX ADMIN — LABETALOL HYDROCHLORIDE 10 MG: 5 INJECTION INTRAVENOUS at 21:04

## 2024-12-10 RX ADMIN — QUETIAPINE FUMARATE 25 MG: 25 TABLET ORAL at 20:46

## 2024-12-10 RX ADMIN — FUROSEMIDE 20 MG: 20 TABLET ORAL at 10:51

## 2024-12-10 RX ADMIN — IPRATROPIUM BROMIDE AND ALBUTEROL SULFATE 1 DOSE: 2.5; .5 SOLUTION RESPIRATORY (INHALATION) at 07:38

## 2024-12-10 RX ADMIN — ATORVASTATIN CALCIUM 10 MG: 10 TABLET, FILM COATED ORAL at 10:51

## 2024-12-10 RX ADMIN — AZITHROMYCIN 500 MG: 250 TABLET, FILM COATED ORAL at 23:07

## 2024-12-10 RX ADMIN — METOPROLOL TARTRATE 25 MG: 25 TABLET, FILM COATED ORAL at 10:52

## 2024-12-10 RX ADMIN — PREDNISONE 40 MG: 20 TABLET ORAL at 10:52

## 2024-12-10 RX ADMIN — SERTRALINE HYDROCHLORIDE 100 MG: 50 TABLET ORAL at 10:52

## 2024-12-10 RX ADMIN — TIZANIDINE 4 MG: 4 TABLET ORAL at 02:29

## 2024-12-10 RX ADMIN — SUCRALFATE 1 G: 1 TABLET ORAL at 20:46

## 2024-12-10 RX ADMIN — SUCRALFATE 1 G: 1 TABLET ORAL at 13:25

## 2024-12-10 RX ADMIN — INSULIN LISPRO 4 UNITS: 100 INJECTION, SOLUTION INTRAVENOUS; SUBCUTANEOUS at 18:08

## 2024-12-10 RX ADMIN — PANTOPRAZOLE SODIUM 40 MG: 40 INJECTION, POWDER, FOR SOLUTION INTRAVENOUS at 20:46

## 2024-12-10 ASSESSMENT — PAIN DESCRIPTION - ORIENTATION: ORIENTATION: LOWER

## 2024-12-10 ASSESSMENT — PAIN SCALES - GENERAL
PAINLEVEL_OUTOF10: 7
PAINLEVEL_OUTOF10: 6
PAINLEVEL_OUTOF10: 6

## 2024-12-10 ASSESSMENT — PAIN DESCRIPTION - LOCATION
LOCATION: BACK
LOCATION: BACK

## 2024-12-10 ASSESSMENT — PAIN DESCRIPTION - DESCRIPTORS: DESCRIPTORS: ACHING

## 2024-12-10 NOTE — PROGRESS NOTES
Hospital Medicine Progress Note      Subjective:  She feels like her breathing is close to normal.  Hb trend isn't great.  She hasn't noticed any bleeding, had a normal brown BM yesterday.       General appearance: No apparent distress, appears stated age and cooperative.  HEENT: Pupils equal, round.  Conjunctivae/corneas clear.  Neck: No jugular venous distention.   Respiratory:  Normal respiratory effort.  Bilaterally without Rales/Wheezes/Rhonchi.  Cardiovascular: Normal rate and regular rhythm with normal S1/S2 without murmurs, rubs or gallops.  Abdomen: Soft, non-tender, non-distended with normal bowel sounds.  Musculoskeletal: No edema.  Without deformity.  Skin: No jaundice.  Lots of superficial skin lesions which patient has been picking.   Neurologic:  Neurovascularly intact without any focal sensory/motor deficits. Cranial nerves: II-XII intact.  Psychiatric: Alert and oriented, has insight.  Seems delusional.      Presenting Admission History:  \"74 y.o. female who presented to [facility] with SOB. PMHx significant for CHF, Hx of CVA, COPD, DM, HLD, HTN, Thyroid disease.\"      Assessment/Plan:        Pneumonia.  Organism unknown.  Empiric ceftriaxone and azithromycin.    AECOPD.  Steroids, inhaled bronchodilators, abx.    Anemia.  PPI.  1u pRBCs on 12/8.  Trend Hb.  Will discharge with iron supplement.  Outpatient colonoscopy per GI.    Afib/flutter.  Amiodarone, metoprolol.  Resume apixaban soon when Hb stable and OK with GI.    H/o CVA.  Apixaban as above.  Statin.     Chronic diastolic CHF.  Furosemide.    Hypothyroidism.  Clinically euthyroid.  Continue home dose of levothyroxine.     She is fixated on what might be a delusional infestation.  I see no evidence of the \"bugs and 1 inch worms\" that she thinks she keeping \"picking out\" of her skin.  Supportive care, reassurance.  I don't think an additional antipsychotic (beyond her low-dose quetiapine) is warranted at this point - she is at risk for  history obtained     [] Independent Interpretation of tests (any 1)    [] Telemetry (Rhythm Strip) personally reviewed and interpreted        [] Imaging personally reviewed and interpreted     [] Discussion (any 1)  [] Multi-Disciplinary Rounds with Case Management  [] Discussed management of the case with           Labs:  Personally reviewed on 12/10/2024 and interpreted for clinical significance as documented above.     Recent Labs     12/08/24 0628 12/08/24  2305 12/09/24  0623 12/10/24  0604   WBC 9.2  --  9.3 8.5   HGB 6.8* 7.0* 7.5* 7.0*   HCT 21.8* 22.1* 23.9* 22.4*     --  270 258     Recent Labs     12/08/24 0628 12/09/24  0623    140   K 4.1 3.9    107   CO2 22 23   BUN 23* 23*   CREATININE 0.9 1.0   CALCIUM 8.8 8.7     No results for input(s): \"PROBNP\", \"TROPHS\" in the last 72 hours.  No results for input(s): \"LABA1C\" in the last 72 hours.  Recent Labs     12/08/24 0628 12/09/24 0623   AST 39* 39*   ALT 15 22   BILITOT 0.4 0.4   ALKPHOS 259* 286*     No results for input(s): \"INR\", \"LACTA\", \"TSH\" in the last 72 hours.      Urine Cultures:   Lab Results   Component Value Date/Time    LABURIN  05/08/2024 12:27 PM     <10,000 CFU/ml mixed skin/urogenital jud. No further workup    LABURIN 50,000 CFU/ml 05/08/2024 12:27 PM     Blood Cultures:   Lab Results   Component Value Date/Time    BC No Growth after 4 days of incubation. 06/11/2024 04:22 AM     Lab Results   Component Value Date/Time    BLOODCULT2 No Growth after 4 days of incubation. 06/11/2024 04:25 AM     Organism:   Lab Results   Component Value Date/Time    ORG Klebsiella pneumoniae 05/08/2024 12:27 PM         JAMILA LEIVA MD        V 10.25    Date of Admission: 12/6/2024    Hospital Day: 5      Chief Complaint   Patient presents with    Shortness of Breath     Reports not feeling well since Monday, hx COPD and CHF. EMS reports patient was at 92% while on baseline 4L nasal cannula. EMS gave duoneb and albuterol PTA. Pt

## 2024-12-10 NOTE — CARE COORDINATION
Chart reviewed day 4. Care provided by GI and IM. Pt is from home with her daughter. She reports being mostly independent in ADLs. She is active with [employer]. Her baseline is 3L. H&H 7.0 and 22.4 today. Pt received 1 unit PRBC yesterday. Will continue to follow course for needs. Raissa Roberson RN     [facility] can accept. Raissa Roberson RN

## 2024-12-10 NOTE — PROGRESS NOTES
PROGRESS NOTE    HPI: Nancy Franz is a(n)74 y.o. female admitted for work-up and treatment for Hypokalemia [E87.6]  Lactic acidosis [E87.20]  Hyperglycemia [R73.9]  COPD exacerbation (HCC) [J44.1]  Pneumonia of right lower lobe due to infectious organism [J18.9]  Anemia, unspecified type [D64.9]  Pneumonia due to infectious organism [J18.9].     We are following for anemia.    Subjective:     She states breathing is better.  Back pain is improved some.  Having brown Bms.      Objective:     I/O last 3 completed shifts:  In: 2466 [P.O.:2200; Blood:266]  Out: 0       BP (!) 127/55   Pulse 54   Temp 97.8 °F (36.6 °C) (Oral)   Resp 18   Ht 1.575 m (5' 2\")   Wt 96.6 kg (213 lb)   SpO2 95%   BMI 38.96 kg/m²     Physical Exam:  HEENT: anicteric sclera, oropharyngeal membranes pink and moist.  Lungs: + cough, dyspneic, 4 L O2  Abdomen: soft, NT. No ascites.  No hepatomegaly or splenomegaly  Neuro: alert and oriented x 3, no asterixis      Results:   Lab Results   Component Value Date    ALT 22 12/09/2024    AST 39 (H) 12/09/2024    ALKPHOS 286 (H) 12/09/2024    BILIDIR <0.2 01/21/2024    INR 1.42 (H) 01/21/2024    AMYLASE 50 03/13/2014    LIPASE 45.0 11/15/2024     Lab Results   Component Value Date    WBC 8.5 12/10/2024    HGB 7.0 (L) 12/10/2024    HCT 22.4 (L) 12/10/2024    MCV 78.3 (L) 12/10/2024     12/10/2024     BUN/Cr/glu/ALT/AST/amyl/lip:  23/1.0/--/22/39/--/-- (12/09 0623)  XR CHEST PORTABLE    Result Date: 12/6/2024  Right lower lobe pneumonia.         Impression:  74-year-old female past medical history of a-fib on eliquis, DM, HTN, HLD, COPD, CHF, asthma, CVA, anxiety, depression, DVT, hypothyroidism, seizure disorder and VILLEGAS cirrhosis decompensated by esophageal varices (last EGD 8/2024) who presents to the ER with shortness of breath, cough and congestion. Admitted with pneumonia and COPD exacerbation.     XIANG.  With reports of melena 3

## 2024-12-10 NOTE — PLAN OF CARE
Problem: Chronic Conditions and Co-morbidities  Goal: Patient's chronic conditions and co-morbidity symptoms are monitored and maintained or improved  Outcome: Progressing     Problem: Discharge Planning  Goal: Discharge to home or other facility with appropriate resources  Outcome: Progressing     Problem: Pain  Goal: Verbalizes/displays adequate comfort level or baseline comfort level  Outcome: Progressing     Problem: Safety - Adult  Goal: Free from fall injury  Outcome: Progressing     Problem: Respiratory - Adult  Goal: Achieves optimal ventilation and oxygenation  Outcome: Progressing

## 2024-12-10 NOTE — PLAN OF CARE
Problem: Chronic Conditions and Co-morbidities  Goal: Patient's chronic conditions and co-morbidity symptoms are monitored and maintained or improved  12/10/2024 1328 by Rufina Arias RN  Outcome: Progressing  Flowsheets (Taken 12/10/2024 1102)  Care Plan - Patient's Chronic Conditions and Co-Morbidity Symptoms are Monitored and Maintained or Improved: Monitor and assess patient's chronic conditions and comorbid symptoms for stability, deterioration, or improvement  12/10/2024 0154 by Nick Dobbins RN  Outcome: Progressing     Problem: Discharge Planning  Goal: Discharge to home or other facility with appropriate resources  12/10/2024 1328 by Rufina Arias RN  Outcome: Progressing  Flowsheets (Taken 12/10/2024 1102)  Discharge to home or other facility with appropriate resources: Identify barriers to discharge with patient and caregiver  12/10/2024 0154 by Nick Dobbins RN  Outcome: Progressing     Problem: Pain  Goal: Verbalizes/displays adequate comfort level or baseline comfort level  12/10/2024 1328 by Rufina Arias RN  Outcome: Progressing  Flowsheets (Taken 12/10/2024 0815)  Verbalizes/displays adequate comfort level or baseline comfort level: Encourage patient to monitor pain and request assistance  12/10/2024 0154 by Nick Dobbins RN  Outcome: Progressing     Problem: Safety - Adult  Goal: Free from fall injury  12/10/2024 1328 by Rufina Arias RN  Outcome: Progressing  12/10/2024 0154 by Nick Dobbins RN  Outcome: Progressing     Problem: Respiratory - Adult  Goal: Achieves optimal ventilation and oxygenation  12/10/2024 1328 by Rufina Arias RN  Outcome: Progressing  Flowsheets (Taken 12/10/2024 1102)  Achieves optimal ventilation and oxygenation: Assess for changes in respiratory status  12/10/2024 0154 by Nick Dobbins RN  Outcome: Progressing

## 2024-12-10 NOTE — PROGRESS NOTES
Hospital Medicine Progress Note      Subjective:  She feels like her breathing is a little better.  She is fixated on what might be a delusional infestation.        General appearance: No apparent distress, appears stated age and cooperative.  HEENT: Pupils equal, round.  Conjunctivae/corneas clear.  Neck: No jugular venous distention.   Respiratory:  Normal respiratory effort.  Bilaterally without Rales/Wheezes/Rhonchi.  Cardiovascular: Normal rate and regular rhythm with normal S1/S2 without murmurs, rubs or gallops.  Abdomen: Soft, non-tender, non-distended with normal bowel sounds.  Musculoskeletal: No edema.  Without deformity.  Skin: No jaundice.  Lots of superficial skin lesions which patient has been picking.   Neurologic:  Neurovascularly intact without any focal sensory/motor deficits. Cranial nerves: II-XII intact.  Psychiatric: Alert and oriented, has insight.  Seems delusional.      Presenting Admission History:  \"74 y.o. female who presented to [facility] with SOB. PMHx significant for CHF, Hx of CVA, COPD, DM, HLD, HTN, Thyroid disease.\"      Assessment/Plan:        Pneumonia.  Organism unknown.  Empiric ceftriaxone and azithromycin.    AECOPD.  Steroids, inhaled bronchodilators, abx.    Anemia.  PPI.  1u pRBCs on 12/8.  Will discharge with iron supplement.  Outpatient colonoscopy per GI.    Afib/flutter.  Amiodarone, metoprolol.  Resume apixaban 12/10 if Hb stable.    H/o CVA.  Apixaban as above.  Statin.     Chronic diastolic CHF.  Furosemide.    Hypothyroidism.  Clinically euthyroid.  Continue home dose of levothyroxine.     She is fixated on what might be a delusional infestation.  I see no evidence of the \"bugs and 1 inch worms\" that she thinks she keeping \"picking out\" of her skin.  Supportive care, reassurance.  I don't think an additional antipsychotic (beyond her low-dose quetiapine) is warranted at this point - she is at risk for polypharmacy.  I see she has a h/o \"delusions,\" details  Inhalation Q4H WA RT    cefTRIAXone (ROCEPHIN) IV  1,000 mg IntraVENous Q24H    predniSONE  40 mg Oral Daily    insulin lispro  0-16 Units SubCUTAneous 4x Daily AC & HS    pantoprazole  40 mg IntraVENous BID     PRN Meds: mineral oil-hydrophilic petrolatum, mirtazapine, tiZANidine, glucose, dextrose bolus **OR** dextrose bolus, glucagon (rDNA), dextrose, sodium chloride flush, sodium chloride, ondansetron **OR** ondansetron, polyethylene glycol, acetaminophen **OR** acetaminophen, guaiFENesin-dextromethorphan, benzonatate    BP (!) 145/61   Pulse 72   Temp 97.9 °F (36.6 °C) (Oral)   Resp 18   Ht 1.575 m (5' 2\")   Wt 96.6 kg (213 lb)   SpO2 95%   BMI 38.96 kg/m²     Telemetry:      Personally reviewed and interpreted telemetry (Rhythm Strip) on 12/9/2024 with the following findings:     Diet: ADULT DIET; Regular; Low Sodium (2 gm)    DVT Prophylaxis: []PPx LMWH  []SQ Heparin  []IPC/SCDs  []Eliquis  []Xarelto  []Coumadin  [] Heparin Drip  []Other -      Code status: Full Code    PT/OT Eval Status:   []NOT yet ordered  []Ordered and Pending   []Seen with Recommendations for:   []Home independently  []Home w/ assist  []HHC  []SNF  []Acute Rehab    Multi-Disciplinary Rounds with Case Management completed on 12/9/2024 with the following recommendations:  Anticipated Discharge Location: []Home w/ []HHC vs []SNF  []Acute Rehab  []LTAC  []Hospice  []Other -    Anticipated Discharge Day/Date:    Barriers to Discharge:   --------------------------------------------------    MDM (any 2 required for High level billing)    A. Problems (any 1)  [] Acute/Chronic Illness/injury posing ongoing threat to life and/or bodily function without ongoing treatment    [] Severe exacerbation of chronic illness    --------------------------------------------------  B. Risk of Treatment (any 1)    [] Drugs/treatments that require intensive monitoring for toxicity    [] IV ABX (Vancomycin, Aminoglycosides, etc)     [] Post-Cath/Contrast

## 2024-12-11 ENCOUNTER — ANESTHESIA (OUTPATIENT)
Dept: ENDOSCOPY | Age: 74
DRG: 193 | End: 2024-12-11
Payer: MEDICARE

## 2024-12-11 ENCOUNTER — ANESTHESIA EVENT (OUTPATIENT)
Dept: ENDOSCOPY | Age: 74
DRG: 193 | End: 2024-12-11
Payer: MEDICARE

## 2024-12-11 LAB
ANION GAP SERPL CALCULATED.3IONS-SCNC: 11 MMOL/L (ref 3–16)
BUN SERPL-MCNC: 21 MG/DL (ref 7–20)
CALCIUM SERPL-MCNC: 8.3 MG/DL (ref 8.3–10.6)
CHLORIDE SERPL-SCNC: 109 MMOL/L (ref 99–110)
CO2 SERPL-SCNC: 23 MMOL/L (ref 21–32)
CREAT SERPL-MCNC: 1 MG/DL (ref 0.6–1.2)
DEPRECATED RDW RBC AUTO: 20.8 % (ref 12.4–15.4)
GFR SERPLBLD CREATININE-BSD FMLA CKD-EPI: 59 ML/MIN/{1.73_M2}
GLUCOSE BLD-MCNC: 128 MG/DL (ref 70–99)
GLUCOSE BLD-MCNC: 147 MG/DL (ref 70–99)
GLUCOSE BLD-MCNC: 91 MG/DL (ref 70–99)
GLUCOSE BLD-MCNC: 93 MG/DL (ref 70–99)
GLUCOSE SERPL-MCNC: 128 MG/DL (ref 70–99)
HCT VFR BLD AUTO: 23.6 % (ref 36–48)
HCT VFR BLD AUTO: 27.3 % (ref 36–48)
HGB BLD-MCNC: 7.4 G/DL (ref 12–16)
HGB BLD-MCNC: 8.5 G/DL (ref 12–16)
MCH RBC QN AUTO: 24.9 PG (ref 26–34)
MCHC RBC AUTO-ENTMCNC: 31.3 G/DL (ref 31–36)
MCV RBC AUTO: 79.5 FL (ref 80–100)
PERFORMED ON: ABNORMAL
PERFORMED ON: ABNORMAL
PERFORMED ON: NORMAL
PERFORMED ON: NORMAL
PLATELET # BLD AUTO: 286 K/UL (ref 135–450)
PMV BLD AUTO: 8.1 FL (ref 5–10.5)
POTASSIUM SERPL-SCNC: 3.9 MMOL/L (ref 3.5–5.1)
RBC # BLD AUTO: 2.97 M/UL (ref 4–5.2)
SODIUM SERPL-SCNC: 143 MMOL/L (ref 136–145)
WBC # BLD AUTO: 8.2 K/UL (ref 4–11)

## 2024-12-11 PROCEDURE — 7100000011 HC PHASE II RECOVERY - ADDTL 15 MIN: Performed by: INTERNAL MEDICINE

## 2024-12-11 PROCEDURE — 85018 HEMOGLOBIN: CPT

## 2024-12-11 PROCEDURE — 88305 TISSUE EXAM BY PATHOLOGIST: CPT

## 2024-12-11 PROCEDURE — 94761 N-INVAS EAR/PLS OXIMETRY MLT: CPT

## 2024-12-11 PROCEDURE — 2580000003 HC RX 258: Performed by: STUDENT IN AN ORGANIZED HEALTH CARE EDUCATION/TRAINING PROGRAM

## 2024-12-11 PROCEDURE — 6360000002 HC RX W HCPCS: Performed by: STUDENT IN AN ORGANIZED HEALTH CARE EDUCATION/TRAINING PROGRAM

## 2024-12-11 PROCEDURE — 6370000000 HC RX 637 (ALT 250 FOR IP): Performed by: STUDENT IN AN ORGANIZED HEALTH CARE EDUCATION/TRAINING PROGRAM

## 2024-12-11 PROCEDURE — 3609012400 HC EGD TRANSORAL BIOPSY SINGLE/MULTIPLE: Performed by: INTERNAL MEDICINE

## 2024-12-11 PROCEDURE — 2580000003 HC RX 258: Performed by: NURSE PRACTITIONER

## 2024-12-11 PROCEDURE — 36415 COLL VENOUS BLD VENIPUNCTURE: CPT

## 2024-12-11 PROCEDURE — 6370000000 HC RX 637 (ALT 250 FOR IP): Performed by: INTERNAL MEDICINE

## 2024-12-11 PROCEDURE — 1200000000 HC SEMI PRIVATE

## 2024-12-11 PROCEDURE — 2709999900 HC NON-CHARGEABLE SUPPLY: Performed by: INTERNAL MEDICINE

## 2024-12-11 PROCEDURE — 85027 COMPLETE CBC AUTOMATED: CPT

## 2024-12-11 PROCEDURE — 3700000000 HC ANESTHESIA ATTENDED CARE: Performed by: INTERNAL MEDICINE

## 2024-12-11 PROCEDURE — 85014 HEMATOCRIT: CPT

## 2024-12-11 PROCEDURE — 0DB68ZX EXCISION OF STOMACH, VIA NATURAL OR ARTIFICIAL OPENING ENDOSCOPIC, DIAGNOSTIC: ICD-10-PCS | Performed by: INTERNAL MEDICINE

## 2024-12-11 PROCEDURE — 2700000000 HC OXYGEN THERAPY PER DAY

## 2024-12-11 PROCEDURE — 6360000002 HC RX W HCPCS: Performed by: NURSE ANESTHETIST, CERTIFIED REGISTERED

## 2024-12-11 PROCEDURE — 7100000010 HC PHASE II RECOVERY - FIRST 15 MIN: Performed by: INTERNAL MEDICINE

## 2024-12-11 PROCEDURE — 80048 BASIC METABOLIC PNL TOTAL CA: CPT

## 2024-12-11 PROCEDURE — 6360000002 HC RX W HCPCS: Performed by: NURSE PRACTITIONER

## 2024-12-11 RX ORDER — SODIUM CHLORIDE 0.9 % (FLUSH) 0.9 %
5-40 SYRINGE (ML) INJECTION EVERY 12 HOURS SCHEDULED
Status: CANCELLED | OUTPATIENT
Start: 2024-12-11

## 2024-12-11 RX ORDER — LABETALOL HYDROCHLORIDE 5 MG/ML
10 INJECTION, SOLUTION INTRAVENOUS
Status: CANCELLED | OUTPATIENT
Start: 2024-12-11

## 2024-12-11 RX ORDER — DIPHENHYDRAMINE HYDROCHLORIDE 50 MG/ML
12.5 INJECTION INTRAMUSCULAR; INTRAVENOUS
Status: CANCELLED | OUTPATIENT
Start: 2024-12-11 | End: 2024-12-12

## 2024-12-11 RX ORDER — SODIUM CHLORIDE 0.9 % (FLUSH) 0.9 %
5-40 SYRINGE (ML) INJECTION PRN
Status: CANCELLED | OUTPATIENT
Start: 2024-12-11

## 2024-12-11 RX ORDER — SPIRONOLACTONE 25 MG/1
12.5 TABLET ORAL DAILY
Status: DISCONTINUED | OUTPATIENT
Start: 2024-12-11 | End: 2024-12-13 | Stop reason: HOSPADM

## 2024-12-11 RX ORDER — PROPOFOL 10 MG/ML
INJECTION, EMULSION INTRAVENOUS
Status: DISCONTINUED | OUTPATIENT
Start: 2024-12-11 | End: 2024-12-11 | Stop reason: SDUPTHER

## 2024-12-11 RX ORDER — LIDOCAINE HYDROCHLORIDE 20 MG/ML
INJECTION, SOLUTION INFILTRATION; PERINEURAL
Status: DISCONTINUED | OUTPATIENT
Start: 2024-12-11 | End: 2024-12-11 | Stop reason: SDUPTHER

## 2024-12-11 RX ORDER — SODIUM CHLORIDE 9 MG/ML
INJECTION, SOLUTION INTRAVENOUS PRN
Status: CANCELLED | OUTPATIENT
Start: 2024-12-11

## 2024-12-11 RX ORDER — ONDANSETRON 2 MG/ML
4 INJECTION INTRAMUSCULAR; INTRAVENOUS
Status: CANCELLED | OUTPATIENT
Start: 2024-12-11 | End: 2024-12-12

## 2024-12-11 RX ORDER — OXYCODONE HYDROCHLORIDE 5 MG/1
5 TABLET ORAL PRN
Status: CANCELLED | OUTPATIENT
Start: 2024-12-11 | End: 2024-12-11

## 2024-12-11 RX ORDER — NALOXONE HYDROCHLORIDE 0.4 MG/ML
INJECTION, SOLUTION INTRAMUSCULAR; INTRAVENOUS; SUBCUTANEOUS PRN
Status: CANCELLED | OUTPATIENT
Start: 2024-12-11

## 2024-12-11 RX ORDER — OXYCODONE HYDROCHLORIDE 5 MG/1
10 TABLET ORAL PRN
Status: CANCELLED | OUTPATIENT
Start: 2024-12-11 | End: 2024-12-11

## 2024-12-11 RX ADMIN — ATORVASTATIN CALCIUM 10 MG: 10 TABLET, FILM COATED ORAL at 09:04

## 2024-12-11 RX ADMIN — SUCRALFATE 1 G: 1 TABLET ORAL at 16:58

## 2024-12-11 RX ADMIN — PROPOFOL 50 MG: 10 INJECTION, EMULSION INTRAVENOUS at 15:27

## 2024-12-11 RX ADMIN — SUCRALFATE 1 G: 1 TABLET ORAL at 19:52

## 2024-12-11 RX ADMIN — SERTRALINE HYDROCHLORIDE 100 MG: 50 TABLET ORAL at 09:05

## 2024-12-11 RX ADMIN — FUROSEMIDE 20 MG: 20 TABLET ORAL at 09:05

## 2024-12-11 RX ADMIN — MICONAZOLE NITRATE: 2 POWDER TOPICAL at 19:48

## 2024-12-11 RX ADMIN — LIDOCAINE HYDROCHLORIDE 50 MG: 20 INJECTION, SOLUTION INFILTRATION; PERINEURAL at 15:27

## 2024-12-11 RX ADMIN — WATER 1000 MG: 1 INJECTION INTRAMUSCULAR; INTRAVENOUS; SUBCUTANEOUS at 19:39

## 2024-12-11 RX ADMIN — MICONAZOLE NITRATE: 2 POWDER TOPICAL at 09:07

## 2024-12-11 RX ADMIN — IRON SUCROSE 200 MG: 20 INJECTION, SOLUTION INTRAVENOUS at 09:04

## 2024-12-11 RX ADMIN — PROPOFOL 25 MG: 10 INJECTION, EMULSION INTRAVENOUS at 15:29

## 2024-12-11 RX ADMIN — SODIUM CHLORIDE, PRESERVATIVE FREE 10 ML: 5 INJECTION INTRAVENOUS at 19:49

## 2024-12-11 RX ADMIN — QUETIAPINE FUMARATE 25 MG: 25 TABLET ORAL at 19:44

## 2024-12-11 RX ADMIN — SUCRALFATE 1 G: 1 TABLET ORAL at 09:05

## 2024-12-11 RX ADMIN — TIZANIDINE 4 MG: 4 TABLET ORAL at 19:44

## 2024-12-11 RX ADMIN — PROPOFOL 25 MG: 10 INJECTION, EMULSION INTRAVENOUS at 15:31

## 2024-12-11 RX ADMIN — SPIRONOLACTONE 12.5 MG: 25 TABLET ORAL at 09:04

## 2024-12-11 RX ADMIN — PANTOPRAZOLE SODIUM 40 MG: 40 INJECTION, POWDER, FOR SOLUTION INTRAVENOUS at 09:01

## 2024-12-11 RX ADMIN — SODIUM CHLORIDE, PRESERVATIVE FREE 10 ML: 5 INJECTION INTRAVENOUS at 09:01

## 2024-12-11 RX ADMIN — METOPROLOL TARTRATE 25 MG: 25 TABLET, FILM COATED ORAL at 19:44

## 2024-12-11 RX ADMIN — ACETAMINOPHEN 500 MG: 500 TABLET ORAL at 19:44

## 2024-12-11 RX ADMIN — PANTOPRAZOLE SODIUM 40 MG: 40 INJECTION, POWDER, FOR SOLUTION INTRAVENOUS at 19:44

## 2024-12-11 ASSESSMENT — PAIN DESCRIPTION - LOCATION
LOCATION: BACK
LOCATION: BACK

## 2024-12-11 ASSESSMENT — PAIN DESCRIPTION - DESCRIPTORS
DESCRIPTORS: ACHING
DESCRIPTORS: ACHING

## 2024-12-11 ASSESSMENT — PAIN DESCRIPTION - ORIENTATION
ORIENTATION: LOWER
ORIENTATION: LOWER;MID

## 2024-12-11 ASSESSMENT — PAIN DESCRIPTION - FREQUENCY: FREQUENCY: CONTINUOUS

## 2024-12-11 ASSESSMENT — PAIN SCALES - GENERAL
PAINLEVEL_OUTOF10: 6
PAINLEVEL_OUTOF10: 5
PAINLEVEL_OUTOF10: 0

## 2024-12-11 ASSESSMENT — PAIN - FUNCTIONAL ASSESSMENT
PAIN_FUNCTIONAL_ASSESSMENT: 0-10
PAIN_FUNCTIONAL_ASSESSMENT: 0-10
PAIN_FUNCTIONAL_ASSESSMENT: ACTIVITIES ARE NOT PREVENTED

## 2024-12-11 ASSESSMENT — ENCOUNTER SYMPTOMS: SHORTNESS OF BREATH: 1

## 2024-12-11 ASSESSMENT — PAIN DESCRIPTION - PAIN TYPE: TYPE: ACUTE PAIN

## 2024-12-11 NOTE — ANESTHESIA PRE PROCEDURE
sertraline (ZOLOFT) 100 MG tablet Take 1 tablet by mouth daily 6/17/24   Karina Tolentino APRN - CNP   folic acid (FOLVITE) 1 MG tablet Take 1 tablet by mouth daily 1/24/24   Leslie Sepulveda DO   levETIRAcetam (KEPPRA) 500 MG tablet Take 1 tablet by mouth 2 times daily 1/24/24 8/22/24  eLslie Sepulveda DO   tiZANidine (ZANAFLEX) 4 MG tablet Take 1 tablet by mouth 2 times daily as needed (As needed for muscle spasm)    ProviderLuke MD   sennosides-docusate sodium (SENOKOT-S) 8.6-50 MG tablet Take 2 tablets by mouth as needed for Constipation    ProviderLkue MD   acetaminophen (TYLENOL) 325 MG tablet Take 2 tablets by mouth every 8 (eight) hours    ProviderLuke MD   ergocalciferol (ERGOCALCIFEROL) 1.25 MG (86022 UT) capsule Take 1 capsule by mouth once a week    ProviderLuke MD   fluticasone-umeclidin-vilant (TRELEGY ELLIPTA) 200-62.5-25 MCG/ACT AEPB inhaler Inhale 1 puff into the lungs daily 8/23/23   Malina Burgos PA   levothyroxine (SYNTHROID) 88 MCG tablet Take 1 tablet by mouth Daily    ProviderLuke MD       Current medications:    Current Facility-Administered Medications   Medication Dose Route Frequency Provider Last Rate Last Admin   • spironolactone (ALDACTONE) tablet 12.5 mg  12.5 mg Oral Daily Bassam Parson MD   12.5 mg at 12/11/24 0904   • ipratropium 0.5 mg-albuterol 2.5 mg (DUONEB) nebulizer solution 1 Dose  1 Dose Inhalation Q4H PRN Bassam Parson MD       • diphenhydrAMINE-zinc acetate (BENADRYL) cream   Topical TID PRN Bassam Parson MD   Given at 12/10/24 1052   • miconazole (MICOTIN) 2 % powder   Topical BID Bassam Parson MD   Given at 12/11/24 0907   • labetalol (NORMODYNE;TRANDATE) injection 10 mg  10 mg IntraVENous Q4H PRN Gabriel Toscano APRN - CNP   10 mg at 12/10/24 2104   • mineral oil-hydrophilic petrolatum (AQUAPHOR) ointment   Topical BID PRN Bassam Parson MD       • lidocaine 1 % injection 50 mg  50 mg IntraDERmal Once Kirby,

## 2024-12-11 NOTE — PROGRESS NOTES
Hospital Medicine Progress Note      Subjective:  She hasn't noticed any bleeding.  Hb jumping around a bit, might be stable.      General appearance: No apparent distress, appears stated age and cooperative.  HEENT: Pupils equal, round.  Conjunctivae/corneas clear.  Neck: No jugular venous distention.   Respiratory:  Normal respiratory effort.  Bilaterally without Rales/Wheezes/Rhonchi.  Cardiovascular: Normal rate and regular rhythm with normal S1/S2 without murmurs, rubs or gallops.  Abdomen: Soft, non-tender, non-distended with normal bowel sounds.  Musculoskeletal: No edema.  Without deformity.  Skin: No jaundice.  Lots of superficial skin lesions which patient has been picking.   Neurologic:  Neurovascularly intact without any focal sensory/motor deficits. Cranial nerves: II-XII intact.  Psychiatric: Alert and oriented, has insight.  Seems delusional.      Presenting Admission History:  \"74 y.o. female who presented to [facility] with SOB. PMHx significant for CHF, Hx of CVA, COPD, DM, HLD, HTN, Thyroid disease.\"      Assessment/Plan:        Pneumonia.  Organism unknown.  Empiric ceftriaxone and azithromycin.    AECOPD.  Steroids, inhaled bronchodilators, abx.    Anemia.  PPI.  1u pRBCs on 12/8.  Trend Hb.  Will discharge with iron supplement.  EGD 12/11.  Outpatient colonoscopy per GI.    Afib/flutter.  Amiodarone, metoprolol.  Resume apixaban soon when Hb stable and OK with GI.    H/o CVA.  Apixaban as above.  Statin.     Chronic diastolic CHF.  Furosemide.  Spironolactone.  Poor candidate for empagliflozin.    Hypothyroidism.  Clinically euthyroid.  Continue home dose of levothyroxine.     She is fixated on what might be a delusional infestation.  I see no evidence of the \"bugs and 1 inch worms\" that she thinks she keeping \"picking out\" of her skin.  I asked her to put them in a cup to show to us.  Supportive care, reassurance.  For what it's worth, she has already been recently treated multiple times  12/09/24  0623   AST 39*   ALT 22   BILITOT 0.4   ALKPHOS 286*     No results for input(s): \"INR\", \"LACTA\", \"TSH\" in the last 72 hours.      Urine Cultures:   Lab Results   Component Value Date/Time    LABURIN  05/08/2024 12:27 PM     <10,000 CFU/ml mixed skin/urogenital jud. No further workup    LABURIN 50,000 CFU/ml 05/08/2024 12:27 PM     Blood Cultures:   Lab Results   Component Value Date/Time    BC No Growth after 4 days of incubation. 06/11/2024 04:22 AM     Lab Results   Component Value Date/Time    BLOODCULT2 No Growth after 4 days of incubation. 06/11/2024 04:25 AM     Organism:   Lab Results   Component Value Date/Time    ORG Klebsiella pneumoniae 05/08/2024 12:27 PM         JAMILA LEIVA MD        V 10.25    Date of Admission: 12/6/2024    Hospital Day: 6      Chief Complaint   Patient presents with    Shortness of Breath     Reports not feeling well since Monday, hx COPD and CHF. EMS reports patient was at 92% while on baseline 4L nasal cannula. EMS gave duoneb and albuterol PTA. Pt report coughing up yellow/red sputum.         Current Principal Problem:  Pneumonia due to infectious organism      Consults:      IP CONSULT TO HOSPITALIST  IP CONSULT TO CASE MANAGEMENT  IP CONSULT TO GI  IP CONSULT TO VASCULAR ACCESS TEAM

## 2024-12-11 NOTE — PLAN OF CARE
Problem: Chronic Conditions and Co-morbidities  Goal: Patient's chronic conditions and co-morbidity symptoms are monitored and maintained or improved  12/10/2024 2157 by Annia Kilgore RN  Outcome: Progressing  Flowsheets (Taken 12/10/2024 2044)  Care Plan - Patient's Chronic Conditions and Co-Morbidity Symptoms are Monitored and Maintained or Improved: Monitor and assess patient's chronic conditions and comorbid symptoms for stability, deterioration, or improvement     Problem: Discharge Planning  Goal: Discharge to home or other facility with appropriate resources  12/10/2024 2157 by Annia Kilgore RN  Outcome: Progressing  Flowsheets (Taken 12/10/2024 2044)  Discharge to home or other facility with appropriate resources: Identify barriers to discharge with patient and caregiver     Problem: Pain  Goal: Verbalizes/displays adequate comfort level or baseline comfort level  12/10/2024 2157 by Annia Kilgore RN  Outcome: Progressing  Flowsheets (Taken 12/10/2024 2038)  Verbalizes/displays adequate comfort level or baseline comfort level: Encourage patient to monitor pain and request assistance     Problem: Safety - Adult  Goal: Free from fall injury  12/10/2024 2157 by Annia Kilgore RN  Outcome: Progressing     Problem: Respiratory - Adult  Goal: Achieves optimal ventilation and oxygenation  12/10/2024 2157 by Annia Kilgore RN  Outcome: Progressing  Flowsheets (Taken 12/10/2024 2044)  Achieves optimal ventilation and oxygenation: Assess for changes in respiratory status
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
## 2024-12-11 NOTE — PERIOP NOTE
Called report to floor nurse - pt transport to floor in stable condition   VSS  Pt denies complaints   CMU called

## 2024-12-11 NOTE — ANESTHESIA POSTPROCEDURE EVALUATION
Department of Anesthesiology  Postprocedure Note    Patient: Nancy Franz  MRN: [id number]  YOB: 1950  Date of evaluation: 12/11/2024    Procedure Summary       Date: 12/11/24 Room / Location: [place] [address] / [facility]    Anesthesia Start: 1523 Anesthesia Stop: 1541    Procedure: ESOPHAGOGASTRODUODENOSCOPY BIOPSY Diagnosis:       Melena      Anemia, unspecified type      (Melena [K92.1])      (Anemia, unspecified type [D64.9])    Surgeons: Mp Bull MD Responsible Provider: Rios Jasmine MD    Anesthesia Type: MAC ASA Status: 4            Anesthesia Type: No value filed.    Jose Eduardo Phase I: Jose Eduardo Score: 10    Jose Eduardo Phase II: Jose Eduardo Score: 9    Anesthesia Post Evaluation    Patient location during evaluation: PACU  Patient participation: complete - patient participated  Level of consciousness: awake and alert  Airway patency: patent  Nausea & Vomiting: no nausea and no vomiting  Cardiovascular status: blood pressure returned to baseline  Respiratory status: acceptable  Hydration status: euvolemic  Comments: VSS on transfer to phase 2 recovery.  No anesthetic complications.  Pain management: adequate    No notable events documented.

## 2024-12-11 NOTE — OP NOTE
Esophagogastroduodenoscopy Note    Patient:   Nancy Franz    YOB: 1950    Facility:   [facility] [Inpatient]   Referring/PCP: Samia Rutherford APRN - CNP    Procedure:   Esophagogastroduodenoscopy with biopsy  Date:     12/11/2024   Endoscopist:  Mp Bull MD     Preoperative Diagnosis:   74-year-old female past medical history of a-fib on eliquis, DM, HTN, HLD, COPD, CHF, asthma, CVA, anxiety, depression, DVT, hypothyroidism, seizure disorder and VILLEGAS cirrhosis decompensated by esophageal varices (last EGD 8/2024) who presents to the ER with shortness of breath, cough and congestion. Admitted with pneumonia and COPD exacerbation.     Postoperative Diagnosis:  eso varix, hiatal hernia, portal HTN gastropathy, gastritis    Anesthesia:  per anesthesia    Estimated blood loss: Estimated amount of blood loss is 25ml.    Complications: None    Description of Procedure:  Informed consent was obtained from the patient after explanation of the procedure including indications, description of the procedure,  benefits and possible risks and complications of the procedure, and alternatives. Questions were answered.  The patient's history was reviewed and a directed physical examination was performed prior to the procedure.    Patient was monitored throughout the procedure with pulse oximetry and periodic assessment of vital signs. Patient was sedated as noted above. The Nursing staff and I performed a time out.  With the patient in the left lateral decubitus position, the Olympus videoendoscope was placed in the patient's mouth and under direct visualization passed into the esophagus. The scope was ultimately passed to the third portion of the duodenum.  Visualization was performed during both introduction and withdrawal of the endoscope and retroflexed view of the proximal stomach was obtained.     Findings::   Esophagus: 1 chain of 1+ eso varix. The  findings do not support a diagnosis of Haider's Esophagus.  Stomach: portal HTN gastropathy, 3cm hiatal hernia, patchy antral inflammation biopsied)  Duodenum: normal    Recommendations: -Continue acid suppression.,   -Await pathology.  -diet as tolerate  Pt is at high risk for bleeding with anticoagulation therapy  Mp Bull MD, MD

## 2024-12-11 NOTE — DISCHARGE INSTRUCTIONS
Follow up with PCP within 1-2 weeks.  Follow up with your cardiologist ASAP - they have a procedure ( a \"watchman device\") that they can perform on your heart which can make it unnecessary for you to take long-term blood thinners.  This might be good for you since you tend to bleed.  Follow up with GI per their instructions, they were considering doing an outpatient colonoscopy at some point.             ENDOSCOPY:  Inspection of any cavity of the body by means of an endoscopy. An endoscope is a flexible tube that allows direct visualization of the cavity.    You have had a Colonoscopy and Esophagogastroduodenoscopy    Discharge Instructions    1)  You may experience some lightheadedness for the next several hours. We suggest you plan on quiet relation for the rest of the day.    2)  Because of the sedative drugs in your blood steam, be advised that you should not drive, operate any machinery, or sign contracts for the remainder of the day.    3)  You should not take any alcoholic beverages tonight, and only take sleeping medication that has been specifically prescribed for you by your physician.    4)  Unless instructed differently, resume your regular diet. Eat smaller portions for your first meal and progress to normal amounts over the rest of the day.    5)  If you have any fever, chills, excessive bleeding, uncontrolled pain, increased abdominal bloating, or any other problems, notify your doctor or return to the hospital.    6)  Do not expect a normal bowel movement for one to three days due to the cleansing of the large intestine prior to the colonoscopy.    7) If you have a polyp removed, do not do any strenuous activity and avoid the use of Aspirin or related compounds for 2 week.    8) If you have a sore throat, you may use lozenges, or salt water gargles.    9) Call for biopsy results in one week:  [phone]    10)  Follow high fiber diet instruction paper

## 2024-12-12 LAB
ANION GAP SERPL CALCULATED.3IONS-SCNC: 9 MMOL/L (ref 3–16)
BUN SERPL-MCNC: 20 MG/DL (ref 7–20)
CALCIUM SERPL-MCNC: 8.2 MG/DL (ref 8.3–10.6)
CHLORIDE SERPL-SCNC: 107 MMOL/L (ref 99–110)
CO2 SERPL-SCNC: 25 MMOL/L (ref 21–32)
CREAT SERPL-MCNC: 1 MG/DL (ref 0.6–1.2)
DEPRECATED RDW RBC AUTO: 20.7 % (ref 12.4–15.4)
GFR SERPLBLD CREATININE-BSD FMLA CKD-EPI: 59 ML/MIN/{1.73_M2}
GLUCOSE BLD-MCNC: 100 MG/DL (ref 70–99)
GLUCOSE BLD-MCNC: 143 MG/DL (ref 70–99)
GLUCOSE BLD-MCNC: 151 MG/DL (ref 70–99)
GLUCOSE BLD-MCNC: 160 MG/DL (ref 70–99)
GLUCOSE SERPL-MCNC: 86 MG/DL (ref 70–99)
HCT VFR BLD AUTO: 25.8 % (ref 36–48)
HGB BLD-MCNC: 8.1 G/DL (ref 12–16)
MAGNESIUM SERPL-MCNC: 1.67 MG/DL (ref 1.8–2.4)
MCH RBC QN AUTO: 25 PG (ref 26–34)
MCHC RBC AUTO-ENTMCNC: 31.3 G/DL (ref 31–36)
MCV RBC AUTO: 79.9 FL (ref 80–100)
PERFORMED ON: ABNORMAL
PLATELET # BLD AUTO: 279 K/UL (ref 135–450)
PMV BLD AUTO: 7.7 FL (ref 5–10.5)
POTASSIUM SERPL-SCNC: 3.5 MMOL/L (ref 3.5–5.1)
RBC # BLD AUTO: 3.23 M/UL (ref 4–5.2)
SODIUM SERPL-SCNC: 141 MMOL/L (ref 136–145)
WBC # BLD AUTO: 6.6 K/UL (ref 4–11)

## 2024-12-12 PROCEDURE — 85027 COMPLETE CBC AUTOMATED: CPT

## 2024-12-12 PROCEDURE — 80048 BASIC METABOLIC PNL TOTAL CA: CPT

## 2024-12-12 PROCEDURE — 36415 COLL VENOUS BLD VENIPUNCTURE: CPT

## 2024-12-12 PROCEDURE — 83735 ASSAY OF MAGNESIUM: CPT

## 2024-12-12 PROCEDURE — 6370000000 HC RX 637 (ALT 250 FOR IP): Performed by: STUDENT IN AN ORGANIZED HEALTH CARE EDUCATION/TRAINING PROGRAM

## 2024-12-12 PROCEDURE — 6360000002 HC RX W HCPCS: Performed by: NURSE PRACTITIONER

## 2024-12-12 PROCEDURE — 1200000000 HC SEMI PRIVATE

## 2024-12-12 PROCEDURE — 2580000003 HC RX 258: Performed by: STUDENT IN AN ORGANIZED HEALTH CARE EDUCATION/TRAINING PROGRAM

## 2024-12-12 PROCEDURE — 6360000002 HC RX W HCPCS: Performed by: STUDENT IN AN ORGANIZED HEALTH CARE EDUCATION/TRAINING PROGRAM

## 2024-12-12 PROCEDURE — 6370000000 HC RX 637 (ALT 250 FOR IP): Performed by: INTERNAL MEDICINE

## 2024-12-12 RX ORDER — POTASSIUM CHLORIDE 1500 MG/1
40 TABLET, EXTENDED RELEASE ORAL ONCE
Status: COMPLETED | OUTPATIENT
Start: 2024-12-12 | End: 2024-12-12

## 2024-12-12 RX ORDER — LANOLIN ALCOHOL/MO/W.PET/CERES
400 CREAM (GRAM) TOPICAL 2 TIMES DAILY
Status: COMPLETED | OUTPATIENT
Start: 2024-12-12 | End: 2024-12-13

## 2024-12-12 RX ADMIN — AMIODARONE HYDROCHLORIDE 200 MG: 200 TABLET ORAL at 09:06

## 2024-12-12 RX ADMIN — Medication 400 MG: at 09:07

## 2024-12-12 RX ADMIN — SUCRALFATE 1 G: 1 TABLET ORAL at 16:10

## 2024-12-12 RX ADMIN — PANTOPRAZOLE SODIUM 40 MG: 40 INJECTION, POWDER, FOR SOLUTION INTRAVENOUS at 21:36

## 2024-12-12 RX ADMIN — MICONAZOLE NITRATE: 2 POWDER TOPICAL at 21:12

## 2024-12-12 RX ADMIN — QUETIAPINE FUMARATE 25 MG: 25 TABLET ORAL at 21:11

## 2024-12-12 RX ADMIN — SODIUM CHLORIDE, PRESERVATIVE FREE 10 ML: 5 INJECTION INTRAVENOUS at 21:32

## 2024-12-12 RX ADMIN — WATER 1000 MG: 1 INJECTION INTRAMUSCULAR; INTRAVENOUS; SUBCUTANEOUS at 21:31

## 2024-12-12 RX ADMIN — METOPROLOL TARTRATE 25 MG: 25 TABLET, FILM COATED ORAL at 09:07

## 2024-12-12 RX ADMIN — SUCRALFATE 1 G: 1 TABLET ORAL at 09:06

## 2024-12-12 RX ADMIN — SERTRALINE HYDROCHLORIDE 100 MG: 50 TABLET ORAL at 09:07

## 2024-12-12 RX ADMIN — PANTOPRAZOLE SODIUM 40 MG: 40 INJECTION, POWDER, FOR SOLUTION INTRAVENOUS at 09:07

## 2024-12-12 RX ADMIN — FUROSEMIDE 20 MG: 20 TABLET ORAL at 09:06

## 2024-12-12 RX ADMIN — SUCRALFATE 1 G: 1 TABLET ORAL at 21:11

## 2024-12-12 RX ADMIN — SODIUM CHLORIDE, PRESERVATIVE FREE 10 ML: 5 INJECTION INTRAVENOUS at 09:08

## 2024-12-12 RX ADMIN — METOPROLOL TARTRATE 25 MG: 25 TABLET, FILM COATED ORAL at 21:11

## 2024-12-12 RX ADMIN — POTASSIUM CHLORIDE 40 MEQ: 1500 TABLET, EXTENDED RELEASE ORAL at 09:07

## 2024-12-12 RX ADMIN — LEVOTHYROXINE SODIUM 88 MCG: 88 TABLET ORAL at 05:43

## 2024-12-12 RX ADMIN — Medication 400 MG: at 21:11

## 2024-12-12 RX ADMIN — SPIRONOLACTONE 12.5 MG: 25 TABLET ORAL at 09:06

## 2024-12-12 RX ADMIN — MICONAZOLE NITRATE: 2 POWDER TOPICAL at 09:07

## 2024-12-12 RX ADMIN — ATORVASTATIN CALCIUM 10 MG: 10 TABLET, FILM COATED ORAL at 09:06

## 2024-12-12 ASSESSMENT — PAIN SCALES - GENERAL: PAINLEVEL_OUTOF10: 7

## 2024-12-12 ASSESSMENT — PAIN DESCRIPTION - DESCRIPTORS: DESCRIPTORS: ACHING

## 2024-12-12 ASSESSMENT — PAIN DESCRIPTION - ORIENTATION: ORIENTATION: LOWER;MID

## 2024-12-12 ASSESSMENT — PAIN DESCRIPTION - LOCATION: LOCATION: BACK

## 2024-12-12 NOTE — CARE COORDINATION
Chart reviewed day 6. Care provided by GI and IM. Pt is from home with her daughter. She is mostly independent. She has home O2 with [employer] and her baseline is 3L. EGD negative for bleed. Pt with large bloody BM today. H&H 8.1 & 25.8 today. Will continue to follow course for needs. Raissa Roberson RN
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
## 2024-12-12 NOTE — PROGRESS NOTES
am                                                      PROGRESS NOTE    HPI: Nancy Franz is a(n)74 y.o. female admitted for work-up and treatment for Hypokalemia [E87.6]  Lactic acidosis [E87.20]  Hyperglycemia [R73.9]  COPD exacerbation (HCC) [J44.1]  Pneumonia of right lower lobe due to infectious organism [J18.9]  Anemia, unspecified type [D64.9]  Pneumonia due to infectious organism [J18.9].     We are following for anemia.    Subjective:     She was not in her room during rounds.  Per nursing she had a bloody bowel movement overnight.  She has had brown bowel movements today.  Hgb is stable.      Objective:     I/O last 3 completed shifts:  In: 720 [P.O.:720]  Out: -       BP (!) 155/67   Pulse 62   Temp 98.4 °F (36.9 °C) (Oral)   Resp 18   Ht 1.575 m (5' 2\")   Wt 91.4 kg (201 lb 9.6 oz)   SpO2 98%   BMI 36.87 kg/m²     Physical Exam:        Results:   Lab Results   Component Value Date    ALT 22 12/09/2024    AST 39 (H) 12/09/2024    ALKPHOS 286 (H) 12/09/2024    BILIDIR <0.2 01/21/2024    INR 1.42 (H) 01/21/2024    AMYLASE 50 03/13/2014    LIPASE 45.0 11/15/2024     Lab Results   Component Value Date    WBC 6.6 12/12/2024    HGB 8.1 (L) 12/12/2024    HCT 25.8 (L) 12/12/2024    MCV 79.9 (L) 12/12/2024     12/12/2024     BUN/Cr/glu/ALT/AST/amyl/lip:  20/1.0/--/--/--/--/-- (12/12 0543)  XR CHEST PORTABLE    Result Date: 12/6/2024  Right lower lobe pneumonia.         Impression:  74-year-old female past medical history of a-fib on eliquis, DM, HTN, HLD, COPD, CHF, asthma, CVA, anxiety, depression, DVT, hypothyroidism, seizure disorder and VILLEGAS cirrhosis decompensated by esophageal varices (last EGD 8/2024) who presents to the ER with shortness of breath, cough and congestion. Admitted with pneumonia and COPD exacerbation.     XIANG.  With reports of melena 3 days PTA. No hematemesis.  Having brown stools here.  Transfused 1 unit of blood 12/10.  S/p EGD 12/1 with findings of esophageal varix,
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
## 2024-12-12 NOTE — PLAN OF CARE
Problem: Chronic Conditions and Co-morbidities  Goal: Patient's chronic conditions and co-morbidity symptoms are monitored and maintained or improved  12/11/2024 2027 by Annia Kilgore RN  Outcome: Progressing  Flowsheets (Taken 12/11/2024 1937)  Care Plan - Patient's Chronic Conditions and Co-Morbidity Symptoms are Monitored and Maintained or Improved: Monitor and assess patient's chronic conditions and comorbid symptoms for stability, deterioration, or improvement     Problem: Discharge Planning  Goal: Discharge to home or other facility with appropriate resources  12/11/2024 2027 by Annia Kilgore RN  Outcome: Progressing  Flowsheets (Taken 12/11/2024 1937)  Discharge to home or other facility with appropriate resources: Identify barriers to discharge with patient and caregiver     Problem: Pain  Goal: Verbalizes/displays adequate comfort level or baseline comfort level  12/11/2024 2027 by Annia Kilgore RN  Outcome: Progressing  Flowsheets (Taken 12/11/2024 1936)  Verbalizes/displays adequate comfort level or baseline comfort level: Encourage patient to monitor pain and request assistance     Problem: Safety - Adult  Goal: Free from fall injury  12/11/2024 2027 by Annia Kilgore RN  Outcome: Progressing     Problem: Respiratory - Adult  Goal: Achieves optimal ventilation and oxygenation  12/11/2024 2027 by Annia Kilgore RN  Outcome: Progressing  Flowsheets (Taken 12/11/2024 1937)  Achieves optimal ventilation and oxygenation: Assess for changes in respiratory status

## 2024-12-12 NOTE — PLAN OF CARE
Problem: Chronic Conditions and Co-morbidities  Goal: Patient's chronic conditions and co-morbidity symptoms are monitored and maintained or improved  12/12/2024 0923 by Jodie León RN  Outcome: Progressing  12/11/2024 2027 by Annia Kilgore RN  Outcome: Progressing  Flowsheets (Taken 12/11/2024 1937)  Care Plan - Patient's Chronic Conditions and Co-Morbidity Symptoms are Monitored and Maintained or Improved: Monitor and assess patient's chronic conditions and comorbid symptoms for stability, deterioration, or improvement     Problem: Discharge Planning  Goal: Discharge to home or other facility with appropriate resources  12/12/2024 0923 by Jodie León RN  Outcome: Progressing  12/11/2024 2027 by Annia Kilgore RN  Outcome: Progressing  Flowsheets (Taken 12/11/2024 1937)  Discharge to home or other facility with appropriate resources: Identify barriers to discharge with patient and caregiver     Problem: Pain  Goal: Verbalizes/displays adequate comfort level or baseline comfort level  12/12/2024 0923 by Jodie León RN  Outcome: Progressing  12/11/2024 2027 by Annia Kilgore RN  Outcome: Progressing  Flowsheets (Taken 12/11/2024 1936)  Verbalizes/displays adequate comfort level or baseline comfort level: Encourage patient to monitor pain and request assistance     Problem: Safety - Adult  Goal: Free from fall injury  12/12/2024 0923 by Jodie León RN  Outcome: Progressing  12/11/2024 2027 by Annia Kilgore RN  Outcome: Progressing     Problem: Respiratory - Adult  Goal: Achieves optimal ventilation and oxygenation  12/12/2024 0923 by Jodie León RN  Outcome: Progressing  12/11/2024 2027 by Annia Kilgore RN  Outcome: Progressing  Flowsheets (Taken 12/11/2024 1937)  Achieves optimal ventilation and oxygenation: Assess for changes in respiratory status

## 2024-12-13 VITALS
SYSTOLIC BLOOD PRESSURE: 129 MMHG | TEMPERATURE: 98 F | BODY MASS INDEX: 37.1 KG/M2 | OXYGEN SATURATION: 94 % | HEART RATE: 91 BPM | WEIGHT: 201.6 LBS | RESPIRATION RATE: 16 BRPM | HEIGHT: 62 IN | DIASTOLIC BLOOD PRESSURE: 56 MMHG

## 2024-12-13 LAB
DEPRECATED RDW RBC AUTO: 20.5 % (ref 12.4–15.4)
GLUCOSE BLD-MCNC: 168 MG/DL (ref 70–99)
GLUCOSE BLD-MCNC: 97 MG/DL (ref 70–99)
HCT VFR BLD AUTO: 28.2 % (ref 36–48)
HGB BLD-MCNC: 8.6 G/DL (ref 12–16)
MCH RBC QN AUTO: 25.1 PG (ref 26–34)
MCHC RBC AUTO-ENTMCNC: 30.5 G/DL (ref 31–36)
MCV RBC AUTO: 82.2 FL (ref 80–100)
PERFORMED ON: ABNORMAL
PERFORMED ON: NORMAL
PLATELET # BLD AUTO: 272 K/UL (ref 135–450)
PMV BLD AUTO: 8.3 FL (ref 5–10.5)
RBC # BLD AUTO: 3.43 M/UL (ref 4–5.2)
WBC # BLD AUTO: 6.8 K/UL (ref 4–11)

## 2024-12-13 PROCEDURE — 2580000003 HC RX 258: Performed by: STUDENT IN AN ORGANIZED HEALTH CARE EDUCATION/TRAINING PROGRAM

## 2024-12-13 PROCEDURE — 2500000003 HC RX 250 WO HCPCS: Performed by: INTERNAL MEDICINE

## 2024-12-13 PROCEDURE — 85027 COMPLETE CBC AUTOMATED: CPT

## 2024-12-13 PROCEDURE — 6370000000 HC RX 637 (ALT 250 FOR IP): Performed by: INTERNAL MEDICINE

## 2024-12-13 PROCEDURE — 6360000002 HC RX W HCPCS: Performed by: NURSE PRACTITIONER

## 2024-12-13 PROCEDURE — 6370000000 HC RX 637 (ALT 250 FOR IP): Performed by: STUDENT IN AN ORGANIZED HEALTH CARE EDUCATION/TRAINING PROGRAM

## 2024-12-13 RX ORDER — FERROUS SULFATE 325(65) MG
325 TABLET ORAL
Qty: 30 TABLET | Refills: 3 | Status: SHIPPED | OUTPATIENT
Start: 2024-12-13

## 2024-12-13 RX ORDER — AMIODARONE HYDROCHLORIDE 200 MG/1
200 TABLET ORAL DAILY
Qty: 30 TABLET | Refills: 0 | Status: SHIPPED | OUTPATIENT
Start: 2024-12-13

## 2024-12-13 RX ORDER — FERROUS SULFATE 325(65) MG
325 TABLET ORAL
Status: DISCONTINUED | OUTPATIENT
Start: 2024-12-13 | End: 2024-12-13 | Stop reason: HOSPADM

## 2024-12-13 RX ORDER — MINERAL OIL/HYDROPHIL PETROLAT
OINTMENT (GRAM) TOPICAL
Qty: 1 EACH | Refills: 0 | Status: SHIPPED | OUTPATIENT
Start: 2024-12-13

## 2024-12-13 RX ORDER — SPIRONOLACTONE 25 MG/1
12.5 TABLET ORAL DAILY
Qty: 30 TABLET | Refills: 3 | Status: SHIPPED | OUTPATIENT
Start: 2024-12-13

## 2024-12-13 RX ADMIN — FERROUS SULFATE TAB 325 MG (65 MG ELEMENTAL FE) 325 MG: 325 (65 FE) TAB at 09:39

## 2024-12-13 RX ADMIN — SODIUM CHLORIDE, PRESERVATIVE FREE 10 ML: 5 INJECTION INTRAVENOUS at 09:16

## 2024-12-13 RX ADMIN — PANTOPRAZOLE SODIUM 40 MG: 40 INJECTION, POWDER, FOR SOLUTION INTRAVENOUS at 09:16

## 2024-12-13 RX ADMIN — ATORVASTATIN CALCIUM 10 MG: 10 TABLET, FILM COATED ORAL at 09:15

## 2024-12-13 RX ADMIN — AMIODARONE HYDROCHLORIDE 200 MG: 200 TABLET ORAL at 09:15

## 2024-12-13 RX ADMIN — SPIRONOLACTONE 12.5 MG: 25 TABLET ORAL at 09:15

## 2024-12-13 RX ADMIN — LEVOTHYROXINE SODIUM 88 MCG: 88 TABLET ORAL at 05:57

## 2024-12-13 RX ADMIN — METOPROLOL TARTRATE 25 MG: 25 TABLET, FILM COATED ORAL at 09:16

## 2024-12-13 RX ADMIN — Medication 400 MG: at 09:16

## 2024-12-13 RX ADMIN — SUCRALFATE 1 G: 1 TABLET ORAL at 09:16

## 2024-12-13 RX ADMIN — FUROSEMIDE 20 MG: 20 TABLET ORAL at 09:15

## 2024-12-13 RX ADMIN — SERTRALINE HYDROCHLORIDE 100 MG: 50 TABLET ORAL at 09:15

## 2024-12-13 RX ADMIN — APIXABAN 5 MG: 5 TABLET, FILM COATED ORAL at 09:16

## 2024-12-13 RX ADMIN — MICONAZOLE NITRATE: 2 POWDER TOPICAL at 09:17

## 2024-12-13 ASSESSMENT — PAIN SCALES - GENERAL: PAINLEVEL_OUTOF10: 0

## 2024-12-13 NOTE — CARE COORDINATION
CASE MANAGEMENT DISCHARGE SUMMARY      Discharge to: Home with [facility]    IMM given:12/13/2024    Transportation:    Family/car:     Confirmed discharge plan with:     Patient: yes     Family:  yes     Facility/Agency, name:  Aki from [facility] aware and can pull orders from [facility]   RN, name: Sabrina    Note: Discharging nurse to complete CYNTHIA, reconcile AVS, and place final copy with patient's discharge packet. RN to ensure that written prescriptions for  Level II medications are sent with patient to the facility as per protocol.    Follow-Up copy of Important Message from Medicare (IMM2) has been explained to patient and/or designated healthcare decision maker (HCDM). Pt and/or HCDM aware that patient is permitted to stay an additional 4 hours prior to discharge to consider an appeal if they feel as though they are being discharged too soon. Patient may discharge as planned if chooses to do so.  Patient/HCDM voice no other concerns or questions regarding this process.       Raissa Roberson RN

## 2024-12-13 NOTE — PLAN OF CARE
Problem: Chronic Conditions and Co-morbidities  Goal: Patient's chronic conditions and co-morbidity symptoms are monitored and maintained or improved  12/13/2024 1050 by Jodie León RN  Outcome: Progressing  12/13/2024 0341 by Karina Greco LPN  Outcome: Progressing     Problem: Discharge Planning  Goal: Discharge to home or other facility with appropriate resources  12/13/2024 1050 by Jodie León RN  Outcome: Progressing  12/13/2024 0341 by Karina Greco LPN  Outcome: Progressing     Problem: Pain  Goal: Verbalizes/displays adequate comfort level or baseline comfort level  12/13/2024 1050 by Jodie León RN  Outcome: Progressing  12/13/2024 0341 by Karina Greco LPN  Outcome: Progressing     Problem: Safety - Adult  Goal: Free from fall injury  12/13/2024 1050 by Jodie León RN  Outcome: Progressing  12/13/2024 0341 by Karina Greco LPN  Outcome: Progressing     Problem: Respiratory - Adult  Goal: Achieves optimal ventilation and oxygenation  12/13/2024 1050 by Jodie León RN  Outcome: Progressing  12/13/2024 0341 by Karina Greco LPN  Outcome: Progressing

## 2024-12-13 NOTE — DISCHARGE SUMMARY
Discharge Summary    Name:  Nancy Franz /Age/Sex: 1950 (74 y.o. female)   Admit Date: 2024  Discharge Date: 24   MRN & CSN:  [id number] & [id number] Encounter Date and Time 24 9:07 AM EST    Attending:  Jamila Parson MD Discharging Provider: JAMILA PARSON MD       Hospital Course:       \"74 y.o. female who presented to [facility] with SOB. PMHx significant for CHF, Hx of CVA, COPD, DM, HLD, HTN, Thyroid disease.\"       Pneumonia.  Organism unknown.  Empiric ceftriaxone and azithromycin.  Completed abx course here and she did well.      AECOPD.  Steroids (completed course here), inhaled bronchodilators, abx.     Anemia.  PPI.  1u pRBCs on .  Hb trended up.  Will discharge with iron supplement.  EGD  showed an esophageal varix, portal HTN gastropathy, and antral gastritis.  GI remarked that she would be high risk for bleeding with anticoagulation.  Outpatient colonoscopy per GI.     Afib/flutter.  Amiodarone, metoprolol.    - Discussed GI input with the patient regarding apixaban.  Unfortunately she is also high risk for CVA.  GI didn't feel strongly one way or another regarding overall risk/benefit of anticoagulation.  Discussed risks/benefits/options in detail with patient.  She strongly preferred to avoid stroke, even if it meant that she might have to deal with some bleeding issues along the way.  Will made a shared decision to resume apixaban at this point.  She should f/u with cardiology as outpatient for consideration of Watchman device.     H/o CVA.  Apixaban as above.  Statin.      Chronic diastolic CHF.  Furosemide.  Spironolactone.  Poor candidate for empagliflozin.     Hypothyroidism.  Clinically euthyroid.  Continue home dose of levothyroxine.      She is fixated on what seems like a delusional infestation.  I see no evidence of the \"bugs and 1 inch worms\" that she thinks she keeping \"picking out\" of her skin.  I asked her to put them in a cup to show to  No results found for: \"TROPONINT\"  Lactic Acid: No results for input(s): \"LACTA\" in the last 72 hours.  BNP: No results for input(s): \"PROBNP\" in the last 72 hours.  UA:  Lab Results   Component Value Date/Time    NITRU Negative 11/15/2024 07:29 PM    COLORU Yellow 11/15/2024 07:29 PM    PHUR 7.5 11/15/2024 07:35 PM    PHUR 7.0 07/25/2024 03:13 PM    PHUR 6.0 01/16/2024 05:40 PM    WBCUA 0-2 06/11/2024 04:41 AM    RBCUA 0-2 06/11/2024 04:41 AM    MUCUS Rare 01/16/2024 05:40 PM    BACTERIA 1+ 01/16/2024 05:40 PM    CLARITYU SL CLOUDY 11/15/2024 07:29 PM    SPECGRAV 1.020 07/25/2024 03:13 PM    LEUKOCYTESUR Negative 11/15/2024 07:29 PM    UROBILINOGEN 0.2 11/15/2024 07:29 PM    BILIRUBINUR Negative 11/15/2024 07:29 PM    BLOODU Negative 11/15/2024 07:29 PM    GLUCOSEU Negative 11/15/2024 07:29 PM    GLUCOSEU NEGATIVE 03/05/2011 09:51 PM    KETUA Negative 11/15/2024 07:29 PM    AMORPHOUS Rare 01/09/2023 12:01 AM     Urine Cultures:   Lab Results   Component Value Date/Time    LABURIN  05/08/2024 12:27 PM     <10,000 CFU/ml mixed skin/urogenital jud. No further workup    LABURIN 50,000 CFU/ml 05/08/2024 12:27 PM     Blood Cultures:   Lab Results   Component Value Date/Time    BC No Growth after 4 days of incubation. 06/11/2024 04:22 AM     Lab Results   Component Value Date/Time    BLOODCULT2 No Growth after 4 days of incubation. 06/11/2024 04:25 AM     Organism:   Lab Results   Component Value Date/Time    ORG Klebsiella pneumoniae 05/08/2024 12:27 PM         The patient expressed appropriate understanding of, and agreement with the discharge recommendations, medications, and plan.     Full Code    Discharge condition: stable    Consults this admission:  IP CONSULT TO HOSPITALIST  IP CONSULT TO CASE MANAGEMENT  IP CONSULT TO GI  IP CONSULT TO VASCULAR ACCESS TEAM  IP CONSULT TO HOME CARE NEEDS    Time Spent Discharging patient 33 minutes    Electronically signed by JAMILA LEIVA MD on 12/13/2024 at 9:16 AM

## 2024-12-13 NOTE — DISCHARGE INSTR - COC
Continuity of Care Form    Patient Name: Nancy Franz   :  1950  MRN:  [id number]    Admit date:  2024  Discharge date:  ***    Code Status Order: Full Code   Advance Directives:   Advance Care Flowsheet Documentation        Date/Time Healthcare Directive Type of Healthcare Directive Copy in Chart Healthcare Agent Appointed Healthcare Agent's Name Healthcare Agent's Phone Number    24 1414 No, patient does not have an advance directive for healthcare treatment  --  --  --  --  --                     Admitting Physician:  William Orr DO  PCP: Samia Rutherford, APRN - CNP    Discharging Nurse: ***  Discharging Hospital Unit/Room#: 0357/0357-01  Discharging Unit Phone Number: ***    Emergency Contact:   Extended Emergency Contact Information  Primary Emergency Contact: Alek Franzy  Address: [address] [place] [address] [place] [address] [facility]  Home Phone: [phone]  Mobile Phone: [phone]  Relation: Child   needed? No  Secondary Emergency Contact: Niesha Unger  Home Phone: [phone]  Mobile Phone: [phone]  Relation: Friend    Past Surgical History:  Past Surgical History:   Procedure Laterality Date    CATARACT REMOVAL WITH IMPLANT Left 2014    CATARACT REMOVAL WITH IMPLANT Right 2015    phacoemulsification with initraocular lens implant     SECTION      x3    COLONOSCOPY  2012    HYSTERECTOMY (CERVIX STATUS UNKNOWN)      IR MIDLINE CATH  2024    IR MIDLINE CATH 2024 [facility] PROC[facility]    IR MIDLINE CATH  2024    IR MIDLINE CATH 2024 [facility] PROC[facility]    OTHER SURGICAL HISTORY  2023    EGD    TONSILLECTOMY      TUBAL LIGATION      UPPER GASTROINTESTINAL ENDOSCOPY  2014    gastric polyp    UPPER GASTROINTESTINAL ENDOSCOPY  2014    gastric polyp    UPPER GASTROINTESTINAL ENDOSCOPY  2018    gastritis    UPPER GASTROINTESTINAL ENDOSCOPY N/A 2023    EGD performed by  Juan Jose Teresa MD at [facility] ENDOSCOPY    UPPER GASTROINTESTINAL ENDOSCOPY N/A 1/17/2024    EGD BIOPSY performed by Juan Jose Teresa MD at [facility] ENDOSCOPY    UPPER GASTROINTESTINAL ENDOSCOPY N/A 8/26/2024    EGD W/ANES. performed by Juan Jose Teresa MD at [facility] ENDOSCOPY    UPPER GASTROINTESTINAL ENDOSCOPY N/A 12/11/2024    ESOPHAGOGASTRODUODENOSCOPY BIOPSY performed by Mp Bull MD at [facility] ENDOSCOPY       Immunization History:   Immunization History   Administered Date(s) Administered    COVID-19, MODERNA BLUE border, Primary or Immunocompromised, (age 12y+), IM, 100 mcg/0.5mL 04/15/2021, 05/26/2021    Influenza Virus Vaccine 09/11/2010, 11/03/2014, 12/01/2017, 08/06/2018, 09/05/2018, 04/08/2019, 07/16/2019, 08/13/2019    Influenza Whole 09/11/2010    Influenza, FLUARIX, FLULAVAL, FLUZONE (age 6 mo+) and AFLURIA, (age 3 y+), Quadv PF, 0.5mL 10/07/2016, 01/12/2018    Pneumococcal, PCV-13, PREVNAR 13, (age 6w+), IM, 0.5mL 09/20/2016    Pneumococcal, PPSV23, PNEUMOVAX 23, (age 2y+), SC/IM, 0.5mL 03/07/2011, 09/05/2017, 12/01/2017, 08/06/2018, 09/05/2018, 04/08/2019, 07/16/2019, 09/18/2019    Td, unspecified formulation 09/05/2017       Active Problems:  Patient Active Problem List   Diagnosis Code    GERD (gastroesophageal reflux disease) K21.9    Environmental allergies Z91.09    Chronic neck, back, & bilateral LE pain M79.604, M79.605    Diverticulosis K57.90    SIRS (systemic inflammatory response syndrome) (HCC) R65.10    Acute respiratory failure with hypoxia J96.01    Obesity E66.9    Hypothyroidism E03.9    Altered mental status R41.82    Intractable nausea and vomiting R11.2    Encephalopathy G93.40    Acute cystitis without hematuria N30.00    Septic shock (HCC) A41.9, R65.21    Acute kidney injury (HCC) N17.9    Acute on chronic diastolic CHF (congestive heart failure) ([facility]) I50.33    Benign essential HTN I10    Anxiety and depression F41.9, F32.A

## 2025-01-03 RX ORDER — MIRTAZAPINE 7.5 MG/1
TABLET, FILM COATED ORAL
Qty: 30 TABLET | Refills: 0 | OUTPATIENT
Start: 2025-01-03

## 2025-04-07 ENCOUNTER — OFFICE VISIT (OUTPATIENT)
Age: 75
End: 2025-04-07

## 2025-04-07 VITALS
HEART RATE: 83 BPM | BODY MASS INDEX: 38.71 KG/M2 | HEIGHT: 61 IN | WEIGHT: 205 LBS | DIASTOLIC BLOOD PRESSURE: 60 MMHG | OXYGEN SATURATION: 96 % | SYSTOLIC BLOOD PRESSURE: 136 MMHG

## 2025-04-07 DIAGNOSIS — R03.0 ELEVATED BLOOD PRESSURE READING: ICD-10-CM

## 2025-04-07 DIAGNOSIS — B37.9 CANDIDIASIS: Primary | ICD-10-CM

## 2025-04-07 RX ORDER — PERMETHRIN 50 MG/G
CREAM TOPICAL
Qty: 60 G | Refills: 1 | Status: SHIPPED | OUTPATIENT
Start: 2025-04-07

## 2025-04-07 RX ORDER — NYSTATIN 100000 [USP'U]/G
POWDER TOPICAL
Qty: 60 G | Refills: 0 | Status: SHIPPED | OUTPATIENT
Start: 2025-04-07

## 2025-04-07 RX ORDER — NYSTATIN 100000 [USP'U]/ML
500000 SUSPENSION ORAL 4 TIMES DAILY
Qty: 200 ML | Refills: 0 | Status: SHIPPED | OUTPATIENT
Start: 2025-04-07 | End: 2025-04-07

## 2025-04-07 NOTE — PROGRESS NOTES
Nancy Franz (: 1950) is a 74 y.o. female, Established patient, here for evaluation of the following chief complaint(s):  Other (Patient states she has parasites all over her body.  )      ASSESSMENT/PLAN:    ICD-10-CM    1. Candidiasis  B37.9 nystatin (MYCOSTATIN) 189768 UNIT/ML suspension      2. Elevated blood pressure reading  R03.0           Yeast infection instructions:  Wear loose cotton clothing. Don't wear nylon or other fabric that holds body heat and moisture close to the skin.  Try sleeping without underwear.  Don't scratch. Relieve itching with a cold pack or a cool bath.  Change out of wet or damp clothes as soon as possible  Discussed PCP follow up for persisting or worsening symptoms, or to return to the clinic if unable to obtain PCP follow up for worsening symptoms.    The patient tolerated their visit well. A time was given to answer questions and a plan was established, proposed, and was agreed upon. Reviewed AVS with treatment instructions and answered questions - patient acknowledges understanding and agreement with the discussed treatment plan and AVS instructions.      SUBJECTIVE/OBJECTIVE:  HPI:   74 y.o. female presents for complaint of \"parasites\" of skin x 1-2 months.    Admits has had this issue intermittently for several months. States she believes she has bugs or mites that are biting her at home. Brought in clear tape with suspected parasite, but no parasite is seen. Does have multiple abrasions scattered throughout body  Denies fever, shortness of breath, chest pain, difficulty breathing    Nothing makes symptoms better.  Scratching makes symptoms worse.    Has attempted lice killing shampoo for symptoms with no improvement    Pt provided HPI by themself - pt considered to be a reliable historian.            VITAL SIGNS  Vitals:    25 1604 25 1627   BP: (!) 175/66 136/60   BP Site: Left Upper Arm    Patient Position: Sitting    BP Cuff Size: Large Adult

## 2025-04-07 NOTE — PATIENT INSTRUCTIONS
Nancy,    Thank you for trusting [facility] with your care. Your decision to come to us means a lot and we are honored to be part of your healthcare journey. We value your trust and hope your experience with us was positive and met your expectations.    We're always looking for ways to improve, and your feedback is incredibly important to us. You will receive a text or email soon asking you how your visit went. for If you could take a moment to share your thoughts, it would mean the world to us. Your input helps us better serve you and others in the community.     Thank you again for choosing us. We're grateful for the opportunity to care for you and your loved ones. We hope to see you again - though we always wish you health and wellness!    Warm regards,    [facility]    Gabbi Finn Clinic Supervisor, RT, Elicia CABRERAR/MA, and Anahy Hendrix NP-C

## 2025-05-18 ENCOUNTER — OFFICE VISIT (OUTPATIENT)
Age: 75
End: 2025-05-18

## 2025-05-18 VITALS
TEMPERATURE: 97.1 F | HEART RATE: 59 BPM | OXYGEN SATURATION: 95 % | SYSTOLIC BLOOD PRESSURE: 122 MMHG | DIASTOLIC BLOOD PRESSURE: 74 MMHG

## 2025-05-18 DIAGNOSIS — N30.00 ACUTE CYSTITIS WITHOUT HEMATURIA: Primary | ICD-10-CM

## 2025-05-18 DIAGNOSIS — R39.15 URGENCY OF URINATION: ICD-10-CM

## 2025-05-18 LAB
BILIRUBIN, POC: NORMAL
BLOOD URINE, POC: NORMAL
CLARITY, POC: NORMAL
COLOR, POC: YELLOW
GLUCOSE URINE, POC: NEGATIVE MG/DL
KETONES, POC: NORMAL MG/DL
LEUKOCYTE EST, POC: POSITIVE
NITRITE, POC: NORMAL
PH, POC: 5.5
PROTEIN, POC: 100 MG/DL
SPECIFIC GRAVITY, POC: 1.02
UROBILINOGEN, POC: 0.2 MG/DL

## 2025-05-18 RX ORDER — CEPHALEXIN 500 MG/1
500 CAPSULE ORAL 2 TIMES DAILY
Qty: 10 CAPSULE | Refills: 0 | Status: SHIPPED | OUTPATIENT
Start: 2025-05-18 | End: 2025-05-23

## 2025-05-18 RX ORDER — ONDANSETRON 4 MG/1
4 TABLET, ORALLY DISINTEGRATING ORAL EVERY 6 HOURS PRN
Qty: 20 TABLET | Refills: 0 | Status: SHIPPED | OUTPATIENT
Start: 2025-05-18 | End: 2025-05-23

## 2025-05-18 RX ORDER — CEFTRIAXONE 1 G/1
1000 INJECTION, POWDER, FOR SOLUTION INTRAMUSCULAR; INTRAVENOUS ONCE
Status: COMPLETED | OUTPATIENT
Start: 2025-05-18 | End: 2025-05-18

## 2025-05-18 RX ADMIN — CEFTRIAXONE 1000 MG: 1 INJECTION, POWDER, FOR SOLUTION INTRAMUSCULAR; INTRAVENOUS at 19:25

## 2025-05-20 ENCOUNTER — RESULTS FOLLOW-UP (OUTPATIENT)
Age: 75
End: 2025-05-20

## 2025-05-20 LAB
BACTERIA UR CULT: ABNORMAL
ORGANISM: ABNORMAL

## 2025-05-25 ENCOUNTER — OFFICE VISIT (OUTPATIENT)
Age: 75
End: 2025-05-25

## 2025-05-25 VITALS
SYSTOLIC BLOOD PRESSURE: 113 MMHG | TEMPERATURE: 98.9 F | BODY MASS INDEX: 37.76 KG/M2 | RESPIRATION RATE: 16 BRPM | HEART RATE: 64 BPM | WEIGHT: 200 LBS | OXYGEN SATURATION: 99 % | HEIGHT: 61 IN | DIASTOLIC BLOOD PRESSURE: 65 MMHG

## 2025-05-25 DIAGNOSIS — L03.113 CELLULITIS OF RIGHT ARM: Primary | ICD-10-CM

## 2025-05-25 DIAGNOSIS — R35.0 FREQUENT URINATION: ICD-10-CM

## 2025-05-25 LAB
BILIRUBIN, POC: NORMAL
BLOOD URINE, POC: NORMAL
CLARITY, POC: NORMAL
COLOR, POC: NORMAL
GLUCOSE URINE, POC: NORMAL MG/DL
KETONES, POC: NORMAL MG/DL
LEUKOCYTE EST, POC: NORMAL
NITRITE, POC: NORMAL
PH, POC: 6
PROTEIN, POC: NORMAL MG/DL
SPECIFIC GRAVITY, POC: 1.02
UROBILINOGEN, POC: 0.2 MG/DL

## 2025-05-25 RX ORDER — KETOCONAZOLE 20 MG/G
CREAM TOPICAL
Qty: 30 G | Refills: 1 | Status: SHIPPED | OUTPATIENT
Start: 2025-05-25

## 2025-05-25 RX ORDER — DOXYCYCLINE HYCLATE 100 MG
100 TABLET ORAL 2 TIMES DAILY
Qty: 20 TABLET | Refills: 0 | Status: SHIPPED | OUTPATIENT
Start: 2025-05-25 | End: 2025-06-04

## 2025-05-26 ENCOUNTER — TELEPHONE (OUTPATIENT)
Age: 75
End: 2025-05-26

## 2025-05-26 DIAGNOSIS — L03.113 CELLULITIS OF RIGHT ARM: ICD-10-CM

## 2025-05-26 RX ORDER — KETOCONAZOLE 20 MG/G
CREAM TOPICAL
Qty: 30 G | Refills: 1 | Status: SHIPPED | OUTPATIENT
Start: 2025-05-26

## 2025-05-26 RX ORDER — DOXYCYCLINE HYCLATE 100 MG
100 TABLET ORAL 2 TIMES DAILY
Qty: 20 TABLET | Refills: 0 | Status: SHIPPED | OUTPATIENT
Start: 2025-05-26 | End: 2025-06-05

## 2025-06-13 DIAGNOSIS — B37.9 CANDIDIASIS: ICD-10-CM

## 2025-06-13 RX ORDER — FUROSEMIDE 40 MG/1
20 TABLET ORAL DAILY
Qty: 30 TABLET | Refills: 0 | OUTPATIENT
Start: 2025-06-13

## 2025-06-14 RX ORDER — NYSTATIN 100000 [USP'U]/G
POWDER TOPICAL
Qty: 60 G | Refills: 0 | OUTPATIENT
Start: 2025-06-14

## 2025-06-18 RX ORDER — PERMETHRIN 50 MG/G
CREAM TOPICAL
Qty: 60 G | Refills: 1 | OUTPATIENT
Start: 2025-06-18

## 2025-07-09 ENCOUNTER — APPOINTMENT (OUTPATIENT)
Dept: CT IMAGING | Age: 75
DRG: 562 | End: 2025-07-09
Payer: MEDICARE

## 2025-07-09 ENCOUNTER — APPOINTMENT (OUTPATIENT)
Dept: GENERAL RADIOLOGY | Age: 75
DRG: 562 | End: 2025-07-09
Payer: MEDICARE

## 2025-07-09 ENCOUNTER — HOSPITAL ENCOUNTER (INPATIENT)
Age: 75
LOS: 5 days | Discharge: HOME HEALTH | DRG: 562 | End: 2025-07-14
Attending: EMERGENCY MEDICINE | Admitting: STUDENT IN AN ORGANIZED HEALTH CARE EDUCATION/TRAINING PROGRAM
Payer: MEDICARE

## 2025-07-09 DIAGNOSIS — R79.89 POSITIVE D-DIMER: ICD-10-CM

## 2025-07-09 DIAGNOSIS — E03.9 HYPOTHYROIDISM, UNSPECIFIED TYPE: ICD-10-CM

## 2025-07-09 DIAGNOSIS — I95.9 TRANSIENT HYPOTENSION: ICD-10-CM

## 2025-07-09 DIAGNOSIS — S82.142A TIBIAL PLATEAU FRACTURE, LEFT, CLOSED, INITIAL ENCOUNTER: ICD-10-CM
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
## 2025-07-09 DIAGNOSIS — R00.1 BRADYCARDIA: ICD-10-CM

## 2025-07-09 DIAGNOSIS — R55 SYNCOPE AND COLLAPSE: Primary | ICD-10-CM

## 2025-07-09 DIAGNOSIS — N17.9 ACUTE RENAL FAILURE, UNSPECIFIED ACUTE RENAL FAILURE TYPE: ICD-10-CM

## 2025-07-09 DIAGNOSIS — R79.89 ELEVATED D-DIMER: ICD-10-CM

## 2025-07-09 LAB
ALBUMIN SERPL-MCNC: 3.2 G/DL (ref 3.4–5)
ALBUMIN/GLOB SERPL: 0.9 {RATIO} (ref 1.1–2.2)
ALP SERPL-CCNC: 190 U/L (ref 40–129)
ALT SERPL-CCNC: 16 U/L (ref 10–40)
ANION GAP SERPL CALCULATED.3IONS-SCNC: 15 MMOL/L (ref 3–16)
AST SERPL-CCNC: 27 U/L (ref 15–37)
BASE EXCESS BLDV CALC-SCNC: -2.2 MMOL/L (ref -3–3)
BASOPHILS # BLD: 0 K/UL (ref 0–0.2)
BASOPHILS NFR BLD: 0.7 %
BILIRUB SERPL-MCNC: 0.4 MG/DL (ref 0–1)
BILIRUB UR QL STRIP.AUTO: NEGATIVE
BUN SERPL-MCNC: 21 MG/DL (ref 7–20)
CALCIUM SERPL-MCNC: 8 MG/DL (ref 8.3–10.6)
CHLORIDE SERPL-SCNC: 106 MMOL/L (ref 99–110)
CLARITY UR: CLEAR
CO2 BLDV-SCNC: 23 MMOL/L
CO2 SERPL-SCNC: 23 MMOL/L (ref 21–32)
COHGB MFR BLDV: 2.2 % (ref 0–1.5)
COLOR UR: YELLOW
CREAT SERPL-MCNC: 1.6 MG/DL (ref 0.6–1.2)
D-DIMER QUANTITATIVE: 2.76 UG/ML FEU (ref 0–0.6)
DEPRECATED RDW RBC AUTO: 15.6 % (ref 12.4–15.4)
EOSINOPHIL # BLD: 0.6 K/UL (ref 0–0.6)
EOSINOPHIL NFR BLD: 10.1 %
GFR SERPLBLD CREATININE-BSD FMLA CKD-EPI: 34 ML/MIN/{1.73_M2}
GLUCOSE BLD-MCNC: 108 MG/DL (ref 70–99)
GLUCOSE SERPL-MCNC: 85 MG/DL (ref 70–99)
GLUCOSE UR STRIP.AUTO-MCNC: NEGATIVE MG/DL
HCO3 BLDV-SCNC: 22.1 MMOL/L (ref 23–29)
HCT VFR BLD AUTO: 33.2 % (ref 36–48)
HGB BLD-MCNC: 11.1 G/DL (ref 12–16)
HGB UR QL STRIP.AUTO: NEGATIVE
KETONES UR STRIP.AUTO-MCNC: NEGATIVE MG/DL
LACTATE BLDV-SCNC: 1.7 MMOL/L (ref 0.4–2)
LEUKOCYTE ESTERASE UR QL STRIP.AUTO: NEGATIVE
LYMPHOCYTES # BLD: 1.4 K/UL (ref 1–5.1)
LYMPHOCYTES NFR BLD: 23.7 %
MCH RBC QN AUTO: 30.8 PG (ref 26–34)
MCHC RBC AUTO-ENTMCNC: 33.5 G/DL (ref 31–36)
MCV RBC AUTO: 92.1 FL (ref 80–100)
METHGB MFR BLDV: 0.3 %
MONOCYTES # BLD: 0.5 K/UL (ref 0–1.3)
MONOCYTES NFR BLD: 9.1 %
NEUTROPHILS # BLD: 3.3 K/UL (ref 1.7–7.7)
NEUTROPHILS NFR BLD: 56.4 %
NITRITE UR QL STRIP.AUTO: NEGATIVE
NT-PROBNP SERPL-MCNC: 914 PG/ML (ref 0–449)
O2 CT VFR BLDV CALC: 15 VOL %
O2 THERAPY: ABNORMAL
PCO2 BLDV: 36.1 MMHG (ref 40–50)
PERFORMED ON: ABNORMAL
PH BLDV: 7.41 [PH] (ref 7.35–7.45)
PH UR STRIP.AUTO: 6 [PH] (ref 5–8)
PLATELET # BLD AUTO: 163 K/UL (ref 135–450)
PMV BLD AUTO: 8.5 FL (ref 5–10.5)
PO2 BLDV: 78.8 MMHG (ref 25–40)
POTASSIUM SERPL-SCNC: 3.3 MMOL/L (ref 3.5–5.1)
PROT SERPL-MCNC: 6.8 G/DL (ref 6.4–8.2)
PROT UR STRIP.AUTO-MCNC: NEGATIVE MG/DL
RBC # BLD AUTO: 3.61 M/UL (ref 4–5.2)
SAO2 % BLDV: 96 %
SODIUM SERPL-SCNC: 144 MMOL/L (ref 136–145)
SP GR UR STRIP.AUTO: 1.02 (ref 1–1.03)
TROPONIN, HIGH SENSITIVITY: 29 NG/L (ref 0–14)
TROPONIN, HIGH SENSITIVITY: 33 NG/L (ref 0–14)
TSH SERPL DL<=0.005 MIU/L-ACNC: 8.62 UIU/ML (ref 0.27–4.2)
UA COMPLETE W REFLEX CULTURE PNL UR: NORMAL
UA DIPSTICK W REFLEX MICRO PNL UR: NORMAL
URN SPEC COLLECT METH UR: NORMAL
UROBILINOGEN UR STRIP-ACNC: 0.2 E.U./DL
WBC # BLD AUTO: 5.9 K/UL (ref 4–11)

## 2025-07-09 PROCEDURE — 73502 X-RAY EXAM HIP UNI 2-3 VIEWS: CPT

## 2025-07-09 PROCEDURE — 6360000002 HC RX W HCPCS: Performed by: EMERGENCY MEDICINE

## 2025-07-09 PROCEDURE — 99285 EMERGENCY DEPT VISIT HI MDM: CPT

## 2025-07-09 PROCEDURE — 84484 ASSAY OF TROPONIN QUANT: CPT

## 2025-07-09 PROCEDURE — 93005 ELECTROCARDIOGRAM TRACING: CPT | Performed by: EMERGENCY MEDICINE

## 2025-07-09 PROCEDURE — 2500000003 HC RX 250 WO HCPCS: Performed by: STUDENT IN AN ORGANIZED HEALTH CARE EDUCATION/TRAINING PROGRAM

## 2025-07-09 PROCEDURE — 6370000000 HC RX 637 (ALT 250 FOR IP): Performed by: STUDENT IN AN ORGANIZED HEALTH CARE EDUCATION/TRAINING PROGRAM

## 2025-07-09 PROCEDURE — 83880 ASSAY OF NATRIURETIC PEPTIDE: CPT

## 2025-07-09 PROCEDURE — 80053 COMPREHEN METABOLIC PANEL: CPT

## 2025-07-09 PROCEDURE — 1200000000 HC SEMI PRIVATE

## 2025-07-09 PROCEDURE — 83605 ASSAY OF LACTIC ACID: CPT

## 2025-07-09 PROCEDURE — 36415 COLL VENOUS BLD VENIPUNCTURE: CPT

## 2025-07-09 PROCEDURE — 73700 CT LOWER EXTREMITY W/O DYE: CPT

## 2025-07-09 PROCEDURE — 81003 URINALYSIS AUTO W/O SCOPE: CPT

## 2025-07-09 PROCEDURE — 2580000003 HC RX 258: Performed by: EMERGENCY MEDICINE

## 2025-07-09 PROCEDURE — 73610 X-RAY EXAM OF ANKLE: CPT

## 2025-07-09 PROCEDURE — 2580000003 HC RX 258: Performed by: STUDENT IN AN ORGANIZED HEALTH CARE EDUCATION/TRAINING PROGRAM

## 2025-07-09 PROCEDURE — 82803 BLOOD GASES ANY COMBINATION: CPT

## 2025-07-09 PROCEDURE — 84481 FREE ASSAY (FT-3): CPT

## 2025-07-09 PROCEDURE — 96374 THER/PROPH/DIAG INJ IV PUSH: CPT

## 2025-07-09 PROCEDURE — 85379 FIBRIN DEGRADATION QUANT: CPT

## 2025-07-09 PROCEDURE — 73562 X-RAY EXAM OF KNEE 3: CPT

## 2025-07-09 PROCEDURE — 84443 ASSAY THYROID STIM HORMONE: CPT

## 2025-07-09 PROCEDURE — 85025 COMPLETE CBC W/AUTO DIFF WBC: CPT

## 2025-07-09 PROCEDURE — 96361 HYDRATE IV INFUSION ADD-ON: CPT

## 2025-07-09 PROCEDURE — 6370000000 HC RX 637 (ALT 250 FOR IP): Performed by: EMERGENCY MEDICINE

## 2025-07-09 PROCEDURE — 84439 ASSAY OF FREE THYROXINE: CPT

## 2025-07-09 PROCEDURE — 70450 CT HEAD/BRAIN W/O DYE: CPT

## 2025-07-09 RX ORDER — METOPROLOL SUCCINATE 50 MG/1
50 TABLET, EXTENDED RELEASE ORAL DAILY
Status: ON HOLD | COMMUNITY
End: 2025-07-11 | Stop reason: HOSPADM

## 2025-07-09 RX ORDER — MUPIROCIN 2 %
1 OINTMENT (GRAM) TOPICAL 3 TIMES DAILY
COMMUNITY

## 2025-07-09 RX ORDER — ACETAMINOPHEN 500 MG
500 TABLET ORAL EVERY 6 HOURS PRN
Status: DISCONTINUED | OUTPATIENT
Start: 2025-07-09 | End: 2025-07-14 | Stop reason: HOSPADM

## 2025-07-09 RX ORDER — LEVOTHYROXINE SODIUM 25 UG/1
25 TABLET ORAL DAILY
COMMUNITY

## 2025-07-09 RX ORDER — GLUCAGON 1 MG/ML
1 KIT INJECTION PRN
Status: DISCONTINUED | OUTPATIENT
Start: 2025-07-09 | End: 2025-07-10 | Stop reason: SDUPTHER

## 2025-07-09 RX ORDER — ONDANSETRON 4 MG/1
4 TABLET, ORALLY DISINTEGRATING ORAL EVERY 8 HOURS PRN
Status: DISCONTINUED | OUTPATIENT
Start: 2025-07-09 | End: 2025-07-14 | Stop reason: HOSPADM

## 2025-07-09 RX ORDER — TRIAMCINOLONE ACETONIDE 1 MG/G
1 CREAM TOPICAL 2 TIMES DAILY
Status: ON HOLD | COMMUNITY
End: 2025-07-09

## 2025-07-09 RX ORDER — DULAGLUTIDE 0.75 MG/.5ML
0.75 INJECTION, SOLUTION SUBCUTANEOUS WEEKLY
COMMUNITY

## 2025-07-09 RX ORDER — 0.9 % SODIUM CHLORIDE 0.9 %
1000 INTRAVENOUS SOLUTION INTRAVENOUS ONCE
Status: COMPLETED | OUTPATIENT
Start: 2025-07-09 | End: 2025-07-09

## 2025-07-09 RX ORDER — INSULIN LISPRO 100 [IU]/ML
0-4 INJECTION, SOLUTION INTRAVENOUS; SUBCUTANEOUS
Status: DISCONTINUED | OUTPATIENT
Start: 2025-07-09 | End: 2025-07-10 | Stop reason: SDUPTHER

## 2025-07-09 RX ORDER — ENOXAPARIN SODIUM 100 MG/ML
40 INJECTION SUBCUTANEOUS DAILY
Status: DISCONTINUED | OUTPATIENT
Start: 2025-07-10 | End: 2025-07-10

## 2025-07-09 RX ORDER — POLYETHYLENE GLYCOL 3350 17 G/17G
17 POWDER, FOR SOLUTION ORAL DAILY PRN
Status: DISCONTINUED | OUTPATIENT
Start: 2025-07-09 | End: 2025-07-12

## 2025-07-09 RX ORDER — SODIUM CHLORIDE 0.9 % (FLUSH) 0.9 %
5-40 SYRINGE (ML) INJECTION PRN
Status: DISCONTINUED | OUTPATIENT
Start: 2025-07-09 | End: 2025-07-14 | Stop reason: HOSPADM

## 2025-07-09 RX ORDER — POTASSIUM CHLORIDE 7.45 MG/ML
10 INJECTION INTRAVENOUS PRN
Status: DISCONTINUED | OUTPATIENT
Start: 2025-07-09 | End: 2025-07-14 | Stop reason: HOSPADM

## 2025-07-09 RX ORDER — ONDANSETRON 2 MG/ML
4 INJECTION INTRAMUSCULAR; INTRAVENOUS EVERY 6 HOURS PRN
Status: DISCONTINUED | OUTPATIENT
Start: 2025-07-09 | End: 2025-07-14 | Stop reason: HOSPADM

## 2025-07-09 RX ORDER — DEXTROSE MONOHYDRATE 100 MG/ML
INJECTION, SOLUTION INTRAVENOUS CONTINUOUS PRN
Status: DISCONTINUED | OUTPATIENT
Start: 2025-07-09 | End: 2025-07-10 | Stop reason: SDUPTHER

## 2025-07-09 RX ORDER — GABAPENTIN 300 MG/1
300 CAPSULE ORAL DAILY
COMMUNITY

## 2025-07-09 RX ORDER — HYDROCODONE BITARTRATE AND ACETAMINOPHEN 10; 325 MG/1; MG/1
1 TABLET ORAL EVERY 6 HOURS PRN
Status: DISCONTINUED | OUTPATIENT
Start: 2025-07-09 | End: 2025-07-14 | Stop reason: HOSPADM

## 2025-07-09 RX ORDER — POTASSIUM CHLORIDE 1500 MG/1
40 TABLET, EXTENDED RELEASE ORAL PRN
Status: DISCONTINUED | OUTPATIENT
Start: 2025-07-09 | End: 2025-07-14 | Stop reason: HOSPADM

## 2025-07-09 RX ORDER — HYDROXYZINE HYDROCHLORIDE 25 MG/1
25 TABLET, FILM COATED ORAL 3 TIMES DAILY PRN
Status: ON HOLD | COMMUNITY
End: 2025-07-11 | Stop reason: HOSPADM

## 2025-07-09 RX ORDER — BUSPIRONE HYDROCHLORIDE 5 MG/1
5 TABLET ORAL 2 TIMES DAILY
COMMUNITY

## 2025-07-09 RX ORDER — SODIUM CHLORIDE 9 MG/ML
INJECTION, SOLUTION INTRAVENOUS PRN
Status: DISCONTINUED | OUTPATIENT
Start: 2025-07-09 | End: 2025-07-14 | Stop reason: HOSPADM

## 2025-07-09 RX ORDER — SODIUM CHLORIDE 0.9 % (FLUSH) 0.9 %
5-40 SYRINGE (ML) INJECTION EVERY 12 HOURS SCHEDULED
Status: DISCONTINUED | OUTPATIENT
Start: 2025-07-09 | End: 2025-07-14 | Stop reason: HOSPADM

## 2025-07-09 RX ORDER — HYDROCODONE BITARTRATE AND ACETAMINOPHEN 5; 325 MG/1; MG/1
1 TABLET ORAL EVERY 6 HOURS PRN
Status: DISCONTINUED | OUTPATIENT
Start: 2025-07-09 | End: 2025-07-11

## 2025-07-09 RX ORDER — ACETAMINOPHEN 650 MG/1
325 SUPPOSITORY RECTAL EVERY 6 HOURS PRN
Status: DISCONTINUED | OUTPATIENT
Start: 2025-07-09 | End: 2025-07-14 | Stop reason: HOSPADM

## 2025-07-09 RX ORDER — FENTANYL CITRATE 50 UG/ML
50 INJECTION, SOLUTION INTRAMUSCULAR; INTRAVENOUS ONCE
Refills: 0 | Status: COMPLETED | OUTPATIENT
Start: 2025-07-09 | End: 2025-07-09

## 2025-07-09 RX ORDER — POTASSIUM CHLORIDE 1500 MG/1
40 TABLET, EXTENDED RELEASE ORAL ONCE
Status: COMPLETED | OUTPATIENT
Start: 2025-07-09 | End: 2025-07-09

## 2025-07-09 RX ORDER — SODIUM CHLORIDE, SODIUM LACTATE, POTASSIUM CHLORIDE, CALCIUM CHLORIDE 600; 310; 30; 20 MG/100ML; MG/100ML; MG/100ML; MG/100ML
INJECTION, SOLUTION INTRAVENOUS CONTINUOUS
Status: DISCONTINUED | OUTPATIENT
Start: 2025-07-09 | End: 2025-07-10

## 2025-07-09 RX ORDER — MAGNESIUM SULFATE IN WATER 40 MG/ML
2000 INJECTION, SOLUTION INTRAVENOUS PRN
Status: DISCONTINUED | OUTPATIENT
Start: 2025-07-09 | End: 2025-07-14 | Stop reason: HOSPADM

## 2025-07-09 RX ADMIN — SODIUM CHLORIDE 1000 ML: 0.9 INJECTION, SOLUTION INTRAVENOUS at 18:26

## 2025-07-09 RX ADMIN — HYDROCODONE BITARTRATE AND ACETAMINOPHEN 1 TABLET: 10; 325 TABLET ORAL at 23:31

## 2025-07-09 RX ADMIN — Medication 3 MG: at 23:33

## 2025-07-09 RX ADMIN — SODIUM CHLORIDE, SODIUM LACTATE, POTASSIUM CHLORIDE, AND CALCIUM CHLORIDE: .6; .31; .03; .02 INJECTION, SOLUTION INTRAVENOUS at 23:27

## 2025-07-09 RX ADMIN — POTASSIUM CHLORIDE 40 MEQ: 1500 TABLET, EXTENDED RELEASE ORAL at 19:57

## 2025-07-09 RX ADMIN — FENTANYL CITRATE 50 MCG: 50 INJECTION, SOLUTION INTRAMUSCULAR; INTRAVENOUS at 18:33

## 2025-07-09 RX ADMIN — SODIUM CHLORIDE, PRESERVATIVE FREE 10 ML: 5 INJECTION INTRAVENOUS at 23:34

## 2025-07-09 ASSESSMENT — PAIN SCALES - GENERAL
PAINLEVEL_OUTOF10: 7
PAINLEVEL_OUTOF10: 10
PAINLEVEL_OUTOF10: 10
PAINLEVEL_OUTOF10: 7

## 2025-07-09 ASSESSMENT — PAIN DESCRIPTION - LOCATION
LOCATION: ANKLE
LOCATION: ANKLE;LEG

## 2025-07-09 ASSESSMENT — PAIN - FUNCTIONAL ASSESSMENT
PAIN_FUNCTIONAL_ASSESSMENT: 0-10
PAIN_FUNCTIONAL_ASSESSMENT: 0-10

## 2025-07-09 ASSESSMENT — PAIN DESCRIPTION - ORIENTATION: ORIENTATION: LEFT

## 2025-07-09 ASSESSMENT — PAIN DESCRIPTION - DESCRIPTORS: DESCRIPTORS: DISCOMFORT
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
## 2025-07-10 ENCOUNTER — APPOINTMENT (OUTPATIENT)
Dept: GENERAL RADIOLOGY | Age: 75
DRG: 562 | End: 2025-07-10
Payer: MEDICARE

## 2025-07-10 ENCOUNTER — HOSPITAL ENCOUNTER (INPATIENT)
Age: 75
Discharge: HOME | DRG: 562 | End: 2025-07-12
Attending: STUDENT IN AN ORGANIZED HEALTH CARE EDUCATION/TRAINING PROGRAM
Payer: MEDICARE

## 2025-07-10 ENCOUNTER — APPOINTMENT (OUTPATIENT)
Dept: CT IMAGING | Age: 75
DRG: 562 | End: 2025-07-10
Payer: MEDICARE

## 2025-07-10 ENCOUNTER — APPOINTMENT (OUTPATIENT)
Age: 75
DRG: 562 | End: 2025-07-10
Attending: STUDENT IN AN ORGANIZED HEALTH CARE EDUCATION/TRAINING PROGRAM
Payer: MEDICARE

## 2025-07-10 PROBLEM — R00.1 BRADYCARDIA: Status: ACTIVE | Noted: 2025-07-10

## 2025-07-10 PROBLEM — I35.0 AORTIC VALVE STENOSIS: Status: ACTIVE | Noted: 2025-07-10

## 2025-07-10 PROBLEM — I05.0 MITRAL VALVE STENOSIS: Status: ACTIVE | Noted: 2025-07-10

## 2025-07-10 PROBLEM — S82.142A TIBIAL PLATEAU FRACTURE, LEFT, CLOSED, INITIAL ENCOUNTER: Status: ACTIVE | Noted: 2025-07-10

## 2025-07-10 LAB
ALBUMIN SERPL-MCNC: 3.1 G/DL (ref 3.4–5)
ALBUMIN/GLOB SERPL: 0.8 {RATIO} (ref 1.1–2.2)
ALP SERPL-CCNC: 192 U/L (ref 40–129)
ALT SERPL-CCNC: 13 U/L (ref 10–40)
AMMONIA PLAS-SCNC: 63 UMOL/L (ref 11–51)
ANION GAP SERPL CALCULATED.3IONS-SCNC: 12 MMOL/L (ref 3–16)
AST SERPL-CCNC: 26 U/L (ref 15–37)
BASOPHILS # BLD: 0.1 K/UL (ref 0–0.2)
BASOPHILS NFR BLD: 1.7 %
BILIRUB SERPL-MCNC: 0.5 MG/DL (ref 0–1)
BUN SERPL-MCNC: 23 MG/DL (ref 7–20)
CALCIUM SERPL-MCNC: 7.8 MG/DL (ref 8.3–10.6)
CHLORIDE SERPL-SCNC: 108 MMOL/L (ref 99–110)
CO2 SERPL-SCNC: 23 MMOL/L (ref 21–32)
CREAT SERPL-MCNC: 1.4 MG/DL (ref 0.6–1.2)
DEPRECATED RDW RBC AUTO: 15.8 % (ref 12.4–15.4)
ECHO AO ASC DIAM: 3.4 CM
ECHO AO ASCENDING AORTA INDEX: 1.81 CM/M2
ECHO AO ROOT DIAM: 3.1 CM
ECHO AO ROOT INDEX: 1.65 CM/M2
ECHO AR MAX VEL PISA: 3.2 M/S
ECHO AV AREA PEAK VELOCITY: 1.1 CM2
ECHO AV AREA VTI: 1 CM2
ECHO AV AREA/BSA PEAK VELOCITY: 0.6 CM2/M2
ECHO AV AREA/BSA VTI: 0.5 CM2/M2
ECHO AV MEAN GRADIENT: 22 MMHG
ECHO AV MEAN VELOCITY: 2.1 M/S
ECHO AV PEAK GRADIENT: 46 MMHG
ECHO AV PEAK VELOCITY: 3.4 M/S
ECHO AV VELOCITY RATIO: 0.32
ECHO AV VTI: 72.4 CM
ECHO BSA: 1.97 M2
ECHO BSA: 1.97 M2
ECHO EST RA PRESSURE: 15 MMHG
ECHO IVC EXP: 2.2 CM
ECHO IVC INSP: 1.1 CM
ECHO LA AREA 2C: 22.2 CM2
ECHO LA AREA 4C: 22.2 CM2
ECHO LA MAJOR AXIS: 6.8 CM
ECHO LA MINOR AXIS: 6.3 CM
ECHO LA VOL BP: 65 ML (ref 22–52)
ECHO LA VOL MOD A2C: 65 ML (ref 22–52)
ECHO LA VOL MOD A4C: 81 ML (ref 22–52)
ECHO LA VOL/BSA BIPLANE: 35 ML/M2 (ref 16–34)
ECHO LA VOLUME INDEX MOD A2C: 35 ML/M2 (ref 16–34)
ECHO LA VOLUME INDEX MOD A4C: 43 ML/M2 (ref 16–34)
ECHO LV E' LATERAL VELOCITY: 6.96 CM/S
ECHO LV E' SEPTAL VELOCITY: 4.68 CM/S
ECHO LV EDV A2C: 102 ML
ECHO LV EDV A4C: 88 ML
ECHO LV EDV INDEX A4C: 47 ML/M2
ECHO LV EDV NDEX A2C: 54 ML/M2
ECHO LV EF PHYSICIAN: 58 %
ECHO LV EJECTION FRACTION A2C: 76 %
ECHO LV EJECTION FRACTION A4C: 67 %
ECHO LV EJECTION FRACTION BIPLANE: 70 % (ref 55–100)
ECHO LV ESV A2C: 25 ML
ECHO LV ESV A4C: 29 ML
ECHO LV ESV INDEX A2C: 13 ML/M2
ECHO LV ESV INDEX A4C: 15 ML/M2
ECHO LV FRACTIONAL SHORTENING: 47 % (ref 28–44)
ECHO LV INTERNAL DIMENSION DIASTOLE INDEX: 2.93 CM/M2
ECHO LV INTERNAL DIMENSION DIASTOLIC: 5.5 CM (ref 3.9–5.3)
ECHO LV INTERNAL DIMENSION SYSTOLIC INDEX: 1.54 CM/M2
ECHO LV INTERNAL DIMENSION SYSTOLIC: 2.9 CM
ECHO LV IVSD: 0.9 CM (ref 0.6–0.9)
ECHO LV MASS 2D: 185.8 G (ref 67–162)
ECHO LV MASS INDEX 2D: 98.9 G/M2 (ref 43–95)
ECHO LV POSTERIOR WALL DIASTOLIC: 0.9 CM (ref 0.6–0.9)
ECHO LV RELATIVE WALL THICKNESS RATIO: 0.33
ECHO LVOT AREA: 2.5 CM2
ECHO LVOT AV VTI INDEX: 0.39
ECHO LVOT DIAM: 1.8 CM
ECHO LVOT MEAN GRADIENT: 3 MMHG
ECHO LVOT PEAK GRADIENT: 5 MMHG
ECHO LVOT PEAK VELOCITY: 1.1 M/S
ECHO LVOT STROKE VOLUME INDEX: 38.3 ML/M2
ECHO LVOT SV: 72 ML
ECHO LVOT VTI: 28.3 CM
ECHO MV A VELOCITY: 1.3 M/S
ECHO MV AREA PHT: 3.8 CM2
ECHO MV AREA VTI: 1 CM2
ECHO MV E DECELERATION TIME (DT): 198 MS
ECHO MV E VELOCITY: 2.1 M/S
ECHO MV E/A RATIO: 1.62
ECHO MV E/E' LATERAL: 30.17
ECHO MV E/E' RATIO (AVERAGED): 37.52
ECHO MV E/E' SEPTAL: 44.87
ECHO MV LVOT VTI INDEX: 2.48
ECHO MV MAX VELOCITY: 2.3 M/S
ECHO MV MEAN GRADIENT: 21 MMHG
ECHO MV MEAN VELOCITY: 0.9 M/S
ECHO MV PEAK GRADIENT: 21 MMHG
ECHO MV PRESSURE HALF TIME (PHT): 58 MS
ECHO MV VTI: 70.1 CM
ECHO PV MAX VELOCITY: 1.1 M/S
ECHO PV MEAN GRADIENT: 3 MMHG
ECHO PV MEAN VELOCITY: 0.8 M/S
ECHO PV PEAK GRADIENT: 5 MMHG
ECHO PV VTI: 30.7 CM
ECHO RA AREA 4C: 19.4 CM2
ECHO RA END SYSTOLIC VOLUME APICAL 4 CHAMBER INDEX BSA: 26 ML/M2
ECHO RA VOLUME: 49 ML
ECHO RIGHT VENTRICULAR SYSTOLIC PRESSURE (RVSP): 70 MMHG
ECHO RV BASAL DIMENSION: 3.7 CM
ECHO RV FREE WALL PEAK S': 11.7 CM/S
ECHO RV LONGITUDINAL DIMENSION: 8.7 CM
ECHO RV MID DIMENSION: 2.8 CM
ECHO RV TAPSE: 2.6 CM (ref 1.7–?)
ECHO TV REGURGITANT MAX VELOCITY: 3.7 M/S
ECHO TV REGURGITANT PEAK GRADIENT: 54 MMHG
EKG ATRIAL RATE: 59 BPM
EKG DIAGNOSIS: NORMAL
EKG P AXIS: 67 DEGREES
EKG P-R INTERVAL: 168 MS
EKG Q-T INTERVAL: 418 MS
EKG QRS DURATION: 78 MS
EKG QTC CALCULATION (BAZETT): 413 MS
EKG R AXIS: -42 DEGREES
EKG T AXIS: 107 DEGREES
EKG VENTRICULAR RATE: 59 BPM
EOSINOPHIL # BLD: 1 K/UL (ref 0–0.6)
EOSINOPHIL NFR BLD: 16.4 %
EST. AVERAGE GLUCOSE BLD GHB EST-MCNC: 108.3 MG/DL
GFR SERPLBLD CREATININE-BSD FMLA CKD-EPI: 39 ML/MIN/{1.73_M2}
GLUCOSE BLD-MCNC: 128 MG/DL (ref 70–99)
GLUCOSE BLD-MCNC: 129 MG/DL (ref 70–99)
GLUCOSE BLD-MCNC: 92 MG/DL (ref 70–99)
GLUCOSE BLD-MCNC: 93 MG/DL (ref 70–99)
GLUCOSE SERPL-MCNC: 109 MG/DL (ref 70–99)
HBA1C MFR BLD: 5.4 %
HCT VFR BLD AUTO: 34.2 % (ref 36–48)
HGB BLD-MCNC: 11.3 G/DL (ref 12–16)
LYMPHOCYTES # BLD: 1.6 K/UL (ref 1–5.1)
LYMPHOCYTES NFR BLD: 25.4 %
MCH RBC QN AUTO: 30.7 PG (ref 26–34)
MCHC RBC AUTO-ENTMCNC: 33 G/DL (ref 31–36)
MCV RBC AUTO: 93 FL (ref 80–100)
MONOCYTES # BLD: 0.5 K/UL (ref 0–1.3)
MONOCYTES NFR BLD: 7.5 %
NEUTROPHILS # BLD: 3 K/UL (ref 1.7–7.7)
NEUTROPHILS NFR BLD: 49 %
NT-PROBNP SERPL-MCNC: 928 PG/ML (ref 0–449)
PERFORMED ON: ABNORMAL
PERFORMED ON: ABNORMAL
PERFORMED ON: NORMAL
PERFORMED ON: NORMAL
PLATELET # BLD AUTO: 161 K/UL (ref 135–450)
PMV BLD AUTO: 8.9 FL (ref 5–10.5)
POTASSIUM SERPL-SCNC: 4.2 MMOL/L (ref 3.5–5.1)
PROT SERPL-MCNC: 6.8 G/DL (ref 6.4–8.2)
RBC # BLD AUTO: 3.68 M/UL (ref 4–5.2)
SODIUM SERPL-SCNC: 143 MMOL/L (ref 136–145)
T3FREE SERPL-MCNC: 2 PG/ML (ref 2.3–4.2)
T4 FREE SERPL-MCNC: 0.6 NG/DL (ref 0.9–1.8)
TSH SERPL DL<=0.005 MIU/L-ACNC: 8.53 UIU/ML (ref 0.27–4.2)
WBC # BLD AUTO: 6.1 K/UL (ref 4–11)

## 2025-07-10 PROCEDURE — 85025 COMPLETE CBC W/AUTO DIFF WBC: CPT

## 2025-07-10 PROCEDURE — 94640 AIRWAY INHALATION TREATMENT: CPT

## 2025-07-10 PROCEDURE — 80053 COMPREHEN METABOLIC PANEL: CPT

## 2025-07-10 PROCEDURE — 93970 EXTREMITY STUDY: CPT

## 2025-07-10 PROCEDURE — 2700000000 HC OXYGEN THERAPY PER DAY

## 2025-07-10 PROCEDURE — 97530 THERAPEUTIC ACTIVITIES: CPT

## 2025-07-10 PROCEDURE — 93306 TTE W/DOPPLER COMPLETE: CPT

## 2025-07-10 PROCEDURE — 6370000000 HC RX 637 (ALT 250 FOR IP)

## 2025-07-10 PROCEDURE — 71045 X-RAY EXAM CHEST 1 VIEW: CPT

## 2025-07-10 PROCEDURE — 1200000000 HC SEMI PRIVATE

## 2025-07-10 PROCEDURE — 83036 HEMOGLOBIN GLYCOSYLATED A1C: CPT

## 2025-07-10 PROCEDURE — 2500000003 HC RX 250 WO HCPCS: Performed by: STUDENT IN AN ORGANIZED HEALTH CARE EDUCATION/TRAINING PROGRAM

## 2025-07-10 PROCEDURE — 97166 OT EVAL MOD COMPLEX 45 MIN: CPT

## 2025-07-10 PROCEDURE — 6370000000 HC RX 637 (ALT 250 FOR IP): Performed by: STUDENT IN AN ORGANIZED HEALTH CARE EDUCATION/TRAINING PROGRAM

## 2025-07-10 PROCEDURE — 83880 ASSAY OF NATRIURETIC PEPTIDE: CPT

## 2025-07-10 PROCEDURE — 84439 ASSAY OF FREE THYROXINE: CPT

## 2025-07-10 PROCEDURE — 97535 SELF CARE MNGMENT TRAINING: CPT

## 2025-07-10 PROCEDURE — 93010 ELECTROCARDIOGRAM REPORT: CPT | Performed by: INTERNAL MEDICINE

## 2025-07-10 PROCEDURE — 94761 N-INVAS EAR/PLS OXIMETRY MLT: CPT

## 2025-07-10 PROCEDURE — 82140 ASSAY OF AMMONIA: CPT

## 2025-07-10 PROCEDURE — 71250 CT THORAX DX C-: CPT

## 2025-07-10 PROCEDURE — 2580000003 HC RX 258

## 2025-07-10 PROCEDURE — 93306 TTE W/DOPPLER COMPLETE: CPT | Performed by: INTERNAL MEDICINE

## 2025-07-10 PROCEDURE — 84443 ASSAY THYROID STIM HORMONE: CPT

## 2025-07-10 PROCEDURE — 99222 1ST HOSP IP/OBS MODERATE 55: CPT | Performed by: ORTHOPAEDIC SURGERY

## 2025-07-10 PROCEDURE — 6360000002 HC RX W HCPCS: Performed by: STUDENT IN AN ORGANIZED HEALTH CARE EDUCATION/TRAINING PROGRAM

## 2025-07-10 PROCEDURE — 36415 COLL VENOUS BLD VENIPUNCTURE: CPT

## 2025-07-10 PROCEDURE — 93970 EXTREMITY STUDY: CPT | Performed by: INTERNAL MEDICINE

## 2025-07-10 PROCEDURE — 97162 PT EVAL MOD COMPLEX 30 MIN: CPT

## 2025-07-10 PROCEDURE — 99223 1ST HOSP IP/OBS HIGH 75: CPT | Performed by: INTERNAL MEDICINE

## 2025-07-10 PROCEDURE — 99233 SBSQ HOSP IP/OBS HIGH 50: CPT

## 2025-07-10 RX ORDER — FERROUS SULFATE 325(65) MG
325 TABLET ORAL
Status: DISCONTINUED | OUTPATIENT
Start: 2025-07-11 | End: 2025-07-14 | Stop reason: HOSPADM

## 2025-07-10 RX ORDER — ATORVASTATIN CALCIUM 10 MG/1
20 TABLET, FILM COATED ORAL NIGHTLY
Status: DISCONTINUED | OUTPATIENT
Start: 2025-07-10 | End: 2025-07-14 | Stop reason: HOSPADM

## 2025-07-10 RX ORDER — METOPROLOL SUCCINATE 25 MG/1
25 TABLET, EXTENDED RELEASE ORAL DAILY
Status: DISCONTINUED | OUTPATIENT
Start: 2025-07-10 | End: 2025-07-13

## 2025-07-10 RX ORDER — BUDESONIDE AND FORMOTEROL FUMARATE DIHYDRATE 160; 4.5 UG/1; UG/1
2 AEROSOL RESPIRATORY (INHALATION)
Status: DISCONTINUED | OUTPATIENT
Start: 2025-07-10 | End: 2025-07-14 | Stop reason: HOSPADM

## 2025-07-10 RX ORDER — MIRTAZAPINE 15 MG/1
7.5 TABLET, FILM COATED ORAL NIGHTLY PRN
Status: DISCONTINUED | OUTPATIENT
Start: 2025-07-10 | End: 2025-07-14 | Stop reason: HOSPADM

## 2025-07-10 RX ORDER — BUSPIRONE HYDROCHLORIDE 5 MG/1
5 TABLET ORAL 2 TIMES DAILY
Status: DISCONTINUED | OUTPATIENT
Start: 2025-07-10 | End: 2025-07-14 | Stop reason: HOSPADM

## 2025-07-10 RX ORDER — GLUCAGON 1 MG/ML
1 KIT INJECTION PRN
Status: DISCONTINUED | OUTPATIENT
Start: 2025-07-10 | End: 2025-07-14 | Stop reason: HOSPADM

## 2025-07-10 RX ORDER — AMIODARONE HYDROCHLORIDE 200 MG/1
100 TABLET ORAL DAILY
Status: DISCONTINUED | OUTPATIENT
Start: 2025-07-11 | End: 2025-07-14 | Stop reason: HOSPADM

## 2025-07-10 RX ORDER — SODIUM CHLORIDE 9 MG/ML
INJECTION, SOLUTION INTRAVENOUS CONTINUOUS
Status: DISCONTINUED | OUTPATIENT
Start: 2025-07-10 | End: 2025-07-11

## 2025-07-10 RX ORDER — CLINDAMYCIN HYDROCHLORIDE 150 MG/1
450 CAPSULE ORAL 3 TIMES DAILY
Status: ON HOLD | COMMUNITY
End: 2025-07-11 | Stop reason: HOSPADM

## 2025-07-10 RX ORDER — SERTRALINE HYDROCHLORIDE 100 MG/1
100 TABLET, FILM COATED ORAL DAILY
Status: DISCONTINUED | OUTPATIENT
Start: 2025-07-10 | End: 2025-07-14 | Stop reason: HOSPADM

## 2025-07-10 RX ORDER — FUROSEMIDE 20 MG/1
20 TABLET ORAL DAILY
Status: DISCONTINUED | OUTPATIENT
Start: 2025-07-10 | End: 2025-07-14 | Stop reason: HOSPADM

## 2025-07-10 RX ORDER — INSULIN LISPRO 100 [IU]/ML
0-4 INJECTION, SOLUTION INTRAVENOUS; SUBCUTANEOUS
Status: DISCONTINUED | OUTPATIENT
Start: 2025-07-10 | End: 2025-07-14 | Stop reason: HOSPADM

## 2025-07-10 RX ORDER — FOLIC ACID 1 MG/1
1 TABLET ORAL DAILY
Status: DISCONTINUED | OUTPATIENT
Start: 2025-07-10 | End: 2025-07-14 | Stop reason: HOSPADM

## 2025-07-10 RX ORDER — SPIRONOLACTONE 25 MG/1
12.5 TABLET ORAL DAILY
Status: DISCONTINUED | OUTPATIENT
Start: 2025-07-10 | End: 2025-07-14 | Stop reason: HOSPADM

## 2025-07-10 RX ORDER — LEVOTHYROXINE SODIUM 25 UG/1
25 TABLET ORAL DAILY
Status: DISCONTINUED | OUTPATIENT
Start: 2025-07-10 | End: 2025-07-14 | Stop reason: HOSPADM

## 2025-07-10 RX ORDER — AMIODARONE HYDROCHLORIDE 200 MG/1
200 TABLET ORAL DAILY
Status: DISCONTINUED | OUTPATIENT
Start: 2025-07-10 | End: 2025-07-10

## 2025-07-10 RX ORDER — ASPIRIN 81 MG/1
81 TABLET, CHEWABLE ORAL DAILY
Status: DISCONTINUED | OUTPATIENT
Start: 2025-07-10 | End: 2025-07-10

## 2025-07-10 RX ORDER — DEXTROSE MONOHYDRATE 100 MG/ML
INJECTION, SOLUTION INTRAVENOUS CONTINUOUS PRN
Status: DISCONTINUED | OUTPATIENT
Start: 2025-07-10 | End: 2025-07-14 | Stop reason: HOSPADM

## 2025-07-10 RX ORDER — PANTOPRAZOLE SODIUM 40 MG/1
40 TABLET, DELAYED RELEASE ORAL
Status: DISCONTINUED | OUTPATIENT
Start: 2025-07-10 | End: 2025-07-14 | Stop reason: HOSPADM

## 2025-07-10 RX ORDER — ALBUTEROL SULFATE 90 UG/1
2 INHALANT RESPIRATORY (INHALATION) 4 TIMES DAILY PRN
Status: DISCONTINUED | OUTPATIENT
Start: 2025-07-10 | End: 2025-07-14 | Stop reason: HOSPADM

## 2025-07-10 RX ORDER — GABAPENTIN 300 MG/1
300 CAPSULE ORAL DAILY
Status: DISCONTINUED | OUTPATIENT
Start: 2025-07-10 | End: 2025-07-14 | Stop reason: HOSPADM

## 2025-07-10 RX ADMIN — PANTOPRAZOLE SODIUM 40 MG: 40 TABLET, DELAYED RELEASE ORAL at 17:45

## 2025-07-10 RX ADMIN — FOLIC ACID 1 MG: 1 TABLET ORAL at 13:50

## 2025-07-10 RX ADMIN — Medication 3 MG: at 20:27

## 2025-07-10 RX ADMIN — GABAPENTIN 300 MG: 300 CAPSULE ORAL at 13:50

## 2025-07-10 RX ADMIN — SODIUM CHLORIDE: 0.9 INJECTION, SOLUTION INTRAVENOUS at 13:53

## 2025-07-10 RX ADMIN — BUSPIRONE HYDROCHLORIDE 5 MG: 5 TABLET ORAL at 20:27

## 2025-07-10 RX ADMIN — CEPHALEXIN 500 MG: 250 CAPSULE ORAL at 17:45

## 2025-07-10 RX ADMIN — ASPIRIN 81 MG: 81 TABLET, CHEWABLE ORAL at 10:52

## 2025-07-10 RX ADMIN — ALBUTEROL SULFATE 2 PUFF: 90 AEROSOL, METERED RESPIRATORY (INHALATION) at 15:09

## 2025-07-10 RX ADMIN — BUSPIRONE HYDROCHLORIDE 5 MG: 5 TABLET ORAL at 13:50

## 2025-07-10 RX ADMIN — AMIODARONE HYDROCHLORIDE 200 MG: 200 TABLET ORAL at 13:50

## 2025-07-10 RX ADMIN — SERTRALINE 100 MG: 100 TABLET, FILM COATED ORAL at 13:50

## 2025-07-10 RX ADMIN — BUDESONIDE AND FORMOTEROL FUMARATE DIHYDRATE 2 PUFF: 160; 4.5 AEROSOL RESPIRATORY (INHALATION) at 15:09

## 2025-07-10 RX ADMIN — LEVOTHYROXINE SODIUM 25 MCG: 0.03 TABLET ORAL at 13:50

## 2025-07-10 RX ADMIN — SODIUM CHLORIDE, PRESERVATIVE FREE 10 ML: 5 INJECTION INTRAVENOUS at 20:27

## 2025-07-10 RX ADMIN — HYDROCODONE BITARTRATE AND ACETAMINOPHEN 1 TABLET: 10; 325 TABLET ORAL at 13:50

## 2025-07-10 RX ADMIN — HYDROCODONE BITARTRATE AND ACETAMINOPHEN 1 TABLET: 10; 325 TABLET ORAL at 06:06

## 2025-07-10 RX ADMIN — APIXABAN 5 MG: 5 TABLET, FILM COATED ORAL at 13:50

## 2025-07-10 RX ADMIN — APIXABAN 5 MG: 5 TABLET, FILM COATED ORAL at 20:27

## 2025-07-10 RX ADMIN — ENOXAPARIN SODIUM 40 MG: 100 INJECTION SUBCUTANEOUS at 10:52

## 2025-07-10 RX ADMIN — TIOTROPIUM BROMIDE INHALATION SPRAY 5 MCG: 3.12 SPRAY, METERED RESPIRATORY (INHALATION) at 15:09

## 2025-07-10 RX ADMIN — BUDESONIDE AND FORMOTEROL FUMARATE DIHYDRATE 2 PUFF: 160; 4.5 AEROSOL RESPIRATORY (INHALATION) at 19:23

## 2025-07-10 RX ADMIN — ATORVASTATIN CALCIUM 20 MG: 10 TABLET, FILM COATED ORAL at 20:27

## 2025-07-10 ASSESSMENT — PAIN SCALES - GENERAL
PAINLEVEL_OUTOF10: 4
PAINLEVEL_OUTOF10: 8
PAINLEVEL_OUTOF10: 8
PAINLEVEL_OUTOF10: 3

## 2025-07-10 ASSESSMENT — PAIN DESCRIPTION - LOCATION
LOCATION: ANKLE;LEG
LOCATION: LEG

## 2025-07-10 ASSESSMENT — PAIN DESCRIPTION - DESCRIPTORS: DESCRIPTORS: JABBING

## 2025-07-10 ASSESSMENT — PAIN SCALES - WONG BAKER: WONGBAKER_NUMERICALRESPONSE: HURTS A LITTLE BIT

## 2025-07-10 ASSESSMENT — PAIN - FUNCTIONAL ASSESSMENT: PAIN_FUNCTIONAL_ASSESSMENT: ACTIVITIES ARE NOT PREVENTED

## 2025-07-10 ASSESSMENT — PAIN DESCRIPTION - ORIENTATION: ORIENTATION: MID

## 2025-07-10 NOTE — PLAN OF CARE
Problem: Safety - Adult  Goal: Free from fall injury  7/10/2025 1107 by Laurita Nowak RN  Outcome: Progressing  7/9/2025 2339 by Preet Ruiz RN  Outcome: Progressing     Problem: Chronic Conditions and Co-morbidities  Goal: Patient's chronic conditions and co-morbidity symptoms are monitored and maintained or improved  7/10/2025 1107 by Laurita Nowak RN  Outcome: Progressing  7/9/2025 2339 by Preet Ruiz RN  Outcome: Progressing

## 2025-07-10 NOTE — CONSULTS
[facility] [place]   CONSULTATION  [phone]        Reason for Consultation/Chief Complaint: \"I have been having left ankle pain after passing out.\"  Cardiology consulted for syncope, PAF w bradycardia and severe pulm HTN per JOHANA Campos  Cardiology unknown    History of Present Illness:    Nancy Franz is a 75 y.o. patient who presented to [facility] [place] 7/9/25 with c/o passing out and left leg pain. She has PMH PAF dx 8/24 placed on eliquis and amiodarone, mild-mod AS, mild-mod MS, hx LLE DVT 1/24 on eliquis, COPD on 2.5L NC at night and PRN, asthma, DM, HTN, VILLEGAS cirrhosis, and anxiety. Prior testing: Kimberly nuc 11/11/19 normal perfusion and LV function; ECHO at [facility] 11/23 EF 60-65%; DD2; mild AS (2.7m/s velocity; MPG 17mmHg); ECHO 8/23/24 EF 60-65%; no WMA; mild-moderate AS (velocity 3m/s; MPG 22mmHg); mod MS MPG 10mmHg.   She reported while standing she passed out and landed on left leg and c/o left ankle pain. Reports 2 episodes of passing out similar over last few days. She has significant stress in life now with possible eviction from home needing . Noted decreased po intake and still taking diuretics. She denied any premonitory symptoms. Found to be hypotensive 78/44 in ER. Admission Testing:  EKG SB with SA 59bpm; PAC's; left axis; NST change (PAC's new from 12/24); CXR negative; Head CT negative; Left knee xray suggestive of acute lateral tibial plateau fracture; CT left knee confirmed fracture. LE dopplers negative DVT BLE. LABS: Na 144, K 3.3, BUN/Cr 21/1.6, 23/1.4, Pro- (1772 in 12/24), ALT 16, AST 27, H/H 11.1/33.2 and Chris 33, 29 (same 12/24).TSH 1.43 in 1/24. Non-operative measures are recommended by ortho. Patient with no c/o SOB, palpitations, dizziness, edema, or orthopnea/PND.  have been asked to provide consultation regarding further management and testing.    Past Medical History:   has a past medical history of Altered mental status, Anemia, Asthma, Cerebral artery

## 2025-07-10 NOTE — ACP (ADVANCE CARE PLANNING)
Advance Care Planning     General Advance Care Planning (ACP) Conversation    Date of Conversation: 7/10/2025  Conducted with: Patient with Decision Making Capacity  Other persons present: None    Healthcare Decision Maker:   Supplemental (Other) Decision Maker: Margaret Franz - Child - [phone]       Content/Action Overview:  Has ACP document(s) on file - reflects the patient's care preferences  Reviewed DNR/DNI and patient elects Full Code (Attempt Resuscitation)        Length of Voluntary ACP Conversation in minutes:  <16 minutes (Non-Billable)    Marlena Osuna RN

## 2025-07-10 NOTE — ED NOTES
Nancy Franz is a 75 y.o. female admitted for  Principal Problem:    Syncope and collapse  Resolved Problems:    * No resolved hospital problems. *  .   Patient Home via family with   Chief Complaint   Patient presents with    Fall     Pt arrives from home with reoccurring falls for the past 2 weeks. Pt states she has been under a lot of stress and hasn't been able to sleep. Pt reports blacking out and hurt left ankle (+LOC +blood thinners).    .  Patient is alert and Person, Place, Time, and Situation  Patient's baseline mobility: Baseline Mobility: Independent   Code Status: Prior   Cardiac Rhythm:       Is patient on baseline Oxygen: no how many Liters:   Abnormal Assessment Findings: left lower leg pain and swelling    Isolation: None      NIH Score:    C-SSRS: Risk of Suicide: No Risk  Bedside swallow:        Active LDA's:   Peripheral IV 07/09/25 Right Forearm (Active)   Site Assessment Clean, dry & intact 07/09/25 2114   Line Status Normal saline locked;Capped 07/09/25 2114   Line Care Connections checked and tightened 07/09/25 2114   Phlebitis Assessment No symptoms 07/09/25 2114   Infiltration Assessment 0 07/09/25 2114   Alcohol Cap Used Yes 07/09/25 2114   Dressing Status Clean, dry & intact 07/09/25 2114   Dressing Type Transparent 07/09/25 2114     Patient admitted with a vu: no If the vu is chronic was it exchanged:NA  Reason for vu: Other (specify)   Patient admitted with Central Line:  NA . PICC line placement confirmed: YES OR NO:[id number]}   Reason for Central line:   Was central line Inserted from an outside facility: no       Family/Caregiver Present no Any Concerns: no   Restraints no  Sitter no         Vitals: MEWS Score: 3    Vitals:    07/09/25 1711 07/09/25 1826 07/09/25 1905 07/09/25 2002   BP: (!) 78/44 (!) 107/54 (!) 115/56 133/88   Pulse: 80 55 62 56   Resp: 16 15 15 18   Temp: 97.9 °F (36.6 °C)      TempSrc: Oral      SpO2: 99% 100% 94% 95%   Weight: 89.8 kg (198 lb)      Height:

## 2025-07-10 NOTE — H&P
[facility] Medicine History & Physical    V 5.1    Date of Admission: 7/9/2025    Date of Service:  Pt seen/examined on 7/9/2025 at 2145    [x]Admitted to Inpatient with expected LOS greater than two midnights due to medical therapy.  []Placed in Observation status.    Assessment/Plan:        Syncope and collapse: Patient has had reoccurring falls with reports of passing out this time fell on the left leg complaining of left hip, knee and ankle pain.  Last echocardiogram 8/23/2024 showed preserved EF with mild to moderate stenosis of the aortic valve and moderate calcification of the mitral valve.  -CT head nonacute  -Orthostatic vitals ordered  -Continue cardiac telemetry  -May need Holter monitor at discharge  -Repeat echocardiogram ordered  -If worsening valvular disease will probably need valvuloplasty     Elevated D-dimer: ER provider collected the D-dimer patient has not been tachycardic or hypoxic.  Possible mild elevation in setting of acute fracture.  At this time low suspicion for pulmonary embolism.  Patient is on Eliquis at home.  - Doppler lower extremity ordered    Type II MI: Troponin mildly elevated 33 repeat troponin 29.  Patient has chronic elevated troponin much higher than this.  She is denying any chest pain or palpitations.  -Continue current telemetry    VIOLETA: Creatinine elevated 1.6 with prior creatinine 1.0, 7 months ago.  1 L IV fluid given in the ER. Likely due to decreased intake as family reports patient has not ate for 2 days  -Repeat BMP ordered  -Renally dose medications  -Monitor intake and output  -Additional 1 L IV hydration ordered    Left knee tibial fracture: X-ray showed mild depression of the lateral tibial plateau with soft tissue swelling along the lateral knee suggesting acute lateral tibial plateau fracture.  -CT of the knee ordered and pending--shows acute traumatic fracture of the lateral tibial plateau with focal depression articular surface  -Orthopedic surgery surgery

## 2025-07-10 NOTE — PROGRESS NOTES
4 Eyes Skin Assessment     NAME:  Nancy Franz  YOB: 1950  MEDICAL RECORD NUMBER:  [id number]    The patient is being assessed for  Admission    I agree that at least one RN has performed a thorough Head to Toe Skin Assessment on the patient. ALL assessment sites listed below have been assessed.      Areas assessed by both nurses:    Head, Face, Ears, Shoulders, Back, Chest, Arms, Elbows, Hands, Sacrum. Buttock, Coccyx, Ischium, Legs. Feet and Heels, and Under Medical Devices     Open area noted on R armpit        Does the Patient have a Wound? Yes wound(s) were present on assessment. LDA wound assessment was Initiated and completed by RN       Abdiel Prevention initiated by RN: No  Wound Care Orders initiated by RN: Yes    Pressure Injury (Stage 3,4, Unstageable, DTI, NWPT, and Complex wounds) if present, place Wound referral order by RN under : No    New Ostomies, if present place, Ostomy referral order under : No     Nurse 1 eSignature: Electronically signed by Preet Ruiz RN on 7/9/25 at 11:22 PM EDT    **SHARE this note so that the co-signing nurse can place an eSignature**    Nurse 2 eSignature: Electronically signed by Alyx Peguero RN on 7/10/25 at 12:43 AM EDT

## 2025-07-10 NOTE — PROGRESS NOTES
Consult has been called to Dr. arredondo on 7/10/25. Spoke with kate. 3:54 PM    Pastora Louise  7/10/2025

## 2025-07-10 NOTE — PROGRESS NOTES
Inpatient Occupational Therapy Evaluation & Treatment    Unit: [facility]  Date:  7/10/2025  Patient Name:    Nancy Franz  Admitting diagnosis:  Syncope and collapse [R55]  Bradycardia [R00.1]  Positive D-dimer [R79.89]  Elevated d-dimer [R79.89]  Transient hypotension [I95.9]  Fall, initial encounter [W19.XXXA]  Tibial plateau fracture, left, closed, initial encounter [S82.142A]  Acute renal failure, unspecified acute renal failure type [N17.9]  Hypothyroidism, unspecified type [E03.9]  Admit Date:  7/9/2025  Precautions/Restrictions/WB Status/ Lines/ Wounds/ Oxygen: Fall risk, Bed/chair alarm, Lines (IV and external catheter), Telemetry, WB Restrictions (NWB LLE), and Knee immobilizer    Dr Pulido in to room during this session and reports patient can be TTWB for balance if needed but keep NWB as much as possible. Knee immobilizer on when OOB, can be open when in bed for skin integrity/icing    Pt seen for cotreatment this date due to patient safety, patient endurance, complexity of condition, and acute illness/injury    Treatment Time:  2371-7478  Treatment Number:  1  Timed Code Treatment Minutes: 50 minutes  Total Treatment Minutes:  60  minutes    Patient Goals for Therapy: \"go home\"          Discharge Recommendations: SNF  DME needs for discharge: Defer to facility    If going home will need 24 hour physical assist, HHOT, RW, manual WC and BSC       Therapy recommendations for staff:   Assist of 2 for stand-pivot transfers with rolling walker (RW) and gait belt to/from BSC  to/from chair, NWB (or limited TTWB) LLE with knee immobilizer, position chair closer to bed for shorter pivot distance    History of Present Illness: per Dr Pulido Consult 7/10/25:  \"75 y.o. female with the following orthopaedic surgery problems:     Left knee closed tibial plateau fracture involving lateral compartment     Recommend nonsurgical management  Knee immobilizer when out of bed and during weightbearing activities.  Would recommend

## 2025-07-10 NOTE — PROGRESS NOTES
Progress Note    Admit Date:  7/9/2025    Syncope and collapse  Left knee closed tibial plateau fracture     VIOLETA   Hypotension     Atrial fibrillation     Subjective:  Ms. Franz today seen resting in bed. Feeling okay  Left leg pain is better   Reportedly has had multiple syncopal episodes at home.   Does have hx of seizures but reports this is different.  Has not had good PO intake at home 2/2 to home stressors, has still been taking her diuretics.       Objective:   Patient Vitals for the past 4 hrs:   BP Temp Temp src Pulse Resp SpO2 Height Weight   07/10/25 1350 (!) 142/61 98 °F (36.7 °C) Oral 61 16 94 % -- --   07/10/25 1053 (!) 141/56 -- -- -- -- -- 1.549 m (5' 1\") 89.8 kg (198 lb)   07/10/25 1045 (!) 143/52 98.1 °F (36.7 °C) Oral 54 18 92 % -- --          Intake/Output Summary (Last 24 hours) at 7/10/2025 1432  Last data filed at 7/10/2025 1250  Gross per 24 hour   Intake 1100 ml   Output --   Net 1100 ml       Physical Exam:    Gen: No distress. Alert. Pleasant elderly female  Eyes: PERRL. No sclera icterus. No conjunctival injection.   Neck: No JVD.  Trachea midline.  Resp: No accessory muscle use. No rhonchi. +Diminished breath sounds with trace crackles and wheezing   CV: Regular rate. Regular rhythm. + murmur.  No rub.   Peripheral Pulses: +2 palpable, equal bilaterally   GI: Non-tender. Non-distended.  Normal bowel sounds.  Skin: Warm and dry. No nodule on exposed extremities. No rash on exposed extremities.   M/S: No cyanosis. No joint deformity. No clubbing. ++left knee in immobilizer, +left foot with mild edema, tenderness to palpation, some minor erythema of LLE   Neuro: Awake. Grossly nonfocal CN II-XII grossly intact. Ambulating without difficulty   Psych: Oriented x 3. No anxiety or agitation.         Medications:  atorvastatin, 20 mg, Nightly  amiodarone, 200 mg, Daily  apixaban, 5 mg, BID  busPIRone, 5 mg, BID  [START ON 7/11/2025] ferrous sulfate, 325 mg, Daily with  Mood disorder

## 2025-07-10 NOTE — PROGRESS NOTES
Inpatient Physical Therapy Evaluation & Treatment    Unit: [facility]  Date:  7/10/2025  Patient Name:    Nancy Franz  Admitting diagnosis:  Syncope and collapse [R55]  Bradycardia [R00.1]  Positive D-dimer [R79.89]  Elevated d-dimer [R79.89]  Transient hypotension [I95.9]  Fall, initial encounter [W19.XXXA]  Tibial plateau fracture, left, closed, initial encounter [S82.142A]  Acute renal failure, unspecified acute renal failure type [N17.9]  Hypothyroidism, unspecified type [E03.9]  Admit Date:  7/9/2025  Precautions/Restrictions/WB Status/ Lines/ Wounds/ Oxygen: Fall risk, Bed/chair alarm, Lines (IV and external catheter), Telemetry, WB Restrictions (NWB LLE), and Knee immobilizer    Can be TTWB for balance if needed but keep NWB as much as possible. Knee immobilizer on When OOB, can be open when in bed      Pt seen for cotreatment this date due to patient safety, complexity of condition, acute illness/injury, and limited functional status information    Treatment Time:  2908-0862  Treatment Number:  1   Timed Code Treatment Minutes: 43 minutes  Total Treatment Minutes:  53  minutes    Patient Stated Goals for Therapy: \" To go home. \"          Discharge Recommendations: SNF  DME needs for discharge: RW, Manual W/C, and Defer to facility       Therapy recommendation for EMS Transport: requires transport by cot due to pt needs A x 2 for safe transfers    Therapy recommendations for staff:   Assist of 2 for stand-pivot transfers with rolling walker (RW) and gait belt to/from BSC  to/from chair  NWB LLE with knee immobilizer, position chair closer to bed for shorter pivot distance.    History of Present Illness:   per Dr Tee H&P 7/9/25:  \"75 y.o. female who presented to [facility] with with reports of fall and passing out.  Patient reports he was standing up and she blacked out and fell.  Reported pain in her left leg including left ankle, knee and hip.  EMS was called.  She reports due to prior falls  14       Subjective  Patient lying reclined in bed with no family present.  Pt agreeable to this PT session. RN cleared pt for therapy session.    Cognition    A&O Person, Place, Time, and Situation   Able to follow 1 step commands    Pain   Yes  Location: left leg  Ratin /10  Pain Medicine Status: Received pain med prior to tx    Activity Tolerance   During therapy session noted pt with fatigue    Pt Position BP (mmHg) HR (bpm) SpO2 (%) on RA Comments   Supine at rest 158/54 71 94    Seated at EOB       Standing       End of session         Objective  Does this pt have an acute or acute on chronic diagnosis of CHF? No    Upper Extremity ROM/Strength  Please see OT evaluation.      Lower Extremity ROM / Strength   AROM WFL: No  ROM limitations: left knee immobilizer    BLE strength impaired, but not formally assessed with MMT.    Lower Extremity Sensation    WFL    Coordination  WFL    Tone  Not Tested    Balance  Static Sitting:  SBA  Dynamic Sitting:  SBA   Comments: EOB    Static Standing: Min A   Dynamic Standing:  Min A   Comments: RW and gait belt    Posture  Seated: Forward head and neck  Standing: Forward head and neck    Bed Mobility   Supine to Sit:    SBA; HOB elevated, increased time to complete, use of side rail  Sit to Supine:   Not Tested  Rolling:   Not Tested  Scooting in sitting: SBA to EOB  Scooting in supine:  Not Tested   Bridging:  Not Tested  Comments: Increased time to complete movement    Transfer Training     Sit to stand:   Min A x2  Stand to sit:   Min A x2  Bed to/from Chair:  Min A  x2  Comments: RW and gait belt, VC for sequencing, maintaining NWB of LLE. Pt unable to hop but able to scoot R foot in order to pivot to chair.    Gait gait deferred due to difficulty with transfers; pt ambulated 0 ft.     Stair Training deferred, pt does not have stairs in home environment    Therapeutic Exercises Initiated  deferred secondary to treatment focus on functional mobility    Positioning

## 2025-07-10 NOTE — ED NOTES
[facility] Medication Reconciliation Status          [] COMPLETE       Medication history has been reviewed and obtained from the following source(s):       [] patient/family verbal report             [] patient/family provided written list       [] external pharmacy   [] external facility list          [x] IN PROGRESS       Medication reconciliation marked in progress at this time due to:       [x] patient/family poor historian      [] waiting arrival of family to clarify       [] waiting for accurate list        [] external pharmacy needs called      * Follow up is needed.          [] UNABLE TO ASSESS       Medication reconciliation is incomplete and unable to assess at this time due to:       [] critical patient condition   [] patient is unresponsive        [] no family available                       [] unknown pharmacy       [] anonymous patient          * Follow up is needed.      [] Pharmacy consult placed for medication reconciliation assistance.   Additional comments:

## 2025-07-10 NOTE — PLAN OF CARE
ORTHO NOTE    Consult received. In summary, 76yo female admitted after syncope, noted to have minimally depressed left lateral tibial plateau fracture. To recommend nonoperative management. NWB LLE, knee immobilizer.  Ok to provide diet. PT eval. Return to clinic in 3-4 weeks for repeat Xrays. Anticipate 10 weeks until full WBAT. Full consult note to follow.    Aakash Pulido MD

## 2025-07-10 NOTE — PROGRESS NOTES
07/10/25 1500   RT Protocol   History Pulmonary Disease 2   Respiratory pattern 0   Breath sounds 2   Cough 0   Indications for Bronchodilator Therapy Decreased or absent breath sounds   Bronchodilator Assessment Score 4

## 2025-07-10 NOTE — PROGRESS NOTES
Patient admitted to room 207 from ED.  Patient oriented to room, call light, bed rails, phone, lights and bathroom.  Patient instructed about the schedule of the day including: vital sign frequency, lab draws, possible tests, frequency of MD and staff rounds, including RN/MD rounding together at bedside, daily weights, and I &O's.  Patient instructed about prescribed diet, how to use 8MENU, and television.   bed alarm in place, patient aware of placement and reason.   Bed locked, in lowest position, side rails up 2/4, call light within reach.  Will continue to monitor.

## 2025-07-10 NOTE — PROGRESS NOTES
EMERGENCY DEPARTMENT HISTORY AND PHYSICAL EXAM      Date: 8/9/2018  Patient Name: Riky Vega    History of Presenting Illness     Chief Complaint   Patient presents with    Urinary Pain     and burning x 5 days     History Provided By: Patient    HPI: Riky Vega, 25 y.o. female with PMHx significant for asthma and HA, presents ambulatory to the ED with cc of persistent, moderate, burning and frequent urination since 8/5/18 after the pt had sexual intercourse alongside associated chills. She states that she just recently finished her menstrual cycle and the bleeding is stopping, but the pain and burning persists. Pt denies any change in vaginal discharge. Pt denies lightheadedness, fever, or abdominal pain. Pt currently endorses smoking tobacco. She also occasionally drinking alcohol. Chief Complaint: dysuria  Duration: since 8/5/18   Timing:  Constant  Location: vagina  Quality: Burning  Severity: Moderate  Modifying Factors: n/a  Associated Symptoms: chills    There are no other complaints, changes, or physical findings at this time. PCP: Devin Rod MD    Current Outpatient Prescriptions   Medication Sig Dispense Refill    cephALEXin (KEFLEX) 500 mg capsule Take 1 Cap by mouth three (3) times daily for 7 days. 21 Cap 0    phenazopyridine (PYRIDIUM) 200 mg tablet Take 1 Tab by mouth three (3) times daily for 2 days. 6 Tab 0    methocarbamol (ROBAXIN-750) 750 mg tablet Take 1 Tab by mouth four (4) times daily. 20 Tab 0    naproxen (NAPROSYN) 500 mg tablet Take 1 Tab by mouth two (2) times daily (with meals). 20 Tab 0    gabapentin (NEURONTIN) 100 mg capsule Take 1 Cap by mouth three (3) times daily as needed. 20 Cap 0    Cetirizine (ZYRTEC) 10 mg cap Take 10 mg by mouth daily as needed. 20 Cap 0    mometasone (NASONEX) 50 mcg/actuation nasal spray 2 Sprays by Both Nostrils route daily.  1 Container 0    albuterol (PROVENTIL HFA, VENTOLIN HFA, PROAIR HFA) 90 mcg/actuation inhaler Take Shift assessment complete. Scheduled meds given. Patients head-toe complete, VS are logged, and active bowel sound noted in all four quadrants.     Patient on room air, RR easy and unlabored. No s/s of distress. A/O x4. Denies pain or SOB. Knee immobilizer on and PT in room to evaluate patient. LLE NWB.     Patient sitting in bed, denies further needs at this time. Call light and bedside table are within reach. The bed is locked and is in the lowest position.        1-2 Puffs by inhalation every four (4) hours as needed for Wheezing. 1 Inhaler 1    predniSONE (STERAPRED DS) 10 mg dose pack See administration instruction per 10mg dose pack 48 Tab 0    ibuprofen (MOTRIN) 200 mg tablet Take 200 mg by mouth. Past History     Past Medical History:  Past Medical History:   Diagnosis Date    Asthma     H/O seasonal allergies     Ill-defined condition     headaches       Past Surgical History:  History reviewed. No pertinent surgical history. Family History:  History reviewed. No pertinent family history. Social History:  Social History   Substance Use Topics    Smoking status: Current Every Day Smoker     Packs/day: 0.50    Smokeless tobacco: Never Used    Alcohol use Yes      Comment: socially       Allergies:  No Known Allergies      Review of Systems   Review of Systems   Constitutional: Positive for chills. Negative for fever. Respiratory: Negative for shortness of breath. Cardiovascular: Negative for chest pain. Gastrointestinal: Negative for abdominal pain, nausea and vomiting. Genitourinary: Positive for dysuria, frequency and vaginal bleeding. Negative for flank pain and vaginal discharge. Musculoskeletal: Negative for back pain and myalgias. Skin: Negative for color change, pallor, rash and wound. Neurological: Negative for dizziness, weakness and light-headedness. All other systems reviewed and are negative. Physical Exam   Physical Exam   Constitutional: She is oriented to person, place, and time. She appears well-developed and well-nourished. No distress. HENT:   Head: Normocephalic and atraumatic. Eyes: Conjunctivae are normal.   Cardiovascular: Normal rate, regular rhythm and normal heart sounds. Pulmonary/Chest: Effort normal and breath sounds normal. No respiratory distress. Abdominal: Soft. Bowel sounds are normal. She exhibits no distension. Musculoskeletal: Normal range of motion.    Neurological: She is alert and oriented to person, place, and time. Skin: Skin is warm. No rash noted. Psychiatric: She has a normal mood and affect. Her behavior is normal.   Nursing note and vitals reviewed. Diagnostic Study Results     Labs -     Recent Results (from the past 12 hour(s))   URINALYSIS W/ REFLEX CULTURE    Collection Time: 08/09/18  9:26 PM   Result Value Ref Range    Color PALOMA      Appearance TURBID (A) CLEAR      Specific gravity 1.010 1.003 - 1.030      pH (UA) 7.5 5.0 - 8.0      Protein >300 (A) NEG mg/dL    Glucose NEGATIVE  NEG mg/dL    Ketone TRACE (A) NEG mg/dL    Bilirubin NEGATIVE  NEG      Blood LARGE (A) NEG      Urobilinogen 1.0 0.2 - 1.0 EU/dL    Nitrites NEGATIVE  NEG      Leukocyte Esterase LARGE (A) NEG      WBC  0 - 4 /hpf    RBC >100 (H) 0 - 5 /hpf    Epithelial cells FEW FEW /lpf    Bacteria NEGATIVE  NEG /hpf    UA:UC IF INDICATED URINE CULTURE ORDERED (A) CNI     HCG URINE, QL. - POC    Collection Time: 08/09/18  9:36 PM   Result Value Ref Range    Pregnancy test,urine (POC) NEGATIVE  NEG         Radiologic Studies -     Medical Decision Making   I am the first provider for this patient. I reviewed the vital signs, available nursing notes, past medical history, past surgical history, family history and social history. Vital Signs-Reviewed the patient's vital signs. Patient Vitals for the past 12 hrs:   Temp Pulse Resp BP SpO2   08/09/18 2131 - - - - 100 %   08/09/18 2034 98.6 °F (37 °C) (!) 105 16 120/75 100 %     Records Reviewed: Nursing Notes and Old Medical Records    Provider Notes (Medical Decision Making):   DDx: UTI, pyelonephritis, unlikely kidney calculus     ED Course:   Initial assessment performed. The patients presenting problems have been discussed, and they are in agreement with the care plan formulated and outlined with them. I have encouraged them to ask questions as they arise throughout their visit.     Critical Care Time: 0 minutes    Disposition:  DISCHARGE NOTE  10:22 PM  The patient has been re-evaluated and is ready for discharge. Reviewed available results with patient. Counseled pt on diagnosis and care plan. Pt has expressed understanding, and all questions have been answered. Pt agrees with plan and agrees to F/U as recommended, or return to the ED if their sxs worsen. Discharge instructions have been provided and explained to the pt, along with reasons to return to the ED. Written by Yann Conley ED Scribe, as dictated by Shyann Elias PA-C. PLAN:  1. Discharge Medication List as of 8/9/2018 10:27 PM      START taking these medications    Details   cephALEXin (KEFLEX) 500 mg capsule Take 1 Cap by mouth three (3) times daily for 7 days. , Normal, Disp-21 Cap, R-0      phenazopyridine (PYRIDIUM) 200 mg tablet Take 1 Tab by mouth three (3) times daily for 2 days. , Normal, Disp-6 Tab, R-0         CONTINUE these medications which have NOT CHANGED    Details   methocarbamol (ROBAXIN-750) 750 mg tablet Take 1 Tab by mouth four (4) times daily. , Print, Disp-20 Tab, R-0      naproxen (NAPROSYN) 500 mg tablet Take 1 Tab by mouth two (2) times daily (with meals). , Print, Disp-20 Tab, R-0      gabapentin (NEURONTIN) 100 mg capsule Take 1 Cap by mouth three (3) times daily as needed. , Print, Disp-20 Cap, R-0      Cetirizine (ZYRTEC) 10 mg cap Take 10 mg by mouth daily as needed. , Print, Disp-20 Cap, R-0      mometasone (NASONEX) 50 mcg/actuation nasal spray 2 Sprays by Both Nostrils route daily. , Print, Disp-1 Container, R-0      albuterol (PROVENTIL HFA, VENTOLIN HFA, PROAIR HFA) 90 mcg/actuation inhaler Take 1-2 Puffs by inhalation every four (4) hours as needed for Wheezing., Print, Disp-1 Inhaler, R-1      predniSONE (STERAPRED DS) 10 mg dose pack See administration instruction per 10mg dose pack, Print, Disp-48 Tab, R-0      ibuprofen (MOTRIN) 200 mg tablet Take 200 mg by mouth., Historical Med           2.    Follow-up Information     Follow up With Details Comments Contact Info    Arpan Jackson MD Schedule an appointment as soon as possible for a visit As needed [facility] Millan  [phone]          Return to ED if worse     Diagnosis     Clinical Impression:   1. Acute cystitis with hematuria        Attestation: This note is prepared by Roscoe Hidalgo, acting as Scribe for Buck. Juan Luis Mcgraw PA-C: The scribe's documentation has been prepared under my direction and personally reviewed by me in its entirety. I confirm that the note above accurately reflects all work, treatment, procedures, and medical decision making performed by me.

## 2025-07-10 NOTE — CARE COORDINATION
Case Management Assessment  Initial Evaluation    Date/Time of Evaluation: 7/10/2025 11:42 AM  Assessment Completed by: Marlena Osuna RN    If patient is discharged prior to next notation, then this note serves as note for discharge by case management.    Patient Name: Nancy Franz                   YOB: 1950  Diagnosis: Syncope and collapse [R55]  Bradycardia [R00.1]  Positive D-dimer [R79.89]  Elevated d-dimer [R79.89]  Transient hypotension [I95.9]  Fall, initial encounter [W19.XXXA]  Tibial plateau fracture, left, closed, initial encounter [S82.142A]  Acute renal failure, unspecified acute renal failure type [N17.9]  Hypothyroidism, unspecified type [E03.9]                   Date / Time: 7/9/2025  4:57 PM    Patient Admission Status: Inpatient   Readmission Risk (Low < 19, Mod (19-27), High > 27): Readmission Risk Score: 18.2    Current PCP: Nona Phillips APRN - NP  PCP verified by CM? Yes    Chart Reviewed: Yes      History Provided by: Patient  Patient Orientation: Alert and Oriented    Patient Cognition: Alert    Hospitalization in the last 30 days (Readmission):  No    If yes, Readmission Assessment in CM Navigator will be completed.    Advance Directives:      Code Status: Full Code   Patient's Primary Decision Maker is: Named in Scanned ACP Document    Supplemental (Other) Decision Maker: Margaret Franz - Child - [phone]    Discharge Planning:    Patient lives with: Children Type of Home: House  Primary Care Giver: Self  Patient Support Systems include: Children   Current Financial resources: Medicare  Current community resources: None  Current services prior to admission: Oxygen Therapy, Durable Medical Equipment            Current DME: Oxygen Therapy (Comment) (2.5L PRN thru [facility])            Type of Home Care services:  None    ADLS  Prior functional level: Independent in ADLs/IADLs  Current functional level: Independent in ADLs/IADLs    PT AM-PAC:   /24  OT AM-PAC:
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
## 2025-07-11 ENCOUNTER — TELEPHONE (OUTPATIENT)
Dept: CARDIOLOGY CLINIC | Age: 75
End: 2025-07-11

## 2025-07-11 ENCOUNTER — ANCILLARY PROCEDURE (OUTPATIENT)
Dept: CARDIOLOGY CLINIC | Age: 75
End: 2025-07-11

## 2025-07-11 DIAGNOSIS — I48.0 PAROXYSMAL ATRIAL FIBRILLATION (HCC): Primary | ICD-10-CM

## 2025-07-11 DIAGNOSIS — I48.0 PAROXYSMAL ATRIAL FIBRILLATION (HCC): ICD-10-CM

## 2025-07-11 PROBLEM — I48.19 PERSISTENT ATRIAL FIBRILLATION (HCC): Status: ACTIVE | Noted: 2025-07-11
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
## 2025-07-11 LAB
ANION GAP SERPL CALCULATED.3IONS-SCNC: 13 MMOL/L (ref 3–16)
BASOPHILS # BLD: 0.1 K/UL (ref 0–0.2)
BASOPHILS NFR BLD: 0.9 %
BUN SERPL-MCNC: 16 MG/DL (ref 7–20)
CALCIUM SERPL-MCNC: 8.4 MG/DL (ref 8.3–10.6)
CHLORIDE SERPL-SCNC: 108 MMOL/L (ref 99–110)
CO2 SERPL-SCNC: 21 MMOL/L (ref 21–32)
CREAT SERPL-MCNC: 0.9 MG/DL (ref 0.6–1.2)
DEPRECATED RDW RBC AUTO: 15.8 % (ref 12.4–15.4)
ECHO BSA: 1.97 M2
EKG ATRIAL RATE: 72 BPM
EKG DIAGNOSIS: NORMAL
EKG P AXIS: 47 DEGREES
EKG P-R INTERVAL: 154 MS
EKG Q-T INTERVAL: 416 MS
EKG QRS DURATION: 84 MS
EKG QTC CALCULATION (BAZETT): 455 MS
EKG R AXIS: -23 DEGREES
EKG T AXIS: 69 DEGREES
EKG VENTRICULAR RATE: 72 BPM
EOSINOPHIL # BLD: 0.6 K/UL (ref 0–0.6)
EOSINOPHIL NFR BLD: 9.9 %
GFR SERPLBLD CREATININE-BSD FMLA CKD-EPI: 67 ML/MIN/{1.73_M2}
GLUCOSE BLD-MCNC: 129 MG/DL (ref 70–99)
GLUCOSE BLD-MCNC: 135 MG/DL (ref 70–99)
GLUCOSE BLD-MCNC: 154 MG/DL (ref 70–99)
GLUCOSE BLD-MCNC: 169 MG/DL (ref 70–99)
GLUCOSE SERPL-MCNC: 130 MG/DL (ref 70–99)
HCT VFR BLD AUTO: 32 % (ref 36–48)
HGB BLD-MCNC: 10.8 G/DL (ref 12–16)
LYMPHOCYTES # BLD: 1.3 K/UL (ref 1–5.1)
LYMPHOCYTES NFR BLD: 21.3 %
MCH RBC QN AUTO: 31.4 PG (ref 26–34)
MCHC RBC AUTO-ENTMCNC: 33.7 G/DL (ref 31–36)
MCV RBC AUTO: 93.1 FL (ref 80–100)
MONOCYTES # BLD: 0.5 K/UL (ref 0–1.3)
MONOCYTES NFR BLD: 7.8 %
NEUTROPHILS # BLD: 3.7 K/UL (ref 1.7–7.7)
NEUTROPHILS NFR BLD: 60.1 %
PERFORMED ON: ABNORMAL
PLATELET # BLD AUTO: 138 K/UL (ref 135–450)
PMV BLD AUTO: 8.2 FL (ref 5–10.5)
POTASSIUM SERPL-SCNC: 3.8 MMOL/L (ref 3.5–5.1)
RBC # BLD AUTO: 3.44 M/UL (ref 4–5.2)
SODIUM SERPL-SCNC: 142 MMOL/L (ref 136–145)
T4 FREE SERPL-MCNC: 0.6 NG/DL (ref 0.9–1.8)
WBC # BLD AUTO: 6.2 K/UL (ref 4–11)

## 2025-07-11 PROCEDURE — 99232 SBSQ HOSP IP/OBS MODERATE 35: CPT | Performed by: INTERNAL MEDICINE

## 2025-07-11 PROCEDURE — 99233 SBSQ HOSP IP/OBS HIGH 50: CPT | Performed by: INTERNAL MEDICINE

## 2025-07-11 PROCEDURE — 93010 ELECTROCARDIOGRAM REPORT: CPT | Performed by: INTERNAL MEDICINE

## 2025-07-11 PROCEDURE — 94640 AIRWAY INHALATION TREATMENT: CPT

## 2025-07-11 PROCEDURE — 97535 SELF CARE MNGMENT TRAINING: CPT

## 2025-07-11 PROCEDURE — 6370000000 HC RX 637 (ALT 250 FOR IP): Performed by: STUDENT IN AN ORGANIZED HEALTH CARE EDUCATION/TRAINING PROGRAM

## 2025-07-11 PROCEDURE — 2580000003 HC RX 258

## 2025-07-11 PROCEDURE — 6370000000 HC RX 637 (ALT 250 FOR IP)

## 2025-07-11 PROCEDURE — 85025 COMPLETE CBC W/AUTO DIFF WBC: CPT

## 2025-07-11 PROCEDURE — 6370000000 HC RX 637 (ALT 250 FOR IP): Performed by: INTERNAL MEDICINE

## 2025-07-11 PROCEDURE — 97530 THERAPEUTIC ACTIVITIES: CPT

## 2025-07-11 PROCEDURE — 2700000000 HC OXYGEN THERAPY PER DAY

## 2025-07-11 PROCEDURE — 94761 N-INVAS EAR/PLS OXIMETRY MLT: CPT

## 2025-07-11 PROCEDURE — 1200000000 HC SEMI PRIVATE

## 2025-07-11 PROCEDURE — 36415 COLL VENOUS BLD VENIPUNCTURE: CPT

## 2025-07-11 PROCEDURE — 80048 BASIC METABOLIC PNL TOTAL CA: CPT

## 2025-07-11 PROCEDURE — 93005 ELECTROCARDIOGRAM TRACING: CPT | Performed by: INTERNAL MEDICINE

## 2025-07-11 RX ORDER — METOPROLOL SUCCINATE 25 MG/1
25 TABLET, EXTENDED RELEASE ORAL DAILY
Qty: 30 TABLET | Refills: 1 | Status: SHIPPED | OUTPATIENT
Start: 2025-07-11 | End: 2025-07-14 | Stop reason: HOSPADM

## 2025-07-11 RX ORDER — AMIODARONE HYDROCHLORIDE 100 MG/1
100 TABLET ORAL DAILY
Qty: 30 TABLET | Refills: 1 | Status: SHIPPED | OUTPATIENT
Start: 2025-07-12

## 2025-07-11 RX ORDER — AMLODIPINE BESYLATE 5 MG/1
5 TABLET ORAL DAILY
Status: DISCONTINUED | OUTPATIENT
Start: 2025-07-11 | End: 2025-07-14 | Stop reason: HOSPADM

## 2025-07-11 RX ORDER — FUROSEMIDE 20 MG/1
20 TABLET ORAL DAILY PRN
Qty: 60 TABLET | Refills: 3 | Status: SHIPPED | OUTPATIENT
Start: 2025-07-11

## 2025-07-11 RX ORDER — HYDROCODONE BITARTRATE AND ACETAMINOPHEN 5; 325 MG/1; MG/1
1 TABLET ORAL EVERY 6 HOURS PRN
Qty: 12 TABLET | Refills: 0 | Status: SHIPPED | OUTPATIENT
Start: 2025-07-11 | End: 2025-07-14 | Stop reason: HOSPADM

## 2025-07-11 RX ORDER — CEPHALEXIN 500 MG/1
500 CAPSULE ORAL 2 TIMES DAILY
Qty: 10 CAPSULE | Refills: 0 | Status: SHIPPED | OUTPATIENT
Start: 2025-07-11 | End: 2025-07-14

## 2025-07-11 RX ORDER — HYDROCODONE BITARTRATE AND ACETAMINOPHEN 5; 325 MG/1; MG/1
1 TABLET ORAL EVERY 6 HOURS PRN
Status: DISCONTINUED | OUTPATIENT
Start: 2025-07-11 | End: 2025-07-14 | Stop reason: HOSPADM

## 2025-07-11 RX ORDER — AMLODIPINE BESYLATE 5 MG/1
5 TABLET ORAL DAILY
Qty: 30 TABLET | Refills: 0 | Status: SHIPPED | OUTPATIENT
Start: 2025-07-12 | End: 2025-07-14

## 2025-07-11 RX ADMIN — PANTOPRAZOLE SODIUM 40 MG: 40 TABLET, DELAYED RELEASE ORAL at 17:59

## 2025-07-11 RX ADMIN — CEPHALEXIN 500 MG: 250 CAPSULE ORAL at 17:59

## 2025-07-11 RX ADMIN — LEVOTHYROXINE SODIUM 25 MCG: 0.03 TABLET ORAL at 06:05

## 2025-07-11 RX ADMIN — SPIRONOLACTONE 12.5 MG: 25 TABLET ORAL at 09:27

## 2025-07-11 RX ADMIN — APIXABAN 5 MG: 5 TABLET, FILM COATED ORAL at 21:28

## 2025-07-11 RX ADMIN — CEPHALEXIN 500 MG: 250 CAPSULE ORAL at 13:17

## 2025-07-11 RX ADMIN — BUSPIRONE HYDROCHLORIDE 5 MG: 5 TABLET ORAL at 21:28

## 2025-07-11 RX ADMIN — APIXABAN 5 MG: 5 TABLET, FILM COATED ORAL at 09:27

## 2025-07-11 RX ADMIN — CEPHALEXIN 500 MG: 250 CAPSULE ORAL at 00:11

## 2025-07-11 RX ADMIN — AMLODIPINE BESYLATE 5 MG: 5 TABLET ORAL at 09:27

## 2025-07-11 RX ADMIN — BUDESONIDE AND FORMOTEROL FUMARATE DIHYDRATE 2 PUFF: 160; 4.5 AEROSOL RESPIRATORY (INHALATION) at 19:05

## 2025-07-11 RX ADMIN — SODIUM CHLORIDE: 0.9 INJECTION, SOLUTION INTRAVENOUS at 03:18

## 2025-07-11 RX ADMIN — BUSPIRONE HYDROCHLORIDE 5 MG: 5 TABLET ORAL at 09:27

## 2025-07-11 RX ADMIN — HYDROCODONE BITARTRATE AND ACETAMINOPHEN 1 TABLET: 10; 325 TABLET ORAL at 00:10

## 2025-07-11 RX ADMIN — Medication 3 MG: at 21:28

## 2025-07-11 RX ADMIN — TIOTROPIUM BROMIDE INHALATION SPRAY 5 MCG: 3.12 SPRAY, METERED RESPIRATORY (INHALATION) at 07:33

## 2025-07-11 RX ADMIN — SERTRALINE 100 MG: 100 TABLET, FILM COATED ORAL at 09:27

## 2025-07-11 RX ADMIN — ATORVASTATIN CALCIUM 20 MG: 10 TABLET, FILM COATED ORAL at 21:28

## 2025-07-11 RX ADMIN — HYDROCODONE BITARTRATE AND ACETAMINOPHEN 1 TABLET: 10; 325 TABLET ORAL at 06:05

## 2025-07-11 RX ADMIN — CEPHALEXIN 500 MG: 250 CAPSULE ORAL at 06:05

## 2025-07-11 RX ADMIN — PANTOPRAZOLE SODIUM 40 MG: 40 TABLET, DELAYED RELEASE ORAL at 06:05

## 2025-07-11 RX ADMIN — GABAPENTIN 300 MG: 300 CAPSULE ORAL at 09:27

## 2025-07-11 RX ADMIN — FOLIC ACID 1 MG: 1 TABLET ORAL at 09:27

## 2025-07-11 RX ADMIN — HYDROCODONE BITARTRATE AND ACETAMINOPHEN 1 TABLET: 5; 325 TABLET ORAL at 21:31

## 2025-07-11 RX ADMIN — AMIODARONE HYDROCHLORIDE 100 MG: 200 TABLET ORAL at 09:27

## 2025-07-11 RX ADMIN — FERROUS SULFATE TAB 325 MG (65 MG ELEMENTAL FE) 325 MG: 325 (65 FE) TAB at 09:27

## 2025-07-11 RX ADMIN — BUDESONIDE AND FORMOTEROL FUMARATE DIHYDRATE 2 PUFF: 160; 4.5 AEROSOL RESPIRATORY (INHALATION) at 07:33

## 2025-07-11 ASSESSMENT — PAIN DESCRIPTION - ORIENTATION
ORIENTATION: LEFT

## 2025-07-11 ASSESSMENT — PAIN DESCRIPTION - LOCATION
LOCATION: LEG

## 2025-07-11 ASSESSMENT — PAIN SCALES - WONG BAKER: WONGBAKER_NUMERICALRESPONSE: NO HURT

## 2025-07-11 ASSESSMENT — PAIN SCALES - GENERAL
PAINLEVEL_OUTOF10: 0
PAINLEVEL_OUTOF10: 3
PAINLEVEL_OUTOF10: 7
PAINLEVEL_OUTOF10: 3
PAINLEVEL_OUTOF10: 8
PAINLEVEL_OUTOF10: 7

## 2025-07-11 ASSESSMENT — PAIN DESCRIPTION - DESCRIPTORS
DESCRIPTORS: ACHING

## 2025-07-11 ASSESSMENT — PAIN - FUNCTIONAL ASSESSMENT: PAIN_FUNCTIONAL_ASSESSMENT: PREVENTS OR INTERFERES WITH MANY ACTIVE NOT PASSIVE ACTIVITIES

## 2025-07-11 NOTE — CARE COORDINATION
Met with pt at bedside. Pt unsure she is going to be able to dc to SNF due to having to care for her two daughters at home. Pt also has a court date 7/16 to avoid eviction from her home. Pt reports she has too many things going on to stay somewhere outside of her home. Pt will make her decision once she is ready for discharge. Will cont to follow.     1540- discharge order cancelled due to unsafe discharge due to pt refusing SNF and no ride home. Pt does not have the help needed at home and recommended. Agreeable to [facility] if pt does go home. No preference. Referral called to Arac[facility] at [facility] . Able to accept pt at DC.

## 2025-07-11 NOTE — DISCHARGE INSTRUCTIONS
Recommend nonsurgical management  Knee immobilizer when out of bed and during weightbearing activities.  Would recommend nonweightbearing/toe-touch weightbearing activities as needed for mobility  Assist devices as necessary.  May remove knee immobilizer when in bed for skin rest, hygiene, ice application  Return to clinic in 3 to 4 weeks for repeat x-rays, initiate knee range of motion at that time point

## 2025-07-11 NOTE — PROGRESS NOTES
Inpatient Occupational Therapy Treatment    Unit: [address] [place]  Date:  7/11/2025  Patient Name:    Nancy Franz  Admitting diagnosis:  Syncope and collapse [R55]  Bradycardia [R00.1]  Positive D-dimer [R79.89]  Elevated d-dimer [R79.89]  Transient hypotension [I95.9]  Fall, initial encounter [W19.XXXA]  Tibial plateau fracture, left, closed, initial encounter [S82.142A]  Acute renal failure, unspecified acute renal failure type [N17.9]  Hypothyroidism, unspecified type [E03.9]  Admit Date:  7/9/2025  Precautions/Restrictions/WB Status/ Lines/ Wounds/ Oxygen: Fall risk, Bed/chair alarm, Lines (IV, Supplemental O2 (3L), and external catheter), Telemetry, WB Restrictions (NWB (TTWB ok per ortho also) LLE), and Knee immobilizer    Pt seen for cotreatment this date due to patient safety, patient endurance, complexity of condition, and acute illness/injury    Treatment Time:  6032-1977  Treatment Number:  2  Timed Code Treatment Minutes:  38 minutes  Total Treatment Minutes:  38 minutes    Patient Goals for Therapy: \"go home\"          Discharge Recommendations: SNF  DME needs for discharge: Defer to facility    If going home will need 24 hour physical assist, HHOT, manual WC if no WC available at home (patient unsure)       Therapy recommendations for staff:   Assist of 1 for stand-pivot transfers with rolling walker (RW) and gait belt to/from BSC  to/from chair, NWB (or limited TTWB) LLE with knee immobilizer, position chair closer to bed for shorter pivot distance    History of Present Illness: per Dr Pulido Consult 7/10/25:  \"75 y.o. female with the following orthopaedic surgery problems:     Left knee closed tibial plateau fracture involving lateral compartment     Recommend nonsurgical management  Knee immobilizer when out of bed and during weightbearing activities.  Would recommend nonweightbearing/toe-touch weightbearing activities as needed for mobility  Assist devices as necessary.  May remove knee immobilizer when in    Objective:  Does this pt have an acute or acute on chronic diagnosis of CHF? No    Upper Extremity ROM:    AROM WFL: Yes    Dominant Hand: Right    Upper Extremity Strength:    Strength Assessment (measured on a 0-5 scale):  4/5 overall bilaterally    Balance:  Sitting:    SBA  Standing:    Min A  and With RW and gait belt,cues for LLE NWB    Bed Mobility:   Supine to Sit:    SBA  Sit to Supine:   SBA  Rolling:   Not Tested  Scooting in sitting: SBA  Scooting in supine:  Not Tested    Transfers:    Sit to stand:    CGA and With cues for hand placement initially, improved to SBA on second trial  Stand to sit:    CGA and With cues for hand placement  Bed to chair:     CGA, With RW and gait belt, With increased time, and With cues for hand placement and technique. Able to maintain NWB/brief TTWB on LLE per MD order but unable to hop, scoots R foot along floor.  Bed/ chair to standard toilet:  Not Tested  Bed/chair to BSC:   Not Tested  Functional Mobility:   CGA, With RW and gait belt, With increased time, short distance EOB to chair, and cues for NWB/TTWB in KI     ADLs:  Dressing:      UE:   Not Tested  LE:    Max A adjust socks, adjust KI    Toileting:  Not Tested purewick in place    Positioning Needs:   Pt in bed and alarm set  Call light provided and all needs within reach    Patient/Family Education:   Pt educated on role of inpatient OT, plan of care, importance of continued activity, DC recommendations, functional transfer/mobility safety, transfer techniques, and calling for assist with mobility. Reinforced ortho WB restrictions/KI wearing schedule.    CHF Education  N/A    Assessment:  Pt seen for occupational therapy followup in the acute care setting this date.  Pt demonstrated progress toward goals. Patient will need BSC, RW, and WC if discharging home (patient's preference due to social needs). Patient states she does in fact have a BSC and RW, may have WC in storage shed. OT encouraged pt to ask

## 2025-07-11 NOTE — PROGRESS NOTES
[facility]   Progress Note  Cardiology      HPI: Seeing Ms. Franz today for f/u syncope, PAF, AS, HTN. She feels better overall. Reports less pain overall but not specific about location. No nursing issues overnight. Tele reviewed NSR 82bpm.      Physical Examination:    Vitals:    07/11/25 0635   BP:    Pulse:    Resp: 16   Temp:    SpO2:           Constitutional and General Appearance: NAD   Respiratory:  Normal excursion and expansion without use of accessory muscles  Resp Auscultation: Normal breath sounds without dullness  Cardiovascular:  The apical impulses not displaced  Heart tones are crisp and normal  Cervical veins are not engorged  The carotid upstroke is normal in amplitude and contour without delay or bruit  Normal S1S2, No S3, +II/VI harsh FANI  Peripheral pulses are symmetrical and full  There is no clubbing, cyanosis of the extremities.  Trace-1+ LLE edema  Pedal Pulses: 2+ and equal   Abdomen:  No masses or tenderness  Liver/Spleen: No Abnormalities Noted  Neurological/Psychiatric:  Alert and oriented in all spheres  Moves all extremities well  No abnormalities of mood, affect, memory, mentation, or behavior are noted  Skin: warm and dry      Lab Results   Component Value Date    WBC 6.2 07/11/2025    HGB 10.8 (L) 07/11/2025    HCT 32.0 (L) 07/11/2025    MCV 93.1 07/11/2025     07/11/2025     Lab Results   Component Value Date    CREATININE 0.9 07/11/2025    BUN 16 07/11/2025     07/11/2025    K 3.8 07/11/2025     07/11/2025    CO2 21 07/11/2025     Lab Results   Component Value Date    INR 1.42 (H) 01/21/2024    PROTIME 17.4 (H) 01/21/2024      ECHO 7/10/25    Left Ventricle: Normal left ventricular systolic function with a visually estimated EF of 55 - 60%. Left ventricle size is normal. Mildly increased wall thickness. Findings consistent with mild concentric hypertrophy. Normal wall motion. Normal diastolic function.    Right Ventricle: Right ventricle is mildly  Possible/questionable hx of seizures    Right hand weakness & numbness    Acute-on-chronic low back pain with radicular symptoms    Trimalleolar fracture of left ankle, closed, initial encounter    Ankle fracture, bimalleolar, closed, left, initial encounter    Acute respiratory failure with hypoxemia (HCC)    Closed fracture of left ankle    Aspiration pneumonitis (HCC)    Acute on chronic respiratory failure with hypoxia (HCC)    OD (overdose of drug)    Drug ingestion    Toxic encephalopathy    History of depression    Misuse of prescription only drugs    GI bleed    Elevated lactic acid level    Hyperkalemia    Leukocytosis    Thrombocytosis    High anion gap metabolic acidosis    Hypovolemic shock (HCC)    NSTEMI (non-ST elevated myocardial infarction) (HCC)    Abnormal CXR    PSVT (paroxysmal supraventricular tachycardia)    PAF (paroxysmal atrial fibrillation) ([facility])    Pneumonia of both lungs due to infectious organism    Acute focal neurological deficit    Diverticulitis    Diverticulitis of colon    Acute hyponatremia    COPD exacerbation (HCC)    Generalized abdominal pain    Shortness of breath    Delusions ([facility])    Bilateral lower extremity edema    Sepsis (HCC)    Chest pain    Frequent falls    Seizure-like activity (HCC)    Acute hypoxemic respiratory failure (HCC)    Heart murmur    Seizure (HCC)    Acute diverticulitis    Metabolic encephalopathy    Atrial flutter (HCC)    Acute on chronic congestive heart failure (HCC)    Atrial fibrillation with RVR (HCC)    Pneumonia due to infectious agent    Secondary hypercoagulable state    Right upper quadrant abdominal pain    Atrial fibrillation with rapid ventricular response (HCC)    Hypoxia    Moderate persistent asthma with exacerbation    Multifocal pneumonia    Esophagitis    Acute deep vein thrombosis (DVT) of proximal vein of left lower extremity (HCC)    Closed fracture of olecranon process of left ulna    Coronary atherosclerosis    Disorder of

## 2025-07-11 NOTE — TELEPHONE ENCOUNTER
Monitor placed by MG RN  Monitor company [employer]  Length of monitor 2 week  Monitor ordered by [facility]  Serial number [id number]  Kit ID [id number] and [id number]  Activation successful prior to pt leaving office? NA-delivered to [address] [place] [address] to be placed at discharge.

## 2025-07-11 NOTE — PLAN OF CARE
Problem: Safety - Adult  Goal: Free from fall injury  7/11/2025 0023 by Alyx Peguero RN  Outcome: Progressing  7/10/2025 1107 by Laurita Nowak RN  Outcome: Progressing     Problem: Chronic Conditions and Co-morbidities  Goal: Patient's chronic conditions and co-morbidity symptoms are monitored and maintained or improved  7/11/2025 0023 by Alyx Peguero RN  Outcome: Progressing  7/10/2025 1107 by Laurita Nowak RN  Outcome: Progressing     Problem: Discharge Planning  Goal: Discharge to home or other facility with appropriate resources  Outcome: Progressing     Problem: Pain  Goal: Verbalizes/displays adequate comfort level or baseline comfort level  Outcome: Progressing

## 2025-07-11 NOTE — PROGRESS NOTES
Pt awake in room. Alert and oriented. Gets up with assist. Left leg immobilizer in place. Pt calm and cooperative. Calls out appropriately. NS infusing at 75/hr. Purwick in place. VSS, on room air. Denies any needs. Call light in reach, will continue to monitor.

## 2025-07-11 NOTE — DISCHARGE SUMMARY
fracture or dislocation.  No pronounced soft tissue swelling. Left knee: There is mild depression of the lateral tibial plateau.  There is focal soft tissue swelling along the lateral knee.  No advanced degenerative change. Evaluation of a knee joint effusion is limited by obliquity of lateral view.     1. No acute osseous abnormality of the left hip.  Patient is status post left proximal femoral fixation without evidence of hardware complication. 2.  Mild depression of the lateral tibial plateau with soft tissue swelling along the lateral knee is suggestive of an acute lateral tibial plateau fracture.  This could be better characterized by CT, as clinically indicated.     XR KNEE LEFT (3 VIEWS)  Result Date: 7/9/2025  EXAMINATION: TWO XRAY VIEWS OF THE LEFT HIP; THREE XRAY VIEWS OF THE LEFT KNEE 7/9/2025 4:59 pm COMPARISON: None. HISTORY: ORDERING SYSTEM PROVIDED HISTORY: PAIN TECHNOLOGIST PROVIDED HISTORY: PAIN Reason for exam:->PAIN Reason for Exam: left leg pain with multiple falls. previous fx to hip and ankle. FINDINGS: Decreased osseous mineralization. Left hip: Patient is status post fixation of the left proximal femur without evidence of hardware complication.  No acute fracture or dislocation.  No pronounced soft tissue swelling. Left knee: There is mild depression of the lateral tibial plateau.  There is focal soft tissue swelling along the lateral knee.  No advanced degenerative change. Evaluation of a knee joint effusion is limited by obliquity of lateral view.     1. No acute osseous abnormality of the left hip.  Patient is status post left proximal femoral fixation without evidence of hardware complication. 2.  Mild depression of the lateral tibial plateau with soft tissue swelling along the lateral knee is suggestive of an acute lateral tibial plateau fracture.  This could be better characterized by CT, as clinically indicated.     XR ANKLE LEFT (MIN 3 VIEWS)  Result Date: 7/9/2025  EXAMINATION:

## 2025-07-11 NOTE — DISCHARGE SUMMARY
Hospital Medicine Discharge Summary    Patient: Nancy Franz     Gender: female  : 1950   Age: 75 y.o.  MRN: [id number]    Admitting Physician: Helga Tee DO  Discharge Physician: Leslie Sepulveda DO    Code Status: Full Code     Admit Date: 2025   Discharge Date: 2025      Discharge Diagnoses:    Active Hospital Problems    Diagnosis Date Noted    Tibial plateau fracture, left, closed, initial encounter [S82.142A] 07/10/2025    Bradycardia [R00.1] 07/10/2025    Aortic valve stenosis [I35.0] 07/10/2025    Mitral valve stenosis [I05.0] 07/10/2025    Syncope and collapse [R55] 2025    Transient hypotension [I95.9] 2024    Acute renal failure [N17.9] 2024    Hypertension [I10] 2017     Condition at Discharge: Stable    Hospital Course:     #Acute traumatic fracture of lateral tibial plateau with focal depression   -imaging as above  -PT/OT   -fall precautions  -prn pain medications   -orthopedic surgery consulted--> non-surgical management with knee immobilizer    #Syncope and collapse  #Transient hypotension    -CT head non-acute   -initially hypotensive but now hypertensive   -orthostatics pending   -echocardiogram as above with severe pulmonary HTN and moderate aortic stenosis   -will consult cardiology -would benefit from cardiac event monitor possibly   -may benefit from neurology consult as well with hx of seizures, patient was on keppra but has not taken in a long time.    #Atrial fibrillation/flutter  Now with bradycardia   -on amiodarone, metoprolol -unsure if she has been taking,hold for now   -on eliquis for AC     #Recent left lower extremity cellulitis  -finishing course of keflex -continue     #Chronic diastolic HF   #Moderate aortic stenosis   #Severe pulmonary HTN   -on lasix and aldactone -hold for now with low BP, VIOLETA   -needs to follow up with cardiologist or see pulmonary HTN specialist   -monitor intake and output, daily weights   -cardiologist consulted   -has  of the articular surface, without associated split.     CT HEAD WO CONTRAST  Result Date: 7/9/2025  EXAMINATION: CT OF THE HEAD WITHOUT CONTRAST  7/9/2025 5:09 pm TECHNIQUE: CT of the head was performed without the administration of intravenous contrast. Automated exposure control, iterative reconstruction, and/or weight based adjustment of the mA/kV was utilized to reduce the radiation dose to as low as reasonably achievable. COMPARISON: CT head 06/11/2024. HISTORY: ORDERING SYSTEM PROVIDED HISTORY: fall TECHNOLOGIST PROVIDED HISTORY: Reason for exam:->fall Has a \"code stroke\" or \"stroke alert\" been called?->No Decision Support Exception - unselect if not a suspected or confirmed emergency medical condition->Emergency Medical Condition (MA) Reason for Exam: fall FINDINGS: BRAIN/VENTRICLES: There is no acute intracranial hemorrhage, mass effect or midline shift.  No abnormal extra-axial fluid collection.  The gray-white differentiation is maintained without evidence of an acute territorial infarct.  There is no evidence of hydrocephalus. Redemonstrated chronic lacunar infarcts in the right cerebellar hemisphere. ORBITS: The visualized portion of the orbits demonstrate no acute abnormality. SINUSES: The visualized paranasal sinuses and mastoid air cells demonstrate no acute abnormality. SOFT TISSUES/SKULL:  No acute abnormality of the visualized skull or soft tissues.     No acute intracranial findings.     XR HIP LEFT (2-3 VIEWS)  Result Date: 7/9/2025  EXAMINATION: TWO XRAY VIEWS OF THE LEFT HIP; THREE XRAY VIEWS OF THE LEFT KNEE 7/9/2025 4:59 pm COMPARISON: None. HISTORY: ORDERING SYSTEM PROVIDED HISTORY: PAIN TECHNOLOGIST PROVIDED HISTORY: PAIN Reason for exam:->PAIN Reason for Exam: left leg pain with multiple falls. previous fx to hip and ankle. FINDINGS: Decreased osseous mineralization. Left hip: Patient is status post fixation of the left proximal femur without evidence of hardware complication.  No acute

## 2025-07-11 NOTE — PROGRESS NOTES
Shift assessment complete. Scheduled meds given. Patients head-toe complete. 86% on room air, now 94% on 3L O2 NC.     A/O x4. Forgetful at times. Denies pain or discomfort.      Patient sitting in bed, denies further needs at this time. Call light and bedside table are within reach. The bed is locked and is in the lowest position.

## 2025-07-11 NOTE — PROGRESS NOTES
Inpatient Physical Therapy Treatment    Unit: [address] [place]  Date:  7/11/2025  Patient Name:    Nancy Franz  Admitting diagnosis:  Syncope and collapse [R55]  Bradycardia [R00.1]  Positive D-dimer [R79.89]  Elevated d-dimer [R79.89]  Transient hypotension [I95.9]  Fall, initial encounter [W19.XXXA]  Tibial plateau fracture, left, closed, initial encounter [S82.142A]  Acute renal failure, unspecified acute renal failure type [N17.9]  Hypothyroidism, unspecified type [E03.9]  Admit Date:  7/9/2025  Precautions/Restrictions/WB Status/ Lines/ Wounds/ Oxygen: Fall risk, Bed/chair alarm, Lines (IV and external catheter, 3L O2), Telemetry, WB Restrictions (NWB LLE), and Knee immobilizer     Dr Pulido in to room during this session and reports patient can be TTWB for balance if needed but keep NWB as much as possible. Knee immobilizer on when OOB, can be open when in bed for skin integrity/icing      Pt seen for cotreatment this date due to patient safety, acute illness/injury, and staff recommendation    Treatment Time:  7232-7703  Treatment Number:  2   Timed Code Treatment Minutes: 44 minutes  Total Treatment Minutes:  44  minutes    Patient Stated Goals for Therapy: \" Go home. I'll crawl if I have to. \"          Discharge Recommendations: SNF  DME needs for discharge: Defer to facility; will need manual wheelchair if going home       Therapy recommendation for EMS Transport: can transport by wheelchair    Therapy recommendations for staff:   Assist of 1 for transfers with use of rolling walker (RW) and gait belt to/from BSC  to/from chair    History of Present Illness:   per Dr Tee H&P 7/9/25:  \"75 y.o. female who presented to [facility] with with reports of fall and passing out.  Patient reports he was standing up and she blacked out and fell.  Reported pain in her left leg including left ankle, knee and hip.  EMS was called.  She reports due to prior falls she had fractured her left hip that was repaired.

## 2025-07-11 NOTE — DISCHARGE INSTR - COC
Continuity of Care Form    Patient Name: Nancy Franz   :  1950  MRN:  [id number]    Admit date:  2025  Discharge date:  ***    Code Status Order: Full Code   Advance Directives:     Admitting Physician:  Helga Tee DO  PCP: Nona Phillips APRN - NP    Discharging Nurse: ***  Discharging Hospital Unit/Room#: 0207/0207-01  Discharging Unit Phone Number: ***    Emergency Contact:   Extended Emergency Contact Information  Primary Emergency Contact: Margaret Franz  Address: [address] [place] [address] [address] [place], [place] [id number] [place] [address]  Home Phone: [phone]  Mobile Phone: [phone]  Relation: Child   needed? No    Past Surgical History:  Past Surgical History:   Procedure Laterality Date    CATARACT REMOVAL WITH IMPLANT Left 2014    CATARACT REMOVAL WITH IMPLANT Right 2015    phacoemulsification with initraocular lens implant     SECTION      x3    COLONOSCOPY  2012    HYSTERECTOMY (CERVIX STATUS UNKNOWN)      IR MIDLINE CATH  2024    IR MIDLINE CATH 2024 [facility] PROCEDURES    IR MIDLINE CATH  2024    IR MIDLINE CATH 2024 [facility] SPECIAL PROCEDURES    OTHER SURGICAL HISTORY  2023    EGD    TONSILLECTOMY      TUBAL LIGATION      UPPER GASTROINTESTINAL ENDOSCOPY  2014    gastric polyp    UPPER GASTROINTESTINAL ENDOSCOPY  2014    gastric polyp    UPPER GASTROINTESTINAL ENDOSCOPY  2018    gastritis    UPPER GASTROINTESTINAL ENDOSCOPY N/A 2023    EGD performed by Juan Jose Teresa MD at [facility] ENDOSCOPY    UPPER GASTROINTESTINAL ENDOSCOPY N/A 2024    EGD BIOPSY performed by Juan Jose Teresa MD at [facility] ENDOSCOPY    UPPER GASTROINTESTINAL ENDOSCOPY N/A 2024    EGD W/ANES. performed by Juan Jose Teresa MD at [facility] ENDOSCOPY    UPPER GASTROINTESTINAL ENDOSCOPY N/A 2024    ESOPHAGOGASTRODUODENOSCOPY BIOPSY performed by Mp Bull MD at [facility] ENDOSCOPY

## 2025-07-11 NOTE — PLAN OF CARE
Problem: Safety - Adult  Goal: Free from fall injury  7/11/2025 1314 by Laurita Nowak RN  Outcome: Progressing  7/11/2025 0023 by Alyx Peguero RN  Outcome: Progressing     Problem: Chronic Conditions and Co-morbidities  Goal: Patient's chronic conditions and co-morbidity symptoms are monitored and maintained or improved  7/11/2025 1314 by Laurita Nowak RN  Outcome: Progressing  7/11/2025 0023 by Alyx Peguero RN  Outcome: Progressing

## 2025-07-12 LAB
ANION GAP SERPL CALCULATED.3IONS-SCNC: 12 MMOL/L (ref 3–16)
BASOPHILS # BLD: 0.1 K/UL (ref 0–0.2)
BASOPHILS NFR BLD: 1.2 %
BUN SERPL-MCNC: 10 MG/DL (ref 7–20)
CALCIUM SERPL-MCNC: 8.5 MG/DL (ref 8.3–10.6)
CHLORIDE SERPL-SCNC: 102 MMOL/L (ref 99–110)
CO2 SERPL-SCNC: 25 MMOL/L (ref 21–32)
CREAT SERPL-MCNC: 0.7 MG/DL (ref 0.6–1.2)
DEPRECATED RDW RBC AUTO: 15.4 % (ref 12.4–15.4)
EOSINOPHIL # BLD: 0.5 K/UL (ref 0–0.6)
EOSINOPHIL NFR BLD: 6.7 %
GFR SERPLBLD CREATININE-BSD FMLA CKD-EPI: >90 ML/MIN/{1.73_M2}
GLUCOSE BLD-MCNC: 109 MG/DL (ref 70–99)
GLUCOSE BLD-MCNC: 133 MG/DL (ref 70–99)
GLUCOSE BLD-MCNC: 134 MG/DL (ref 70–99)
GLUCOSE BLD-MCNC: 169 MG/DL (ref 70–99)
GLUCOSE SERPL-MCNC: 115 MG/DL (ref 70–99)
HCT VFR BLD AUTO: 33.1 % (ref 36–48)
HGB BLD-MCNC: 11.2 G/DL (ref 12–16)
LYMPHOCYTES # BLD: 0.9 K/UL (ref 1–5.1)
LYMPHOCYTES NFR BLD: 13.5 %
MCH RBC QN AUTO: 31.4 PG (ref 26–34)
MCHC RBC AUTO-ENTMCNC: 33.9 G/DL (ref 31–36)
MCV RBC AUTO: 92.7 FL (ref 80–100)
MONOCYTES # BLD: 0.5 K/UL (ref 0–1.3)
MONOCYTES NFR BLD: 7.7 %
NEUTROPHILS # BLD: 4.8 K/UL (ref 1.7–7.7)
NEUTROPHILS NFR BLD: 70.9 %
PERFORMED ON: ABNORMAL
PLATELET # BLD AUTO: 141 K/UL (ref 135–450)
PMV BLD AUTO: 8.1 FL (ref 5–10.5)
POTASSIUM SERPL-SCNC: 4.2 MMOL/L (ref 3.5–5.1)
RBC # BLD AUTO: 3.57 M/UL (ref 4–5.2)
SODIUM SERPL-SCNC: 139 MMOL/L (ref 136–145)
WBC # BLD AUTO: 6.8 K/UL (ref 4–11)

## 2025-07-12 PROCEDURE — 99232 SBSQ HOSP IP/OBS MODERATE 35: CPT | Performed by: INTERNAL MEDICINE

## 2025-07-12 PROCEDURE — 1200000000 HC SEMI PRIVATE

## 2025-07-12 PROCEDURE — 2500000003 HC RX 250 WO HCPCS: Performed by: STUDENT IN AN ORGANIZED HEALTH CARE EDUCATION/TRAINING PROGRAM

## 2025-07-12 PROCEDURE — 2700000000 HC OXYGEN THERAPY PER DAY

## 2025-07-12 PROCEDURE — 94761 N-INVAS EAR/PLS OXIMETRY MLT: CPT

## 2025-07-12 PROCEDURE — 85025 COMPLETE CBC W/AUTO DIFF WBC: CPT

## 2025-07-12 PROCEDURE — 6370000000 HC RX 637 (ALT 250 FOR IP)

## 2025-07-12 PROCEDURE — 6370000000 HC RX 637 (ALT 250 FOR IP): Performed by: STUDENT IN AN ORGANIZED HEALTH CARE EDUCATION/TRAINING PROGRAM

## 2025-07-12 PROCEDURE — 6370000000 HC RX 637 (ALT 250 FOR IP): Performed by: INTERNAL MEDICINE

## 2025-07-12 PROCEDURE — 80048 BASIC METABOLIC PNL TOTAL CA: CPT

## 2025-07-12 PROCEDURE — 36415 COLL VENOUS BLD VENIPUNCTURE: CPT

## 2025-07-12 PROCEDURE — 94640 AIRWAY INHALATION TREATMENT: CPT

## 2025-07-12 RX ORDER — DOCUSATE SODIUM 100 MG/1
100 CAPSULE, LIQUID FILLED ORAL DAILY
Status: DISCONTINUED | OUTPATIENT
Start: 2025-07-12 | End: 2025-07-13

## 2025-07-12 RX ORDER — POLYETHYLENE GLYCOL 3350 17 G/17G
17 POWDER, FOR SOLUTION ORAL DAILY
Status: DISCONTINUED | OUTPATIENT
Start: 2025-07-12 | End: 2025-07-13

## 2025-07-12 RX ADMIN — CEPHALEXIN 500 MG: 250 CAPSULE ORAL at 11:53

## 2025-07-12 RX ADMIN — DOCUSATE SODIUM 100 MG: 100 CAPSULE, LIQUID FILLED ORAL at 11:53

## 2025-07-12 RX ADMIN — AMIODARONE HYDROCHLORIDE 100 MG: 200 TABLET ORAL at 09:44

## 2025-07-12 RX ADMIN — APIXABAN 5 MG: 5 TABLET, FILM COATED ORAL at 20:52

## 2025-07-12 RX ADMIN — FOLIC ACID 1 MG: 1 TABLET ORAL at 09:43

## 2025-07-12 RX ADMIN — HYDROCODONE BITARTRATE AND ACETAMINOPHEN 1 TABLET: 5; 325 TABLET ORAL at 03:56

## 2025-07-12 RX ADMIN — Medication 3 MG: at 20:52

## 2025-07-12 RX ADMIN — SPIRONOLACTONE 12.5 MG: 25 TABLET ORAL at 09:43

## 2025-07-12 RX ADMIN — AMLODIPINE BESYLATE 5 MG: 5 TABLET ORAL at 09:44

## 2025-07-12 RX ADMIN — SERTRALINE 100 MG: 100 TABLET, FILM COATED ORAL at 09:43

## 2025-07-12 RX ADMIN — PANTOPRAZOLE SODIUM 40 MG: 40 TABLET, DELAYED RELEASE ORAL at 05:41

## 2025-07-12 RX ADMIN — PANTOPRAZOLE SODIUM 40 MG: 40 TABLET, DELAYED RELEASE ORAL at 16:47

## 2025-07-12 RX ADMIN — CEPHALEXIN 500 MG: 250 CAPSULE ORAL at 05:41

## 2025-07-12 RX ADMIN — POLYETHYLENE GLYCOL (3350) 17 G: 17 POWDER, FOR SOLUTION ORAL at 11:53

## 2025-07-12 RX ADMIN — APIXABAN 5 MG: 5 TABLET, FILM COATED ORAL at 09:43

## 2025-07-12 RX ADMIN — BUSPIRONE HYDROCHLORIDE 5 MG: 5 TABLET ORAL at 09:43

## 2025-07-12 RX ADMIN — CEPHALEXIN 500 MG: 250 CAPSULE ORAL at 17:50

## 2025-07-12 RX ADMIN — SODIUM CHLORIDE, PRESERVATIVE FREE 10 ML: 5 INJECTION INTRAVENOUS at 09:45

## 2025-07-12 RX ADMIN — CEPHALEXIN 500 MG: 250 CAPSULE ORAL at 00:15

## 2025-07-12 RX ADMIN — LEVOTHYROXINE SODIUM 25 MCG: 0.03 TABLET ORAL at 05:41

## 2025-07-12 RX ADMIN — BUSPIRONE HYDROCHLORIDE 5 MG: 5 TABLET ORAL at 20:52

## 2025-07-12 RX ADMIN — TIOTROPIUM BROMIDE INHALATION SPRAY 5 MCG: 3.12 SPRAY, METERED RESPIRATORY (INHALATION) at 07:26

## 2025-07-12 RX ADMIN — ATORVASTATIN CALCIUM 20 MG: 10 TABLET, FILM COATED ORAL at 20:52

## 2025-07-12 RX ADMIN — BUDESONIDE AND FORMOTEROL FUMARATE DIHYDRATE 2 PUFF: 160; 4.5 AEROSOL RESPIRATORY (INHALATION) at 07:26

## 2025-07-12 RX ADMIN — BUDESONIDE AND FORMOTEROL FUMARATE DIHYDRATE 2 PUFF: 160; 4.5 AEROSOL RESPIRATORY (INHALATION) at 19:13

## 2025-07-12 RX ADMIN — GABAPENTIN 300 MG: 300 CAPSULE ORAL at 09:44

## 2025-07-12 RX ADMIN — FERROUS SULFATE TAB 325 MG (65 MG ELEMENTAL FE) 325 MG: 325 (65 FE) TAB at 09:43

## 2025-07-12 ASSESSMENT — PAIN SCALES - GENERAL: PAINLEVEL_OUTOF10: 0

## 2025-07-12 ASSESSMENT — PAIN DESCRIPTION - ORIENTATION: ORIENTATION: MID

## 2025-07-12 ASSESSMENT — PAIN - FUNCTIONAL ASSESSMENT: PAIN_FUNCTIONAL_ASSESSMENT: ACTIVITIES ARE NOT PREVENTED

## 2025-07-12 ASSESSMENT — PAIN DESCRIPTION - DESCRIPTORS: DESCRIPTORS: ACHING

## 2025-07-12 ASSESSMENT — PAIN DESCRIPTION - LOCATION: LOCATION: HEAD

## 2025-07-12 ASSESSMENT — PAIN SCALES - WONG BAKER: WONGBAKER_NUMERICALRESPONSE: NO HURT

## 2025-07-12 NOTE — PROGRESS NOTES
Progress Note    Admit Date:  7/9/2025    Syncope and collapse  Left knee closed tibial plateau fracture     VIOLETA   Hypotension     Atrial fibrillation     Subjective:  Ms. Franz today seen resting in bed. She reports she is feeling pretty well.    She says she will not go to SNF.    Has pain but its not bad.     Objective:   Patient Vitals for the past 4 hrs:   BP Temp Temp src Pulse Resp SpO2   07/12/25 0930 (!) 162/63 99.5 °F (37.5 °C) Oral 80 18 98 %   07/12/25 0727 -- -- -- 83 16 98 %          Intake/Output Summary (Last 24 hours) at 7/12/2025 1105  Last data filed at 7/12/2025 0826  Gross per 24 hour   Intake 730 ml   Output 3200 ml   Net -2470 ml       Physical Exam:    Gen: No distress. Alert. Pleasant elderly female  Eyes: PERRL. No sclera icterus. No conjunctival injection.   Neck: No JVD.  Trachea midline.  Resp: No accessory muscle use. No rhonchi. +Diminished breath sounds with trace crackles and wheezing   CV: Regular rate. Regular rhythm. + murmur.  No rub.   Peripheral Pulses: +2 palpable, equal bilaterally   GI: Non-tender. Non-distended.  Normal bowel sounds.  Skin: Warm and dry. No nodule on exposed extremities. No rash on exposed extremities.   M/S: No cyanosis. No joint deformity. No clubbing. ++left knee in immobilizer, +left foot with mild edema, tenderness to palpation, some minor erythema of LLE   Neuro: Awake. Grossly nonfocal CN II-XII grossly intact. Ambulating without difficulty   Psych: Oriented x 3. No anxiety or agitation.         Medications:  polyethylene glycol, 17 g, Daily  docusate sodium, 100 mg, Daily  amLODIPine, 5 mg, Daily  atorvastatin, 20 mg, Nightly  apixaban, 5 mg, BID  busPIRone, 5 mg, BID  ferrous sulfate, 325 mg, Daily with breakfast  budesonide-formoterol, 2 puff, BID RT   And  tiotropium, 2 puff, Daily RT  folic acid, 1 mg, Daily  [Held by provider] furosemide, 20 mg, Daily  gabapentin, 300 mg, Daily  levothyroxine, 25 mcg, Daily  [Held by provider] metoprolol

## 2025-07-12 NOTE — PROGRESS NOTES
Progress Note    Admit Date:  7/9/2025    Syncope and collapse  Left knee closed tibial plateau fracture     VIOLETA   Hypotension     Atrial fibrillation     Subjective:  Ms. Franz today seen resting in bed. She reports she is feeling pretty well.    She says she will not go to SNF.    Objective:   Patient Vitals for the past 4 hrs:   BP Temp Temp src Pulse Resp SpO2   07/12/25 0930 (!) 162/63 99.5 °F (37.5 °C) Oral 80 18 98 %   07/12/25 0727 -- -- -- 83 16 98 %          Intake/Output Summary (Last 24 hours) at 7/12/2025 1059  Last data filed at 7/12/2025 0826  Gross per 24 hour   Intake 730 ml   Output 3200 ml   Net -2470 ml       Physical Exam:    Gen: No distress. Alert. Pleasant elderly female  Eyes: PERRL. No sclera icterus. No conjunctival injection.   Neck: No JVD.  Trachea midline.  Resp: No accessory muscle use. No rhonchi. +Diminished breath sounds with trace crackles and wheezing   CV: Regular rate. Regular rhythm. + murmur.  No rub.   Peripheral Pulses: +2 palpable, equal bilaterally   GI: Non-tender. Non-distended.  Normal bowel sounds.  Skin: Warm and dry. No nodule on exposed extremities. No rash on exposed extremities.   M/S: No cyanosis. No joint deformity. No clubbing. ++left knee in immobilizer, +left foot with mild edema, tenderness to palpation, some minor erythema of LLE   Neuro: Awake. Grossly nonfocal CN II-XII grossly intact. Ambulating without difficulty   Psych: Oriented x 3. No anxiety or agitation.         Medications:  amLODIPine, 5 mg, Daily  atorvastatin, 20 mg, Nightly  apixaban, 5 mg, BID  busPIRone, 5 mg, BID  ferrous sulfate, 325 mg, Daily with breakfast  budesonide-formoterol, 2 puff, BID RT   And  tiotropium, 2 puff, Daily RT  folic acid, 1 mg, Daily  [Held by provider] furosemide, 20 mg, Daily  gabapentin, 300 mg, Daily  levothyroxine, 25 mcg, Daily  [Held by provider] metoprolol succinate, 25 mg, Daily  pantoprazole, 40 mg, BID AC  sertraline, 100 mg, Daily  insulin lispro, 0-4  lymph nodes, including a precarinal lymph node measuring 2 cm, overall unchanged from the prior examination. Likely reactive.    3. Cirrhotic liver with likely mild splenomegaly and small recanalized paraumbilical vein.            Vascular duplex lower extremity venous bilateral   Final Result      XR CHEST PORTABLE   Final Result   1. No acute traumatic injury is seen.   2. COPD, without acute cardiopulmonary process.         CT KNEE LEFT WO CONTRAST   Final Result   1. Acute traumatic fracture of the lateral tibial plateau with focal depression of the articular surface, without associated split.            CT HEAD WO CONTRAST   Final Result   No acute intracranial findings.         XR ANKLE LEFT (MIN 3 VIEWS)   Final Result   Soft tissue swelling but no acute fracture.         XR KNEE LEFT (3 VIEWS)   Final Result   1. No acute osseous abnormality of the left hip.  Patient is status post left   proximal femoral fixation without evidence of hardware complication.      2.  Mild depression of the lateral tibial plateau with soft tissue swelling   along the lateral knee is suggestive of an acute lateral tibial plateau   fracture.  This could be better characterized by CT, as clinically indicated.         XR HIP LEFT (2-3 VIEWS)   Final Result   1. No acute osseous abnormality of the left hip.  Patient is status post left   proximal femoral fixation without evidence of hardware complication.      2.  Mild depression of the lateral tibial plateau with soft tissue swelling   along the lateral knee is suggestive of an acute lateral tibial plateau   fracture.  This could be better characterized by CT, as clinically indicated.           Echocardiogram from 7/10/25  Left Ventricle: Normal left ventricular systolic function with a visually estimated EF of 55 - 60%. Left ventricle size is normal. Mildly increased wall thickness. Findings consistent with mild concentric hypertrophy. Normal wall motion. Normal diastolic

## 2025-07-12 NOTE — PROGRESS NOTES
Pt evening shift assessment complete. VSS and medication given as ordered. Knee immobilizer in place. Pt assessed for comfort needs, given pain medication per pt request.  Pt does not express any additional needs at this time, resting comfortably in bed.

## 2025-07-12 NOTE — PLAN OF CARE
Problem: Safety - Adult  Goal: Free from fall injury  7/11/2025 2142 by Jenny Joy, RN  Outcome: Progressing  Flowsheets (Taken 7/11/2025 2129)  Free From Fall Injury: Instruct family/caregiver on patient safety  7/11/2025 1314 by Laurita Nowak, RN  Outcome: Progressing

## 2025-07-12 NOTE — PROGRESS NOTES
Pt wanting to leave AMA, removed Telemetry. Educated pt that she needs to wear the Telemetry until her ride gets here. Educated pt on the risks of leaving AMA, when she can not walk. Dr morales.

## 2025-07-12 NOTE — PLAN OF CARE
Notified that patient is living AMA.  Patient is here for fracture tibia.  Patient is nonweightbearing with knee immobilizer on

## 2025-07-12 NOTE — PROGRESS NOTES
Pt in bed during assessment. Pt is alert and oriented. Pt denies pain at this time. Bed alarm in place. Call light in reach. Vitals stable. Pt accepting of morning medications. Shift assessment completed. No new concerns at this time.     Bedside Mobility Assessment Tool (BMAT):     Assessment Level 1- Sit and Shake    1. From a semi-reclined position, ask patient to sit up and rotate to a seated position at the side of the bed. Can use the bedrail.    2. Ask patient to reach out and grab your hand and shake making sure patient reaches across his/her midline.   Pass- Patient is able to come to a seated position, maintain core strength. Maintains seated balance while reaching across midline. Move on to Assessment Level 2.     Assessment Level 2- Stretch and Point   1. With patient in seated position at the side of the bed, have patient place both feet on the floor (or stool) with knees no higher than hips.    2. Ask patient to stretch one leg and straighten the knee, then bend the ankle/flex and point the toes. If appropriate, repeat with the other leg.   Pass- Patient is able to demonstrate appropriate quad strength on intended weight bearing limb(s). Move onto Assessment Level 3.     Assessment Level 3- Stand   1. Ask patient to elevate off the bed or chair (seated to standing) using an assistive device (cane, bedrail).    2. Patient should be able to raise buttocks off be and hold for a count of five. May repeat once.   Pass- Patient maintains standing stability for at least 5 seconds, proceed to assessment level 4.    Assessment Level 4- Walk   1. Ask patient to march in place at bedside.    2. Then ask patient to advance step and return each foot. Some medical conditions may render a patient from stepping backwards, use your best clinical judgement.   Fail- Patient not able to complete tasks OR requires use of assistive device. Patient is MOBILITY LEVEL 3.       Mobility Level- 3    [Negative] : Heme/Lymph

## 2025-07-12 NOTE — PROGRESS NOTES
07/12/25 0727   Treatment   Treatment Type MDI   $Treatment Type $Inhaled Therapy/Meds   Medications Budesonide/Formoterol   Pre-Tx Pulse 83   Pre-Tx Resps 16   Breath Sounds Pre-Tx MARCELINO Diminished   Breath Sounds Pre-Tx LLL Diminished   Breath Sounds Pre-Tx RUL Diminished   Breath Sounds Pre-Tx RML Diminished   Breath Sounds Pre-Tx RLL Diminished   Delivery Source MDI with spacer   Position Semi-Chavez's   Treatment Tolerance Well   Is patient on O2? Y   Oxygen Therapy/Pulse Ox   O2 Therapy Room air   O2 Device Nasal cannula   O2 Flow Rate (L/min) 3 L/min   Pulse 83   Respirations 16   SpO2 98 %

## 2025-07-12 NOTE — PROGRESS NOTES
CARDIOLOGY PROGRESS NOTE      Patient Name: Nancy Franz  Date of admission: 7/9/2025  4:57 PM  Admission Dx: Syncope and collapse [R55]  Bradycardia [R00.1]  Positive D-dimer [R79.89]  Elevated d-dimer [R79.89]  Transient hypotension [I95.9]  Fall, initial encounter [W19.XXXA]  Tibial plateau fracture, left, closed, initial encounter [S82.142A]  Acute renal failure, unspecified acute renal failure type [N17.9]  Hypothyroidism, unspecified type [E03.9]  Reason for Consult:  syncope, PAF w bradycardia, severe PH   Requesting Physician: Helga Tee DO  Primary Care physician: Nona Phillips APRN - NP    Subjective:     Nancy Franz is a 75 y.o. patient with a prior medical history notable for paroxysmal atrial fibrillation on Eliquis and amiodarone, mild to moderate aortic stenosis/mitral stenosis, DVT 1/2024, COPD on 2.5 L nasal cannula at night and as needed during daytime hours, diabetes, hypertension, VILLEGAS cirrhosis and obesity, who presented to the hospital with complaints above.    Patient was initially evaluated by our service 7/10/Dr. Mitchell evaluated.  Noted patient reported passed out while standing.  2 episodes similar over the last several days prior to admission.  History of decreased p.o. intake and compliance ongoing with a diuretic at home.  Hypotensive on initial evaluation 78/44.  EKG showed sinus bradycardia.  proBNP elevated but decreased compared to 12/2024, and troponins mildly elevated with flat trend.  No palpitations or ischemic symptoms.  Echocardiogram was performed that showed normal LV function mild RV enlargement with preserved function, moderate aortic valve stenosis with mild regurgitation, mild mitral stenosis/regurgitation, severely elevated PA pressures with RVSP 60.  Plan was for cardiac event monitor for 2 weeks at discharge and outpatient follow-up.  No intervention for aortic valve planned at this time.  BP was found elevated and restarted Aldactone 12.5 daily and  ventricle is mildly dilated. Normal systolic function.    Aortic Valve: Mild regurgitation. Moderate stenosis of the aortic valve. AV mean gradient is 22 mmHg. AV Peak Gradient is 46 mmHg. AV Mean Gradient is 22 mmHg. AV Peak Velocity is 3.4 m/s.    Mitral Valve: Severe posterior annular calcification. Mildly thickened MV leaflets. Mild regurgitation.    Tricuspid Valve: Mild regurgitation. Severely elevated RVSP, consistent with severe pulmonary hypertension. TR Peak Gradient is 54 mmHg. Est. RA Pressure is 8 mmHg. RVSP is 60mmHg.    Left Atrium: Left atrium is mildly dilated.    Right Atrium: Right atrium is mildly dilated.    Image quality is adequate.      Impression and Plan      Syncope   - Hypotensive on presentation which likely trigger for postural dizziness and syncope on presentation.      - Bradycardic on presentation.  Cardiac monitor planned x 2 weeks and is in place.    - Telemetry here is negative for significant arrhythmia or high-grade block   - Lasix and beta-blocker has been held   - Aldactone has been restarted.  Monitor closely for volume status    History of paroxysmal atrial fibrillation-maintaining sinus here   - Eliquis for stroke prophylaxis   -amiodarone per EP     Moderate aortic stenosis  Mild mitral stenosis  Mild MR/AR   - Not contributing to current syncope picture.  Continue surveillance outpatient    DVT 1/2024 on Eliquis  COPD on 2.5 L nightly  Diabetes mellitus    Hypertension - poorly controlled   - Amlodipine 5 mg and spironolactone were added back to her regimen yesterday.  Expected to improve over time as outpatient.  Holding metoprolol and Lasix.  Reassess outpatient    VILLEGAS cirrhosis  Obesity BMI 38      Cardiac monitor in place. Suspicion for cardiac related syncope is low.  Patient is stable for discharge from a cardiac standpoint with no further inpatient cardiac testing planned. We will sign off.     Patient Active Problem List   Diagnosis    GERD (gastroesophageal

## 2025-07-13 LAB
ANION GAP SERPL CALCULATED.3IONS-SCNC: 10 MMOL/L (ref 3–16)
BASOPHILS # BLD: 0.1 K/UL (ref 0–0.2)
BASOPHILS NFR BLD: 1.3 %
BUN SERPL-MCNC: 11 MG/DL (ref 7–20)
CALCIUM SERPL-MCNC: 9.6 MG/DL (ref 8.3–10.6)
CHLORIDE SERPL-SCNC: 100 MMOL/L (ref 99–110)
CO2 SERPL-SCNC: 27 MMOL/L (ref 21–32)
CREAT SERPL-MCNC: 0.7 MG/DL (ref 0.6–1.2)
DEPRECATED RDW RBC AUTO: 16 % (ref 12.4–15.4)
EOSINOPHIL # BLD: 0.7 K/UL (ref 0–0.6)
EOSINOPHIL NFR BLD: 10.7 %
GFR SERPLBLD CREATININE-BSD FMLA CKD-EPI: >90 ML/MIN/{1.73_M2}
GLUCOSE BLD-MCNC: 123 MG/DL (ref 70–99)
GLUCOSE BLD-MCNC: 124 MG/DL (ref 70–99)
GLUCOSE BLD-MCNC: 129 MG/DL (ref 70–99)
GLUCOSE BLD-MCNC: 152 MG/DL (ref 70–99)
GLUCOSE SERPL-MCNC: 125 MG/DL (ref 70–99)
HCT VFR BLD AUTO: 34.8 % (ref 36–48)
HGB BLD-MCNC: 11.9 G/DL (ref 12–16)
LYMPHOCYTES # BLD: 1.2 K/UL (ref 1–5.1)
LYMPHOCYTES NFR BLD: 19.3 %
MAGNESIUM SERPL-MCNC: 1.56 MG/DL (ref 1.8–2.4)
MCH RBC QN AUTO: 31.4 PG (ref 26–34)
MCHC RBC AUTO-ENTMCNC: 34.1 G/DL (ref 31–36)
MCV RBC AUTO: 92.1 FL (ref 80–100)
MONOCYTES # BLD: 0.5 K/UL (ref 0–1.3)
MONOCYTES NFR BLD: 7.6 %
NEUTROPHILS # BLD: 3.7 K/UL (ref 1.7–7.7)
NEUTROPHILS NFR BLD: 61.1 %
PERFORMED ON: ABNORMAL
PLATELET # BLD AUTO: 163 K/UL (ref 135–450)
PMV BLD AUTO: 7.7 FL (ref 5–10.5)
POTASSIUM SERPL-SCNC: 4 MMOL/L (ref 3.5–5.1)
RBC # BLD AUTO: 3.78 M/UL (ref 4–5.2)
SODIUM SERPL-SCNC: 137 MMOL/L (ref 136–145)
WBC # BLD AUTO: 6.1 K/UL (ref 4–11)

## 2025-07-13 PROCEDURE — 6370000000 HC RX 637 (ALT 250 FOR IP): Performed by: STUDENT IN AN ORGANIZED HEALTH CARE EDUCATION/TRAINING PROGRAM

## 2025-07-13 PROCEDURE — 2700000000 HC OXYGEN THERAPY PER DAY

## 2025-07-13 PROCEDURE — 94640 AIRWAY INHALATION TREATMENT: CPT

## 2025-07-13 PROCEDURE — 99232 SBSQ HOSP IP/OBS MODERATE 35: CPT | Performed by: INTERNAL MEDICINE

## 2025-07-13 PROCEDURE — 6370000000 HC RX 637 (ALT 250 FOR IP)

## 2025-07-13 PROCEDURE — 6370000000 HC RX 637 (ALT 250 FOR IP): Performed by: INTERNAL MEDICINE

## 2025-07-13 PROCEDURE — 85025 COMPLETE CBC W/AUTO DIFF WBC: CPT

## 2025-07-13 PROCEDURE — 83735 ASSAY OF MAGNESIUM: CPT

## 2025-07-13 PROCEDURE — 80048 BASIC METABOLIC PNL TOTAL CA: CPT

## 2025-07-13 PROCEDURE — 36415 COLL VENOUS BLD VENIPUNCTURE: CPT

## 2025-07-13 PROCEDURE — 94761 N-INVAS EAR/PLS OXIMETRY MLT: CPT

## 2025-07-13 PROCEDURE — 1200000000 HC SEMI PRIVATE

## 2025-07-13 RX ORDER — LANOLIN ALCOHOL/MO/W.PET/CERES
400 CREAM (GRAM) TOPICAL 2 TIMES DAILY
Status: COMPLETED | OUTPATIENT
Start: 2025-07-13 | End: 2025-07-13

## 2025-07-13 RX ORDER — DOCUSATE SODIUM 100 MG/1
100 CAPSULE, LIQUID FILLED ORAL 2 TIMES DAILY
Status: DISCONTINUED | OUTPATIENT
Start: 2025-07-13 | End: 2025-07-14 | Stop reason: HOSPADM

## 2025-07-13 RX ORDER — METOPROLOL SUCCINATE 25 MG/1
12.5 TABLET, EXTENDED RELEASE ORAL DAILY
Status: DISCONTINUED | OUTPATIENT
Start: 2025-07-13 | End: 2025-07-14 | Stop reason: HOSPADM

## 2025-07-13 RX ORDER — POLYETHYLENE GLYCOL 3350 17 G/17G
17 POWDER, FOR SOLUTION ORAL 2 TIMES DAILY
Status: DISCONTINUED | OUTPATIENT
Start: 2025-07-13 | End: 2025-07-14 | Stop reason: HOSPADM

## 2025-07-13 RX ADMIN — CEPHALEXIN 500 MG: 250 CAPSULE ORAL at 12:14

## 2025-07-13 RX ADMIN — BUSPIRONE HYDROCHLORIDE 5 MG: 5 TABLET ORAL at 08:41

## 2025-07-13 RX ADMIN — PANTOPRAZOLE SODIUM 40 MG: 40 TABLET, DELAYED RELEASE ORAL at 05:25

## 2025-07-13 RX ADMIN — GABAPENTIN 300 MG: 300 CAPSULE ORAL at 08:41

## 2025-07-13 RX ADMIN — LEVOTHYROXINE SODIUM 25 MCG: 0.03 TABLET ORAL at 05:25

## 2025-07-13 RX ADMIN — Medication 3 MG: at 20:54

## 2025-07-13 RX ADMIN — METOPROLOL SUCCINATE 12.5 MG: 25 TABLET, EXTENDED RELEASE ORAL at 08:41

## 2025-07-13 RX ADMIN — FERROUS SULFATE TAB 325 MG (65 MG ELEMENTAL FE) 325 MG: 325 (65 FE) TAB at 08:41

## 2025-07-13 RX ADMIN — ATORVASTATIN CALCIUM 20 MG: 10 TABLET, FILM COATED ORAL at 20:54

## 2025-07-13 RX ADMIN — CEPHALEXIN 500 MG: 250 CAPSULE ORAL at 00:03

## 2025-07-13 RX ADMIN — CEPHALEXIN 500 MG: 250 CAPSULE ORAL at 17:02

## 2025-07-13 RX ADMIN — AMIODARONE HYDROCHLORIDE 100 MG: 200 TABLET ORAL at 08:41

## 2025-07-13 RX ADMIN — TIOTROPIUM BROMIDE INHALATION SPRAY 5 MCG: 3.12 SPRAY, METERED RESPIRATORY (INHALATION) at 07:01

## 2025-07-13 RX ADMIN — HYDROCODONE BITARTRATE AND ACETAMINOPHEN 1 TABLET: 5; 325 TABLET ORAL at 20:54

## 2025-07-13 RX ADMIN — FOLIC ACID 1 MG: 1 TABLET ORAL at 08:41

## 2025-07-13 RX ADMIN — APIXABAN 5 MG: 5 TABLET, FILM COATED ORAL at 20:54

## 2025-07-13 RX ADMIN — PANTOPRAZOLE SODIUM 40 MG: 40 TABLET, DELAYED RELEASE ORAL at 17:02

## 2025-07-13 RX ADMIN — BUDESONIDE AND FORMOTEROL FUMARATE DIHYDRATE 2 PUFF: 160; 4.5 AEROSOL RESPIRATORY (INHALATION) at 19:08

## 2025-07-13 RX ADMIN — CEPHALEXIN 500 MG: 250 CAPSULE ORAL at 05:25

## 2025-07-13 RX ADMIN — POLYETHYLENE GLYCOL (3350) 17 G: 17 POWDER, FOR SOLUTION ORAL at 20:54

## 2025-07-13 RX ADMIN — DOCUSATE SODIUM 100 MG: 100 CAPSULE, LIQUID FILLED ORAL at 08:41

## 2025-07-13 RX ADMIN — POLYETHYLENE GLYCOL (3350) 17 G: 17 POWDER, FOR SOLUTION ORAL at 08:40

## 2025-07-13 RX ADMIN — BUSPIRONE HYDROCHLORIDE 5 MG: 5 TABLET ORAL at 20:54

## 2025-07-13 RX ADMIN — MAGNESIUM OXIDE 400 MG (241.3 MG MAGNESIUM) TABLET 400 MG: TABLET at 08:41

## 2025-07-13 RX ADMIN — DOCUSATE SODIUM 100 MG: 100 CAPSULE, LIQUID FILLED ORAL at 20:54

## 2025-07-13 RX ADMIN — SERTRALINE 100 MG: 100 TABLET, FILM COATED ORAL at 08:41

## 2025-07-13 RX ADMIN — BUDESONIDE AND FORMOTEROL FUMARATE DIHYDRATE 2 PUFF: 160; 4.5 AEROSOL RESPIRATORY (INHALATION) at 07:01

## 2025-07-13 RX ADMIN — APIXABAN 5 MG: 5 TABLET, FILM COATED ORAL at 08:41

## 2025-07-13 RX ADMIN — SPIRONOLACTONE 12.5 MG: 25 TABLET ORAL at 08:41

## 2025-07-13 RX ADMIN — AMLODIPINE BESYLATE 5 MG: 5 TABLET ORAL at 08:41

## 2025-07-13 RX ADMIN — MAGNESIUM OXIDE 400 MG (241.3 MG MAGNESIUM) TABLET 400 MG: TABLET at 20:54

## 2025-07-13 ASSESSMENT — PAIN DESCRIPTION - ONSET: ONSET: GRADUAL

## 2025-07-13 ASSESSMENT — PAIN DESCRIPTION - LOCATION: LOCATION: LEG;HEAD

## 2025-07-13 ASSESSMENT — PAIN SCALES - GENERAL
PAINLEVEL_OUTOF10: 6
PAINLEVEL_OUTOF10: 3

## 2025-07-13 ASSESSMENT — PAIN DESCRIPTION - ORIENTATION: ORIENTATION: LEFT

## 2025-07-13 ASSESSMENT — PAIN - FUNCTIONAL ASSESSMENT: PAIN_FUNCTIONAL_ASSESSMENT: PREVENTS OR INTERFERES SOME ACTIVE ACTIVITIES AND ADLS

## 2025-07-13 ASSESSMENT — PAIN DESCRIPTION - FREQUENCY: FREQUENCY: CONTINUOUS

## 2025-07-13 ASSESSMENT — PAIN DESCRIPTION - PAIN TYPE: TYPE: ACUTE PAIN

## 2025-07-13 ASSESSMENT — PAIN DESCRIPTION - DESCRIPTORS: DESCRIPTORS: ACHING

## 2025-07-13 NOTE — PROGRESS NOTES
Progress Note    Admit Date:  7/9/2025    Syncope and collapse  Left knee closed tibial plateau fracture     VIOLETA   Hypotension     Atrial fibrillation     Subjective:  Ms. Franz today seen resting in bed. She reports she is feeling pretty well.  She had some SVT overnight, resumed lower dose Toprol.    Objective:   No data found.       Intake/Output Summary (Last 24 hours) at 7/13/2025 1353  Last data filed at 7/13/2025 1223  Gross per 24 hour   Intake 360 ml   Output 2425 ml   Net -2065 ml       Physical Exam:    Gen: No distress. Alert. Pleasant elderly female  Eyes: PERRL. No sclera icterus. No conjunctival injection.   Neck: No JVD.  Trachea midline.  Resp: No accessory muscle use. No rhonchi. +Diminished breath sounds with trace crackles and wheezing   CV: Regular rate. Regular rhythm. + murmur.  No rub.   Peripheral Pulses: +2 palpable, equal bilaterally   GI: Non-tender. Non-distended.  Normal bowel sounds.  Skin: Warm and dry. No nodule on exposed extremities. No rash on exposed extremities.   M/S: No cyanosis. No joint deformity. No clubbing. ++left knee in immobilizer, +left foot with mild edema, tenderness to palpation, some minor erythema of LLE   Neuro: Awake. Grossly nonfocal CN II-XII grossly intact. Ambulating without difficulty   Psych: Oriented x 3. No anxiety or agitation.         Medications:  metoprolol succinate, 12.5 mg, Daily  magnesium oxide, 400 mg, BID  polyethylene glycol, 17 g, BID  docusate sodium, 100 mg, BID  amLODIPine, 5 mg, Daily  atorvastatin, 20 mg, Nightly  apixaban, 5 mg, BID  busPIRone, 5 mg, BID  ferrous sulfate, 325 mg, Daily with breakfast  budesonide-formoterol, 2 puff, BID RT   And  tiotropium, 2 puff, Daily RT  folic acid, 1 mg, Daily  [Held by provider] furosemide, 20 mg, Daily  gabapentin, 300 mg, Daily  levothyroxine, 25 mcg, Daily  pantoprazole, 40 mg, BID AC  sertraline, 100 mg, Daily  insulin lispro, 0-4 Units, 4x Daily AC & HS  spironolactone, 12.5 mg,

## 2025-07-13 NOTE — PROGRESS NOTES
After reading notes and speaking with pt placed 2 liters nasal cannula back on pt. Pt states she wears 2 liters at night and PRN during the day.

## 2025-07-13 NOTE — PROGRESS NOTES
Pt had 7,2 and 11 beats of SVT . HR currently 83. Pt resting no complaints at this time. Message sent to .      5672  ordered BMP, MAG and CBC.    RN called lab and notified of STAT order

## 2025-07-13 NOTE — PROGRESS NOTES
Received message back from Dr Sepulveda that she had already talked with pt. This nurse let pt know the Dr already spoke with her and is not planning discharge. Pt states she needs to go home today to prepare for court on 7/15 or she is going to lose her home. Asked pt what she planned to do because family refused to take her home due to not being discharged and the dr is not planning to discharge. Pt states she would walk out of here if she had to, she didn't care. Let pt know she wasn't suppose to bare weight on her left leg do that wasn't an option either. Pt continues to state she is going home today. Asked pt if she would let this nurse place IV while we work things out. Pt continues to refuse having IV placed.

## 2025-07-13 NOTE — PLAN OF CARE
Problem: Safety - Adult  Goal: Free from fall injury  7/12/2025 2241 by Leslie Mercado, RN  Outcome: Progressing  7/12/2025 1334 by Adri Newsome RN  Outcome: Progressing     Problem: Chronic Conditions and Co-morbidities  Goal: Patient's chronic conditions and co-morbidity symptoms are monitored and maintained or improved  7/12/2025 2241 by Leslie Mercado, RN  Outcome: Progressing  7/12/2025 1334 by Adri Newsome RN  Outcome: Progressing     Problem: Discharge Planning  Goal: Discharge to home or other facility with appropriate resources  7/12/2025 2241 by Leslie Mercado, RN  Outcome: Progressing  7/12/2025 1334 by Adri Newsome RN  Outcome: Progressing     Problem: Pain  Goal: Verbalizes/displays adequate comfort level or baseline comfort level  7/12/2025 2241 by Leslie Mercado, RN  Outcome: Progressing  7/12/2025 1334 by Adri Newsome RN  Outcome: Progressing

## 2025-07-13 NOTE — FLOWSHEET NOTE
07/12/25 2007   Vital Signs   Temp 98.9 °F (37.2 °C)   Temp Source Oral   Pulse 83   Heart Rate Source Monitor   Respirations 18   BP (!) 155/65   MAP (Calculated) 95   BP Location Right upper arm   BP Method Automatic   Patient Position Semi fowlers   Oxygen Therapy   SpO2 93 %   O2 Device None (Room air)     Pt A/O assessment completed. Immobilizer in place. Meds given per MAR. Dr harris with leaving IV out. Pt denies any needs. Call light within reach and bed alarm on

## 2025-07-13 NOTE — PROGRESS NOTES
Notified by [facility], pt had 11 beats SVT heart rate got in the 180's. Pt states feeling fine. Vitals obtained. Secure message sent to Dr Sepulveda

## 2025-07-13 NOTE — PROGRESS NOTES
Bedside report given to Adri RN  pt in stable condition no needs at this time. Call light within reach

## 2025-07-13 NOTE — PROGRESS NOTES
This nurse at bedside, provided pt with evening medications. Pt continues to refuse having iv placed. Dr Sepulveda aware.
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
## 2025-07-13 NOTE — PROGRESS NOTES
Pts son got here to pick pt up unaware that it was AMA. Son refused to take pt until tomorrow when Dr can discharge her. Dr aware. IV removed requesting to leave it out.

## 2025-07-13 NOTE — PROGRESS NOTES
Secure message sent to Dr Sepulveda. Pt has attempted to leave AMA the last two night and family was not going to take pt home unless pt is discharged, and pt is refusing SNF.

## 2025-07-13 NOTE — PROGRESS NOTES
This nurse at bedside to place IV as ordered. Pt refusing to IV placed due to needing to go home today. Secure message sent to Dr Sepulveda.

## 2025-07-13 NOTE — PROGRESS NOTES
Pt in bed during assessment. Pt is alert and oriented. Pt denies pain at this time. Vitals stable. Pt accepting of morning medications. Pt denies any additional needs. No new concerns at this time. Bed alarm in place, call light in reach.     Bedside Mobility Assessment Tool (BMAT):     Assessment Level 1- Sit and Shake    1. From a semi-reclined position, ask patient to sit up and rotate to a seated position at the side of the bed. Can use the bedrail.    2. Ask patient to reach out and grab your hand and shake making sure patient reaches across his/her midline.   Pass- Patient is able to come to a seated position, maintain core strength. Maintains seated balance while reaching across midline. Move on to Assessment Level 2.     Assessment Level 2- Stretch and Point   1. With patient in seated position at the side of the bed, have patient place both feet on the floor (or stool) with knees no higher than hips.    2. Ask patient to stretch one leg and straighten the knee, then bend the ankle/flex and point the toes. If appropriate, repeat with the other leg.   Pass- Patient is able to demonstrate appropriate quad strength on intended weight bearing limb(s). Move onto Assessment Level 3.     Assessment Level 3- Stand   1. Ask patient to elevate off the bed or chair (seated to standing) using an assistive device (cane, bedrail).    2. Patient should be able to raise buttocks off be and hold for a count of five. May repeat once.   Pass- Patient maintains standing stability for at least 5 seconds, proceed to assessment level 4.    Assessment Level 4- Walk   1. Ask patient to march in place at bedside.    2. Then ask patient to advance step and return each foot. Some medical conditions may render a patient from stepping backwards, use your best clinical judgement.   Fail- Patient not able to complete tasks OR requires use of assistive device. Patient is MOBILITY LEVEL 3.       Mobility Level- 3

## 2025-07-14 VITALS
DIASTOLIC BLOOD PRESSURE: 60 MMHG | HEIGHT: 61 IN | HEART RATE: 72 BPM | RESPIRATION RATE: 18 BRPM | BODY MASS INDEX: 38.71 KG/M2 | TEMPERATURE: 98 F | WEIGHT: 205.03 LBS | OXYGEN SATURATION: 98 % | SYSTOLIC BLOOD PRESSURE: 144 MMHG

## 2025-07-14 LAB
ANION GAP SERPL CALCULATED.3IONS-SCNC: 13 MMOL/L (ref 3–16)
BUN SERPL-MCNC: 15 MG/DL (ref 7–20)
CALCIUM SERPL-MCNC: 8.9 MG/DL (ref 8.3–10.6)
CHLORIDE SERPL-SCNC: 101 MMOL/L (ref 99–110)
CO2 SERPL-SCNC: 24 MMOL/L (ref 21–32)
CREAT SERPL-MCNC: 0.6 MG/DL (ref 0.6–1.2)
GFR SERPLBLD CREATININE-BSD FMLA CKD-EPI: >90 ML/MIN/{1.73_M2}
GLUCOSE BLD-MCNC: 112 MG/DL (ref 70–99)
GLUCOSE BLD-MCNC: 142 MG/DL (ref 70–99)
GLUCOSE SERPL-MCNC: 154 MG/DL (ref 70–99)
MAGNESIUM SERPL-MCNC: 2.1 MG/DL (ref 1.8–2.4)
PERFORMED ON: ABNORMAL
PERFORMED ON: ABNORMAL
PHOSPHATE SERPL-MCNC: 3 MG/DL (ref 2.5–4.9)
POTASSIUM SERPL-SCNC: 4.1 MMOL/L (ref 3.5–5.1)
SODIUM SERPL-SCNC: 138 MMOL/L (ref 136–145)

## 2025-07-14 PROCEDURE — 84100 ASSAY OF PHOSPHORUS: CPT

## 2025-07-14 PROCEDURE — 6370000000 HC RX 637 (ALT 250 FOR IP): Performed by: INTERNAL MEDICINE

## 2025-07-14 PROCEDURE — 6370000000 HC RX 637 (ALT 250 FOR IP)

## 2025-07-14 PROCEDURE — 2700000000 HC OXYGEN THERAPY PER DAY

## 2025-07-14 PROCEDURE — 94640 AIRWAY INHALATION TREATMENT: CPT

## 2025-07-14 PROCEDURE — 80048 BASIC METABOLIC PNL TOTAL CA: CPT

## 2025-07-14 PROCEDURE — 83735 ASSAY OF MAGNESIUM: CPT

## 2025-07-14 PROCEDURE — 94761 N-INVAS EAR/PLS OXIMETRY MLT: CPT

## 2025-07-14 PROCEDURE — 36415 COLL VENOUS BLD VENIPUNCTURE: CPT

## 2025-07-14 RX ORDER — POLYETHYLENE GLYCOL 3350 17 G/17G
17 POWDER, FOR SOLUTION ORAL 2 TIMES DAILY
Qty: 527 G | Refills: 1 | Status: SHIPPED | OUTPATIENT
Start: 2025-07-14 | End: 2025-08-13

## 2025-07-14 RX ORDER — BUDESONIDE AND FORMOTEROL FUMARATE DIHYDRATE 160; 4.5 UG/1; UG/1
2 AEROSOL RESPIRATORY (INHALATION)
Qty: 10.2 G | Refills: 3 | Status: SHIPPED | OUTPATIENT
Start: 2025-07-14

## 2025-07-14 RX ORDER — CEPHALEXIN 500 MG/1
500 CAPSULE ORAL 2 TIMES DAILY
Qty: 10 CAPSULE | Refills: 0 | Status: SHIPPED | OUTPATIENT
Start: 2025-07-14 | End: 2025-07-19

## 2025-07-14 RX ORDER — METOPROLOL SUCCINATE 25 MG/1
12.5 TABLET, EXTENDED RELEASE ORAL DAILY
Qty: 30 TABLET | Refills: 3 | Status: SHIPPED | OUTPATIENT
Start: 2025-07-15

## 2025-07-14 RX ORDER — AMLODIPINE BESYLATE 5 MG/1
5 TABLET ORAL DAILY
Qty: 30 TABLET | Refills: 1 | Status: SHIPPED | OUTPATIENT
Start: 2025-07-14

## 2025-07-14 RX ORDER — HYDROCODONE BITARTRATE AND ACETAMINOPHEN 5; 325 MG/1; MG/1
1 TABLET ORAL EVERY 6 HOURS PRN
Qty: 28 TABLET | Refills: 0 | Status: SHIPPED | OUTPATIENT
Start: 2025-07-14 | End: 2025-07-21

## 2025-07-14 RX ADMIN — METOPROLOL SUCCINATE 12.5 MG: 25 TABLET, EXTENDED RELEASE ORAL at 09:45

## 2025-07-14 RX ADMIN — DOCUSATE SODIUM 100 MG: 100 CAPSULE, LIQUID FILLED ORAL at 09:45

## 2025-07-14 RX ADMIN — FOLIC ACID 1 MG: 1 TABLET ORAL at 09:45

## 2025-07-14 RX ADMIN — LEVOTHYROXINE SODIUM 25 MCG: 0.03 TABLET ORAL at 05:35

## 2025-07-14 RX ADMIN — CEPHALEXIN 500 MG: 250 CAPSULE ORAL at 11:36

## 2025-07-14 RX ADMIN — SERTRALINE 100 MG: 100 TABLET, FILM COATED ORAL at 09:45

## 2025-07-14 RX ADMIN — CEPHALEXIN 500 MG: 250 CAPSULE ORAL at 00:18

## 2025-07-14 RX ADMIN — AMLODIPINE BESYLATE 5 MG: 5 TABLET ORAL at 09:45

## 2025-07-14 RX ADMIN — APIXABAN 5 MG: 5 TABLET, FILM COATED ORAL at 09:45

## 2025-07-14 RX ADMIN — TIOTROPIUM BROMIDE INHALATION SPRAY 5 MCG: 3.12 SPRAY, METERED RESPIRATORY (INHALATION) at 07:09

## 2025-07-14 RX ADMIN — POLYETHYLENE GLYCOL (3350) 17 G: 17 POWDER, FOR SOLUTION ORAL at 09:45

## 2025-07-14 RX ADMIN — FERROUS SULFATE TAB 325 MG (65 MG ELEMENTAL FE) 325 MG: 325 (65 FE) TAB at 09:46

## 2025-07-14 RX ADMIN — PANTOPRAZOLE SODIUM 40 MG: 40 TABLET, DELAYED RELEASE ORAL at 05:35

## 2025-07-14 RX ADMIN — CEPHALEXIN 500 MG: 250 CAPSULE ORAL at 05:35

## 2025-07-14 RX ADMIN — BUDESONIDE AND FORMOTEROL FUMARATE DIHYDRATE 2 PUFF: 160; 4.5 AEROSOL RESPIRATORY (INHALATION) at 07:09

## 2025-07-14 RX ADMIN — PANTOPRAZOLE SODIUM 40 MG: 40 TABLET, DELAYED RELEASE ORAL at 15:35

## 2025-07-14 RX ADMIN — GABAPENTIN 300 MG: 300 CAPSULE ORAL at 09:46

## 2025-07-14 RX ADMIN — AMIODARONE HYDROCHLORIDE 100 MG: 200 TABLET ORAL at 09:45

## 2025-07-14 RX ADMIN — BUSPIRONE HYDROCHLORIDE 5 MG: 5 TABLET ORAL at 09:45

## 2025-07-14 RX ADMIN — SPIRONOLACTONE 12.5 MG: 25 TABLET ORAL at 09:45

## 2025-07-14 NOTE — PROGRESS NOTES
IM Progress Note    Admit Date:  7/9/2025    Syncope and collapse  Left knee closed tibial plateau fracture     VIOLETA   Hypotension     Atrial fibrillation , SVT    Subjective:  Ms. Franz seen   Awake alert and oriented and in no distress .  Denies any complaints now  Left knee in immobilizer -toe-touch weightbearing status .  Disposition : Patient talking about going home ; does not have enough support at home will need SNF referral .patient also talking about a court appointment on Wednesday .    Toprol resumed yesterday for SVT       Objective:   Patient Vitals for the past 4 hrs:   BP Temp Temp src Pulse Resp SpO2   07/14/25 0532 (!) 143/59 97.9 °F (36.6 °C) Oral 71 16 100 %     Vitals:    07/14/25 0532 07/14/25 0709 07/14/25 0930 07/14/25 1045   BP: (!) 143/59  (!) 142/62    Pulse: 71 66 69    Resp: 16 16 18    Temp: 97.9 °F (36.6 °C)  98.1 °F (36.7 °C)    TempSrc: Oral  Oral    SpO2: 100% 96% 98%    Weight:    93 kg (205 lb 0.4 oz)   Height:              Intake/Output Summary (Last 24 hours) at 7/14/2025 0916  Last data filed at 7/14/2025 0607  Gross per 24 hour   Intake 240 ml   Output 1800 ml   Net -1560 ml       Physical Exam:    Gen: No distress. Alert.  Awake and well-oriented .  pleasant elderly female  Eyes: PERRL. No sclera icterus. No conjunctival injection.   Neck: No JVD.  Trachea midline.  Resp: No accessory muscle use. No rhonchi. +Diminished breath sounds with trace crackles and wheezing   CV: Regular rate. Regular rhythm. + murmur.  No rub.   Peripheral Pulses: +2 palpable, equal bilaterally   GI: Non-tender. Non-distended.  Normal bowel sounds.  Skin: Warm and dry. No nodule on exposed extremities. No rash on exposed extremities.   M/S: No cyanosis. No joint deformity. No clubbing. ++left knee in immobilizer, +left foot with mild edema, tenderness to palpation, some minor erythema of LLE   Neuro: Awake. Grossly nonfocal CN II-XII grossly intact. Ambulating without difficulty   Psych: Oriented x  with breakfast  budesonide-formoterol, 2 puff, BID RT   And  tiotropium, 2 puff, Daily RT  folic acid, 1 mg, Daily  [Held by provider] furosemide, 20 mg, Daily  gabapentin, 300 mg, Daily  levothyroxine, 25 mcg, Daily  pantoprazole, 40 mg, BID AC  sertraline, 100 mg, Daily  insulin lispro, 0-4 Units, 4x Daily AC & HS  spironolactone, 12.5 mg, Daily  cephALEXin, 500 mg, 4 times per day  amiodarone, 100 mg, Daily  sodium chloride flush, 5-40 mL, 2 times per day  melatonin, 3 mg, Nightly      PRN Medications:  HYDROcodone 5 mg - acetaminophen, 1 tablet, Q6H PRN  albuterol sulfate HFA, 2 puff, 4x Daily PRN  mirtazapine, 7.5 mg, Nightly PRN  glucose, 4 tablet, PRN  dextrose bolus, 125 mL, PRN   Or  dextrose bolus, 250 mL, PRN  glucagon (rDNA), 1 mg, PRN  dextrose, , Continuous PRN  sulfur hexafluoride microspheres, 2 mL, ONCE PRN  [Held by provider] HYDROcodone-acetaminophen, 1 tablet, Q6H PRN  sodium chloride flush, 5-40 mL, PRN  sodium chloride, , PRN  potassium chloride, 40 mEq, PRN   Or  potassium alternative oral replacement, 40 mEq, PRN   Or  potassium chloride, 10 mEq, PRN  magnesium sulfate, 2,000 mg, PRN  ondansetron, 4 mg, Q8H PRN   Or  ondansetron, 4 mg, Q6H PRN  acetaminophen, 500 mg, Q6H PRN   Or  acetaminophen, 325 mg, Q6H PRN          Data:  CBC:   Recent Labs     07/12/25  0610 07/13/25  0721   WBC 6.8 6.1   HGB 11.2* 11.9*   HCT 33.1* 34.8*   MCV 92.7 92.1    163     BMP:   Recent Labs     07/12/25  0610 07/13/25  0721    137   K 4.2 4.0    100   CO2 25 27   BUN 10 11   CREATININE 0.7 0.7     LIVER PROFILE:   No results for input(s): \"AST\", \"ALT\", \"LIPASE\", \"AMYLASE\", \"BILIDIR\", \"BILITOT\", \"ALKPHOS\" in the last 72 hours.    Invalid input(s): \"ALB\"    PT/INR: No results for input(s): \"PROTIME\", \"INR\" in the last 72 hours.    CULTURES  Results       ** No results found for the last 336 hours. **               RADIOLOGY  CT CHEST WO CONTRAST   Final Result   1. 6 mm nodule in the right

## 2025-07-14 NOTE — PLAN OF CARE
Problem: Safety - Adult  Goal: Free from fall injury  7/13/2025 2314 by Leslie Mrecado, RN  Outcome: Progressing  7/13/2025 1449 by Adri Newsome RN  Outcome: Progressing     Problem: Chronic Conditions and Co-morbidities  Goal: Patient's chronic conditions and co-morbidity symptoms are monitored and maintained or improved  7/13/2025 2314 by Leslie Mercado, RN  Outcome: Progressing  7/13/2025 1449 by Adri Newsome RN  Outcome: Progressing     Problem: Discharge Planning  Goal: Discharge to home or other facility with appropriate resources  7/13/2025 2314 by Leslie Mercado, RN  Outcome: Progressing  7/13/2025 1449 by Adri Newsome RN  Outcome: Progressing     Problem: Pain  Goal: Verbalizes/displays adequate comfort level or baseline comfort level  7/13/2025 2314 by Leslie Mercado, RN  Outcome: Progressing  7/13/2025 1449 by Adri Newsome RN  Outcome: Progressing
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
## 2025-07-14 NOTE — PROGRESS NOTES
IM Progress Note    Admit Date:  7/9/2025    Syncope and collapse  Left knee closed tibial plateau fracture     VIOLETA   Hypotension     Atrial fibrillation , SVT    Subjective:  Ms. Franz seen   Awake alert and oriented and in no distress .  Denies any complaints now  Left knee in immobilizer -toe-touch weightbearing status .  Disposition : Patient talking about going home ; does not have enough support at home will need SNF referral .patient also talking about a court appointment on Wednesday .    Toprol resumed yesterday for SVT       Objective:   Patient Vitals for the past 4 hrs:   BP Temp Temp src Pulse Resp SpO2   07/14/25 0532 (!) 143/59 97.9 °F (36.6 °C) Oral 71 16 100 %     Vitals:    07/14/25 0532 07/14/25 0709 07/14/25 0930 07/14/25 1045   BP: (!) 143/59  (!) 142/62    Pulse: 71 66 69    Resp: 16 16 18    Temp: 97.9 °F (36.6 °C)  98.1 °F (36.7 °C)    TempSrc: Oral  Oral    SpO2: 100% 96% 98%    Weight:    93 kg (205 lb 0.4 oz)   Height:              Intake/Output Summary (Last 24 hours) at 7/14/2025 0916  Last data filed at 7/14/2025 0607  Gross per 24 hour   Intake 240 ml   Output 1800 ml   Net -1560 ml       Physical Exam:    Gen: No distress. Alert.  Awake and well-oriented .  pleasant elderly female  Eyes: PERRL. No sclera icterus. No conjunctival injection.   Neck: No JVD.  Trachea midline.  Resp: No accessory muscle use. No rhonchi. +Diminished breath sounds with trace crackles and wheezing   CV: Regular rate. Regular rhythm. + murmur.  No rub.   Peripheral Pulses: +2 palpable, equal bilaterally   GI: Non-tender. Non-distended.  Normal bowel sounds.  Skin: Warm and dry. No nodule on exposed extremities. No rash on exposed extremities.   M/S: No cyanosis. No joint deformity. No clubbing. ++left knee in immobilizer, +left foot with mild edema, tenderness to palpation, some minor erythema of LLE   Neuro: Awake. Grossly nonfocal CN II-XII grossly intact. Ambulating without difficulty   Psych: Oriented x

## 2025-07-14 NOTE — CARE COORDINATION
CASE MANAGEMENT DISCHARGE SUMMARY      Discharge to: home    IMM given: 7/14/2025 -SOLANGE delivered second IMM to patient. Verbal explanation provided of 4 hours to review notice. Patient voiced understanding and is agreeable to discharge.      Transportation:      Family/car: friend      Confirmed discharge plan with:     Patient: yes     Family:  yes - daughter Margaret     Facility/Agency, name:  [facility]   Phone number for report to facility: [phone]     RN name: Shanti GUERRA    Note: Discharging nurse to complete ECOC, reconcile AVS, and place final copy with patient's discharge packet. RN to ensure that written prescriptions for  Level II medications are sent with patient to the facility as per protocol.

## 2025-07-14 NOTE — FLOWSHEET NOTE
07/14/25 0930   Vital Signs   Temp 98.1 °F (36.7 °C)   Temp Source Oral   Pulse 69   Heart Rate Source Monitor   Respirations 18   BP (!) 142/62   MAP (Calculated) 89   Blood Pressure Lying 142/62   Pulse Lying 69 PER MINUTE   Blood Pressure Sitting 140/70   Pulse Sitting 96 PER MINUTE   Blood Pressure Standing 127/78   Pulse Standing 102 PER MINUTE   Pain Assessment   Pain Assessment None - Denies Pain   Care Plan - Pain Goals   Verbalizes/displays adequate comfort level or baseline comfort level Encourage patient to monitor pain and request assistance;Assess pain using appropriate pain scale;Administer analgesics based on type and severity of pain and evaluate response;Implement non-pharmacological measures as appropriate and evaluate response;Consider cultural and social influences on pain and pain management;Notify Licensed Independent Practitioner if interventions unsuccessful or patient reports new pain   Opioid-Induced Sedation   POSS Score 1   Oxygen Therapy   SpO2 98 %   O2 Device None (Room air)     Patient able to TTWB for orthostatic BP. Skin pink warm and dry. LLE with immobilizer on, drsg CDI. Neurovascular check done see flow sheet. Voiding clear ying urine. Denies complains of. In good spirits. Call bell and bedside table within reach. Bed alarm on.
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
## 2025-07-14 NOTE — FLOWSHEET NOTE
07/14/25 1549   Vital Signs   Temp 98 °F (36.7 °C)   Temp Source Oral   Pulse 72   Heart Rate Source Monitor   Respirations 18   BP (!) 144/60   MAP (Calculated) 88   Pain Assessment   Pain Assessment None - Denies Pain   Care Plan - Pain Goals   Verbalizes/displays adequate comfort level or baseline comfort level Encourage patient to monitor pain and request assistance;Assess pain using appropriate pain scale;Administer analgesics based on type and severity of pain and evaluate response;Implement non-pharmacological measures as appropriate and evaluate response;Consider cultural and social influences on pain and pain management;Notify Licensed Independent Practitioner if interventions unsuccessful or patient reports new pain   Opioid-Induced Sedation   POSS Score 1   Oxygen Therapy   SpO2 98 %   O2 Device None (Room air)     Patient discharged to home, family here to pick her up. Discharge instructions given both verbally and written along with hard copy of new medications purposes and side effects, patient expressed full understanding, patient expresses full understanding of how and when to wear left knee immobilizer and follow up appointments. Left via wheelchair via car all belongings sent.

## 2025-07-14 NOTE — PROGRESS NOTES
Bedside report given to Shanti GUERRA  pt in stable condition no needs at this time. Call light within reach

## 2025-07-14 NOTE — PLAN OF CARE
Problem: Safety - Adult  Goal: Free from fall injury  7/14/2025 1555 by Shanti Harris, RN  Outcome: Completed  7/14/2025 1033 by Shanti Harris RN  Outcome: Progressing     Problem: Chronic Conditions and Co-morbidities  Goal: Patient's chronic conditions and co-morbidity symptoms are monitored and maintained or improved  7/14/2025 1555 by Shanti Harris, RN  Outcome: Completed  7/14/2025 1033 by Shanti Harris, RN  Outcome: Progressing  Flowsheets (Taken 7/14/2025 0943)  Care Plan - Patient's Chronic Conditions and Co-Morbidity Symptoms are Monitored and Maintained or Improved:   Monitor and assess patient's chronic conditions and comorbid symptoms for stability, deterioration, or improvement   Collaborate with multidisciplinary team to address chronic and comorbid conditions and prevent exacerbation or deterioration   Update acute care plan with appropriate goals if chronic or comorbid symptoms are exacerbated and prevent overall improvement and discharge     Problem: Discharge Planning  Goal: Discharge to home or other facility with appropriate resources  7/14/2025 1555 by Shanti Harris, RN  Outcome: Completed  7/14/2025 1033 by Shanti Harris, RN  Outcome: Progressing  Flowsheets (Taken 7/14/2025 0943)  Discharge to home or other facility with appropriate resources:   Identify barriers to discharge with patient and caregiver   Arrange for needed discharge resources and transportation as appropriate   Identify discharge learning needs (meds, wound care, etc)   Arrange for interpreters to assist at discharge as needed   Refer to discharge planning if patient needs post-hospital services based on physician order or complex needs related to functional status, cognitive ability or social support system     Problem: Pain  Goal: Verbalizes/displays adequate comfort level or baseline comfort level  7/14/2025 1555 by Shanti Harris, RN  Outcome: Completed  Flowsheets (Taken

## 2025-07-14 NOTE — FLOWSHEET NOTE
07/13/25 1938   Vital Signs   Temp 98.5 °F (36.9 °C)   Temp Source Oral   Pulse 77   Heart Rate Source Monitor   Respirations 16   BP (!) 151/60   MAP (Calculated) 90   BP Location Right upper arm   BP Method Automatic   Patient Position Semi fowlers   Oxygen Therapy   SpO2 99 %   O2 Device Nasal cannula   O2 Flow Rate (L/min) 2 L/min     Pt A/O assessment completed knee immobilizer noted to left leg.  Meds given per MAR. Pt denies any needs at this time. Call light within reach and bed alarm on

## 2025-07-14 NOTE — CARE COORDINATION
Reviewed chart, met with pt bedside. Pt refusing SNF. Pt plan to DC home with 2 daughters who are disabled. Spoke with daughter Margaret who had a hard time understanding the questions that were being asked to her about medical equipment in the home. Discussed with pt CM concerns about going home. Explained pt is not wearing her brace correctly and pt was unaware of this. Pt states friend Bassam [phone] will be picking her up. MD aware. Ref called to [facility] at [facility]. CM following.    1530 - Pt has not confirmed  person. Discussed with pt that she is medically ready for DC and has been ready medically since Friday. Discussed SNF recs again and pt refuses. Advised we can not send in a cab as she is NWB and the  will not assist her to get into the house. Jenelle Robles calls and states that Bassam is coming to get pt. Called Bassam and confirmed . States he is currently in the parking lot. Shanti GUERRA updated.

## 2025-07-14 NOTE — PLAN OF CARE
Problem: Safety - Adult  Goal: Free from fall injury  7/14/2025 1033 by Shanti Harris, RN  Outcome: Progressing  7/13/2025 2314 by Leslie Mercado, RN  Outcome: Progressing     Problem: Chronic Conditions and Co-morbidities  Goal: Patient's chronic conditions and co-morbidity symptoms are monitored and maintained or improved  7/14/2025 1033 by Shanti Harris, RN  Outcome: Progressing  Flowsheets (Taken 7/14/2025 0943)  Care Plan - Patient's Chronic Conditions and Co-Morbidity Symptoms are Monitored and Maintained or Improved:   Monitor and assess patient's chronic conditions and comorbid symptoms for stability, deterioration, or improvement   Collaborate with multidisciplinary team to address chronic and comorbid conditions and prevent exacerbation or deterioration   Update acute care plan with appropriate goals if chronic or comorbid symptoms are exacerbated and prevent overall improvement and discharge  7/13/2025 2314 by Leslie Mercado, RN  Outcome: Progressing     Problem: Discharge Planning  Goal: Discharge to home or other facility with appropriate resources  7/14/2025 1033 by Shanti Harris, RN  Outcome: Progressing  Flowsheets (Taken 7/14/2025 0943)  Discharge to home or other facility with appropriate resources:   Identify barriers to discharge with patient and caregiver   Arrange for needed discharge resources and transportation as appropriate   Identify discharge learning needs (meds, wound care, etc)   Arrange for interpreters to assist at discharge as needed   Refer to discharge planning if patient needs post-hospital services based on physician order or complex needs related to functional status, cognitive ability or social support system  7/13/2025 2314 by Leslie Mercado, RN  Outcome: Progressing     Problem: Pain  Goal: Verbalizes/displays adequate comfort level or baseline comfort level  7/14/2025 1033 by Shanti Harris, RN  Outcome: Progressing  Flowsheets (Taken 7/14/2025

## 2025-07-14 NOTE — PROGRESS NOTES
Pt attempting to get out of bed stating she needs to use the restroom. Reoriented pt that she has a pure wick. Pt asked if she was still in the hospital. Reoriented pt to time and place. Pt back to bed. Call light within reach bed alarm on

## 2025-07-14 NOTE — PROGRESS NOTES
Patient provided a COPD Educational Folder that includes the following materials:     [x]  [employer] Booklet: Managing your COPD  [x]  ALA: Getting the Most Out of Medication Delivery Devices  [x]  ALA: My COPD Action Plan  [x]  [employer]: [facility]   [x]  Smoking Cessation Classes  [x]  Outpatient Spiritual Care Services  [x]  Magnet: Signs of COPD    PATIENT/CAREGIVER TEACHING:   Level of patient/caregiver understanding able to:   [] Verbalize understanding   [] Demonstrate understanding       [] Teach back        [x] Needs reinforcement     []  Other:     Electronically signed by Shanti Peter RN on 7/14/2025 at 10:46 AM

## 2025-07-22 NOTE — PROGRESS NOTES
Physician Progress Note      PATIENT:               ERAN CHIRINOS  [facility] #:                  [id number]  :                       1950  ADMIT DATE:       2025 4:57 PM  DISCH DATE:        2025 4:00 PM  RESPONDING  PROVIDER #:        Leslie Sepulveda DO          QUERY TEXT:    Type II MI is documented in the medical record H&P. Please clarify the status   of this condition:    The clinical indicators include:  H&P - Type II MI: Troponin mildly elevated 33 repeat troponin 29.  Patient has   chronic elevated troponin much higher than this.  She is denying any chest   pain or palpitations.  -Continue current telemetry     cardiology note- Type II MI: Troponin mildly elevated 33 repeat troponin   29.  Patient has chronic elevated troponin much higher than this.  She is   denying any chest pain or palpitations.  -Continue current telemetry    Plan was for cardiac event monitor for 2 weeks at discharge and outpatient   follow-up.    High Sensitivity Troponin T (ng/L) 29H 33H  Options provided:  -- Type II MI ruled in  -- Type II MI ruled out  -- Other - I will add my own diagnosis  -- Disagree - Not applicable / Not valid  -- Disagree - Clinically unable to determine / Unknown  -- Refer to Clinical Documentation Reviewer    PROVIDER RESPONSE TEXT:    The patient has Type II MI ruled out    Query created by: Renetta Starr on 2025 11:03 AM      Electronically signed by:  Leslie Sepulveda DO 2025 3:08 PM

## 2025-07-23 ENCOUNTER — TELEPHONE (OUTPATIENT)
Dept: CASE MANAGEMENT | Age: 75
End: 2025-07-23

## 2025-07-23 NOTE — TELEPHONE ENCOUNTER
Imaging report CT Chest 7/10/25 with f/u imaging recommendations sent to Nona CASTILLO(R)  Patient Navigator  Incidentals/Lung Navigation  [email or web address] Hospital

## 2025-07-26 NOTE — DISCHARGE SUMMARY
8.6-50 MG tablet  Commonly known as: SENOKOT-S     sertraline 100 MG tablet  Commonly known as: ZOLOFT  Take 1 tablet by mouth daily     simvastatin 20 MG tablet  Commonly known as: ZOCOR  Take 1 tablet by mouth nightly     spironolactone 25 MG tablet  Commonly known as: ALDACTONE  Take 0.5 tablets by mouth daily     sucralfate 1 GM tablet  Commonly known as: CARAFATE  Take 1 tablet by mouth 4 times daily     Trulicity 0.75 MG/0.5ML Soaj SC injection  Generic drug: dulaglutide            STOP taking these medications      acetaminophen 325 MG tablet  Commonly known as: TYLENOL     clindamycin 150 MG capsule  Commonly known as: CLEOCIN     fluticasone-umeclidin-vilant 200-62.5-25 MCG/ACT Aepb inhaler  Commonly known as: TRELEGY ELLIPTA     hydrOXYzine HCl 25 MG tablet  Commonly known as: ATARAX     levETIRAcetam 500 MG tablet  Commonly known as: KEPPRA     metoprolol tartrate 50 MG tablet  Commonly known as: LOPRESSOR     permethrin 5 % cream  Commonly known as: ELIMITE     QUEtiapine 50 MG tablet  Commonly known as: SEROQUEL     tiZANidine 4 MG tablet  Commonly known as: ZANAFLEX            ASK your doctor about these medications      cephALEXin 500 MG capsule  Commonly known as: KEFLEX  Take 1 capsule by mouth 2 times daily for 10 doses  Ask about: Should I take this medication?     HYDROcodone-acetaminophen 5-325 MG per tablet  Commonly known as: NORCO  Take 1 tablet by mouth every 6 hours as needed for Pain for up to 7 days. Max Daily Amount: 4 tablets  Ask about: Should I take this medication?               Where to Get Your Medications        These medications were sent to [facility]  - [place], [place] - [address] - P [phone] - F [phone]  [address], [facility] [address]      Phone: [phone]   amiodarone 100 MG tablet  amLODIPine 5 MG tablet  budesonide-formoterol 160-4.5 MCG/ACT Aero  cephALEXin 500 MG capsule  furosemide 20 MG

## 2025-07-31 LAB — ECHO BSA: 1.97 M2

## 2025-08-04 ENCOUNTER — RESULTS FOLLOW-UP (OUTPATIENT)
Dept: CARDIOLOGY CLINIC | Age: 75
End: 2025-08-04

## 2025-08-04 LAB — ECHO BSA: 1.97 M2

## 2025-08-08 RX ORDER — METOPROLOL SUCCINATE 25 MG/1
25 TABLET, EXTENDED RELEASE ORAL DAILY
Qty: 30 TABLET | Refills: 3 | Status: SHIPPED | OUTPATIENT
Start: 2025-08-08
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
## 2025-08-20 ENCOUNTER — HOSPITAL ENCOUNTER (OUTPATIENT)
Dept: GENERAL RADIOLOGY | Age: 75
Discharge: HOME | End: 2025-08-20
Payer: MEDICARE

## 2025-08-20 DIAGNOSIS — M79.621 PAIN IN RIGHT UPPER ARM: ICD-10-CM

## 2025-08-20 DIAGNOSIS — R06.2 WHEEZING ON AUSCULTATION: ICD-10-CM

## 2025-08-20 PROCEDURE — 73060 X-RAY EXAM OF HUMERUS: CPT

## 2025-08-20 PROCEDURE — 71046 X-RAY EXAM CHEST 2 VIEWS: CPT

## (undated) PROCEDURE — 30233N1 TRANSFUSION OF NONAUTOLOGOUS RED BLOOD CELLS INTO PERIPHERAL VEIN, PERCUTANEOUS APPROACH: ICD-10-PCS

## (undated) PROCEDURE — 0DB68ZX EXCISION OF STOMACH, VIA NATURAL OR ARTIFICIAL OPENING ENDOSCOPIC, DIAGNOSTIC: ICD-10-PCS

## (undated) DEVICE — YANKAUER,BULB TIP,W/O VENT,RIGID,STERILE: Brand: MEDLINE

## (undated) DEVICE — CANNULA,OXY,ADULT,SUPERSOFT,W/7'TUB,SC: Brand: MEDLINE INDUSTRIES, INC.

## (undated) DEVICE — ELECTRODE,ECG,STRESS,FOAM,3PK: Brand: MEDLINE

## (undated) DEVICE — FORCEPS BX L240CM JAW DIA2.8MM L CAP W/ NDL MIC MESH TOOTH

## (undated) DEVICE — ENDOSCOPIC KIT 2 12 FT OP4 DE2 GWN SYR

## (undated) DEVICE — BITE BLOCK ENDOSCP AD 60 FR W/ ADJ STRP PLAS GRN BLOX

## (undated) DEVICE — CONMED SCOPE SAVER BITE BLOCK, 20X27 MM: Brand: SCOPE SAVER

## (undated) DEVICE — FORCEP BX STD CAP 240CM RAD JAW 4

## (undated) DEVICE — CANNULA NSL AD TBNG L7FT PVC STR NONFLARED PRNG O2 DEL W STD